# Patient Record
Sex: MALE | Employment: OTHER | URBAN - NONMETROPOLITAN AREA
[De-identification: names, ages, dates, MRNs, and addresses within clinical notes are randomized per-mention and may not be internally consistent; named-entity substitution may affect disease eponyms.]

---

## 2019-01-14 ENCOUNTER — HOSPITAL ENCOUNTER (OUTPATIENT)
Dept: GENERAL RADIOLOGY | Age: 69
Discharge: HOME | End: 2019-01-14
Payer: MEDICARE

## 2019-01-14 ENCOUNTER — HOSPITAL ENCOUNTER (OUTPATIENT)
Age: 69
Discharge: HOME | End: 2019-01-14
Payer: MEDICARE

## 2019-01-14 DIAGNOSIS — M54.9 DORSALGIA: ICD-10-CM

## 2019-01-14 DIAGNOSIS — M54.2 CERVICALGIA: ICD-10-CM

## 2019-01-14 DIAGNOSIS — R04.2 BLOOD IN SPUTUM: ICD-10-CM

## 2019-01-14 LAB
ALBUMIN SERPL-MCNC: 4.6 G/DL (ref 3.5–5.1)
ALP BLD-CCNC: 92 U/L (ref 38–126)
ALT SERPL-CCNC: 10 U/L (ref 11–66)
ANION GAP SERPL CALCULATED.3IONS-SCNC: 11 MEQ/L (ref 8–16)
AST SERPL-CCNC: 18 U/L (ref 5–40)
BILIRUB SERPL-MCNC: 0.5 MG/DL (ref 0.3–1.2)
BUN BLDV-MCNC: 12 MG/DL (ref 7–22)
CALCIUM SERPL-MCNC: 9 MG/DL (ref 8.5–10.5)
CHLORIDE BLD-SCNC: 105 MEQ/L (ref 98–111)
CO2: 26 MEQ/L (ref 23–33)
CREAT SERPL-MCNC: 0.7 MG/DL (ref 0.4–1.2)
ERYTHROCYTE [DISTWIDTH] IN BLOOD BY AUTOMATED COUNT: 15 % (ref 11.5–14.5)
ERYTHROCYTE [DISTWIDTH] IN BLOOD BY AUTOMATED COUNT: 44.9 FL (ref 35–45)
GFR SERPL CREATININE-BSD FRML MDRD: > 90 ML/MIN/1.73M2
GLUCOSE BLD-MCNC: 125 MG/DL (ref 70–108)
HCT VFR BLD CALC: 53.1 % (ref 42–52)
HEMOGLOBIN: 17.8 GM/DL (ref 14–18)
MCH RBC QN AUTO: 28.8 PG (ref 26–33)
MCHC RBC AUTO-ENTMCNC: 33.5 GM/DL (ref 32.2–35.5)
MCV RBC AUTO: 85.9 FL (ref 80–94)
PLATELET # BLD: 209 THOU/MM3 (ref 130–400)
PMV BLD AUTO: 9.9 FL (ref 9.4–12.4)
POTASSIUM SERPL-SCNC: 3.8 MEQ/L (ref 3.5–5.2)
RBC # BLD: 6.18 MILL/MM3 (ref 4.7–6.1)
SODIUM BLD-SCNC: 142 MEQ/L (ref 135–145)
TOTAL PROTEIN: 6.9 G/DL (ref 6.1–8)
WBC # BLD: 9.5 THOU/MM3 (ref 4.8–10.8)

## 2019-01-14 PROCEDURE — 80053 COMPREHEN METABOLIC PANEL: CPT

## 2019-01-14 PROCEDURE — 72100 X-RAY EXAM L-S SPINE 2/3 VWS: CPT

## 2019-01-14 PROCEDURE — 85027 COMPLETE CBC AUTOMATED: CPT

## 2019-01-14 PROCEDURE — 36415 COLL VENOUS BLD VENIPUNCTURE: CPT

## 2019-01-14 PROCEDURE — 72040 X-RAY EXAM NECK SPINE 2-3 VW: CPT

## 2019-01-14 PROCEDURE — 71046 X-RAY EXAM CHEST 2 VIEWS: CPT

## 2019-01-18 ENCOUNTER — HOSPITAL ENCOUNTER (OUTPATIENT)
Age: 69
Discharge: HOME | End: 2019-01-18
Payer: MEDICARE

## 2019-01-18 LAB
ALBUMIN SERPL-MCNC: 4.1 G/DL (ref 3.5–5.1)
ALP BLD-CCNC: 95 U/L (ref 38–126)
ALT SERPL-CCNC: 10 U/L (ref 11–66)
ANION GAP SERPL CALCULATED.3IONS-SCNC: 12 MEQ/L (ref 8–16)
AST SERPL-CCNC: 18 U/L (ref 5–40)
BILIRUB SERPL-MCNC: 0.3 MG/DL (ref 0.3–1.2)
BUN BLDV-MCNC: 16 MG/DL (ref 7–22)
CALCIUM SERPL-MCNC: 9.1 MG/DL (ref 8.5–10.5)
CHLORIDE BLD-SCNC: 104 MEQ/L (ref 98–111)
CO2: 25 MEQ/L (ref 23–33)
CREAT SERPL-MCNC: 0.8 MG/DL (ref 0.4–1.2)
GFR SERPL CREATININE-BSD FRML MDRD: > 90 ML/MIN/1.73M2
GLUCOSE BLD-MCNC: 211 MG/DL (ref 70–108)
POTASSIUM SERPL-SCNC: 4.7 MEQ/L (ref 3.5–5.2)
SODIUM BLD-SCNC: 141 MEQ/L (ref 135–145)
TOTAL PROTEIN: 6.8 G/DL (ref 6.1–8)

## 2019-01-18 PROCEDURE — 36415 COLL VENOUS BLD VENIPUNCTURE: CPT

## 2019-01-18 PROCEDURE — 80053 COMPREHEN METABOLIC PANEL: CPT

## 2019-01-22 ENCOUNTER — OFFICE VISIT (OUTPATIENT)
Dept: PULMONOLOGY | Age: 69
End: 2019-01-22
Payer: MEDICARE

## 2019-01-22 VITALS
DIASTOLIC BLOOD PRESSURE: 88 MMHG | HEIGHT: 68 IN | SYSTOLIC BLOOD PRESSURE: 130 MMHG | RESPIRATION RATE: 16 BRPM | WEIGHT: 175.2 LBS | TEMPERATURE: 98.3 F | OXYGEN SATURATION: 94 % | HEART RATE: 84 BPM | BODY MASS INDEX: 26.55 KG/M2

## 2019-01-22 DIAGNOSIS — R06.09 DOE (DYSPNEA ON EXERTION): ICD-10-CM

## 2019-01-22 DIAGNOSIS — R06.89 ABNORMAL BREATH SOUNDS: ICD-10-CM

## 2019-01-22 DIAGNOSIS — R04.2 HEMOPTYSIS: Primary | ICD-10-CM

## 2019-01-22 DIAGNOSIS — F17.200 SMOKER: ICD-10-CM

## 2019-01-22 PROCEDURE — 1101F PT FALLS ASSESS-DOCD LE1/YR: CPT | Performed by: INTERNAL MEDICINE

## 2019-01-22 PROCEDURE — 99205 OFFICE O/P NEW HI 60 MIN: CPT | Performed by: INTERNAL MEDICINE

## 2019-01-22 PROCEDURE — 4040F PNEUMOC VAC/ADMIN/RCVD: CPT | Performed by: INTERNAL MEDICINE

## 2019-01-22 PROCEDURE — 1123F ACP DISCUSS/DSCN MKR DOCD: CPT | Performed by: INTERNAL MEDICINE

## 2019-01-22 PROCEDURE — 4004F PT TOBACCO SCREEN RCVD TLK: CPT | Performed by: INTERNAL MEDICINE

## 2019-01-22 PROCEDURE — G8419 CALC BMI OUT NRM PARAM NOF/U: HCPCS | Performed by: INTERNAL MEDICINE

## 2019-01-22 PROCEDURE — G8484 FLU IMMUNIZE NO ADMIN: HCPCS | Performed by: INTERNAL MEDICINE

## 2019-01-22 PROCEDURE — G8428 CUR MEDS NOT DOCUMENT: HCPCS | Performed by: INTERNAL MEDICINE

## 2019-01-22 PROCEDURE — 3017F COLORECTAL CA SCREEN DOC REV: CPT | Performed by: INTERNAL MEDICINE

## 2019-01-22 RX ORDER — CEPHALEXIN 500 MG/1
500 CAPSULE ORAL 3 TIMES DAILY
COMMUNITY
End: 2019-02-12

## 2019-01-22 ASSESSMENT — ENCOUNTER SYMPTOMS
CHEST TIGHTNESS: 0
EYES NEGATIVE: 1
WHEEZING: 1
SHORTNESS OF BREATH: 1
ANAL BLEEDING: 0
CHOKING: 0
COUGH: 0
ABDOMINAL DISTENTION: 0
STRIDOR: 0
BACK PAIN: 1
ABDOMINAL PAIN: 0

## 2019-01-23 ENCOUNTER — HOSPITAL ENCOUNTER (OUTPATIENT)
Dept: CT IMAGING | Age: 69
Discharge: HOME | End: 2019-01-23
Payer: MEDICARE

## 2019-01-23 DIAGNOSIS — F17.200 SMOKER: ICD-10-CM

## 2019-01-23 PROCEDURE — 6360000004 HC RX CONTRAST MEDICATION: Performed by: INTERNAL MEDICINE

## 2019-01-23 PROCEDURE — 71260 CT THORAX DX C+: CPT

## 2019-01-23 RX ADMIN — IOPAMIDOL 85 ML: 755 INJECTION, SOLUTION INTRAVENOUS at 13:09

## 2019-01-24 ENCOUNTER — HOSPITAL ENCOUNTER (OUTPATIENT)
Age: 69
Discharge: HOME | End: 2019-01-24
Payer: MEDICARE

## 2019-01-24 DIAGNOSIS — R04.2 HEMOPTYSIS: ICD-10-CM

## 2019-01-24 PROCEDURE — 86480 TB TEST CELL IMMUN MEASURE: CPT

## 2019-01-25 ENCOUNTER — ANESTHESIA (OUTPATIENT)
Dept: ENDOSCOPY | Age: 69
End: 2019-01-25
Payer: MEDICARE

## 2019-01-25 ENCOUNTER — HOSPITAL ENCOUNTER (OUTPATIENT)
Age: 69
Setting detail: OUTPATIENT SURGERY
Discharge: HOME | End: 2019-01-25
Attending: INTERNAL MEDICINE | Admitting: INTERNAL MEDICINE
Payer: MEDICARE

## 2019-01-25 ENCOUNTER — APPOINTMENT (OUTPATIENT)
Dept: GENERAL RADIOLOGY | Age: 69
End: 2019-01-25
Attending: INTERNAL MEDICINE
Payer: MEDICARE

## 2019-01-25 ENCOUNTER — ANESTHESIA EVENT (OUTPATIENT)
Dept: ENDOSCOPY | Age: 69
End: 2019-01-25
Payer: MEDICARE

## 2019-01-25 VITALS
BODY MASS INDEX: 26.64 KG/M2 | HEART RATE: 78 BPM | HEIGHT: 68 IN | RESPIRATION RATE: 20 BRPM | WEIGHT: 175.8 LBS | TEMPERATURE: 98.3 F | DIASTOLIC BLOOD PRESSURE: 69 MMHG | OXYGEN SATURATION: 97 % | SYSTOLIC BLOOD PRESSURE: 121 MMHG

## 2019-01-25 VITALS
TEMPERATURE: 97.3 F | RESPIRATION RATE: 25 BRPM | SYSTOLIC BLOOD PRESSURE: 112 MMHG | DIASTOLIC BLOOD PRESSURE: 72 MMHG | OXYGEN SATURATION: 93 %

## 2019-01-25 PROCEDURE — 2580000003 HC RX 258: Performed by: INTERNAL MEDICINE

## 2019-01-25 PROCEDURE — 87077 CULTURE AEROBIC IDENTIFY: CPT

## 2019-01-25 PROCEDURE — 6370000000 HC RX 637 (ALT 250 FOR IP)

## 2019-01-25 PROCEDURE — 2500000003 HC RX 250 WO HCPCS: Performed by: NURSE ANESTHETIST, CERTIFIED REGISTERED

## 2019-01-25 PROCEDURE — 31628 BRONCHOSCOPY/LUNG BX EACH: CPT | Performed by: INTERNAL MEDICINE

## 2019-01-25 PROCEDURE — 3609011800 HC BRONCHOSCOPY/TRANSBRONCHIAL LUNG BIOPSY: Performed by: INTERNAL MEDICINE

## 2019-01-25 PROCEDURE — 6370000000 HC RX 637 (ALT 250 FOR IP): Performed by: NURSE ANESTHETIST, CERTIFIED REGISTERED

## 2019-01-25 PROCEDURE — 88112 CYTOPATH CELL ENHANCE TECH: CPT

## 2019-01-25 PROCEDURE — 6370000000 HC RX 637 (ALT 250 FOR IP): Performed by: INTERNAL MEDICINE

## 2019-01-25 PROCEDURE — 87070 CULTURE OTHR SPECIMN AEROBIC: CPT

## 2019-01-25 PROCEDURE — 7100000001 HC PACU RECOVERY - ADDTL 15 MIN: Performed by: INTERNAL MEDICINE

## 2019-01-25 PROCEDURE — 71045 X-RAY EXAM CHEST 1 VIEW: CPT

## 2019-01-25 PROCEDURE — 3609027000 HC BRONCHOSCOPY: Performed by: INTERNAL MEDICINE

## 2019-01-25 PROCEDURE — 87102 FUNGUS ISOLATION CULTURE: CPT

## 2019-01-25 PROCEDURE — 3700000000 HC ANESTHESIA ATTENDED CARE: Performed by: INTERNAL MEDICINE

## 2019-01-25 PROCEDURE — 88305 TISSUE EXAM BY PATHOLOGIST: CPT

## 2019-01-25 PROCEDURE — 3700000001 HC ADD 15 MINUTES (ANESTHESIA): Performed by: INTERNAL MEDICINE

## 2019-01-25 PROCEDURE — 87184 SC STD DISK METHOD PER PLATE: CPT

## 2019-01-25 PROCEDURE — 6360000002 HC RX W HCPCS: Performed by: NURSE ANESTHETIST, CERTIFIED REGISTERED

## 2019-01-25 PROCEDURE — 87186 SC STD MICRODIL/AGAR DIL: CPT

## 2019-01-25 PROCEDURE — 7100000000 HC PACU RECOVERY - FIRST 15 MIN: Performed by: INTERNAL MEDICINE

## 2019-01-25 PROCEDURE — 87116 MYCOBACTERIA CULTURE: CPT

## 2019-01-25 PROCEDURE — 87205 SMEAR GRAM STAIN: CPT

## 2019-01-25 PROCEDURE — 2500000003 HC RX 250 WO HCPCS: Performed by: INTERNAL MEDICINE

## 2019-01-25 PROCEDURE — 2500000003 HC RX 250 WO HCPCS

## 2019-01-25 RX ORDER — PROPOFOL 10 MG/ML
INJECTION, EMULSION INTRAVENOUS PRN
Status: DISCONTINUED | OUTPATIENT
Start: 2019-01-25 | End: 2019-01-25 | Stop reason: SDUPTHER

## 2019-01-25 RX ORDER — LIDOCAINE HYDROCHLORIDE 10 MG/ML
INJECTION, SOLUTION EPIDURAL; INFILTRATION; INTRACAUDAL; PERINEURAL PRN
Status: DISCONTINUED | OUTPATIENT
Start: 2019-01-25 | End: 2019-01-25 | Stop reason: HOSPADM

## 2019-01-25 RX ORDER — LIDOCAINE HYDROCHLORIDE 40 MG/ML
SOLUTION TOPICAL PRN
Status: DISCONTINUED | OUTPATIENT
Start: 2019-01-25 | End: 2019-01-25 | Stop reason: SDUPTHER

## 2019-01-25 RX ORDER — SODIUM CHLORIDE 450 MG/100ML
INJECTION, SOLUTION INTRAVENOUS CONTINUOUS
Status: DISCONTINUED | OUTPATIENT
Start: 2019-01-25 | End: 2019-01-25 | Stop reason: HOSPADM

## 2019-01-25 RX ORDER — KETAMINE HYDROCHLORIDE 50 MG/ML
INJECTION, SOLUTION, CONCENTRATE INTRAMUSCULAR; INTRAVENOUS PRN
Status: DISCONTINUED | OUTPATIENT
Start: 2019-01-25 | End: 2019-01-25 | Stop reason: SDUPTHER

## 2019-01-25 RX ORDER — OXYMETAZOLINE HYDROCHLORIDE 0.05 G/100ML
SPRAY NASAL PRN
Status: DISCONTINUED | OUTPATIENT
Start: 2019-01-25 | End: 2019-01-25 | Stop reason: HOSPADM

## 2019-01-25 RX ORDER — GLYCOPYRROLATE 1 MG/5 ML
SYRINGE (ML) INTRAVENOUS PRN
Status: DISCONTINUED | OUTPATIENT
Start: 2019-01-25 | End: 2019-01-25 | Stop reason: SDUPTHER

## 2019-01-25 RX ADMIN — LIDOCAINE HYDROCHLORIDE 4 ML: 40 SOLUTION TOPICAL at 12:14

## 2019-01-25 RX ADMIN — PROPOFOL 50 MG: 10 INJECTION, EMULSION INTRAVENOUS at 12:18

## 2019-01-25 RX ADMIN — SODIUM CHLORIDE: 4.5 INJECTION, SOLUTION INTRAVENOUS at 10:57

## 2019-01-25 RX ADMIN — PROPOFOL 160 MG: 10 INJECTION, EMULSION INTRAVENOUS at 12:22

## 2019-01-25 RX ADMIN — Medication 0.4 MG: at 12:18

## 2019-01-25 RX ADMIN — KETAMINE HYDROCHLORIDE 20 MG: 50 INJECTION, SOLUTION INTRAMUSCULAR; INTRAVENOUS at 12:18

## 2019-01-25 RX ADMIN — KETAMINE HYDROCHLORIDE 20 MG: 50 INJECTION, SOLUTION INTRAMUSCULAR; INTRAVENOUS at 12:35

## 2019-01-25 ASSESSMENT — PAIN - FUNCTIONAL ASSESSMENT: PAIN_FUNCTIONAL_ASSESSMENT: 0-10

## 2019-01-25 ASSESSMENT — PAIN SCALES - GENERAL
PAINLEVEL_OUTOF10: 0
PAINLEVEL_OUTOF10: 0

## 2019-01-28 LAB
QUANTI TB GOLD PLUS: NEGATIVE
QUANTI TB1 MINUS NIL: 0.03 IU/ML (ref 0–0.34)
QUANTI TB2 MINUS NIL: 0.03 IU/ML (ref 0–0.34)
QUANTIFERON MITOGEN MINUS NIL: > 10 IU/ML
QUANTIFERON NIL: 0.08 IU/ML

## 2019-01-29 LAB
GRAM STAIN RESULT: ABNORMAL
ORGANISM: ABNORMAL
RESPIRATORY CULTURE: ABNORMAL
RESPIRATORY CULTURE: ABNORMAL

## 2019-01-30 ENCOUNTER — HOSPITAL ENCOUNTER (OUTPATIENT)
Dept: PULMONOLOGY | Age: 69
Discharge: HOME | End: 2019-01-30
Payer: MEDICARE

## 2019-01-30 PROCEDURE — 94729 DIFFUSING CAPACITY: CPT

## 2019-01-30 PROCEDURE — 2709999900 HC NON-CHARGEABLE SUPPLY

## 2019-01-30 PROCEDURE — 94060 EVALUATION OF WHEEZING: CPT

## 2019-01-30 PROCEDURE — 94726 PLETHYSMOGRAPHY LUNG VOLUMES: CPT

## 2019-02-06 ENCOUNTER — OFFICE VISIT (OUTPATIENT)
Dept: PULMONOLOGY | Age: 69
End: 2019-02-06
Payer: MEDICARE

## 2019-02-06 VITALS
WEIGHT: 174 LBS | SYSTOLIC BLOOD PRESSURE: 138 MMHG | TEMPERATURE: 98.1 F | DIASTOLIC BLOOD PRESSURE: 82 MMHG | RESPIRATION RATE: 20 BRPM | BODY MASS INDEX: 26.37 KG/M2 | OXYGEN SATURATION: 97 % | HEART RATE: 80 BPM | HEIGHT: 68 IN

## 2019-02-06 DIAGNOSIS — R91.8 MASS OF LOWER LOBE OF LEFT LUNG: Primary | ICD-10-CM

## 2019-02-06 DIAGNOSIS — F17.200 SMOKER: ICD-10-CM

## 2019-02-06 PROCEDURE — G8419 CALC BMI OUT NRM PARAM NOF/U: HCPCS | Performed by: INTERNAL MEDICINE

## 2019-02-06 PROCEDURE — 3017F COLORECTAL CA SCREEN DOC REV: CPT | Performed by: INTERNAL MEDICINE

## 2019-02-06 PROCEDURE — 4004F PT TOBACCO SCREEN RCVD TLK: CPT | Performed by: INTERNAL MEDICINE

## 2019-02-06 PROCEDURE — 4040F PNEUMOC VAC/ADMIN/RCVD: CPT | Performed by: INTERNAL MEDICINE

## 2019-02-06 PROCEDURE — 1123F ACP DISCUSS/DSCN MKR DOCD: CPT | Performed by: INTERNAL MEDICINE

## 2019-02-06 PROCEDURE — G8427 DOCREV CUR MEDS BY ELIG CLIN: HCPCS | Performed by: INTERNAL MEDICINE

## 2019-02-06 PROCEDURE — 1101F PT FALLS ASSESS-DOCD LE1/YR: CPT | Performed by: INTERNAL MEDICINE

## 2019-02-06 PROCEDURE — G8484 FLU IMMUNIZE NO ADMIN: HCPCS | Performed by: INTERNAL MEDICINE

## 2019-02-06 PROCEDURE — 99213 OFFICE O/P EST LOW 20 MIN: CPT | Performed by: INTERNAL MEDICINE

## 2019-02-06 ASSESSMENT — ENCOUNTER SYMPTOMS
CHEST TIGHTNESS: 0
BACK PAIN: 1
STRIDOR: 0
ABDOMINAL DISTENTION: 0
EYES NEGATIVE: 1
WHEEZING: 1
ANAL BLEEDING: 0
SHORTNESS OF BREATH: 1
COUGH: 0
ABDOMINAL PAIN: 0
CHOKING: 0

## 2019-02-11 RX ORDER — FENTANYL CITRATE 50 UG/ML
50 INJECTION, SOLUTION INTRAMUSCULAR; INTRAVENOUS ONCE
Status: CANCELLED | OUTPATIENT
Start: 2019-02-11 | End: 2019-02-11

## 2019-02-11 RX ORDER — SODIUM CHLORIDE 450 MG/100ML
INJECTION, SOLUTION INTRAVENOUS CONTINUOUS
Status: CANCELLED | OUTPATIENT
Start: 2019-02-11

## 2019-02-11 RX ORDER — MIDAZOLAM HYDROCHLORIDE 1 MG/ML
1 INJECTION INTRAMUSCULAR; INTRAVENOUS ONCE
Status: CANCELLED | OUTPATIENT
Start: 2019-02-11 | End: 2019-02-11

## 2019-02-12 ENCOUNTER — HOSPITAL ENCOUNTER (OUTPATIENT)
Dept: CT IMAGING | Age: 69
Discharge: HOME | End: 2019-02-12
Payer: MEDICARE

## 2019-02-12 ENCOUNTER — HOSPITAL ENCOUNTER (OUTPATIENT)
Dept: GENERAL RADIOLOGY | Age: 69
Discharge: HOME | End: 2019-02-12
Payer: MEDICARE

## 2019-02-12 VITALS
HEART RATE: 75 BPM | HEIGHT: 68 IN | DIASTOLIC BLOOD PRESSURE: 74 MMHG | WEIGHT: 174.8 LBS | SYSTOLIC BLOOD PRESSURE: 129 MMHG | TEMPERATURE: 98.1 F | RESPIRATION RATE: 18 BRPM | OXYGEN SATURATION: 93 % | BODY MASS INDEX: 26.49 KG/M2

## 2019-02-12 DIAGNOSIS — F17.200 SMOKER: ICD-10-CM

## 2019-02-12 DIAGNOSIS — R91.8 MASS OF LOWER LOBE OF LEFT LUNG: ICD-10-CM

## 2019-02-12 LAB
ERYTHROCYTE [DISTWIDTH] IN BLOOD BY AUTOMATED COUNT: 14 % (ref 11.5–14.5)
ERYTHROCYTE [DISTWIDTH] IN BLOOD BY AUTOMATED COUNT: 43.1 FL (ref 35–45)
HCT VFR BLD CALC: 50.5 % (ref 42–52)
HEMOGLOBIN: 16.9 GM/DL (ref 14–18)
MCH RBC QN AUTO: 28.5 PG (ref 26–33)
MCHC RBC AUTO-ENTMCNC: 33.5 GM/DL (ref 32.2–35.5)
MCV RBC AUTO: 85.2 FL (ref 80–94)
PLATELET # BLD: 196 THOU/MM3 (ref 130–400)
PMV BLD AUTO: 9.8 FL (ref 9.4–12.4)
RBC # BLD: 5.93 MILL/MM3 (ref 4.7–6.1)
WBC # BLD: 10.3 THOU/MM3 (ref 4.8–10.8)

## 2019-02-12 PROCEDURE — 71045 X-RAY EXAM CHEST 1 VIEW: CPT

## 2019-02-12 PROCEDURE — 6360000002 HC RX W HCPCS

## 2019-02-12 PROCEDURE — 6370000000 HC RX 637 (ALT 250 FOR IP)

## 2019-02-12 PROCEDURE — 32405 CT NEEDLE BIOPSY LUNG PERCUTANEOUS: CPT

## 2019-02-12 PROCEDURE — 77012 CT SCAN FOR NEEDLE BIOPSY: CPT

## 2019-02-12 PROCEDURE — 88341 IMHCHEM/IMCYTCHM EA ADD ANTB: CPT

## 2019-02-12 PROCEDURE — 85027 COMPLETE CBC AUTOMATED: CPT

## 2019-02-12 PROCEDURE — 2709999900 HC NON-CHARGEABLE SUPPLY

## 2019-02-12 PROCEDURE — 88333 PATH CONSLTJ SURG CYTO XM 1: CPT

## 2019-02-12 PROCEDURE — 88305 TISSUE EXAM BY PATHOLOGIST: CPT

## 2019-02-12 PROCEDURE — 88342 IMHCHEM/IMCYTCHM 1ST ANTB: CPT

## 2019-02-12 PROCEDURE — 2580000003 HC RX 258: Performed by: RADIOLOGY

## 2019-02-12 PROCEDURE — 36415 COLL VENOUS BLD VENIPUNCTURE: CPT

## 2019-02-12 RX ORDER — OMEPRAZOLE 20 MG/1
20 CAPSULE, DELAYED RELEASE ORAL DAILY
Status: ON HOLD | COMMUNITY

## 2019-02-12 RX ORDER — DIAPER,BRIEF,INFANT-TODD,DISP
EACH MISCELLANEOUS ONCE
Status: COMPLETED | OUTPATIENT
Start: 2019-02-12 | End: 2019-02-12

## 2019-02-12 RX ORDER — FENTANYL CITRATE 50 UG/ML
50 INJECTION, SOLUTION INTRAMUSCULAR; INTRAVENOUS ONCE
Status: COMPLETED | OUTPATIENT
Start: 2019-02-12 | End: 2019-02-12

## 2019-02-12 RX ORDER — MIDAZOLAM HYDROCHLORIDE 1 MG/ML
1 INJECTION INTRAMUSCULAR; INTRAVENOUS ONCE
Status: COMPLETED | OUTPATIENT
Start: 2019-02-12 | End: 2019-02-12

## 2019-02-12 RX ORDER — SODIUM CHLORIDE 450 MG/100ML
INJECTION, SOLUTION INTRAVENOUS CONTINUOUS
Status: DISCONTINUED | OUTPATIENT
Start: 2019-02-12 | End: 2019-02-13 | Stop reason: HOSPADM

## 2019-02-12 RX ADMIN — FENTANYL CITRATE 50 MCG: 50 INJECTION, SOLUTION INTRAMUSCULAR; INTRAVENOUS at 09:49

## 2019-02-12 RX ADMIN — MIDAZOLAM HYDROCHLORIDE 1 MG: 1 INJECTION INTRAMUSCULAR; INTRAVENOUS at 09:48

## 2019-02-12 RX ADMIN — SODIUM CHLORIDE: 4.5 INJECTION, SOLUTION INTRAVENOUS at 08:38

## 2019-02-12 RX ADMIN — Medication 0.9 G: at 10:10

## 2019-02-12 ASSESSMENT — PAIN SCALES - GENERAL
PAINLEVEL_OUTOF10: 0

## 2019-02-12 ASSESSMENT — PAIN - FUNCTIONAL ASSESSMENT: PAIN_FUNCTIONAL_ASSESSMENT: 0-10

## 2019-02-19 ENCOUNTER — TELEPHONE (OUTPATIENT)
Dept: PULMONOLOGY | Age: 69
End: 2019-02-19

## 2019-02-19 DIAGNOSIS — C34.92 ADENOCARCINOMA OF LEFT LUNG (HCC): Primary | ICD-10-CM

## 2019-02-19 DIAGNOSIS — C34.12 MALIGNANT NEOPLASM OF UPPER LOBE, LEFT BRONCHUS OR LUNG (HCC): ICD-10-CM

## 2019-02-20 ENCOUNTER — OFFICE VISIT (OUTPATIENT)
Dept: PULMONOLOGY | Age: 69
End: 2019-02-20
Payer: MEDICARE

## 2019-02-20 ENCOUNTER — TELEPHONE (OUTPATIENT)
Dept: CARDIOTHORACIC SURGERY | Age: 69
End: 2019-02-20

## 2019-02-20 VITALS
WEIGHT: 176.2 LBS | HEART RATE: 81 BPM | OXYGEN SATURATION: 95 % | SYSTOLIC BLOOD PRESSURE: 124 MMHG | RESPIRATION RATE: 18 BRPM | HEIGHT: 68 IN | DIASTOLIC BLOOD PRESSURE: 70 MMHG | BODY MASS INDEX: 26.7 KG/M2 | TEMPERATURE: 97.6 F

## 2019-02-20 DIAGNOSIS — C34.92 ADENOCARCINOMA OF LEFT LUNG (HCC): Primary | ICD-10-CM

## 2019-02-20 DIAGNOSIS — J44.9 CHRONIC OBSTRUCTIVE PULMONARY DISEASE, UNSPECIFIED COPD TYPE (HCC): ICD-10-CM

## 2019-02-20 DIAGNOSIS — M54.2 NECK PAIN: ICD-10-CM

## 2019-02-20 PROCEDURE — 4040F PNEUMOC VAC/ADMIN/RCVD: CPT | Performed by: INTERNAL MEDICINE

## 2019-02-20 PROCEDURE — 3023F SPIROM DOC REV: CPT | Performed by: INTERNAL MEDICINE

## 2019-02-20 PROCEDURE — G8419 CALC BMI OUT NRM PARAM NOF/U: HCPCS | Performed by: INTERNAL MEDICINE

## 2019-02-20 PROCEDURE — 1101F PT FALLS ASSESS-DOCD LE1/YR: CPT | Performed by: INTERNAL MEDICINE

## 2019-02-20 PROCEDURE — G8926 SPIRO NO PERF OR DOC: HCPCS | Performed by: INTERNAL MEDICINE

## 2019-02-20 PROCEDURE — 1123F ACP DISCUSS/DSCN MKR DOCD: CPT | Performed by: INTERNAL MEDICINE

## 2019-02-20 PROCEDURE — G8484 FLU IMMUNIZE NO ADMIN: HCPCS | Performed by: INTERNAL MEDICINE

## 2019-02-20 PROCEDURE — 4004F PT TOBACCO SCREEN RCVD TLK: CPT | Performed by: INTERNAL MEDICINE

## 2019-02-20 PROCEDURE — 99214 OFFICE O/P EST MOD 30 MIN: CPT | Performed by: INTERNAL MEDICINE

## 2019-02-20 PROCEDURE — G8427 DOCREV CUR MEDS BY ELIG CLIN: HCPCS | Performed by: INTERNAL MEDICINE

## 2019-02-20 PROCEDURE — 3017F COLORECTAL CA SCREEN DOC REV: CPT | Performed by: INTERNAL MEDICINE

## 2019-02-20 ASSESSMENT — ENCOUNTER SYMPTOMS
CHOKING: 0
SHORTNESS OF BREATH: 0
ABDOMINAL PAIN: 0
EYES NEGATIVE: 1
COUGH: 1
STRIDOR: 0
BACK PAIN: 0
ANAL BLEEDING: 0
CHEST TIGHTNESS: 0
ABDOMINAL DISTENTION: 0

## 2019-02-25 ENCOUNTER — HOSPITAL ENCOUNTER (OUTPATIENT)
Dept: PET IMAGING | Age: 69
Discharge: HOME | End: 2019-02-25
Payer: MEDICARE

## 2019-02-25 DIAGNOSIS — C34.92 ADENOCARCINOMA OF LEFT LUNG (HCC): ICD-10-CM

## 2019-02-25 DIAGNOSIS — C34.12 MALIGNANT NEOPLASM OF UPPER LOBE, LEFT BRONCHUS OR LUNG (HCC): ICD-10-CM

## 2019-02-25 LAB
FUNGUS IDENTIFIED: NORMAL
FUNGUS SMEAR: NORMAL

## 2019-02-25 PROCEDURE — 78815 PET IMAGE W/CT SKULL-THIGH: CPT

## 2019-02-25 PROCEDURE — A9552 F18 FDG: HCPCS | Performed by: INTERNAL MEDICINE

## 2019-02-25 PROCEDURE — 3430000000 HC RX DIAGNOSTIC RADIOPHARMACEUTICAL: Performed by: INTERNAL MEDICINE

## 2019-02-25 RX ORDER — FLUDEOXYGLUCOSE F 18 200 MCI/ML
10 INJECTION, SOLUTION INTRAVENOUS
Status: COMPLETED | OUTPATIENT
Start: 2019-02-25 | End: 2019-02-25

## 2019-02-25 RX ADMIN — FLUDEOXYGLUCOSE F 18 12.19 MILLICURIE: 200 INJECTION, SOLUTION INTRAVENOUS at 07:55

## 2019-02-26 ENCOUNTER — TELEPHONE (OUTPATIENT)
Dept: PULMONOLOGY | Age: 69
End: 2019-02-26

## 2019-02-26 ENCOUNTER — HOSPITAL ENCOUNTER (OUTPATIENT)
Dept: INFUSION THERAPY | Age: 69
Discharge: HOME | End: 2019-02-26
Payer: MEDICARE

## 2019-02-26 ENCOUNTER — OFFICE VISIT (OUTPATIENT)
Dept: ONCOLOGY | Age: 69
End: 2019-02-26
Payer: MEDICARE

## 2019-02-26 ENCOUNTER — CLINICAL DOCUMENTATION (OUTPATIENT)
Dept: CASE MANAGEMENT | Age: 69
End: 2019-02-26

## 2019-02-26 VITALS
HEIGHT: 68 IN | WEIGHT: 177.2 LBS | SYSTOLIC BLOOD PRESSURE: 144 MMHG | OXYGEN SATURATION: 96 % | HEART RATE: 82 BPM | TEMPERATURE: 97.8 F | DIASTOLIC BLOOD PRESSURE: 78 MMHG | RESPIRATION RATE: 16 BRPM | BODY MASS INDEX: 26.86 KG/M2

## 2019-02-26 DIAGNOSIS — C34.32 MALIGNANT NEOPLASM OF LOWER LOBE OF LEFT LUNG (HCC): Primary | ICD-10-CM

## 2019-02-26 PROCEDURE — 1123F ACP DISCUSS/DSCN MKR DOCD: CPT | Performed by: INTERNAL MEDICINE

## 2019-02-26 PROCEDURE — G8484 FLU IMMUNIZE NO ADMIN: HCPCS | Performed by: INTERNAL MEDICINE

## 2019-02-26 PROCEDURE — 4040F PNEUMOC VAC/ADMIN/RCVD: CPT | Performed by: INTERNAL MEDICINE

## 2019-02-26 PROCEDURE — G8419 CALC BMI OUT NRM PARAM NOF/U: HCPCS | Performed by: INTERNAL MEDICINE

## 2019-02-26 PROCEDURE — 99205 OFFICE O/P NEW HI 60 MIN: CPT | Performed by: INTERNAL MEDICINE

## 2019-02-26 PROCEDURE — G8427 DOCREV CUR MEDS BY ELIG CLIN: HCPCS | Performed by: INTERNAL MEDICINE

## 2019-02-26 PROCEDURE — 99211 OFF/OP EST MAY X REQ PHY/QHP: CPT

## 2019-02-26 PROCEDURE — 3017F COLORECTAL CA SCREEN DOC REV: CPT | Performed by: INTERNAL MEDICINE

## 2019-02-26 PROCEDURE — 4004F PT TOBACCO SCREEN RCVD TLK: CPT | Performed by: INTERNAL MEDICINE

## 2019-02-26 PROCEDURE — 1101F PT FALLS ASSESS-DOCD LE1/YR: CPT | Performed by: INTERNAL MEDICINE

## 2019-02-27 ENCOUNTER — CLINICAL DOCUMENTATION (OUTPATIENT)
Dept: CASE MANAGEMENT | Age: 69
End: 2019-02-27

## 2019-02-27 ASSESSMENT — ENCOUNTER SYMPTOMS
COUGH: 1
VOMITING: 0
BLOOD IN STOOL: 0
SHORTNESS OF BREATH: 1
NAUSEA: 0
BACK PAIN: 0
CONSTIPATION: 0
WHEEZING: 0
COLOR CHANGE: 0
DIARRHEA: 0
FACIAL SWELLING: 0
TROUBLE SWALLOWING: 0
EYE DISCHARGE: 0
ABDOMINAL DISTENTION: 0
RECTAL PAIN: 0
CHEST TIGHTNESS: 0
SORE THROAT: 0
ABDOMINAL PAIN: 0

## 2019-02-28 ENCOUNTER — ANESTHESIA EVENT (OUTPATIENT)
Dept: OPERATING ROOM | Age: 69
End: 2019-02-28
Payer: MEDICARE

## 2019-02-28 ENCOUNTER — TELEPHONE (OUTPATIENT)
Dept: PULMONOLOGY | Age: 69
End: 2019-02-28

## 2019-03-01 ENCOUNTER — HOSPITAL ENCOUNTER (OUTPATIENT)
Age: 69
Setting detail: OUTPATIENT SURGERY
Discharge: HOME | End: 2019-03-01
Attending: INTERNAL MEDICINE | Admitting: INTERNAL MEDICINE
Payer: MEDICARE

## 2019-03-01 ENCOUNTER — APPOINTMENT (OUTPATIENT)
Dept: GENERAL RADIOLOGY | Age: 69
End: 2019-03-01
Attending: INTERNAL MEDICINE
Payer: MEDICARE

## 2019-03-01 ENCOUNTER — ANESTHESIA (OUTPATIENT)
Dept: OPERATING ROOM | Age: 69
End: 2019-03-01
Payer: MEDICARE

## 2019-03-01 VITALS
HEIGHT: 68 IN | DIASTOLIC BLOOD PRESSURE: 69 MMHG | WEIGHT: 175 LBS | TEMPERATURE: 97.8 F | RESPIRATION RATE: 16 BRPM | SYSTOLIC BLOOD PRESSURE: 119 MMHG | HEART RATE: 76 BPM | OXYGEN SATURATION: 98 % | BODY MASS INDEX: 26.52 KG/M2

## 2019-03-01 VITALS
DIASTOLIC BLOOD PRESSURE: 82 MMHG | OXYGEN SATURATION: 99 % | SYSTOLIC BLOOD PRESSURE: 135 MMHG | RESPIRATION RATE: 1 BRPM

## 2019-03-01 LAB
EKG ATRIAL RATE: 78 BPM
EKG P AXIS: 66 DEGREES
EKG P-R INTERVAL: 144 MS
EKG Q-T INTERVAL: 406 MS
EKG QRS DURATION: 126 MS
EKG QTC CALCULATION (BAZETT): 462 MS
EKG R AXIS: -67 DEGREES
EKG T AXIS: 19 DEGREES
EKG VENTRICULAR RATE: 78 BPM

## 2019-03-01 PROCEDURE — 3609020000 HC BRONCHOSCOPY W/EBUS FNA: Performed by: INTERNAL MEDICINE

## 2019-03-01 PROCEDURE — C1725 CATH, TRANSLUMIN NON-LASER: HCPCS | Performed by: INTERNAL MEDICINE

## 2019-03-01 PROCEDURE — 88112 CYTOPATH CELL ENHANCE TECH: CPT

## 2019-03-01 PROCEDURE — 7100000001 HC PACU RECOVERY - ADDTL 15 MIN: Performed by: INTERNAL MEDICINE

## 2019-03-01 PROCEDURE — 2500000003 HC RX 250 WO HCPCS: Performed by: NURSE ANESTHETIST, CERTIFIED REGISTERED

## 2019-03-01 PROCEDURE — 3603165200 HC BRNCHSC EBUS GUIDED SAMPL 1/2 NODE STATION/STRUX: Performed by: INTERNAL MEDICINE

## 2019-03-01 PROCEDURE — 7100000010 HC PHASE II RECOVERY - FIRST 15 MIN: Performed by: INTERNAL MEDICINE

## 2019-03-01 PROCEDURE — 7100000011 HC PHASE II RECOVERY - ADDTL 15 MIN: Performed by: INTERNAL MEDICINE

## 2019-03-01 PROCEDURE — 31629 BRONCHOSCOPY/NEEDLE BX EACH: CPT | Performed by: INTERNAL MEDICINE

## 2019-03-01 PROCEDURE — 71045 X-RAY EXAM CHEST 1 VIEW: CPT

## 2019-03-01 PROCEDURE — 6360000002 HC RX W HCPCS: Performed by: NURSE ANESTHETIST, CERTIFIED REGISTERED

## 2019-03-01 PROCEDURE — 2709999900 HC NON-CHARGEABLE SUPPLY: Performed by: INTERNAL MEDICINE

## 2019-03-01 PROCEDURE — 93010 ELECTROCARDIOGRAM REPORT: CPT | Performed by: NUCLEAR MEDICINE

## 2019-03-01 PROCEDURE — 7100000000 HC PACU RECOVERY - FIRST 15 MIN: Performed by: INTERNAL MEDICINE

## 2019-03-01 PROCEDURE — 93005 ELECTROCARDIOGRAM TRACING: CPT | Performed by: INTERNAL MEDICINE

## 2019-03-01 PROCEDURE — 2580000003 HC RX 258: Performed by: INTERNAL MEDICINE

## 2019-03-01 PROCEDURE — 3700000001 HC ADD 15 MINUTES (ANESTHESIA): Performed by: INTERNAL MEDICINE

## 2019-03-01 PROCEDURE — 3700000000 HC ANESTHESIA ATTENDED CARE: Performed by: INTERNAL MEDICINE

## 2019-03-01 PROCEDURE — 31652 BRONCH EBUS SAMPLNG 1/2 NODE: CPT | Performed by: INTERNAL MEDICINE

## 2019-03-01 RX ORDER — PROPOFOL 10 MG/ML
INJECTION, EMULSION INTRAVENOUS CONTINUOUS PRN
Status: DISCONTINUED | OUTPATIENT
Start: 2019-03-01 | End: 2019-03-01 | Stop reason: SDUPTHER

## 2019-03-01 RX ORDER — ROCURONIUM BROMIDE 10 MG/ML
INJECTION, SOLUTION INTRAVENOUS PRN
Status: DISCONTINUED | OUTPATIENT
Start: 2019-03-01 | End: 2019-03-01 | Stop reason: SDUPTHER

## 2019-03-01 RX ORDER — FENTANYL CITRATE 50 UG/ML
INJECTION, SOLUTION INTRAMUSCULAR; INTRAVENOUS PRN
Status: DISCONTINUED | OUTPATIENT
Start: 2019-03-01 | End: 2019-03-01 | Stop reason: SDUPTHER

## 2019-03-01 RX ORDER — ONDANSETRON 2 MG/ML
INJECTION INTRAMUSCULAR; INTRAVENOUS PRN
Status: DISCONTINUED | OUTPATIENT
Start: 2019-03-01 | End: 2019-03-01 | Stop reason: SDUPTHER

## 2019-03-01 RX ORDER — KETAMINE HYDROCHLORIDE 50 MG/ML
INJECTION, SOLUTION, CONCENTRATE INTRAMUSCULAR; INTRAVENOUS PRN
Status: DISCONTINUED | OUTPATIENT
Start: 2019-03-01 | End: 2019-03-01 | Stop reason: SDUPTHER

## 2019-03-01 RX ORDER — MIDAZOLAM HYDROCHLORIDE 1 MG/ML
INJECTION INTRAMUSCULAR; INTRAVENOUS PRN
Status: DISCONTINUED | OUTPATIENT
Start: 2019-03-01 | End: 2019-03-01 | Stop reason: SDUPTHER

## 2019-03-01 RX ORDER — LIDOCAINE HYDROCHLORIDE 20 MG/ML
INJECTION, SOLUTION INFILTRATION; PERINEURAL PRN
Status: DISCONTINUED | OUTPATIENT
Start: 2019-03-01 | End: 2019-03-01 | Stop reason: SDUPTHER

## 2019-03-01 RX ORDER — DEXAMETHASONE SODIUM PHOSPHATE 4 MG/ML
INJECTION, SOLUTION INTRA-ARTICULAR; INTRALESIONAL; INTRAMUSCULAR; INTRAVENOUS; SOFT TISSUE PRN
Status: DISCONTINUED | OUTPATIENT
Start: 2019-03-01 | End: 2019-03-01 | Stop reason: SDUPTHER

## 2019-03-01 RX ORDER — NEOSTIGMINE METHYLSULFATE 1 MG/ML
INJECTION, SOLUTION INTRAVENOUS PRN
Status: DISCONTINUED | OUTPATIENT
Start: 2019-03-01 | End: 2019-03-01 | Stop reason: SDUPTHER

## 2019-03-01 RX ORDER — PROPOFOL 10 MG/ML
INJECTION, EMULSION INTRAVENOUS PRN
Status: DISCONTINUED | OUTPATIENT
Start: 2019-03-01 | End: 2019-03-01 | Stop reason: SDUPTHER

## 2019-03-01 RX ORDER — GLYCOPYRROLATE 1 MG/5 ML
SYRINGE (ML) INTRAVENOUS PRN
Status: DISCONTINUED | OUTPATIENT
Start: 2019-03-01 | End: 2019-03-01 | Stop reason: SDUPTHER

## 2019-03-01 RX ORDER — SODIUM CHLORIDE 450 MG/100ML
INJECTION, SOLUTION INTRAVENOUS CONTINUOUS
Status: DISCONTINUED | OUTPATIENT
Start: 2019-03-01 | End: 2019-03-01 | Stop reason: HOSPADM

## 2019-03-01 RX ADMIN — FENTANYL CITRATE 100 MCG: 50 INJECTION INTRAMUSCULAR; INTRAVENOUS at 09:13

## 2019-03-01 RX ADMIN — Medication 0.6 MG: at 10:45

## 2019-03-01 RX ADMIN — Medication 0.2 MG: at 09:13

## 2019-03-01 RX ADMIN — MIDAZOLAM HYDROCHLORIDE 2 MG: 1 INJECTION, SOLUTION INTRAMUSCULAR; INTRAVENOUS at 09:07

## 2019-03-01 RX ADMIN — PHENYLEPHRINE HYDROCHLORIDE 100 MCG: 10 INJECTION INTRAVENOUS at 09:55

## 2019-03-01 RX ADMIN — PROPOFOL 120 MCG/KG/MIN: 10 INJECTION, EMULSION INTRAVENOUS at 09:13

## 2019-03-01 RX ADMIN — FENTANYL CITRATE 50 MCG: 50 INJECTION INTRAMUSCULAR; INTRAVENOUS at 09:56

## 2019-03-01 RX ADMIN — FENTANYL CITRATE 50 MCG: 50 INJECTION INTRAMUSCULAR; INTRAVENOUS at 09:34

## 2019-03-01 RX ADMIN — PROPOFOL 200 MG: 10 INJECTION, EMULSION INTRAVENOUS at 09:13

## 2019-03-01 RX ADMIN — SODIUM CHLORIDE: 4.5 INJECTION, SOLUTION INTRAVENOUS at 06:44

## 2019-03-01 RX ADMIN — ONDANSETRON HYDROCHLORIDE 4 MG: 4 INJECTION, SOLUTION INTRAMUSCULAR; INTRAVENOUS at 09:13

## 2019-03-01 RX ADMIN — ROCURONIUM BROMIDE 40 MG: 10 INJECTION INTRAVENOUS at 09:13

## 2019-03-01 RX ADMIN — ROCURONIUM BROMIDE 10 MG: 10 INJECTION INTRAVENOUS at 09:56

## 2019-03-01 RX ADMIN — ROCURONIUM BROMIDE 20 MG: 10 INJECTION INTRAVENOUS at 10:18

## 2019-03-01 RX ADMIN — KETAMINE HYDROCHLORIDE 50 MG: 50 INJECTION, SOLUTION INTRAMUSCULAR; INTRAVENOUS at 09:13

## 2019-03-01 RX ADMIN — SODIUM CHLORIDE: 4.5 INJECTION, SOLUTION INTRAVENOUS at 10:25

## 2019-03-01 RX ADMIN — LIDOCAINE HYDROCHLORIDE 100 MG: 20 INJECTION, SOLUTION INFILTRATION; PERINEURAL at 09:13

## 2019-03-01 RX ADMIN — NEOSTIGMINE METHYLSULFATE 3 MG: 1 INJECTION, SOLUTION INTRAVENOUS at 10:45

## 2019-03-01 RX ADMIN — DEXAMETHASONE SODIUM PHOSPHATE 10 MG: 4 INJECTION, SOLUTION INTRAMUSCULAR; INTRAVENOUS at 09:13

## 2019-03-01 ASSESSMENT — PULMONARY FUNCTION TESTS
PIF_VALUE: 35
PIF_VALUE: 9
PIF_VALUE: 19
PIF_VALUE: 35
PIF_VALUE: 4
PIF_VALUE: 0
PIF_VALUE: 19
PIF_VALUE: 35
PIF_VALUE: 2
PIF_VALUE: 35
PIF_VALUE: 1
PIF_VALUE: 35
PIF_VALUE: 24
PIF_VALUE: 1
PIF_VALUE: 35
PIF_VALUE: 20
PIF_VALUE: 35
PIF_VALUE: 19
PIF_VALUE: 35
PIF_VALUE: 20
PIF_VALUE: 30
PIF_VALUE: 35
PIF_VALUE: 19
PIF_VALUE: 25
PIF_VALUE: 26
PIF_VALUE: 35
PIF_VALUE: 35
PIF_VALUE: 19
PIF_VALUE: 35
PIF_VALUE: 19
PIF_VALUE: 35
PIF_VALUE: 19
PIF_VALUE: 30
PIF_VALUE: 35
PIF_VALUE: 27
PIF_VALUE: 19
PIF_VALUE: 34
PIF_VALUE: 35
PIF_VALUE: 4
PIF_VALUE: 35
PIF_VALUE: 35
PIF_VALUE: 2
PIF_VALUE: 35
PIF_VALUE: 0
PIF_VALUE: 35
PIF_VALUE: 20
PIF_VALUE: 32
PIF_VALUE: 35
PIF_VALUE: 2
PIF_VALUE: 14
PIF_VALUE: 35
PIF_VALUE: 17
PIF_VALUE: 35
PIF_VALUE: 0
PIF_VALUE: 13
PIF_VALUE: 19
PIF_VALUE: 35
PIF_VALUE: 0
PIF_VALUE: 5
PIF_VALUE: 35
PIF_VALUE: 0
PIF_VALUE: 35
PIF_VALUE: 3
PIF_VALUE: 35
PIF_VALUE: 20
PIF_VALUE: 35
PIF_VALUE: 20
PIF_VALUE: 35
PIF_VALUE: 30
PIF_VALUE: 20
PIF_VALUE: 19
PIF_VALUE: 19
PIF_VALUE: 35
PIF_VALUE: 36
PIF_VALUE: 0
PIF_VALUE: 24
PIF_VALUE: 3
PIF_VALUE: 35
PIF_VALUE: 32
PIF_VALUE: 35
PIF_VALUE: 19
PIF_VALUE: 19
PIF_VALUE: 35
PIF_VALUE: 27
PIF_VALUE: 35
PIF_VALUE: 32
PIF_VALUE: 35

## 2019-03-01 ASSESSMENT — ENCOUNTER SYMPTOMS: SHORTNESS OF BREATH: 1

## 2019-03-01 ASSESSMENT — PAIN SCALES - GENERAL
PAINLEVEL_OUTOF10: 0
PAINLEVEL_OUTOF10: 0

## 2019-03-01 ASSESSMENT — PAIN - FUNCTIONAL ASSESSMENT: PAIN_FUNCTIONAL_ASSESSMENT: 0-10

## 2019-03-02 ENCOUNTER — HOSPITAL ENCOUNTER (OUTPATIENT)
Dept: CT IMAGING | Age: 69
Discharge: HOME | End: 2019-03-02
Payer: MEDICARE

## 2019-03-02 ENCOUNTER — HOSPITAL ENCOUNTER (OUTPATIENT)
Dept: MRI IMAGING | Age: 69
Discharge: HOME | End: 2019-03-02
Payer: MEDICARE

## 2019-03-02 DIAGNOSIS — C34.92 ADENOCARCINOMA OF LEFT LUNG (HCC): ICD-10-CM

## 2019-03-02 DIAGNOSIS — M54.2 NECK PAIN: ICD-10-CM

## 2019-03-02 DIAGNOSIS — J44.9 CHRONIC OBSTRUCTIVE PULMONARY DISEASE, UNSPECIFIED COPD TYPE (HCC): ICD-10-CM

## 2019-03-02 LAB — POC CREATININE WHOLE BLOOD: 1 MG/DL (ref 0.5–1.2)

## 2019-03-02 PROCEDURE — A9579 GAD-BASE MR CONTRAST NOS,1ML: HCPCS | Performed by: INTERNAL MEDICINE

## 2019-03-02 PROCEDURE — 82565 ASSAY OF CREATININE: CPT

## 2019-03-02 PROCEDURE — 70553 MRI BRAIN STEM W/O & W/DYE: CPT

## 2019-03-02 PROCEDURE — 6360000004 HC RX CONTRAST MEDICATION: Performed by: INTERNAL MEDICINE

## 2019-03-02 PROCEDURE — 70492 CT SFT TSUE NCK W/O & W/DYE: CPT

## 2019-03-02 RX ADMIN — IOPAMIDOL 65 ML: 755 INJECTION, SOLUTION INTRAVENOUS at 08:33

## 2019-03-02 RX ADMIN — GADOTERIDOL 15 ML: 279.3 INJECTION, SOLUTION INTRAVENOUS at 09:14

## 2019-03-04 ENCOUNTER — OFFICE VISIT (OUTPATIENT)
Dept: CARDIOTHORACIC SURGERY | Age: 69
End: 2019-03-04
Payer: MEDICARE

## 2019-03-04 VITALS
HEART RATE: 96 BPM | WEIGHT: 176.6 LBS | BODY MASS INDEX: 26.76 KG/M2 | DIASTOLIC BLOOD PRESSURE: 90 MMHG | HEIGHT: 68 IN | SYSTOLIC BLOOD PRESSURE: 138 MMHG

## 2019-03-04 DIAGNOSIS — C34.32 PRIMARY ADENOCARCINOMA OF LOWER LOBE OF LEFT LUNG (HCC): Primary | ICD-10-CM

## 2019-03-04 DIAGNOSIS — Z01.810 ENCOUNTER FOR PREPROCEDURAL CARDIOVASCULAR EXAMINATION: ICD-10-CM

## 2019-03-04 PROCEDURE — 3017F COLORECTAL CA SCREEN DOC REV: CPT | Performed by: THORACIC SURGERY (CARDIOTHORACIC VASCULAR SURGERY)

## 2019-03-04 PROCEDURE — 99204 OFFICE O/P NEW MOD 45 MIN: CPT | Performed by: THORACIC SURGERY (CARDIOTHORACIC VASCULAR SURGERY)

## 2019-03-04 PROCEDURE — 4040F PNEUMOC VAC/ADMIN/RCVD: CPT | Performed by: THORACIC SURGERY (CARDIOTHORACIC VASCULAR SURGERY)

## 2019-03-04 PROCEDURE — 4004F PT TOBACCO SCREEN RCVD TLK: CPT | Performed by: THORACIC SURGERY (CARDIOTHORACIC VASCULAR SURGERY)

## 2019-03-04 PROCEDURE — G8484 FLU IMMUNIZE NO ADMIN: HCPCS | Performed by: THORACIC SURGERY (CARDIOTHORACIC VASCULAR SURGERY)

## 2019-03-04 PROCEDURE — G8427 DOCREV CUR MEDS BY ELIG CLIN: HCPCS | Performed by: THORACIC SURGERY (CARDIOTHORACIC VASCULAR SURGERY)

## 2019-03-04 PROCEDURE — 1101F PT FALLS ASSESS-DOCD LE1/YR: CPT | Performed by: THORACIC SURGERY (CARDIOTHORACIC VASCULAR SURGERY)

## 2019-03-04 PROCEDURE — 1123F ACP DISCUSS/DSCN MKR DOCD: CPT | Performed by: THORACIC SURGERY (CARDIOTHORACIC VASCULAR SURGERY)

## 2019-03-04 PROCEDURE — G8419 CALC BMI OUT NRM PARAM NOF/U: HCPCS | Performed by: THORACIC SURGERY (CARDIOTHORACIC VASCULAR SURGERY)

## 2019-03-04 ASSESSMENT — ENCOUNTER SYMPTOMS
EYES NEGATIVE: 1
ALLERGIC/IMMUNOLOGIC NEGATIVE: 1
RESPIRATORY NEGATIVE: 1
GASTROINTESTINAL NEGATIVE: 1

## 2019-03-05 ENCOUNTER — TELEPHONE (OUTPATIENT)
Dept: CARDIOTHORACIC SURGERY | Age: 69
End: 2019-03-05

## 2019-03-05 ENCOUNTER — TELEPHONE (OUTPATIENT)
Dept: PULMONOLOGY | Age: 69
End: 2019-03-05

## 2019-03-06 ENCOUNTER — TELEPHONE (OUTPATIENT)
Dept: CARDIOTHORACIC SURGERY | Age: 69
End: 2019-03-06

## 2019-03-07 ENCOUNTER — CLINICAL DOCUMENTATION (OUTPATIENT)
Dept: CASE MANAGEMENT | Age: 69
End: 2019-03-07

## 2019-03-08 ENCOUNTER — HOSPITAL ENCOUNTER (OUTPATIENT)
Dept: NON INVASIVE DIAGNOSTICS | Age: 69
Discharge: HOME | End: 2019-03-08
Payer: MEDICARE

## 2019-03-08 VITALS — WEIGHT: 175 LBS | HEIGHT: 68 IN | BODY MASS INDEX: 26.52 KG/M2

## 2019-03-08 DIAGNOSIS — Z01.810 ENCOUNTER FOR PREPROCEDURAL CARDIOVASCULAR EXAMINATION: ICD-10-CM

## 2019-03-08 DIAGNOSIS — C34.32 PRIMARY ADENOCARCINOMA OF LOWER LOBE OF LEFT LUNG (HCC): ICD-10-CM

## 2019-03-08 PROCEDURE — 6360000002 HC RX W HCPCS

## 2019-03-08 PROCEDURE — 93018 CV STRESS TEST I&R ONLY: CPT | Performed by: NUCLEAR MEDICINE

## 2019-03-08 PROCEDURE — A9500 TC99M SESTAMIBI: HCPCS | Performed by: NUCLEAR MEDICINE

## 2019-03-08 PROCEDURE — 78452 HT MUSCLE IMAGE SPECT MULT: CPT

## 2019-03-08 PROCEDURE — 93016 CV STRESS TEST SUPVJ ONLY: CPT | Performed by: NUCLEAR MEDICINE

## 2019-03-08 PROCEDURE — 78452 HT MUSCLE IMAGE SPECT MULT: CPT | Performed by: NUCLEAR MEDICINE

## 2019-03-08 PROCEDURE — 2709999900 HC NON-CHARGEABLE SUPPLY

## 2019-03-08 PROCEDURE — 93017 CV STRESS TEST TRACING ONLY: CPT

## 2019-03-08 PROCEDURE — 3430000000 HC RX DIAGNOSTIC RADIOPHARMACEUTICAL: Performed by: NUCLEAR MEDICINE

## 2019-03-08 RX ADMIN — Medication 11 MILLICURIE: at 08:12

## 2019-03-08 RX ADMIN — Medication 35 MILLICURIE: at 09:12

## 2019-03-11 ENCOUNTER — OFFICE VISIT (OUTPATIENT)
Dept: CARDIOLOGY CLINIC | Age: 69
End: 2019-03-11
Payer: MEDICARE

## 2019-03-11 VITALS
WEIGHT: 179.6 LBS | DIASTOLIC BLOOD PRESSURE: 68 MMHG | BODY MASS INDEX: 27.22 KG/M2 | HEART RATE: 88 BPM | HEIGHT: 68 IN | SYSTOLIC BLOOD PRESSURE: 142 MMHG

## 2019-03-11 DIAGNOSIS — E78.01 FAMILIAL HYPERCHOLESTEROLEMIA: ICD-10-CM

## 2019-03-11 DIAGNOSIS — Z01.818 PRE-OP EVALUATION: ICD-10-CM

## 2019-03-11 DIAGNOSIS — I10 ESSENTIAL HYPERTENSION: Primary | ICD-10-CM

## 2019-03-11 DIAGNOSIS — R94.39 ABNORMAL STRESS TEST: ICD-10-CM

## 2019-03-11 DIAGNOSIS — R06.02 SHORTNESS OF BREATH: ICD-10-CM

## 2019-03-11 LAB — AFB CULTURE & SMEAR: NORMAL

## 2019-03-11 PROCEDURE — G8419 CALC BMI OUT NRM PARAM NOF/U: HCPCS | Performed by: NUCLEAR MEDICINE

## 2019-03-11 PROCEDURE — G8484 FLU IMMUNIZE NO ADMIN: HCPCS | Performed by: NUCLEAR MEDICINE

## 2019-03-11 PROCEDURE — 1101F PT FALLS ASSESS-DOCD LE1/YR: CPT | Performed by: NUCLEAR MEDICINE

## 2019-03-11 PROCEDURE — 4040F PNEUMOC VAC/ADMIN/RCVD: CPT | Performed by: NUCLEAR MEDICINE

## 2019-03-11 PROCEDURE — 4004F PT TOBACCO SCREEN RCVD TLK: CPT | Performed by: NUCLEAR MEDICINE

## 2019-03-11 PROCEDURE — 1123F ACP DISCUSS/DSCN MKR DOCD: CPT | Performed by: NUCLEAR MEDICINE

## 2019-03-11 PROCEDURE — 99204 OFFICE O/P NEW MOD 45 MIN: CPT | Performed by: NUCLEAR MEDICINE

## 2019-03-11 PROCEDURE — 3017F COLORECTAL CA SCREEN DOC REV: CPT | Performed by: NUCLEAR MEDICINE

## 2019-03-11 PROCEDURE — G8427 DOCREV CUR MEDS BY ELIG CLIN: HCPCS | Performed by: NUCLEAR MEDICINE

## 2019-03-11 ASSESSMENT — ENCOUNTER SYMPTOMS
CHEST TIGHTNESS: 0
BLOOD IN STOOL: 0
COLOR CHANGE: 0
PHOTOPHOBIA: 0
SHORTNESS OF BREATH: 1
DIARRHEA: 0
ABDOMINAL DISTENTION: 0
VOMITING: 0
BACK PAIN: 0
ABDOMINAL PAIN: 0
RECTAL PAIN: 0
NAUSEA: 0
CONSTIPATION: 0
ANAL BLEEDING: 0

## 2019-03-12 ENCOUNTER — HOSPITAL ENCOUNTER (OUTPATIENT)
Dept: INPATIENT UNIT | Age: 69
Discharge: HOME | DRG: 163 | End: 2019-03-13
Attending: NUCLEAR MEDICINE | Admitting: NUCLEAR MEDICINE
Payer: MEDICARE

## 2019-03-12 PROBLEM — Z98.61 S/P CORONARY ANGIOPLASTY: Status: ACTIVE | Noted: 2019-03-12

## 2019-03-12 PROBLEM — Z98.890 S/P CARDIAC CATH: Status: ACTIVE | Noted: 2019-03-12

## 2019-03-12 PROBLEM — I25.10 CAD IN NATIVE ARTERY: Status: ACTIVE | Noted: 2019-03-12

## 2019-03-12 LAB
ABO: NORMAL
ACTIVATED CLOTTING TIME: 263 SECONDS (ref 1–150)
ANION GAP SERPL CALCULATED.3IONS-SCNC: 11 MEQ/L (ref 8–16)
ANTIBODY SCREEN: NORMAL
BUN BLDV-MCNC: 17 MG/DL (ref 7–22)
CALCIUM SERPL-MCNC: 9.1 MG/DL (ref 8.5–10.5)
CHLORIDE BLD-SCNC: 102 MEQ/L (ref 98–111)
CHOLESTEROL, TOTAL: 171 MG/DL (ref 100–199)
CO2: 26 MEQ/L (ref 23–33)
CREAT SERPL-MCNC: 0.8 MG/DL (ref 0.4–1.2)
EKG ATRIAL RATE: 69 BPM
EKG ATRIAL RATE: 73 BPM
EKG P AXIS: 68 DEGREES
EKG P AXIS: 72 DEGREES
EKG P-R INTERVAL: 138 MS
EKG P-R INTERVAL: 142 MS
EKG Q-T INTERVAL: 414 MS
EKG Q-T INTERVAL: 432 MS
EKG QRS DURATION: 134 MS
EKG QRS DURATION: 134 MS
EKG QTC CALCULATION (BAZETT): 456 MS
EKG QTC CALCULATION (BAZETT): 462 MS
EKG R AXIS: -63 DEGREES
EKG R AXIS: -70 DEGREES
EKG T AXIS: 17 DEGREES
EKG T AXIS: 9 DEGREES
EKG VENTRICULAR RATE: 69 BPM
EKG VENTRICULAR RATE: 73 BPM
ERYTHROCYTE [DISTWIDTH] IN BLOOD BY AUTOMATED COUNT: 13.5 % (ref 11.5–14.5)
ERYTHROCYTE [DISTWIDTH] IN BLOOD BY AUTOMATED COUNT: 42.3 FL (ref 35–45)
GFR SERPL CREATININE-BSD FRML MDRD: > 90 ML/MIN/1.73M2
GLUCOSE BLD-MCNC: 207 MG/DL (ref 70–108)
HCT VFR BLD CALC: 45.1 % (ref 42–52)
HDLC SERPL-MCNC: 48 MG/DL
HEMOGLOBIN: 15 GM/DL (ref 14–18)
INR BLD: 0.88 (ref 0.85–1.13)
LDL CHOLESTEROL CALCULATED: 102 MG/DL
MCH RBC QN AUTO: 28.3 PG (ref 26–33)
MCHC RBC AUTO-ENTMCNC: 33.3 GM/DL (ref 32.2–35.5)
MCV RBC AUTO: 85.1 FL (ref 80–94)
PLATELET # BLD: 198 THOU/MM3 (ref 130–400)
PMV BLD AUTO: 10 FL (ref 9.4–12.4)
POTASSIUM REFLEX MAGNESIUM: 4.5 MEQ/L (ref 3.5–5.2)
PREGNANCY, SERUM: NEGATIVE
RBC # BLD: 5.3 MILL/MM3 (ref 4.7–6.1)
RH FACTOR: NORMAL
SODIUM BLD-SCNC: 139 MEQ/L (ref 135–145)
TRIGL SERPL-MCNC: 105 MG/DL (ref 0–199)
WBC # BLD: 8.8 THOU/MM3 (ref 4.8–10.8)

## 2019-03-12 PROCEDURE — C1760 CLOSURE DEV, VASC: HCPCS

## 2019-03-12 PROCEDURE — C1725 CATH, TRANSLUMIN NON-LASER: HCPCS

## 2019-03-12 PROCEDURE — 6360000004 HC RX CONTRAST MEDICATION: Performed by: NUCLEAR MEDICINE

## 2019-03-12 PROCEDURE — 93005 ELECTROCARDIOGRAM TRACING: CPT | Performed by: NUCLEAR MEDICINE

## 2019-03-12 PROCEDURE — 6370000000 HC RX 637 (ALT 250 FOR IP)

## 2019-03-12 PROCEDURE — C1887 CATHETER, GUIDING: HCPCS

## 2019-03-12 PROCEDURE — 86901 BLOOD TYPING SEROLOGIC RH(D): CPT

## 2019-03-12 PROCEDURE — C1874 STENT, COATED/COV W/DEL SYS: HCPCS

## 2019-03-12 PROCEDURE — 84703 CHORIONIC GONADOTROPIN ASSAY: CPT

## 2019-03-12 PROCEDURE — 36415 COLL VENOUS BLD VENIPUNCTURE: CPT

## 2019-03-12 PROCEDURE — 93010 ELECTROCARDIOGRAM REPORT: CPT | Performed by: INTERNAL MEDICINE

## 2019-03-12 PROCEDURE — 2580000003 HC RX 258: Performed by: NUCLEAR MEDICINE

## 2019-03-12 PROCEDURE — 93458 L HRT ARTERY/VENTRICLE ANGIO: CPT | Performed by: NUCLEAR MEDICINE

## 2019-03-12 PROCEDURE — 2709999900 HC NON-CHARGEABLE SUPPLY

## 2019-03-12 PROCEDURE — 86850 RBC ANTIBODY SCREEN: CPT

## 2019-03-12 PROCEDURE — 6360000002 HC RX W HCPCS

## 2019-03-12 PROCEDURE — 2580000003 HC RX 258: Performed by: INTERNAL MEDICINE

## 2019-03-12 PROCEDURE — C1894 INTRO/SHEATH, NON-LASER: HCPCS

## 2019-03-12 PROCEDURE — 85347 COAGULATION TIME ACTIVATED: CPT

## 2019-03-12 PROCEDURE — 85610 PROTHROMBIN TIME: CPT

## 2019-03-12 PROCEDURE — 80048 BASIC METABOLIC PNL TOTAL CA: CPT

## 2019-03-12 PROCEDURE — C1769 GUIDE WIRE: HCPCS

## 2019-03-12 PROCEDURE — 80061 LIPID PANEL: CPT

## 2019-03-12 PROCEDURE — 86900 BLOOD TYPING SEROLOGIC ABO: CPT

## 2019-03-12 PROCEDURE — 93005 ELECTROCARDIOGRAM TRACING: CPT | Performed by: INTERNAL MEDICINE

## 2019-03-12 PROCEDURE — 2500000003 HC RX 250 WO HCPCS

## 2019-03-12 PROCEDURE — C9600 PERC DRUG-EL COR STENT SING: HCPCS | Performed by: NUCLEAR MEDICINE

## 2019-03-12 PROCEDURE — 85027 COMPLETE CBC AUTOMATED: CPT

## 2019-03-12 RX ORDER — ASPIRIN 81 MG/1
81 TABLET, CHEWABLE ORAL DAILY
Status: DISCONTINUED | OUTPATIENT
Start: 2019-03-12 | End: 2019-03-13 | Stop reason: HOSPADM

## 2019-03-12 RX ORDER — LISINOPRIL 40 MG/1
40 TABLET ORAL DAILY
Status: DISCONTINUED | OUTPATIENT
Start: 2019-03-12 | End: 2019-03-13 | Stop reason: HOSPADM

## 2019-03-12 RX ORDER — ATORVASTATIN CALCIUM 20 MG/1
20 TABLET, FILM COATED ORAL NIGHTLY
Status: DISCONTINUED | OUTPATIENT
Start: 2019-03-12 | End: 2019-03-13 | Stop reason: HOSPADM

## 2019-03-12 RX ORDER — DUTASTERIDE 0.5 MG/1
0.5 CAPSULE, LIQUID FILLED ORAL DAILY
Status: DISCONTINUED | OUTPATIENT
Start: 2019-03-12 | End: 2019-03-13 | Stop reason: HOSPADM

## 2019-03-12 RX ORDER — AMLODIPINE BESYLATE 10 MG/1
10 TABLET ORAL DAILY
Status: DISCONTINUED | OUTPATIENT
Start: 2019-03-12 | End: 2019-03-13 | Stop reason: HOSPADM

## 2019-03-12 RX ORDER — SODIUM CHLORIDE 0.9 % (FLUSH) 0.9 %
10 SYRINGE (ML) INJECTION EVERY 12 HOURS SCHEDULED
Status: DISCONTINUED | OUTPATIENT
Start: 2019-03-12 | End: 2019-03-13 | Stop reason: HOSPADM

## 2019-03-12 RX ORDER — SODIUM CHLORIDE 9 MG/ML
75 INJECTION, SOLUTION INTRAVENOUS CONTINUOUS
Status: ACTIVE | OUTPATIENT
Start: 2019-03-12 | End: 2019-03-13

## 2019-03-12 RX ORDER — SODIUM CHLORIDE 0.9 % (FLUSH) 0.9 %
10 SYRINGE (ML) INJECTION EVERY 12 HOURS SCHEDULED
Status: DISCONTINUED | OUTPATIENT
Start: 2019-03-12 | End: 2019-03-12 | Stop reason: SDUPTHER

## 2019-03-12 RX ORDER — SODIUM CHLORIDE 0.9 % (FLUSH) 0.9 %
10 SYRINGE (ML) INJECTION PRN
Status: DISCONTINUED | OUTPATIENT
Start: 2019-03-12 | End: 2019-03-13 | Stop reason: HOSPADM

## 2019-03-12 RX ORDER — SODIUM CHLORIDE 0.9 % (FLUSH) 0.9 %
10 SYRINGE (ML) INJECTION PRN
Status: DISCONTINUED | OUTPATIENT
Start: 2019-03-12 | End: 2019-03-12 | Stop reason: SDUPTHER

## 2019-03-12 RX ORDER — ACETAMINOPHEN 325 MG/1
650 TABLET ORAL EVERY 4 HOURS PRN
Status: DISCONTINUED | OUTPATIENT
Start: 2019-03-12 | End: 2019-03-13 | Stop reason: HOSPADM

## 2019-03-12 RX ORDER — ONDANSETRON 2 MG/ML
4 INJECTION INTRAMUSCULAR; INTRAVENOUS EVERY 6 HOURS PRN
Status: DISCONTINUED | OUTPATIENT
Start: 2019-03-12 | End: 2019-03-13 | Stop reason: HOSPADM

## 2019-03-12 RX ORDER — ASPIRIN 81 MG/1
324 TABLET, CHEWABLE ORAL ONCE
Status: DISCONTINUED | OUTPATIENT
Start: 2019-03-12 | End: 2019-03-13 | Stop reason: ALTCHOICE

## 2019-03-12 RX ORDER — CLOPIDOGREL BISULFATE 75 MG/1
75 TABLET ORAL DAILY
Status: DISCONTINUED | OUTPATIENT
Start: 2019-03-13 | End: 2019-03-13 | Stop reason: HOSPADM

## 2019-03-12 RX ORDER — DIPHENHYDRAMINE HYDROCHLORIDE 50 MG/ML
25 INJECTION INTRAMUSCULAR; INTRAVENOUS ONCE
Status: DISCONTINUED | OUTPATIENT
Start: 2019-03-12 | End: 2019-03-13 | Stop reason: ALTCHOICE

## 2019-03-12 RX ORDER — SODIUM CHLORIDE 9 MG/ML
INJECTION, SOLUTION INTRAVENOUS CONTINUOUS
Status: DISCONTINUED | OUTPATIENT
Start: 2019-03-12 | End: 2019-03-12 | Stop reason: ALTCHOICE

## 2019-03-12 RX ORDER — OMEPRAZOLE 20 MG/1
20 CAPSULE, DELAYED RELEASE ORAL
Status: DISCONTINUED | OUTPATIENT
Start: 2019-03-12 | End: 2019-03-13 | Stop reason: HOSPADM

## 2019-03-12 RX ORDER — NITROGLYCERIN 0.4 MG/1
0.4 TABLET SUBLINGUAL EVERY 5 MIN PRN
Status: DISCONTINUED | OUTPATIENT
Start: 2019-03-12 | End: 2019-03-13 | Stop reason: HOSPADM

## 2019-03-12 RX ADMIN — SODIUM CHLORIDE: 9 INJECTION, SOLUTION INTRAVENOUS at 14:25

## 2019-03-12 RX ADMIN — ATORVASTATIN CALCIUM 20 MG: 20 TABLET, FILM COATED ORAL at 22:19

## 2019-03-12 RX ADMIN — LISINOPRIL 40 MG: 40 TABLET ORAL at 22:20

## 2019-03-12 RX ADMIN — AMLODIPINE BESYLATE 10 MG: 10 TABLET ORAL at 22:19

## 2019-03-12 RX ADMIN — OMEPRAZOLE 20 MG: 20 CAPSULE, DELAYED RELEASE ORAL at 22:21

## 2019-03-12 RX ADMIN — DUTASTERIDE 0.5 MG: 0.5 CAPSULE, LIQUID FILLED ORAL at 22:20

## 2019-03-12 RX ADMIN — Medication 10 ML: at 22:06

## 2019-03-12 RX ADMIN — IOPAMIDOL 160 ML: 755 INJECTION, SOLUTION INTRAVENOUS at 17:33

## 2019-03-12 ASSESSMENT — PAIN SCALES - GENERAL
PAINLEVEL_OUTOF10: 0

## 2019-03-12 NOTE — H&P
[address] [address]  Sedation/Analgesia History & Physical    Pt Name: Kayleen Husain  Account number: [de-identified]  MRN: [id number]  YOB: 1950  Provider Performing Procedure: Rashad Eason MD MD [facility]  Primary Care Physician: Von Cody. Andria Lindsey MD  Date: 3/12/2019    PRE-PROCEDURE    Code Status: FULL CODE  Brief History/Pre-Procedure Diagnosis:   Angina  Abn stress test      Consent: : I have discussed with the patient risks, benefits, and alternatives (and relevant risks, benefits, and side effects related to alternatives or not receiving care), and likelihood of the success. The patient and/or representative appear to understand and agree to proceed. The discussion encompasses risks, benefits, and side effects related to the alternatives and the risks related to not receiving the proposed care, treatment, and services. MEDICAL HISTORY  []ASHD/ANGINA/MI/CHF   [x]Hypertension  [x]Diabetes  []Hyperlipidemia  []Smoking  [x]Family Hx of ASHD  []Additional information:       has a past medical history of Anxiety, Arthritis, Cancer ([facility] [address] COPD (chronic obstructive pulmonary disease) ([facility] [address] Enlarged prostate with urinary retention, Gait difficulty, Generalized weakness, Hyperlipidemia, Hypertension, Lesion of bladder, Osteoarthritis, Retention of urine, S/P TURP, SOB (shortness of breath), and Voiding difficulty. SURGICAL HISTORY   has a past surgical history that includes Prostate surgery (2012); Colonoscopy (8065,3818); Appendectomy; bronchoscopy (N/A, 1/25/2019); bronchoscopy (1/25/2019); and bronchoscopy (N/A, 3/1/2019).   Additional information:       ALLERGIES   Allergies as of 03/12/2019    (No Known Allergies)     Additional information:       MEDICATIONS   Aspirin  [x] 81 mg  [] 325 mg  [] None  Antiplatelet drug therapy use last 5 days  []No []Yes  Coumadin Use Last 5 Days []No []Yes  Other anticoagulant use last 5 days  []No []Yes    Current Facility-Administered Medications:     0.9 % sodium chloride infusion, , Intravenous, Continuous, Sade Resendiz MD, Last Rate: 75 mL/hr at 03/12/19 1425    sodium chloride flush 0.9 % injection 10 mL, 10 mL, Intravenous, 2 times per day, [address], MD    sodium chloride flush 0.9 % injection 10 mL, 10 mL, Intravenous, PRN, [address], MD    diphenhydrAMINE (BENADRYL) injection 25 mg, 25 mg, Intravenous, Once, [address], MD    hydrocortisone sodium succinate PF (SOLU-CORTEF) injection 100 mg, 100 mg, Intravenous, Once, [address], MD    nitroGLYCERIN (NITROSTAT) SL tablet 0.4 mg, 0.4 mg, Sublingual, Q5 Min PRN, Sade Resendiz MD    aspirin chewable tablet 324 mg, 324 mg, Oral, Once, [address], MD  Prior to Admission medications    Medication Sig Start Date End Date Taking? Authorizing Provider   umeclidinium-vilanterol [facility] ELLIPTA) 62.5-25 MCG/INH AEPB inhaler Inhale 1 puff into the lungs daily 2/20/19  Yes Alina Crowley MD   omeprazole (PRILOSEC) 20 MG delayed release capsule Take 20 mg by mouth daily   Yes Historical Provider, MD   amLODIPine (NORVASC) 10 MG tablet Take 10 mg by mouth daily. Yes Historical Provider, MD   lisinopril (PRINIVIL;ZESTRIL) 40 MG tablet Take 40 mg by mouth daily. Yes Historical Provider, MD   atorvastatin (LIPITOR) 20 MG tablet Take 20 mg by mouth daily. Yes Historical Provider, MD   dutasteride (AVODART) 0.5 MG capsule Take 0.5 mg by mouth daily.      Yes Historical Provider, MD     Additional information:       VITAL SIGNS   Vitals:    03/12/19 1416   BP: 128/76   Pulse:    Resp:    Temp:    SpO2:        PHYSICAL:   General: No acute distress  HEENT:  Unremarkable for age  Neck: without increased JVD, carotid pulses 2+ bilaterally without bruits  Heart: RRR, S1 & S2 WNL, S4 gallop, without murmurs or rubs    Lungs: Clear to auscultation    Abdomen: BS present, without HSM, masses, or tenderness    Extremities: without C,C,E.

## 2019-03-12 NOTE — OP NOTE
[facility]  Sedation/Analgesia Post Sedation Record    Pt Name: Mili Daily  Account number: [de-identified]  MRN: [id number]  YOB: 1950  Procedure Performed By: Maribel Villavicencio MD MD [facility]  Primary Care Physician: Julia Alston. Kelby Larry MD  Date: 3/12/2019    POST-PROCEDURE    Physicians/Assistants: Maribel Villavicencio MD MD [facility]  Procedure Performed: cath    Sedation/Anesthesia: Versed/ Fentanyl and 2% xylocaine local anesthesia. Estimated Blood Loss: < 10 ml.    Specimens Removed: None       Disposition of Specimen: N/A      Complications: None Immediate      Post-procedure Diagnosis/Findings: severe CAD             Recommendations: see chart               Maribel Villavicencio MD MD [facility]  Electronically signed 3/12/2019 at 5:03 PM

## 2019-03-12 NOTE — PLAN OF CARE
Care plan reviewed with patient and family. Patient and family verbalize understanding of the plan of care and contribute to goal setting.

## 2019-03-12 NOTE — PROGRESS NOTES
Sedation/Analgesia Post Sedation Record        Pt Name: Valorie Woodson  MRN: [id number]  YOB: 1950  Procedure Performed By: Opal Kaur MD MD, [facility] [place] [facility] [place]  Primary Care Physician: Irais Finn. Rodríguez Ma MD        POST-PROCEDURE    Physicians/Assistants: Opal Kaur MD MD, Wilda Moeller    Procedure Performed:  PCI to LCX                                  Sedation/Anesthesia:  Local Anesthesia and IV Conscious Sedation with continuous O2 monitoring    Estimated Blood Loss: 10 cc     Specimens Removed:  [x]None []Other:      Disposition of Specimen:  []Pathology []Other        Complications:   [x]None Immediate []Other:       Post Procedure Diagnosis/Findings:  Coronary Artery Disease          Recommendations:    Medical treatment and review films.    DAPT  Lipid lowering therapy  Aggressive risk factor modification  Follow up with Dr. Darrius Lanza within 1-2 weeks               Opal Kaur MD MD, Wilda Moeller,  Electronically signed 3/12/2019 at 5:41 PM

## 2019-03-12 NOTE — FLOWSHEET NOTE
Pt admitted to  2E14 ambulatory for cardiac cath. Pt NPO. Patient accompanied by family. Vital signs obtained. Assessment and data collection initiated. Oriented to room. Policies and procedures for 2E explained   All questions answered with no further questions at this time. Fall prevention and safety precautions discussed with patient.

## 2019-03-12 NOTE — FLOWSHEET NOTE
Care taken over from [facility]  Received in Cath Recovery. Report received from Cath Lab. Right femoral site has no signs of bleeding or swelling, area soft. Tegaderm dressing clean, dry, and intact. IV 1000 0.9 NS infusing at 75 ml per hour. Denies pain or discomfort. Monitor showing NSR. See Post Cath Assessment flowsheet.

## 2019-03-13 ENCOUNTER — ANESTHESIA EVENT (OUTPATIENT)
Dept: OPERATING ROOM | Age: 69
DRG: 163 | End: 2019-03-13
Payer: MEDICARE

## 2019-03-13 VITALS
WEIGHT: 180.3 LBS | OXYGEN SATURATION: 95 % | SYSTOLIC BLOOD PRESSURE: 140 MMHG | RESPIRATION RATE: 14 BRPM | DIASTOLIC BLOOD PRESSURE: 76 MMHG | BODY MASS INDEX: 27.32 KG/M2 | TEMPERATURE: 98 F | HEIGHT: 68 IN | HEART RATE: 73 BPM

## 2019-03-13 LAB
LV EF: 60 %
LVEF MODALITY: NORMAL

## 2019-03-13 PROCEDURE — 93306 TTE W/DOPPLER COMPLETE: CPT

## 2019-03-13 PROCEDURE — 99238 HOSP IP/OBS DSCHRG MGMT 30/<: CPT | Performed by: NURSE PRACTITIONER

## 2019-03-13 PROCEDURE — 6370000000 HC RX 637 (ALT 250 FOR IP): Performed by: INTERNAL MEDICINE

## 2019-03-13 PROCEDURE — 2580000003 HC RX 258: Performed by: INTERNAL MEDICINE

## 2019-03-13 RX ORDER — NITROGLYCERIN 0.4 MG/1
TABLET SUBLINGUAL
Qty: 25 TABLET | Refills: 3 | Status: SHIPPED | OUTPATIENT
Start: 2019-03-13 | End: 2021-07-28 | Stop reason: SDUPTHER

## 2019-03-13 RX ORDER — CLOPIDOGREL BISULFATE 75 MG/1
75 TABLET ORAL DAILY
Qty: 30 TABLET | Refills: 3 | Status: SHIPPED | OUTPATIENT
Start: 2019-03-14 | End: 2019-07-08 | Stop reason: SDUPTHER

## 2019-03-13 RX ORDER — ASPIRIN 81 MG/1
81 TABLET, CHEWABLE ORAL DAILY
Qty: 30 TABLET | Refills: 3 | Status: ON HOLD | OUTPATIENT
Start: 2019-03-14

## 2019-03-13 RX ADMIN — CLOPIDOGREL BISULFATE 75 MG: 75 TABLET ORAL at 09:13

## 2019-03-13 RX ADMIN — OMEPRAZOLE 20 MG: 20 CAPSULE, DELAYED RELEASE ORAL at 07:38

## 2019-03-13 RX ADMIN — Medication 10 ML: at 10:57

## 2019-03-13 RX ADMIN — SODIUM CHLORIDE 75 ML/HR: 9 INJECTION, SOLUTION INTRAVENOUS at 01:18

## 2019-03-13 RX ADMIN — AMLODIPINE BESYLATE 10 MG: 10 TABLET ORAL at 09:14

## 2019-03-13 RX ADMIN — ASPIRIN 81 MG 81 MG: 81 TABLET ORAL at 09:13

## 2019-03-13 RX ADMIN — LISINOPRIL 40 MG: 40 TABLET ORAL at 09:16

## 2019-03-13 RX ADMIN — DUTASTERIDE 0.5 MG: 0.5 CAPSULE, LIQUID FILLED ORAL at 09:16

## 2019-03-13 ASSESSMENT — PAIN SCALES - GENERAL
PAINLEVEL_OUTOF10: 0
PAINLEVEL_OUTOF10: 0

## 2019-03-13 NOTE — PROGRESS NOTES
Inpatient Cardiac Rehabilitation Consult    Received consult for Phase II Cardiac Rehabilitation. Spoke with pt - Pt with Lung CA and needs lung surgery after his PCI. Pt not appropriate for CR until after his next surgery and the recovery/tx. We will take order from the office once cleared and appropriate. Brochure given.

## 2019-03-13 NOTE — PLAN OF CARE
Problem: Discharge Planning:  Goal: Discharged to appropriate level of care  Description  Discharged to appropriate level of care  3/13/2019 0245 by Sonya Nowak RN  Outcome: Ongoing  Note:   He is from home with his girlfriend. 3/12/2019 1515 by Sabina Greenberg RN  Outcome: Ongoing     Problem: Tissue Perfusion - Cardiopulmonary, Altered:  Goal: Circulatory function within specified parameters  Description  Circulatory function within specified parameters. No problems @ present. 3/13/2019 0245 by Sonya Nowak RN  Outcome: Ongoing  Note:   Ongoing assessment & interventions provided throughout shift. Patient on continuous telemetry monitoring, heart tones, vitals & pulses checked at least 3 times per shift & PRN. Vitals within acceptable limits. Peripheral pulses palpable. 3/12/2019 1515 by Sabina Greenberg RN  Outcome: Ongoing  Goal: Absence of angina  Description  Absence of angina. No angina @ Present. 3/13/2019 0245 by Sonya Nowak RN  Outcome: Ongoing  3/12/2019 1515 by Sabina Greenberg RN  Outcome: Ongoing  Goal: Hemodynamic stability will improve  Description  Hemodynamic stability will improve. Stable at present. 3/13/2019 0245 by Sonya Nowak RN  Outcome: Ongoing  3/12/2019 1515 by Sabina Greenberg RN  Outcome: Ongoing     Problem: Tissue Perfusion - Peripheral, Altered:  Goal: Absence of hematoma at arterial access site  Description  Absence of hematoma at arterial access site. None at present   3/13/2019 0245 by Nicolle Graves RN  Outcome: Ongoing  Note:   Cath site checks performed. No evidence of swelling, drainage, or hematoma noted to site, will continue to monitor. 3/12/2019 1515 by Sabina Greenberg RN  Outcome: Ongoing  Goal: Circulatory function of lower extremities is within specified parameters  Description  Circulatory function of lower extremities is within specified parameters.   Pulses are 2/2 bilaterally   3/13/2019 0245 by Nicolle Graves RN  Outcome: Ongoing  3/12/2019 1515 by Alexsandra Pérez RN  Outcome: Ongoing     Problem: Falls - Risk of:  Goal: Will remain free from falls  Description  Will remain free from falls  Outcome: Ongoing  Note:   Assessment & interventions provided throughout shift. Bed locked & in low position, call light in reach, side-rails up x2, non-slip socks on when ambulating, reminded patient to use call light to call for assistance. Goal: Absence of physical injury  Description  Absence of physical injury  Outcome: Ongoing     Problem: Infection - Surgical Site:  Goal: Will show no infection signs and symptoms  Description  Will show no infection signs and symptoms  Outcome: Ongoing  Note:   Cath site checks performed. No evidence of swelling, drainage, or hematoma noted to site, will continue to monitor. Problem: Pain - Acute:  Goal: Recognizes and communicates pain  Description  Recognizes and communicates pain  Outcome: Ongoing  Goal: Pain level will decrease  Description  Pain level will decrease  Outcome: Ongoing    Care plan reviewed with patient. Patient verbalizes understanding of the care plan and contributed to goal setting.

## 2019-03-13 NOTE — PROGRESS NOTES
Received report from [place] [id number].  Electronically signed by Moshe BRUNO/HOLLY on 3/13/2019 at 7:46 AM

## 2019-03-13 NOTE — PROGRESS NOTES
Reported off to Guided Interventions. Patient in room waiting to be discharged with visitor present.  Electronically signed by Jesús BRUNO/HOLLY on 3/13/2019 at 1:48 PM

## 2019-03-13 NOTE — DISCHARGE SUMMARY
Cardiology Discharge Summary      Patient Identification:  Millie Baird  : 1950  MRN: [id number]   Account: [de-identified]     Admit date: 3/12/2019  Discharge date: 19    Attending provider: Arlyn Marks MD        Primary care provider: Karlee Rizo. Melva Demarco MD     Admission Diagnoses:  Abnormal stress  SOB  HTN  HLP  Smoker  H/o Lung cancer    Discharge Diagnoses:   Abnormal stress  SOB  HTN  HLP  Smoker  H/o Lung cancer  S/P LHC / PCI with DAREN to left circumflex     Hospital Course:   Millie Baird is a 76 y.o. male being evaluated for lung cancer with PMH of DM, HTN, HLP, tobacco use, and abnormal stress test admitted to [address] [place] [address] on 3/12/2019 for cardiac catheterization to evaluate coronary anatomy prior to lung surgery. His cath revealed multivessel coronary artery disease with the most critical being the left circumflex artery. Dr. Destiny Ramirez did discuss with Dr. Swapna Kevin the need for DAPT if intervention of the circumflex was undertaken and Dr. Swapna Kevin was agreeable with going ahead with surgery if patient required plavix and asa. He subsequently underwent PCI of the left circumflex with Dr. Mark Mata and tolerated the procedure well. On 3/13/19 he was hemodynamically stable and agreeable to be discharged home. At the time of discharge he was pain free and his cath site was stable without pain, swelling, bleeding, drainage, redness, or warmth. He will return tomorrow as instructed per Dr. Swapna Kevin for planned lung surgery.          Procedures: LHC / PCI / DAREN to Left circumflex    Code Status: Full Code     Consults:   none    Examination:  Vitals:/77   Pulse 73   Temp 97.8 °F (36.6 °C) (Oral)   Resp 16   Ht 5' 8\" (1.727 m)   Wt 180 lb 4.8 oz (81.8 kg)   SpO2 97%   BMI 27.41 kg/m²      24 hour intake/output:    Intake/Output Summary (Last 24 hours) at 3/13/2019 1133  Last data filed at 3/13/2019 0437  Gross per 24 umeclidinium-vilanterol (ANORO ELLIPTA) 62.5-25 MCG/INH AEPB inhaler  Inhale 1 puff into the lungs daily               LHC: 3/12/19  CONCLUSION:  1.  Multivessel coronary artery disease with the most critical being the  left circumflex artery. 2.  Diffuse calcification. 3.  Normal LV function. 4.  No complication.     RECOMMENDATIONS:  At this point, the case was discussed at length with  Dr. Monica Cruz as well as with Dr. Effie Hodgkins. Plan is to proceed with  angioplasty and stenting of a left circumflex and then subsequently  proceeding with the lung surgery as scheduled without interrupting  Plavix and aspirin as approved by Dr. Ana Joseph M.D.    PCI procedure: DAREN: Stent Single Major Artery or Branch  Conclusions  Procedure Summary  Successful PCI of the proximal Circumflex Coronary Artery. Recommendations  -DAPT for atleast one year  -Lipid lowering therapy  -Aggressive risk factor and lifestyle modifications.  -Follow up with cardiology in 1-2 weeks after discharge. Estimated Blood Loss: 10 ml. Complications: No complications. Signatures  Electronically signed by Maya Ward MD    Significant Diagnostics:   Radiology: No results found.     Labs:   Recent Results (from the past 72 hour(s))   CBC    Collection Time: 03/12/19  2:04 PM   Result Value Ref Range    WBC 8.8 4.8 - 10.8 thou/mm3    RBC 5.30 4.70 - 6.10 mill/mm3    Hemoglobin 15.0 14.0 - 18.0 gm/dl    Hematocrit 45.1 42.0 - 52.0 %    MCV 85.1 80.0 - 94.0 fL    MCH 28.3 26.0 - 33.0 pg    MCHC 33.3 32.2 - 35.5 gm/dl    RDW-CV 13.5 11.5 - 14.5 %    RDW-SD 42.3 35.0 - 45.0 fL    Platelets 773 850 - 032 thou/mm3    MPV 10.0 9.4 - 12.4 fL   Basic Metabolic Panel w/ Reflex to MG    Collection Time: 03/12/19  2:04 PM   Result Value Ref Range    Sodium 139 135 - 145 meq/L    Potassium reflex Magnesium 4.5 3.5 - 5.2 meq/L    Chloride 102 98 - 111 meq/L    CO2 26 23 - 33 meq/L    Glucose 207 (H) 70 - 108 mg/dL    BUN 17 7 - 22 mg/dL CREATININE 0.8 0.4 - 1.2 mg/dL    Calcium 9.1 8.5 - 10.5 mg/dL   Lipid panel    Collection Time: 03/12/19  2:04 PM   Result Value Ref Range    Cholesterol, Total 171 100 - 199 mg/dL    Triglycerides 105 0 - 199 mg/dL    HDL 48 mg/dL    LDL Calculated 102 mg/dL   Protime-INR    Collection Time: 03/12/19  2:04 PM   Result Value Ref Range    INR 0.88 0.85 - 1.13   Serum pregnancy    Collection Time: 03/12/19  2:04 PM   Result Value Ref Range    Preg, Serum NEGATIVE NEGATIVE   Anion Gap    Collection Time: 03/12/19  2:04 PM   Result Value Ref Range    Anion Gap 11.0 8.0 - 16.0 meq/L   Glomerular Filtration Rate, Estimated    Collection Time: 03/12/19  2:04 PM   Result Value Ref Range    Est, Glom Filt Rate >90 ml/min/1.73m2   TYPE AND SCREEN    Collection Time: 03/12/19  2:05 PM   Result Value Ref Range    ABO O     Rh Factor POS     Antibody Screen NEG    Electrocardiogram, 12-lead     Collection Time: 03/12/19  2:48 PM   Result Value Ref Range    Ventricular Rate 69 BPM    Atrial Rate 69 BPM    P-R Interval 138 ms    QRS Duration 134 ms    Q-T Interval 432 ms    QTc Calculation (Bazett) 462 ms    P Axis 68 degrees    R Axis -63 degrees    T Axis 9 degrees   POCT activated clotting time    Collection Time: 03/12/19  5:18 PM   Result Value Ref Range    Activated Clotting Time 263 (H) 1 - 150 seconds   EKG 12 lead    Collection Time: 03/12/19  5:56 PM   Result Value Ref Range    Ventricular Rate 73 BPM    Atrial Rate 73 BPM    P-R Interval 142 ms    QRS Duration 134 ms    Q-T Interval 414 ms    QTc Calculation (Bazett) 456 ms    P Axis 72 degrees    R Axis -70 degrees    T Axis 17 degrees       Patient Instructions:    Discharge lab work: None  Activity: See discharge instructions  Diet: DIET CARDIAC; Carb Control: 4 carb choices (60 gms)/meal      Cardiac Rehab: Yes, but will be after recovery from lung cancer surgery    Follow-up visits:   Donald Salazar MD  [address] [place] [address]  [phone]             Discharge condition: stable  Disposition: Home  Time spent on discharge: less than 30 min      Discharge Medications for PCI/MI (performed or attempted):   · ASA: yes  · Statin: yes  · P2Y12 Inhibitor: yes  · Beta Blocker: yes  · Nitro SL: yes      Discharge Medications for ICD, Cardiomyopathy, CHF: N/A EF per cath 60        Electronically signed by RICH Cisneros CNP on 3/13/2019 at 11:33 AM

## 2019-03-13 NOTE — PROGRESS NOTES
CLINICAL PHARMACY: DISCHARGE MED RECONCILIATION/REVIEW    [facility]) Select Patient?: No  Total # of Interventions Recommended: 0   -   Total # Interventions Accepted: 0  Intervention Severity:   - Level 1 Intervention Present?: No   - Level 2 #: 0   - Level 3 #: 0   Time Spent (min): 15    Additional Documentation:

## 2019-03-13 NOTE — PROCEDURES
[address] [id number]                            CARDIAC CATHETERIZATION    PATIENT NAME: Julissa Bolden                   :        1950  MED REC NO:   [id number]                           ROOM:       0014  ACCOUNT NO:   [de-identified]                           ADMIT DATE: 2019  PROVIDER:     Jacki Samano M.D.    DATE OF PROCEDURE:  2019    CLINICAL HISTORY AND INDICATION:  The patient is a 70-year-old being  evaluated for lung cancer with history of longstanding diabetes,  hypertension, hyperlipidemia, smoking, had a stress that showed  inferolateral stress-induced ischemia, referred for cardiac cath to  evaluate coronary anatomy prior to surgery. PROCEDURES:  1. Left heart cath, LV-gram.  2.  Coronary angiogram, right and left. 3.  Sedation, 2 of Versed and 50 of fentanyl between 04:30 and 05:00  p.m. in my presence under my supervision. PROCEDURE DETAILS:  Please refer to my catheterization detailed note. HEMODYNAMIC RESULTS AND LEFT VENTRICULOGRAM:  Left ventricular  end-diastolic pressure was 12 mmHg, no significant change before and  after contrast injection. No significant gradient across the aortic  valve to signify aortic stenosis. Left ventricular function was normal  with EF 60%. CORONARY ARTERIOGRAM RESULTS:  1. Left main is patent, gives rise to left anterior descending and left  circumflex artery. 2.  Left anterior descending has diffuse 70% stenosis with ectasia of  the vessel and stenosis of the diagonal artery about 80% to 90%, which  is a very small-sized vessel. 3.  Left circumflex artery has severe stenosis about 90% with what looks  like an ulcerated plaque. 4.  Right coronary artery is relatively smaller size vessel, has diffuse  50% stenosis with a small size PDA.     CONCLUSION:  1.  Multivessel coronary artery disease with the most critical being the  left circumflex artery. 2.  Diffuse calcification. 3.  Normal LV function. 4.  No complication. RECOMMENDATIONS:  At this point, the case was discussed at length with  Dr. Pinky Murrell as well as with Dr. Raj Larson. Plan is to proceed with  angioplasty and stenting of a left circumflex and then subsequently  proceeding with the lung surgery as scheduled without interrupting  Plavix and aspirin as approved by Dr. Raj Larson.           Tristin Wheeler M.D.    D: 03/12/2019 17:23:06       T: 03/12/2019 18:10:30     [email or web address]  Job#: [id number]     Doc#: [id number]    CC:

## 2019-03-13 NOTE — FLOWSHEET NOTE
Transferred to 3B30 per bed per transport team with personal belongings  Girlfriend, daughter, with pt   Personal belongings with pt, cell phone, , clothes, home meds with him   Denies complaints  Right femoral site remains stable with no bleeding or hematoma

## 2019-03-13 NOTE — CARE COORDINATION
3/13/19, 10:47 AM      Alana Nurse       Admitted from: Procedure 3/12/2019/ [facility] day: 0   Location: 3B-30/030-A Reason for admit: S/P coronary angioplasty [Z98.61]  CAD in native artery [I25.10]  S/P cardiac cath [Z98.890] Status: OP in a bed  Admit order signed?: yes  PMH:  has a past medical history of Anxiety, Arthritis, Cancer ([facility] [address] COPD (chronic obstructive pulmonary disease) ([facility] [address] Enlarged prostate with urinary retention, Gait difficulty, Generalized weakness, Hyperlipidemia, Hypertension, Lesion of bladder, Osteoarthritis, Retention of urine, S/P cardiac catheterization, S/P TURP, SOB (shortness of breath), and Voiding difficulty. Procedure: 3/12 Cardiac cath: PCI to LCX  Pertinent abnormal Imaging:none  Medications:  Scheduled Meds:   sodium chloride flush  10 mL Intravenous 2 times per day    aspirin  81 mg Oral Daily    clopidogrel  75 mg Oral Daily    amLODIPine  10 mg Oral Daily    atorvastatin  20 mg Oral Nightly    dutasteride  0.5 mg Oral Daily    lisinopril  40 mg Oral Daily    omeprazole  20 mg Oral QAM AC    umeclidinium-vilanterol  1 puff Inhalation Daily     Continuous Infusions:   Pertinent Info/Orders/Treatment Plan:  Cardio consult. Daily wts. I/O. Dietitian following. Plavix. Norvasc. ASA. Lipitor. Lisinopril. Diet: DIET CARDIAC; Carb Control: 4 carb choices (60 gms)/meal   Smoking status:  reports that he has been smoking cigarettes. He has a 40.00 pack-year smoking history. He has never used smokeless tobacco.   PCP: Yordan Gardner.  Se Colbert MD  Readmission: no   %    Discharge Planning  Current Residence:  Private Residence  Living Arrangements:  Spouse/Significant Other   Support Systems:  Family Members, Spouse/Significant Other  Current Services PTA:     Potential Assistance Needed:  N/A  Potential Assistance Purchasing Medications:  No  Does patient want to participate in local refill/ meds to beds program?  Yes  Type of Home Care Services:  None  Patient expects to be discharged to:  home  Expected Discharge date:  03/13/19  Follow Up Appointment: Best Day/ Time:      Discharge Plan: Per report and patient, he is scheduled for a lobectomy tomorrow. Per report [facility] is aware pt is on plavix. Unsure at this time if patient will be discharged and return or kept overnight. Patient plans to return home with s/o, he does not feel HH is needed. CM to f/u after surgery.  Evaluation: no    3/13/19, 11:25 AM    Discharge plan discussed by  and . Discharge plan reviewed with patient/ family. Patient/ family verbalize understanding of discharge plan and are in agreement with plan. Understanding was demonstrated using the teach back method.

## 2019-03-13 NOTE — PROGRESS NOTES
Discharge teaching and instructions for diagnosis/procedure of cardiac cath/stent completed with patient using teachback method. AVS reviewed. Printed prescriptions given to patient. Patient voiced understanding regarding prescriptions, follow up appointments, and care of self at home. Discharged in a wheelchair to  home with support per family. Patient stable at discharge. Denies pain or other needs.

## 2019-03-14 ENCOUNTER — APPOINTMENT (OUTPATIENT)
Dept: GENERAL RADIOLOGY | Age: 69
DRG: 163 | End: 2019-03-14
Attending: THORACIC SURGERY (CARDIOTHORACIC VASCULAR SURGERY)
Payer: MEDICARE

## 2019-03-14 ENCOUNTER — HOSPITAL ENCOUNTER (INPATIENT)
Age: 69
LOS: 4 days | Discharge: HOME | DRG: 163 | End: 2019-03-18
Attending: THORACIC SURGERY (CARDIOTHORACIC VASCULAR SURGERY) | Admitting: THORACIC SURGERY (CARDIOTHORACIC VASCULAR SURGERY)
Payer: MEDICARE

## 2019-03-14 ENCOUNTER — ANESTHESIA (OUTPATIENT)
Dept: OPERATING ROOM | Age: 69
DRG: 163 | End: 2019-03-14
Payer: MEDICARE

## 2019-03-14 VITALS
TEMPERATURE: 95.9 F | OXYGEN SATURATION: 100 % | DIASTOLIC BLOOD PRESSURE: 37 MMHG | SYSTOLIC BLOOD PRESSURE: 64 MMHG | RESPIRATION RATE: 4 BRPM

## 2019-03-14 DIAGNOSIS — Z90.2 S/P LOBECTOMY OF LUNG: Primary | ICD-10-CM

## 2019-03-14 PROBLEM — C34.92 ADENOCARCINOMA, LUNG, LEFT (HCC): Status: ACTIVE | Noted: 2019-03-14

## 2019-03-14 LAB
ABO: NORMAL
ANGLE TEG: 73 DEG (ref 53–72)
ANION GAP SERPL CALCULATED.3IONS-SCNC: 11 MEQ/L (ref 8–16)
ANTIBODY SCREEN: NORMAL
APTT: 32.6 SECONDS (ref 22–38)
BACTERIA: ABNORMAL
BASE EXCESS (CALCULATED): -3 MMOL/L (ref -2.5–2.5)
BASE EXCESS (CALCULATED): -5.5 MMOL/L (ref -2.5–2.5)
BASE EXCESS (CALCULATED): -6.4 MMOL/L (ref -2.5–2.5)
BILIRUBIN URINE: NEGATIVE
BLOOD, URINE: ABNORMAL
BUN BLDV-MCNC: 18 MG/DL (ref 7–22)
CALCIUM IONIZED SERUM: 1.05 MMOL/L (ref 1.12–1.32)
CALCIUM IONIZED SERUM: 1.05 MMOL/L (ref 1.12–1.32)
CALCIUM IONIZED SERUM: 1.22 MMOL/L (ref 1.12–1.32)
CALCIUM SERPL-MCNC: 9.2 MG/DL (ref 8.5–10.5)
CASTS: ABNORMAL /LPF
CASTS: ABNORMAL /LPF
CHARACTER, URINE: CLEAR
CHLORIDE BLD-SCNC: 104 MEQ/L (ref 98–111)
CO2: 23 MEQ/L (ref 23–33)
COLLECTED BY:: ABNORMAL
COLOR: YELLOW
CREAT SERPL-MCNC: 0.8 MG/DL (ref 0.4–1.2)
CRYSTALS: ABNORMAL
DEVICE: ABNORMAL
EPITHELIAL CELLS, UA: ABNORMAL /HPF
EPL-TEG: 0 % (ref 0–15)
ERYTHROCYTE [DISTWIDTH] IN BLOOD BY AUTOMATED COUNT: 13.8 % (ref 11.5–14.5)
ERYTHROCYTE [DISTWIDTH] IN BLOOD BY AUTOMATED COUNT: 13.8 % (ref 11.5–14.5)
ERYTHROCYTE [DISTWIDTH] IN BLOOD BY AUTOMATED COUNT: 42.3 FL (ref 35–45)
ERYTHROCYTE [DISTWIDTH] IN BLOOD BY AUTOMATED COUNT: 44.2 FL (ref 35–45)
GFR SERPL CREATININE-BSD FRML MDRD: > 90 ML/MIN/1.73M2
GLUCOSE BLD-MCNC: 259 MG/DL (ref 70–108)
GLUCOSE, URINE: >= 1000 MG/DL
GLUCOSE, WHOLE BLOOD: 175 MG/DL (ref 70–108)
GLUCOSE, WHOLE BLOOD: 178 MG/DL (ref 70–108)
GLUCOSE, WHOLE BLOOD: 208 MG/DL (ref 70–108)
HCO3: 19 MMOL/L (ref 23–28)
HCO3: 20 MMOL/L (ref 23–28)
HCO3: 25 MMOL/L (ref 23–28)
HCT VFR BLD CALC: 27.2 % (ref 42–52)
HCT VFR BLD CALC: 28.7 % (ref 42–52)
HCT VFR BLD CALC: 45.5 % (ref 42–52)
HEMOGLOBIN FINGERSTICK, POC: 10.6 G/DL (ref 14–18)
HEMOGLOBIN FINGERSTICK, POC: 11.6 G/DL (ref 14–18)
HEMOGLOBIN: 15.2 GM/DL (ref 14–18)
HEMOGLOBIN: 9.1 GM/DL (ref 14–18)
HEMOGLOBIN: 9.5 GM/DL (ref 14–18)
HEPARIN THERAPY: NO
INR BLD: 0.87 (ref 0.85–1.13)
KETONES, URINE: NEGATIVE
KINETICS TEG: 1.1 MINUTES (ref 1–3)
LEUKOCYTE ESTERASE, URINE: NEGATIVE
LY30 (LYSIS) TEG: 0 % (ref 0–7.5)
MA (MAX CLOT) TEG: 67.3 MM (ref 50–70)
MCH RBC QN AUTO: 28.1 PG (ref 26–33)
MCH RBC QN AUTO: 29 PG (ref 26–33)
MCHC RBC AUTO-ENTMCNC: 33.1 GM/DL (ref 32.2–35.5)
MCHC RBC AUTO-ENTMCNC: 33.4 GM/DL (ref 32.2–35.5)
MCV RBC AUTO: 84.1 FL (ref 80–94)
MCV RBC AUTO: 87.5 FL (ref 80–94)
MISCELLANEOUS LAB TEST RESULT: ABNORMAL
MRSA SCREEN RT-PCR: NEGATIVE
NITRITE, URINE: NEGATIVE
O2 SATURATION: 100 %
O2 SATURATION: 100 %
O2 SATURATION: 96 %
PCO2: 37 MMHG (ref 35–45)
PCO2: 39 MMHG (ref 35–45)
PCO2: 55 MMHG (ref 35–45)
PH BLOOD GAS: 7.26 (ref 7.35–7.45)
PH BLOOD GAS: 7.31 (ref 7.35–7.45)
PH BLOOD GAS: 7.34 (ref 7.35–7.45)
PH UA: 5.5 (ref 5–9)
PLATELET # BLD: 146 THOU/MM3 (ref 130–400)
PLATELET # BLD: 179 THOU/MM3 (ref 130–400)
PMV BLD AUTO: 10.1 FL (ref 9.4–12.4)
PMV BLD AUTO: 9.9 FL (ref 9.4–12.4)
PO2: 257 MMHG (ref 71–104)
PO2: 464 MMHG (ref 71–104)
PO2: 86 MMHG (ref 71–104)
POTASSIUM REFLEX MAGNESIUM: 4.3 MEQ/L (ref 3.5–5.2)
POTASSIUM, WHOLE BLOOD: 3.7 MEQ/L (ref 3.5–4.9)
POTASSIUM, WHOLE BLOOD: 3.9 MEQ/L (ref 3.5–4.9)
POTASSIUM, WHOLE BLOOD: 3.9 MEQ/L (ref 3.5–4.9)
PROTEIN UA: NEGATIVE MG/DL
RBC # BLD: 3.28 MILL/MM3 (ref 4.7–6.1)
RBC # BLD: 5.41 MILL/MM3 (ref 4.7–6.1)
RBC URINE: ABNORMAL /HPF
REACTION TIME TEG: 4.6 MINUTES (ref 5–10)
RENAL EPITHELIAL, UA: ABNORMAL
RH FACTOR: NORMAL
SODIUM BLD-SCNC: 138 MEQ/L (ref 135–145)
SODIUM, WHOLE BLOOD: 144 MEQ/L (ref 138–146)
SODIUM, WHOLE BLOOD: 144 MEQ/L (ref 138–146)
SODIUM, WHOLE BLOOD: 145 MEQ/L (ref 138–146)
SOURCE, BLOOD GAS: ABNORMAL
SOURCE, BLOOD GAS: ABNORMAL
SPECIFIC GRAVITY UA: 1.01 (ref 1–1.03)
UROBILINOGEN, URINE: 1 EU/DL (ref 0–1)
VANCOMYCIN RESISTANT ENTEROCOCCUS: NEGATIVE
WBC # BLD: 17.4 THOU/MM3 (ref 4.8–10.8)
WBC # BLD: 9.3 THOU/MM3 (ref 4.8–10.8)
WBC UA: ABNORMAL /HPF
YEAST: ABNORMAL

## 2019-03-14 PROCEDURE — 6360000002 HC RX W HCPCS: Performed by: PHYSICIAN ASSISTANT

## 2019-03-14 PROCEDURE — 3600000005 HC SURGERY LEVEL 5 BASE: Performed by: THORACIC SURGERY (CARDIOTHORACIC VASCULAR SURGERY)

## 2019-03-14 PROCEDURE — 88331 PATH CONSLTJ SURG 1 BLK 1SPC: CPT

## 2019-03-14 PROCEDURE — 82803 BLOOD GASES ANY COMBINATION: CPT

## 2019-03-14 PROCEDURE — P9035 PLATELET PHERES LEUKOREDUCED: HCPCS

## 2019-03-14 PROCEDURE — 6360000002 HC RX W HCPCS

## 2019-03-14 PROCEDURE — 87500 VANOMYCIN DNA AMP PROBE: CPT

## 2019-03-14 PROCEDURE — 86900 BLOOD TYPING SEROLOGIC ABO: CPT

## 2019-03-14 PROCEDURE — 81001 URINALYSIS AUTO W/SCOPE: CPT

## 2019-03-14 PROCEDURE — 86901 BLOOD TYPING SEROLOGIC RH(D): CPT

## 2019-03-14 PROCEDURE — 86923 COMPATIBILITY TEST ELECTRIC: CPT

## 2019-03-14 PROCEDURE — 84295 ASSAY OF SERUM SODIUM: CPT

## 2019-03-14 PROCEDURE — P9045 ALBUMIN (HUMAN), 5%, 250 ML: HCPCS | Performed by: ANESTHESIOLOGY

## 2019-03-14 PROCEDURE — 85027 COMPLETE CBC AUTOMATED: CPT

## 2019-03-14 PROCEDURE — P9017 PLASMA 1 DONOR FRZ W/IN 8 HR: HCPCS

## 2019-03-14 PROCEDURE — 85730 THROMBOPLASTIN TIME PARTIAL: CPT

## 2019-03-14 PROCEDURE — 36430 TRANSFUSION BLD/BLD COMPNT: CPT

## 2019-03-14 PROCEDURE — P9045 ALBUMIN (HUMAN), 5%, 250 ML: HCPCS

## 2019-03-14 PROCEDURE — 2500000003 HC RX 250 WO HCPCS: Performed by: THORACIC SURGERY (CARDIOTHORACIC VASCULAR SURGERY)

## 2019-03-14 PROCEDURE — 85018 HEMOGLOBIN: CPT

## 2019-03-14 PROCEDURE — 86850 RBC ANTIBODY SCREEN: CPT

## 2019-03-14 PROCEDURE — 2500000003 HC RX 250 WO HCPCS

## 2019-03-14 PROCEDURE — 37799 UNLISTED PX VASCULAR SURGERY: CPT

## 2019-03-14 PROCEDURE — 6360000002 HC RX W HCPCS: Performed by: ANESTHESIOLOGY

## 2019-03-14 PROCEDURE — P9016 RBC LEUKOCYTES REDUCED: HCPCS

## 2019-03-14 PROCEDURE — 80048 BASIC METABOLIC PNL TOTAL CA: CPT

## 2019-03-14 PROCEDURE — 85610 PROTHROMBIN TIME: CPT

## 2019-03-14 PROCEDURE — 71045 X-RAY EXAM CHEST 1 VIEW: CPT

## 2019-03-14 PROCEDURE — 82330 ASSAY OF CALCIUM: CPT

## 2019-03-14 PROCEDURE — 2580000003 HC RX 258

## 2019-03-14 PROCEDURE — 88307 TISSUE EXAM BY PATHOLOGIST: CPT

## 2019-03-14 PROCEDURE — 88305 TISSUE EXAM BY PATHOLOGIST: CPT

## 2019-03-14 PROCEDURE — 84132 ASSAY OF SERUM POTASSIUM: CPT

## 2019-03-14 PROCEDURE — 6370000000 HC RX 637 (ALT 250 FOR IP): Performed by: THORACIC SURGERY (CARDIOTHORACIC VASCULAR SURGERY)

## 2019-03-14 PROCEDURE — 87641 MR-STAPH DNA AMP PROBE: CPT

## 2019-03-14 PROCEDURE — 36415 COLL VENOUS BLD VENIPUNCTURE: CPT

## 2019-03-14 PROCEDURE — 3700000001 HC ADD 15 MINUTES (ANESTHESIA): Performed by: THORACIC SURGERY (CARDIOTHORACIC VASCULAR SURGERY)

## 2019-03-14 PROCEDURE — 3600000015 HC SURGERY LEVEL 5 ADDTL 15MIN: Performed by: THORACIC SURGERY (CARDIOTHORACIC VASCULAR SURGERY)

## 2019-03-14 PROCEDURE — C1729 CATH, DRAINAGE: HCPCS | Performed by: THORACIC SURGERY (CARDIOTHORACIC VASCULAR SURGERY)

## 2019-03-14 PROCEDURE — 2580000003 HC RX 258: Performed by: THORACIC SURGERY (CARDIOTHORACIC VASCULAR SURGERY)

## 2019-03-14 PROCEDURE — 2000000000 HC ICU R&B

## 2019-03-14 PROCEDURE — 2709999900 HC NON-CHARGEABLE SUPPLY

## 2019-03-14 PROCEDURE — 2580000003 HC RX 258: Performed by: ANESTHESIOLOGY

## 2019-03-14 PROCEDURE — 82947 ASSAY GLUCOSE BLOOD QUANT: CPT

## 2019-03-14 PROCEDURE — 2720000010 HC SURG SUPPLY STERILE: Performed by: THORACIC SURGERY (CARDIOTHORACIC VASCULAR SURGERY)

## 2019-03-14 PROCEDURE — 85347 COAGULATION TIME ACTIVATED: CPT

## 2019-03-14 PROCEDURE — C1713 ANCHOR/SCREW BN/BN,TIS/BN: HCPCS | Performed by: THORACIC SURGERY (CARDIOTHORACIC VASCULAR SURGERY)

## 2019-03-14 PROCEDURE — 85014 HEMATOCRIT: CPT

## 2019-03-14 PROCEDURE — 88309 TISSUE EXAM BY PATHOLOGIST: CPT

## 2019-03-14 PROCEDURE — 7100000001 HC PACU RECOVERY - ADDTL 15 MIN: Performed by: THORACIC SURGERY (CARDIOTHORACIC VASCULAR SURGERY)

## 2019-03-14 PROCEDURE — 87081 CULTURE SCREEN ONLY: CPT

## 2019-03-14 PROCEDURE — 7100000000 HC PACU RECOVERY - FIRST 15 MIN: Performed by: THORACIC SURGERY (CARDIOTHORACIC VASCULAR SURGERY)

## 2019-03-14 PROCEDURE — 3700000000 HC ANESTHESIA ATTENDED CARE: Performed by: THORACIC SURGERY (CARDIOTHORACIC VASCULAR SURGERY)

## 2019-03-14 PROCEDURE — 2709999900 HC NON-CHARGEABLE SUPPLY: Performed by: THORACIC SURGERY (CARDIOTHORACIC VASCULAR SURGERY)

## 2019-03-14 DEVICE — SEALANT TISS GLUE 4ML PLEUR AIR LEAK PROGEL: Type: IMPLANTABLE DEVICE | Site: CHEST | Status: FUNCTIONAL

## 2019-03-14 RX ORDER — MIDAZOLAM HYDROCHLORIDE 1 MG/ML
INJECTION INTRAMUSCULAR; INTRAVENOUS PRN
Status: DISCONTINUED | OUTPATIENT
Start: 2019-03-14 | End: 2019-03-14 | Stop reason: SDUPTHER

## 2019-03-14 RX ORDER — NEOSTIGMINE METHYLSULFATE 5 MG/5 ML
SYRINGE (ML) INTRAVENOUS PRN
Status: DISCONTINUED | OUTPATIENT
Start: 2019-03-14 | End: 2019-03-14 | Stop reason: SDUPTHER

## 2019-03-14 RX ORDER — 0.9 % SODIUM CHLORIDE 0.9 %
250 INTRAVENOUS SOLUTION INTRAVENOUS ONCE
Status: DISCONTINUED | OUTPATIENT
Start: 2019-03-14 | End: 2019-03-15

## 2019-03-14 RX ORDER — FENTANYL CITRATE 50 UG/ML
INJECTION, SOLUTION INTRAMUSCULAR; INTRAVENOUS PRN
Status: DISCONTINUED | OUTPATIENT
Start: 2019-03-14 | End: 2019-03-14 | Stop reason: SDUPTHER

## 2019-03-14 RX ORDER — SODIUM CHLORIDE 0.9 % (FLUSH) 0.9 %
10 SYRINGE (ML) INJECTION PRN
Status: DISCONTINUED | OUTPATIENT
Start: 2019-03-14 | End: 2019-03-14 | Stop reason: HOSPADM

## 2019-03-14 RX ORDER — ONDANSETRON 2 MG/ML
4 INJECTION INTRAMUSCULAR; INTRAVENOUS EVERY 6 HOURS PRN
Status: DISCONTINUED | OUTPATIENT
Start: 2019-03-14 | End: 2019-03-18 | Stop reason: HOSPADM

## 2019-03-14 RX ORDER — SODIUM CHLORIDE 9 MG/ML
INJECTION, SOLUTION INTRAVENOUS CONTINUOUS
Status: DISCONTINUED | OUTPATIENT
Start: 2019-03-14 | End: 2019-03-15

## 2019-03-14 RX ORDER — 0.9 % SODIUM CHLORIDE 0.9 %
250 INTRAVENOUS SOLUTION INTRAVENOUS ONCE
Status: COMPLETED | OUTPATIENT
Start: 2019-03-14 | End: 2019-03-15

## 2019-03-14 RX ORDER — ROCURONIUM BROMIDE 10 MG/ML
INJECTION, SOLUTION INTRAVENOUS PRN
Status: DISCONTINUED | OUTPATIENT
Start: 2019-03-14 | End: 2019-03-14 | Stop reason: SDUPTHER

## 2019-03-14 RX ORDER — ALBUMIN, HUMAN INJ 5% 5 %
25 SOLUTION INTRAVENOUS ONCE
Status: DISCONTINUED | OUTPATIENT
Start: 2019-03-14 | End: 2019-03-14

## 2019-03-14 RX ORDER — PROMETHAZINE HYDROCHLORIDE 25 MG/ML
12.5 INJECTION, SOLUTION INTRAMUSCULAR; INTRAVENOUS
Status: DISCONTINUED | OUTPATIENT
Start: 2019-03-14 | End: 2019-03-14 | Stop reason: HOSPADM

## 2019-03-14 RX ORDER — SODIUM CHLORIDE 0.9 % (FLUSH) 0.9 %
10 SYRINGE (ML) INJECTION EVERY 12 HOURS SCHEDULED
Status: DISCONTINUED | OUTPATIENT
Start: 2019-03-14 | End: 2019-03-14 | Stop reason: HOSPADM

## 2019-03-14 RX ORDER — SODIUM CHLORIDE 450 MG/100ML
INJECTION, SOLUTION INTRAVENOUS CONTINUOUS
Status: DISCONTINUED | OUTPATIENT
Start: 2019-03-14 | End: 2019-03-15

## 2019-03-14 RX ORDER — ACETAMINOPHEN 325 MG/1
650 TABLET ORAL EVERY 4 HOURS PRN
Status: DISCONTINUED | OUTPATIENT
Start: 2019-03-14 | End: 2019-03-18 | Stop reason: HOSPADM

## 2019-03-14 RX ORDER — LABETALOL HYDROCHLORIDE 5 MG/ML
10 INJECTION, SOLUTION INTRAVENOUS EVERY 10 MIN PRN
Status: DISCONTINUED | OUTPATIENT
Start: 2019-03-14 | End: 2019-03-14 | Stop reason: HOSPADM

## 2019-03-14 RX ORDER — FAMOTIDINE 20 MG/1
20 TABLET, FILM COATED ORAL 2 TIMES DAILY
Status: DISCONTINUED | OUTPATIENT
Start: 2019-03-14 | End: 2019-03-15 | Stop reason: ALTCHOICE

## 2019-03-14 RX ORDER — OXYCODONE HYDROCHLORIDE 5 MG/1
5 TABLET ORAL EVERY 4 HOURS PRN
Status: DISCONTINUED | OUTPATIENT
Start: 2019-03-14 | End: 2019-03-18 | Stop reason: HOSPADM

## 2019-03-14 RX ORDER — MORPHINE SULFATE 2 MG/ML
INJECTION, SOLUTION INTRAMUSCULAR; INTRAVENOUS
Status: COMPLETED
Start: 2019-03-14 | End: 2019-03-14

## 2019-03-14 RX ORDER — OXYCODONE HYDROCHLORIDE 5 MG/1
10 TABLET ORAL EVERY 4 HOURS PRN
Status: DISCONTINUED | OUTPATIENT
Start: 2019-03-14 | End: 2019-03-18 | Stop reason: HOSPADM

## 2019-03-14 RX ORDER — ALBUMIN, HUMAN INJ 5% 5 %
12.5 SOLUTION INTRAVENOUS ONCE
Status: COMPLETED | OUTPATIENT
Start: 2019-03-14 | End: 2019-03-14

## 2019-03-14 RX ORDER — ALBUMIN, HUMAN INJ 5% 5 %
SOLUTION INTRAVENOUS
Status: COMPLETED
Start: 2019-03-14 | End: 2019-03-14

## 2019-03-14 RX ORDER — BUPIVACAINE HYDROCHLORIDE AND EPINEPHRINE 5; 5 MG/ML; UG/ML
INJECTION, SOLUTION EPIDURAL; INTRACAUDAL; PERINEURAL PRN
Status: DISCONTINUED | OUTPATIENT
Start: 2019-03-14 | End: 2019-03-14 | Stop reason: HOSPADM

## 2019-03-14 RX ORDER — GLYCOPYRROLATE 1 MG/5 ML
SYRINGE (ML) INTRAVENOUS PRN
Status: DISCONTINUED | OUTPATIENT
Start: 2019-03-14 | End: 2019-03-14 | Stop reason: SDUPTHER

## 2019-03-14 RX ORDER — BUMETANIDE 0.25 MG/ML
1 INJECTION, SOLUTION INTRAMUSCULAR; INTRAVENOUS 2 TIMES DAILY
Status: COMPLETED | OUTPATIENT
Start: 2019-03-15 | End: 2019-03-16

## 2019-03-14 RX ORDER — ALBUMIN, HUMAN INJ 5% 5 %
SOLUTION INTRAVENOUS PRN
Status: DISCONTINUED | OUTPATIENT
Start: 2019-03-14 | End: 2019-03-14 | Stop reason: SDUPTHER

## 2019-03-14 RX ORDER — DEXAMETHASONE SODIUM PHOSPHATE 4 MG/ML
INJECTION, SOLUTION INTRA-ARTICULAR; INTRALESIONAL; INTRAMUSCULAR; INTRAVENOUS; SOFT TISSUE PRN
Status: DISCONTINUED | OUTPATIENT
Start: 2019-03-14 | End: 2019-03-14 | Stop reason: SDUPTHER

## 2019-03-14 RX ORDER — ALBUMIN, HUMAN INJ 5% 5 %
SOLUTION INTRAVENOUS
Status: DISPENSED
Start: 2019-03-14 | End: 2019-03-15

## 2019-03-14 RX ORDER — MORPHINE SULFATE 2 MG/ML
2 INJECTION, SOLUTION INTRAMUSCULAR; INTRAVENOUS
Status: DISCONTINUED | OUTPATIENT
Start: 2019-03-14 | End: 2019-03-15

## 2019-03-14 RX ORDER — ALBUTEROL SULFATE 90 UG/1
2 AEROSOL, METERED RESPIRATORY (INHALATION) EVERY 6 HOURS PRN
Status: DISCONTINUED | OUTPATIENT
Start: 2019-03-14 | End: 2019-03-18 | Stop reason: HOSPADM

## 2019-03-14 RX ORDER — PROPOFOL 10 MG/ML
INJECTION, EMULSION INTRAVENOUS PRN
Status: DISCONTINUED | OUTPATIENT
Start: 2019-03-14 | End: 2019-03-14 | Stop reason: SDUPTHER

## 2019-03-14 RX ORDER — BUMETANIDE 0.25 MG/ML
1 INJECTION, SOLUTION INTRAMUSCULAR; INTRAVENOUS ONCE
Status: COMPLETED | OUTPATIENT
Start: 2019-03-15 | End: 2019-03-15

## 2019-03-14 RX ORDER — GINSENG 100 MG
CAPSULE ORAL DAILY
Status: DISCONTINUED | OUTPATIENT
Start: 2019-03-14 | End: 2019-03-18 | Stop reason: HOSPADM

## 2019-03-14 RX ORDER — DOCUSATE SODIUM 100 MG/1
100 CAPSULE, LIQUID FILLED ORAL 2 TIMES DAILY
Status: DISCONTINUED | OUTPATIENT
Start: 2019-03-14 | End: 2019-03-18 | Stop reason: HOSPADM

## 2019-03-14 RX ORDER — SODIUM CHLORIDE 0.9 % (FLUSH) 0.9 %
10 SYRINGE (ML) INJECTION EVERY 12 HOURS SCHEDULED
Status: DISCONTINUED | OUTPATIENT
Start: 2019-03-14 | End: 2019-03-18 | Stop reason: HOSPADM

## 2019-03-14 RX ORDER — SODIUM CHLORIDE 9 MG/ML
INJECTION, SOLUTION INTRAVENOUS CONTINUOUS PRN
Status: DISCONTINUED | OUTPATIENT
Start: 2019-03-14 | End: 2019-03-14 | Stop reason: SDUPTHER

## 2019-03-14 RX ORDER — ONDANSETRON 2 MG/ML
INJECTION INTRAMUSCULAR; INTRAVENOUS PRN
Status: DISCONTINUED | OUTPATIENT
Start: 2019-03-14 | End: 2019-03-14

## 2019-03-14 RX ORDER — SODIUM CHLORIDE 0.9 % (FLUSH) 0.9 %
10 SYRINGE (ML) INJECTION PRN
Status: DISCONTINUED | OUTPATIENT
Start: 2019-03-14 | End: 2019-03-18 | Stop reason: HOSPADM

## 2019-03-14 RX ORDER — FENTANYL CITRATE 50 UG/ML
50 INJECTION, SOLUTION INTRAMUSCULAR; INTRAVENOUS EVERY 5 MIN PRN
Status: DISCONTINUED | OUTPATIENT
Start: 2019-03-14 | End: 2019-03-14 | Stop reason: HOSPADM

## 2019-03-14 RX ADMIN — SODIUM CHLORIDE: 9 INJECTION, SOLUTION INTRAVENOUS at 14:15

## 2019-03-14 RX ADMIN — ALBUMIN (HUMAN) 12.5 G: 12.5 INJECTION, SOLUTION INTRAVENOUS at 19:20

## 2019-03-14 RX ADMIN — ALBUMIN (HUMAN) 500 ML: 12.5 SOLUTION INTRAVENOUS at 14:25

## 2019-03-14 RX ADMIN — SODIUM CHLORIDE: 4.5 INJECTION, SOLUTION INTRAVENOUS at 18:38

## 2019-03-14 RX ADMIN — ONDANSETRON HYDROCHLORIDE 4 MG: 4 INJECTION, SOLUTION INTRAMUSCULAR; INTRAVENOUS at 17:06

## 2019-03-14 RX ADMIN — FAMOTIDINE 20 MG: 20 TABLET ORAL at 22:09

## 2019-03-14 RX ADMIN — PHENYLEPHRINE HYDROCHLORIDE 200 MCG: 10 INJECTION INTRAVENOUS at 14:10

## 2019-03-14 RX ADMIN — MORPHINE SULFATE 2 MG: 2 INJECTION, SOLUTION INTRAMUSCULAR; INTRAVENOUS at 20:52

## 2019-03-14 RX ADMIN — ALBUMIN (HUMAN) 12.5 G: 12.5 INJECTION, SOLUTION INTRAVENOUS at 19:02

## 2019-03-14 RX ADMIN — Medication 2 G: at 14:08

## 2019-03-14 RX ADMIN — SODIUM CHLORIDE: 9 INJECTION, SOLUTION INTRAVENOUS at 10:55

## 2019-03-14 RX ADMIN — DEXAMETHASONE SODIUM PHOSPHATE 8 MG: 4 INJECTION, SOLUTION INTRAMUSCULAR; INTRAVENOUS at 17:06

## 2019-03-14 RX ADMIN — ROCURONIUM BROMIDE 50 MG: 10 INJECTION INTRAVENOUS at 14:01

## 2019-03-14 RX ADMIN — FENTANYL CITRATE 100 MCG: 50 INJECTION INTRAMUSCULAR; INTRAVENOUS at 16:00

## 2019-03-14 RX ADMIN — SODIUM CHLORIDE 250 ML: 9 INJECTION, SOLUTION INTRAVENOUS at 19:08

## 2019-03-14 RX ADMIN — Medication 2 G: at 18:00

## 2019-03-14 RX ADMIN — FENTANYL CITRATE 150 MCG: 50 INJECTION INTRAMUSCULAR; INTRAVENOUS at 14:02

## 2019-03-14 RX ADMIN — PHENYLEPHRINE HYDROCHLORIDE 200 MCG: 10 INJECTION INTRAVENOUS at 14:12

## 2019-03-14 RX ADMIN — ALBUMIN (HUMAN) 12.5 G: 12.5 INJECTION, SOLUTION INTRAVENOUS at 18:14

## 2019-03-14 RX ADMIN — DOCUSATE SODIUM 100 MG: 100 CAPSULE, LIQUID FILLED ORAL at 22:09

## 2019-03-14 RX ADMIN — PROPOFOL 50 MG: 10 INJECTION, EMULSION INTRAVENOUS at 17:49

## 2019-03-14 RX ADMIN — SODIUM CHLORIDE 250 ML: 9 INJECTION, SOLUTION INTRAVENOUS at 18:41

## 2019-03-14 RX ADMIN — Medication 5 MG: at 17:06

## 2019-03-14 RX ADMIN — LIDOCAINE HYDROCHLORIDE 50 MG: 20 INJECTION, SOLUTION INTRAVENOUS at 13:55

## 2019-03-14 RX ADMIN — MIDAZOLAM HYDROCHLORIDE 2 MG: 1 INJECTION, SOLUTION INTRAMUSCULAR; INTRAVENOUS at 14:16

## 2019-03-14 RX ADMIN — SODIUM CHLORIDE 250 ML: 9 INJECTION, SOLUTION INTRAVENOUS at 22:17

## 2019-03-14 RX ADMIN — ALBUMIN (HUMAN) 12.5 G: 12.5 INJECTION, SOLUTION INTRAVENOUS at 18:40

## 2019-03-14 RX ADMIN — PROPOFOL 150 MG: 10 INJECTION, EMULSION INTRAVENOUS at 13:59

## 2019-03-14 RX ADMIN — OXYCODONE HYDROCHLORIDE 10 MG: 5 TABLET ORAL at 22:49

## 2019-03-14 RX ADMIN — PHENYLEPHRINE HYDROCHLORIDE 200 MCG: 10 INJECTION INTRAVENOUS at 14:15

## 2019-03-14 RX ADMIN — FENTANYL CITRATE 100 MCG: 50 INJECTION INTRAMUSCULAR; INTRAVENOUS at 14:52

## 2019-03-14 RX ADMIN — SODIUM CHLORIDE: 9 INJECTION, SOLUTION INTRAVENOUS at 15:17

## 2019-03-14 RX ADMIN — FENTANYL CITRATE 50 MCG: 50 INJECTION INTRAMUSCULAR; INTRAVENOUS at 17:06

## 2019-03-14 RX ADMIN — ROCURONIUM BROMIDE 50 MG: 10 INJECTION INTRAVENOUS at 14:56

## 2019-03-14 RX ADMIN — FENTANYL CITRATE 50 MCG: 50 INJECTION INTRAMUSCULAR; INTRAVENOUS at 18:15

## 2019-03-14 RX ADMIN — ALBUMIN (HUMAN) 12.5 G: 12.5 SOLUTION INTRAVENOUS at 18:38

## 2019-03-14 RX ADMIN — PHENYLEPHRINE HYDROCHLORIDE 200 MCG: 10 INJECTION INTRAVENOUS at 14:18

## 2019-03-14 RX ADMIN — SODIUM CHLORIDE: 9 INJECTION, SOLUTION INTRAVENOUS at 13:53

## 2019-03-14 RX ADMIN — Medication 0.8 MG: at 17:06

## 2019-03-14 RX ADMIN — FENTANYL CITRATE 100 MCG: 50 INJECTION INTRAMUSCULAR; INTRAVENOUS at 15:15

## 2019-03-14 ASSESSMENT — PULMONARY FUNCTION TESTS
PIF_VALUE: 26
PIF_VALUE: 7
PIF_VALUE: 22
PIF_VALUE: 22
PIF_VALUE: 21
PIF_VALUE: 22
PIF_VALUE: 25
PIF_VALUE: 20
PIF_VALUE: 27
PIF_VALUE: 27
PIF_VALUE: 31
PIF_VALUE: 25
PIF_VALUE: 35
PIF_VALUE: 27
PIF_VALUE: 28
PIF_VALUE: 28
PIF_VALUE: 27
PIF_VALUE: 32
PIF_VALUE: 23
PIF_VALUE: 26
PIF_VALUE: 26
PIF_VALUE: 6
PIF_VALUE: 26
PIF_VALUE: 28
PIF_VALUE: 26
PIF_VALUE: 29
PIF_VALUE: 26
PIF_VALUE: 15
PIF_VALUE: 31
PIF_VALUE: 27
PIF_VALUE: 21
PIF_VALUE: 26
PIF_VALUE: 22
PIF_VALUE: 4
PIF_VALUE: 27
PIF_VALUE: 16
PIF_VALUE: 27
PIF_VALUE: 16
PIF_VALUE: 28
PIF_VALUE: 26
PIF_VALUE: 27
PIF_VALUE: 14
PIF_VALUE: 26
PIF_VALUE: 1
PIF_VALUE: 16
PIF_VALUE: 27
PIF_VALUE: 26
PIF_VALUE: 9
PIF_VALUE: 20
PIF_VALUE: 24
PIF_VALUE: 28
PIF_VALUE: 24
PIF_VALUE: 27
PIF_VALUE: 0
PIF_VALUE: 25
PIF_VALUE: 26
PIF_VALUE: 28
PIF_VALUE: 15
PIF_VALUE: 27
PIF_VALUE: 28
PIF_VALUE: 6
PIF_VALUE: 21
PIF_VALUE: 26
PIF_VALUE: 26
PIF_VALUE: 23
PIF_VALUE: 23
PIF_VALUE: 27
PIF_VALUE: 26
PIF_VALUE: 27
PIF_VALUE: 23
PIF_VALUE: 25
PIF_VALUE: 28
PIF_VALUE: 28
PIF_VALUE: 0
PIF_VALUE: 20
PIF_VALUE: 27
PIF_VALUE: 28
PIF_VALUE: 15
PIF_VALUE: 25
PIF_VALUE: 0
PIF_VALUE: 23
PIF_VALUE: 23
PIF_VALUE: 21
PIF_VALUE: 26
PIF_VALUE: 42
PIF_VALUE: 25
PIF_VALUE: 22
PIF_VALUE: 1
PIF_VALUE: 24
PIF_VALUE: 26
PIF_VALUE: 27
PIF_VALUE: 22
PIF_VALUE: 19
PIF_VALUE: 27
PIF_VALUE: 26
PIF_VALUE: 26
PIF_VALUE: 17
PIF_VALUE: 11
PIF_VALUE: 16
PIF_VALUE: 26
PIF_VALUE: 27
PIF_VALUE: 24
PIF_VALUE: 23
PIF_VALUE: 26
PIF_VALUE: 4
PIF_VALUE: 23
PIF_VALUE: 16
PIF_VALUE: 22
PIF_VALUE: 26
PIF_VALUE: 20
PIF_VALUE: 28
PIF_VALUE: 23
PIF_VALUE: 23
PIF_VALUE: -13
PIF_VALUE: 28
PIF_VALUE: 0
PIF_VALUE: 26
PIF_VALUE: 26
PIF_VALUE: 25
PIF_VALUE: 24
PIF_VALUE: 27
PIF_VALUE: 21
PIF_VALUE: 26
PIF_VALUE: 23
PIF_VALUE: 25
PIF_VALUE: 24
PIF_VALUE: 27
PIF_VALUE: 21
PIF_VALUE: 25
PIF_VALUE: 25
PIF_VALUE: 23
PIF_VALUE: 16
PIF_VALUE: 20
PIF_VALUE: 15
PIF_VALUE: 28
PIF_VALUE: 26
PIF_VALUE: 26
PIF_VALUE: 28
PIF_VALUE: 26
PIF_VALUE: 26
PIF_VALUE: 15
PIF_VALUE: 22
PIF_VALUE: 27
PIF_VALUE: 26
PIF_VALUE: 21
PIF_VALUE: 17
PIF_VALUE: 22
PIF_VALUE: 26
PIF_VALUE: 27
PIF_VALUE: 23
PIF_VALUE: 26
PIF_VALUE: 27
PIF_VALUE: 15
PIF_VALUE: 26
PIF_VALUE: 28
PIF_VALUE: 28
PIF_VALUE: 27
PIF_VALUE: 23
PIF_VALUE: 22
PIF_VALUE: 28
PIF_VALUE: 24
PIF_VALUE: 22
PIF_VALUE: 26
PIF_VALUE: 27
PIF_VALUE: 27
PIF_VALUE: 20
PIF_VALUE: 24
PIF_VALUE: 22
PIF_VALUE: 27
PIF_VALUE: 26
PIF_VALUE: 21
PIF_VALUE: 16
PIF_VALUE: 23
PIF_VALUE: 28
PIF_VALUE: 27
PIF_VALUE: 31
PIF_VALUE: 26
PIF_VALUE: 26
PIF_VALUE: 3
PIF_VALUE: 15
PIF_VALUE: 26
PIF_VALUE: 28
PIF_VALUE: 26
PIF_VALUE: 27
PIF_VALUE: 26
PIF_VALUE: 21
PIF_VALUE: 21
PIF_VALUE: 16
PIF_VALUE: 24
PIF_VALUE: 21
PIF_VALUE: 21
PIF_VALUE: 15
PIF_VALUE: 27
PIF_VALUE: 6
PIF_VALUE: 27
PIF_VALUE: 20
PIF_VALUE: 27
PIF_VALUE: 21
PIF_VALUE: 15
PIF_VALUE: 24
PIF_VALUE: 28
PIF_VALUE: 15
PIF_VALUE: 22
PIF_VALUE: 25
PIF_VALUE: 1
PIF_VALUE: 27
PIF_VALUE: 23
PIF_VALUE: 26
PIF_VALUE: 28
PIF_VALUE: 26
PIF_VALUE: 27
PIF_VALUE: 20
PIF_VALUE: 26
PIF_VALUE: 26
PIF_VALUE: 21
PIF_VALUE: 22
PIF_VALUE: 4
PIF_VALUE: 26
PIF_VALUE: 24
PIF_VALUE: 23
PIF_VALUE: 24
PIF_VALUE: 26
PIF_VALUE: 16
PIF_VALUE: 4
PIF_VALUE: 27
PIF_VALUE: 16
PIF_VALUE: 28
PIF_VALUE: 22
PIF_VALUE: 3
PIF_VALUE: 26
PIF_VALUE: 28
PIF_VALUE: 21
PIF_VALUE: 22
PIF_VALUE: 15
PIF_VALUE: 25
PIF_VALUE: 27
PIF_VALUE: 23
PIF_VALUE: 0
PIF_VALUE: 16
PIF_VALUE: 25
PIF_VALUE: 4
PIF_VALUE: 26
PIF_VALUE: 26
PIF_VALUE: 21
PIF_VALUE: 36

## 2019-03-14 ASSESSMENT — PAIN SCALES - GENERAL
PAINLEVEL_OUTOF10: 10
PAINLEVEL_OUTOF10: 9
PAINLEVEL_OUTOF10: 3
PAINLEVEL_OUTOF10: 8

## 2019-03-14 NOTE — PROGRESS NOTES
Dr Teresita Carey and Dr Emile Celeste have discussed that the surgery should not take place until the platelets arrive from [place]. Patient has been updated regarding this information. Blood bank to notify the unit if anything changes with the processing of this order.

## 2019-03-14 NOTE — H&P
Visit Date: 3/4/2019     Mr. Steve Carrasco is a 76 y.o.male  who presented for:       Chief Complaint   Patient presents with    New Patient       referral, dx adenocarcinoma of left lung.          HPI:   HPI :77 y/o male COPD 48 pk/yr smoker, still smokes instructed to quit in preparation for surgery. Has a 5.7 x 3.7 x 3.4 cm in cephalocaudal extent and transverse and AP dimensions respectively. SUV max is up to 5.1 image 114 Poorly Differentiated Adenocarcinoma left lower lobe. EBUS done Friday, cytology still pending. No mediastinal uptake on PET scan. Pending Lexiscan and Split quantitative V/Q scan. Unknown if mass contained to lobe not invading chest wall. FEV 1= 2.21L for 73% predicted, DLCO 65% predicted. Plan is if EBUS biopsies negative for malignancy will attempt for curative resection with flexible bronchoscopy, left thoracotomy open lung biopsy possible left lower lobectomy, mediastinal lymph node dissection.      Informed consent:     Mr. Steve Carrasco was instructed about the advisability for him to undergo left thoracotomy open lung biopsy mediastinal lymph node dissection flexible bronchoscopy if EBUS biopsies negative for malignancy in an attempt for curative resection of his Adenocarcinoma left lower lobe. Potential risks of surgery like death, stroke, bleeding, infection, damage to arteries, veins, nerves, pneumonia, blood clots to include some were discussed as well as alternatives. Mr Steve Carrasco and his daughter very well appeared to understand, all of their questions were answered to their satisfaction and he is agreeable to proceed as advised.          Current Medication      Current Outpatient Medications:     umeclidinium-vilanterol (ANORO ELLIPTA) 62.5-25 MCG/INH AEPB inhaler, Inhale 1 puff into the lungs daily, Disp: 60 each, Rfl: 5    omeprazole (PRILOSEC) 20 MG delayed release capsule, Take 20 mg by mouth daily, Disp: , Rfl:     amLODIPine (NORVASC) 10 MG tablet, Take 10 mg by mouth daily. , Disp: , alert and oriented to person, place, and time. He has normal reflexes. He displays normal reflexes. No cranial nerve deficit or sensory deficit. He exhibits normal muscle tone. Coordination normal.   Skin: Skin is warm and dry. Capillary refill takes less than 2 seconds. No rash noted. He is not diaphoretic. No erythema. No pallor. Psychiatric: He has a normal mood and affect. His behavior is normal. Judgment and thought content normal.               Lab Results   Component Value Date     WBC 10.3 02/12/2019     RBC 5.93 02/12/2019     HGB 16.9 02/12/2019     HCT 50.5 02/12/2019     MCV 85.2 02/12/2019     MCH 28.5 02/12/2019     MCHC 33.5 02/12/2019     RDW 14.5 05/25/2013      02/12/2019     MPV 9.8 02/12/2019               Lab Results   Component Value Date      01/18/2019     K 4.7 01/18/2019      01/18/2019     CO2 25 01/18/2019     BUN 16 01/18/2019     LABALBU 4.1 01/18/2019     CREATININE 0.8 01/18/2019     CALCIUM 9.1 01/18/2019     LABGLOM >90 01/18/2019     GLUCOSE 211 01/18/2019               Lab Results   Component Value Date     ALKPHOS 95 01/18/2019     ALT 10 01/18/2019     AST 18 01/18/2019     PROT 6.8 01/18/2019     BILITOT 0.3 01/18/2019     BILIDIR 0.2 05/25/2013     LABALBU 4.1 01/18/2019               Assessment/Plan      1. Primary adenocarcinoma of lower lobe of left lung ([address]       Lexiscan pending and Quantitative V/Q scan as well as EBUS biopsies.          Return for left exploratory thoracotomy mediastinal lymph node dissection flexible bronchoscopy. EBUS negative station 4 and 7. Lexiscan abnormal had cardiac cath and Circumflex coronary stenting yesterday. Can not stop Plavix therefore will proceed with resectional therapy for curative attempt. Informed consent:    Mr. Leena Barajas was instructed about the advisability for him to undergo resection attempt for his primary Adenocarcinoma Left Lower lobe.  Potential risks of surgery like bleeding, infection stroke heart attack death blood clots air leak to include some. Mr. Corinna Alvarez very well appeared to understand all of his questions and his daughters were answered to their satisfaction and he is agreeable. Alternatives were also explained.

## 2019-03-14 NOTE — BRIEF OP NOTE
Brief Postoperative Note  ______________________________________________________________    Patient: Kellie Krueger  YOB: 1950  MRN: [id number]  Date of Procedure: 3/14/2019    Pre-Op Diagnosis: PRIMARY ADENOCARCINOMA OF LOWER LOBE OF LEFT LUNG, COPD, CAD s/p LCX STENTING YESTERDAY, ONGOING HEMOPTYSIS    Post-Op Diagnosis: Same       Procedure(s):  LEFT EXPLORATORY THORACOTOMY, MEDIASTINAL LYMPH NODE DISECTION, FLEXIBLE BRONCHOSCOPY, LEFT LOWER LOBECTOMY    Anesthesia: General    Surgeon(s):  Behzad Villavicencio MD    Assistant: Medically Necessary First Assistant Jessie Jj PA-C, CECELIA GUERRIER MS3, Abdelrahman Muñoz BSNFA    Estimated Blood Loss (mL): 646     Complications: None    Specimens:   ID Type Source Tests Collected by Time Destination   A : station 9 lymphnode Tissue Lung SURGICAL PATHOLOGY Behzad Villavicencio MD 3/14/2019 1543    B : 10l lymph node Tissue Lung SURGICAL PATHOLOGY Behzad Villavicencio MD 3/14/2019 1608    C : left lower lobe for broncial margins *Frozen Tissue Lung SURGICAL PATHOLOGY Behzad Villavicencio MD 3/14/2019 1618    D : station 6 lymph nodes x 3 Tissue Lung SURGICAL PATHOLOGY Behzad Villavicencio MD 3/14/2019 1629    E : staion 7 lmph node Tissue Lung SURGICAL PATHOLOGY Behzad Villavicencio MD 3/14/2019 1633    F : station 8 lymph node Tissue Lung SURGICAL PATHOLOGY Behzad Villavicencio MD 3/14/2019 1634        Implants:  Implant Name Type Inv. Item Serial No.  Lot No. LRB No. Used   SEALANT PROGEL PLEURAL AIR LEAK Cement SEALANT PROGEL PLEURAL AIR LEAK  CR BARD [facility] [id number] Left 1         Drains:   Chest Tube 1 Left;Posterior 26 [place] (Active)       Chest Tube 2 Posterior; Left 26 [place] (Active)       Closed/Suction Drain Left Back (Active)       Findings: Bronchial margins negative for malignancy. Mass adherent to the parietal pleura posteriorly.  Extrapleural dissection done and parietal pleura removed en block with left lower lobectomy with margin of normal appearing pleura. No masses palpable on left upper lobe. Completion therapeutic bronchoscopy done. Flexible bronchoscopy showed bright red blood oozing from LLL bronchi and blood in all of the tracheobronchial tree. No endobronchial lesions on LLL,MAURICIO,Lingula,RUL,RML,RB6,RLL segmental bronchi. Completion therapeutic flexible bronchoscopy showed perfectly coapted LLL bronchus, after aspiration of blood from tracheobronchial tree, no active bleeding was noted from any remaining bronchus.     Job ID [id number]    Hammad Trevino MD  Date: 3/14/2019  Time: 5:20 PM

## 2019-03-15 ENCOUNTER — APPOINTMENT (OUTPATIENT)
Dept: GENERAL RADIOLOGY | Age: 69
DRG: 163 | End: 2019-03-15
Attending: THORACIC SURGERY (CARDIOTHORACIC VASCULAR SURGERY)
Payer: MEDICARE

## 2019-03-15 LAB
ALBUMIN SERPL-MCNC: 4.1 G/DL (ref 3.5–5.1)
ALP BLD-CCNC: 50 U/L (ref 38–126)
ALT SERPL-CCNC: 12 U/L (ref 11–66)
ANION GAP SERPL CALCULATED.3IONS-SCNC: 13 MEQ/L (ref 8–16)
AST SERPL-CCNC: 29 U/L (ref 5–40)
AVERAGE GLUCOSE: 174 MG/DL (ref 70–126)
BILIRUB SERPL-MCNC: 0.6 MG/DL (ref 0.3–1.2)
BUN BLDV-MCNC: 12 MG/DL (ref 7–22)
CALCIUM SERPL-MCNC: 7.4 MG/DL (ref 8.5–10.5)
CHLORIDE BLD-SCNC: 106 MEQ/L (ref 98–111)
CO2: 21 MEQ/L (ref 23–33)
CREAT SERPL-MCNC: 0.7 MG/DL (ref 0.4–1.2)
ERYTHROCYTE [DISTWIDTH] IN BLOOD BY AUTOMATED COUNT: 13.7 % (ref 11.5–14.5)
ERYTHROCYTE [DISTWIDTH] IN BLOOD BY AUTOMATED COUNT: 43.1 FL (ref 35–45)
GFR SERPL CREATININE-BSD FRML MDRD: > 90 ML/MIN/1.73M2
GLUCOSE BLD-MCNC: 224 MG/DL (ref 70–108)
GLUCOSE BLD-MCNC: 229 MG/DL (ref 70–108)
GLUCOSE BLD-MCNC: 241 MG/DL (ref 70–108)
GLUCOSE BLD-MCNC: 274 MG/DL (ref 70–108)
HBA1C MFR BLD: 7.8 % (ref 4.4–6.4)
HCT VFR BLD CALC: 27 % (ref 42–52)
HCT VFR BLD CALC: 30 % (ref 42–52)
HEMOGLOBIN: 10.2 GM/DL (ref 14–18)
HEMOGLOBIN: 9.3 GM/DL (ref 14–18)
MCH RBC QN AUTO: 29.5 PG (ref 26–33)
MCHC RBC AUTO-ENTMCNC: 34.4 GM/DL (ref 32.2–35.5)
MCV RBC AUTO: 85.7 FL (ref 80–94)
PLATELET # BLD: 205 THOU/MM3 (ref 130–400)
PMV BLD AUTO: 9.8 FL (ref 9.4–12.4)
POTASSIUM SERPL-SCNC: 3.8 MEQ/L (ref 3.5–5.2)
RBC # BLD: 3.15 MILL/MM3 (ref 4.7–6.1)
SODIUM BLD-SCNC: 140 MEQ/L (ref 135–145)
TOTAL PROTEIN: 6.2 G/DL (ref 6.1–8)
WBC # BLD: 15.3 THOU/MM3 (ref 4.8–10.8)

## 2019-03-15 PROCEDURE — 82948 REAGENT STRIP/BLOOD GLUCOSE: CPT

## 2019-03-15 PROCEDURE — 83036 HEMOGLOBIN GLYCOSYLATED A1C: CPT

## 2019-03-15 PROCEDURE — 0BC78ZZ EXTIRPATION OF MATTER FROM LEFT MAIN BRONCHUS, VIA NATURAL OR ARTIFICIAL OPENING ENDOSCOPIC: ICD-10-PCS | Performed by: THORACIC SURGERY (CARDIOTHORACIC VASCULAR SURGERY)

## 2019-03-15 PROCEDURE — 94761 N-INVAS EAR/PLS OXIMETRY MLT: CPT

## 2019-03-15 PROCEDURE — 85027 COMPLETE CBC AUTOMATED: CPT

## 2019-03-15 PROCEDURE — APPSS30 APP SPLIT SHARED TIME 16-30 MINUTES: Performed by: PHYSICIAN ASSISTANT

## 2019-03-15 PROCEDURE — 36592 COLLECT BLOOD FROM PICC: CPT

## 2019-03-15 PROCEDURE — 85018 HEMOGLOBIN: CPT

## 2019-03-15 PROCEDURE — 31624 DX BRONCHOSCOPE/LAVAGE: CPT | Performed by: THORACIC SURGERY (CARDIOTHORACIC VASCULAR SURGERY)

## 2019-03-15 PROCEDURE — 38746 REMOVE THORACIC LYMPH NODES: CPT | Performed by: THORACIC SURGERY (CARDIOTHORACIC VASCULAR SURGERY)

## 2019-03-15 PROCEDURE — 0BCB8ZZ EXTIRPATION OF MATTER FROM LEFT LOWER LOBE BRONCHUS, VIA NATURAL OR ARTIFICIAL OPENING ENDOSCOPIC: ICD-10-PCS | Performed by: THORACIC SURGERY (CARDIOTHORACIC VASCULAR SURGERY)

## 2019-03-15 PROCEDURE — 2709999900 HC NON-CHARGEABLE SUPPLY

## 2019-03-15 PROCEDURE — 85014 HEMATOCRIT: CPT

## 2019-03-15 PROCEDURE — 6370000000 HC RX 637 (ALT 250 FOR IP): Performed by: PHYSICIAN ASSISTANT

## 2019-03-15 PROCEDURE — 07B70ZZ EXCISION OF THORAX LYMPHATIC, OPEN APPROACH: ICD-10-PCS | Performed by: THORACIC SURGERY (CARDIOTHORACIC VASCULAR SURGERY)

## 2019-03-15 PROCEDURE — 36415 COLL VENOUS BLD VENIPUNCTURE: CPT

## 2019-03-15 PROCEDURE — 6360000002 HC RX W HCPCS: Performed by: THORACIC SURGERY (CARDIOTHORACIC VASCULAR SURGERY)

## 2019-03-15 PROCEDURE — 2580000003 HC RX 258: Performed by: PHYSICIAN ASSISTANT

## 2019-03-15 PROCEDURE — 6370000000 HC RX 637 (ALT 250 FOR IP): Performed by: THORACIC SURGERY (CARDIOTHORACIC VASCULAR SURGERY)

## 2019-03-15 PROCEDURE — 32480 PARTIAL REMOVAL OF LUNG: CPT | Performed by: THORACIC SURGERY (CARDIOTHORACIC VASCULAR SURGERY)

## 2019-03-15 PROCEDURE — 2500000003 HC RX 250 WO HCPCS: Performed by: THORACIC SURGERY (CARDIOTHORACIC VASCULAR SURGERY)

## 2019-03-15 PROCEDURE — 36430 TRANSFUSION BLD/BLD COMPNT: CPT

## 2019-03-15 PROCEDURE — P9035 PLATELET PHERES LEUKOREDUCED: HCPCS

## 2019-03-15 PROCEDURE — 2580000003 HC RX 258: Performed by: THORACIC SURGERY (CARDIOTHORACIC VASCULAR SURGERY)

## 2019-03-15 PROCEDURE — 94640 AIRWAY INHALATION TREATMENT: CPT

## 2019-03-15 PROCEDURE — 31646 BRNCHSC W/THER ASPIR SBSQ: CPT | Performed by: THORACIC SURGERY (CARDIOTHORACIC VASCULAR SURGERY)

## 2019-03-15 PROCEDURE — 0BTJ0ZZ RESECTION OF LEFT LOWER LUNG LOBE, OPEN APPROACH: ICD-10-PCS | Performed by: THORACIC SURGERY (CARDIOTHORACIC VASCULAR SURGERY)

## 2019-03-15 PROCEDURE — 80053 COMPREHEN METABOLIC PANEL: CPT

## 2019-03-15 PROCEDURE — 2500000003 HC RX 250 WO HCPCS: Performed by: PHYSICIAN ASSISTANT

## 2019-03-15 PROCEDURE — C1729 CATH, DRAINAGE: HCPCS

## 2019-03-15 PROCEDURE — 71045 X-RAY EXAM CHEST 1 VIEW: CPT

## 2019-03-15 PROCEDURE — P9017 PLASMA 1 DONOR FRZ W/IN 8 HR: HCPCS

## 2019-03-15 PROCEDURE — 2060000000 HC ICU INTERMEDIATE R&B

## 2019-03-15 PROCEDURE — 2700000000 HC OXYGEN THERAPY PER DAY

## 2019-03-15 PROCEDURE — 37799 UNLISTED PX VASCULAR SURGERY: CPT

## 2019-03-15 RX ORDER — ATORVASTATIN CALCIUM 20 MG/1
20 TABLET, FILM COATED ORAL NIGHTLY
Status: DISCONTINUED | OUTPATIENT
Start: 2019-03-15 | End: 2019-03-18 | Stop reason: HOSPADM

## 2019-03-15 RX ORDER — CLOPIDOGREL BISULFATE 75 MG/1
75 TABLET ORAL DAILY
Status: DISCONTINUED | OUTPATIENT
Start: 2019-03-15 | End: 2019-03-18 | Stop reason: HOSPADM

## 2019-03-15 RX ORDER — ASPIRIN 81 MG/1
81 TABLET, CHEWABLE ORAL DAILY
Status: DISCONTINUED | OUTPATIENT
Start: 2019-03-15 | End: 2019-03-18 | Stop reason: HOSPADM

## 2019-03-15 RX ORDER — PROMETHAZINE HYDROCHLORIDE 25 MG/1
12.5 TABLET ORAL EVERY 6 HOURS PRN
Status: DISCONTINUED | OUTPATIENT
Start: 2019-03-15 | End: 2019-03-18 | Stop reason: HOSPADM

## 2019-03-15 RX ORDER — AMLODIPINE BESYLATE 10 MG/1
10 TABLET ORAL DAILY
Status: DISCONTINUED | OUTPATIENT
Start: 2019-03-15 | End: 2019-03-18 | Stop reason: HOSPADM

## 2019-03-15 RX ORDER — PANTOPRAZOLE SODIUM 40 MG/1
40 TABLET, DELAYED RELEASE ORAL
Status: DISCONTINUED | OUTPATIENT
Start: 2019-03-15 | End: 2019-03-18 | Stop reason: HOSPADM

## 2019-03-15 RX ORDER — LISINOPRIL 40 MG/1
40 TABLET ORAL DAILY
Status: DISCONTINUED | OUTPATIENT
Start: 2019-03-15 | End: 2019-03-18 | Stop reason: HOSPADM

## 2019-03-15 RX ORDER — POTASSIUM CHLORIDE 20MEQ/15ML
20 LIQUID (ML) ORAL 3 TIMES DAILY
Status: DISCONTINUED | OUTPATIENT
Start: 2019-03-15 | End: 2019-03-15 | Stop reason: RX

## 2019-03-15 RX ORDER — NITROGLYCERIN 0.4 MG/1
0.4 TABLET SUBLINGUAL EVERY 5 MIN PRN
Status: DISCONTINUED | OUTPATIENT
Start: 2019-03-15 | End: 2019-03-18 | Stop reason: HOSPADM

## 2019-03-15 RX ORDER — FINASTERIDE 5 MG/1
5 TABLET, FILM COATED ORAL DAILY
Status: DISCONTINUED | OUTPATIENT
Start: 2019-03-15 | End: 2019-03-18 | Stop reason: HOSPADM

## 2019-03-15 RX ADMIN — DOCUSATE SODIUM 100 MG: 100 CAPSULE, LIQUID FILLED ORAL at 20:36

## 2019-03-15 RX ADMIN — SODIUM CHLORIDE 250 ML: 9 INJECTION, SOLUTION INTRAVENOUS at 02:37

## 2019-03-15 RX ADMIN — Medication 10 ML: at 08:57

## 2019-03-15 RX ADMIN — DEXTROSE MONOHYDRATE 2 G: 50 INJECTION, SOLUTION INTRAVENOUS at 08:55

## 2019-03-15 RX ADMIN — CLOPIDOGREL BISULFATE 75 MG: 75 TABLET ORAL at 08:54

## 2019-03-15 RX ADMIN — DEXTROSE MONOHYDRATE 2 G: 50 INJECTION, SOLUTION INTRAVENOUS at 03:00

## 2019-03-15 RX ADMIN — Medication 2 PUFF: at 12:14

## 2019-03-15 RX ADMIN — BUMETANIDE 1 MG: 0.25 INJECTION INTRAMUSCULAR; INTRAVENOUS at 20:37

## 2019-03-15 RX ADMIN — OXYCODONE HYDROCHLORIDE 10 MG: 5 TABLET ORAL at 08:53

## 2019-03-15 RX ADMIN — ASPIRIN 81 MG 81 MG: 81 TABLET ORAL at 08:54

## 2019-03-15 RX ADMIN — GLYCOPYRROLATE AND FORMOTEROL FUMARATE 2 PUFF: 9; 4.8 AEROSOL, METERED RESPIRATORY (INHALATION) at 18:44

## 2019-03-15 RX ADMIN — PROMETHAZINE HYDROCHLORIDE 12.5 MG: 25 TABLET ORAL at 13:08

## 2019-03-15 RX ADMIN — LISINOPRIL 40 MG: 40 TABLET ORAL at 11:02

## 2019-03-15 RX ADMIN — ATORVASTATIN CALCIUM 20 MG: 20 TABLET, FILM COATED ORAL at 20:36

## 2019-03-15 RX ADMIN — FINASTERIDE 5 MG: 5 TABLET, FILM COATED ORAL at 08:58

## 2019-03-15 RX ADMIN — OXYCODONE HYDROCHLORIDE 10 MG: 5 TABLET ORAL at 03:03

## 2019-03-15 RX ADMIN — OXYCODONE HYDROCHLORIDE 10 MG: 5 TABLET ORAL at 20:36

## 2019-03-15 RX ADMIN — INSULIN LISPRO 2 UNITS: 100 INJECTION, SOLUTION INTRAVENOUS; SUBCUTANEOUS at 11:01

## 2019-03-15 RX ADMIN — Medication 2 PUFF: at 18:43

## 2019-03-15 RX ADMIN — PANTOPRAZOLE SODIUM 40 MG: 40 TABLET, DELAYED RELEASE ORAL at 10:55

## 2019-03-15 RX ADMIN — AMLODIPINE BESYLATE 10 MG: 10 TABLET ORAL at 08:58

## 2019-03-15 RX ADMIN — DOCUSATE SODIUM 100 MG: 100 CAPSULE, LIQUID FILLED ORAL at 08:54

## 2019-03-15 RX ADMIN — Medication 10 ML: at 20:37

## 2019-03-15 RX ADMIN — OXYCODONE HYDROCHLORIDE 10 MG: 5 TABLET ORAL at 15:12

## 2019-03-15 RX ADMIN — PROMETHAZINE HYDROCHLORIDE 12.5 MG: 25 TABLET ORAL at 20:36

## 2019-03-15 RX ADMIN — INSULIN LISPRO 2 UNITS: 100 INJECTION, SOLUTION INTRAVENOUS; SUBCUTANEOUS at 21:09

## 2019-03-15 RX ADMIN — BUMETANIDE 1 MG: 0.25 INJECTION INTRAMUSCULAR; INTRAVENOUS at 00:31

## 2019-03-15 RX ADMIN — BUMETANIDE 1 MG: 0.25 INJECTION INTRAMUSCULAR; INTRAVENOUS at 08:55

## 2019-03-15 RX ADMIN — ONDANSETRON 4 MG: 2 INJECTION INTRAMUSCULAR; INTRAVENOUS at 08:54

## 2019-03-15 RX ADMIN — POTASSIUM BICARBONATE 20 MEQ: 782 TABLET, EFFERVESCENT ORAL at 08:54

## 2019-03-15 RX ADMIN — FAMOTIDINE 20 MG: 20 TABLET ORAL at 08:54

## 2019-03-15 ASSESSMENT — PAIN DESCRIPTION - PROGRESSION: CLINICAL_PROGRESSION: NOT CHANGED

## 2019-03-15 ASSESSMENT — PAIN SCALES - GENERAL
PAINLEVEL_OUTOF10: 7
PAINLEVEL_OUTOF10: 7
PAINLEVEL_OUTOF10: 4
PAINLEVEL_OUTOF10: 5
PAINLEVEL_OUTOF10: 7
PAINLEVEL_OUTOF10: 5
PAINLEVEL_OUTOF10: 7
PAINLEVEL_OUTOF10: 8
PAINLEVEL_OUTOF10: 4

## 2019-03-15 ASSESSMENT — PAIN DESCRIPTION - PAIN TYPE
TYPE: SURGICAL PAIN
TYPE: SURGICAL PAIN

## 2019-03-15 ASSESSMENT — PAIN DESCRIPTION - FREQUENCY: FREQUENCY: CONTINUOUS

## 2019-03-15 ASSESSMENT — PAIN DESCRIPTION - ONSET: ONSET: ON-GOING

## 2019-03-15 ASSESSMENT — PAIN DESCRIPTION - LOCATION: LOCATION: CHEST

## 2019-03-15 ASSESSMENT — PAIN DESCRIPTION - ORIENTATION: ORIENTATION: LEFT

## 2019-03-15 ASSESSMENT — PAIN DESCRIPTION - DESCRIPTORS: DESCRIPTORS: ACHING

## 2019-03-15 NOTE — PROGRESS NOTES
CT/CV Surgery Progress Note    3/15/2019 8:16 AM  Surgeon:  Dr. Dominic Witt:  Mr. Sol Murillo is resting comfortably in bed on 1 L/min NC, alert, and in no acute distress. Stable overnight. No major events noted. Chest tubes drained 1570 cc's in the last 24 hours and 150 cc's in the last 8 hours. Awaiting CXR. Pt denies chest pressure, SOB, fever,chills, N/V/D. Vital Signs: /75   Pulse 95   Temp 99.3 °F (37.4 °C) (Core)   Resp 22   Ht 5' 8\" (1.727 m)   Wt 174 lb 6.4 oz (79.1 kg)   SpO2 98%   BMI 26.52 kg/m²    Temp (24hrs), Av.8 °F (35.4 °C), Min:90.7 °F (32.6 °C), Max:99.7 °F (37.6 °C)      PULSE OXIMETRY RANGE: SpO2  Av %  Min: 89 %  Max: 100 %     Labs:   CBC:     Recent Labs     19  1404 19  1047  19  1828 19  2140 03/15/19  0440   WBC 8.8 9.3  --  17.4*  --  15.3*   HGB 15.0 15.2   < > 9.5* 9.1* 9.3*   HCT 45.1 45.5  --  28.7* 27.2* 27.0*   MCV 85.1 84.1  --  87.5  --  85.7    179  --  146  --  205   APTT  --  32.6  --   --   --   --    INR 0.88 0.87  --   --   --   --     < > = values in this interval not displayed. BMP:   Recent Labs     19  1404 19  1047  19  1544 19  1649 03/15/19  0440    138   < > 144 145 140   K 4.5 4.3   < > 3.9 3.9 3.8    104  --   --   --  106   CO2 26 23  --   --   --  21*   BUN 17 18  --   --   --  12   CREATININE 0.8 0.8  --   --   --  0.7    < > = values in this interval not displayed.      Last HgA1C: No results found for: LABA1C    Imaging:  CXR: Awaiting CXR      Intake/Output Summary (Last 24 hours) at 3/15/2019 0816  Last data filed at 3/15/2019 0600  Gross per 24 hour   Intake 6682 ml   Output 7465 ml   Net -783 ml       Scheduled Meds:    potassium bicarb-citric acid  20 mEq Oral Daily    sodium chloride flush  10 mL Intravenous 2 times per day    ceFAZolin (ANCEF) IVPB  2 g Intravenous Q8H    docusate sodium  100 mg Oral BID    famotidine  20 mg Oral BID    bacitracin   Topical Daily    sodium chloride  250 mL Intravenous Once    sodium chloride  250 mL Intravenous Once    sodium chloride  250 mL Intravenous Once    sodium chloride  250 mL Intravenous Once    bumetanide  1 mg Intravenous BID       ROS: All neg unless specifically mentioned in subjective section. Exam:  General Appearance: alert ,conversing, in no acute distress  Cardiovascular: normal rate, regular rhythm, normal S1 and S2, no murmurs, rubs, clicks, or gallops  Pulmonary/Chest: clear to auscultation bilaterally- no wheezes, rales or rhonchi, normal air movement, no respiratory distress  Neurological: alert, oriented, normal speech, no focal findings or movement disorder noted  Sternum: Incision healing appropriately and no wound dehiscence noted. Assessment:   Patient Active Problem List   Diagnosis    Chronic retention of urine    Benign prostatic hyperplasia    Mass of left lung    Adenocarcinoma of left lung (HCC)    Mediastinal lymphadenopathy    Angina effort ([address]    Abnormal stress test    S/P coronary angioplasty    CAD in native artery    S/P cardiac cath    Adenocarcinoma, lung, left ([address]   -acute pulmonary edema    Plan: 3/15/19  1. Awaiting CXR results  2. Restart home meds  3.  Transfer floors       The plan of care was discussed in detail with Dr. Janyth Hammans, PA-C

## 2019-03-15 NOTE — PROGRESS NOTES
2100- Pts family at bedside. Updated on pt's current status and plan of care. Pt's three daughters and significant other verbalize understanding. All questions answered. 2127- Received phone call from Dr. Treva Brown. This RN updated Dr. Treva Brown of current pt status, blood products received and current output in chest tubes and bulb drain. Orders received from Dr. Treva Brown for chest XR, TEG, H and H every 6 hours and transfuse 2 units FFP. Orders read back to Dr. Treva Brown and orders placed. Dr. Treva Brown also states that he does not wish for Plavix to be restarted this evening. 2145- This RN called Dr. Janice Flower to again update on current chest tube outputs, hourly outputs and total output at this time. Labs drawn and sent per orders. 0014- TEG results received. Dr. Treva Brown called and updated. Updated on current chest tube output and hourly outputs at this time. Orders received for 20mEq of liquid potassium TID as long as pt is on Bumex. Orders read back to Dr. Treva Brown and orders placed.

## 2019-03-15 NOTE — PROGRESS NOTES
1759 Pt transferred to PACU, report received from [address] and [place] [address]. Pt is awake but impulsive and needs redirected. Pt continuously stating he needs to \"pee. \" Advised he has a catheter and is in recovery. See flow sheet for assessment. 1805 Xray present. 815 [facility] Dr. Jesus Olvera rounding and reviewed CT output and vitals. Orders received. [address] pushing 100 mcg William  1841 Platelets started. [address] has pushing 100 mcg of William.   1853 Platelets started. No signs of reaction. 1855 Back dressing is saturated with bloody drainage. Cyn [facility] Call made to  and reviewed CT output, back dressing saturated, vitals and orders.  and  discussed case over the phone. Orders placed. 1908 Platelets and PRBC started, no signs of reaction  1926  rounding and updated. 1929 PRBC and platelets started. 1952 No signs of reaction from platelets or PRBC.  4765 Back dressing saturated with bloody drainage. Left back and around x2 CT sites reinforced with ABDs. No crepitus palpated. CT output has slowed down. Pt positioned to right side and warm blanket placed over left side to splint. Pt is more appropriate and oriented. Pt agrees he is comfortable and denies SOB.  rounding, ok to transfer to ICU. Call made to  and updated -no new orders received. 2018 Pt transferred to ICU, X2 RNS, and monitor.

## 2019-03-15 NOTE — PLAN OF CARE
Problem: Pain:  Goal: Pain level will decrease  Description  Pain level will decrease  Outcome: Ongoing  Note:   Pt complains of post-op pain at the incision site. Provide pain medication per orders. Reassess pain post pain medications. Attempt nonpharmacological pain relief. Will continue to monitor for pain needs. Problem: Anxiety/Stress:  Goal: Level of anxiety will decrease  Description  Level of anxiety will decrease  Outcome: Ongoing  Note:   Pt was mildly anxious at the start of this shift. Pt is now relaxed and able to verbalize needs and complaints. Continue to assess for needs with hourly rounding. Problem: Bowel Function - Altered:  Goal: Bowel elimination is within specified parameters  Description  Bowel elimination is within specified parameters  Outcome: Ongoing  Note:   Bowel sounds active this shift. Stool softener provided per orders. Problem: Fluid Volume - Imbalance:  Goal: Absence of imbalanced fluid volume signs and symptoms  Description  Absence of imbalanced fluid volume signs and symptoms  Outcome: Ongoing  Note:   Bumex given per orders. Pt diuresing well. Accurate I/O performed. Will continue to monitor for fluid volume imbalance. Problem: Skin Integrity - Impaired:  Goal: Will show no infection signs and symptoms  Description  Will show no infection signs and symptoms  Outcome: Ongoing  Note:   Pt has scattered abrasions and bruising. Pt is able to reposition self in bed. Continue to provide assistance with repositioning as needed. Arms and legs elevated on pillows. Sacral dressing in place. Continue to monitor for signs of skin breakdown. Care plan reviewed with patient and daughters and girlfriend. Patient and daughters and girlfriend verbalize understanding of the plan of care and contribute to goal setting.

## 2019-03-16 ENCOUNTER — APPOINTMENT (OUTPATIENT)
Dept: GENERAL RADIOLOGY | Age: 69
DRG: 163 | End: 2019-03-16
Attending: THORACIC SURGERY (CARDIOTHORACIC VASCULAR SURGERY)
Payer: MEDICARE

## 2019-03-16 LAB
ANION GAP SERPL CALCULATED.3IONS-SCNC: 9 MEQ/L (ref 8–16)
BUN BLDV-MCNC: 19 MG/DL (ref 7–22)
CALCIUM SERPL-MCNC: 8.2 MG/DL (ref 8.5–10.5)
CHLORIDE BLD-SCNC: 105 MEQ/L (ref 98–111)
CO2: 26 MEQ/L (ref 23–33)
CREAT SERPL-MCNC: 0.7 MG/DL (ref 0.4–1.2)
ERYTHROCYTE [DISTWIDTH] IN BLOOD BY AUTOMATED COUNT: 14 % (ref 11.5–14.5)
ERYTHROCYTE [DISTWIDTH] IN BLOOD BY AUTOMATED COUNT: 43.8 FL (ref 35–45)
GFR SERPL CREATININE-BSD FRML MDRD: > 90 ML/MIN/1.73M2
GLUCOSE BLD-MCNC: 184 MG/DL (ref 70–108)
GLUCOSE BLD-MCNC: 198 MG/DL (ref 70–108)
GLUCOSE BLD-MCNC: 202 MG/DL (ref 70–108)
GLUCOSE BLD-MCNC: 248 MG/DL (ref 70–108)
GLUCOSE BLD-MCNC: 442 MG/DL (ref 70–108)
HCT VFR BLD CALC: 29.4 % (ref 42–52)
HEMOGLOBIN: 10 GM/DL (ref 14–18)
MCH RBC QN AUTO: 29.4 PG (ref 26–33)
MCHC RBC AUTO-ENTMCNC: 34 GM/DL (ref 32.2–35.5)
MCV RBC AUTO: 86.5 FL (ref 80–94)
MRSA SCREEN: NORMAL
PLATELET # BLD: 209 THOU/MM3 (ref 130–400)
PMV BLD AUTO: 9.8 FL (ref 9.4–12.4)
POTASSIUM SERPL-SCNC: 4 MEQ/L (ref 3.5–5.2)
RBC # BLD: 3.4 MILL/MM3 (ref 4.7–6.1)
SODIUM BLD-SCNC: 140 MEQ/L (ref 135–145)
WBC # BLD: 12.4 THOU/MM3 (ref 4.8–10.8)

## 2019-03-16 PROCEDURE — 2709999900 HC NON-CHARGEABLE SUPPLY

## 2019-03-16 PROCEDURE — 97535 SELF CARE MNGMENT TRAINING: CPT

## 2019-03-16 PROCEDURE — 6370000000 HC RX 637 (ALT 250 FOR IP): Performed by: PHYSICIAN ASSISTANT

## 2019-03-16 PROCEDURE — 6370000000 HC RX 637 (ALT 250 FOR IP): Performed by: THORACIC SURGERY (CARDIOTHORACIC VASCULAR SURGERY)

## 2019-03-16 PROCEDURE — 36415 COLL VENOUS BLD VENIPUNCTURE: CPT

## 2019-03-16 PROCEDURE — 97165 OT EVAL LOW COMPLEX 30 MIN: CPT

## 2019-03-16 PROCEDURE — 85027 COMPLETE CBC AUTOMATED: CPT

## 2019-03-16 PROCEDURE — 82948 REAGENT STRIP/BLOOD GLUCOSE: CPT

## 2019-03-16 PROCEDURE — 2500000003 HC RX 250 WO HCPCS: Performed by: PHYSICIAN ASSISTANT

## 2019-03-16 PROCEDURE — 2060000000 HC ICU INTERMEDIATE R&B

## 2019-03-16 PROCEDURE — 94640 AIRWAY INHALATION TREATMENT: CPT

## 2019-03-16 PROCEDURE — 2580000003 HC RX 258: Performed by: PHYSICIAN ASSISTANT

## 2019-03-16 PROCEDURE — 97530 THERAPEUTIC ACTIVITIES: CPT

## 2019-03-16 PROCEDURE — 94660 CPAP INITIATION&MGMT: CPT

## 2019-03-16 PROCEDURE — 71046 X-RAY EXAM CHEST 2 VIEWS: CPT

## 2019-03-16 PROCEDURE — 2700000000 HC OXYGEN THERAPY PER DAY

## 2019-03-16 PROCEDURE — 80048 BASIC METABOLIC PNL TOTAL CA: CPT

## 2019-03-16 RX ORDER — DEXTROSE MONOHYDRATE 25 G/50ML
12.5 INJECTION, SOLUTION INTRAVENOUS PRN
Status: DISCONTINUED | OUTPATIENT
Start: 2019-03-16 | End: 2019-03-18 | Stop reason: HOSPADM

## 2019-03-16 RX ORDER — DEXTROSE MONOHYDRATE 50 MG/ML
100 INJECTION, SOLUTION INTRAVENOUS PRN
Status: DISCONTINUED | OUTPATIENT
Start: 2019-03-16 | End: 2019-03-18 | Stop reason: HOSPADM

## 2019-03-16 RX ORDER — INSULIN GLARGINE 100 [IU]/ML
25 INJECTION, SOLUTION SUBCUTANEOUS 2 TIMES DAILY
Status: DISCONTINUED | OUTPATIENT
Start: 2019-03-16 | End: 2019-03-18 | Stop reason: HOSPADM

## 2019-03-16 RX ORDER — NICOTINE POLACRILEX 4 MG
15 LOZENGE BUCCAL PRN
Status: DISCONTINUED | OUTPATIENT
Start: 2019-03-16 | End: 2019-03-18 | Stop reason: HOSPADM

## 2019-03-16 RX ADMIN — INSULIN LISPRO 3 UNITS: 100 INJECTION, SOLUTION INTRAVENOUS; SUBCUTANEOUS at 16:46

## 2019-03-16 RX ADMIN — INSULIN GLARGINE 25 UNITS: 100 INJECTION, SOLUTION SUBCUTANEOUS at 14:06

## 2019-03-16 RX ADMIN — LISINOPRIL 40 MG: 40 TABLET ORAL at 09:49

## 2019-03-16 RX ADMIN — OXYCODONE HYDROCHLORIDE 10 MG: 5 TABLET ORAL at 20:48

## 2019-03-16 RX ADMIN — ASPIRIN 81 MG 81 MG: 81 TABLET ORAL at 09:49

## 2019-03-16 RX ADMIN — AMLODIPINE BESYLATE 10 MG: 10 TABLET ORAL at 09:49

## 2019-03-16 RX ADMIN — DOCUSATE SODIUM 100 MG: 100 CAPSULE, LIQUID FILLED ORAL at 09:49

## 2019-03-16 RX ADMIN — OXYCODONE HYDROCHLORIDE 10 MG: 5 TABLET ORAL at 14:56

## 2019-03-16 RX ADMIN — INSULIN LISPRO 6 UNITS: 100 INJECTION, SOLUTION INTRAVENOUS; SUBCUTANEOUS at 09:50

## 2019-03-16 RX ADMIN — FINASTERIDE 5 MG: 5 TABLET, FILM COATED ORAL at 09:49

## 2019-03-16 RX ADMIN — PANTOPRAZOLE SODIUM 40 MG: 40 TABLET, DELAYED RELEASE ORAL at 05:53

## 2019-03-16 RX ADMIN — Medication 10 ML: at 09:52

## 2019-03-16 RX ADMIN — INSULIN LISPRO 2 UNITS: 100 INJECTION, SOLUTION INTRAVENOUS; SUBCUTANEOUS at 21:01

## 2019-03-16 RX ADMIN — BUMETANIDE 1 MG: 0.25 INJECTION INTRAMUSCULAR; INTRAVENOUS at 09:49

## 2019-03-16 RX ADMIN — GLYCOPYRROLATE AND FORMOTEROL FUMARATE 2 PUFF: 9; 4.8 AEROSOL, METERED RESPIRATORY (INHALATION) at 19:38

## 2019-03-16 RX ADMIN — Medication 10 ML: at 20:48

## 2019-03-16 RX ADMIN — INSULIN GLARGINE 25 UNITS: 100 INJECTION, SOLUTION SUBCUTANEOUS at 21:01

## 2019-03-16 RX ADMIN — DOCUSATE SODIUM 100 MG: 100 CAPSULE, LIQUID FILLED ORAL at 20:48

## 2019-03-16 RX ADMIN — CLOPIDOGREL BISULFATE 75 MG: 75 TABLET ORAL at 09:49

## 2019-03-16 RX ADMIN — INSULIN LISPRO 6 UNITS: 100 INJECTION, SOLUTION INTRAVENOUS; SUBCUTANEOUS at 13:09

## 2019-03-16 RX ADMIN — ATORVASTATIN CALCIUM 20 MG: 20 TABLET, FILM COATED ORAL at 20:48

## 2019-03-16 RX ADMIN — POTASSIUM BICARBONATE 20 MEQ: 782 TABLET, EFFERVESCENT ORAL at 09:37

## 2019-03-16 RX ADMIN — BUMETANIDE 1 MG: 0.25 INJECTION INTRAMUSCULAR; INTRAVENOUS at 20:49

## 2019-03-16 RX ADMIN — OXYCODONE HYDROCHLORIDE 10 MG: 5 TABLET ORAL at 01:18

## 2019-03-16 RX ADMIN — OXYCODONE HYDROCHLORIDE 5 MG: 5 TABLET ORAL at 05:37

## 2019-03-16 ASSESSMENT — PAIN SCALES - GENERAL
PAINLEVEL_OUTOF10: 4
PAINLEVEL_OUTOF10: 7
PAINLEVEL_OUTOF10: 4
PAINLEVEL_OUTOF10: 5
PAINLEVEL_OUTOF10: 7
PAINLEVEL_OUTOF10: 7
PAINLEVEL_OUTOF10: 6

## 2019-03-16 ASSESSMENT — PAIN DESCRIPTION - PAIN TYPE
TYPE: SURGICAL PAIN
TYPE: SURGICAL PAIN

## 2019-03-16 ASSESSMENT — PAIN DESCRIPTION - LOCATION
LOCATION: BACK
LOCATION: BACK

## 2019-03-16 NOTE — PLAN OF CARE
Problem: Pain:  Goal: Pain level will decrease  Description  Pain level will decrease  3/16/2019 1110 by Angie Ji RN  Outcome: Ongoing  Note:   Pain controlled. Receiving prn pain medication as needed  3/16/2019 0130 by Luna Scott RN  Outcome: Ongoing  Note:   Pain Assessment: 0-10  Pain Level: 7   Pain goal:  3  Is pain goal met at this time? Yes     Additional interventions to be implemented: medications oxicodone and position change       Problem: Discharge Planning:  Goal: Participates in care planning  Description  Participates in care planning  3/16/2019 1110 by Angie Ji RN  Outcome: Ongoing  Note:   Plan is to return home at discharge  3/16/2019 0130 by Luna Scott RN  Outcome: Ongoing  Note:   Patient is aware and participates in care. Problem: Airway Clearance - Ineffective:  Goal: Ability to maintain a clear airway will improve  Description  Ability to maintain a clear airway will improve  3/16/2019 1110 by Angie Ji RN  Outcome: Ongoing  Note:   Airway is clear  3/16/2019 0130 by Luna Scott RN  Outcome: Ongoing  Note:   Bipap on at this time. Chest x ray in am.       Problem: Anxiety/Stress:  Goal: Level of anxiety will decrease  Description  Level of anxiety will decrease  3/16/2019 1110 by Angie Ji RN  Note:   Denies anxiety  3/16/2019 0130 by Luna Scott RN  Outcome: Ongoing  Note:   No s/s of anxiety noted. Problem: Bowel Function - Altered:  Goal: Bowel elimination is within specified parameters  Description  Bowel elimination is within specified parameters  3/16/2019 0130 by Luna Scott RN  Outcome: Ongoing  Note:   Scheduled colace given as ordered.        Problem: Gas Exchange - Impaired:  Goal: Levels of oxygenation will improve  Description  Levels of oxygenation will improve  Outcome: Ongoing  Note:   Weaning oxygen as tolerated- on 1L nasal cannula     Problem: Skin Integrity - Impaired:  Goal: Will show no infection signs and symptoms  Description  Will show no infection signs and symptoms  3/16/2019 0130 by Vivien Fernández RN  Outcome: Ongoing  Note:   No new skin breakdown noted. Care plan reviewed with patient and patient verbalizes understanding of the plan of care and contribute to goal setting.

## 2019-03-16 NOTE — PLAN OF CARE
Problem: Pain:  Goal: Pain level will decrease  Description  Pain level will decrease  Outcome: Ongoing  Note:   Pain Assessment: 0-10  Pain Level: 7   Pain goal:  3  Is pain goal met at this time? Yes     Additional interventions to be implemented: medications oxicodone and position change         Problem: Discharge Planning:  Goal: Participates in care planning  Description  Participates in care planning  Outcome: Ongoing  Note:   Patient is aware and participates in care. Problem: Airway Clearance - Ineffective:  Goal: Ability to maintain a clear airway will improve  Description  Ability to maintain a clear airway will improve  Outcome: Ongoing  Note:   Bipap on at this time. Chest x ray in am.       Problem: Anxiety/Stress:  Goal: Level of anxiety will decrease  Description  Level of anxiety will decrease  Outcome: Ongoing  Note:   No s/s of anxiety noted. Problem: Bowel Function - Altered:  Goal: Bowel elimination is within specified parameters  Description  Bowel elimination is within specified parameters  Outcome: Ongoing  Note:   Scheduled colace given as ordered. Problem: Skin Integrity - Impaired:  Goal: Will show no infection signs and symptoms  Description  Will show no infection signs and symptoms  Outcome: Ongoing  Note:   No new skin breakdown noted. Care plan reviewed with patient. Patient verbalizes understanding of the plan of care and contribute to goal setting. Problem: Pressure Injury - Risk of  Goal: *Prevention of pressure injury  Document Josue Scale and appropriate interventions in the flowsheet. Outcome: Not Progressing Towards Goal  Pressure Injury Interventions:             Activity Interventions: Pressure redistribution bed/mattress(bed type)    Mobility Interventions: Assess need for specialty bed, HOB 30 degrees or less, Pressure redistribution bed/mattress (bed type)    Nutrition Interventions: Document food/fluid/supplement intake    Friction and Shear Interventions: HOB 30 degrees or less

## 2019-03-16 NOTE — PROGRESS NOTES
[facility] [address]  INPATIENT OCCUPATIONAL THERAPY  [facility] ICU STEPDOWN TELEMETRY 4K  EVALUATION    Time:  Time In: 1130  Time Out: 1200  Timed Code Treatment Minutes: 23 Minutes  Minutes: 30          Date: 3/16/2019  Patient Name: Amy Perry,   Gender: male      MRN: [id number]  : 1950  (76 y.o.)  Referring Practitioner: Celena Craig PA-C  Diagnosis: adenocarcinoma lung, left       Restrictions/Precautions:  General Precautions, Fall Risk                    Other position/activity restrictions: L chest tube       Past Medical History:   Diagnosis Date    Anxiety     Arthritis     [facility] ([facility] 2019    left lung stage 2    COPD (chronic obstructive pulmonary disease) ([facility] 2019    Enlarged prostate with urinary retention     Gait difficulty     Generalized weakness     Hyperlipidemia     Hypertension     Lesion of bladder     Osteoarthritis     Retention of urine     S/P cardiac catheterization 2019    stent to circumflex per Dr. Jaime Hall S/P TURP     SOB (shortness of breath)     With activity    Voiding difficulty      Past Surgical History:   Procedure Laterality Date    APPENDECTOMY      BRONCHOSCOPY N/A 2019    BRONCHOSCOPY performed by Sam Stiles MD at [address] [place] [address]  2019    BRONCHOSCOPY/TRANSBRONCHIAL LUNG BIOPSY performed by Sam Stilse MD at [address] [place] [address] N/A 3/1/2019    BRONCHOSCOPY W/EBUS FNA performed by Sam Stiles MD at [facility] 80  [id number]    PROSTATE SURGERY      TURP           Subjective  Chart Reviewed: Yes    Subjective: RN okayed session. Pt in chair with family present agreeable to OT. Comments: trialed room air for brief mobility into hallway, O2 dropping to 86%, returned to .5 L once returned to room.  notified RN    General:  Overall Orientation Status: Within Functional Limits                   Pain:   denies       Social/Functional from OT services to address above an dincrease safety and indep wtih self care tasks for return to [facility] PLOF  Performance deficits / Impairments: Decreased functional mobility , Decreased ADL status, Decreased endurance, Decreased strength, Decreased balance, Decreased safe awareness  Prognosis: Good    Clinical Decision Making: Clinical Decision making was of Low Complexity as the result of analysis of data from a problem focused assessment, a consideration of a limited number of treatment options, no significant comorbidities affecting the plan of care and no modification or assistance required to complete the evaluation. Discharge Recommendations:  Discharge Recommendations: Home with assist PRN    Patient Education:  Patient Education: OT POC, role of OT, deep breathing,     Equipment Recommendations:  Equipment Needed: No    Safety:  Safety Devices in place: Yes  Type of devices: All fall risk precautions in place, Left in chair, Call light within reach, Nurse notified, Gait belt    Plan:  Times per week: 5x  Current Treatment Recommendations: Strengthening, Balance Training, Functional Mobility Training, Endurance Training, Self-Care / ADL, Patient/Caregiver Education & Training, Safety Education & Training    Goals:  Patient goals : to go home    Short term goals  Time Frame for Short term goals: by discharge  Short term goal 1: Pt will complete BADL routine Mod I with any adaptive technique needed and no cues for safet   Short term goal 2: Pt will complete dynamic standing task wtih S for > 3 minutes with 2 hand release and no LOB for ease with grooming routine  Short term goal 3: Pt will complete functional mobility with S and no AD with no cues for safety for ease with accessing home environment  Short term goal 4: Pt will complete functional task or ADL with S tolerating > 8 minutes with O2 remaining > 90% with no cues for EC strategies prn.    Long term goals  Time Frame for Long term goals : No LTG

## 2019-03-16 NOTE — PROGRESS NOTES
Cardiovascular Surgery Progress Note      Comfortable on 2L, stable  CXR mild pulmonary edema, no space issues  Tapering serosanginous pleural tube output  Hyperglycemia uncontrolled  -Continue IV diuresis  -Insulin sliding scale adjusted, lantus added; diet changed to diabetic  -Keep tubes in

## 2019-03-16 NOTE — OP NOTE
[address] [place] [address]                                OPERATIVE REPORT    PATIENT NAME: Dayton Siemens                   :        1950  MED REC NO:   [id number]                           ROOM:       0001  ACCOUNT NO:   [de-identified]                           ADMIT DATE: 2019  PROVIDER:     Mary Grace Limon MD    DATE OF PROCEDURE:  03/15/2019    HISTORY:  The patient is a 63-year-old gentleman who presented with  hemoptysis, and on his workup a left lower lobe mass, was discovered  and biopsied diagnosed as primary adenocarcinoma of the left lower lobe. He had an abnormal stress test, which led to cardiac catheterization  disclosing a critically stenosed dominant circumflex coronary artery  disease which was stented yesterday, the day before surgery. His steady  hemoptysis continued and worsened nevertheless, especially with the Plavix. Because  of his tumor, size of the tumor about 5 cm, and the fact that no lymph  node lit up in the mediastinum, and the bronchial ultrasound biopsies of  the mediastinal lymph nodes were negative for metastatic disease, his  FEV1 was over 2 liters; a curative resection was then recommended. Potential risks, benefits and alternatives of the procedure were  explained in addition to the fact that since he is having surgery on  Plavix because of a recent stent; he has increased risk of bleeding, and  potential risks as that of stroke, bleeding, infection, damage to  arteries, veins, nerves, blood clots, hemothorax, pneumothorax to  include some were discussed, and the patient very well appeared to  understand. All of his questions were answered to his satisfaction and  he is agreeable. PREOPERATIVE DIAGNOSES:  1. Primary adenocarcinoma of the left lower lobe. 2.  COPD. 3.  Coronary artery disease, status post left circumflex coronary artery  stenting, the day before surgery.   4. Ongoing hemoptysis. POSTOPERATIVE DIAGNOSES:  1. Primary adenocarcinoma of the left lower lobe. 2.  COPD. 3.  Coronary artery disease status post left circumflex coronary artery  stenting, the day before surgery. 4.  Ongoing hemoptysis. OPERATION PERFORMED:  1. Left exploratory thoracotomy via posterior thoracotomy. 2.  Anatomic left lower lobectomy with mediastinal lymph node  dissection. 3.  Flexible bronchoscopy and completion therapeutic flexible  bronchoscopy. SURGEON:  Elvira Fernández MD.    MEDICALLY NECESSARY FIRST ASSISTANTS:  1. Angeles Frazier PA-C.  2.  Conor Taylor MS3.  3.  Bruce Batista. ESTIMATED BLOOD LOSS:  Approximately 200 mL. COMPLICATIONS:  None were noted intraoperatively other than generalized  oozing from areas in the parietal pleura. FINDINGS:   Bronchial margins were negative for malignancy. The mass  was adherent to the parietal pleura posteriorly, just lateral to the  aorta. Extrapleural dissection was done and the parietal pleura,was removed  en block with the mass, adhered to the left lower lobe mass in the left lower lobectomy  specimen. Parietal pleural margings of normal-appearing pleura were  done. No masses were palpable in the left upper lobe, and completion  therapeutic bronchoscopy was done. Flexible bronchoscopy showed bright  red blood in the tracheobronchial tree, and on further inspection, it  was seen oozing from the left lower lobe bronchus. This was aspirated  dry, but it continued oozing off. All the tracheobronchial tree was blood  stained, and all this was aspirated until clean and irrigated with  saline until clean. There were no endobronchial lesions identified on  the left lower lobe, left upper lobe, lingula, right upper lobe, right  middle lobe, RB6, and right lower lobe segmental bronchi.   Completion  therapeutic bronchoscopy showed perfectly coapted left lower lobe  bronchus after aspiration of blood from the tracheobronchial tree. There was no active bleeding noted from any remaining bronchus in the  left upper lobe, right upper lobe, RB6, right lower lobe, right middle  lobe, and right lower lobe segmental bronchi. OPERATIVE PROCEDURE:  After adequate monitoring lines were placed, the  patient supine, time-out procedures were completed. General  endotracheal anesthesia was accomplished, with single-lumen endotracheal  tube. A right angle connector was placed on the endotracheal tube to  deliver simultaneous bronchoscopy and anesthetic ventilation. After  this was done, then a flexible bronchoscope was advanced through the right  angle connector. All of the tracheobronchial tree was stained with  fresh blood. Bronchoscope was advanced to the left mainstem bronchus  and left lower lobe, and it was aspirated until clear, and there was  bright red blood oozing from the left lower lobe bronchi. I could not  appreciate any masses in the takeoff of the left lower lobe bronchus. The left upper lobe bronchus was wide open, and there were no  endobronchial lesions. Bronchoscope was advanced to the right upper  lobe, middle lobe, lower lobe, RB6, and there were no endobronchial  Lesions. Some saline was used to irrigate the left lower lobe and aspirated until clear, and  there was still some oozing welling up. After this was done, a double lumen endotracheal tube was placed,  positioned, and the patient was then positioned in the right lateral  decubitus position in a standard fashion for a left thoracotomy. The  patient was then prepped and draped, and the left hemithorax was entered  through the sixth interspace after excising a small piece of rib  posteriorly. After this was done, then it was noted that the mass was  adherent to the parietal pleura posteriorly. I was able to go around  the mass medially with my fingers.   There was an area that there was no  adhesion, therefore extrapleural dissection was wall,  3-0 PDS to reattach the posterior border of the latissimus, 10 mm  Mookie-Crowell drain was placed in the inferior fasciocutaneous flap and  exited through a separate stab. A few sutures were placed coapting the  fasciocutaneous flap to the muscles, and the chest wall was closed  in layers with absorbable sutures for subcutaneous, subcuticular for the  skin. The patient was then positioned on his back. The double lumen  tube which was blood stained was removed, and a single lumen tube was  placed. Right angle connector was placed, and completion therapeutic  flexible bronchoscopy was done. Bronchoscope was advanced, and the left  lower lobe stump appeared to be perfectly coapted. The left upper lobe  bronchus appeared to be clean. It was irrigated was saline, and after  aspirating all the blood stain, there was no active bleeding from the  left lobe. Bronchoscope was advanced to the right upper lobe, middle  lobe and lower lobe, and these were aspirated until clean. Bronchoscope  was then removed, and the patient was then transferred to recovery room. Platelets had to be transfused. The patient tolerated the procedure  uneventfully.         Esequiel Abdi MD    D: 03/15/2019 8:32:05       T: 03/15/2019 12:28:46     [id number]  Job#: [id number]     Doc#: [id number]    CC:

## 2019-03-17 ENCOUNTER — APPOINTMENT (OUTPATIENT)
Dept: GENERAL RADIOLOGY | Age: 69
DRG: 163 | End: 2019-03-17
Attending: THORACIC SURGERY (CARDIOTHORACIC VASCULAR SURGERY)
Payer: MEDICARE

## 2019-03-17 LAB
ANION GAP SERPL CALCULATED.3IONS-SCNC: 11 MEQ/L (ref 8–16)
BUN BLDV-MCNC: 28 MG/DL (ref 7–22)
CALCIUM SERPL-MCNC: 8.8 MG/DL (ref 8.5–10.5)
CHLORIDE BLD-SCNC: 99 MEQ/L (ref 98–111)
CO2: 30 MEQ/L (ref 23–33)
CREAT SERPL-MCNC: 0.9 MG/DL (ref 0.4–1.2)
ERYTHROCYTE [DISTWIDTH] IN BLOOD BY AUTOMATED COUNT: 14 % (ref 11.5–14.5)
ERYTHROCYTE [DISTWIDTH] IN BLOOD BY AUTOMATED COUNT: 45.5 FL (ref 35–45)
GFR SERPL CREATININE-BSD FRML MDRD: 84 ML/MIN/1.73M2
GLUCOSE BLD-MCNC: 126 MG/DL (ref 70–108)
GLUCOSE BLD-MCNC: 147 MG/DL (ref 70–108)
GLUCOSE BLD-MCNC: 151 MG/DL (ref 70–108)
GLUCOSE BLD-MCNC: 175 MG/DL (ref 70–108)
GLUCOSE BLD-MCNC: 183 MG/DL (ref 70–108)
HCT VFR BLD CALC: 31.4 % (ref 42–52)
HEMOGLOBIN: 10.2 GM/DL (ref 14–18)
MCH RBC QN AUTO: 29.1 PG (ref 26–33)
MCHC RBC AUTO-ENTMCNC: 32.5 GM/DL (ref 32.2–35.5)
MCV RBC AUTO: 89.7 FL (ref 80–94)
PLATELET # BLD: 215 THOU/MM3 (ref 130–400)
PMV BLD AUTO: 9.5 FL (ref 9.4–12.4)
POTASSIUM SERPL-SCNC: 4.3 MEQ/L (ref 3.5–5.2)
RBC # BLD: 3.5 MILL/MM3 (ref 4.7–6.1)
SODIUM BLD-SCNC: 140 MEQ/L (ref 135–145)
WBC # BLD: 10.5 THOU/MM3 (ref 4.8–10.8)

## 2019-03-17 PROCEDURE — 6370000000 HC RX 637 (ALT 250 FOR IP): Performed by: PHYSICIAN ASSISTANT

## 2019-03-17 PROCEDURE — 94660 CPAP INITIATION&MGMT: CPT

## 2019-03-17 PROCEDURE — 71046 X-RAY EXAM CHEST 2 VIEWS: CPT

## 2019-03-17 PROCEDURE — 6370000000 HC RX 637 (ALT 250 FOR IP): Performed by: THORACIC SURGERY (CARDIOTHORACIC VASCULAR SURGERY)

## 2019-03-17 PROCEDURE — 80048 BASIC METABOLIC PNL TOTAL CA: CPT

## 2019-03-17 PROCEDURE — 82948 REAGENT STRIP/BLOOD GLUCOSE: CPT

## 2019-03-17 PROCEDURE — 2580000003 HC RX 258: Performed by: PHYSICIAN ASSISTANT

## 2019-03-17 PROCEDURE — 2060000000 HC ICU INTERMEDIATE R&B

## 2019-03-17 PROCEDURE — 2700000000 HC OXYGEN THERAPY PER DAY

## 2019-03-17 PROCEDURE — 94640 AIRWAY INHALATION TREATMENT: CPT

## 2019-03-17 PROCEDURE — 2709999900 HC NON-CHARGEABLE SUPPLY

## 2019-03-17 PROCEDURE — 36415 COLL VENOUS BLD VENIPUNCTURE: CPT

## 2019-03-17 PROCEDURE — 85027 COMPLETE CBC AUTOMATED: CPT

## 2019-03-17 RX ADMIN — FINASTERIDE 5 MG: 5 TABLET, FILM COATED ORAL at 08:36

## 2019-03-17 RX ADMIN — OXYCODONE HYDROCHLORIDE 10 MG: 5 TABLET ORAL at 10:00

## 2019-03-17 RX ADMIN — ACETAMINOPHEN 650 MG: 325 TABLET ORAL at 07:28

## 2019-03-17 RX ADMIN — ATORVASTATIN CALCIUM 20 MG: 20 TABLET, FILM COATED ORAL at 20:03

## 2019-03-17 RX ADMIN — INSULIN LISPRO 3 UNITS: 100 INJECTION, SOLUTION INTRAVENOUS; SUBCUTANEOUS at 08:38

## 2019-03-17 RX ADMIN — DOCUSATE SODIUM 100 MG: 100 CAPSULE, LIQUID FILLED ORAL at 20:03

## 2019-03-17 RX ADMIN — MAGNESIUM CITRATE 296 ML: 1.75 LIQUID ORAL at 12:23

## 2019-03-17 RX ADMIN — OXYCODONE HYDROCHLORIDE 10 MG: 5 TABLET ORAL at 09:22

## 2019-03-17 RX ADMIN — DOCUSATE SODIUM 100 MG: 100 CAPSULE, LIQUID FILLED ORAL at 08:36

## 2019-03-17 RX ADMIN — Medication 10 ML: at 08:37

## 2019-03-17 RX ADMIN — OXYCODONE HYDROCHLORIDE 10 MG: 5 TABLET ORAL at 17:48

## 2019-03-17 RX ADMIN — OXYCODONE HYDROCHLORIDE 10 MG: 5 TABLET ORAL at 13:22

## 2019-03-17 RX ADMIN — GLYCOPYRROLATE AND FORMOTEROL FUMARATE 2 PUFF: 9; 4.8 AEROSOL, METERED RESPIRATORY (INHALATION) at 09:35

## 2019-03-17 RX ADMIN — AMLODIPINE BESYLATE 10 MG: 10 TABLET ORAL at 08:36

## 2019-03-17 RX ADMIN — OXYCODONE HYDROCHLORIDE 5 MG: 5 TABLET ORAL at 05:05

## 2019-03-17 RX ADMIN — Medication 10 ML: at 20:02

## 2019-03-17 RX ADMIN — INSULIN LISPRO 3 UNITS: 100 INJECTION, SOLUTION INTRAVENOUS; SUBCUTANEOUS at 12:27

## 2019-03-17 RX ADMIN — ACETAMINOPHEN 650 MG: 325 TABLET ORAL at 15:50

## 2019-03-17 RX ADMIN — LISINOPRIL 40 MG: 40 TABLET ORAL at 08:36

## 2019-03-17 RX ADMIN — PANTOPRAZOLE SODIUM 40 MG: 40 TABLET, DELAYED RELEASE ORAL at 05:05

## 2019-03-17 RX ADMIN — ACETAMINOPHEN 650 MG: 325 TABLET ORAL at 11:41

## 2019-03-17 RX ADMIN — ASPIRIN 81 MG 81 MG: 81 TABLET ORAL at 08:36

## 2019-03-17 RX ADMIN — INSULIN GLARGINE 25 UNITS: 100 INJECTION, SOLUTION SUBCUTANEOUS at 08:39

## 2019-03-17 RX ADMIN — GLYCOPYRROLATE AND FORMOTEROL FUMARATE 2 PUFF: 9; 4.8 AEROSOL, METERED RESPIRATORY (INHALATION) at 21:11

## 2019-03-17 RX ADMIN — INSULIN LISPRO 2 UNITS: 100 INJECTION, SOLUTION INTRAVENOUS; SUBCUTANEOUS at 21:11

## 2019-03-17 RX ADMIN — CLOPIDOGREL BISULFATE 75 MG: 75 TABLET ORAL at 08:37

## 2019-03-17 RX ADMIN — INSULIN GLARGINE 25 UNITS: 100 INJECTION, SOLUTION SUBCUTANEOUS at 21:10

## 2019-03-17 RX ADMIN — BACITRACIN: 500 OINTMENT TOPICAL at 15:53

## 2019-03-17 ASSESSMENT — PAIN DESCRIPTION - FREQUENCY
FREQUENCY: INTERMITTENT
FREQUENCY: CONTINUOUS
FREQUENCY: INTERMITTENT
FREQUENCY: CONTINUOUS

## 2019-03-17 ASSESSMENT — PAIN DESCRIPTION - PROGRESSION
CLINICAL_PROGRESSION: NOT CHANGED

## 2019-03-17 ASSESSMENT — PAIN DESCRIPTION - ORIENTATION
ORIENTATION: LEFT;UPPER

## 2019-03-17 ASSESSMENT — PAIN SCALES - GENERAL
PAINLEVEL_OUTOF10: 3
PAINLEVEL_OUTOF10: 0
PAINLEVEL_OUTOF10: 5
PAINLEVEL_OUTOF10: 4
PAINLEVEL_OUTOF10: 6
PAINLEVEL_OUTOF10: 7
PAINLEVEL_OUTOF10: 3
PAINLEVEL_OUTOF10: 8
PAINLEVEL_OUTOF10: 7
PAINLEVEL_OUTOF10: 6
PAINLEVEL_OUTOF10: 3
PAINLEVEL_OUTOF10: 7
PAINLEVEL_OUTOF10: 5
PAINLEVEL_OUTOF10: 0
PAINLEVEL_OUTOF10: 0
PAINLEVEL_OUTOF10: 3
PAINLEVEL_OUTOF10: 4
PAINLEVEL_OUTOF10: 3

## 2019-03-17 ASSESSMENT — PAIN DESCRIPTION - PAIN TYPE
TYPE: SURGICAL PAIN

## 2019-03-17 ASSESSMENT — PAIN DESCRIPTION - ONSET
ONSET: ON-GOING

## 2019-03-17 ASSESSMENT — PAIN DESCRIPTION - LOCATION
LOCATION: CHEST

## 2019-03-17 ASSESSMENT — PAIN DESCRIPTION - DESCRIPTORS
DESCRIPTORS: SORE;SHOOTING
DESCRIPTORS: SORE;SHOOTING
DESCRIPTORS: ACHING
DESCRIPTORS: ACHING
DESCRIPTORS: SHARP;SORE;SHOOTING
DESCRIPTORS: SHOOTING
DESCRIPTORS: ACHING
DESCRIPTORS: ACHING

## 2019-03-17 NOTE — PLAN OF CARE
Problem: Pain:  Goal: Control of acute pain  Description  Control of acute pain  Outcome: Ongoing  Note:   Pain medication give prn for chest tube site pain, Goal 3-4 on 1-10 scale     Problem: Discharge Planning:  Goal: Participates in care planning  Description  Participates in care planning  Outcome: Ongoing  Note:   Discharge plan home with girlfriend     Problem: Airway Clearance - Ineffective:  Goal: Ability to maintain a clear airway will improve  Description  Ability to maintain a clear airway will improve  Outcome: Ongoing  Note:   Patient on 3L/NC today, SOB on exertion, encourage IS reaching 500, abnormal lung sounds and chest x-ray. Problem: Bowel Function - Altered:  Goal: Bowel elimination is within specified parameters  Description  Bowel elimination is within specified parameters  Outcome: Ongoing  Note:   Patient bloating, belching, and constipated due to surgery and pain medication, Mag Citrate given, continue to monitor bowel sounds and I&Os     Problem: Fluid Volume - Imbalance:  Goal: Absence of imbalanced fluid volume signs and symptoms  Description  Absence of imbalanced fluid volume signs and symptoms  Outcome: Ongoing  Note:   Monitor I&Os, chest tubes remain to -20 sx and RUTHANN bulb suction      Problem: Nutrition Deficit:  Goal: Ability to achieve adequate nutritional intake will improve  Description  Ability to achieve adequate nutritional intake will improve  Outcome: Ongoing  Note:   Encourage to eat 3 meals per day, with warm fluids. Patient educated on importance of protein in diet for incision healing      Problem: Skin Integrity - Impaired:  Goal: Will show no infection signs and symptoms  Description  Will show no infection signs and symptoms  Outcome: Ongoing  Note:   Incision care teaching done with patient. Continue to monitor for new skin breakdown. Scattered bruising due to anticoagulants.       Problem: Falls - Risk of:  Goal: Will remain free from falls  Description  Will

## 2019-03-17 NOTE — PROGRESS NOTES
Cardiovascular Surgery Progress Note      Comfortable on 1L  Labs/CXR ok -- CXR mild pulmonary edema  Pleural tube output tapering, still marginally high  -Continue IV diuresis  -Keep pleural tubes

## 2019-03-18 ENCOUNTER — APPOINTMENT (OUTPATIENT)
Dept: GENERAL RADIOLOGY | Age: 69
DRG: 163 | End: 2019-03-18
Attending: THORACIC SURGERY (CARDIOTHORACIC VASCULAR SURGERY)
Payer: MEDICARE

## 2019-03-18 ENCOUNTER — TELEPHONE (OUTPATIENT)
Dept: CARDIOTHORACIC SURGERY | Age: 69
End: 2019-03-18

## 2019-03-18 VITALS
DIASTOLIC BLOOD PRESSURE: 58 MMHG | TEMPERATURE: 98.8 F | BODY MASS INDEX: 26.14 KG/M2 | WEIGHT: 172.5 LBS | SYSTOLIC BLOOD PRESSURE: 94 MMHG | RESPIRATION RATE: 18 BRPM | HEIGHT: 68 IN | OXYGEN SATURATION: 93 % | HEART RATE: 93 BPM

## 2019-03-18 LAB
ANION GAP SERPL CALCULATED.3IONS-SCNC: 10 MEQ/L (ref 8–16)
BUN BLDV-MCNC: 29 MG/DL (ref 7–22)
CALCIUM SERPL-MCNC: 8.9 MG/DL (ref 8.5–10.5)
CHLORIDE BLD-SCNC: 101 MEQ/L (ref 98–111)
CO2: 28 MEQ/L (ref 23–33)
CREAT SERPL-MCNC: 0.9 MG/DL (ref 0.4–1.2)
ERYTHROCYTE [DISTWIDTH] IN BLOOD BY AUTOMATED COUNT: 13.8 % (ref 11.5–14.5)
ERYTHROCYTE [DISTWIDTH] IN BLOOD BY AUTOMATED COUNT: 44.9 FL (ref 35–45)
GFR SERPL CREATININE-BSD FRML MDRD: 84 ML/MIN/1.73M2
GLUCOSE BLD-MCNC: 187 MG/DL (ref 70–108)
GLUCOSE BLD-MCNC: 234 MG/DL (ref 70–108)
GLUCOSE BLD-MCNC: 92 MG/DL (ref 70–108)
GLUCOSE BLD-MCNC: 97 MG/DL (ref 70–108)
HCT VFR BLD CALC: 32.1 % (ref 42–52)
HEMOGLOBIN: 10.5 GM/DL (ref 14–18)
MCH RBC QN AUTO: 29.2 PG (ref 26–33)
MCHC RBC AUTO-ENTMCNC: 32.7 GM/DL (ref 32.2–35.5)
MCV RBC AUTO: 89.4 FL (ref 80–94)
PLATELET # BLD: 252 THOU/MM3 (ref 130–400)
PMV BLD AUTO: 9.9 FL (ref 9.4–12.4)
POTASSIUM SERPL-SCNC: 4.4 MEQ/L (ref 3.5–5.2)
RBC # BLD: 3.59 MILL/MM3 (ref 4.7–6.1)
SODIUM BLD-SCNC: 139 MEQ/L (ref 135–145)
WBC # BLD: 11.2 THOU/MM3 (ref 4.8–10.8)

## 2019-03-18 PROCEDURE — 36415 COLL VENOUS BLD VENIPUNCTURE: CPT

## 2019-03-18 PROCEDURE — 97161 PT EVAL LOW COMPLEX 20 MIN: CPT

## 2019-03-18 PROCEDURE — 6370000000 HC RX 637 (ALT 250 FOR IP): Performed by: PHYSICIAN ASSISTANT

## 2019-03-18 PROCEDURE — APPSS60 APP SPLIT SHARED TIME 46-60 MINUTES: Performed by: PHYSICIAN ASSISTANT

## 2019-03-18 PROCEDURE — 97530 THERAPEUTIC ACTIVITIES: CPT

## 2019-03-18 PROCEDURE — 94640 AIRWAY INHALATION TREATMENT: CPT

## 2019-03-18 PROCEDURE — 85027 COMPLETE CBC AUTOMATED: CPT

## 2019-03-18 PROCEDURE — 6370000000 HC RX 637 (ALT 250 FOR IP): Performed by: THORACIC SURGERY (CARDIOTHORACIC VASCULAR SURGERY)

## 2019-03-18 PROCEDURE — 71046 X-RAY EXAM CHEST 2 VIEWS: CPT

## 2019-03-18 PROCEDURE — 2709999900 HC NON-CHARGEABLE SUPPLY

## 2019-03-18 PROCEDURE — 82948 REAGENT STRIP/BLOOD GLUCOSE: CPT

## 2019-03-18 PROCEDURE — 94660 CPAP INITIATION&MGMT: CPT

## 2019-03-18 PROCEDURE — 80048 BASIC METABOLIC PNL TOTAL CA: CPT

## 2019-03-18 PROCEDURE — 2700000000 HC OXYGEN THERAPY PER DAY

## 2019-03-18 PROCEDURE — 2580000003 HC RX 258: Performed by: PHYSICIAN ASSISTANT

## 2019-03-18 PROCEDURE — 94761 N-INVAS EAR/PLS OXIMETRY MLT: CPT

## 2019-03-18 RX ORDER — OXYCODONE HYDROCHLORIDE 5 MG/1
5 TABLET ORAL EVERY 8 HOURS PRN
Qty: 15 TABLET | Refills: 0 | Status: SHIPPED | OUTPATIENT
Start: 2019-03-18 | End: 2019-03-28 | Stop reason: SDUPTHER

## 2019-03-18 RX ORDER — AMLODIPINE BESYLATE 2.5 MG/1
2.5 TABLET ORAL DAILY
Qty: 30 TABLET | Refills: 3 | Status: ON HOLD | OUTPATIENT
Start: 2019-03-18 | End: 2019-04-19 | Stop reason: DRUGHIGH

## 2019-03-18 RX ADMIN — PANTOPRAZOLE SODIUM 40 MG: 40 TABLET, DELAYED RELEASE ORAL at 06:23

## 2019-03-18 RX ADMIN — CLOPIDOGREL BISULFATE 75 MG: 75 TABLET ORAL at 09:05

## 2019-03-18 RX ADMIN — BACITRACIN: 500 OINTMENT TOPICAL at 09:05

## 2019-03-18 RX ADMIN — AMLODIPINE BESYLATE 10 MG: 10 TABLET ORAL at 09:05

## 2019-03-18 RX ADMIN — LISINOPRIL 40 MG: 40 TABLET ORAL at 09:05

## 2019-03-18 RX ADMIN — INSULIN LISPRO 3 UNITS: 100 INJECTION, SOLUTION INTRAVENOUS; SUBCUTANEOUS at 11:30

## 2019-03-18 RX ADMIN — ASPIRIN 81 MG 81 MG: 81 TABLET ORAL at 09:05

## 2019-03-18 RX ADMIN — FINASTERIDE 5 MG: 5 TABLET, FILM COATED ORAL at 09:05

## 2019-03-18 RX ADMIN — GLYCOPYRROLATE AND FORMOTEROL FUMARATE 2 PUFF: 9; 4.8 AEROSOL, METERED RESPIRATORY (INHALATION) at 09:25

## 2019-03-18 RX ADMIN — Medication 10 ML: at 09:05

## 2019-03-18 RX ADMIN — INSULIN GLARGINE 25 UNITS: 100 INJECTION, SOLUTION SUBCUTANEOUS at 09:05

## 2019-03-18 RX ADMIN — OXYCODONE HYDROCHLORIDE 10 MG: 5 TABLET ORAL at 06:52

## 2019-03-18 ASSESSMENT — PAIN DESCRIPTION - PAIN TYPE
TYPE: SURGICAL PAIN
TYPE: SURGICAL PAIN

## 2019-03-18 ASSESSMENT — PAIN SCALES - GENERAL
PAINLEVEL_OUTOF10: 3
PAINLEVEL_OUTOF10: 0
PAINLEVEL_OUTOF10: 3
PAINLEVEL_OUTOF10: 5
PAINLEVEL_OUTOF10: 8

## 2019-03-18 ASSESSMENT — PAIN DESCRIPTION - LOCATION
LOCATION: CHEST
LOCATION: BACK

## 2019-03-18 ASSESSMENT — PAIN DESCRIPTION - ORIENTATION
ORIENTATION: LEFT;UPPER
ORIENTATION: LEFT;UPPER

## 2019-03-18 ASSESSMENT — PAIN DESCRIPTION - DESCRIPTORS: DESCRIPTORS: ACHING

## 2019-03-18 ASSESSMENT — PAIN DESCRIPTION - FREQUENCY: FREQUENCY: INTERMITTENT

## 2019-03-18 ASSESSMENT — PAIN DESCRIPTION - ONSET: ONSET: ON-GOING

## 2019-03-18 NOTE — PROGRESS NOTES
[facility]  INPATIENT PHYSICAL THERAPY  EVALUATION  [facility] ICU STEPDOWN TELEMETRY 4K - 4K-14/014-A    Time In: 5530  Time Out: 5688  Timed Code Treatment Minutes: 18 Minutes  Minutes: 27          Date: 3/18/2019  Patient Name: Ramana Gant,  Gender:  male        MRN: [id number]  : 1950  (76 y.o.)      Referring Practitioner: ROBERTO Caputo PA-C  Diagnosis: Adenocarcinoma Lt lung  Treatment Diagnosis: Adenocarcinoma Lt lung  Additional Pertinent Hx: Pt admitted due to ademocarcinoma Lt lung. s/p surgery. Chest tube removed this am.       Past Medical History:   Diagnosis Date    Anxiety     Arthritis     Cancer ([facility] [address] 2019    left lung stage 2    COPD (chronic obstructive pulmonary disease) ([facility] [address] 2019    Enlarged prostate with urinary retention     Gait difficulty     Generalized weakness     Hyperlipidemia     Hypertension     Lesion of bladder     Osteoarthritis     Retention of urine     S/P cardiac catheterization 2019    stent to circumflex per Dr. English [place] S/P TURP     SOB (shortness of breath)     With activity    Voiding difficulty      Past Surgical History:   Procedure Laterality Date    APPENDECTOMY      BRONCHOSCOPY N/A 2019    BRONCHOSCOPY performed by Emili Waller MD at [address] [place] [address]  2019    BRONCHOSCOPY/TRANSBRONCHIAL LUNG BIOPSY performed by Emili Waller MD at [address] [place] [address] N/A 3/1/2019    BRONCHOSCOPY W/EBUS FNA performed by Emili Waller MD at [facility] [address]  [phone]   [address] [facility]      TURP    THORACOTOMY Left 3/14/2019    LEFT EXPLORATORY THORACOTOMY, MEDIASTINAL LYMPH NODE DISECTION, FLEXIBLE BRONCHOSCOPY [facility]       Restrictions/Precautions:  General Precautions                    Other position/activity restrictions: Chest tube removed approx 40 min prior to session. O2 sats on room air 89%.   Increased to 93% with directed deep breathing. Decreased to 85% with 1st gait trial of 50 ft, but maintained >=88% with 2nd walk with cues for deep breathing. Subjective:  Chart Reviewed: Yes  Patient assessed for rehabilitation services?: Yes  Subjective: Nurse approved session, stated pt currently on room air. Sats 89%. IMproved to 93% with cues for deep breathing. Pt was pleasant and cooperative. General:  Overall Orientation Status: Within Normal Limits  Follows Commands: Within Functional Limits    Vision: Within Functional Limits    Hearing: Within functional limits         Pain:   .  Pain Assessment  Pain Level: 0(at rest.  Chest soreness area of tube removal with activity. )       Social/Functional History:    Lives With: Significant other  Type of Home: House  Home Layout: One level  Home Access: Stairs to enter without rails  Entrance Stairs - Number of Steps: 2  Home Equipment: (No AD used PTA)     Bathroom Shower/Tub: Tub/Shower unit  Bathroom Equipment: (no AD PTA)       ADL Assistance: Independent  Homemaking Assistance: Independent  Ambulation Assistance: Independent  Transfer Assistance: Independent       Type of occupation: works at [employer] semi retired          Objective:       ROM RLE: WFL  ROM LLE: WFL    Strength RLE: WFL    Strength LLE: WFL    RLE Tone: Normotonic  LLE Tone: Normotonic          Transfers  Sit to Stand: Stand by assistance(to Mod I with reps)  Stand to sit: Modified independent       Ambulation 1  Surface: level tile  Device: No Device  Assistance: Stand by assistance  Quality of Gait: slow pace, small steps. O2 sats dropped with 1st walk to 85%. Improved with cues for deep breathing. 2nd walk, O2 sats dropped to 87%, but recovered during the walk with cues for breathing.    Distance: 50 ft, 110 ft          EXERCISES:  The following exercises were completed to improve functional mobility: seated- with erect trunk- upper trunk rotation x8 reps, mirta horizontal abduction and mirta T-Scale Score : 45.54  Mobility Inpatient CMS 0-100% Score: 40.64  Mobility Inpatient without Stair CMS G-Code Modifier : CK

## 2019-03-18 NOTE — PROGRESS NOTES
[address] [facility] THERAPY MISSED TREATMENT NOTE  [facility] ICU STEPDOWN TELEMETRY 4K  4K-14/014-A      Date: 3/18/2019  Patient Name: Rishabh Ferro        CSN: [id number]   : 1950  (76 y.o.)  Gender: male   Referring Practitioner: Jesi Bentley PA-C  Diagnosis: adenocarcinoma lung, left         REASON FOR MISSED TREATMENT:  Missed Treat. Attempted to see pt and he declined as he is going home soon today. Verified with RN and he is going home within the hour.   Will see again if pt continues to be on caseload

## 2019-03-18 NOTE — FLOWSHEET NOTE
03/18/19 1420   Encounter Summary   Services provided to: Patient   Referral/Consult From: [address] [facility] Significant other;Children   Continue Visiting No  (3/19/19)   Complexity of Encounter High   Length of Encounter 15 minutes   Spiritual Assessment Completed Yes   Grief and Life Adjustment   Type New Diagnosis; Adjustment to illness   Assessment Approachable   Intervention Discussed illness/injury and it's impact; Discussed relationship with God;End of life care;Nurtured hope;Explored feelings, thoughts, concerns;Prayer   Outcome Expressed gratitude;Engaged in conversation; Shared life review;Receptive;Encouraged     Subjective: Patient was approachable and sitting in his chair. He explained that he was recently brought in because he spit up blood. He called his doctor who insisted he come to the ED. He is thankful that he was directed that way because they found the cancer.      Objective: Per report, the patient was diagnosed with 3rd stage lung cancer.     Assessment: Patient was receptive to the  and engaged in our conversation. He explained he wasn't really feeling that bad but had a cough that didn't seem to go away. They did the tests and found out about the cancer. He explained that it did attach itself to the bone however the doctor feels he was able to scrape it off the bone. They would like him to do chemo and radiation as a preventative. - Patient is sustained by support from his S/O and her three children. Concerning chris support, he stated \"God must still want me around for some reason. I had heart issues before and he healed me from that. So this isn't going to stop me either. \"  - Patient is coping with his situation and his pain level appeared controlled. He is optimistic about his future therapy and is looking forward to recovery.      Plan:  provided a listening presence for the patient during our encounter.  Patient expressed no additional spiritual needs at this time since he will be discharged later today.

## 2019-03-18 NOTE — PROGRESS NOTES
A home oxygen evaluation has been completed. [x]Patient is an inpatient. It is expected that the patient will be discharged within the next 48 hours. Qualified provider to write order for home prescription if patient qualifies. Social service/care managers will arrange for home oxygen. If patient is active, arrange for Home Medical supplier to assess for Oxygen Conserving Device per pulse oximetry. []Patient is an outpatient. Results will be faxed to the ordering provider. Qualified provider to write order for home prescription if patient qualifies and arranges for home oxygen. Patient was placed on room air for 30 minutes. SpO2 was 93 % on room air at rest. Patients SpO2 was 89% or above and did not qualify for home oxygen. Patient was walked for 6 minutes. SpO2 was 88 % during walking. Patients SpO2 was below 89% and qualified for home oxygen. Oxygen was applied at 2 lpm via nasal cannula to maintain a SpO2 between 90-92% while walking. Actual SpO2 was 91 %. .     Note: For any SpO2 at 82% see policy and procedure for possible qualifications.

## 2019-03-18 NOTE — PLAN OF CARE
Problem: Anxiety/Stress:  Goal: Level of anxiety will decrease  Description  Level of anxiety will decrease  Outcome: Met This Shift  Note:   Patient has not exhibited any signs of anxiety this evening. Will continue to monitor. Problem: Pain:  Goal: Pain level will decrease  Description  Pain level will decrease  Outcome: Ongoing  Note:   Patient complains of pain in right side. Patients current pain level is a 8/10. Patients pain goal is a 3/10. Patient is receiving PO meds for pain at this time. Problem: Discharge Planning:  Goal: Participates in care planning  Description  Participates in care planning  3/18/2019 0015 by Nancy Warren RN  Outcome: Ongoing  Note:   Patient still has chest tubes placed. Discharge planning is being discussed. Will continue to monitor. 3/18/2019 0013 by Nancy Warren RN  Outcome: Ongoing    Problem: Sleep Pattern Disturbance:  Goal: Appears well-rested  Description  Appears well-rested  3/18/2019 0015 by Nancy Warren RN  Outcome: Ongoing  Note:   Patient has Gwendolyn Miser ordered. Patient also has door shut to decrease stimulation. Will continue to monitor. 3/17/2019 1849 by Gio Muller RN  Outcome: Ongoing  Note:   Patient prefers door closed to help with rest and sleep. Problem: Falls - Risk of:  Goal: Will remain free from falls  Description  Will remain free from falls  3/18/2019 0015 by Nancy Warren RN  Outcome: Ongoing  Note:   Patient alert and oriented x4. Bed alarmed armed. Bed wheels locked. Bedside table in reach. Patient up with assistance when ambulating. Patient verbalizes and demonstrates the use of the call light. Hourly rounding being completed. Problem: Airway Clearance - Ineffective:  Goal: Ability to maintain a clear airway will improve  Description  Ability to maintain a clear airway will improve  3/18/2019 0015 by Nancy Warren RN  Outcome: Ongoing  Note:   Patient is on 2L. With oxygen saturation greater then 92%.  Patient

## 2019-03-18 NOTE — PROGRESS NOTES
Discharge instructions reviewed with patient and patient verbalizes understanding. Reviewed and educated patient follow up appointments, outpatient testing - labs and chest xray, current medications and newly prescribed medications and patient verbalizes understanding to all of this. To follow up with PCP regarding blood glucose levels per [facility]. Patient discharged with personal belongings. Patient taken to lobby for discharge.

## 2019-03-18 NOTE — PROGRESS NOTES
Patient was evaluated today for the diagnosis of adenocarcinoma of left lung. I entered a DME order for home oxygen because the diagnosis and testing requires the patient to have supplemental oxygen. Condition will improve or be benefited by oxygen use. The patient is able to perform good mobility in a home setting and therefore does require the use of a portable oxygen system. The need for this equipment was discussed with the patient and he understands and is in agreement.

## 2019-03-18 NOTE — CARE COORDINATION
3/18/19, 10:37 AM      [facility] day: 4  Location: [facility]/014-A Reason for admit: Adenocarcinoma, lung, left ([facility] [address] [C34.92]   Imaginge: 3/18          1. Interval removal of left-sided chest tubes with no residual pneumothorax. 2. Mild blunting of the costophrenic angles which may represent small pleural effusions. Procedure:   S/p 3/13 PCI  3/14  3/14 LEFT EXPLORATORY THORACOTOMY, MEDIASTINAL LYMPH NODE DISECTION, FLEXIBLE BRONCHOSCOPY, LEFT LOWER LOBECTOMY      Treatment Plan of Care: from ICU prior, WBC 11.2; monitor. Oxygen 1L continued  PCP: Alexandro Murray. Paul Adkins MD  Readmission Risk Score: 14%  Discharge Plan: plans home with GF; denies needs, ambulates 160 feet with therapy, monitor for home oxygen eval if not weaned off; will ask physician, (spoke with ERYN Young; await home oxygen eval, weaned off oxygen at rest)    3/18/19, 10:41 AM    Discharge plan discussed by  and . Discharge plan reviewed with patient/ family. Patient/ family verbalize understanding of discharge plan and are in agreement with plan. Understanding was demonstrated using the teach back method.        IMM Letter  IMM Letter given to Patient/Family/Significant other/Guardian/POA/by[de-identified]   IMM Letter date given[de-identified] 03/15/19  IMM Letter time given[de-identified] 4356     Update: spoke with client earlier and chose [facility] (denied need for list) for home oxygen (2L with activity); made [facility] home referral to [facility]  Electronically signed by Claude Lavender, RN on 3/18/2019 at 1:30 PM

## 2019-03-18 NOTE — PROGRESS NOTES
Daily  lisinopril (PRINIVIL;ZESTRIL) tablet 40 mg, 40 mg, Oral, Daily  nitroGLYCERIN (NITROSTAT) SL tablet 0.4 mg, 0.4 mg, Sublingual, Q5 Min PRN  pantoprazole (PROTONIX) tablet 40 mg, 40 mg, Oral, QAM AC  glycopyrrolate-formoterol (BEVESPI) 9-4.8 MCG/ACT inhaler 2 puff, 2 puff, Inhalation, BID  promethazine (PHENERGAN) tablet 12.5 mg, 12.5 mg, Oral, Q6H PRN  sodium chloride flush 0.9 % injection 10 mL, 10 mL, Intravenous, 2 times per day  sodium chloride flush 0.9 % injection 10 mL, 10 mL, Intravenous, PRN  acetaminophen (TYLENOL) tablet 650 mg, 650 mg, Oral, Q4H PRN  oxyCODONE (ROXICODONE) immediate release tablet 5 mg, 5 mg, Oral, Q4H PRN **OR** oxyCODONE (ROXICODONE) immediate release tablet 10 mg, 10 mg, Oral, Q4H PRN  docusate sodium (COLACE) capsule 100 mg, 100 mg, Oral, BID  ondansetron (ZOFRAN) injection 4 mg, 4 mg, Intravenous, Q6H PRN  albuterol sulfate  (90 Base) MCG/ACT inhaler 2 puff, 2 puff, Inhalation, Q6H PRN  bacitracin ointment, , Topical, Daily    ASSESSMENT AND PLAN    Excellent post op. Final path pending.  Home O2 eval, Home

## 2019-03-18 NOTE — DISCHARGE SUMMARY
CT/CV Surgery Discharge Summary     Pt Name: Alana Nurse  MRN: [id number]  YOB: 1950  Primary Care Physician: Yordan Colbert MD    Admit date:  3/14/2019 10:07 AM      Discharge date:  03/18/19     Disposition: Home    Admitting Diagnosis:   1. Primary adenocarcinoma of the left lower lobe. 2.  COPD. 3.  Coronary artery disease, status post left circumflex coronary artery  stenting, the day before surgery. 4.  Ongoing hemoptysis. Discharge Diagnosis:   1. Primary adenocarcinoma of the left lower lobe. 2.  COPD. 3.  Coronary artery disease, status post left circumflex coronary artery  stenting, the day before surgery. 4.  Ongoing hemoptysis. Condition: Stable    Problem List:   Patient Active Problem List   Diagnosis Code    Chronic retention of urine R33.9    Benign prostatic hyperplasia N40.0    Mass of left lung R91.8    Adenocarcinoma of left lung (HCC) C34.92    Mediastinal lymphadenopathy R59.0    Angina effort ([address] I20.8    Abnormal stress test R94.39    S/P coronary angioplasty Z98.61    CAD in native artery I25.10    S/P cardiac cath Z98.890    Adenocarcinoma, lung, left (HCC) C34.92         Procedures:    1. Left exploratory thoracotomy via posterior thoracotomy. 2.  Anatomic left lower lobectomy with mediastinal lymph node  dissection. 3.  Flexible bronchoscopy and completion therapeutic flexible  bronchoscopy. Reason for Admission:    Per Dr. Elmer Champagne H&P, \"75 y/o male COPD 48 pk/yr smoker, still smokes instructed to quit in preparation for surgery. Has a 5.7 x 3.7 x 3.4 cm in cephalocaudal extent and transverse and AP dimensions respectively. SUV max is up to 5.1 image 114 Poorly Differentiated Adenocarcinoma left lower lobe. EBUS done Friday, cytology still pending. No mediastinal uptake on PET scan. Pending Lexiscan and Split quantitative V/Q scan. Unknown if mass contained to lobe not invading chest wall.  FEV 1= 2.21L for 73% predicted, DLCO 65% predicted. Plan is if EBUS biopsies negative for malignancy will attempt for curative resection with flexible bronchoscopy, left thoracotomy open lung biopsy possible left lower lobectomy, mediastinal lymph node dissection. \"  Hospital Course: Following an uncomplicated  Left lower lobectomy 03/14/19,  the patient had an unremarkable and progressive convalescence without adverse events. At time of discharge, Lora Rush was alert, tolerating a regular diet, having bowel movements, ambulating on his own accord and had adequate analgesia on oral pain medications, and had no signs of any complications. DME Home 02    Stationary Oxygen Concentrator at 2 lpm via Nasal Cannula on exertion. None needed at rest    Stationary Prescribed at:  Non Continuous while sleeping    Portable Gaseous O2 System and contents at 2 lpm via Nasal Cannula with exertion and none     30-60min/day    Diagnosis: s/p medical deconditioning/ left lower lobectomy   Length of need: 3 Months    Discharge Vitals:  height is 5' 8\" (1.727 m) and weight is 172 lb 8 oz (78.2 kg). His oral temperature is 98.3 °F (36.8 °C). His blood pressure is 105/64 and his pulse is 93. His respiration is 20 and oxygen saturation is 95%. DISCHARGE INSTRUCTIONS:  Discharge Medications:         Medication List      START taking these medications    oxyCODONE 5 MG immediate release tablet  Commonly known as:  ROXICODONE  Take 1 tablet by mouth every 8 hours as needed for Pain for up to 5 days.         CHANGE how you take these medications    amLODIPine 2.5 MG tablet  Commonly known as:  NORVASC  Take 1 tablet by mouth daily  What changed:    · medication strength  · how much to take        CONTINUE taking these medications    aspirin 81 MG chewable tablet  Take 1 tablet by mouth daily     atorvastatin 20 MG tablet  Commonly known as:  LIPITOR     AVODART 0.5 MG capsule  Generic drug:  dutasteride     clopidogrel 75 MG tablet  Commonly known as:  PLAVIX  Take 1 tablet by mouth daily     lisinopril 40 MG tablet  Commonly known as:  PRINIVIL;ZESTRIL     nitroGLYCERIN 0.4 MG SL tablet  Commonly known as:  NITROSTAT  up to max of 3 total doses. If no relief after 1 dose, call 911. omeprazole 20 MG delayed release capsule  Commonly known as:  PRILOSEC     umeclidinium-vilanterol 62.5-25 MCG/INH Aepb inhaler  Commonly known as:  ANORO ELLIPTA  Inhale 1 puff into the lungs daily           Where to Get Your Medications      These medications were sent to [address] - [place], [address] [facility] [phone]  [address] [facility], [place] [address]    Phone:  [phone]   · amLODIPine 2.5 MG tablet     You can get these medications from any pharmacy    Bring a paper prescription for each of these medications  · oxyCODONE 5 MG immediate release tablet       Diet: AHA diet as tolerated  Activity: As instructed on discharge,no driving while on narcotics. Aerobic activity (walking, climbing stairs, etc.) is encouraged. Wound Care: Clean wounds as instructed on discharge     Follow-up:    1   Follow up with Mariah Saleh. MD Demetrius 2-3 weeks  2. Follow up with Cosme Chaney PA-C  in 3-4 weeks, with PA and Lateral CXR, CBC, and BMP. 3. The pt was agreeable to discharge and future plan of care. All questions were thoroughly answered. Cosme Chaney   Electronincally signed 3/18/2019 at 11:10 AM    CC: Mariah Saleh.  Anais Kent MD

## 2019-03-19 ENCOUNTER — TELEPHONE (OUTPATIENT)
Dept: CARDIOTHORACIC SURGERY | Age: 69
End: 2019-03-19

## 2019-03-21 ENCOUNTER — OFFICE VISIT (OUTPATIENT)
Dept: PULMONOLOGY | Age: 69
End: 2019-03-21
Payer: MEDICARE

## 2019-03-21 VITALS
BODY MASS INDEX: 26.37 KG/M2 | SYSTOLIC BLOOD PRESSURE: 124 MMHG | TEMPERATURE: 99.1 F | DIASTOLIC BLOOD PRESSURE: 82 MMHG | OXYGEN SATURATION: 97 % | HEIGHT: 68 IN | WEIGHT: 174 LBS | RESPIRATION RATE: 16 BRPM | HEART RATE: 78 BPM

## 2019-03-21 DIAGNOSIS — J44.9 CHRONIC OBSTRUCTIVE PULMONARY DISEASE, UNSPECIFIED COPD TYPE (HCC): Primary | ICD-10-CM

## 2019-03-21 PROCEDURE — 3023F SPIROM DOC REV: CPT | Performed by: INTERNAL MEDICINE

## 2019-03-21 PROCEDURE — G8926 SPIRO NO PERF OR DOC: HCPCS | Performed by: INTERNAL MEDICINE

## 2019-03-21 PROCEDURE — 4040F PNEUMOC VAC/ADMIN/RCVD: CPT | Performed by: INTERNAL MEDICINE

## 2019-03-21 PROCEDURE — 1111F DSCHRG MED/CURRENT MED MERGE: CPT | Performed by: INTERNAL MEDICINE

## 2019-03-21 PROCEDURE — G8598 ASA/ANTIPLAT THER USED: HCPCS | Performed by: INTERNAL MEDICINE

## 2019-03-21 PROCEDURE — 1101F PT FALLS ASSESS-DOCD LE1/YR: CPT | Performed by: INTERNAL MEDICINE

## 2019-03-21 PROCEDURE — 99213 OFFICE O/P EST LOW 20 MIN: CPT | Performed by: INTERNAL MEDICINE

## 2019-03-21 PROCEDURE — G8427 DOCREV CUR MEDS BY ELIG CLIN: HCPCS | Performed by: INTERNAL MEDICINE

## 2019-03-21 PROCEDURE — 1036F TOBACCO NON-USER: CPT | Performed by: INTERNAL MEDICINE

## 2019-03-21 PROCEDURE — 3017F COLORECTAL CA SCREEN DOC REV: CPT | Performed by: INTERNAL MEDICINE

## 2019-03-21 PROCEDURE — G8419 CALC BMI OUT NRM PARAM NOF/U: HCPCS | Performed by: INTERNAL MEDICINE

## 2019-03-21 PROCEDURE — 1123F ACP DISCUSS/DSCN MKR DOCD: CPT | Performed by: INTERNAL MEDICINE

## 2019-03-21 PROCEDURE — G8484 FLU IMMUNIZE NO ADMIN: HCPCS | Performed by: INTERNAL MEDICINE

## 2019-03-21 ASSESSMENT — ENCOUNTER SYMPTOMS
COUGH: 0
ABDOMINAL PAIN: 0
EYES NEGATIVE: 1
SHORTNESS OF BREATH: 0
CHEST TIGHTNESS: 0
ABDOMINAL DISTENTION: 0
BACK PAIN: 0
ANAL BLEEDING: 0
STRIDOR: 0
CHOKING: 0

## 2019-03-26 ENCOUNTER — OFFICE VISIT (OUTPATIENT)
Dept: CARDIOLOGY CLINIC | Age: 69
End: 2019-03-26
Payer: MEDICARE

## 2019-03-26 VITALS
SYSTOLIC BLOOD PRESSURE: 110 MMHG | HEART RATE: 70 BPM | WEIGHT: 178.3 LBS | HEIGHT: 68 IN | BODY MASS INDEX: 27.02 KG/M2 | DIASTOLIC BLOOD PRESSURE: 62 MMHG

## 2019-03-26 DIAGNOSIS — E78.01 FAMILIAL HYPERCHOLESTEROLEMIA: ICD-10-CM

## 2019-03-26 DIAGNOSIS — I10 ESSENTIAL HYPERTENSION: ICD-10-CM

## 2019-03-26 DIAGNOSIS — C34.92 ADENOCARCINOMA, LUNG, LEFT (HCC): ICD-10-CM

## 2019-03-26 DIAGNOSIS — Z95.5 S/P DRUG ELUTING CORONARY STENT PLACEMENT: Primary | ICD-10-CM

## 2019-03-26 DIAGNOSIS — Z90.2 S/P LOBECTOMY OF LUNG: ICD-10-CM

## 2019-03-26 PROCEDURE — G8484 FLU IMMUNIZE NO ADMIN: HCPCS | Performed by: NURSE PRACTITIONER

## 2019-03-26 PROCEDURE — G8419 CALC BMI OUT NRM PARAM NOF/U: HCPCS | Performed by: NURSE PRACTITIONER

## 2019-03-26 PROCEDURE — G8598 ASA/ANTIPLAT THER USED: HCPCS | Performed by: NURSE PRACTITIONER

## 2019-03-26 PROCEDURE — 1101F PT FALLS ASSESS-DOCD LE1/YR: CPT | Performed by: NURSE PRACTITIONER

## 2019-03-26 PROCEDURE — 99213 OFFICE O/P EST LOW 20 MIN: CPT | Performed by: NURSE PRACTITIONER

## 2019-03-26 PROCEDURE — 1123F ACP DISCUSS/DSCN MKR DOCD: CPT | Performed by: NURSE PRACTITIONER

## 2019-03-26 PROCEDURE — 3017F COLORECTAL CA SCREEN DOC REV: CPT | Performed by: NURSE PRACTITIONER

## 2019-03-26 PROCEDURE — G8427 DOCREV CUR MEDS BY ELIG CLIN: HCPCS | Performed by: NURSE PRACTITIONER

## 2019-03-26 PROCEDURE — 1111F DSCHRG MED/CURRENT MED MERGE: CPT | Performed by: NURSE PRACTITIONER

## 2019-03-26 PROCEDURE — 1036F TOBACCO NON-USER: CPT | Performed by: NURSE PRACTITIONER

## 2019-03-26 PROCEDURE — 4040F PNEUMOC VAC/ADMIN/RCVD: CPT | Performed by: NURSE PRACTITIONER

## 2019-03-28 ENCOUNTER — HOSPITAL ENCOUNTER (OUTPATIENT)
Dept: INFUSION THERAPY | Age: 69
Discharge: HOME | End: 2019-03-28
Payer: MEDICARE

## 2019-03-28 ENCOUNTER — OFFICE VISIT (OUTPATIENT)
Dept: ONCOLOGY | Age: 69
End: 2019-03-28
Payer: MEDICARE

## 2019-03-28 ENCOUNTER — CLINICAL DOCUMENTATION (OUTPATIENT)
Dept: CASE MANAGEMENT | Age: 69
End: 2019-03-28

## 2019-03-28 VITALS
BODY MASS INDEX: 26.58 KG/M2 | WEIGHT: 175.4 LBS | HEART RATE: 72 BPM | HEIGHT: 68 IN | TEMPERATURE: 97.7 F | RESPIRATION RATE: 16 BRPM | OXYGEN SATURATION: 99 % | SYSTOLIC BLOOD PRESSURE: 127 MMHG | DIASTOLIC BLOOD PRESSURE: 72 MMHG

## 2019-03-28 DIAGNOSIS — Z90.2 S/P LOBECTOMY OF LUNG: ICD-10-CM

## 2019-03-28 DIAGNOSIS — C34.32 MALIGNANT NEOPLASM OF LOWER LOBE OF LEFT LUNG (HCC): Primary | ICD-10-CM

## 2019-03-28 PROCEDURE — G8419 CALC BMI OUT NRM PARAM NOF/U: HCPCS | Performed by: INTERNAL MEDICINE

## 2019-03-28 PROCEDURE — 1111F DSCHRG MED/CURRENT MED MERGE: CPT | Performed by: INTERNAL MEDICINE

## 2019-03-28 PROCEDURE — 99215 OFFICE O/P EST HI 40 MIN: CPT | Performed by: INTERNAL MEDICINE

## 2019-03-28 PROCEDURE — 1123F ACP DISCUSS/DSCN MKR DOCD: CPT | Performed by: INTERNAL MEDICINE

## 2019-03-28 PROCEDURE — G8598 ASA/ANTIPLAT THER USED: HCPCS | Performed by: INTERNAL MEDICINE

## 2019-03-28 PROCEDURE — 1036F TOBACCO NON-USER: CPT | Performed by: INTERNAL MEDICINE

## 2019-03-28 PROCEDURE — G8427 DOCREV CUR MEDS BY ELIG CLIN: HCPCS | Performed by: INTERNAL MEDICINE

## 2019-03-28 PROCEDURE — 3017F COLORECTAL CA SCREEN DOC REV: CPT | Performed by: INTERNAL MEDICINE

## 2019-03-28 PROCEDURE — 99211 OFF/OP EST MAY X REQ PHY/QHP: CPT

## 2019-03-28 PROCEDURE — G8484 FLU IMMUNIZE NO ADMIN: HCPCS | Performed by: INTERNAL MEDICINE

## 2019-03-28 PROCEDURE — 4040F PNEUMOC VAC/ADMIN/RCVD: CPT | Performed by: INTERNAL MEDICINE

## 2019-03-28 RX ORDER — OXYCODONE HYDROCHLORIDE 5 MG/1
5 TABLET ORAL EVERY 12 HOURS PRN
Qty: 42 TABLET | Refills: 0 | Status: SHIPPED | OUTPATIENT
Start: 2019-03-28 | End: 2019-04-18

## 2019-03-28 ASSESSMENT — ENCOUNTER SYMPTOMS
SORE THROAT: 0
WHEEZING: 0
VOMITING: 0
NAUSEA: 0
BLOOD IN STOOL: 0
ABDOMINAL DISTENTION: 0
COUGH: 1
ABDOMINAL PAIN: 0
FACIAL SWELLING: 0
DIARRHEA: 0
BACK PAIN: 0
CHEST TIGHTNESS: 0
CONSTIPATION: 0
SHORTNESS OF BREATH: 1
RECTAL PAIN: 0
TROUBLE SWALLOWING: 0
EYE DISCHARGE: 0
COLOR CHANGE: 0

## 2019-04-08 ENCOUNTER — HOSPITAL ENCOUNTER (OUTPATIENT)
Dept: GENERAL RADIOLOGY | Age: 69
Discharge: HOME | End: 2019-04-08
Payer: MEDICARE

## 2019-04-08 ENCOUNTER — HOSPITAL ENCOUNTER (OUTPATIENT)
Age: 69
Discharge: HOME | End: 2019-04-08
Payer: MEDICARE

## 2019-04-08 DIAGNOSIS — Z90.2 S/P LOBECTOMY OF LUNG: ICD-10-CM

## 2019-04-08 LAB
ALT SERPL-CCNC: < 5 U/L (ref 11–66)
ANION GAP SERPL CALCULATED.3IONS-SCNC: 15 MEQ/L (ref 8–16)
AVERAGE GLUCOSE: 171 MG/DL (ref 70–126)
BUN BLDV-MCNC: 15 MG/DL (ref 7–22)
CALCIUM SERPL-MCNC: 9.1 MG/DL (ref 8.5–10.5)
CHLORIDE BLD-SCNC: 102 MEQ/L (ref 98–111)
CHOLESTEROL, TOTAL: 115 MG/DL (ref 100–199)
CO2: 24 MEQ/L (ref 23–33)
CREAT SERPL-MCNC: 0.8 MG/DL (ref 0.4–1.2)
ERYTHROCYTE [DISTWIDTH] IN BLOOD BY AUTOMATED COUNT: 13.3 % (ref 11.5–14.5)
ERYTHROCYTE [DISTWIDTH] IN BLOOD BY AUTOMATED COUNT: 41.6 FL (ref 35–45)
GFR SERPL CREATININE-BSD FRML MDRD: > 90 ML/MIN/1.73M2
GLUCOSE BLD-MCNC: 171 MG/DL (ref 70–108)
HBA1C MFR BLD: 7.7 % (ref 4.4–6.4)
HCT VFR BLD CALC: 36.6 % (ref 42–52)
HDLC SERPL-MCNC: 35 MG/DL
HEMOGLOBIN: 11.6 GM/DL (ref 14–18)
LDL CHOLESTEROL CALCULATED: 63 MG/DL
MCH RBC QN AUTO: 27.4 PG (ref 26–33)
MCHC RBC AUTO-ENTMCNC: 31.7 GM/DL (ref 32.2–35.5)
MCV RBC AUTO: 86.5 FL (ref 80–94)
PLATELET # BLD: 299 THOU/MM3 (ref 130–400)
PMV BLD AUTO: 9.3 FL (ref 9.4–12.4)
POTASSIUM SERPL-SCNC: 4.6 MEQ/L (ref 3.5–5.2)
RBC # BLD: 4.23 MILL/MM3 (ref 4.7–6.1)
SODIUM BLD-SCNC: 141 MEQ/L (ref 135–145)
TRIGL SERPL-MCNC: 85 MG/DL (ref 0–199)
WBC # BLD: 8.7 THOU/MM3 (ref 4.8–10.8)

## 2019-04-08 PROCEDURE — 80048 BASIC METABOLIC PNL TOTAL CA: CPT

## 2019-04-08 PROCEDURE — 84460 ALANINE AMINO (ALT) (SGPT): CPT

## 2019-04-08 PROCEDURE — 80061 LIPID PANEL: CPT

## 2019-04-08 PROCEDURE — 71046 X-RAY EXAM CHEST 2 VIEWS: CPT

## 2019-04-08 PROCEDURE — 85027 COMPLETE CBC AUTOMATED: CPT

## 2019-04-08 PROCEDURE — 36415 COLL VENOUS BLD VENIPUNCTURE: CPT

## 2019-04-08 PROCEDURE — 83036 HEMOGLOBIN GLYCOSYLATED A1C: CPT

## 2019-04-10 ENCOUNTER — CLINICAL DOCUMENTATION (OUTPATIENT)
Dept: CASE MANAGEMENT | Age: 69
End: 2019-04-10

## 2019-04-10 ENCOUNTER — OFFICE VISIT (OUTPATIENT)
Dept: CARDIOTHORACIC SURGERY | Age: 69
End: 2019-04-10

## 2019-04-10 VITALS
DIASTOLIC BLOOD PRESSURE: 80 MMHG | HEIGHT: 68 IN | BODY MASS INDEX: 26.01 KG/M2 | HEART RATE: 84 BPM | OXYGEN SATURATION: 99 % | WEIGHT: 171.6 LBS | SYSTOLIC BLOOD PRESSURE: 129 MMHG

## 2019-04-10 DIAGNOSIS — C34.92 ADENOCARCINOMA OF LEFT LUNG (HCC): ICD-10-CM

## 2019-04-10 PROCEDURE — 99024 POSTOP FOLLOW-UP VISIT: CPT | Performed by: PHYSICIAN ASSISTANT

## 2019-04-10 NOTE — PROGRESS NOTES
CT/CV Surgery Follow Up Office Visit      Patient's Name/Date of Birth: Dee Frausto / 1950 (71 y.o.)    PCP: Darilyn Crigler. Segundo Ornelas MD     Oncologist:  Dr. Paresh Suggs    Date: April 10, 2019    We had the pleasure of seeing Dee Frausto in the office today, as you know this is a very pleasant 71y.o. year old male with a history of primary adenocarcinoma of the left lower lobe leading to 3/15/19 left exploratory thoracotomy via posterior thoracotomy, anatomic left lower lobectomy with mediastinal lymph node dissection, along with flexible bronchoscopy and completion therapeutic flexible bronchoscopy. The pt denies chest pressure, SOB, fever, chills, N/V/D. He still has intermittent pain along his incision line radiating into his anterior chest in same intercostal nerve distribution. He also mentions abdominal bloating and constipation. He denies any other complaints. PastMedical History:  Kiesha Fofana  has a past medical history of Anxiety, Arthritis, Cancer ([address] COPD (chronic obstructive pulmonary disease) ([address] Enlarged prostate with urinary retention, Gait difficulty, Generalized weakness, Hyperlipidemia, Hypertension, Lesion of bladder, Osteoarthritis, Retention of urine, S/P cardiac catheterization, S/P TURP, SOB (shortness of breath), and Voiding difficulty. Past Surgical History:  The patient  has a past surgical history that includes Prostate surgery (2012); Colonoscopy (0064,1116); Appendectomy; bronchoscopy (N/A, 1/25/2019); bronchoscopy (1/25/2019); bronchoscopy (N/A, 3/1/2019); thoracotomy (Left, 3/14/2019); and Carotid stent placement (03/12/2019). Allergies: The patient has No Known Allergies. Medications:    Current Outpatient Medications:     metFORMIN (GLUCOPHAGE) 500 MG tablet, Take 500 mg by mouth daily (with breakfast), Disp: , Rfl:     oxyCODONE (ROXICODONE) 5 MG immediate release tablet, Take 1 tablet by mouth every 12 hours as needed for Pain for up to 21 days. , Disp: 42 tablet, Rfl: 0    amLODIPine (NORVASC) 2.5 MG tablet, Take 1 tablet by mouth daily, Disp: 30 tablet, Rfl: 3    aspirin 81 MG chewable tablet, Take 1 tablet by mouth daily, Disp: 30 tablet, Rfl: 3    nitroGLYCERIN (NITROSTAT) 0.4 MG SL tablet, up to max of 3 total doses. If no relief after 1 dose, call 911., Disp: 25 tablet, Rfl: 3    clopidogrel (PLAVIX) 75 MG tablet, Take 1 tablet by mouth daily, Disp: 30 tablet, Rfl: 3    umeclidinium-vilanterol (ANORO ELLIPTA) 62.5-25 MCG/INH AEPB inhaler, Inhale 1 puff into the lungs daily, Disp: 60 each, Rfl: 5    omeprazole (PRILOSEC) 20 MG delayed release capsule, Take 20 mg by mouth daily, Disp: , Rfl:     lisinopril (PRINIVIL;ZESTRIL) 40 MG tablet, Take 40 mg by mouth daily. , Disp: , Rfl:     atorvastatin (LIPITOR) 20 MG tablet, Take 40 mg by mouth daily , Disp: , Rfl:     dutasteride (AVODART) 0.5 MG capsule, Take 0.5 mg by mouth daily. , Disp: , Rfl:     Family History: This patient's family history includes Brain Cancer in his mother; COPD in his sister; Diabetes in his brother, father, mother, and sister; Heart Attack in his father; Heart Disease in his father; High Blood Pressure in his brother, father, mother, and sister. Social History:  Steffi Paz  reports that he quit smoking about 5 weeks ago. His smoking use included cigarettes. He started smoking about 48 years ago. He has a 40.00 pack-year smoking history. He has never used smokeless tobacco. He reports that he does not drink alcohol or use drugs. Vital Signs:   Vitals:    04/10/19 1018   BP: 129/80   Site: Left Upper Arm   Position: Sitting   Pulse: 84   SpO2: 99%   Weight: 171 lb 9.6 oz (77.8 kg)   Height: 5' 8\" (1.727 m)     ROS:   Constitutional: Negative for activity change, chills, fatigue, fever and unexpected weight change. Respiratory: Negative for apnea, shortness of breath, wheezing and stridor. Cardiovascular: Negative for chest pain, palpitations and leg swelling. Gastrointestinal: Negative for hematochezia, melana, and N/V/D.  POSITIVE for constipation and abdominal bloating. Musculoskeletal: Negative for myalgias  Skin: Negative for color change, rash and wound. Neurological: Negative for dizziness or syncope. Physical Exam:  General appearance:  No acute distress, appears stated age and cooperative. Neck: No jugular venous distention. Trachea midline. No carotid bruits. Respiratory:  Normal respiratory effort. Clear to auscultation, bilaterally without Rales/Wheezes/Rhonch. Cardiovascular:  Regular rate and rhythm with normal S1/S2 without murmurs, rubs or gallops. Abdomen: Soft, non-tender, non-distended with normal bowel sounds. Ext: No clubbing, cyanosis or edema bilaterally. Full range of motion without deformity. Skin: Skin color, texture, turgor normal.  No rashes or lesions. Neurologic:  Neurovascularly intact without any focal sensory/motor deficits. Psychiatric:  Alert and oriented, thought content appropriate, normal insight. Incision:  Left lateral chest that is clean, dry, and intact. Chest tube sites with sutures in place. Labs:    CBC:  Lab Results   Component Value Date    WBC 8.7 04/08/2019    HGB 11.6 04/08/2019    HCT 36.6 04/08/2019    MCV 86.5 04/08/2019     04/08/2019    INR 0.87 03/14/2019     BMP:   Lab Results   Component Value Date     04/08/2019    K 4.6 04/08/2019    K 4.3 03/14/2019     04/08/2019    CO2 24 04/08/2019    BUN 15 04/08/2019    CREATININE 0.8 04/08/2019     Imaging  CXR PA & LATERAL: I have reviewed the CXR.           Assessment:  Patient Active Problem List   Diagnosis    Chronic retention of urine    Benign prostatic hyperplasia    Mass of left lung    Adenocarcinoma of left lung ([address]    Mediastinal lymphadenopathy    Angina effort ([address]    Abnormal stress test    S/P coronary angioplasty    CAD in native artery    S/P cardiac cath    Adenocarcinoma, lung, left ([address]

## 2019-04-11 NOTE — PROGRESS NOTES
Name: Mili Daily  : 1950  MRN: [id number]    Oncology Navigation Follow-Up Note    Contact Type:  Medical Oncology    Subjective: constipation    Education: drink plenty of fluids, OTC softners, laxatives-what he normally uses and even prune juice    Notes: Shekhar, Lung Cancer patient, (not on treatent yet) phoned to say he is having a problem with constipation-wanted to make sure he could take something for it. Educated on above and explained if it is not resolved, he needs to call the doctor.     Electronically signed by Sabina Almodovar RN on 2019 at 9:53 AM

## 2019-04-17 DIAGNOSIS — C34.32 MALIGNANT NEOPLASM OF LOWER LOBE OF LEFT LUNG (HCC): Primary | ICD-10-CM

## 2019-04-18 ENCOUNTER — OFFICE VISIT (OUTPATIENT)
Dept: ONCOLOGY | Age: 69
End: 2019-04-18
Payer: MEDICARE

## 2019-04-18 ENCOUNTER — APPOINTMENT (OUTPATIENT)
Dept: GENERAL RADIOLOGY | Age: 69
End: 2019-04-18
Payer: MEDICARE

## 2019-04-18 ENCOUNTER — HOSPITAL ENCOUNTER (OUTPATIENT)
Age: 69
Setting detail: OBSERVATION
Discharge: HOME | End: 2019-04-20
Attending: INTERNAL MEDICINE | Admitting: INTERNAL MEDICINE
Payer: MEDICARE

## 2019-04-18 ENCOUNTER — APPOINTMENT (OUTPATIENT)
Dept: CT IMAGING | Age: 69
End: 2019-04-18
Payer: MEDICARE

## 2019-04-18 ENCOUNTER — HOSPITAL ENCOUNTER (OUTPATIENT)
Dept: INFUSION THERAPY | Age: 69
Discharge: HOME | End: 2019-04-18
Payer: MEDICARE

## 2019-04-18 VITALS
RESPIRATION RATE: 18 BRPM | WEIGHT: 172.2 LBS | TEMPERATURE: 98.3 F | DIASTOLIC BLOOD PRESSURE: 72 MMHG | HEART RATE: 84 BPM | OXYGEN SATURATION: 98 % | HEIGHT: 68 IN | BODY MASS INDEX: 26.1 KG/M2 | SYSTOLIC BLOOD PRESSURE: 144 MMHG

## 2019-04-18 DIAGNOSIS — R07.9 ACUTE CHEST PAIN: ICD-10-CM

## 2019-04-18 DIAGNOSIS — C34.32 MALIGNANT NEOPLASM OF LOWER LOBE OF LEFT LUNG (HCC): ICD-10-CM

## 2019-04-18 DIAGNOSIS — C34.32 MALIGNANT NEOPLASM OF LOWER LOBE OF LEFT LUNG (HCC): Primary | ICD-10-CM

## 2019-04-18 DIAGNOSIS — Z90.2 S/P LOBECTOMY OF LUNG: ICD-10-CM

## 2019-04-18 LAB
ALBUMIN SERPL-MCNC: 3.3 G/DL (ref 3.5–5.1)
ALBUMIN SERPL-MCNC: 3.8 G/DL (ref 3.5–5.1)
ALP BLD-CCNC: 100 U/L (ref 38–126)
ALP BLD-CCNC: 104 U/L (ref 38–126)
ALT SERPL-CCNC: 6 U/L (ref 11–66)
ALT SERPL-CCNC: 6 U/L (ref 11–66)
ANION GAP SERPL CALCULATED.3IONS-SCNC: 13 MEQ/L (ref 8–16)
AST SERPL-CCNC: 13 U/L (ref 5–40)
AST SERPL-CCNC: 17 U/L (ref 5–40)
AVERAGE GLUCOSE: 168 MG/DL (ref 70–126)
BASINOPHIL, AUTOMATED: 0 % (ref 0–3)
BASOPHILS # BLD: 0.6 %
BASOPHILS ABSOLUTE: 0.1 THOU/MM3 (ref 0–0.1)
BILIRUB SERPL-MCNC: 0.3 MG/DL (ref 0.3–1.2)
BILIRUB SERPL-MCNC: 0.3 MG/DL (ref 0.3–1.2)
BILIRUBIN DIRECT: < 0.2 MG/DL (ref 0–0.3)
BILIRUBIN DIRECT: < 0.2 MG/DL (ref 0–0.3)
BUN BLDV-MCNC: 12 MG/DL (ref 7–22)
BUN, WHOLE BLOOD: 12 MG/DL (ref 8–26)
CALCIUM SERPL-MCNC: 9.1 MG/DL (ref 8.5–10.5)
CHLORIDE BLD-SCNC: 104 MEQ/L (ref 98–111)
CHLORIDE, WHOLE BLOOD: 101 MEQ/L (ref 98–109)
CO2: 25 MEQ/L (ref 23–33)
CREAT SERPL-MCNC: 0.7 MG/DL (ref 0.4–1.2)
CREATININE, WHOLE BLOOD: 0.7 MG/DL (ref 0.5–1.2)
EKG ATRIAL RATE: 72 BPM
EKG P AXIS: 29 DEGREES
EKG P-R INTERVAL: 136 MS
EKG Q-T INTERVAL: 392 MS
EKG QRS DURATION: 96 MS
EKG QTC CALCULATION (BAZETT): 429 MS
EKG R AXIS: -51 DEGREES
EKG T AXIS: 8 DEGREES
EKG VENTRICULAR RATE: 72 BPM
EOSINOPHIL # BLD: 3.3 %
EOSINOPHILS ABSOLUTE: 0.3 THOU/MM3 (ref 0–0.4)
EOSINOPHILS RELATIVE PERCENT: 4 % (ref 0–4)
ERYTHROCYTE [DISTWIDTH] IN BLOOD BY AUTOMATED COUNT: 13.6 % (ref 11.5–14.5)
ERYTHROCYTE [DISTWIDTH] IN BLOOD BY AUTOMATED COUNT: 41.2 FL (ref 35–45)
GFR SERPL CREATININE-BSD FRML MDRD: > 90 ML/MIN/1.73M2
GFR, ESTIMATED: > 90 ML/MIN/1.73M2
GLUCOSE BLD-MCNC: 107 MG/DL (ref 70–108)
GLUCOSE BLD-MCNC: 140 MG/DL (ref 70–108)
GLUCOSE BLD-MCNC: 233 MG/DL (ref 70–108)
GLUCOSE, WHOLE BLOOD: 175 MG/DL (ref 70–108)
HBA1C MFR BLD: 7.6 % (ref 4.4–6.4)
HCT VFR BLD CALC: 35.8 % (ref 42–52)
HCT VFR BLD CALC: 35.9 % (ref 42–52)
HEMOGLOBIN: 11.5 GM/DL (ref 14–18)
HEMOGLOBIN: 12 GM/DL (ref 14–18)
IMMATURE GRANS (ABS): 0.04 THOU/MM3 (ref 0–0.07)
IMMATURE GRANULOCYTES: 0.5 %
IONIZED CALCIUM, WHOLE BLOOD: 1.17 MMOL/L (ref 1.12–1.32)
LIPASE: 22.3 U/L (ref 5.6–51.3)
LYMPHOCYTES # BLD: 15.5 %
LYMPHOCYTES # BLD: 18 % (ref 15–47)
LYMPHOCYTES ABSOLUTE: 1.3 THOU/MM3 (ref 1–4.8)
MCH RBC QN AUTO: 26.9 PG (ref 26–33)
MCH RBC QN AUTO: 27 PG (ref 27–31)
MCHC RBC AUTO-ENTMCNC: 32.1 GM/DL (ref 32.2–35.5)
MCHC RBC AUTO-ENTMCNC: 33.4 GM/DL (ref 33–37)
MCV RBC AUTO: 81 FL (ref 80–94)
MCV RBC AUTO: 83.8 FL (ref 80–94)
MONOCYTES # BLD: 9.2 %
MONOCYTES ABSOLUTE: 0.8 THOU/MM3 (ref 0.4–1.3)
MONOCYTES: 8 % (ref 0–12)
NUCLEATED RED BLOOD CELLS: 0 /100 WBC
OSMOLALITY CALCULATION: 285.2 MOSMOL/KG (ref 275–300)
PDW BLD-RTO: 12.3 % (ref 11.5–14.5)
PLATELET # BLD: 332 THOU/MM3 (ref 130–400)
PLATELET # BLD: 347 THOU/MM3 (ref 130–400)
PMV BLD AUTO: 6.4 FL (ref 7.4–10.4)
PMV BLD AUTO: 9.1 FL (ref 9.4–12.4)
POTASSIUM SERPL-SCNC: 5.2 MEQ/L (ref 3.5–5.2)
POTASSIUM, WHOLE BLOOD: 4.4 MEQ/L (ref 3.5–4.9)
PRO-BNP: 186.7 PG/ML (ref 0–900)
RBC # BLD: 4.27 MILL/MM3 (ref 4.7–6.1)
RBC # BLD: 4.44 MILL/MM3 (ref 4.7–6.1)
SEG NEUTROPHILS: 69 % (ref 43–75)
SEG NEUTROPHILS: 70.9 %
SEGMENTED NEUTROPHILS ABSOLUTE COUNT: 6 THOU/MM3 (ref 1.8–7.7)
SODIUM BLD-SCNC: 142 MEQ/L (ref 135–145)
SODIUM, WHOLE BLOOD: 140 MEQ/L (ref 138–146)
TOTAL CO2, WHOLE BLOOD: 26 MEQ/L (ref 23–33)
TOTAL PROTEIN: 6.7 G/DL (ref 6.1–8)
TOTAL PROTEIN: 7.2 G/DL (ref 6.1–8)
TROPONIN T: < 0.01 NG/ML
WBC # BLD: 8.5 THOU/MM3 (ref 4.8–10.8)
WBC # BLD: 8.5 THOU/MM3 (ref 4.8–10.8)

## 2019-04-18 PROCEDURE — 6370000000 HC RX 637 (ALT 250 FOR IP): Performed by: PHYSICIAN ASSISTANT

## 2019-04-18 PROCEDURE — 83690 ASSAY OF LIPASE: CPT

## 2019-04-18 PROCEDURE — 96372 THER/PROPH/DIAG INJ SC/IM: CPT

## 2019-04-18 PROCEDURE — G0378 HOSPITAL OBSERVATION PER HR: HCPCS

## 2019-04-18 PROCEDURE — 2580000003 HC RX 258: Performed by: PHYSICIAN ASSISTANT

## 2019-04-18 PROCEDURE — G8598 ASA/ANTIPLAT THER USED: HCPCS | Performed by: INTERNAL MEDICINE

## 2019-04-18 PROCEDURE — 1123F ACP DISCUSS/DSCN MKR DOCD: CPT | Performed by: INTERNAL MEDICINE

## 2019-04-18 PROCEDURE — 99211 OFF/OP EST MAY X REQ PHY/QHP: CPT

## 2019-04-18 PROCEDURE — G8427 DOCREV CUR MEDS BY ELIG CLIN: HCPCS | Performed by: INTERNAL MEDICINE

## 2019-04-18 PROCEDURE — 85025 COMPLETE CBC W/AUTO DIFF WBC: CPT

## 2019-04-18 PROCEDURE — 1036F TOBACCO NON-USER: CPT | Performed by: INTERNAL MEDICINE

## 2019-04-18 PROCEDURE — 2580000003 HC RX 258: Performed by: INTERNAL MEDICINE

## 2019-04-18 PROCEDURE — G8419 CALC BMI OUT NRM PARAM NOF/U: HCPCS | Performed by: INTERNAL MEDICINE

## 2019-04-18 PROCEDURE — 83036 HEMOGLOBIN GLYCOSYLATED A1C: CPT

## 2019-04-18 PROCEDURE — 6370000000 HC RX 637 (ALT 250 FOR IP): Performed by: INTERNAL MEDICINE

## 2019-04-18 PROCEDURE — 6360000002 HC RX W HCPCS: Performed by: INTERNAL MEDICINE

## 2019-04-18 PROCEDURE — 93005 ELECTROCARDIOGRAM TRACING: CPT | Performed by: INTERNAL MEDICINE

## 2019-04-18 PROCEDURE — 80076 HEPATIC FUNCTION PANEL: CPT

## 2019-04-18 PROCEDURE — 71275 CT ANGIOGRAPHY CHEST: CPT

## 2019-04-18 PROCEDURE — 2500000003 HC RX 250 WO HCPCS: Performed by: PHYSICIAN ASSISTANT

## 2019-04-18 PROCEDURE — 82948 REAGENT STRIP/BLOOD GLUCOSE: CPT

## 2019-04-18 PROCEDURE — 96361 HYDRATE IV INFUSION ADD-ON: CPT

## 2019-04-18 PROCEDURE — 96374 THER/PROPH/DIAG INJ IV PUSH: CPT

## 2019-04-18 PROCEDURE — 99285 EMERGENCY DEPT VISIT HI MDM: CPT

## 2019-04-18 PROCEDURE — 83880 ASSAY OF NATRIURETIC PEPTIDE: CPT

## 2019-04-18 PROCEDURE — 99215 OFFICE O/P EST HI 40 MIN: CPT | Performed by: INTERNAL MEDICINE

## 2019-04-18 PROCEDURE — 99219 PR INITIAL OBSERVATION CARE/DAY 50 MINUTES: CPT | Performed by: INTERNAL MEDICINE

## 2019-04-18 PROCEDURE — 84484 ASSAY OF TROPONIN QUANT: CPT

## 2019-04-18 PROCEDURE — 3017F COLORECTAL CA SCREEN DOC REV: CPT | Performed by: INTERNAL MEDICINE

## 2019-04-18 PROCEDURE — 93010 ELECTROCARDIOGRAM REPORT: CPT | Performed by: INTERNAL MEDICINE

## 2019-04-18 PROCEDURE — 80047 BASIC METABLC PNL IONIZED CA: CPT

## 2019-04-18 PROCEDURE — 4040F PNEUMOC VAC/ADMIN/RCVD: CPT | Performed by: INTERNAL MEDICINE

## 2019-04-18 PROCEDURE — 6360000004 HC RX CONTRAST MEDICATION: Performed by: PHYSICIAN ASSISTANT

## 2019-04-18 PROCEDURE — 80048 BASIC METABOLIC PNL TOTAL CA: CPT

## 2019-04-18 PROCEDURE — 36415 COLL VENOUS BLD VENIPUNCTURE: CPT

## 2019-04-18 PROCEDURE — 74018 RADEX ABDOMEN 1 VIEW: CPT

## 2019-04-18 RX ORDER — DEXTROSE MONOHYDRATE 50 MG/ML
100 INJECTION, SOLUTION INTRAVENOUS PRN
Status: DISCONTINUED | OUTPATIENT
Start: 2019-04-18 | End: 2019-04-20 | Stop reason: HOSPADM

## 2019-04-18 RX ORDER — ONDANSETRON 2 MG/ML
4 INJECTION INTRAMUSCULAR; INTRAVENOUS EVERY 6 HOURS PRN
Status: DISCONTINUED | OUTPATIENT
Start: 2019-04-18 | End: 2019-04-20 | Stop reason: HOSPADM

## 2019-04-18 RX ORDER — SODIUM CHLORIDE 0.9 % (FLUSH) 0.9 %
10 SYRINGE (ML) INJECTION EVERY 12 HOURS SCHEDULED
Status: DISCONTINUED | OUTPATIENT
Start: 2019-04-18 | End: 2019-04-20 | Stop reason: HOSPADM

## 2019-04-18 RX ORDER — PANTOPRAZOLE SODIUM 40 MG/1
40 TABLET, DELAYED RELEASE ORAL
Status: DISCONTINUED | OUTPATIENT
Start: 2019-04-19 | End: 2019-04-20 | Stop reason: HOSPADM

## 2019-04-18 RX ORDER — ASPIRIN 81 MG/1
81 TABLET, CHEWABLE ORAL DAILY
Status: DISCONTINUED | OUTPATIENT
Start: 2019-04-18 | End: 2019-04-20 | Stop reason: HOSPADM

## 2019-04-18 RX ORDER — ACETAMINOPHEN 325 MG/1
650 TABLET ORAL EVERY 4 HOURS PRN
Status: DISCONTINUED | OUTPATIENT
Start: 2019-04-18 | End: 2019-04-20 | Stop reason: HOSPADM

## 2019-04-18 RX ORDER — CLOPIDOGREL BISULFATE 75 MG/1
75 TABLET ORAL DAILY
Status: DISCONTINUED | OUTPATIENT
Start: 2019-04-18 | End: 2019-04-20 | Stop reason: HOSPADM

## 2019-04-18 RX ORDER — LISINOPRIL 40 MG/1
40 TABLET ORAL DAILY
Status: DISCONTINUED | OUTPATIENT
Start: 2019-04-18 | End: 2019-04-20 | Stop reason: HOSPADM

## 2019-04-18 RX ORDER — DEXTROSE MONOHYDRATE 25 G/50ML
12.5 INJECTION, SOLUTION INTRAVENOUS PRN
Status: DISCONTINUED | OUTPATIENT
Start: 2019-04-18 | End: 2019-04-20 | Stop reason: HOSPADM

## 2019-04-18 RX ORDER — FINASTERIDE 5 MG/1
5 TABLET, FILM COATED ORAL DAILY
Status: DISCONTINUED | OUTPATIENT
Start: 2019-04-18 | End: 2019-04-18

## 2019-04-18 RX ORDER — 0.9 % SODIUM CHLORIDE 0.9 %
1000 INTRAVENOUS SOLUTION INTRAVENOUS ONCE
Status: COMPLETED | OUTPATIENT
Start: 2019-04-18 | End: 2019-04-18

## 2019-04-18 RX ORDER — POTASSIUM CHLORIDE 7.45 MG/ML
10 INJECTION INTRAVENOUS PRN
Status: DISCONTINUED | OUTPATIENT
Start: 2019-04-18 | End: 2019-04-20 | Stop reason: HOSPADM

## 2019-04-18 RX ORDER — SUCRALFATE 1 G/1
1 TABLET ORAL EVERY 6 HOURS SCHEDULED
Status: DISCONTINUED | OUTPATIENT
Start: 2019-04-18 | End: 2019-04-20 | Stop reason: HOSPADM

## 2019-04-18 RX ORDER — DUTASTERIDE 0.5 MG/1
0.5 CAPSULE, LIQUID FILLED ORAL DAILY
Status: DISCONTINUED | OUTPATIENT
Start: 2019-04-18 | End: 2019-04-20 | Stop reason: HOSPADM

## 2019-04-18 RX ORDER — DUTASTERIDE 0.5 MG/1
0.5 CAPSULE, LIQUID FILLED ORAL DAILY
Status: DISCONTINUED | OUTPATIENT
Start: 2019-04-18 | End: 2019-04-18 | Stop reason: CLARIF

## 2019-04-18 RX ORDER — POLYETHYLENE GLYCOL 3350 17 G/17G
17 POWDER, FOR SOLUTION ORAL DAILY PRN
Status: DISCONTINUED | OUTPATIENT
Start: 2019-04-18 | End: 2019-04-20 | Stop reason: HOSPADM

## 2019-04-18 RX ORDER — SENNOSIDES 8.6 MG
1 TABLET ORAL DAILY
COMMUNITY
End: 2019-09-11 | Stop reason: ALTCHOICE

## 2019-04-18 RX ORDER — SODIUM CHLORIDE 0.9 % (FLUSH) 0.9 %
10 SYRINGE (ML) INJECTION PRN
Status: DISCONTINUED | OUTPATIENT
Start: 2019-04-18 | End: 2019-04-20 | Stop reason: HOSPADM

## 2019-04-18 RX ORDER — NICOTINE POLACRILEX 4 MG
15 LOZENGE BUCCAL PRN
Status: DISCONTINUED | OUTPATIENT
Start: 2019-04-18 | End: 2019-04-20 | Stop reason: HOSPADM

## 2019-04-18 RX ORDER — POTASSIUM CHLORIDE 20 MEQ/1
40 TABLET, EXTENDED RELEASE ORAL PRN
Status: DISCONTINUED | OUTPATIENT
Start: 2019-04-18 | End: 2019-04-20 | Stop reason: HOSPADM

## 2019-04-18 RX ORDER — ATORVASTATIN CALCIUM 40 MG/1
40 TABLET, FILM COATED ORAL DAILY
Status: DISCONTINUED | OUTPATIENT
Start: 2019-04-18 | End: 2019-04-20 | Stop reason: HOSPADM

## 2019-04-18 RX ORDER — SODIUM CHLORIDE 9 MG/ML
INJECTION, SOLUTION INTRAVENOUS CONTINUOUS
Status: DISCONTINUED | OUTPATIENT
Start: 2019-04-18 | End: 2019-04-20

## 2019-04-18 RX ORDER — AMLODIPINE BESYLATE 2.5 MG/1
2.5 TABLET ORAL DAILY
Status: DISCONTINUED | OUTPATIENT
Start: 2019-04-18 | End: 2019-04-19

## 2019-04-18 RX ADMIN — ASPIRIN 81 MG: 81 TABLET, CHEWABLE ORAL at 20:11

## 2019-04-18 RX ADMIN — SODIUM CHLORIDE 1000 ML: 9 INJECTION, SOLUTION INTRAVENOUS at 12:35

## 2019-04-18 RX ADMIN — IOPAMIDOL 80 ML: 755 INJECTION, SOLUTION INTRAVENOUS at 14:25

## 2019-04-18 RX ADMIN — SUCRALFATE 1 G: 1 TABLET ORAL at 23:30

## 2019-04-18 RX ADMIN — LISINOPRIL 40 MG: 40 TABLET ORAL at 20:13

## 2019-04-18 RX ADMIN — SUCRALFATE 1 G: 1 TABLET ORAL at 20:14

## 2019-04-18 RX ADMIN — LIDOCAINE HYDROCHLORIDE: 20 SOLUTION ORAL; TOPICAL at 14:00

## 2019-04-18 RX ADMIN — FAMOTIDINE 20 MG: 10 INJECTION, SOLUTION INTRAVENOUS at 14:01

## 2019-04-18 RX ADMIN — SODIUM CHLORIDE: 9 INJECTION, SOLUTION INTRAVENOUS at 18:09

## 2019-04-18 RX ADMIN — AMLODIPINE BESYLATE 2.5 MG: 2.5 TABLET ORAL at 20:11

## 2019-04-18 RX ADMIN — ACETAMINOPHEN 650 MG: 325 TABLET ORAL at 19:57

## 2019-04-18 RX ADMIN — SUCRALFATE 1 G: 1 TABLET ORAL at 14:01

## 2019-04-18 RX ADMIN — INSULIN LISPRO 1 UNITS: 100 INJECTION, SOLUTION INTRAVENOUS; SUBCUTANEOUS at 20:47

## 2019-04-18 RX ADMIN — CLOPIDOGREL BISULFATE 75 MG: 75 TABLET ORAL at 20:12

## 2019-04-18 RX ADMIN — ENOXAPARIN SODIUM 40 MG: 40 INJECTION SUBCUTANEOUS at 19:57

## 2019-04-18 RX ADMIN — ATORVASTATIN CALCIUM 40 MG: 40 TABLET, FILM COATED ORAL at 20:12

## 2019-04-18 RX ADMIN — DUTASTERIDE 0.5 MG: 0.5 CAPSULE, LIQUID FILLED ORAL at 20:13

## 2019-04-18 ASSESSMENT — ENCOUNTER SYMPTOMS
COLOR CHANGE: 0
COUGH: 1
BACK PAIN: 0
BLOOD IN STOOL: 0
VOMITING: 0
NAUSEA: 0
FACIAL SWELLING: 0
RECTAL PAIN: 0
CHEST TIGHTNESS: 0
DIARRHEA: 0
VOMITING: 0
NAUSEA: 0
COUGH: 0
BACK PAIN: 0
TROUBLE SWALLOWING: 0
ABDOMINAL PAIN: 0
SORE THROAT: 0
ABDOMINAL PAIN: 0
EYE DISCHARGE: 0
COLOR CHANGE: 0
WHEEZING: 0
CONSTIPATION: 0
EYE REDNESS: 0
CONSTIPATION: 1
SHORTNESS OF BREATH: 1
WHEEZING: 0
DIARRHEA: 0
RHINORRHEA: 0
EYE DISCHARGE: 0
ABDOMINAL DISTENTION: 0
SORE THROAT: 0
SHORTNESS OF BREATH: 1

## 2019-04-18 ASSESSMENT — PAIN DESCRIPTION - PAIN TYPE
TYPE: ACUTE PAIN
TYPE: ACUTE PAIN

## 2019-04-18 ASSESSMENT — PAIN SCALES - GENERAL
PAINLEVEL_OUTOF10: 0
PAINLEVEL_OUTOF10: 6
PAINLEVEL_OUTOF10: 5
PAINLEVEL_OUTOF10: 3

## 2019-04-18 ASSESSMENT — PAIN DESCRIPTION - FREQUENCY: FREQUENCY: CONTINUOUS

## 2019-04-18 ASSESSMENT — PAIN DESCRIPTION - LOCATION
LOCATION: CHEST
LOCATION: ABDOMEN

## 2019-04-18 ASSESSMENT — PAIN DESCRIPTION - DESCRIPTORS: DESCRIPTORS: SQUEEZING

## 2019-04-18 ASSESSMENT — HEART SCORE: ECG: 1

## 2019-04-18 NOTE — ED TRIAGE NOTES
Patient presents to ER with complaints of left sided chest pain that began a week ago. Patient reports recently having part of left lung removed 03/14/2019 due to cancer. Patient reports also having heart stent placed 03/12/2019. EKG obtained. Telemetry applied.

## 2019-04-18 NOTE — ED NOTES
Pt transported to 6K21 on cart in stable condition. Floor contacted before transport. Spoke with Spurgeon Habermann.      Darlene Poster  04/18/19 3230

## 2019-04-18 NOTE — ED NOTES
Pt given medication per orders. Pt denies chest pain at this time.       Emily Hernández, MOLLY  04/18/19 8877

## 2019-04-18 NOTE — ED NOTES
Pt resting on cot watching TV. Pt denies chest pain at this time. Pt and family updated on plan of care.      Ross Campos RN  04/18/19 4983

## 2019-04-18 NOTE — H&P
History & Physical        Patient:  Sissy Azul  YOB: 1950    MRN: [id number]     Acct: [de-identified]    PCP: Alexandro Murray. Paul Adkins MD    Date of Admission: 4/18/2019    Date of Service: Pt seen/examined on 4/18/2019 and Placed in Observation. Chief Complaint:    Chief Complaint   Patient presents with    Chest Pain    Post-op Problem     swelling from surgery at site, left anterior         History Of Present Illness:      71 y.o. male who presented to [facility] with \"left sided chest pain that began a week ago. Patient reports recently having part of left lung removed 03/14/2019 due to cancer. Patient reports also having heart stent placed 03/12/2019. \" -per er note and confirmed by myself.     Addendum: states cp only lasted a few seconds , did not get diaphoretic or sob, hx of parnell  Has moderate pleural effusion         Past Medical History:          Diagnosis Date    Anxiety     Arthritis     [address] 01/2019    left lung stage 2    COPD (chronic obstructive pulmonary disease) ([facility] 02/2019    Enlarged prostate with urinary retention     Gait difficulty     Generalized weakness     Hyperlipidemia     Hypertension     Lesion of bladder     Osteoarthritis     Retention of urine     S/P cardiac catheterization 03/12/2019    stent to circumflex per Dr. Jerrica Asher S/P TURP     SOB (shortness of breath)     With activity    Voiding difficulty        Past Surgical History:          Procedure Laterality Date    APPENDECTOMY      BRONCHOSCOPY N/A 1/25/2019    BRONCHOSCOPY performed by Monet Mederos MD at [address] [place] [address]  1/25/2019    BRONCHOSCOPY/TRANSBRONCHIAL LUNG BIOPSY performed by Monet Mederos MD at [address] [place] [address] N/A 3/1/2019    BRONCHOSCOPY W/EBUS FNA performed by Monet Mederos MD at [facility]  03/12/2019    COLONOSCOPY  [id number]   [address] SURGERY  2012    TURP    has never used smokeless tobacco.  ETOH:   reports that he does not drink alcohol. Family History:       Reviewed in detail and negative for DM, CAD, Cancer, CVA. Positive as follows:        Problem Relation Age of Onset    Diabetes Mother     High Blood Pressure Mother     Brain Cancer Mother     Heart Disease Father     Diabetes Father     High Blood Pressure Father     Heart Attack Father     Diabetes Sister     High Blood Pressure Sister     COPD Sister         Agent Orange    Diabetes Brother     High Blood Pressure Brother        Diet:  No diet orders on file    REVIEW OF SYSTEMS:   Pertinent positives as noted in the HPI. All other systems reviewed and negative. PHYSICAL EXAM:    BP (!) 146/84   Pulse 70   Temp 97.5 °F (36.4 °C) (Oral)   Resp 22   Ht 5' 8\" (1.727 m)   Wt 172 lb (78 kg)   SpO2 97%   BMI 26.15 kg/m²     General appearance:  No apparent distress, appears stated age and cooperative. HEENT:  Normal cephalic, atraumatic without obvious deformity. Pupils equal, round, and reactive to light. Extra ocular muscles intact. Conjunctivae/corneas clear. Neck: Supple, with full range of motion. no jugular venous distention. Trachea midline. no carotid bruits  Respiratory:  Normal respiratory effort. l base quiet up 1/3 posterior  Cardiovascular:  Regular rate and rhythm with normal S1/S2 without murmurs, rubs or gallops. PMI non displaced  Abdomen: Soft, non-tender, non-distended with normal bowel sounds. No guarding, rebound. Musculoskeletal:  No clubbing, cyanosis or edema bilaterally. Full range of motion without deformity. Skin: Skin color, texture, turgor normal.  No rashes or lesions, or suspicious lesions. Neurologic:  Neurovascularly intact without any focal sensory/motor deficits.  Cranial nerves: II-XII intact, grossly non-focal.  Psychiatric:  Alert and oriented, thought content appropriate, normal insight  Capillary Refill: Brisk,< 2 seconds   Peripheral Pulses: are recommended to confirm complete resolution. **This report has been created using voice recognition software. It may contain minor errors which are inherent in voice recognition technology. ** Final report electronically signed by Dr. Robbin Snow on 4/8/2019 11:08 AM    Xr Abdomen (kub) (single Ap View)    Result Date: 4/18/2019  PROCEDURE: XR ABDOMEN (KUB) (SINGLE AP VIEW) CLINICAL INFORMATION: constipation. COMPARISON: No prior study. TECHNIQUE: A supine AP view of the abdomen was obtained. FINDINGS: Degenerative changes lumbar spine and both hips. Nonspecific bowel gas pattern. Stool in the colon. Correlate for constipation. No acute osseous pending. SI joints are symmetric. Flow no overlying the right hip joint. No acute findings. Stool in the colon. Correlate for constipation. **This report has been created using voice recognition software. It may contain minor errors which are inherent in voice recognition technology. ** Final report electronically signed by Dr. Jessica Ivan on 4/18/2019 1:07 PM    Cta Chest W Wo Contrast    Result Date: 4/18/2019  PROCEDURE: CTA CHEST W WO CONTRAST CLINICAL INFORMATION: s/p left lower lobectomy, left-sided chest pain and swelling, also attention PE. COMPARISON: January 23, 2019 TECHNIQUE: 1.5 mm axial images were obtained through the chest after the administration of IV contrast.  A non-contrast localizer was obtained. 3D reconstructions were performed on the scanner to include sagittal and bilateral oblique images through the  chest. 80 mL Isovue-370 were administered intravenously. All CT scans at this facility use dose modulation, iterative reconstruction, and/or weight-based dosing when appropriate to reduce radiation dose to as low as reasonably achievable. FINDINGS: Thyroid gland is unremarkable. Centrilobular and paraseptal emphysematous changes throughout the lungs. Thoracic aorta is normal caliber. No cardiomegaly. Shotty mediastinal lymph nodes.  No overt lymphadenopathy by size criteria. Pulmonary arterial vessels are adequately opacified. There is no acute pulmonary arterial embolism. Postsurgical changes left lower lobe. Moderate left pleural effusion with adjacent atelectasis. Posterior left rib defects with some periosteal reaction suggesting interval healing. No acute findings in the upper abdomen. Fatty infiltration of the liver. No acute findings thoracic spine. Degenerative changes. There is a 9 mm nodule in the right lower lobe which was previously described. 6 mm subpleural nodule in the right upper lobe. This is similar in appearance to prior study. 1. No acute pulmonary embolism. 2. Postsurgical changes left lower lobe with moderate-sized pleural effusion and adjacent consolidation. 3. Shotty mediastinal lymph nodes. 4. 9 mm nodule in the right lower lobe. 6 mm subpleural nodular scarring in the right upper lobe. **This report has been created using voice recognition software. It may contain minor errors which are inherent in voice recognition technology Final report electronically signed by Dr. Lydia Ruff on 4/18/2019 2:51 PM           DVT prophylaxis: [x] Lovenox                                 [] SCDs                                 [] SQ Heparin                                 [] Encourage ambulation           [] Already on Anticoagulation    Code Status: Prior      PT/OT Eval Status:     Disposition:    [x] Home       [] TCU       [] Rehab       [] Psych       [] SNF       [] [place]       [] Other-    ASSESSMENT:    Active Hospital Problems    Diagnosis Date Noted    Chest pain [R07.9] 04/18/2019    Adenocarcinoma, lung, left ([address] [C34.92] 03/14/2019    CAD in native artery [I25.10] 03/12/2019       PLAN:    1. Observation  2. ivf  3. Serial troponin  4. Cardiology consulted  5. Will get IR to tap L pleural effsion  6. Will consult oncology supposedly is going to start chemo        Thank you Juan Miguel Toledo.  Rafaela Leos MD for the

## 2019-04-18 NOTE — ED PROVIDER NOTES
[address] [address] [facility] emergency department encounter      [address] [facility]       Chief Complaint   Patient presents with    Chest Pain    Post-op Problem     swelling from surgery at site, left anterior       Nurses Notes reviewed and I agree except asnoted in the HPI. HISTORY OFPRESENT ILLNESS    Harper Perea is a 71 y.o. male who presents to the emergency department for evaluation of intermittent chest pain for the past week. In January 2019, a mass in the patient's left lower lung lobe was discovered. In February 2019, this mass was diagnosed as adenocarcinoma of the left lung. On 3/14/19, Dr. Jayde Hanna performed a left lower lung lobectomy. 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 Angioplasty was performed, and a stent was placed in the left circumflex. The patient's procedure reportedly went well, and he is reportedly healing very nicely. The patient was seen by his oncologist, Dr. Aminah Alvarado, today, who was pleased with his recovery process. The patient now presents to this department for evaluation of intermittent midsternal chest pain for the past one week. He states that this pain comes on suddenly and without unknown triggers. He states that this pain comes with both activity and rest and lasts only a few seconds before self resolving. The patient currently denies having any chest pain. The patient denies any worsening of this pain with deep breathing. Since the lobectomy procedures performed, the patient reports chills as well as shortness of breath and dyspnea on exertion. He denies any abdominal pain, nausea, vomiting, sweating, palpitations, coughing, or URI symptoms. He also reports constipation. The patient also has a history of hypertension, hyperlipidemia, and diabetes. He takes Plavix and aspirin daily. There are no additional complaints at this time. MG tablet Take 500 mg by mouth 2 times daily (with meals) Take 1 tab by mouth at supper x2 weeks, then 1 tab in the morning and 1 tab at supper x2 weeks, then 1 tab in the morning and 2 tab at supper x2weeks, then 2 tabs twice a day      oxyCODONE (ROXICODONE) 5 MG immediate release tablet Take 1 tablet by mouth every 12 hours as needed for Pain for up to 21 days. Qty: 42 tablet, Refills: 0    Comments: Reduce doses taken as pain becomes manageable  Associated Diagnoses: S/P lobectomy of lung      aspirin 81 MG chewable tablet Take 1 tablet by mouth daily  Qty: 30 tablet, Refills: 3      clopidogrel (PLAVIX) 75 MG tablet Take 1 tablet by mouth daily  Qty: 30 tablet, Refills: 3      omeprazole (PRILOSEC) 20 MG delayed release capsule Take 20 mg by mouth daily      lisinopril (PRINIVIL;ZESTRIL) 40 MG tablet Take 40 mg by mouth daily. atorvastatin (LIPITOR) 20 MG tablet Take 40 mg by mouth daily       dutasteride (AVODART) 0.5 MG capsule Take 0.5 mg by mouth daily. senna (SENOKOT) 8.6 MG TABS tablet Take 1 tablet by mouth daily      amLODIPine (NORVASC) 2.5 MG tablet Take 1 tablet by mouth daily  Qty: 30 tablet, Refills: 3      nitroGLYCERIN (NITROSTAT) 0.4 MG SL tablet up to max of 3 total doses. If no relief after 1 dose, call 911. Qty: 25 tablet, Refills: 3      umeclidinium-vilanterol (ANORO ELLIPTA) 62.5-25 MCG/INH AEPB inhaler Inhale 1 puff into the lungs daily  Qty: 60 each, Refills: 5    Associated Diagnoses: Chronic obstructive pulmonary disease, unspecified COPD type ([facility]             ALLERGIES     has No Known Allergies. FAMILY HISTORY     indicated that his mother is . He indicated that his father is . He indicated that his sister is alive. He indicated that his brother is alive. [unfilled]    SOCIAL HISTORY      reports that he quit smoking about 7 weeks ago. His smoking use included cigarettes. He started smoking about 48 years ago.  He has a 40.00 pack-year smoking history. He has never used smokeless tobacco. He reports that he does not drink alcohol or use drugs. PHYSICAL EXAM     INITIAL VITALS:  height is 5' 8\" (1.727 m) and weight is 171 lb 11.2 oz (77.9 kg). His oral temperature is 98 °F (36.7 °C). His blood pressure is 122/78 and his pulse is 79. His respiration is 16 and oxygen saturation is 98%. Physical Exam   Constitutional: He is oriented to person, place, and time. He appears well-developed and well-nourished. No distress. HENT:   Head: Normocephalic and atraumatic. Right Ear: External ear normal.   Left Ear: External ear normal.   Nose: Nose normal.   Mouth/Throat: Oropharynx is clear and moist.   Eyes: Pupils are equal, round, and reactive to light. Conjunctivae and EOM are normal. Right eye exhibits no discharge. Left eye exhibits no discharge. No scleral icterus. Neck: Normal range of motion. Neck supple. Cardiovascular: Normal rate, regular rhythm and normal heart sounds. Exam reveals no gallop and no friction rub. No murmur heard. Pulmonary/Chest: Effort normal and breath sounds normal. No stridor. No respiratory distress. He has no wheezes. He has no rales. He exhibits no tenderness. Abdominal: Soft. Bowel sounds are normal. He exhibits no distension and no mass. There is tenderness (mild, left-sided). There is no rebound and no guarding. No hernia. There is mild tenderness of the left abdomen, and the patient is noted to have a well-healing surgical incision of the left flank. He states that the pain he has in the left abdomen has been present since his surgery due to his healing wound. Musculoskeletal: Normal range of motion. He exhibits no edema. Lymphadenopathy:     He has no cervical adenopathy. Neurological: He is alert and oriented to person, place, and time. He exhibits normal muscle tone. Coordination normal. GCS eye subscore is 4. GCS verbal subscore is 5. GCS motor subscore is 6. Skin: Skin is warm and dry.  No rash noted. He is not diaphoretic. No erythema. No pallor. There is a well-healing surgical incision of the left mid-back with very mild associated tenderness. There are no signs of post-operative wound infection. Psychiatric: He has a normal mood and affect. His behavior is normal. Thought content normal.   Nursing note and vitals reviewed.     Heart Score for chest pain patients  History: Slightly Suspicious  ECG: Non-Specifc repolarization disturbance/LBTB/PM  Patient Age: > 65 years  *Risk factors for Atherosclerotic disease: Diabetes Mellitus, Hypercholesterolemia, Hypertension, Coronary Artery Disease  Risk Factors: > 3 Risk factors or history of atherosclerotic disease*  Troponin: < 1X normal limit  Heart Score Total: 5       DIFFERENTIAL DIAGNOSIS:   Includes but not limited to: Post-operative pain, costochondritis, musculoskeletal pain, ACS, PE, GERD, pneumonia, pleuritis, CHF, pulmonary edema    DIAGNOSTIC RESULTS     EKG: All EKG's are interpreted by the Emergency Department Physician who either signs or Co-signsthis chart in the absence of a cardiologist.  EKG Chest Pain (Edited Result - FINAL)    Component (Lab Inquiry)   Collection Time Result Time Ventricular Rate Atrial Rate P-R Interval QRS Duration Q-T Interval   04/18/19 12:08:40 04/18/19 12:08:40 72 72 136 96 392       Collection Time Result Time QTc Calculation (Bazett) P Axis R Axis T Axis   04/18/19 12:08:40 04/18/19 12:08:40 429 29 -51 8         Edited Result - FINAL                Narrative:    Normal sinus rhythm  Left axis deviation  Low voltage QRS, consider pulmonary disease, pericardial effusion, or normal variant  Abnormal ECG  When compared with ECG of 12-MAR-2019 17:56,  Right bundle branch block is no longer Present  Confirmed by Rashida Sears MD, Jose Chang ([id number]) on 4/18/2019 7:47:55 PM              RADIOLOGY: non-plain film images(s) such as CT, Ultrasound and MRI are read by the radiologist.  Plainradiographic images are visualized and preliminarily interpreted by the emergency physician unless otherwise stated below. CTA Chest W WO Contrast   Final Result         1. No acute pulmonary embolism. 2. Postsurgical changes left lower lobe with moderate-sized pleural effusion and adjacent consolidation. 3. Shotty mediastinal lymph nodes. 4. 9 mm nodule in the right lower lobe. 6 mm subpleural nodular scarring in the right upper lobe. **This report has been created using voice recognition software. It may contain minor errors which are inherent in voice recognition technology      Final report electronically signed by Dr. Rachana Huggins on 4/18/2019 2:51 PM      XR ABDOMEN (KUB) (SINGLE AP VIEW)   Final Result   No acute findings. Stool in the colon. Correlate for constipation. **This report has been created using voice recognition software. It may contain minor errors which are inherent in voice recognition technology. **      Final report electronically signed by Dr. Rachana Huggins on 4/18/2019 1:07 PM      IR GUIDED THORACENTESIS PLEURAL    (Results Pending)       LABS:   Labs Reviewed   CBC WITH AUTO DIFFERENTIAL - Abnormal; Notable for the following components:       Result Value    RBC 4.27 (*)     Hemoglobin 11.5 (*)     Hematocrit 35.8 (*)     MCHC 32.1 (*)     MPV 9.1 (*)     All other components within normal limits   BASIC METABOLIC PANEL - Abnormal; Notable for the following components:    Glucose 140 (*)     All other components within normal limits   HEPATIC FUNCTION PANEL - Abnormal; Notable for the following components:    Alb 3.3 (*)     ALT 6 (*)     All other components within normal limits   HEMOGLOBIN A1C - Abnormal; Notable for the following components:    Hemoglobin A1C 7.6 (*)     AVERAGE GLUCOSE 168 (*)     All other components within normal limits   POCT GLUCOSE - Abnormal; Notable for the following components:    POC Glucose 233 (*)     All other components within normal limits   BRAIN NATRIURETIC amenable to my treatment and admission decisions. Dr. Mehreen Pfeiffer, Hospitalist, was consulted and kindly agreed to admit the patient for further evaluation and management. The patient remained stable and maintained stable vital signs until admission to the floor. CRITICAL CARE:   None    CONSULTS:  Discussed the case with my attending physician in the Emergency Department, who agreed with my workup, treatment, and disposition decisions.     Dr. Mehreen Pfeiffer, Hospitalist - kindly agreed to admit the patient for further evaluation and management    PROCEDURES:  None    FINAL IMPRESSION    Chest pain, unspecified  Adenocarcinoma, left lung    DISPOSITION/PLAN   Admit under Hospitalist services    PATIENT REFERRED TO:  Andry Stein MD  [address] [place] [address],[address] [facility]  [phone]            DISCHARGE MEDICATIONS:  Current Discharge Medication List          (Please note that portions of this note werecompleted with a voice recognition program.  Efforts were made to edit the dictations but occasionally words are mis-transcribed.)    RAMIRO Faustin PA-C  04/18/19 [id number]

## 2019-04-19 ENCOUNTER — APPOINTMENT (OUTPATIENT)
Dept: GENERAL RADIOLOGY | Age: 69
End: 2019-04-19
Payer: MEDICARE

## 2019-04-19 ENCOUNTER — APPOINTMENT (OUTPATIENT)
Dept: ULTRASOUND IMAGING | Age: 69
End: 2019-04-19
Payer: MEDICARE

## 2019-04-19 LAB
ANION GAP SERPL CALCULATED.3IONS-SCNC: 11 MEQ/L (ref 8–16)
BASOPHILS # BLD: 0.5 %
BASOPHILS ABSOLUTE: 0 THOU/MM3 (ref 0–0.1)
BODY FLUID RBC: ABNORMAL /CUMM
BUN BLDV-MCNC: 10 MG/DL (ref 7–22)
CALCIUM SERPL-MCNC: 9 MG/DL (ref 8.5–10.5)
CHARACTER, BODY FLUID: ABNORMAL
CHLORIDE BLD-SCNC: 107 MEQ/L (ref 98–111)
CO2: 24 MEQ/L (ref 23–33)
COLOR: ABNORMAL
CREAT SERPL-MCNC: 0.7 MG/DL (ref 0.4–1.2)
EOSINOPHIL # BLD: 5.8 %
EOSINOPHILS ABSOLUTE: 0.4 THOU/MM3 (ref 0–0.4)
ERYTHROCYTE [DISTWIDTH] IN BLOOD BY AUTOMATED COUNT: 13.6 % (ref 11.5–14.5)
ERYTHROCYTE [DISTWIDTH] IN BLOOD BY AUTOMATED COUNT: 41.4 FL (ref 35–45)
GFR SERPL CREATININE-BSD FRML MDRD: > 90 ML/MIN/1.73M2
GLUCOSE BLD-MCNC: 122 MG/DL (ref 70–108)
GLUCOSE BLD-MCNC: 127 MG/DL (ref 70–108)
GLUCOSE BLD-MCNC: 132 MG/DL (ref 70–108)
GLUCOSE BLD-MCNC: 150 MG/DL (ref 70–108)
GLUCOSE BLD-MCNC: 273 MG/DL (ref 70–108)
HCT VFR BLD CALC: 36.2 % (ref 42–52)
HEMOGLOBIN: 11.6 GM/DL (ref 14–18)
IMMATURE GRANS (ABS): 0.03 THOU/MM3 (ref 0–0.07)
IMMATURE GRANULOCYTES: 0.4 %
LD, FLUID: 287 U/L
LYMPHOCYTES # BLD: 17.3 %
LYMPHOCYTES ABSOLUTE: 1.3 THOU/MM3 (ref 1–4.8)
MAGNESIUM: 1.7 MG/DL (ref 1.6–2.4)
MCH RBC QN AUTO: 26.9 PG (ref 26–33)
MCHC RBC AUTO-ENTMCNC: 32 GM/DL (ref 32.2–35.5)
MCV RBC AUTO: 83.8 FL (ref 80–94)
MONOCYTES # BLD: 10.7 %
MONOCYTES ABSOLUTE: 0.8 THOU/MM3 (ref 0.4–1.3)
MONONUCLEAR CELLS BODY FLUID: 96.7 %
NUCLEATED RED BLOOD CELLS: 0 /100 WBC
PATHOLOGIST REVIEW: ABNORMAL
PLATELET # BLD: 366 THOU/MM3 (ref 130–400)
PMV BLD AUTO: 9.2 FL (ref 9.4–12.4)
POLYMORPHONUCLEAR CELLS BODY FLUID: 3.3 %
POTASSIUM REFLEX MAGNESIUM: 4.9 MEQ/L (ref 3.5–5.2)
PROTEIN FLUID: 4.3 GM/DL
RBC # BLD: 4.32 MILL/MM3 (ref 4.7–6.1)
SEG NEUTROPHILS: 65.3 %
SEGMENTED NEUTROPHILS ABSOLUTE COUNT: 4.8 THOU/MM3 (ref 1.8–7.7)
SODIUM BLD-SCNC: 142 MEQ/L (ref 135–145)
SPECIMEN: ABNORMAL
TOTAL NUCLEATED CELLS BODY FLUID: 800 /CUMM (ref 0–500)
TOTAL VOLUME RECEIVED BODY FLUID: 73 ML
TROPONIN T: < 0.01 NG/ML
WBC # BLD: 7.4 THOU/MM3 (ref 4.8–10.8)

## 2019-04-19 PROCEDURE — 6360000002 HC RX W HCPCS: Performed by: INTERNAL MEDICINE

## 2019-04-19 PROCEDURE — 84484 ASSAY OF TROPONIN QUANT: CPT

## 2019-04-19 PROCEDURE — 89050 BODY FLUID CELL COUNT: CPT

## 2019-04-19 PROCEDURE — 83735 ASSAY OF MAGNESIUM: CPT

## 2019-04-19 PROCEDURE — 88112 CYTOPATH CELL ENHANCE TECH: CPT

## 2019-04-19 PROCEDURE — 80048 BASIC METABOLIC PNL TOTAL CA: CPT

## 2019-04-19 PROCEDURE — 99223 1ST HOSP IP/OBS HIGH 75: CPT | Performed by: INTERNAL MEDICINE

## 2019-04-19 PROCEDURE — 83615 LACTATE (LD) (LDH) ENZYME: CPT

## 2019-04-19 PROCEDURE — 85025 COMPLETE CBC W/AUTO DIFF WBC: CPT

## 2019-04-19 PROCEDURE — 88305 TISSUE EXAM BY PATHOLOGIST: CPT

## 2019-04-19 PROCEDURE — 32555 ASPIRATE PLEURA W/ IMAGING: CPT

## 2019-04-19 PROCEDURE — 87070 CULTURE OTHR SPECIMN AEROBIC: CPT

## 2019-04-19 PROCEDURE — 84157 ASSAY OF PROTEIN OTHER: CPT

## 2019-04-19 PROCEDURE — 71045 X-RAY EXAM CHEST 1 VIEW: CPT

## 2019-04-19 PROCEDURE — 99225 PR SBSQ OBSERVATION CARE/DAY 25 MINUTES: CPT | Performed by: INTERNAL MEDICINE

## 2019-04-19 PROCEDURE — 96372 THER/PROPH/DIAG INJ SC/IM: CPT

## 2019-04-19 PROCEDURE — 6370000000 HC RX 637 (ALT 250 FOR IP): Performed by: INTERNAL MEDICINE

## 2019-04-19 PROCEDURE — 82948 REAGENT STRIP/BLOOD GLUCOSE: CPT

## 2019-04-19 PROCEDURE — 96361 HYDRATE IV INFUSION ADD-ON: CPT

## 2019-04-19 PROCEDURE — 87075 CULTR BACTERIA EXCEPT BLOOD: CPT

## 2019-04-19 PROCEDURE — 99220 PR INITIAL OBSERVATION CARE/DAY 70 MINUTES: CPT | Performed by: INTERNAL MEDICINE

## 2019-04-19 PROCEDURE — 87205 SMEAR GRAM STAIN: CPT

## 2019-04-19 PROCEDURE — 6370000000 HC RX 637 (ALT 250 FOR IP): Performed by: PHYSICIAN ASSISTANT

## 2019-04-19 PROCEDURE — 2580000003 HC RX 258: Performed by: INTERNAL MEDICINE

## 2019-04-19 PROCEDURE — G0378 HOSPITAL OBSERVATION PER HR: HCPCS

## 2019-04-19 PROCEDURE — 36415 COLL VENOUS BLD VENIPUNCTURE: CPT

## 2019-04-19 RX ORDER — AMLODIPINE BESYLATE 10 MG/1
10 TABLET ORAL DAILY
Status: DISCONTINUED | OUTPATIENT
Start: 2019-04-20 | End: 2019-04-20 | Stop reason: HOSPADM

## 2019-04-19 RX ORDER — OXYCODONE HYDROCHLORIDE 5 MG/1
5 TABLET ORAL 2 TIMES DAILY PRN
Status: DISCONTINUED | OUTPATIENT
Start: 2019-04-19 | End: 2019-04-20 | Stop reason: HOSPADM

## 2019-04-19 RX ORDER — ATORVASTATIN CALCIUM 40 MG/1
20 TABLET, FILM COATED ORAL DAILY
COMMUNITY
End: 2021-07-28

## 2019-04-19 RX ORDER — AMLODIPINE BESYLATE 10 MG/1
10 TABLET ORAL DAILY
COMMUNITY
End: 2021-07-28

## 2019-04-19 RX ADMIN — CLOPIDOGREL BISULFATE 75 MG: 75 TABLET ORAL at 08:59

## 2019-04-19 RX ADMIN — LISINOPRIL 40 MG: 40 TABLET ORAL at 08:56

## 2019-04-19 RX ADMIN — ATORVASTATIN CALCIUM 40 MG: 40 TABLET, FILM COATED ORAL at 20:38

## 2019-04-19 RX ADMIN — OXYCODONE HYDROCHLORIDE 5 MG: 5 TABLET ORAL at 09:00

## 2019-04-19 RX ADMIN — PANTOPRAZOLE SODIUM 40 MG: 40 TABLET, DELAYED RELEASE ORAL at 05:52

## 2019-04-19 RX ADMIN — ENOXAPARIN SODIUM 40 MG: 40 INJECTION SUBCUTANEOUS at 20:38

## 2019-04-19 RX ADMIN — SUCRALFATE 1 G: 1 TABLET ORAL at 11:20

## 2019-04-19 RX ADMIN — SUCRALFATE 1 G: 1 TABLET ORAL at 05:52

## 2019-04-19 RX ADMIN — DUTASTERIDE 0.5 MG: 0.5 CAPSULE, LIQUID FILLED ORAL at 08:59

## 2019-04-19 RX ADMIN — SODIUM CHLORIDE: 9 INJECTION, SOLUTION INTRAVENOUS at 16:54

## 2019-04-19 RX ADMIN — SUCRALFATE 1 G: 1 TABLET ORAL at 16:55

## 2019-04-19 RX ADMIN — INSULIN LISPRO 2 UNITS: 100 INJECTION, SOLUTION INTRAVENOUS; SUBCUTANEOUS at 21:43

## 2019-04-19 RX ADMIN — ASPIRIN 81 MG: 81 TABLET, CHEWABLE ORAL at 08:57

## 2019-04-19 RX ADMIN — AMLODIPINE BESYLATE 2.5 MG: 2.5 TABLET ORAL at 08:58

## 2019-04-19 RX ADMIN — OXYCODONE HYDROCHLORIDE 5 MG: 5 TABLET ORAL at 20:40

## 2019-04-19 ASSESSMENT — PAIN SCALES - GENERAL
PAINLEVEL_OUTOF10: 7
PAINLEVEL_OUTOF10: 7
PAINLEVEL_OUTOF10: 5
PAINLEVEL_OUTOF10: 7
PAINLEVEL_OUTOF10: 7
PAINLEVEL_OUTOF10: 4

## 2019-04-19 ASSESSMENT — PAIN DESCRIPTION - DESCRIPTORS: DESCRIPTORS: ACHING

## 2019-04-19 ASSESSMENT — PAIN DESCRIPTION - ORIENTATION
ORIENTATION: LEFT
ORIENTATION: LEFT

## 2019-04-19 ASSESSMENT — PAIN DESCRIPTION - FREQUENCY: FREQUENCY: CONTINUOUS

## 2019-04-19 ASSESSMENT — PAIN DESCRIPTION - PAIN TYPE
TYPE: ACUTE PAIN
TYPE: ACUTE PAIN

## 2019-04-19 ASSESSMENT — PAIN DESCRIPTION - LOCATION
LOCATION: FLANK
LOCATION: ABDOMEN;FLANK

## 2019-04-19 NOTE — CARE COORDINATION
4/19/19, 7:16 AM      Todd Scott       Admitted from: ED 4/18/2019/ [facility] day: 0   Location: -21/021-A Reason for admit: Chest pain [R07.9] Status: IP  Admit order signed?: yes  PMH:  has a past medical history of Anxiety, Arthritis, Cancer ([address] COPD (chronic obstructive pulmonary disease) ([address] Enlarged prostate with urinary retention, Gait difficulty, Generalized weakness, Hyperlipidemia, Hypertension, Lesion of bladder, Osteoarthritis, Retention of urine, S/P cardiac catheterization, S/P TURP, SOB (shortness of breath), and Voiding difficulty. Procedure: none  Pertinent abnormal Imaging:CTA Chest W WO Contrast    Impression:           1. No acute pulmonary embolism. 2. Postsurgical changes left lower lobe with moderate-sized pleural effusion and adjacent consolidation. 3. Shotty mediastinal lymph nodes. 4. 9 mm nodule in the right lower lobe. 6 mm subpleural nodular scarring in the right upper lobe. KUB    Impression:       No acute findings. Stool in the colon. Correlate for constipation. Medications:  Scheduled Meds:   sucralfate  1 g Oral 4 times per day    amLODIPine  2.5 mg Oral Daily    aspirin  81 mg Oral Daily    atorvastatin  40 mg Oral Daily    clopidogrel  75 mg Oral Daily    lisinopril  40 mg Oral Daily    pantoprazole  40 mg Oral QAM AC    sodium chloride flush  10 mL Intravenous 2 times per day    enoxaparin  40 mg Subcutaneous Q24H    insulin lispro  0-6 Units Subcutaneous TID WC    insulin lispro  0-3 Units Subcutaneous Nightly    dutasteride  0.5 mg Oral Daily     Continuous Infusions:   dextrose      sodium chloride 50 mL/hr at 04/18/19 1809      Pertinent Info/Orders/Treatment Plan:  IV fluids, diabetes management, Oncology consult, telemetry, Cardiology consult. Diet: DIET CARB CONTROL;   Smoking status:  reports that he quit smoking about 7 weeks ago. His smoking use included cigarettes. He started smoking about 48 years ago.  He has a 40.00 pack-year smoking history. He has never used smokeless tobacco.   PCP: Sydney Aparicio. Malgorzata Argueta MD  Readmission: no  Readmission Risk Score: 0%    Discharge Planning  Current Residence:  Private Residence  Living Arrangements:  Spouse/Significant Other   Support Systems:  Spouse/Significant Other, Friends/Neighbors, Family Members  Current Services PTA:     Potential Assistance Needed:  N/A  Potential Assistance Purchasing Medications:  No  Does patient want to participate in local refill/ meds to beds program?  No  Type of Home Care Services:  None  Patient expects to be discharged to:  home  Expected Discharge date:  04/20/19  Follow Up Appointment: Best Day/ Time: Wednesday PM    Discharge Plan: Met with Eileen Mares. He currently lives at home with his significant other. Plan is to return at discharge. Denies need for DME and declines HH.      Evaluation: no

## 2019-04-19 NOTE — PLAN OF CARE
Problem: Daily Care:  Goal: Daily care needs are met  Description  Daily care needs are met  Outcome: Met This Shift     Problem: Tissue Perfusion - Cardiopulmonary, Altered:  Goal: Absence of angina  Description  Absence of angina  Outcome: Ongoing  Note:   Denies any chest pain this shift  Goal: Hemodynamic stability will improve  Description  Hemodynamic stability will improve  Outcome: Ongoing  Note:   Vital signs stable, telemetry monitor on      Problem: Pain:  Goal: Control of acute pain  Description  Control of acute pain  Outcome: Ongoing  Note:   Complains of left flank pain from lobectomy, prn oxycodone given with some relief     Problem: Skin Integrity:  Goal: Skin integrity will stabilize  Description  Skin integrity will stabilize  Outcome: Ongoing  Note:   No new skin breakdown noted. Pt turns and repositions self frequently     Problem: Discharge Planning:  Goal: Patients continuum of care needs are met  Description  Patients continuum of care needs are met  Outcome: Ongoing  Note:   Plans on returning home with girlfriend at discharge     Problem: Falls - Risk of:  Goal: Will remain free from falls  Description  Will remain free from falls  Outcome: Ongoing  Note:   No falls this shift, call light in reach. Bed alarm on. Up with 1 assist.      Care plan reviewed with patient. Patient verbalize understanding of the plan of care and contribute to goal setting.

## 2019-04-19 NOTE — CONSULTS
 Cancer ([facility] 2019    left lung stage 2    COPD (chronic obstructive pulmonary disease) ([facility] 2019    Enlarged prostate with urinary retention     Gait difficulty     Generalized weakness     Hyperlipidemia     Hypertension     Lesion of bladder     Osteoarthritis     Retention of urine     S/P cardiac catheterization 2019    stent to circumflex per Dr. Donald Montiel S/P TURP     SOB (shortness of breath)     With activity    Voiding difficulty       Past Surgical History           Procedure Laterality Date    APPENDECTOMY      BRONCHOSCOPY N/A 2019    BRONCHOSCOPY performed by Pb Beasley MD at [address] [place] [address]  2019    BRONCHOSCOPY/TRANSBRONCHIAL LUNG BIOPSY performed by Pb Beasley MD at [address] [place] [address] N/A 3/1/2019    BRONCHOSCOPY W/EBUS FNA performed by Pb Beasley MD at [facility]  2019    COLONOSCOPY  [id number]   [address]      TURP    THORACOTOMY Left 3/14/2019    LEFT EXPLORATORY THORACOTOMY, MEDIASTINAL LYMPH NODE DISECTION, FLEXIBLE BRONCHOSCOPY performed by Maria C Wilson MD at [address] [place] [address]; Allergies    Patient has no known allergies.   Social History     Social History     Socioeconomic History    Marital status:      Spouse name: Not on file    Number of children: Not on file    Years of education: Not on file    Highest education level: Not on file   Occupational History    Not on file   Social Needs    Financial resource strain: Not on file    Food insecurity:     Worry: Not on file     Inability: Not on file    Transportation needs:     Medical: Not on file     Non-medical: Not on file   Tobacco Use    Smoking status: Former Smoker     Packs/day: 1.00     Years: 40.00     Pack years: 40.00     Types: Cigarettes     Start date:      Last attempt to quit: 2019     Years since quittin.1    Smokeless tobacco: Never Used    Tobacco comment: about 5 cigs per day   Substance and Sexual Activity    Alcohol use: No    Drug use: No    Sexual activity: Not on file   Lifestyle    Physical activity:     Days per week: Not on file     Minutes per session: Not on file    Stress: Not on file   Relationships    Social connections:     Talks on phone: Not on file     Gets together: Not on file     Attends Confucianist service: Not on file     Active member of club or organization: Not on file     Attends meetings of clubs or organizations: Not on file     Relationship status: Not on file    Intimate partner violence:     Fear of current or ex partner: Not on file     Emotionally abused: Not on file     Physically abused: Not on file     Forced sexual activity: Not on file   Other Topics Concern    Not on file   Social History Narrative    Not on file     Family History          Problem Relation Age of Onset    Diabetes Mother     High Blood Pressure Mother     Brain Cancer Mother     Heart Disease Father     Diabetes Father     High Blood Pressure Father     Heart Attack Father     Diabetes Sister     High Blood Pressure Sister     COPD Sister         Agent Orange    Diabetes Brother     High Blood Pressure Brother        ROS    General/Constitutional: No fever or chills. HENT: Negative. Eyes: Negative. Upper respiratory tract: No nasal stuffiness or post nasal drip. Lower respiratory tract/ lungs: No cough or sputum production. Cardiovascular: No palpitations, no chest pain now or edema. Gastrointestinal: No nausea or vomiting. Neurological: No focal neurological weakness. Extremities: No tenderness. Musculoskeletal: no complaints  Genitourinary: No complaints. Hematological: Negative. Denies easy buising  Skin: No itching.   Meds    Current Medications    [START ON 4/20/2019] amLODIPine  10 mg Oral Daily    sucralfate  1 g Oral 4 times per day    aspirin  81 mg Oral Daily    atorvastatin 40 mg Oral Daily    clopidogrel  75 mg Oral Daily    lisinopril  40 mg Oral Daily    pantoprazole  40 mg Oral QAM AC    sodium chloride flush  10 mL Intravenous 2 times per day    enoxaparin  40 mg Subcutaneous Q24H    insulin lispro  0-6 Units Subcutaneous TID WC    insulin lispro  0-3 Units Subcutaneous Nightly    dutasteride  0.5 mg Oral Daily     oxyCODONE, sodium chloride flush, ondansetron, glucose, dextrose, glucagon (rDNA), dextrose, magnesium sulfate, potassium chloride **OR** potassium alternative oral replacement **OR** potassium chloride, polyethylene glycol, acetaminophen  IV Drips/Infusions   dextrose      sodium chloride 50 mL/hr at 04/18/19 1809     Vitals    Vitals    height is 5' 8\" (1.727 m) and weight is 170 lb 12.8 oz (77.5 kg). His oral temperature is 98.2 °F (36.8 °C). His blood pressure is 137/79 and his pulse is 72. His respiration is 16 and oxygen saturation is 98%. I/O    Intake/Output Summary (Last 24 hours) at 4/19/2019 1607  Last data filed at 4/19/2019 0711  Gross per 24 hour   Intake 1658.5 ml   Output 1100 ml   Net 558.5 ml     Patient Vitals for the past 96 hrs (Last 3 readings):   Weight   04/19/19 0337 170 lb 12.8 oz (77.5 kg)   04/18/19 1659 171 lb 11.2 oz (77.9 kg)   04/18/19 1214 172 lb (78 kg)     Exam   Constitutional: Patient appears moderately built and moderately nourished. Head: Normocephalic and atraumatic. Mouth/Throat: Oropharynx is clear and moist.  No oral thrush. Eyes: Conjunctivae are normal. Pupils are equal, round, and reactive to light. No scleral icterus. Neck: Neck supple. No JVD or tracheal deviation present. Cardiovascular: Regular rate, regular rhythm, S1 and S2 with no murmur. No peripheral edema  Pulmonary/Chest: Normal effort with clear breath sounds. No stridor. No respiratory distress. Decreased breath sounds on the left base  Abdominal: Soft. Bowel sounds audible.  No distension or tenderness to palp  Musculoskeletal: Moves all extremities  Lymphadenopathy:  No cervical adenopathy. Neurological: Patient is alert and oriented to person, place, and time. Skin: Skin is warm and dry. Labs   ABG  Lab Results   Component Value Date    PH 7.31 03/14/2019    PO2 464 03/14/2019    PCO2 39 03/14/2019    HCO3 19 03/14/2019    O2SAT 100 03/14/2019     No results found for: IFIO2, MODE, SETTIDVOL, SETPEEP  CBC  Recent Labs     04/18/19  1119 04/18/19  1237 04/19/19  0607   WBC 8.5 8.5 7.4   RBC 4.44* 4.27* 4.32*   HGB 12.0* 11.5* 11.6*   HCT 35.9* 35.8* 36.2*   MCV 81 83.8 83.8   MCH 27.0 26.9 26.9   MCHC 33.4 32.1* 32.0*   RDW 12.3  --   --     347 366   MPV 6.4* 9.1* 9.2*      BMP  Recent Labs     04/18/19  1120 04/18/19  1237 04/19/19  0607    142 142   K 4.4 5.2 4.9   CL  --  104 107   CO2  --  25 24   BUN  --  12 10   CREATININE 0.7 0.7 0.7   GLUCOSE  --  140* 122*   MG  --   --  1.7   CALCIUM  --  9.1 9.0     LFT  Recent Labs     04/18/19  1119 04/18/19  1237   AST 13 17   ALT 6* 6*   BILITOT 0.3 0.3   ALKPHOS 104 100   LIPASE  --  22.3     TROP  Lab Results   Component Value Date    TROPONINT < 0.010 04/19/2019    TROPONINT < 0.010 04/18/2019    TROPONINT < 0.010 04/18/2019     BNP  Lab Results   Component Value Date    PROBNP 186.7 04/18/2019     D-Dimer  No results found for: DDIMER  Lactic Acid  No results for input(s): LACTA in the last 72 hours. INR  No results for input(s): INR, PROTIME in the last 72 hours. PTT  No results for input(s): APTT in the last 72 hours. Glucose  Recent Labs     04/18/19 2032 04/19/19  0620 04/19/19  1041   POCGLU 233* 127* 132*     UA   Recent Labs     04/19/19  4930 Ken Eleni   .     Cultures    Procalcitonin  No results found for: Sukhi Priest [[id number]] Resulted: 04/19/19 1351      Order Status: Completed Updated: 04/19/19 1353     Narrative:       PROCEDURE: US THORACENTESIS    CLINICAL INFORMATION: l side, cultures, malignant cells, Emilymaryuri Suzie COMPARISON: CT 4/18/2019    PROCEDURE:        The benefits and the risk of the procedure were explained to the patient. The patient was given an opportunity to ask questions. Signed consent was obtained. Limited ultrasound exam of the left chest showed moderate amount of pleural fluid.  Using ultrasound guidance, a site was marked on the skin. The left side of the posterior chest was prepped and draped using the usual sterile technique and the skin was anesthetized with 2 percent lidocaine. A 5 Lao one-step catheter was introduced to the left pleural space. 0.3  L of bloody fluid drained. A collection was accessed 3 times though only a small amount of fluid would be obtained. It appears thickened and complex. The catheter was then   removed. The patient tolerated the procedure well with no complications. Specimen sent for laboratory studies as requested.       Impression:         Successful ultrasound-guided thoracentesis with  0.3 L of fluid drained. **This report has been created using voice recognition software. It may contain minor errors which are inherent in voice recognition technology. **    Final report electronically signed by Dr. Keiko Hood on 4/19/2019 1:51 PM     XR CHEST PA INSPIRATION 1 VW [[id number]] Resulted: 04/19/19 1257     Order Status: Completed Updated: 04/19/19 1259     Narrative:       PROCEDURE: XR CHEST PA INSPIRATION 1 VW    CLINICAL INFORMATION: left thoracentesis . COMPARISON: 4/8/2019    TECHNIQUE: PA upright chest    FINDINGS: Significant decrease in left pleural effusion has significantly improved aeration left lung. No evidence of pneumothorax post thoracentesis.   Right lung remains clear  EKG leads overlie the upper chest.     Impression:       No pneumothorax status post left thoracentesis        **This report has been created using voice recognition software.  It may contain minor errors which are inherent in voice recognition technology. **    Final report electronically signed by Dr. Yanci Murphy on 4/19/2019 12:57 PM     IR GUIDED THORACENTESIS PLEURAL [[id number]]      Order Status: Canceled      CTA Chest W WO Contrast [[id number]] Resulted: 04/18/19 1451     Order Status: Completed Updated: 04/18/19 1453     Narrative:       PROCEDURE: CTA CHEST W WO CONTRAST    CLINICAL INFORMATION: s/p left lower lobectomy, left-sided chest pain and swelling, also attention PE.    COMPARISON: January 23, 2019    TECHNIQUE: 1.5 mm axial images were obtained through the chest after the administration of IV contrast.  A non-contrast localizer was obtained.  3D reconstructions were performed on the scanner to include sagittal and bilateral oblique images through the   chest. 80 mL Isovue-370 were administered intravenously. All CT scans at this facility use dose modulation, iterative reconstruction, and/or weight-based dosing when appropriate to reduce radiation dose to as low as reasonably achievable. FINDINGS:  Thyroid gland is unremarkable. Centrilobular and paraseptal emphysematous changes throughout the lungs. Thoracic aorta is normal caliber. No cardiomegaly. Shotty mediastinal lymph nodes. No overt lymphadenopathy by size criteria. Pulmonary arterial vessels are adequately opacified. There is no acute pulmonary arterial embolism. Postsurgical changes left lower lobe. Moderate left pleural effusion with adjacent atelectasis. Posterior left rib defects with some periosteal reaction suggesting interval healing. No acute findings in the upper abdomen. Fatty infiltration of the liver. No acute findings thoracic spine. Degenerative changes. There is a 9 mm nodule in the right lower lobe which was previously described. 6 mm subpleural nodule in the right upper lobe.  This is similar in appearance to prior study.       Impression:           1. No acute pulmonary embolism.     2. Postsurgical changes left lower lobe with moderate-sized pleural effusion and adjacent consolidation. 3. Shotty mediastinal lymph nodes. 4. 9 mm nodule in the right lower lobe. 6 mm subpleural nodular scarring in the right upper lobe. **This report has been created using voice recognition software. It may contain minor errors which are inherent in voice recognition technology    Final report electronically signed by Dr. Margaret Perrin on 4/18/2019 2:51 PM     XR ABDOMEN (KUB) (SINGLE AP VIEW) [[id number]] Resulted: 04/18/19 1307     Order Status: Completed Updated: 04/18/19 1331     Narrative:       PROCEDURE: XR ABDOMEN (KUB) (SINGLE AP VIEW)    CLINICAL INFORMATION: constipation. COMPARISON: No prior study. TECHNIQUE: A supine AP view of the abdomen was obtained. FINDINGS:     Degenerative changes lumbar spine and both hips. Nonspecific bowel gas pattern. Stool in the colon. Correlate for constipation. No acute osseous pending. SI joints are symmetric. Flow no overlying the right hip joint.     Impression:       No acute findings. Stool in the colon. Correlate for constipation. **This report has been created using voice recognition software. It may contain minor errors which are inherent in voice recognition technology. **    Final report electronically signed by Dr. Margaret Perrin on 4/18/2019 1:07 PM             CT Scans    (See actual reports for details)   Path: 3/14/2019  FINAL DIAGNOSIS:  A. 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   Lymph nodes (0/3): Negative for malignancy. B. Tissue designated lymph node, station 9, excision:   Benign fibroadipose tissue. C. Lymph node, station 10 L, excision:   Negative for malignancy (0/1). D. Lymph nodes, station 6, excision:   Negative for malignancy (0/3). E. Lymph node, station 7, excision:   Negative for malignancy (0/1).   F. Tissue designated lymph node, station 8, excision:   Benign fibroadipose

## 2019-04-19 NOTE — PLAN OF CARE
Problem: Tissue Perfusion - Cardiopulmonary, Altered:  Goal: Absence of angina  Description  Absence of angina  Outcome: Ongoing  Note:   No angina this shift. Will monitor. Goal: Hemodynamic stability will improve  Description  Hemodynamic stability will improve  Outcome: Ongoing  Note:   Vitals:    04/18/19 1945   BP: 125/70   Pulse: 81   Resp: 18   Temp: 98 °F (36.7 °C)   SpO2: 96%          Problem: Daily Care:  Goal: Daily care needs are met  Description  Daily care needs are met  Outcome: Ongoing  Note:   Daily care needs met this shift. Will continue to assess needs. Problem: Pain:  Goal: Patient's pain/discomfort is manageable  Description  Patient's pain/discomfort is manageable  Outcome: Ongoing  Note:   Patient complaining of pain this shift. Prn pain medication available. Will monitor. Problem: Skin Integrity:  Goal: Skin integrity will stabilize  Description  Skin integrity will stabilize  Outcome: Ongoing  Note:   No skin break down noted at this time. Encouraged patient to reposition self in bed. Problem: Discharge Planning:  Goal: Patients continuum of care needs are met  Description  Patients continuum of care needs are met  Outcome: Ongoing  Note:   Pt plans to return home at discharge. Will continue to assess discharge needs. Problem: Falls - Risk of:  Goal: Will remain free from falls  Description  Will remain free from falls  Outcome: Ongoing  Note:   No falls noted this shift. Continue falling star program. Bed alarm on, bed in low position. Call light and personal belongings in reach. Patient uses call light appropriately. Goal: Absence of physical injury  Description  Absence of physical injury  Outcome: Ongoing  Note:   No physical injury noted this shift. Safe environment provided. Will monitor. Care plan reviewed with patient. Patient verbalizes understanding of plan of care and contributes to goal setting.

## 2019-04-19 NOTE — PROGRESS NOTES
24 hour   Intake 1658.5 ml   Output 1100 ml   Net 558.5 ml       Diet:  DIET CARB CONTROL; Exam:  /80   Pulse 78   Temp 98.1 °F (36.7 °C) (Oral)   Resp 18   Ht 5' 8\" (1.727 m)   Wt 170 lb 12.8 oz (77.5 kg)   SpO2 97%   BMI 25.97 kg/m²     General appearance: No apparent distress, appears stated age and cooperative. HEENT: Pupils equal, round, and reactive to light. Conjunctivae/corneas clear. Neck: Supple, with full range of motion. No jugular venous distention. Trachea midline. Respiratory:  Normal respiratory effort. Clear to auscultation, bilaterally without Rales/Wheezes/Rhonchi. Cardiovascular: Regular rate and rhythm with normal S1/S2 without murmurs, rubs or gallops. Abdomen: Soft, non-tender, non-distended with normal bowel sounds. Musculoskeletal: passive and active ROM x 4 extremities. Skin: Skin color, texture, turgor normal.  No rashes or lesions. Neurologic:  Neurovascularly intact without any focal sensory/motor deficits. Cranial nerves: II-XII intact, grossly non-focal.  Psychiatric: Alert and oriented, thought content appropriate, normal insight  Capillary Refill: Brisk,< 3 seconds   Peripheral Pulses: +2 palpable, equal bilaterally       Labs:   Recent Labs     04/18/19  1119 04/18/19  1237 04/19/19  0607   WBC 8.5 8.5 7.4   HGB 12.0* 11.5* 11.6*   HCT 35.9* 35.8* 36.2*    347 366     Recent Labs     04/18/19  1120 04/18/19  1237 04/19/19  0607    142 142   K 4.4 5.2 4.9   CL  --  104 107   CO2  --  25 24   BUN  --  12 10   CREATININE 0.7 0.7 0.7   CALCIUM  --  9.1 9.0     Recent Labs     04/18/19  1119 04/18/19  1237   AST 13 17   ALT 6* 6*   BILIDIR <0.2 <0.2   BILITOT 0.3 0.3   ALKPHOS 104 100     No results for input(s): INR in the last 72 hours. No results for input(s): Clarance Lewis in the last 72 hours.     Urinalysis:      Lab Results   Component Value Date    NITRU NEGATIVE 03/14/2019    WBCUA 0-2 03/14/2019    BACTERIA NONE 03/14/2019    RBCUA 5-10 03/14/2019    BLOODU MODERATE 03/14/2019    SPECGRAV 1.013 03/14/2019    GLUCOSEU NEGATIVE 08/13/2012       Radiology:  CTA Chest W WO Contrast   Final Result         1. No acute pulmonary embolism. 2. Postsurgical changes left lower lobe with moderate-sized pleural effusion and adjacent consolidation. 3. Shotty mediastinal lymph nodes. 4. 9 mm nodule in the right lower lobe. 6 mm subpleural nodular scarring in the right upper lobe. **This report has been created using voice recognition software. It may contain minor errors which are inherent in voice recognition technology      Final report electronically signed by Dr. Lydia Ruff on 4/18/2019 2:51 PM      XR ABDOMEN (KUB) (SINGLE AP VIEW)   Final Result   No acute findings. Stool in the colon. Correlate for constipation. **This report has been created using voice recognition software. It may contain minor errors which are inherent in voice recognition technology. **      Final report electronically signed by Dr. Lydia Ruff on 4/18/2019 1:07 PM      [address]    (Results Pending)       Diet: DIET CARB CONTROL;    DVT prophylaxis: [x] Lovenox                                 [] SCDs                                 [] SQ Heparin                                 [] Encourage ambulation           [] Already on Anticoagulation     Disposition:    [x] Home       [] TCU       [] Rehab       [] Psych       [] SNF       [] [place]       [] Other-    Code Status: Full Code    Assessment/Plan:    Anticipated Discharge in : TBD     Active Hospital Problems    Diagnosis Date Noted    Chest pain [R07.9] 04/18/2019    Adenocarcinoma, lung, left ([address] [C34.92] 03/14/2019    CAD in native artery [I25.10] 03/12/2019       Assessment/Plan:    1. Chest Pain- history of CAD s/p PCI to left Cx on 3/12/19. Troponin negative. No ecg changes. Compliant with DAPT. Consult Cardiology. Monitor on telemetry.    2. Primary adenocarcinoma of lung s/p lung lobectomy (3/14/2019). Well healing surgical site. No evidence of infection (swelling, redness, drainage, warmth). Consult Oncology. 3. Left Pleural Effusion- CTA chest showing moderate sized left lower effusion. Will plan for thoracentesis with fluid studies   4. Chronic Hypoxic Resp. Failure- pt on home 2L NC prn. Currently at baseline.          Electronically signed by Jolene Downs MD on 4/19/2019 at 7:43 AM

## 2019-04-19 NOTE — CONSULTS
[facility] [address] [place] [address]  Cardiology Consult        Patient:  Kinjal Doyle  YOB: 1950  MRN: [id number]     Acct: [de-identified]    PCP: Lowell Frey. Shanelle Miranda MD    Date of Admission: 4/18/2019      Reason for Consultation:  Chest pain      History Of Present Illness:    71 y.o. pleasant male c hx of CAD s/p PCI, DM, HTN, HLD, primary adenocarcinoma of lung with s/p lung lobectomy who presented to the hospital with complaints of chest pain. The chest pain is midsternal, no radiation, no aggravating or alleviating factors, sometimes associated with shortness of breath. Patient is about to start chemotherapy for lung cancer. Found to have moderate sized left pleural effusion. Trops x 3 negative. EKG SR, NSST, Echo from 3/19 showed normal LVSF, EF 60%. Had PCI of LCx but has another LAD lesion of 70%.         Past Medical History:          Diagnosis Date    Anxiety     Arthritis     Cancer ([facility] [facility] 01/2019    left lung stage 2    COPD (chronic obstructive pulmonary disease) ([facility] [address] 02/2019    Enlarged prostate with urinary retention     Gait difficulty     Generalized weakness     Hyperlipidemia     Hypertension     Lesion of bladder     Osteoarthritis     Retention of urine     S/P cardiac catheterization 03/12/2019    stent to circumflex per Dr. Alvarenga Fitting S/P TURP     SOB (shortness of breath)     With activity    Voiding difficulty        Past Surgical History:          Procedure Laterality Date    APPENDECTOMY      BRONCHOSCOPY N/A 1/25/2019    BRONCHOSCOPY performed by Raquel Stokes MD at [address] [place] [address]  1/25/2019    BRONCHOSCOPY/TRANSBRONCHIAL LUNG BIOPSY performed by Raquel Stokes MD at [address] [place] [address] N/A 3/1/2019    BRONCHOSCOPY W/EBUS FNA performed by Raquel Stokes MD at [facility]  03/12/2019    COLONOSCOPY  [id number]   [address]  2012    TURP    THORACOTOMY Left 3/14/2019 (ROXICODONE) immediate release tablet 5 mg  5 mg Oral BID PRN Izzy Bills MD   5 mg at 04/19/19 0900    sucralfate (CARAFATE) tablet 1 g  1 g Oral 4 times per day Faye Penaloza PA-C   1 g at 04/19/19 0552    aspirin chewable tablet 81 mg  81 mg Oral Daily [facility], DO   81 mg at 04/19/19 0857    atorvastatin (LIPITOR) tablet 40 mg  40 mg Oral Daily [facility], DO   40 mg at 04/18/19 2012    clopidogrel (PLAVIX) tablet 75 mg  75 mg Oral Daily [facility], DO   75 mg at 04/19/19 0859    lisinopril (PRINIVIL;ZESTRIL) tablet 40 mg  40 mg Oral Daily [facility], DO   40 mg at 04/19/19 0856    pantoprazole (PROTONIX) tablet 40 mg  40 mg Oral QAM AC Wesley Domingo, DO   40 mg at 04/19/19 0552    sodium chloride flush 0.9 % injection 10 mL  10 mL Intravenous 2 times per day [facility], DO        sodium chloride flush 0.9 % injection 10 mL  10 mL Intravenous PRN [facility], DO        ondansetron [facility]) injection 4 mg  4 mg Intravenous Q6H PRN [facility], DO        enoxaparin (LOVENOX) injection 40 mg  40 mg Subcutaneous Q24H Wesley Domingo, DO   40 mg at 04/18/19 1957    glucose (GLUTOSE) 40 % oral gel 15 g  15 g Oral PRN [facility], DO        dextrose 50 % solution 12.5 g  12.5 g Intravenous PRN [facility], DO        glucagon (rDNA) injection 1 mg  1 mg Intramuscular PRN [facility], DO        dextrose 5 % solution  100 mL/hr Intravenous PRN [facility], DO        0.9 % sodium chloride infusion   Intravenous Continuous Wesley Domingo, DO 50 mL/hr at 04/18/19 1809      magnesium sulfate 1 g in dextrose 5 % 100 mL IVPB  1 g Intravenous PRN [facility], DO        potassium chloride (KLOR-CON M) extended release tablet 40 mEq  40 mEq Oral PRN [facility], DO        Or    potassium bicarb-citric acid (EFFER-K) effervescent tablet 40 mEq  40 mEq Oral PRN [facility], DO        Or    potassium chloride 10 mEq/100 mL IVPB (Peripheral Line)  10 mEq Intravenous PRN Pacheco Backer, DO        polyethylene glycol (GLYCOLAX) packet 17 g  17 g Oral Daily PRN Pacheco Backer, DO        acetaminophen (TYLENOL) tablet 650 mg  650 mg Oral Q4H PRN Pacheco Backer, DO   650 mg at 04/18/19 1957    insulin lispro (HUMALOG) injection vial 0-6 Units  0-6 Units Subcutaneous TID  Wesley Martinezbeck, DO        insulin lispro (HUMALOG) injection vial 0-3 Units  0-3 Units Subcutaneous Nightly Pacheco Backer, DO   1 Units at 04/18/19 2047    dutasteride (AVODART) capsule 0.5 mg  0.5 mg Oral Daily Pacheco Backer, DO   0.5 mg at 04/19/19 5900       Allergies:  Patient has no known allergies. Social History:    TOBACCO:   reports that he quit smoking about 7 weeks ago. His smoking use included cigarettes. He started smoking about 48 years ago. He has a 40.00 pack-year smoking history. He has never used smokeless tobacco.  ETOH:   reports that he does not drink alcohol. Family History:        Problem Relation Age of Onset    Diabetes Mother     High Blood Pressure Mother     Brain Cancer Mother     Heart Disease Father     Diabetes Father     High Blood Pressure Father     Heart Attack Father     Diabetes Sister     High Blood Pressure Sister     COPD Sister         Agent Orange    Diabetes Brother     High Blood Pressure Brother          Review of Systems -   General ROS: negative  Psychological ROS: negative  Hematological and Lymphatic ROS: No history of blood clots or bleeding disorder. Respiratory ROS: no cough, shortness of breath, or wheezing  Cardiovascular ROS: As per HPI  Gastrointestinal ROS: negative  Genito-Urinary ROS: no dysuria, trouble voiding, or hematuria  Musculoskeletal ROS: negative  Neurological ROS: no TIA or stroke symptoms  Dermatological ROS: negative    All others reviewed and are negative.        /79   Pulse 72   Temp 98.2 °F (36.8 °C) (Oral)   Resp 16   Ht 5' 8\" (1.727 m)   Wt 170 lb 12.8 oz (77.5 kg)   SpO2 98%   BMI 25.97 kg/m²       Physical Examination:   General appearance - alert, in no distress  Mental status - alert, oriented to person, place, and time  Neck - supple, no significant adenopathy, no JVD, or carotid bruits  Chest - clear to auscultation, no wheezes, rales or rhonchi, symmetric air entry  Heart - normal rate, regular rhythm, normal S1, S2, no murmurs, rubs, clicks or gallops  Abdomen - soft, nontender, nondistended, no masses or organomegaly  Neurological - alert, oriented, normal speech, no focal findings or movement disorder noted  Musculoskeletal - no joint tenderness, deformity or swelling  Extremities - peripheral pulses normal, no pedal edema, no clubbing or cyanosis  Skin - normal coloration and turgor, no rashes, no suspicious skin lesions noted      LABS:    Recent Labs     04/18/19  1440 04/18/19  2036 04/19/19  0232   TROPONINT < 0.010 < 0.010 < 0.010     CBC:   Lab Results   Component Value Date    WBC 7.4 04/19/2019    RBC 4.32 04/19/2019    HGB 11.6 04/19/2019    HCT 36.2 04/19/2019    MCV 83.8 04/19/2019    MCH 26.9 04/19/2019    MCHC 32.0 04/19/2019    RDW 12.3 04/18/2019     04/19/2019    MPV 9.2 04/19/2019     BMP:    Lab Results   Component Value Date     04/19/2019    K 4.9 04/19/2019     04/19/2019    CO2 24 04/19/2019    BUN 10 04/19/2019    LABALBU 3.3 04/18/2019    CREATININE 0.7 04/19/2019    CALCIUM 9.0 04/19/2019    LABGLOM >90 04/19/2019    GLUCOSE 122 04/19/2019     Hepatic Function Panel:    Lab Results   Component Value Date    ALKPHOS 100 04/18/2019    ALT 6 04/18/2019    AST 17 04/18/2019    PROT 6.7 04/18/2019    BILITOT 0.3 04/18/2019    BILIDIR <0.2 04/18/2019    LABALBU 3.3 04/18/2019     Magnesium:    Lab Results   Component Value Date    MG 1.7 04/19/2019     Warfarin PT/INR:  No components found for: PTPATWAR, PTINRWAR  HgBA1c:    Lab Results   Component Value Date    LABA1C 7.6 04/18/2019     FLP:    Lab Results   Component Value Date    TRIG 85 04/08/2019    HDL 35 04/08/2019    LDLCALC 63 04/08/2019     TSH:  No results found for: TSH  BNP: No components found for: PRO-BNP      Assessment/Plan:  Atypical chest pain  Left side pleural effusion  CAD s/p PCI  Lung cancer    Atypical chest pain, likely from left pleural effusion  Trops x 3 negative  Follow up in cardiology clinic in 2-4 weeks to evaluate further  Continue Aspirin, plavix, statin        Please do note hesitate to contact me for any further questions. Thank you for the opportunity to be involved in this patient's care.     Code Status: Full Code    Electronically signed by Scott Samson MD on 4/19/2019 at 9:54 AM

## 2019-04-19 NOTE — PROGRESS NOTES
Pharmacy Medication History Note      List of current medications patient is taking is complete. Source of information: Spoke to patient and had his medications    Changes made to medication list:  Medications removed (include reason, ex. therapy complete or physician discontinued):  Amlodipine 2.5 mg daily - dose adjustment  Atorvastatin 20 mg 2 tablets - dose adjustment  Anoro Ellipta 62.5 - 25 mcg/inh - therapy completed    Medications added/doses adjusted:  Amlodipine 10 mg daily  Atorvastatin 40 mg daily    Other notes (ex. Recent course of antibiotics, Coumadin dosing):  Denies use of other OTC or herbal medications.       Allergies reviewed      Electronically signed by Pepito Sparks on 4/19/2019 at 10:10 AM

## 2019-04-19 NOTE — PROGRESS NOTES
A Ultrasound guided left thoracentesis  was performed without complication. Please see PACS for full report.

## 2019-04-20 VITALS
WEIGHT: 170.8 LBS | BODY MASS INDEX: 25.88 KG/M2 | HEIGHT: 68 IN | DIASTOLIC BLOOD PRESSURE: 77 MMHG | TEMPERATURE: 98.2 F | HEART RATE: 82 BPM | RESPIRATION RATE: 18 BRPM | OXYGEN SATURATION: 93 % | SYSTOLIC BLOOD PRESSURE: 128 MMHG

## 2019-04-20 LAB — GLUCOSE BLD-MCNC: 179 MG/DL (ref 70–108)

## 2019-04-20 PROCEDURE — 96361 HYDRATE IV INFUSION ADD-ON: CPT

## 2019-04-20 PROCEDURE — 99217 PR OBSERVATION CARE DISCHARGE MANAGEMENT: CPT | Performed by: INTERNAL MEDICINE

## 2019-04-20 PROCEDURE — 82948 REAGENT STRIP/BLOOD GLUCOSE: CPT

## 2019-04-20 PROCEDURE — 6370000000 HC RX 637 (ALT 250 FOR IP): Performed by: PHYSICIAN ASSISTANT

## 2019-04-20 PROCEDURE — 6370000000 HC RX 637 (ALT 250 FOR IP): Performed by: INTERNAL MEDICINE

## 2019-04-20 PROCEDURE — G0378 HOSPITAL OBSERVATION PER HR: HCPCS

## 2019-04-20 RX ADMIN — CLOPIDOGREL BISULFATE 75 MG: 75 TABLET ORAL at 08:13

## 2019-04-20 RX ADMIN — PANTOPRAZOLE SODIUM 40 MG: 40 TABLET, DELAYED RELEASE ORAL at 05:50

## 2019-04-20 RX ADMIN — INSULIN LISPRO 1 UNITS: 100 INJECTION, SOLUTION INTRAVENOUS; SUBCUTANEOUS at 08:14

## 2019-04-20 RX ADMIN — SUCRALFATE 1 G: 1 TABLET ORAL at 00:13

## 2019-04-20 RX ADMIN — ASPIRIN 81 MG: 81 TABLET, CHEWABLE ORAL at 08:12

## 2019-04-20 RX ADMIN — AMLODIPINE BESYLATE 10 MG: 10 TABLET ORAL at 08:13

## 2019-04-20 RX ADMIN — DUTASTERIDE 0.5 MG: 0.5 CAPSULE, LIQUID FILLED ORAL at 08:14

## 2019-04-20 RX ADMIN — SUCRALFATE 1 G: 1 TABLET ORAL at 05:49

## 2019-04-20 RX ADMIN — LISINOPRIL 40 MG: 40 TABLET ORAL at 08:14

## 2019-04-20 ASSESSMENT — PAIN SCALES - GENERAL: PAINLEVEL_OUTOF10: 0

## 2019-04-20 NOTE — DISCHARGE SUMMARY
Hospital Medicine Discharge Summary      Patient Identification:   Musa Urban   : 1950  MRN: [id number]   Account: [de-identified]      Patient's PCP: Abimael Phillips. Cynthia Murray MD    Admit Date: 2019     Discharge Date:   19    Admitting Physician: Rajesh Stiles DO     Discharge Physician: Gracie Marks MD     Discharge Diagnoses: Atypical Chest Pain, Left Pleural exudative Effusion s/p thoracentesis, CAD s/p PCI, Lung Cancer     Active Hospital Problems    Diagnosis Date Noted    Pleural effusion exudative [J90]     Chest pain [R07.9] 2019    Adenocarcinoma, lung, left ([address] [C34.92] 2019    CAD in native artery [I25.10] 2019       The patient was seen and examined on day of discharge and this discharge summary is in conjunction with any daily progress note from day of discharge. Hospital Course:   71year old male with a history of HTN, DM2, HLD, tobacco abuse, CAD s/p PCI (3/12/2019), and primary adenocarcinoma of lung s/p lung lobectomy (3/14/2019) who presents with one week history of chest pain that occurs mostly at rest. Last for a few seconds and resides. Has Sob with it. Not associated with any diaphoresis, dizziness or lightheadedness. Pt was at his oncology appointment yesterday to start chemotherapy however was sent to the ER for his chest pain.       Pt found to have moderate size left pleural effusion. Troponin negative. No ecg changes. Had thoracentesis with 300cc of purulent exudative fluid removed. Pulmonary thinks this is likely  postthoracotomy pleural effusion (needs to be treated conservatively only vs malignant pleural effusion). Will need to have to follow-up with Dr. Vik Pino on cytology. Cardiology saying chest pain is from left pleural effusion, cont ASA, plavix and statin. Pt currently not having any dizziness, chest pain or sob. Tolerating PO intake. Will need to follow-up with PCP, Cardiology and Oncology.        Exam:     Vitals:  Vitals: 04/19/19 1511 04/19/19 2031 04/20/19 0010 04/20/19 0420   BP: 135/72 108/67 135/79 124/78   Pulse: 71 89 80 79   Resp: 16 18 18 18   Temp: 98.1 °F (36.7 °C) 98.6 °F (37 °C) 97.4 °F (36.3 °C) 98.4 °F (36.9 °C)   TempSrc: Oral Oral Oral Oral   SpO2: 97% 96% 98% 97%   Weight:       Height:         Weight: Weight: 170 lb 12.8 oz (77.5 kg)     24 hour intake/output:    Intake/Output Summary (Last 24 hours) at 4/20/2019 0805  Last data filed at 4/20/2019 0501  Gross per 24 hour   Intake 2553.45 ml   Output 825 ml   Net 1728. 45 ml         General appearance:  No apparent distress, appears stated age and cooperative. HEENT:  Normal cephalic, atraumatic without obvious deformity. Pupils equal, round, and reactive to light. Extra ocular muscles intact. Conjunctivae/corneas clear. Neck: Supple, with full range of motion. No jugular venous distention. Trachea midline. Respiratory:  Normal respiratory effort. Clear to auscultation, bilaterally without Rales/Wheezes/Rhonchi. Cardiovascular:  Regular rate and rhythm with normal S1/S2 without murmurs, rubs or gallops. Abdomen: Soft, non-tender, non-distended with normal bowel sounds. Musculoskeletal:  No clubbing, cyanosis or edema bilaterally. Full range of motion without deformity. Skin: Skin color, texture, turgor normal.  No rashes or lesions. Neurologic:  Neurovascularly intact without any focal sensory/motor deficits. Cranial nerves: II-XII intact, grossly non-focal.  Psychiatric:  Alert and oriented, thought content appropriate, normal insight  Capillary Refill: Brisk,< 3 seconds   Peripheral Pulses: +2 palpable, equal bilaterally       Labs:  For convenience and continuity at follow-up the following most recent labs are provided:      CBC:    Lab Results   Component Value Date    WBC 7.4 04/19/2019    HGB 11.6 04/19/2019    HCT 36.2 04/19/2019     04/19/2019       Renal:    Lab Results   Component Value Date     04/19/2019    K 4.9 04/19/2019    CL 107 04/19/2019    CO2 24 04/19/2019    BUN 10 04/19/2019    CREATININE 0.7 04/19/2019    CALCIUM 9.0 04/19/2019         Significant Diagnostic Studies    Radiology:   US THORACENTESIS   Final Result      Successful ultrasound-guided thoracentesis with  0.3 L of fluid drained. **This report has been created using voice recognition software. It may contain minor errors which are inherent in voice recognition technology. **      Final report electronically signed by Dr. Deana Calderon on 4/19/2019 1:51 PM      XR CHEST PA INSPIRATION 1 VW   Final Result   No pneumothorax status post left thoracentesis            **This report has been created using voice recognition software. It may contain minor errors which are inherent in voice recognition technology. **      Final report electronically signed by Dr. Alexy Scruggs on 4/19/2019 12:57 PM      CTA Chest W WO Contrast   Final Result         1. No acute pulmonary embolism. 2. Postsurgical changes left lower lobe with moderate-sized pleural effusion and adjacent consolidation. 3. Shotty mediastinal lymph nodes. 4. 9 mm nodule in the right lower lobe. 6 mm subpleural nodular scarring in the right upper lobe. **This report has been created using voice recognition software. It may contain minor errors which are inherent in voice recognition technology      Final report electronically signed by Dr. Vikash Cabrales on 4/18/2019 2:51 PM      XR ABDOMEN (KUB) (SINGLE AP VIEW)   Final Result   No acute findings. Stool in the colon. Correlate for constipation. **This report has been created using voice recognition software. It may contain minor errors which are inherent in voice recognition technology. **      Final report electronically signed by Dr. Vikash Cabrales on 4/18/2019 1:07 PM             Consults:     IP CONSULT TO ONCOLOGY  IP CONSULT TO CARDIOLOGY  IP CONSULT TO PULMONOLOGY    Disposition:    [x] Home       [] TCU       [] Rehab       [] Psych       [] SNF       [] [place]       [] Other-    Condition at Discharge: Stable    Code Status:  Full Code     Patient Instructions: Activity: activity as tolerated  Diet: DIET CARB CONTROL; Follow-up visits:   [place]  [id number] [facility]  [place] [address]  [phone]               Discharge Medications:      [facility]"   Home Medication Instructions [id number]    Printed on:04/20/19 1039   Medication Information                      amLODIPine (NORVASC) 10 MG tablet  Take 10 mg by mouth daily             aspirin 81 MG chewable tablet  Take 1 tablet by mouth daily             atorvastatin (LIPITOR) 40 MG tablet  Take 40 mg by mouth daily             clopidogrel (PLAVIX) 75 MG tablet  Take 1 tablet by mouth daily             dutasteride (AVODART) 0.5 MG capsule  Take 0.5 mg by mouth daily. lisinopril (PRINIVIL;ZESTRIL) 40 MG tablet  Take 40 mg by mouth daily. metFORMIN (GLUCOPHAGE) 500 MG tablet  Take 500 mg by mouth 2 times daily (with meals) Take 1 tab by mouth at supper x2 weeks, then 1 tab in the morning and 1 tab at supper x2 weeks, then 1 tab in the morning and 2 tab at supper x2weeks, then 2 tabs twice a day             nitroGLYCERIN (NITROSTAT) 0.4 MG SL tablet  up to max of 3 total doses. If no relief after 1 dose, call 911. omeprazole (PRILOSEC) 20 MG delayed release capsule  Take 20 mg by mouth daily             senna (SENOKOT) 8.6 MG TABS tablet  Take 1 tablet by mouth daily                 Time Spent on discharge is more than 20 minutes in the examination, evaluation, counseling and review of medications and discharge plan. Signed: Thank you Mariah Orma. Anais Kent MD for the opportunity to be involved in this patient's care.     Electronically signed by Silke Cross MD on 4/20/2019 at 8:05 AM

## 2019-04-20 NOTE — PROGRESS NOTES
Discharge teaching and instructions for diagnosis/procedure of Chest pain completed with patient using teachback method. AVS reviewed. Patient voiced understanding regarding prescriptions, follow up appointments, and care of self at home. Discharged ambulatory to  home with support per family.

## 2019-04-20 NOTE — PLAN OF CARE
Problem: Tissue Perfusion - Cardiopulmonary, Altered:  Goal: Absence of angina  Description  Absence of angina  4/20/2019 0053 by Jenni Mayorga RN  Outcome: Ongoing  Note:   No chest pain so far this shift, continue to assess for pain or discomfort. Problem: Tissue Perfusion - Cardiopulmonary, Altered:  Goal: Hemodynamic stability will improve  Description  Hemodynamic stability will improve  4/20/2019 0053 by Jenni Mayorga RN  Outcome: Ongoing  Note:   Pt remains hemodynamically stable at this time, refer to flowsheet. Problem: Daily Care:  Goal: Daily care needs are met  Description  Daily care needs are met  4/20/2019 0053 by Jenni Mayorga RN  Outcome: Ongoing  Note:   Daily needs are met at this time, continue to assist as needed in preparation for discharge. Problem: Pain:  Goal: Patient's pain/discomfort is manageable  Description  Patient's pain/discomfort is manageable  4/20/2019 0053 by Jenni Mayorga RN  Outcome: Ongoing  Note:   Pt is resting quietly at this time with eyes closed, will continue to assess using 0-10 pain scale. Pt has prn oxycodone. Problem: Skin Integrity:  Goal: Skin integrity will stabilize  Description  Skin integrity will stabilize  4/20/2019 0053 by Jenni Mayorga RN  Outcome: Ongoing  Note:   Pt has incisions from previous lobecotomy that are healing on the left side of abdomen and back. Problem: Discharge Planning:  Goal: Patients continuum of care needs are met  Description  Patients continuum of care needs are met  4/20/2019 0053 by Jenni Mayorga RN  Outcome: Ongoing  Note:   Pt needs are met at this time, continue to assist as needed in preparation for discharge. Problem: Falls - Risk of:  Goal: Will remain free from falls  Description  Will remain free from falls  4/20/2019 0053 by Jenni Mayorga RN  Outcome: Ongoing  Note:   No falls so far this shift, call light and bedside table within reach.  Bed in lowest position and alarm on, nonskid socks on bilaterally. Problem: Falls - Risk of:  Goal: Absence of physical injury  Description  Absence of physical injury  4/20/2019 0053 by Amelie Moise RN  Outcome: Ongoing  Note:   No signs of physical injury at this time, continue to assess for signs of harming self or others. Care plan reviewed with patient. Patient verbalize understanding of the plan of care and contribute to goal setting.

## 2019-04-22 ENCOUNTER — TELEPHONE (OUTPATIENT)
Dept: ONCOLOGY | Age: 69
End: 2019-04-22

## 2019-04-22 NOTE — CARE COORDINATION
4/22/19, 7:13 AM    Discharge plan discussed by  and . Discharge plan reviewed with patient/ family. Patient/ family verbalize understanding of discharge plan and are in agreement with plan. Understanding was demonstrated using the teach back method. Pt discharged to home. Denies needs or services.

## 2019-04-24 LAB
ANAEROBIC CULTURE: NORMAL
BODY FLUID CULTURE, STERILE: NORMAL
GRAM STAIN RESULT: NORMAL

## 2019-04-29 NOTE — PROGRESS NOTES
MG TABS tablet Take 1 tablet by mouth daily      metFORMIN (GLUCOPHAGE) 500 MG tablet Take 500 mg by mouth 2 times daily (with meals) Take 1 tab by mouth at supper x2 weeks, then 1 tab in the morning and 1 tab at supper x2 weeks, then 1 tab in the morning and 2 tab at supper x2weeks, then 2 tabs twice a day      aspirin 81 MG chewable tablet Take 1 tablet by mouth daily 30 tablet 3    nitroGLYCERIN (NITROSTAT) 0.4 MG SL tablet up to max of 3 total doses. If no relief after 1 dose, call 911. 25 tablet 3    clopidogrel (PLAVIX) 75 MG tablet Take 1 tablet by mouth daily 30 tablet 3    omeprazole (PRILOSEC) 20 MG delayed release capsule Take 20 mg by mouth daily      lisinopril (PRINIVIL;ZESTRIL) 40 MG tablet Take 40 mg by mouth daily.  dutasteride (AVODART) 0.5 MG capsule Take 0.5 mg by mouth daily. No current facility-administered medications for this visit.         Social History     Socioeconomic History    Marital status:      Spouse name: None    Number of children: None    Years of education: None    Highest education level: None   Occupational History    None   Social Needs    Financial resource strain: None    Food insecurity:     Worry: None     Inability: None    Transportation needs:     Medical: None     Non-medical: None   Tobacco Use    Smoking status: Former Smoker     Packs/day: 1.00     Years: 40.00     Pack years: 40.00     Types: Cigarettes     Start date:      Last attempt to quit: 2019     Years since quittin.1    Smokeless tobacco: Never Used    Tobacco comment: about 5 cigs per day   Substance and Sexual Activity    Alcohol use: No    Drug use: No    Sexual activity: None   Lifestyle    Physical activity:     Days per week: None     Minutes per session: None    Stress: None   Relationships    Social connections:     Talks on phone: None     Gets together: None     Attends [employer] service: None     Active member of club or organization: None     Attends meetings of clubs or organizations: None     Relationship status: None    Intimate partner violence:     Fear of current or ex partner: None     Emotionally abused: None     Physically abused: None     Forced sexual activity: None   Other Topics Concern    None   Social History Narrative    None       Family History   Problem Relation Age of Onset    Diabetes Mother     High Blood Pressure Mother     Brain Cancer Mother     Heart Disease Father     Diabetes Father     High Blood Pressure Father     Heart Attack Father     Diabetes Sister     High Blood Pressure Sister     COPD Sister         Agent [place]    Diabetes Brother     High Blood Pressure Brother        Blood pressure 115/78, pulse 89, height 5' 8\" (1.727 m), weight 169 lb 12.8 oz (77 kg). General:   Well developed, well nourished  Lungs:   Clear to auscultation  Heart:    Normal S1 S2, No murmur, rubs, or gallops  Abdomen:   Soft, non tender, no organomegalies, positive bowel sounds  Extremities:   No edema, no cyanosis, good peripheral pulses  Neurological:   Awake, alert, oriented. No obvious focal deficits  Musculoskeletal:  No obvious deformities    EK19  Normal sinus rhythm  Left axis deviation  Low voltage QRS, consider pulmonary disease, pericardial effusion, or normal variant  Abnormal ECG  When compared with ECG of 12-MAR-2019 17:56,  Right bundle branch block is no longer Present  Confirmed by Cesar Mi MD, Mallorie Kaur ([id number]) on 2019 7:47:55 PM    Echo: 3/13/19  Summary   Technically difficult examination.   Ejection fraction is visually estimated at 60%.   Overall left ventricular function is normal.      Signature      ----------------------------------------------------------------   Electronically signed by Todd De La Paz MD (Interpreting  Jailene Helms) on 2019 at 05:19 PM    LHC: 3/12/19  CORONARY ARTERIOGRAM RESULTS:  1.   Left main is patent, gives rise to left anterior descending and

## 2019-04-30 ENCOUNTER — OFFICE VISIT (OUTPATIENT)
Dept: CARDIOLOGY CLINIC | Age: 69
End: 2019-04-30
Payer: MEDICARE

## 2019-04-30 VITALS
SYSTOLIC BLOOD PRESSURE: 115 MMHG | DIASTOLIC BLOOD PRESSURE: 78 MMHG | HEIGHT: 68 IN | WEIGHT: 169.8 LBS | BODY MASS INDEX: 25.73 KG/M2 | HEART RATE: 89 BPM

## 2019-04-30 DIAGNOSIS — Z90.2 S/P LOBECTOMY OF LUNG: ICD-10-CM

## 2019-04-30 DIAGNOSIS — Z95.5 S/P DRUG ELUTING CORONARY STENT PLACEMENT: Primary | ICD-10-CM

## 2019-04-30 DIAGNOSIS — C34.92 ADENOCARCINOMA, LUNG, LEFT (HCC): ICD-10-CM

## 2019-04-30 DIAGNOSIS — E78.01 FAMILIAL HYPERCHOLESTEROLEMIA: ICD-10-CM

## 2019-04-30 DIAGNOSIS — R06.02 SHORTNESS OF BREATH: ICD-10-CM

## 2019-04-30 DIAGNOSIS — I10 ESSENTIAL HYPERTENSION: ICD-10-CM

## 2019-04-30 PROCEDURE — 99213 OFFICE O/P EST LOW 20 MIN: CPT | Performed by: NURSE PRACTITIONER

## 2019-04-30 PROCEDURE — 1036F TOBACCO NON-USER: CPT | Performed by: NURSE PRACTITIONER

## 2019-04-30 PROCEDURE — 3017F COLORECTAL CA SCREEN DOC REV: CPT | Performed by: NURSE PRACTITIONER

## 2019-04-30 PROCEDURE — G8419 CALC BMI OUT NRM PARAM NOF/U: HCPCS | Performed by: NURSE PRACTITIONER

## 2019-04-30 PROCEDURE — G8598 ASA/ANTIPLAT THER USED: HCPCS | Performed by: NURSE PRACTITIONER

## 2019-04-30 PROCEDURE — 1123F ACP DISCUSS/DSCN MKR DOCD: CPT | Performed by: NURSE PRACTITIONER

## 2019-04-30 PROCEDURE — 4040F PNEUMOC VAC/ADMIN/RCVD: CPT | Performed by: NURSE PRACTITIONER

## 2019-04-30 PROCEDURE — G8427 DOCREV CUR MEDS BY ELIG CLIN: HCPCS | Performed by: NURSE PRACTITIONER

## 2019-04-30 NOTE — PROGRESS NOTES
Patient here for hospital fu. Patient complains of SOB with exertion. Patient denies chest pain, dizziness, lightheadedness, palpations and YONAS.

## 2019-05-01 DIAGNOSIS — C34.32 MALIGNANT NEOPLASM OF LOWER LOBE OF LEFT LUNG (HCC): Primary | ICD-10-CM

## 2019-05-02 ENCOUNTER — HOSPITAL ENCOUNTER (OUTPATIENT)
Dept: GENERAL RADIOLOGY | Age: 69
Discharge: HOME | End: 2019-05-02
Payer: MEDICARE

## 2019-05-02 ENCOUNTER — HOSPITAL ENCOUNTER (OUTPATIENT)
Age: 69
Discharge: HOME | End: 2019-05-02
Payer: MEDICARE

## 2019-05-02 ENCOUNTER — HOSPITAL ENCOUNTER (OUTPATIENT)
Dept: INFUSION THERAPY | Age: 69
Discharge: HOME | End: 2019-05-02
Payer: MEDICARE

## 2019-05-02 ENCOUNTER — OFFICE VISIT (OUTPATIENT)
Dept: ONCOLOGY | Age: 69
End: 2019-05-02
Payer: MEDICARE

## 2019-05-02 VITALS
SYSTOLIC BLOOD PRESSURE: 140 MMHG | WEIGHT: 172.2 LBS | HEIGHT: 68 IN | BODY MASS INDEX: 26.1 KG/M2 | HEART RATE: 81 BPM | TEMPERATURE: 98 F | OXYGEN SATURATION: 97 % | RESPIRATION RATE: 18 BRPM | DIASTOLIC BLOOD PRESSURE: 75 MMHG

## 2019-05-02 DIAGNOSIS — Z90.2 S/P LOBECTOMY OF LUNG: ICD-10-CM

## 2019-05-02 DIAGNOSIS — R10.12 POSTOPERATIVE LEFT UPPER QUADRANT ABDOMINAL PAIN: ICD-10-CM

## 2019-05-02 DIAGNOSIS — G89.18 POSTOPERATIVE LEFT UPPER QUADRANT ABDOMINAL PAIN: ICD-10-CM

## 2019-05-02 DIAGNOSIS — C34.92 ADENOCARCINOMA, LUNG, LEFT (HCC): ICD-10-CM

## 2019-05-02 DIAGNOSIS — C34.32 MALIGNANT NEOPLASM OF LOWER LOBE OF LEFT LUNG (HCC): ICD-10-CM

## 2019-05-02 DIAGNOSIS — Z90.2 S/P LOBECTOMY OF LUNG: Primary | ICD-10-CM

## 2019-05-02 DIAGNOSIS — J90 PLEURAL EFFUSION ON LEFT: ICD-10-CM

## 2019-05-02 LAB
ALBUMIN SERPL-MCNC: 4 G/DL (ref 3.5–5.1)
ALP BLD-CCNC: 102 U/L (ref 38–126)
ALT SERPL-CCNC: 7 U/L (ref 11–66)
AST SERPL-CCNC: 20 U/L (ref 5–40)
BASINOPHIL, AUTOMATED: 0 % (ref 0–3)
BILIRUB SERPL-MCNC: 0.2 MG/DL (ref 0.3–1.2)
BILIRUBIN DIRECT: < 0.2 MG/DL (ref 0–0.3)
BUN, WHOLE BLOOD: 22 MG/DL (ref 8–26)
CHLORIDE, WHOLE BLOOD: 101 MEQ/L (ref 98–109)
CREATININE, WHOLE BLOOD: 0.7 MG/DL (ref 0.5–1.2)
EOSINOPHILS RELATIVE PERCENT: 3 % (ref 0–4)
GFR, ESTIMATED: > 90 ML/MIN/1.73M2
GLUCOSE, WHOLE BLOOD: 159 MG/DL (ref 70–108)
HCT VFR BLD CALC: 39.4 % (ref 42–52)
HEMOGLOBIN: 13 GM/DL (ref 14–18)
IONIZED CALCIUM, WHOLE BLOOD: 1.25 MMOL/L (ref 1.12–1.32)
LYMPHOCYTES # BLD: 17 % (ref 15–47)
MCH RBC QN AUTO: 26.5 PG (ref 27–31)
MCHC RBC AUTO-ENTMCNC: 33 GM/DL (ref 33–37)
MCV RBC AUTO: 80 FL (ref 80–94)
MONOCYTES: 7 % (ref 0–12)
PDW BLD-RTO: 13.2 % (ref 11.5–14.5)
PLATELET # BLD: 236 THOU/MM3 (ref 130–400)
PMV BLD AUTO: 7 FL (ref 7.4–10.4)
POTASSIUM, WHOLE BLOOD: 5.1 MEQ/L (ref 3.5–4.9)
RBC # BLD: 4.92 MILL/MM3 (ref 4.7–6.1)
SEG NEUTROPHILS: 73 % (ref 43–75)
SODIUM, WHOLE BLOOD: 140 MEQ/L (ref 138–146)
TOTAL CO2, WHOLE BLOOD: 30 MEQ/L (ref 23–33)
TOTAL PROTEIN: 7.2 G/DL (ref 6.1–8)
WBC # BLD: 8.2 THOU/MM3 (ref 4.8–10.8)

## 2019-05-02 PROCEDURE — 71046 X-RAY EXAM CHEST 2 VIEWS: CPT

## 2019-05-02 PROCEDURE — 85025 COMPLETE CBC W/AUTO DIFF WBC: CPT

## 2019-05-02 PROCEDURE — G8427 DOCREV CUR MEDS BY ELIG CLIN: HCPCS | Performed by: INTERNAL MEDICINE

## 2019-05-02 PROCEDURE — 71045 X-RAY EXAM CHEST 1 VIEW: CPT

## 2019-05-02 PROCEDURE — 80076 HEPATIC FUNCTION PANEL: CPT

## 2019-05-02 PROCEDURE — G8419 CALC BMI OUT NRM PARAM NOF/U: HCPCS | Performed by: INTERNAL MEDICINE

## 2019-05-02 PROCEDURE — 4040F PNEUMOC VAC/ADMIN/RCVD: CPT | Performed by: INTERNAL MEDICINE

## 2019-05-02 PROCEDURE — G8598 ASA/ANTIPLAT THER USED: HCPCS | Performed by: INTERNAL MEDICINE

## 2019-05-02 PROCEDURE — 1123F ACP DISCUSS/DSCN MKR DOCD: CPT | Performed by: INTERNAL MEDICINE

## 2019-05-02 PROCEDURE — 1036F TOBACCO NON-USER: CPT | Performed by: INTERNAL MEDICINE

## 2019-05-02 PROCEDURE — 36415 COLL VENOUS BLD VENIPUNCTURE: CPT

## 2019-05-02 PROCEDURE — 80047 BASIC METABLC PNL IONIZED CA: CPT

## 2019-05-02 PROCEDURE — 3017F COLORECTAL CA SCREEN DOC REV: CPT | Performed by: INTERNAL MEDICINE

## 2019-05-02 PROCEDURE — 99214 OFFICE O/P EST MOD 30 MIN: CPT | Performed by: INTERNAL MEDICINE

## 2019-05-02 PROCEDURE — 99211 OFF/OP EST MAY X REQ PHY/QHP: CPT

## 2019-05-02 RX ORDER — OXYCODONE AND ACETAMINOPHEN 7.5; 325 MG/1; MG/1
1 TABLET ORAL EVERY 8 HOURS PRN
Qty: 60 TABLET | Refills: 0 | Status: SHIPPED | OUTPATIENT
Start: 2019-05-02 | End: 2019-05-30 | Stop reason: SDUPTHER

## 2019-05-02 RX ORDER — FOLIC ACID 1 MG/1
1 TABLET ORAL DAILY
Qty: 30 TABLET | Refills: 3 | Status: SHIPPED | OUTPATIENT
Start: 2019-05-02 | End: 2019-09-11

## 2019-05-02 ASSESSMENT — ENCOUNTER SYMPTOMS
BLOOD IN STOOL: 0
ABDOMINAL DISTENTION: 0
TROUBLE SWALLOWING: 0
COLOR CHANGE: 0
CHEST TIGHTNESS: 0
SHORTNESS OF BREATH: 1
BACK PAIN: 0
EYE DISCHARGE: 0
RECTAL PAIN: 0
VOMITING: 0
WHEEZING: 0
NAUSEA: 0
ABDOMINAL PAIN: 0
CONSTIPATION: 0
SORE THROAT: 0
DIARRHEA: 0
COUGH: 1
FACIAL SWELLING: 0

## 2019-05-02 NOTE — PROGRESS NOTES
[facility] [address] [place] [address]  [address]  Dept: [phone]  Dept Fax: [phone]: [phone]    Visit Date:5/2/2019     Rishabh Ferro is a 71 y.o. male who presents today for:   Chief Complaint   Patient presents with    Follow-up     ADENOCARCINOMA OF LEFT LUNG        HPI:   The patient developed hemoptysis beginning of January 2019. Chest x-ray on January 14, 2019 showed a developing retrocardiac infiltrate. Patient was referred to the pulmonologist Zoltan Rivera who ordered CT of the chest.  It was performed on January 23, 2019 and showed:  a lobulated mass in the left lower lobe possibly two separate masses is left lower lobe this is a lobulated elongated and measures up to six 5.9 cm in length 2.5 cm in width spiculated margins are present and adjacent airspace disease. Lobulated    8mm nodule lateral aspect right lower lobe image 42   No effusions are seen. Upper abdomen   No adrenal masses. Simple cyst the left kidney. No suspicious bone lesions   Patient underwent bronchoscopy with transbronchial biopsy and BAL on January 25, 2019. They showed no endobronchial lesions and benign bronchial epithelium, alveolar macrophages, and scattered inflammatory cells, respectively. No malignant cells seen. Subsequently on February 12, 2019 the patient had CT-guided lung biopsy which showed poorly differentiated pulmonary adenocarcinoma. The patient had PET scan on February 25, 2019 that showed:  1. Known left lower lobe malignancy showing FDG avidity. 2. Mediastinal adenopathy with minimal activity above background suggesting possible reactive lymphadenopathy. Metastatic disease is not excludable. 3. Pleural fluid in the major fissure of the left upper lung. Minimal activity above background may indicate a malignant effusion. 4. No other evidence of metastatic disease, small nodule in the right lower lobe shows no significant activity above background. Patient had EBUS with transbronchial ultrasound guided mediastinal lymph node biopsy. The mediastinal lymph node biopsy was  Negative. Therefore the patient met with the thoracic surgeon Dr. Lissy Iglesias and after discussing his surgical treatment options, on March 14, 2019 he underwent left lower lobe, lung lobectomy: .  Final pathology report showed:  A. 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   Lymph nodes (0/3): Negative for malignancy. B. Tissue designated lymph node, station 9, excision:   Benign fibroadipose tissue. C. Lymph node, station 10 L, excision:   Negative for malignancy (0/1). D. Lymph nodes, station 6, excision:   Negative for malignancy (0/3). E. Lymph node, station 7, excision:   Negative for malignancy (0/1). F. Tissue designated lymph node, station 8, excision:   Benign fibroadipose tissue. Interim history on May 2, 2019: This is post hospitalization follow-up visit. The patient was hospitalized from April 18, 2019 till April 20, 2019 for atypical chest pain, left pleural exudative effusion. He underwent thoracentesis. Cytology was negative for presence of malignant cells. Troponin negative. No ecg changes. Today the patient reports that his chest pain is resolved however he continues having pain in post surgical site. His shortness of breath is stable. He denies having any fevers.       HPI   Past Medical History:   Diagnosis Date    Anxiety     Arthritis     Cancer ([facility] [address] 01/2019    left lung stage 2    COPD (chronic obstructive pulmonary disease) ([facility] [address] 02/2019    Enlarged prostate with urinary retention     Gait difficulty     Generalized weakness     Hyperlipidemia     Hypertension     Lesion of bladder     Osteoarthritis     Retention of urine     S/P cardiac catheterization 03/12/2019    stent to circumflex per Dr. Hermes Gale S/P TURP     SOB (shortness of TABS tablet Take 1 tablet by mouth daily      metFORMIN (GLUCOPHAGE) 500 MG tablet Take 500 mg by mouth 2 times daily (with meals) Take 1 tab by mouth at supper x2 weeks, then 1 tab in the morning and 1 tab at supper x2 weeks, then 1 tab in the morning and 2 tab at supper x2weeks, then 2 tabs twice a day      aspirin 81 MG chewable tablet Take 1 tablet by mouth daily 30 tablet 3    nitroGLYCERIN (NITROSTAT) 0.4 MG SL tablet up to max of 3 total doses. If no relief after 1 dose, call 911. 25 tablet 3    clopidogrel (PLAVIX) 75 MG tablet Take 1 tablet by mouth daily 30 tablet 3    omeprazole (PRILOSEC) 20 MG delayed release capsule Take 20 mg by mouth daily      lisinopril (PRINIVIL;ZESTRIL) 40 MG tablet Take 40 mg by mouth daily.  dutasteride (AVODART) 0.5 MG capsule Take 0.5 mg by mouth daily. No current facility-administered medications for this visit. No Known Allergies   Health Maintenance   Topic Date Due    AAA screen  1950    Hepatitis C screen  1950    Diabetic foot exam  03/25/1960    Diabetic retinal exam  03/25/1960    Diabetic microalbuminuria test  03/25/1968    Shingles Vaccine (1 of 2) 03/25/2000    Pneumococcal 65+ years Vaccine (1 of 2 - PCV13) 03/25/2015    Flu vaccine (Season Ended) 09/01/2019    Lipid screen  04/08/2020    A1C test (Diabetic or Prediabetic)  04/18/2020    Potassium monitoring  04/19/2020    Creatinine monitoring  04/19/2020    DTaP/Tdap/Td vaccine (2 - Td) 05/25/2023    Colon cancer screen colonoscopy  01/05/2027        Subjective:   Review of Systems   Constitutional: Negative for activity change, appetite change, fatigue and fever. HENT: Negative for congestion, dental problem, facial swelling, hearing loss, mouth sores, nosebleeds, sore throat, tinnitus and trouble swallowing. Eyes: Negative for discharge and visual disturbance. Respiratory: Positive for cough and shortness of breath.  Negative for chest tightness and wheezing. Cardiovascular: Negative for chest pain, palpitations and leg swelling. Gastrointestinal: Negative for abdominal distention, abdominal pain, blood in stool, constipation, diarrhea, nausea, rectal pain and vomiting. Endocrine: Negative for cold intolerance, polydipsia and polyuria. Genitourinary: Negative for decreased urine volume, difficulty urinating, dysuria, flank pain, hematuria and urgency. Musculoskeletal: Negative for arthralgias, back pain, gait problem, joint swelling, myalgias and neck stiffness. Skin: Negative for color change, rash and wound. Neurological: Negative for dizziness, tremors, seizures, speech difficulty, weakness, light-headedness, numbness and headaches. Hematological: Negative for adenopathy. Does not bruise/bleed easily. Psychiatric/Behavioral: Negative for confusion and sleep disturbance. The patient is not nervous/anxious. Objective:   Physical Exam   Constitutional: He is oriented to person, place, and time. He appears well-developed and well-nourished. No distress. HENT:   Head: Normocephalic. Mouth/Throat: Oropharynx is clear and moist. No oropharyngeal exudate. Eyes: Pupils are equal, round, and reactive to light. EOM are normal. Right eye exhibits no discharge. Left eye exhibits no discharge. No scleral icterus. Neck: Normal range of motion. Neck supple. No JVD present. No tracheal deviation present. No thyromegaly present. Cardiovascular: Normal rate and normal heart sounds. Exam reveals no gallop and no friction rub. No murmur heard. Pulmonary/Chest: Effort normal and breath sounds normal. No stridor. No respiratory distress. He has no wheezes. He has no rales. He exhibits no tenderness. Abdominal: Soft. Bowel sounds are normal. He exhibits no distension and no mass. There is no tenderness. There is no rebound. Musculoskeletal: Normal range of motion. He exhibits no edema. Good range of motion in all four extremities. Lymphadenopathy:     He has no cervical adenopathy. Neurological: He is alert and oriented to person, place, and time. He has normal reflexes. No cranial nerve deficit. He exhibits normal muscle tone. Skin: Skin is warm. No rash noted. No erythema. Psychiatric: He has a normal mood and affect. His behavior is normal. Judgment and thought content normal.   Vitals reviewed. BP (!) 140/75 (Site: Left Upper Arm, Position: Sitting, Cuff Size: Medium Adult)   Pulse 81   Temp 98 °F (36.7 °C) (Oral)   Resp 18   Ht 5' 7.99\" (1.727 m)   Wt 172 lb 3.2 oz (78.1 kg)   SpO2 97%   BMI 26.19 kg/m²      ECOG status is 1. Imaging studies and labs:     Pet Ct Skull Base To Mid Thigh  Result Date: 2/25/2019  PROCEDURE: PET CT SKULL BASE TO MID THIGH CLINICAL INFORMATION: Malignant neoplasm of upper lobe, left bronchus or lung ([address] Adenocarcinoma of left lung ([address] . COMPARISON: CT chest 1/23/2019 TECHNIQUE: Radiopharmaceutical: 12.19 mCi F-18 FDG PET/CT was performed from the skull base to the mid thigh levels using routine PET acquisition. Injection site: Right antecubital space. Injection time 7:55 AM. Serum glucose: 201 mg/dL Image acquisition 8:58 AM FINDINGS: Neck: No FDG avid cervical lymphadenopathy. CHEST: There is mediastinal adenopathy. A left paratracheal node short axis measurement 1.2 cm SUV max of 2.11. Nonenlarged right paratracheal node SUV max of 1.3 nonenlarged left hilar node SUV max 1.8. There is fissural fluid in the left upper lobe portion of the major fissure. Study show some activity SUV max 1.3. The left lower lobe mass consistent with known malignancy currently measures 5.7 x 3.7 x 3.4 cm in cephalocaudal extent and transverse and AP dimensions respectively. SUV max is up to 5.1 image 114. There is a nodular density in the right lower lobe image 122 measuring 1.0 cm SUV max of 0.6. Anterior left basilar atelectasis.  Abdomen pelvis: Physiologic activity is seen in the liver, spleen, and genitourinary collecting system. There is no mesenteric, retroperitoneal pelvic or inguinal lymphadenopathy identified. Mild colonic diverticulosis. Prostamegaly. Normal-appearing bladder. No hypermetabolic osseous lesions to suggest osseous metastatic disease. 1. Known left lower lobe malignancy showing FDG avidity. 2. Mediastinal adenopathy with minimal activity above background suggesting possible reactive lymphadenopathy. Metastatic disease is not excludable. 3. Pleural fluid in the major fissure of the left upper lung. Minimal activity above background may indicate a malignant effusion. 4. No other evidence of metastatic disease, small nodule in the right lower lobe shows no significant activity above background.    Xr Chest Pa Inspiration 1 Vw    Lab Results   Component Value Date    WBC 8.2 05/02/2019    HGB 13.0 (L) 05/02/2019    HCT 39.4 (L) 05/02/2019    MCV 80 05/02/2019     05/02/2019       Chemistry        Component Value Date/Time     05/02/2019 0945     04/19/2019 0607    K 5.1 (H) 05/02/2019 0945    K 4.9 04/19/2019 0607     04/19/2019 0607    CO2 24 04/19/2019 0607    BUN 10 04/19/2019 0607    CREATININE 0.7 05/02/2019 0945    CREATININE 0.7 04/19/2019 0607        Component Value Date/Time    CALCIUM 9.0 04/19/2019 0607    ALKPHOS 102 05/02/2019 0945    AST 20 05/02/2019 0945    ALT 7 (L) 05/02/2019 0945    BILITOT 0.2 (L) 05/02/2019 0945           LUNG:    Procedure  Lobectomy    Specimen Laterality  Left    Tumor Site  Lower lobe of lung    Tumor Size  4.7 x 2.3 x 2.2 cm    Tumor Focality  Single tumor    Histologic Type  Adenocarcinoma with sarcomatoid features    Histologic Grade  G3: Poorly differentiated    Visceral Pleura Invasion  Present    Lymphovascular Invasion  Present    Direct Invasion of Adjacent Structures  Adjacent structures present but not involved  Parietal pleura    Margins  All margins are uninvolved by tumor  Margins examined: Bronchial, vascular, and parenchymal.    Treatment Effect  No known presurgical therapy    Regional Lymph Nodes  Number of Lymph Nodes Involved: 0  Number of Lymph Nodes Examined: 8    Pathologic Stage Classification (pTNM, AJCC 8th Edition)    Primary Tumor (pT)  pT2b: Tumor >4 cm, but <=5 cm in greatest dimension    Regional Lymph Nodes (pN)  pN0: No regional lymph node metastasis    Assessment/Plan:   1. Non-small cell lung cancer. 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 He developed hemoptysis that triggered further workup with CT scans showing  lobulated mass in the left lower lobe. Subsequent CT-guided lung biopsy on 02/12/2019 showed poorly differentiated pulmonary adenocarcinoma. The patient had PET scan that showed, in addition to known left lower lobe mass a mediastinal adenopathy with activity above the background. Differential diagnosis includes reactive lymphadenopathy versus metastatic disease. Patient had EBUS with transbronchial ultrasound guided mediastinal lymph node biopsy. It was negative. On March 14, 2019 he underwent  left lower lobe, lung lobectomy and lymph node sampling. His surgery showed pT2b,pN0 cancer   He is recovering from surgery nicely. He presents to the medical oncology clinic to discuss further treatment of his newly diagnosed stage II A lung cancer. His lung cancer had few poor prognostic features: Sarcomatoid features, high histologic grade, lymphovascular invasion, visceral pleural invasion. The patient is elderly and he has some comorbidities including  CAD, COPD, DM. However, he is still fully independent with instrumental activities of daily leaving. His ECOG performance status is 1. Next, we talk about adjuvant chemotherapy. The clinical trials utilizing cisplatin-based regimens demonstrated a survival advantage for patients with stage II disease. Although older adult patients have not been well represented in adjuvant trials, the available data suggest that fit older adults with resected NSCLC  derive as much benefit from adjuvant chemotherapy as a younger individual, with similar tolerance of toxicities. My recommendation is to proceed with combination of Alimta and cisplatin. Schedule and possible side effects of chemotherapy were reviewed with the patient. In preparation for chemotherapy the patient received prescription for folic acid was instruction to take one tablet daily. 2.  Pleural effusion. CXR performed today showed improvement in left-sided pleural effusion. 3.  Postsurgical pain. Oxycodone  5 mg dose does not control his pain well. Ttherefore I increased the dose to 7.5 mg every 8 hours when necessary. OARS prescription monitoring report for this patient was reviewed today. No signs of potential drug abuse or divergent fashion identified. Diagnosis Orders   1. S/P lobectomy of lung  XR CHEST STANDARD (2 VW)    XR CHEST DECUBITUS LEFT    oxyCODONE-acetaminophen (ENDOCET) 7.5-325 MG per tablet   2. Postoperative left upper quadrant abdominal pain  oxyCODONE-acetaminophen (ENDOCET) 7.5-325 MG per tablet   3. Pleural effusion on left     4. Adenocarcinoma, lung, left ([facility] [address]          Plan:   No follow-ups on file.      Orders Placed:   Orders Placed This Encounter   Procedures    XR CHEST STANDARD (2 VW)     Standing Status:   Future     Number of Occurrences:   1     Standing Expiration Date:   5/2/2020    XR CHEST DECUBITUS LEFT     Standing Status:   Future     Number of Occurrences:   1     Standing Expiration Date:   5/2/2020        Medications Prescribed:   Orders Placed This Encounter   Medications    folic acid (FOLVITE) 1 MG tablet     Sig: Take 1 tablet by mouth daily     Dispense:  30 tablet     Refill:  3    oxyCODONE-acetaminophen (ENDOCET) 7.5-325 MG per tablet     Sig: Take 1 tablet by mouth every 8 hours as needed for Pain for up to 30 days. Intended supply: 30 days     Dispense:  60 tablet     Refill:  0     Reduce doses taken as pain becomes manageable             I spent 40 minutes face-to-face with the patient and his family. Greater than 50% of time was spent on counseling and coordinating his care. Face to face counseling included prognosis, risks, benefits of treatment, compliance and risk reduction.

## 2019-05-02 NOTE — PATIENT INSTRUCTIONS
1.  RTC in one week to see me, for labs: CBC, BMP, LFTs and to start treatment with cisplatin and Alimta  2. Chemo teaching before RTC  3.   Description sent her to the pharmacy for folic acid 1 mg daily

## 2019-05-07 RX ORDER — SODIUM CHLORIDE 0.9 % (FLUSH) 0.9 %
20 SYRINGE (ML) INJECTION PRN
Status: CANCELLED | OUTPATIENT
Start: 2019-05-07

## 2019-05-07 RX ORDER — SODIUM CHLORIDE 0.9 % (FLUSH) 0.9 %
10 SYRINGE (ML) INJECTION PRN
Status: CANCELLED | OUTPATIENT
Start: 2019-05-07

## 2019-05-07 RX ORDER — HEPARIN SODIUM (PORCINE) LOCK FLUSH IV SOLN 100 UNIT/ML 100 UNIT/ML
500 SOLUTION INTRAVENOUS PRN
Status: CANCELLED | OUTPATIENT
Start: 2019-05-07

## 2019-05-08 ENCOUNTER — HOSPITAL ENCOUNTER (OUTPATIENT)
Dept: INFUSION THERAPY | Age: 69
Discharge: HOME | End: 2019-05-08
Payer: MEDICARE

## 2019-05-08 VITALS
HEART RATE: 78 BPM | TEMPERATURE: 98.1 F | SYSTOLIC BLOOD PRESSURE: 124 MMHG | DIASTOLIC BLOOD PRESSURE: 77 MMHG | RESPIRATION RATE: 18 BRPM | OXYGEN SATURATION: 97 %

## 2019-05-08 DIAGNOSIS — C34.32 MALIGNANT NEOPLASM OF LOWER LOBE OF LEFT LUNG (HCC): Primary | ICD-10-CM

## 2019-05-08 PROCEDURE — 99212 OFFICE O/P EST SF 10 MIN: CPT

## 2019-05-08 RX ORDER — HEPARIN SODIUM (PORCINE) LOCK FLUSH IV SOLN 100 UNIT/ML 100 UNIT/ML
500 SOLUTION INTRAVENOUS PRN
Status: CANCELLED | OUTPATIENT
Start: 2019-05-09

## 2019-05-08 RX ORDER — 0.9 % SODIUM CHLORIDE 0.9 %
10 VIAL (ML) INJECTION ONCE
Status: CANCELLED | OUTPATIENT
Start: 2019-05-09

## 2019-05-08 RX ORDER — SODIUM CHLORIDE 0.9 % (FLUSH) 0.9 %
10 SYRINGE (ML) INJECTION PRN
Status: CANCELLED | OUTPATIENT
Start: 2019-05-09

## 2019-05-08 RX ORDER — METHYLPREDNISOLONE SODIUM SUCCINATE 125 MG/2ML
125 INJECTION, POWDER, LYOPHILIZED, FOR SOLUTION INTRAMUSCULAR; INTRAVENOUS ONCE
Status: CANCELLED | OUTPATIENT
Start: 2019-05-09

## 2019-05-08 RX ORDER — DIPHENHYDRAMINE HYDROCHLORIDE 50 MG/ML
50 INJECTION INTRAMUSCULAR; INTRAVENOUS ONCE
Status: CANCELLED | OUTPATIENT
Start: 2019-05-09

## 2019-05-08 RX ORDER — SODIUM CHLORIDE 0.9 % (FLUSH) 0.9 %
5 SYRINGE (ML) INJECTION PRN
Status: CANCELLED | OUTPATIENT
Start: 2019-05-09

## 2019-05-08 RX ORDER — CYANOCOBALAMIN 1000 UG/ML
1000 INJECTION INTRAMUSCULAR; SUBCUTANEOUS ONCE
Status: CANCELLED | OUTPATIENT
Start: 2019-05-08

## 2019-05-08 RX ORDER — SODIUM CHLORIDE 9 MG/ML
INJECTION, SOLUTION INTRAVENOUS CONTINUOUS
Status: CANCELLED | OUTPATIENT
Start: 2019-05-09

## 2019-05-08 ASSESSMENT — PAIN DESCRIPTION - DESCRIPTORS
DESCRIPTORS: PRESSURE;STABBING
DESCRIPTORS_2: DISCOMFORT;CONSTANT

## 2019-05-08 ASSESSMENT — PAIN DESCRIPTION - PROGRESSION
CLINICAL_PROGRESSION_2: NOT CHANGED
CLINICAL_PROGRESSION: NOT CHANGED

## 2019-05-08 ASSESSMENT — PAIN DESCRIPTION - LOCATION
LOCATION: ABDOMEN
LOCATION_2: BACK

## 2019-05-08 ASSESSMENT — PAIN DESCRIPTION - FREQUENCY: FREQUENCY: CONTINUOUS

## 2019-05-08 ASSESSMENT — PAIN DESCRIPTION - PAIN TYPE: TYPE: CHRONIC PAIN

## 2019-05-08 ASSESSMENT — PAIN DESCRIPTION - ORIENTATION
ORIENTATION_2: LEFT;UPPER
ORIENTATION: LEFT

## 2019-05-08 ASSESSMENT — PAIN DESCRIPTION - ONSET
ONSET_2: ON-GOING
ONSET: ON-GOING

## 2019-05-08 ASSESSMENT — PAIN DESCRIPTION - INTENSITY: RATING_2: 4

## 2019-05-08 ASSESSMENT — PAIN SCALES - GENERAL: PAINLEVEL_OUTOF10: 6

## 2019-05-08 ASSESSMENT — PAIN DESCRIPTION - DURATION: DURATION_2: CONTINUOUS

## 2019-05-08 NOTE — PROGRESS NOTES
Patient and spouse instructed on chemotherapy specifically the drugs Alimta/Cisplatin along with pre meds and hydration. The [employer] folder along with the following - chemotherapy drug information sheets,chemotherapy side effects handouts,Understanding your blood counts, [place] diet,Importance to hydration,when to call physician sheet,reduce risk infection sheet,bleeding precaution,neutropenia precaution,Chemotherapy and You and Eating Hints books was included and reviewed. All questions answered satisfactory and support given. . Patient navigator explained to patient and informed that she may be calling him if he needs assistance. Patient given a tour of facility . Approximately 60 minutes spent with patient and spouse. Discharged in satisfactory condition, patient to return in AM to start Cycle #1.

## 2019-05-08 NOTE — PLAN OF CARE
Problem: Pain:  Goal: Control of chronic pain  Description  Control of chronic pain  Outcome: Met This Shift     Problem: Intellectual/Education/Knowledge Deficit  Goal: Teaching initiated upon admission  Outcome: Met This Shift  Intervention: Verbal/written education provided  Note:   Initial chemotherapy teaching completed, patient verbalizes understanding of information received and when to call doctor if needed     Problem: Discharge Planning  Goal: Knowledge of discharge instructions  Description  Knowledge of discharge instructions     Outcome: Met This Shift  Intervention: Interaction with patient/family and care team  Note:   Patient verbalizes understanding of discharge instructions, follow up appointment, and when to call physician if needed     Care plan reviewed with patient. Patient verbalizes understanding of the plan of care and contributes to goal setting.

## 2019-05-09 ENCOUNTER — HOSPITAL ENCOUNTER (OUTPATIENT)
Dept: INFUSION THERAPY | Age: 69
Discharge: HOME | End: 2019-05-09
Payer: MEDICARE

## 2019-05-09 ENCOUNTER — OFFICE VISIT (OUTPATIENT)
Dept: ONCOLOGY | Age: 69
End: 2019-05-09
Payer: MEDICARE

## 2019-05-09 VITALS
BODY MASS INDEX: 27.59 KG/M2 | OXYGEN SATURATION: 98 % | RESPIRATION RATE: 18 BRPM | HEIGHT: 67 IN | TEMPERATURE: 98.1 F | HEART RATE: 87 BPM | DIASTOLIC BLOOD PRESSURE: 80 MMHG | SYSTOLIC BLOOD PRESSURE: 132 MMHG | WEIGHT: 175.8 LBS

## 2019-05-09 VITALS
HEART RATE: 87 BPM | HEIGHT: 68 IN | BODY MASS INDEX: 26.64 KG/M2 | TEMPERATURE: 97.8 F | SYSTOLIC BLOOD PRESSURE: 132 MMHG | OXYGEN SATURATION: 98 % | WEIGHT: 175.8 LBS | DIASTOLIC BLOOD PRESSURE: 77 MMHG | RESPIRATION RATE: 18 BRPM

## 2019-05-09 DIAGNOSIS — C34.32 MALIGNANT NEOPLASM OF LOWER LOBE OF LEFT LUNG (HCC): Primary | ICD-10-CM

## 2019-05-09 DIAGNOSIS — Z90.2 S/P LOBECTOMY OF LUNG: ICD-10-CM

## 2019-05-09 DIAGNOSIS — Z51.11 ENCOUNTER FOR CHEMOTHERAPY MANAGEMENT: ICD-10-CM

## 2019-05-09 DIAGNOSIS — C34.92 ADENOCARCINOMA OF LEFT LUNG (HCC): ICD-10-CM

## 2019-05-09 DIAGNOSIS — R10.12 POSTOPERATIVE LEFT UPPER QUADRANT ABDOMINAL PAIN: ICD-10-CM

## 2019-05-09 DIAGNOSIS — G89.18 POSTOPERATIVE LEFT UPPER QUADRANT ABDOMINAL PAIN: ICD-10-CM

## 2019-05-09 LAB
ALBUMIN SERPL-MCNC: 3.7 G/DL (ref 3.5–5.1)
ALP BLD-CCNC: 107 U/L (ref 38–126)
ALT SERPL-CCNC: 7 U/L (ref 11–66)
AST SERPL-CCNC: 15 U/L (ref 5–40)
BILIRUB SERPL-MCNC: 0.2 MG/DL (ref 0.3–1.2)
BILIRUBIN DIRECT: < 0.2 MG/DL (ref 0–0.3)
BUN, WHOLE BLOOD: 13 MG/DL (ref 8–26)
CHLORIDE, WHOLE BLOOD: 103 MEQ/L (ref 98–109)
CREATININE, WHOLE BLOOD: 0.7 MG/DL (ref 0.5–1.2)
GFR, ESTIMATED: > 90 ML/MIN/1.73M2
GLUCOSE, WHOLE BLOOD: 198 MG/DL (ref 70–108)
HCT VFR BLD CALC: 37.6 % (ref 42–52)
HEMOGLOBIN: 12.8 GM/DL (ref 14–18)
IONIZED CALCIUM, WHOLE BLOOD: 1.25 MMOL/L (ref 1.12–1.32)
MCH RBC QN AUTO: 27.1 PG (ref 27–31)
MCHC RBC AUTO-ENTMCNC: 33.9 GM/DL (ref 33–37)
MCV RBC AUTO: 80 FL (ref 80–94)
PDW BLD-RTO: 12.7 % (ref 11.5–14.5)
PLATELET # BLD: 203 THOU/MM3 (ref 130–400)
PMV BLD AUTO: 6.8 FL (ref 7.4–10.4)
POTASSIUM, WHOLE BLOOD: 4.2 MEQ/L (ref 3.5–4.9)
RBC # BLD: 4.7 MILL/MM3 (ref 4.7–6.1)
SEG NEUTROPHILS: 70 % (ref 43–75)
SEGMENTED NEUTROPHILS ABSOLUTE COUNT: 5.6 THOU/MM3 (ref 1.8–7.7)
SODIUM, WHOLE BLOOD: 141 MEQ/L (ref 138–146)
TOTAL CO2, WHOLE BLOOD: 26 MEQ/L (ref 23–33)
TOTAL PROTEIN: 6.8 G/DL (ref 6.1–8)
WBC # BLD: 8 THOU/MM3 (ref 4.8–10.8)

## 2019-05-09 PROCEDURE — 96367 TX/PROPH/DG ADDL SEQ IV INF: CPT

## 2019-05-09 PROCEDURE — G8427 DOCREV CUR MEDS BY ELIG CLIN: HCPCS | Performed by: INTERNAL MEDICINE

## 2019-05-09 PROCEDURE — 96413 CHEMO IV INFUSION 1 HR: CPT

## 2019-05-09 PROCEDURE — G0463 HOSPITAL OUTPT CLINIC VISIT: HCPCS

## 2019-05-09 PROCEDURE — 6360000002 HC RX W HCPCS: Performed by: INTERNAL MEDICINE

## 2019-05-09 PROCEDURE — 2580000003 HC RX 258: Performed by: INTERNAL MEDICINE

## 2019-05-09 PROCEDURE — 2709999900 HC NON-CHARGEABLE SUPPLY

## 2019-05-09 PROCEDURE — 3017F COLORECTAL CA SCREEN DOC REV: CPT | Performed by: INTERNAL MEDICINE

## 2019-05-09 PROCEDURE — 1036F TOBACCO NON-USER: CPT | Performed by: INTERNAL MEDICINE

## 2019-05-09 PROCEDURE — 1123F ACP DISCUSS/DSCN MKR DOCD: CPT | Performed by: INTERNAL MEDICINE

## 2019-05-09 PROCEDURE — 85027 COMPLETE CBC AUTOMATED: CPT

## 2019-05-09 PROCEDURE — 96415 CHEMO IV INFUSION ADDL HR: CPT

## 2019-05-09 PROCEDURE — 36415 COLL VENOUS BLD VENIPUNCTURE: CPT

## 2019-05-09 PROCEDURE — 80047 BASIC METABLC PNL IONIZED CA: CPT

## 2019-05-09 PROCEDURE — 96372 THER/PROPH/DIAG INJ SC/IM: CPT

## 2019-05-09 PROCEDURE — 4040F PNEUMOC VAC/ADMIN/RCVD: CPT | Performed by: INTERNAL MEDICINE

## 2019-05-09 PROCEDURE — 99214 OFFICE O/P EST MOD 30 MIN: CPT | Performed by: INTERNAL MEDICINE

## 2019-05-09 PROCEDURE — 96411 CHEMO IV PUSH ADDL DRUG: CPT

## 2019-05-09 PROCEDURE — G8419 CALC BMI OUT NRM PARAM NOF/U: HCPCS | Performed by: INTERNAL MEDICINE

## 2019-05-09 PROCEDURE — 96375 TX/PRO/DX INJ NEW DRUG ADDON: CPT

## 2019-05-09 PROCEDURE — 96366 THER/PROPH/DIAG IV INF ADDON: CPT

## 2019-05-09 PROCEDURE — 80076 HEPATIC FUNCTION PANEL: CPT

## 2019-05-09 PROCEDURE — G8598 ASA/ANTIPLAT THER USED: HCPCS | Performed by: INTERNAL MEDICINE

## 2019-05-09 RX ORDER — PALONOSETRON 0.05 MG/ML
0.25 INJECTION, SOLUTION INTRAVENOUS ONCE
Status: COMPLETED | OUTPATIENT
Start: 2019-05-09 | End: 2019-05-09

## 2019-05-09 RX ORDER — DEXAMETHASONE 4 MG/1
8 TABLET ORAL
Qty: 6 TABLET | Refills: 2 | Status: SHIPPED | OUTPATIENT
Start: 2019-05-10 | End: 2019-05-30 | Stop reason: SDUPTHER

## 2019-05-09 RX ORDER — PROCHLORPERAZINE MALEATE 5 MG/1
5 TABLET ORAL EVERY 6 HOURS PRN
Qty: 60 TABLET | Refills: 1 | Status: SHIPPED | OUTPATIENT
Start: 2019-05-09 | End: 2019-05-30 | Stop reason: ALTCHOICE

## 2019-05-09 RX ORDER — CYANOCOBALAMIN 1000 UG/ML
1000 INJECTION INTRAMUSCULAR; SUBCUTANEOUS ONCE
Status: COMPLETED | OUTPATIENT
Start: 2019-05-09 | End: 2019-05-09

## 2019-05-09 RX ORDER — SODIUM CHLORIDE 9 MG/ML
20 INJECTION, SOLUTION INTRAVENOUS ONCE
Status: COMPLETED | OUTPATIENT
Start: 2019-05-09 | End: 2019-05-09

## 2019-05-09 RX ADMIN — SODIUM CHLORIDE 970 MG: 9 INJECTION, SOLUTION INTRAVENOUS at 11:55

## 2019-05-09 RX ADMIN — PALONOSETRON 0.25 MG: 0.05 INJECTION, SOLUTION INTRAVENOUS at 10:47

## 2019-05-09 RX ADMIN — POTASSIUM CHLORIDE: 2 INJECTION, SOLUTION, CONCENTRATE INTRAVENOUS at 09:45

## 2019-05-09 RX ADMIN — SODIUM CHLORIDE 20 ML/HR: 9 INJECTION, SOLUTION INTRAVENOUS at 09:39

## 2019-05-09 RX ADMIN — SODIUM CHLORIDE 150 MG: 9 INJECTION, SOLUTION INTRAVENOUS at 11:10

## 2019-05-09 RX ADMIN — CYANOCOBALAMIN 1000 MCG: 1000 INJECTION, SOLUTION INTRAMUSCULAR; SUBCUTANEOUS at 11:45

## 2019-05-09 RX ADMIN — POTASSIUM CHLORIDE: 2 INJECTION, SOLUTION, CONCENTRATE INTRAVENOUS at 15:22

## 2019-05-09 RX ADMIN — DEXAMETHASONE SODIUM PHOSPHATE 12 MG: 4 INJECTION, SOLUTION INTRA-ARTICULAR; INTRALESIONAL; INTRAMUSCULAR; INTRAVENOUS; SOFT TISSUE at 10:50

## 2019-05-09 RX ADMIN — MANNITOL: 250 INJECTION, SOLUTION INTRAVENOUS at 12:20

## 2019-05-09 ASSESSMENT — ENCOUNTER SYMPTOMS
VOMITING: 0
COLOR CHANGE: 0
ABDOMINAL PAIN: 0
TROUBLE SWALLOWING: 0
BLOOD IN STOOL: 0
CONSTIPATION: 0
CHEST TIGHTNESS: 0
WHEEZING: 0
DIARRHEA: 0
COUGH: 1
ABDOMINAL DISTENTION: 0
NAUSEA: 0
RECTAL PAIN: 0
BACK PAIN: 0
SHORTNESS OF BREATH: 1
FACIAL SWELLING: 0
SORE THROAT: 0
EYE DISCHARGE: 0

## 2019-05-09 ASSESSMENT — PAIN DESCRIPTION - FREQUENCY: FREQUENCY: INTERMITTENT

## 2019-05-09 ASSESSMENT — PAIN DESCRIPTION - PAIN TYPE: TYPE: CHRONIC PAIN

## 2019-05-09 ASSESSMENT — PAIN DESCRIPTION - DESCRIPTORS: DESCRIPTORS: ACHING

## 2019-05-09 ASSESSMENT — PAIN SCALES - GENERAL: PAINLEVEL_OUTOF10: 4

## 2019-05-09 ASSESSMENT — PAIN DESCRIPTION - PROGRESSION: CLINICAL_PROGRESSION: NOT CHANGED

## 2019-05-09 ASSESSMENT — PAIN DESCRIPTION - LOCATION: LOCATION: ABDOMEN

## 2019-05-09 NOTE — PROGRESS NOTES
Patient assessed for the following post chemotherapy:    Dizziness   No  Lightheadedness  No     Acute nausea/vomiting No  Headache   No  Chest pain/pressure  No  Rash/itching   No  Shortness of breath  No    Patient kept for 20 minutes observation post infusion chemotherapy. Patient tolerated chemotherapy treatment alimta and cisplatin without any complications. Last vital signs:   /80   Pulse 87   Temp 98.1 °F (36.7 °C) (Oral)   Resp 18   Ht 5' 7\" (1.702 m)   Wt 175 lb 12.8 oz (79.7 kg)   SpO2 98%   BMI 27.53 kg/m²     Patient instructed if experience any of the above symptoms following today's infusion,he/she is to notify MD immediately or go to the emergency department. Discharge instructions given to patient. Verbalizes understanding. Ambulated off unit per self with family with belongings.

## 2019-05-09 NOTE — ONCOLOGY
Chemotherapy Administration    Pre-assessment Data: Antineoplastic Agents  Other:   See toxicity flow sheet for assessment [x]     Physician Notification of Concerns Related to Chemotherapy Administration:   Physician Notified Eliezer Late / Time of Notification Dr Karolyn Pack seen today.      Interventions:   Lab work assessed  [x]   Height / Weight verified for dose [x]   Current MAR reviewed [x]   Emergency drugs available as appropriate [x]   Anaphylaxis assessment completed [x]   Pre-medications administered as ordered [x]   Blood return noted upon initiation of chemotherapy [x]   Blood return noted each 1-2ml of a vesicant medication if given IV push []   Blood return noted each 2-3ml of a non-vesicant medication if given IV push []   Monitor for signs / symptoms of hypersensitivity reaction [x]   Chemotherapy orders (drug/dose/rate) verified by 2 Chemo certified RNs [x]   Monitor IV site and blood return throughout the infusion of the medication [x]   Document IV site checks on the IV assessment form [x]   Document chemotherapy teaching on the Patient Education tab [x]   Document patient verbalizes understanding of medications being administered [x]   If IV infiltration, see ONS Guidelines [x]   Other:   alimta and cisplatin                         []

## 2019-05-09 NOTE — PLAN OF CARE
Problem: Pain:  Goal: Control of chronic pain  Description  Control of chronic pain  Outcome: Met This Shift  Note:   Patient rating pain a 4 out of 10 using MARY scale, Pain located in left lower abdomen. Intervention: Opioid analgesia side-effects  Note:   Patient encouraged to take prescribed pain medications and call Physician if pain is not controlled. Problem: Intellectual/Education/Knowledge Deficit  Goal: Teaching initiated upon admission  Outcome: Met This Shift  Note:   Patient verbalizes understanding to verbal information given on alimta and cisplatin,action and possible side effects. Aware to call MD if develop complications. Intervention: Verbal/written education provided  Note:   Chemotherapy Teaching     What is Chemotherapy   Drug action ? Method of Administration ? Handouts given ? Side Effects  Nausea/vomiting ? Diarrhea ? Fatigue ? Signs / Symptoms of infection ? Neutropenia ? Thrombocytopenia ? Alopecia ? neuropathy ? [place] diet &  the importance of fluids ? Micellaneous  Importance of nutrition ? Importance of oral hygiene ? When to call the MD ?   Monitoring labs ? Use of supportive services ? Explanation of Drug Regimen / Frequency  Alimta and cisplatin day#1 cycle#1     Comments  Verbalized understanding to drug,action,side effects and when to call MD         Problem: Discharge Planning  Goal: Knowledge of discharge instructions  Description  Knowledge of discharge instructions     Outcome: Met This Shift  Note:   Verbalize understanding of discharge instructions, follow up appointments, and when to call Physician. Intervention: Interaction with patient/family and care team  Note:   Provide discharge instructions. Problem: Musculor/Skeletal Functional Status  Goal: Absence of falls  Outcome: Met This Shift  Note:   Free from falls while in O.P. Oncology.    Intervention: Provide standby assistance  Note:   Discussed the need to use the call light for assistance when getting up to ambulate. Call light within reach. Care plan reviewed with patient and family. Patient and family verbalize understanding of the plan of care and contribute to goal setting.

## 2019-05-09 NOTE — PROGRESS NOTES
[facility], [address] [facility] [address]  [address]  Dept: [phone]  Dept Fax: [phone]: [phone]    Visit Date:5/9/2019     Beatriz Sainz is a 71 y.o. male who presents today for:   Chief Complaint   Patient presents with    Follow-up     ADENOCARCINOMA OF LEFT LUNG        HPI:   The patient developed hemoptysis beginning of January 2019. Chest x-ray on January 14, 2019 showed a developing retrocardiac infiltrate. Patient was referred to the pulmonologist Surendra Blue who ordered CT of the chest.  It was performed on January 23, 2019 and showed:  a lobulated mass in the left lower lobe possibly two separate masses is left lower lobe this is a lobulated elongated and measures up to six 5.9 cm in length 2.5 cm in width spiculated margins are present and adjacent airspace disease. Lobulated    8mm nodule lateral aspect right lower lobe image 42   No effusions are seen. Upper abdomen   No adrenal masses. Simple cyst the left kidney. No suspicious bone lesions   Patient underwent bronchoscopy with transbronchial biopsy and BAL on January 25, 2019. They showed no endobronchial lesions and benign bronchial epithelium, alveolar macrophages, and scattered inflammatory cells, respectively. No malignant cells seen. Subsequently on February 12, 2019 the patient had CT-guided lung biopsy which showed poorly differentiated pulmonary adenocarcinoma. The patient had PET scan on February 25, 2019 that showed:  1. Known left lower lobe malignancy showing FDG avidity. 2. Mediastinal adenopathy with minimal activity above background suggesting possible reactive lymphadenopathy. Metastatic disease is not excludable. 3. Pleural fluid in the major fissure of the left upper lung. Minimal activity above background may indicate a malignant effusion. 4. No other evidence of metastatic disease, small nodule in the right lower lobe shows no significant activity above background. Patient had EBUS with transbronchial ultrasound guided mediastinal lymph node biopsy. The mediastinal lymph node biopsy was  Negative. Therefore the patient met with the thoracic surgeon Dr. Kelley Polanco and after discussing his surgical treatment options, on March 14, 2019 he underwent left lower lobe, lung lobectomy: .  Final pathology report showed:  A. 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   Lymph nodes (0/3): Negative for malignancy. B. Tissue designated lymph node, station 9, excision:   Benign fibroadipose tissue. C. Lymph node, station 10 L, excision:   Negative for malignancy (0/1). D. Lymph nodes, station 6, excision:   Negative for malignancy (0/3). E. Lymph node, station 7, excision:   Negative for malignancy (0/1). F. Tissue designated lymph node, station 8, excision:   Benign fibroadipose tissue. The patient was hospitalized from April 18, 2019 till April 20, 2019 for atypical chest pain, left pleural exudative effusion. He underwent thoracentesis. Cytology was negative for malignant cells. Troponins were negative. No ecg changes. Interim history on May 9, 2019: Today the patient presents to the medical oncology clinic to start adjuvant chemotherapy. 1 week ago the patient started taking folic acid today  His chest pain is resolved. However he continues having pain in post surgical site which is gradually improving. His shortness of breath is stable. He denies having any fevers.   Remaining 12 point review of system is negative  HPI   Past Medical History:   Diagnosis Date    Anxiety     Arthritis     Cancer ([facility] [address] 01/2019    left lung stage 2    COPD (chronic obstructive pulmonary disease) ([facility] [address] 02/2019    Enlarged prostate with urinary retention     Gait difficulty     Generalized weakness     Hyperlipidemia     Hypertension     Lesion of bladder     Osteoarthritis     Retention of urine     S/P cardiac catheterization 2019    stent to circumflex per Dr. Celestina Covarrubias S/P TURP     SOB (shortness of breath)     With activity    Voiding difficulty       Past Surgical History:   Procedure Laterality Date    APPENDECTOMY      BRONCHOSCOPY N/A 2019    BRONCHOSCOPY performed by Omayra Swift MD at [address] [place] [address]  2019    BRONCHOSCOPY/TRANSBRONCHIAL LUNG BIOPSY performed by Omayra Swift MD at [address] [place] [address] N/A 3/1/2019    BRONCHOSCOPY W/EBUS FNA performed by Omayra Swift MD at [facility]  2019    COLONOSCOPY  [id number]    PROSTATE SURGERY  2012    TURP    THORACOTOMY Left 3/14/2019    LEFT EXPLORATORY THORACOTOMY, MEDIASTINAL LYMPH NODE DISECTION, FLEXIBLE BRONCHOSCOPY performed by Azael Camargo MD at [address] [place] [address] History   Problem Relation Age of Onset    Diabetes Mother     High Blood Pressure Mother     Brain Cancer Mother     Heart Disease Father     Diabetes Father     High Blood Pressure Father     Heart Attack Father     Diabetes Sister     High Blood Pressure Sister     COPD Sister         Agent Orange    Diabetes Brother     High Blood Pressure Brother       Social History     Tobacco Use    Smoking status: Former Smoker     Packs/day: 1.00     Years: 40.00     Pack years: 40.00     Types: Cigarettes     Start date:      Last attempt to quit: 2019     Years since quittin.1    Smokeless tobacco: Never Used    Tobacco comment: about 5 cigs per day   Substance Use Topics    Alcohol use: No      Current Outpatient Medications   Medication Sig Dispense Refill    [START ON 5/10/2019] dexamethasone (DECADRON) 4 MG tablet Take 2 tablets by mouth daily (with breakfast) for 3 doses 6 tablet 2    prochlorperazine (COMPAZINE) 5 MG tablet Take 1 tablet by mouth every 6 hours as needed for Nausea 60 tablet 1    folic acid (FOLVITE) 1 MG tablet Take 1 tablet by mouth daily 30 tablet 3    oxyCODONE-acetaminophen (ENDOCET) 7.5-325 MG per tablet Take 1 tablet by mouth every 8 hours as needed for Pain for up to 30 days. Intended supply: 30 days 60 tablet 0    amLODIPine (NORVASC) 10 MG tablet Take 10 mg by mouth daily      atorvastatin (LIPITOR) 40 MG tablet Take 40 mg by mouth daily      senna (SENOKOT) 8.6 MG TABS tablet Take 1 tablet by mouth daily      metFORMIN (GLUCOPHAGE) 500 MG tablet Take 500 mg by mouth 2 times daily (with meals) Take 1 tab by mouth at supper x2 weeks, then 1 tab in the morning and 1 tab at supper x2 weeks, then 1 tab in the morning and 2 tab at supper x2weeks, then 2 tabs twice a day      aspirin 81 MG chewable tablet Take 1 tablet by mouth daily 30 tablet 3    nitroGLYCERIN (NITROSTAT) 0.4 MG SL tablet up to max of 3 total doses. If no relief after 1 dose, call 911. 25 tablet 3    clopidogrel (PLAVIX) 75 MG tablet Take 1 tablet by mouth daily 30 tablet 3    omeprazole (PRILOSEC) 20 MG delayed release capsule Take 20 mg by mouth daily      lisinopril (PRINIVIL;ZESTRIL) 40 MG tablet Take 40 mg by mouth daily.  dutasteride (AVODART) 0.5 MG capsule Take 0.5 mg by mouth daily. No current facility-administered medications for this visit.        No Known Allergies   Health Maintenance   Topic Date Due    AAA screen  1950    Hepatitis C screen  1950    Diabetic foot exam  03/25/1960    Diabetic retinal exam  03/25/1960    Diabetic microalbuminuria test  03/25/1968    Shingles Vaccine (1 of 2) 03/25/2000    Pneumococcal 65+ years Vaccine (1 of 2 - PCV13) 03/25/2015    Flu vaccine (Season Ended) 09/01/2019    Lipid screen  04/08/2020    A1C test (Diabetic or Prediabetic)  04/18/2020    Potassium monitoring  04/19/2020    Creatinine monitoring  04/19/2020    DTaP/Tdap/Td vaccine (2 - Td) 05/25/2023    Colon cancer screen colonoscopy  01/05/2027        Subjective:   Review of Systems Constitutional: Negative for activity change, appetite change, fatigue and fever. HENT: Negative for congestion, dental problem, facial swelling, hearing loss, mouth sores, nosebleeds, sore throat, tinnitus and trouble swallowing. Eyes: Negative for discharge and visual disturbance. Respiratory: Positive for cough and shortness of breath. Negative for chest tightness and wheezing. Cardiovascular: Negative for chest pain, palpitations and leg swelling. Gastrointestinal: Negative for abdominal distention, abdominal pain, blood in stool, constipation, diarrhea, nausea, rectal pain and vomiting. Endocrine: Negative for cold intolerance, polydipsia and polyuria. Genitourinary: Negative for decreased urine volume, difficulty urinating, dysuria, flank pain, hematuria and urgency. Musculoskeletal: Negative for arthralgias, back pain, gait problem, joint swelling, myalgias and neck stiffness. Skin: Negative for color change, rash and wound. Neurological: Negative for dizziness, tremors, seizures, speech difficulty, weakness, light-headedness, numbness and headaches. Hematological: Negative for adenopathy. Does not bruise/bleed easily. Psychiatric/Behavioral: Negative for confusion and sleep disturbance. The patient is not nervous/anxious. Objective:   Physical Exam   Constitutional: He is oriented to person, place, and time. He appears well-developed and well-nourished. No distress. HENT:   Head: Normocephalic. Mouth/Throat: Oropharynx is clear and moist. No oropharyngeal exudate. Eyes: Pupils are equal, round, and reactive to light. EOM are normal. Right eye exhibits no discharge. Left eye exhibits no discharge. No scleral icterus. Neck: Normal range of motion. Neck supple. No JVD present. No tracheal deviation present. No thyromegaly present. Cardiovascular: Normal rate and normal heart sounds. Exam reveals no gallop and no friction rub. No murmur heard.   Pulmonary/Chest: Effort normal and breath sounds normal. No stridor. No respiratory distress. He has no wheezes. He has no rales. He exhibits no tenderness. Abdominal: Soft. Bowel sounds are normal. He exhibits no distension and no mass. There is no tenderness. There is no rebound. Musculoskeletal: Normal range of motion. He exhibits no edema. Good range of motion in all four extremities. Lymphadenopathy:     He has no cervical adenopathy. Neurological: He is alert and oriented to person, place, and time. He has normal reflexes. No cranial nerve deficit. He exhibits normal muscle tone. Skin: Skin is warm. No rash noted. No erythema. Psychiatric: He has a normal mood and affect. His behavior is normal. Judgment and thought content normal.   Vitals reviewed. /77 (Site: Left Upper Arm, Position: Sitting, Cuff Size: Medium Adult)   Pulse 87   Temp 97.8 °F (36.6 °C) (Oral)   Resp 18   Ht 5' 7.99\" (1.727 m)   Wt 175 lb 12.8 oz (79.7 kg)   SpO2 98%   BMI 26.74 kg/m²      ECOG status is 1. Imaging studies and labs:     Pet Ct Skull Base To Mid Thigh  Result Date: 2/25/2019  PROCEDURE: PET CT SKULL BASE TO MID THIGH CLINICAL INFORMATION: Malignant neoplasm of upper lobe, left bronchus or lung ([address] Adenocarcinoma of left lung ([address] . COMPARISON: CT chest 1/23/2019 TECHNIQUE: Radiopharmaceutical: 12.19 mCi F-18 FDG PET/CT was performed from the skull base to the mid thigh levels using routine PET acquisition. Injection site: Right antecubital space. Injection time 7:55 AM. Serum glucose: 201 mg/dL Image acquisition 8:58 AM FINDINGS: Neck: No FDG avid cervical lymphadenopathy. CHEST: There is mediastinal adenopathy. A left paratracheal node short axis measurement 1.2 cm SUV max of 2.11. Nonenlarged right paratracheal node SUV max of 1.3 nonenlarged left hilar node SUV max 1.8. There is fissural fluid in the left upper lobe portion of the major fissure. Study show some activity SUV max 1.3.  The left lower lobe mass consistent with known malignancy currently measures 5.7 x 3.7 x 3.4 cm in cephalocaudal extent and transverse and AP dimensions respectively. SUV max is up to 5.1 image 114. There is a nodular density in the right lower lobe image 122 measuring 1.0 cm SUV max of 0.6. Anterior left basilar atelectasis. Abdomen pelvis: Physiologic activity is seen in the liver, spleen, and genitourinary collecting system. There is no mesenteric, retroperitoneal pelvic or inguinal lymphadenopathy identified. Mild colonic diverticulosis. Prostamegaly. Normal-appearing bladder. No hypermetabolic osseous lesions to suggest osseous metastatic disease. 1. Known left lower lobe malignancy showing FDG avidity. 2. Mediastinal adenopathy with minimal activity above background suggesting possible reactive lymphadenopathy. Metastatic disease is not excludable. 3. Pleural fluid in the major fissure of the left upper lung. Minimal activity above background may indicate a malignant effusion. 4. No other evidence of metastatic disease, small nodule in the right lower lobe shows no significant activity above background.    Xr Chest Pa Inspiration 1 Vw    Lab Results   Component Value Date    WBC 8.0 05/09/2019    HGB 12.8 (L) 05/09/2019    HCT 37.6 (L) 05/09/2019    MCV 80 05/09/2019     05/09/2019       Chemistry        Component Value Date/Time     05/09/2019 0828     04/19/2019 0607    K 4.2 05/09/2019 0828    K 4.9 04/19/2019 0607     04/19/2019 0607    CO2 24 04/19/2019 0607    BUN 10 04/19/2019 0607    CREATININE 0.7 05/09/2019 0828    CREATININE 0.7 04/19/2019 0607        Component Value Date/Time    CALCIUM 9.0 04/19/2019 0607    ALKPHOS 107 05/09/2019 0828    AST 15 05/09/2019 0828    ALT 7 (L) 05/09/2019 0828    BILITOT 0.2 (L) 05/09/2019 0828           LUNG:    Procedure  Lobectomy    Specimen Laterality  Left    Tumor Site  Lower lobe of lung    Tumor Size  4.7 x 2.3 x 2.2 cm    Tumor Focality  Single tumor    Histologic Type  Adenocarcinoma with sarcomatoid features    Histologic Grade  G3: Poorly differentiated    Visceral Pleura Invasion  Present    Lymphovascular Invasion  Present    Direct Invasion of Adjacent Structures  Adjacent structures present but not involved  Parietal pleura    Margins  All margins are uninvolved by tumor  Margins examined: Bronchial, vascular, and parenchymal.    Treatment Effect  No known presurgical therapy    Regional Lymph Nodes  Number of Lymph Nodes Involved: 0  Number of Lymph Nodes Examined: 8    Pathologic Stage Classification (pTNM, AJCC 8th Edition)    Primary Tumor (pT)  pT2b: Tumor >4 cm, but <=5 cm in greatest dimension    Regional Lymph Nodes (pN)  pN0: No regional lymph node metastasis    Assessment/Plan:   1. Non-small cell lung cancer. 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 He developed hemoptysis that triggered further workup with CT scans showing  lobulated mass in the left lower lobe. Subsequent CT-guided lung biopsy on 02/12/2019 showed poorly differentiated pulmonary adenocarcinoma. The patient had PET scan that showed, in addition to known left lower lobe mass a mediastinal adenopathy with activity above the background. Differential diagnosis includes reactive lymphadenopathy versus metastatic disease. Patient had EBUS with transbronchial ultrasound guided mediastinal lymph node biopsy. It was negative. On March 14, 2019 he underwent  left lower lobe, lung lobectomy and lymph node sampling. His surgery showed pT2b,pN0 cancer   He is recovering from surgery nicely. He presents to the medical oncology clinic to discuss further treatment of his newly diagnosed stage II A lung cancer.   His lung cancer had few poor prognostic features: Sarcomatoid features, high histologic grade, lymphovascular invasion, visceral pleural invasion. The patient is elderly and he has some comorbidities including  CAD, COPD, DM. However, he is still fully independent with instrumental activities of daily leaving. His ECOG performance status is 1.   2. Adjuvant chemotherapy. The patient has recovered from the surgery. He has well controlled and improving postsurgical pain. His vital signs are stable, his labs are within normal limits, we will proceed with cycle #1 of Alimta and cisplatin. Schedule and possible side effects of chemotherapy were reviewed with the patient. The patient is placed on dexamethasone 8 mg daily for 3 days starting tomorrow for acute chemotherapy-induced nausea. For chronic chemotherapy-induced nausea he is placed on Compazine. The patient was instructed to call us with uncontrolled nausea vomiting and fever above 100.3  2. Pleural effusion. CXR showed improvement in left-sided pleural effusion. 3.  Postsurgical pain. Oxycodone 7.5 mg every 8 hours when necessary controls his postsurgical pain well. OARS prescription monitoring report for this patient was reviewed today. No signs of potential drug abuse or divergent fashion identified. Diagnosis Orders   1. Malignant neoplasm of lower lobe of left lung ([facility] [address]     2. S/P lobectomy of lung     3. Encounter for chemotherapy management     4. Postoperative left upper quadrant abdominal pain          Plan:   Return in about 3 weeks (around 5/30/2019). Orders Placed:   No orders of the defined types were placed in this encounter.        Medications Prescribed:   Orders Placed This Encounter   Medications    dexamethasone (DECADRON) 4 MG tablet     Sig: Take 2 tablets by mouth daily (with breakfast) for 3 doses     Dispense:  6 tablet     Refill:  2    prochlorperazine (COMPAZINE) 5 MG tablet     Sig: Take 1 tablet by mouth every 6 hours as needed for Nausea     Dispense:  60 tablet     Refill:  1

## 2019-05-16 ENCOUNTER — CLINICAL DOCUMENTATION (OUTPATIENT)
Dept: CASE MANAGEMENT | Age: 69
End: 2019-05-16

## 2019-05-16 RX ORDER — CHLORPROMAZINE HYDROCHLORIDE 10 MG/1
10 TABLET, FILM COATED ORAL 3 TIMES DAILY
Qty: 30 TABLET | Refills: 1 | Status: SHIPPED | OUTPATIENT
Start: 2019-05-16 | End: 2019-05-30 | Stop reason: SDUPTHER

## 2019-05-16 NOTE — PROGRESS NOTES
Name: Rachel Olson  : 1950  MRN: [id number]    Oncology Navigation Follow-Up Note-telephone    Contact Type:  Medical Oncology    Subjective: severe hiccups-sometimes hard to catch breath and then vomits    Objective: Does take his prilosec and takes compazine every 6 hours        Notes: [facility] phoned complaining of above. Started the day after chemo (chemo alimpta/cisplatin ). Spoke with Dr. Stephanie Hart. Wanted him to try the compazine first-which I learned he was already doing. Jeanne Glaser ordered Thorazine 10mg 3x daily-gave him 10 tablets and to stop the compazine. Informed [facility] and told him prescription was e-scribed to his pharmacy and to stop compazine. I said I would check back with him to see if he had relief. He verbalized understanding.     Electronically signed by Severa Gondola, RN on 2019 at 3:50 PM

## 2019-05-20 ENCOUNTER — CLINICAL DOCUMENTATION (OUTPATIENT)
Dept: CASE MANAGEMENT | Age: 69
End: 2019-05-20

## 2019-05-20 NOTE — PROGRESS NOTES
Name: Todd Scott  : 1950  MRN: [id number]    Oncology Navigation Follow-Up Note    Contact Type:  Telephone    Subjective: Hiccups, burping-new: bruising    Objective: Chemotherapy: Alimta, Cisplatin. Day #11 post chemotherapy    Education: Discussed \"good blood cells\" WBC, HGB and Platelets    Notes:Phoned Maxwell Jean today to get update regarding hiccups. They resolved with medication, does not have anymore. Still burping some-told him he could try Gas-X. Burping does not bother him like hiccups did. He mentioned he has bruising on his arms-one arm lower half, other two sizes of quarters. He is on ASA and Plavix. Discussed with Dr. Russell Blum. Schedule full tomorrow-wanted him to see his PCP-Dr. Romo to evaluate. Informed Maxwell Jean and he verbalized understanding. I told him if they needed to call me, they could. Will follow up with him.     Electronically signed by Vazquez Cox RN on 2019 at 2:53 PM

## 2019-05-29 DIAGNOSIS — C34.32 MALIGNANT NEOPLASM OF LOWER LOBE OF LEFT LUNG (HCC): Primary | ICD-10-CM

## 2019-05-30 ENCOUNTER — HOSPITAL ENCOUNTER (OUTPATIENT)
Dept: INFUSION THERAPY | Age: 69
Discharge: HOME | End: 2019-05-30
Payer: MEDICARE

## 2019-05-30 ENCOUNTER — OFFICE VISIT (OUTPATIENT)
Dept: ONCOLOGY | Age: 69
End: 2019-05-30
Payer: MEDICARE

## 2019-05-30 VITALS
HEART RATE: 89 BPM | TEMPERATURE: 97.1 F | WEIGHT: 178.8 LBS | HEIGHT: 67 IN | OXYGEN SATURATION: 96 % | DIASTOLIC BLOOD PRESSURE: 79 MMHG | SYSTOLIC BLOOD PRESSURE: 123 MMHG | BODY MASS INDEX: 28.06 KG/M2 | RESPIRATION RATE: 20 BRPM

## 2019-05-30 VITALS
WEIGHT: 178.8 LBS | DIASTOLIC BLOOD PRESSURE: 81 MMHG | BODY MASS INDEX: 28.06 KG/M2 | TEMPERATURE: 97.9 F | SYSTOLIC BLOOD PRESSURE: 131 MMHG | HEIGHT: 67 IN | OXYGEN SATURATION: 94 % | RESPIRATION RATE: 18 BRPM | HEART RATE: 89 BPM

## 2019-05-30 DIAGNOSIS — Z51.11 ENCOUNTER FOR CHEMOTHERAPY MANAGEMENT: ICD-10-CM

## 2019-05-30 DIAGNOSIS — C34.32 MALIGNANT NEOPLASM OF LOWER LOBE OF LEFT LUNG (HCC): Primary | ICD-10-CM

## 2019-05-30 DIAGNOSIS — R10.12 POSTOPERATIVE LEFT UPPER QUADRANT ABDOMINAL PAIN: ICD-10-CM

## 2019-05-30 DIAGNOSIS — C34.92 ADENOCARCINOMA OF LEFT LUNG (HCC): ICD-10-CM

## 2019-05-30 DIAGNOSIS — Z90.2 S/P LOBECTOMY OF LUNG: ICD-10-CM

## 2019-05-30 DIAGNOSIS — G89.18 POSTOPERATIVE LEFT UPPER QUADRANT ABDOMINAL PAIN: ICD-10-CM

## 2019-05-30 LAB
ALBUMIN SERPL-MCNC: 3.9 G/DL (ref 3.5–5.1)
ALP BLD-CCNC: 134 U/L (ref 38–126)
ALT SERPL-CCNC: 10 U/L (ref 11–66)
AST SERPL-CCNC: 13 U/L (ref 5–40)
BILIRUB SERPL-MCNC: 0.2 MG/DL (ref 0.3–1.2)
BILIRUBIN DIRECT: < 0.2 MG/DL (ref 0–0.3)
BUN, WHOLE BLOOD: 19 MG/DL (ref 8–26)
CHLORIDE, WHOLE BLOOD: 100 MEQ/L (ref 98–109)
CREATININE, WHOLE BLOOD: 0.9 MG/DL (ref 0.5–1.2)
GFR, ESTIMATED: 89 ML/MIN/1.73M2
GLUCOSE, WHOLE BLOOD: 374 MG/DL (ref 70–108)
HCT VFR BLD CALC: 35.7 % (ref 42–52)
HEMOGLOBIN: 12 GM/DL (ref 14–18)
IONIZED CALCIUM, WHOLE BLOOD: 1.25 MMOL/L (ref 1.12–1.32)
MCH RBC QN AUTO: 26.5 PG (ref 27–31)
MCHC RBC AUTO-ENTMCNC: 33.7 GM/DL (ref 33–37)
MCV RBC AUTO: 79 FL (ref 80–94)
PDW BLD-RTO: 13.5 % (ref 11.5–14.5)
PLATELET # BLD: 243 THOU/MM3 (ref 130–400)
PMV BLD AUTO: 6.8 FL (ref 7.4–10.4)
POTASSIUM, WHOLE BLOOD: 5.2 MEQ/L (ref 3.5–4.9)
RBC # BLD: 4.54 MILL/MM3 (ref 4.7–6.1)
SEG NEUTROPHILS: 66 % (ref 43–75)
SEGMENTED NEUTROPHILS ABSOLUTE COUNT: 3.1 THOU/MM3 (ref 1.8–7.7)
SODIUM, WHOLE BLOOD: 135 MEQ/L (ref 138–146)
TOTAL CO2, WHOLE BLOOD: 25 MEQ/L (ref 23–33)
TOTAL PROTEIN: 6.5 G/DL (ref 6.1–8)
WBC # BLD: 4.7 THOU/MM3 (ref 4.8–10.8)

## 2019-05-30 PROCEDURE — 3017F COLORECTAL CA SCREEN DOC REV: CPT | Performed by: PHYSICIAN ASSISTANT

## 2019-05-30 PROCEDURE — 96375 TX/PRO/DX INJ NEW DRUG ADDON: CPT

## 2019-05-30 PROCEDURE — 80076 HEPATIC FUNCTION PANEL: CPT

## 2019-05-30 PROCEDURE — 96367 TX/PROPH/DG ADDL SEQ IV INF: CPT

## 2019-05-30 PROCEDURE — 96413 CHEMO IV INFUSION 1 HR: CPT

## 2019-05-30 PROCEDURE — 6360000002 HC RX W HCPCS: Performed by: INTERNAL MEDICINE

## 2019-05-30 PROCEDURE — 96366 THER/PROPH/DIAG IV INF ADDON: CPT

## 2019-05-30 PROCEDURE — G8598 ASA/ANTIPLAT THER USED: HCPCS | Performed by: PHYSICIAN ASSISTANT

## 2019-05-30 PROCEDURE — 1036F TOBACCO NON-USER: CPT | Performed by: PHYSICIAN ASSISTANT

## 2019-05-30 PROCEDURE — G8427 DOCREV CUR MEDS BY ELIG CLIN: HCPCS | Performed by: PHYSICIAN ASSISTANT

## 2019-05-30 PROCEDURE — 99211 OFF/OP EST MAY X REQ PHY/QHP: CPT

## 2019-05-30 PROCEDURE — 2709999900 HC NON-CHARGEABLE SUPPLY

## 2019-05-30 PROCEDURE — 4040F PNEUMOC VAC/ADMIN/RCVD: CPT | Performed by: PHYSICIAN ASSISTANT

## 2019-05-30 PROCEDURE — 36415 COLL VENOUS BLD VENIPUNCTURE: CPT

## 2019-05-30 PROCEDURE — G8419 CALC BMI OUT NRM PARAM NOF/U: HCPCS | Performed by: PHYSICIAN ASSISTANT

## 2019-05-30 PROCEDURE — 1123F ACP DISCUSS/DSCN MKR DOCD: CPT | Performed by: PHYSICIAN ASSISTANT

## 2019-05-30 PROCEDURE — 2580000003 HC RX 258: Performed by: INTERNAL MEDICINE

## 2019-05-30 PROCEDURE — 96411 CHEMO IV PUSH ADDL DRUG: CPT

## 2019-05-30 PROCEDURE — 80047 BASIC METABLC PNL IONIZED CA: CPT

## 2019-05-30 PROCEDURE — 96415 CHEMO IV INFUSION ADDL HR: CPT

## 2019-05-30 PROCEDURE — 99214 OFFICE O/P EST MOD 30 MIN: CPT | Performed by: PHYSICIAN ASSISTANT

## 2019-05-30 PROCEDURE — 85027 COMPLETE CBC AUTOMATED: CPT

## 2019-05-30 RX ORDER — LANCETS 28 GAUGE
1 EACH MISCELLANEOUS DAILY
Refills: 11 | Status: ON HOLD | COMMUNITY
Start: 2019-03-25

## 2019-05-30 RX ORDER — 0.9 % SODIUM CHLORIDE 0.9 %
10 VIAL (ML) INJECTION ONCE
Status: CANCELLED | OUTPATIENT
Start: 2019-05-30

## 2019-05-30 RX ORDER — BLOOD-GLUCOSE METER
1 KIT MISCELLANEOUS DAILY
Refills: 0 | Status: ON HOLD | COMMUNITY
Start: 2019-03-25

## 2019-05-30 RX ORDER — SODIUM CHLORIDE 0.9 % (FLUSH) 0.9 %
5 SYRINGE (ML) INJECTION PRN
Status: CANCELLED | OUTPATIENT
Start: 2019-05-30

## 2019-05-30 RX ORDER — PALONOSETRON 0.05 MG/ML
0.25 INJECTION, SOLUTION INTRAVENOUS ONCE
Status: COMPLETED | OUTPATIENT
Start: 2019-05-30 | End: 2019-05-30

## 2019-05-30 RX ORDER — ONDANSETRON 4 MG/1
4 TABLET, ORALLY DISINTEGRATING ORAL EVERY 8 HOURS PRN
Qty: 30 TABLET | Refills: 2 | Status: SHIPPED | OUTPATIENT
Start: 2019-05-30 | End: 2019-07-17 | Stop reason: SDUPTHER

## 2019-05-30 RX ORDER — OXYCODONE AND ACETAMINOPHEN 7.5; 325 MG/1; MG/1
1 TABLET ORAL EVERY 6 HOURS PRN
Qty: 90 TABLET | Refills: 0 | Status: SHIPPED | OUTPATIENT
Start: 2019-05-30 | End: 2019-06-26 | Stop reason: SDUPTHER

## 2019-05-30 RX ORDER — SODIUM CHLORIDE 0.9 % (FLUSH) 0.9 %
10 SYRINGE (ML) INJECTION PRN
Status: CANCELLED | OUTPATIENT
Start: 2019-05-30

## 2019-05-30 RX ORDER — METHYLPREDNISOLONE SODIUM SUCCINATE 125 MG/2ML
125 INJECTION, POWDER, LYOPHILIZED, FOR SOLUTION INTRAMUSCULAR; INTRAVENOUS ONCE
Status: CANCELLED | OUTPATIENT
Start: 2019-05-30

## 2019-05-30 RX ORDER — DEXAMETHASONE 4 MG/1
4 TABLET ORAL
Qty: 6 TABLET | Refills: 2 | Status: SHIPPED | OUTPATIENT
Start: 2019-05-30 | End: 2019-06-02

## 2019-05-30 RX ORDER — HEPARIN SODIUM (PORCINE) LOCK FLUSH IV SOLN 100 UNIT/ML 100 UNIT/ML
500 SOLUTION INTRAVENOUS PRN
Status: CANCELLED | OUTPATIENT
Start: 2019-05-30

## 2019-05-30 RX ORDER — CHLORPROMAZINE HYDROCHLORIDE 10 MG/1
10 TABLET, FILM COATED ORAL 3 TIMES DAILY
Qty: 30 TABLET | Refills: 1 | Status: SHIPPED | OUTPATIENT
Start: 2019-05-30 | End: 2019-09-11 | Stop reason: ALTCHOICE

## 2019-05-30 RX ORDER — BLOOD-GLUCOSE METER
1 KIT MISCELLANEOUS DAILY
Refills: 11 | Status: ON HOLD | COMMUNITY
Start: 2019-03-25

## 2019-05-30 RX ORDER — DIPHENHYDRAMINE HYDROCHLORIDE 50 MG/ML
50 INJECTION INTRAMUSCULAR; INTRAVENOUS ONCE
Status: CANCELLED | OUTPATIENT
Start: 2019-05-30

## 2019-05-30 RX ORDER — SODIUM CHLORIDE 9 MG/ML
20 INJECTION, SOLUTION INTRAVENOUS ONCE
Status: COMPLETED | OUTPATIENT
Start: 2019-05-30 | End: 2019-05-30

## 2019-05-30 RX ORDER — SODIUM CHLORIDE 9 MG/ML
INJECTION, SOLUTION INTRAVENOUS CONTINUOUS
Status: CANCELLED | OUTPATIENT
Start: 2019-05-30

## 2019-05-30 RX ADMIN — POTASSIUM CHLORIDE: 2 INJECTION, SOLUTION, CONCENTRATE INTRAVENOUS at 15:58

## 2019-05-30 RX ADMIN — MANNITOL: 250 INJECTION, SOLUTION INTRAVENOUS at 12:47

## 2019-05-30 RX ADMIN — SODIUM CHLORIDE 150 MG: 9 INJECTION, SOLUTION INTRAVENOUS at 11:55

## 2019-05-30 RX ADMIN — SODIUM CHLORIDE 970 MG: 9 INJECTION, SOLUTION INTRAVENOUS at 12:33

## 2019-05-30 RX ADMIN — DEXAMETHASONE SODIUM PHOSPHATE 12 MG: 4 INJECTION, SOLUTION INTRA-ARTICULAR; INTRALESIONAL; INTRAMUSCULAR; INTRAVENOUS; SOFT TISSUE at 11:33

## 2019-05-30 RX ADMIN — PALONOSETRON 0.25 MG: 0.05 INJECTION, SOLUTION INTRAVENOUS at 11:52

## 2019-05-30 RX ADMIN — POTASSIUM CHLORIDE: 2 INJECTION, SOLUTION, CONCENTRATE INTRAVENOUS at 10:17

## 2019-05-30 RX ADMIN — SODIUM CHLORIDE 20 ML/HR: 9 INJECTION, SOLUTION INTRAVENOUS at 10:05

## 2019-05-30 ASSESSMENT — PAIN DESCRIPTION - PROGRESSION: CLINICAL_PROGRESSION: NOT CHANGED

## 2019-05-30 ASSESSMENT — PAIN DESCRIPTION - DESCRIPTORS: DESCRIPTORS: SHARP;SHOOTING

## 2019-05-30 ASSESSMENT — PAIN DESCRIPTION - FREQUENCY: FREQUENCY: INTERMITTENT

## 2019-05-30 ASSESSMENT — PAIN DESCRIPTION - ORIENTATION: ORIENTATION: LEFT;MID

## 2019-05-30 ASSESSMENT — PAIN DESCRIPTION - PAIN TYPE: TYPE: CHRONIC PAIN

## 2019-05-30 ASSESSMENT — PAIN SCALES - GENERAL: PAINLEVEL_OUTOF10: 7

## 2019-05-30 ASSESSMENT — PAIN DESCRIPTION - ONSET: ONSET: ON-GOING

## 2019-05-30 ASSESSMENT — PAIN DESCRIPTION - LOCATION: LOCATION: ABDOMEN

## 2019-05-30 NOTE — PROGRESS NOTES
[facility], [address] [facility] [address]  [address] [address]  Dept: [phone]  Dept Fax: [phone]: [phone]      Visit Date:5/30/2019     Wu Vaughn is a 71 y.o. male who presents today for:   Chief Complaint   Patient presents with    Follow-up     Adenocarcinoma of Left Lung        HPI:   Wu Vaughn is a 71 y.o. male followed by Dr. Nolan Ryan for lung cancer. Per Dr. Lawton Pel note on 5/9/19: The patient developed hemoptysis beginning of January 2019. Chest x-ray on January 14, 2019 showed a developing retrocardiac infiltrate. Patient was referred to the pulmonologist Lm Chahal who ordered CT of the chest.  It was performed on January 23, 2019 and showed:  a lobulated mass in the left lower lobe possibly two separate masses is left lower lobe this is a lobulated elongated and measures up to six 5.9 cm in length 2.5 cm in width spiculated margins are present and adjacent airspace disease. Lobulated    8mm nodule lateral aspect right lower lobe image 42   No effusions are seen. Upper abdomen   No adrenal masses. Simple cyst the left kidney. No suspicious bone lesions   Patient underwent bronchoscopy with transbronchial biopsy and BAL on January 25, 2019. They showed no endobronchial lesions and benign bronchial epithelium, alveolar macrophages, and scattered inflammatory cells, respectively. No malignant cells seen. Subsequently on February 12, 2019 the patient had CT-guided lung biopsy which showed poorly differentiated pulmonary adenocarcinoma. The patient had PET scan on February 25, 2019 that showed:  1. Known left lower lobe malignancy showing FDG avidity. 2. Mediastinal adenopathy with minimal activity above background suggesting possible reactive lymphadenopathy. Metastatic disease is not excludable. 3. Pleural fluid in the major fissure of the left upper lung. Minimal activity above background may indicate a malignant effusion.    4. No other evidence of metastatic disease, small nodule in the right lower lobe shows no significant activity above background.       Patient had EBUS with transbronchial ultrasound guided mediastinal lymph node biopsy. The mediastinal lymph node biopsy was  Negative. Therefore the patient met with the thoracic surgeon Dr. Arleth Oakes and after discussing his surgical treatment options, on March 14, 2019 he underwent left lower lobe, lung lobectomy: .  Final pathology report showed:  A. 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   Lymph nodes (0/3): Negative for malignancy. B. Tissue designated lymph node, station 9, excision:   Benign fibroadipose tissue. C. Lymph node, station 10 L, excision:   Negative for malignancy (0/1). D. Lymph nodes, station 6, excision:   Negative for malignancy (0/3). E. Lymph node, station 7, excision:   Negative for malignancy (0/1). F. Tissue designated lymph node, station 8, excision:   Benign fibroadipose tissue. The patient was hospitalized from April 18, 2019 till April 20, 2019 for atypical chest pain, left pleural exudative effusion. He underwent thoracentesis. Cytology was negative for malignant cells. Troponins were negative. No ecg changes. Interval History 5/30/2019:   The patient is here for follow-up evaluation and to receive cycle #2 of Cisplatin and Alimta. He received cycle #1 on 5/9/19 and had persistent hiccups that began several days after chemo. He was given thorazine and was instructed to stop compazine with improvement. Patient affirms ongoing pain to the LUQ of his abdomen that is mildly controlled with Percocet 7.5-325 every 8 hours. He denies fever, chills, or signs/symptoms of infection. Denies chest pain, vomiting, bowel or urinary changes. Denies hearing changes, rashes, peripheral neuropathy or edema.       HPI   Past Medical History:   Diagnosis Date    Anxiety     Arthritis     Cancer ([facility] 2019    left lung stage 2    [facility]    Enlarged prostate with urinary retention     Gait difficulty     Generalized weakness     Hyperlipidemia     Hypertension     Lesion of bladder     Osteoarthritis     Retention of urine     S/P cardiac catheterization 2019    stent to circumflex per Dr. Ray Arms S/P TURP     SOB (shortness of breath)     With activity    Voiding difficulty       Past Surgical History:   Procedure Laterality Date    APPENDECTOMY      BRONCHOSCOPY N/A 2019    BRONCHOSCOPY performed by Magalys Soto MD at [address] [place] [address]  2019    BRONCHOSCOPY/TRANSBRONCHIAL LUNG BIOPSY performed by Magalys Soto MD at [address] [place] [address] N/A 3/1/2019    BRONCHOSCOPY W/EBUS FNA performed by Magalys S[facility] at [facility]  2019    COLONOSCOPY  [phone]    PROSTATE SURGERY  2012    TURP    THORACOTOMY Left 3/14/2019    LEFT EXPLORATORY THORACOTOMY, MEDIASTINAL LYMPH NODE DISECTION, FLEXIBLE BRONCHOSCOPY performed by Mary Grace Limon MD at [address] [place] [address] History   Problem Relation Age of Onset    Diabetes Mother     High Blood Pressure Mother     Brain Cancer Mother     Heart Disease Father     Diabetes Father     High Blood Pressure Father     Heart Attack Father     Diabetes Sister     High Blood Pressure Sister     COPD Sister         Agent Orange    Diabetes Brother     High Blood Pressure Brother       Social History     Tobacco Use    Smoking status: Former Smoker     Packs/day: 1.00     Years: 40.00     Pack years: 40.00     Types: Cigarettes     Start date:      Last attempt to quit: 2019     Years since quittin.2    Smokeless tobacco: Never Used    Tobacco comment: about 5 cigs per day   Substance Use Topics    Alcohol use: No      Current Outpatient Medications   Medication Sig Dispense Refill    Blood Glucose Monitoring Suppl (FREESTYLE FREEDOM LITE) w/Device KIT 1 Device daily  0    FREESTYLE LITE strip 1 strip daily  11    FREESTYLE LANCETS MISC 1 Stick daily  11    chlorproMAZINE (THORAZINE) 10 MG tablet Take 1 tablet by mouth 3 times daily 30 tablet 1    prochlorperazine (COMPAZINE) 5 MG tablet Take 1 tablet by mouth every 6 hours as needed for Nausea 60 tablet 1    folic acid (FOLVITE) 1 MG tablet Take 1 tablet by mouth daily 30 tablet 3    oxyCODONE-acetaminophen (ENDOCET) 7.5-325 MG per tablet Take 1 tablet by mouth every 8 hours as needed for Pain for up to 30 days. Intended supply: 30 days 60 tablet 0    amLODIPine (NORVASC) 10 MG tablet Take 10 mg by mouth daily      atorvastatin (LIPITOR) 40 MG tablet Take 40 mg by mouth daily      senna (SENOKOT) 8.6 MG TABS tablet Take 1 tablet by mouth daily      metFORMIN (GLUCOPHAGE) 500 MG tablet Take 500 mg by mouth 2 times daily (with meals) Take 1 tab by mouth at supper x2 weeks, then 1 tab in the morning and 1 tab at supper x2 weeks, then 1 tab in the morning and 2 tab at supper x2weeks, then 2 tabs twice a day      aspirin 81 MG chewable tablet Take 1 tablet by mouth daily 30 tablet 3    nitroGLYCERIN (NITROSTAT) 0.4 MG SL tablet up to max of 3 total doses. If no relief after 1 dose, call 911. 25 tablet 3    clopidogrel (PLAVIX) 75 MG tablet Take 1 tablet by mouth daily 30 tablet 3    omeprazole (PRILOSEC) 20 MG delayed release capsule Take 20 mg by mouth daily      lisinopril (PRINIVIL;ZESTRIL) 40 MG tablet Take 40 mg by mouth daily.  dutasteride (AVODART) 0.5 MG capsule Take 0.5 mg by mouth daily. No current facility-administered medications for this visit.        No Known Allergies   Health Maintenance   Topic Date Due    AAA screen  1950    Hepatitis C screen  1950    Diabetic foot exam  03/25/1960    Diabetic retinal exam  03/25/1960    Diabetic microalbuminuria test  03/25/1968  Shingles Vaccine (1 of 2) 03/25/2000    Pneumococcal 65+ years Vaccine (1 of 2 - PCV13) 03/25/2015    Flu vaccine (Season Ended) 09/01/2019    Lipid screen  04/08/2020    A1C test (Diabetic or Prediabetic)  04/18/2020    Potassium monitoring  04/19/2020    Creatinine monitoring  04/19/2020    DTaP/Tdap/Td vaccine (2 - Td) 05/25/2023    Colon cancer screen colonoscopy  01/05/2027       Review of Systems:   Review of Systems   Pertinent review of systems noted in HPI, all other ROS negative. Objective:   Physical Exam   /79 (Site: Right Upper Arm, Position: Sitting, Cuff Size: Large Adult)   Pulse 89   Temp 97.1 °F (36.2 °C) (Oral)   Resp 20   Ht 5' 7.01\" (1.702 m)   Wt 178 lb 12.8 oz (81.1 kg)   SpO2 96%   BMI 28.00 kg/m²    General appearance: No apparent distress, well developed and cooperative. HEENT: Pupils equal, round, and reactive to light. Conjunctivae/corneas clear. Oral mucosa moist, no sores noted. Neck: Supple, with full range of motion. Trachea midline. Respiratory:  Normal respiratory effort. Clear to auscultation, bilaterally without Rales/Wheezes/Rhonchi. Well healed scar to left anterior chest wall consistent with prior lobectomy. Cardiovascular: Regular rate and rhythm with normal S1/S2 without murmurs, rubs or gallops. Abdomen: Soft, non-distended with active bowel sounds. Tenderness to palpation of LUQ and near surgical scar. Musculoskeletal: No clubbing, cyanosis or edema bilaterally. Full range of motion without deformity. Skin: Skin color, texture, turgor normal.  No rashes or lesions. Neurologic:  Neurovascularly intact without any focal sensory/motor deficits.    Psychiatric: Alert and oriented      Imaging Studies and Labs:   CBC:   Lab Results   Component Value Date    WBC 8.0 05/09/2019    HGB 12.8 (L) 05/09/2019    HCT 37.6 (L) 05/09/2019    MCV 80 05/09/2019     05/09/2019     BMP:   Lab Results   Component Value Date     05/09/2019  04/19/2019    K 4.2 05/09/2019    K 4.9 04/19/2019     04/19/2019    CO2 24 04/19/2019    BUN 10 04/19/2019    CREATININE 0.7 05/09/2019    CREATININE 0.7 04/19/2019    GLUCOSE 122 04/19/2019    CALCIUM 9.0 04/19/2019      LFT:   Lab Results   Component Value Date    ALT 7 (L) 05/09/2019    AST 15 05/09/2019    ALKPHOS 107 05/09/2019    BILITOT 0.2 (L) 05/09/2019         Assessment and Plan:   1. Non-small cell lung cancer. Poorly differentiated adenocarcinoma with sarcomatoid features  Stage IIA (pT2b, pN0, cM0)  S/P LLL lobectomy and lymph node sampling on 03/14/2019. He developed hemoptysis that triggered further workup with CT scans showing  lobulated mass in the left lower lobe. Subsequent CT-guided lung biopsy on 02/12/2019 showed poorly differentiated pulmonary adenocarcinoma. The patient had PET scan that showed, in addition to known left lower lobe mass a mediastinal adenopathy with activity above the background. Differential diagnosis includes reactive lymphadenopathy versus metastatic disease. Patient had EBUS with transbronchial ultrasound guided mediastinal lymph node biopsy. It was negative. On March 14, 2019 he underwent  left lower lobe, lung lobectomy and lymph node sampling. His surgery showed pT2b,pN0 cancer  He met with Dr. Marylen Bonnet and decision made to proceed with adjuvant chemotherapy with Alimta and cisplatin. His lung cancer had few poor prognostic features: Sarcomatoid features, high histologic grade, lymphovascular invasion, visceral pleural invasion. 2. Adjuvant chemotherapy  He received cycle #1 of Alimta and cisplatin on 5/9/19. He developed persistent hiccups that improved with oral Thorazine 10 mg TID and stopping compazine. Today, his vitals are stable and labs reviewed - okay to proceed with cycle #2 of Alimta and cisplatin. Will monitor potassium level at 5.2 on 5/30/19, 5.1 on 5/2/19. Refill given for Thorazine for possible hiccups.  Prescription for Zofran sent

## 2019-05-30 NOTE — PROGRESS NOTES
Patient assessed for the following post chemotherapy:    Dizziness   No  Lightheadedness  No      Acute nausea/vomiting No  Headache   No  Chest pain/pressure  No  Rash/itching   No  Shortness of breath  No    Patient kept for 20 minutes observation post infusion chemotherapy. Patient tolerated chemotherapy treatment Alimta/Cisplatin without any complications. Last vital signs:   /78   Pulse 81   Temp 98.7 °F (37.1 °C) (Oral)   Resp 20   Ht 5' 7\" (1.702 m)   Wt 178 lb 12.8 oz (81.1 kg)   SpO2 97%   BMI 28.00 kg/m²     Patient instructed if experience any of the above symptoms following today's infusion, he is to notify MD immediately or go to the emergency department. Discharge instructions given to patient. Verbalizes understanding. Ambulated off unit per self, accompanied by family, with belongings.

## 2019-05-30 NOTE — PLAN OF CARE
Problem: Pain:  Description  Pain management should include both nonpharmacologic and pharmacologic interventions. Goal: Pain level will decrease  Description  Pain level will decrease  5/30/2019 1332 by Hailey Sellers RN  Outcome: Met This Shift  5/30/2019 1330 by Hailey Sellers RN  Outcome: Met This Shift  Goal: Control of acute pain  Description  Control of acute pain  5/30/2019 1332 by Hailey Sellers RN  Outcome: Met This Shift  5/30/2019 1330 by Hailey Sellers RN  Outcome: Met This Shift  Goal: Control of chronic pain  Description  Control of chronic pain  5/30/2019 1332 by Hailey Sellers RN  Outcome: Met This Shift  Note:   Patient rating pain a 6 out of 10 using MARY scale, Pain located in  left upper abdomen. 5/30/2019 1330 by Hailey Sellers RN  Outcome: Met This Shift  Note:   Patient rating pain a 6 out of 10 using MARY scale, Pain located in  left rib/left upper abdomen. Intervention: Opioid analgesia side-effects  Description  Opioid analgesia side-effects - REMINDER(s):  Bradycardia. Confusion. Constipation. Respiratory function, decreased. Note:   Patient encouraged to take prescribed pain medications and call Physician if pain is not controlled. Intervention: Assess barriers to pain control  Description  Assess barriers to pain control - REMINDER(s):  Addiction concerns. Healthcare practices, cultural.  Pain medication concerns. Past pain experiences. Note:   Patient encouraged to take prescribed pain medications and call Physician if pain is not controlled. Problem: Intellectual/Education/Knowledge Deficit  Goal: Teaching initiated upon admission  5/30/2019 1332 by Hailey Sellers RN  Outcome: Met This Shift  Note:   Patient verbalizes understanding to verbal information given on Alimta /Cisplatin ,action and possible side effects. Aware to call MD if develop complications.     5/30/2019 1330 by Hailey Sellers RN  Outcome: Met This Shift  Note:   Patient verbalizes understanding to verbal information given on Alimta /Cisplatin ,action and possible side effects. Aware to call MD if develop complications. Intervention: Verbal/written education provided  5/30/2019 1332 by Hailey Sellers RN  Note:   Chemotherapy Teaching     What is Chemotherapy   Drug action ? Method of Administration ? Handouts given ? Side Effects  Nausea/vomiting ? Diarrhea ? Fatigue ? Signs / Symptoms of infection ? Neutropenia ? Thrombocytopenia ? Alopecia ? neuropathy ? Garber diet &  the importance of fluids ? Micellaneous  Importance of nutrition ? Importance of oral hygiene ? When to call the MD ?   Monitoring labs ? Use of supportive services ? Explanation of Drug Regimen / Frequency  Alimta/Cisplatin:  D1C2     Comments  Verbalized understanding to drug,action,side effects and when to call MD      Problem: Discharge Planning  Goal: Knowledge of discharge instructions  Description  Knowledge of discharge instructions     5/30/2019 1332 by Hailey Sellers RN  Outcome: Met This Shift  Note:   Verbalized understanding of discharge instructions, follow-up appointments, and when to call the physician. 5/30/2019 1330 by Hailey Sellers RN  Outcome: Met This Shift  Intervention: Interaction with patient/family and care team  Note:   Discuss understanding of discharge instructions,follow-up appointments, and when to call the physician. Problem: Musculor/Skeletal Functional Status  Goal: Absence of falls  5/30/2019 1332 by Hailey Sellers RN  Outcome: Met This Shift  Note:   Free from falls while in O.P. Oncology. 5/30/2019 1330 by Hailey Sellers RN  Outcome: Met This Shift  Intervention: Provide standby assistance  Note:   Discussed the need to use the call light for assistance when getting up to ambulate. Care plan reviewed with patient and family.   Patient and family verbalize understanding of the plan of care and contribute to goal setting.

## 2019-05-30 NOTE — PATIENT INSTRUCTIONS
1. Okay to proceed with chemotherapy today. 2. Follow up with Dr. Juan Pablo Cervantes in three weeks for next cycle of chemo. 3. Labs on RTC: CBC, BMP, LFT  4. Please call for questions or concerns.

## 2019-05-30 NOTE — ONCOLOGY
Chemotherapy Administration    Pre-assessment Data: Antineoplastic Agents  Other:   See toxicity flow sheet for assessment [x]     Physician Notification of Concerns Related to Chemotherapy Administration:   Physician Notified Lainey Mina / Time of Notification      Interventions:   Lab work assessed  [x]   Height / Weight verified for dose [x]   Current MAR reviewed [x]   Emergency drugs available as appropriate [x]   Anaphylaxis assessment completed [x]   Pre-medications administered as ordered [x]   Blood return noted upon initiation of chemotherapy [x]   Blood return noted each 1-2ml of a vesicant medication if given IV push []   Blood return noted each 2-3ml of a non-vesicant medication if given IV push []   Monitor for signs / symptoms of hypersensitivity reaction [x]   Chemotherapy orders (drug/dose/rate) verified by 2 Chemo certified RNs [x]   Monitor IV site and blood return throughout the infusion of the medication [x]   Document IV site checks on the IV assessment form [x]   Document chemotherapy teaching on the Patient Education tab [x]   Document patient verbalizes understanding of medications being administered [x]   If IV infiltration, see ONS Guidelines []   Other:     Alimta/Cisplatin []

## 2019-06-19 RX ORDER — PALONOSETRON 0.05 MG/ML
0.25 INJECTION, SOLUTION INTRAVENOUS ONCE
Status: CANCELLED | OUTPATIENT
Start: 2019-06-26

## 2019-06-19 RX ORDER — HEPARIN SODIUM (PORCINE) LOCK FLUSH IV SOLN 100 UNIT/ML 100 UNIT/ML
500 SOLUTION INTRAVENOUS PRN
Status: CANCELLED | OUTPATIENT
Start: 2019-06-26

## 2019-06-19 RX ORDER — SODIUM CHLORIDE 0.9 % (FLUSH) 0.9 %
5 SYRINGE (ML) INJECTION PRN
Status: CANCELLED | OUTPATIENT
Start: 2019-06-26

## 2019-06-19 RX ORDER — METHYLPREDNISOLONE SODIUM SUCCINATE 125 MG/2ML
125 INJECTION, POWDER, LYOPHILIZED, FOR SOLUTION INTRAMUSCULAR; INTRAVENOUS ONCE
Status: CANCELLED | OUTPATIENT
Start: 2019-06-26

## 2019-06-19 RX ORDER — SODIUM CHLORIDE 9 MG/ML
20 INJECTION, SOLUTION INTRAVENOUS ONCE
Status: CANCELLED | OUTPATIENT
Start: 2019-06-26

## 2019-06-19 RX ORDER — DIPHENHYDRAMINE HYDROCHLORIDE 50 MG/ML
50 INJECTION INTRAMUSCULAR; INTRAVENOUS ONCE
Status: CANCELLED | OUTPATIENT
Start: 2019-06-26

## 2019-06-19 RX ORDER — SODIUM CHLORIDE 9 MG/ML
INJECTION, SOLUTION INTRAVENOUS CONTINUOUS
Status: CANCELLED | OUTPATIENT
Start: 2019-06-26

## 2019-06-19 RX ORDER — 0.9 % SODIUM CHLORIDE 0.9 %
10 VIAL (ML) INJECTION ONCE
Status: CANCELLED | OUTPATIENT
Start: 2019-06-26

## 2019-06-19 RX ORDER — CYANOCOBALAMIN 1000 UG/ML
1000 INJECTION INTRAMUSCULAR; SUBCUTANEOUS ONCE
Status: CANCELLED | OUTPATIENT
Start: 2019-06-26

## 2019-06-19 RX ORDER — SODIUM CHLORIDE 0.9 % (FLUSH) 0.9 %
10 SYRINGE (ML) INJECTION PRN
Status: CANCELLED | OUTPATIENT
Start: 2019-06-26

## 2019-06-20 ENCOUNTER — HOSPITAL ENCOUNTER (OUTPATIENT)
Dept: INFUSION THERAPY | Age: 69
Discharge: HOME | End: 2019-06-20
Payer: MEDICARE

## 2019-06-20 ENCOUNTER — OFFICE VISIT (OUTPATIENT)
Dept: ONCOLOGY | Age: 69
End: 2019-06-20
Payer: MEDICARE

## 2019-06-20 ENCOUNTER — APPOINTMENT (OUTPATIENT)
Dept: GENERAL RADIOLOGY | Age: 69
End: 2019-06-20
Payer: MEDICARE

## 2019-06-20 ENCOUNTER — HOSPITAL ENCOUNTER (EMERGENCY)
Age: 69
Discharge: HOME | End: 2019-06-20
Attending: FAMILY MEDICINE
Payer: MEDICARE

## 2019-06-20 VITALS
BODY MASS INDEX: 27.56 KG/M2 | RESPIRATION RATE: 16 BRPM | WEIGHT: 176 LBS | SYSTOLIC BLOOD PRESSURE: 129 MMHG | OXYGEN SATURATION: 95 % | TEMPERATURE: 97.8 F | HEART RATE: 67 BPM | DIASTOLIC BLOOD PRESSURE: 83 MMHG

## 2019-06-20 VITALS
DIASTOLIC BLOOD PRESSURE: 83 MMHG | TEMPERATURE: 99 F | BODY MASS INDEX: 28.09 KG/M2 | OXYGEN SATURATION: 95 % | RESPIRATION RATE: 18 BRPM | WEIGHT: 179 LBS | HEIGHT: 67 IN | HEART RATE: 85 BPM | SYSTOLIC BLOOD PRESSURE: 130 MMHG

## 2019-06-20 DIAGNOSIS — K59.00 CONSTIPATION, UNSPECIFIED CONSTIPATION TYPE: ICD-10-CM

## 2019-06-20 DIAGNOSIS — Z51.11 ENCOUNTER FOR CHEMOTHERAPY MANAGEMENT: ICD-10-CM

## 2019-06-20 DIAGNOSIS — R10.12 POSTOPERATIVE LEFT UPPER QUADRANT ABDOMINAL PAIN: ICD-10-CM

## 2019-06-20 DIAGNOSIS — E87.5 HYPERKALEMIA: Primary | ICD-10-CM

## 2019-06-20 DIAGNOSIS — G89.18 POSTOPERATIVE LEFT UPPER QUADRANT ABDOMINAL PAIN: ICD-10-CM

## 2019-06-20 DIAGNOSIS — Z90.2 S/P LOBECTOMY OF LUNG: ICD-10-CM

## 2019-06-20 DIAGNOSIS — C34.32 MALIGNANT NEOPLASM OF LOWER LOBE OF LEFT LUNG (HCC): ICD-10-CM

## 2019-06-20 LAB
ALBUMIN SERPL-MCNC: 4.2 G/DL (ref 3.5–5.1)
ALBUMIN SERPL-MCNC: 4.3 G/DL (ref 3.5–5.1)
ALP BLD-CCNC: 86 U/L (ref 38–126)
ALP BLD-CCNC: 88 U/L (ref 38–126)
ALT SERPL-CCNC: 10 U/L (ref 11–66)
ALT SERPL-CCNC: 10 U/L (ref 11–66)
ANION GAP SERPL CALCULATED.3IONS-SCNC: 14 MEQ/L (ref 8–16)
AST SERPL-CCNC: 16 U/L (ref 5–40)
AST SERPL-CCNC: 16 U/L (ref 5–40)
BASOPHILS # BLD: 0.4 %
BASOPHILS ABSOLUTE: 0 THOU/MM3 (ref 0–0.1)
BILIRUB SERPL-MCNC: 0.3 MG/DL (ref 0.3–1.2)
BILIRUB SERPL-MCNC: 0.3 MG/DL (ref 0.3–1.2)
BILIRUBIN DIRECT: < 0.2 MG/DL (ref 0–0.3)
BILIRUBIN DIRECT: < 0.2 MG/DL (ref 0–0.3)
BUN BLDV-MCNC: 22 MG/DL (ref 7–22)
BUN, WHOLE BLOOD: 25 MG/DL (ref 8–26)
CALCIUM IONIZED: 1.21 MMOL/L (ref 1.12–1.32)
CALCIUM SERPL-MCNC: 9.6 MG/DL (ref 8.5–10.5)
CHLORIDE BLD-SCNC: 100 MEQ/L (ref 98–111)
CHLORIDE, WHOLE BLOOD: 103 MEQ/L (ref 98–109)
CO2: 25 MEQ/L (ref 23–33)
CREAT SERPL-MCNC: 0.8 MG/DL (ref 0.4–1.2)
CREATININE, WHOLE BLOOD: 1 MG/DL (ref 0.5–1.2)
EKG ATRIAL RATE: 75 BPM
EKG P AXIS: 68 DEGREES
EKG P-R INTERVAL: 126 MS
EKG Q-T INTERVAL: 386 MS
EKG QRS DURATION: 96 MS
EKG QTC CALCULATION (BAZETT): 431 MS
EKG R AXIS: -56 DEGREES
EKG T AXIS: 46 DEGREES
EKG VENTRICULAR RATE: 75 BPM
EOSINOPHIL # BLD: 0.8 %
EOSINOPHILS ABSOLUTE: 0 THOU/MM3 (ref 0–0.4)
ERYTHROCYTE [DISTWIDTH] IN BLOOD BY AUTOMATED COUNT: 17.4 % (ref 11.5–14.5)
ERYTHROCYTE [DISTWIDTH] IN BLOOD BY AUTOMATED COUNT: 51.3 FL (ref 35–45)
GFR SERPL CREATININE-BSD FRML MDRD: > 90 ML/MIN/1.73M2
GFR, ESTIMATED: 79 ML/MIN/1.73M2
GLUCOSE BLD-MCNC: 343 MG/DL (ref 70–108)
GLUCOSE, WHOLE BLOOD: 267 MG/DL (ref 70–108)
HCT VFR BLD CALC: 36 % (ref 42–52)
HCT VFR BLD CALC: 37.7 % (ref 42–52)
HEMOGLOBIN: 12.1 GM/DL (ref 14–18)
HEMOGLOBIN: 12.3 GM/DL (ref 14–18)
IMMATURE GRANS (ABS): 0.02 THOU/MM3 (ref 0–0.07)
IMMATURE GRANULOCYTES: 0.4 %
IONIZED CALCIUM, WHOLE BLOOD: 1.23 MMOL/L (ref 1.12–1.32)
LIPASE: 21 U/L (ref 5.6–51.3)
LYMPHOCYTES # BLD: 19.1 %
LYMPHOCYTES ABSOLUTE: 1 THOU/MM3 (ref 1–4.8)
MAGNESIUM: 1.7 MG/DL (ref 1.6–2.4)
MCH RBC QN AUTO: 26.7 PG (ref 26–33)
MCH RBC QN AUTO: 27.6 PG (ref 27–31)
MCHC RBC AUTO-ENTMCNC: 32.1 GM/DL (ref 32.2–35.5)
MCHC RBC AUTO-ENTMCNC: 34.2 GM/DL (ref 33–37)
MCV RBC AUTO: 81 FL (ref 80–94)
MCV RBC AUTO: 83.2 FL (ref 80–94)
MONOCYTES # BLD: 11.1 %
MONOCYTES ABSOLUTE: 0.6 THOU/MM3 (ref 0.4–1.3)
NUCLEATED RED BLOOD CELLS: 0 /100 WBC
OSMOLALITY CALCULATION: 294.5 MOSMOL/KG (ref 275–300)
PDW BLD-RTO: 14.7 % (ref 11.5–14.5)
PHOSPHORUS: 3.2 MG/DL (ref 2.4–4.7)
PLATELET # BLD: 196 THOU/MM3 (ref 130–400)
PLATELET # BLD: 199 THOU/MM3 (ref 130–400)
PMV BLD AUTO: 6.5 FL (ref 7.4–10.4)
PMV BLD AUTO: 8.8 FL (ref 9.4–12.4)
POTASSIUM SERPL-SCNC: 5.2 MEQ/L (ref 3.5–5.2)
POTASSIUM, WHOLE BLOOD: 6.1 MEQ/L (ref 3.5–4.9)
POTASSIUM, WHOLE BLOOD: 6.1 MEQ/L (ref 3.5–4.9)
RBC # BLD: 4.46 MILL/MM3 (ref 4.7–6.1)
RBC # BLD: 4.53 MILL/MM3 (ref 4.7–6.1)
SEG NEUTROPHILS: 67 % (ref 43–75)
SEG NEUTROPHILS: 68.2 %
SEGMENTED NEUTROPHILS ABSOLUTE COUNT: 3.4 THOU/MM3 (ref 1.8–7.7)
SEGMENTED NEUTROPHILS ABSOLUTE COUNT: 3.6 THOU/MM3 (ref 1.8–7.7)
SODIUM BLD-SCNC: 139 MEQ/L (ref 135–145)
SODIUM, WHOLE BLOOD: 138 MEQ/L (ref 138–146)
TOTAL CO2, WHOLE BLOOD: 27 MEQ/L (ref 23–33)
TOTAL PROTEIN: 7.1 G/DL (ref 6.1–8)
TOTAL PROTEIN: 7.2 G/DL (ref 6.1–8)
WBC # BLD: 5 THOU/MM3 (ref 4.8–10.8)
WBC # BLD: 5.3 THOU/MM3 (ref 4.8–10.8)

## 2019-06-20 PROCEDURE — G8598 ASA/ANTIPLAT THER USED: HCPCS | Performed by: INTERNAL MEDICINE

## 2019-06-20 PROCEDURE — 85025 COMPLETE CBC W/AUTO DIFF WBC: CPT

## 2019-06-20 PROCEDURE — 82330 ASSAY OF CALCIUM: CPT

## 2019-06-20 PROCEDURE — 93010 ELECTROCARDIOGRAM REPORT: CPT | Performed by: INTERNAL MEDICINE

## 2019-06-20 PROCEDURE — 36415 COLL VENOUS BLD VENIPUNCTURE: CPT

## 2019-06-20 PROCEDURE — 93005 ELECTROCARDIOGRAM TRACING: CPT | Performed by: FAMILY MEDICINE

## 2019-06-20 PROCEDURE — 84100 ASSAY OF PHOSPHORUS: CPT

## 2019-06-20 PROCEDURE — 85027 COMPLETE CBC AUTOMATED: CPT

## 2019-06-20 PROCEDURE — 2709999900 HC NON-CHARGEABLE SUPPLY

## 2019-06-20 PROCEDURE — 99213 OFFICE O/P EST LOW 20 MIN: CPT | Performed by: INTERNAL MEDICINE

## 2019-06-20 PROCEDURE — 83690 ASSAY OF LIPASE: CPT

## 2019-06-20 PROCEDURE — 80053 COMPREHEN METABOLIC PANEL: CPT

## 2019-06-20 PROCEDURE — G8419 CALC BMI OUT NRM PARAM NOF/U: HCPCS | Performed by: INTERNAL MEDICINE

## 2019-06-20 PROCEDURE — 99285 EMERGENCY DEPT VISIT HI MDM: CPT

## 2019-06-20 PROCEDURE — 80047 BASIC METABLC PNL IONIZED CA: CPT

## 2019-06-20 PROCEDURE — 99211 OFF/OP EST MAY X REQ PHY/QHP: CPT

## 2019-06-20 PROCEDURE — 6370000000 HC RX 637 (ALT 250 FOR IP): Performed by: FAMILY MEDICINE

## 2019-06-20 PROCEDURE — 1036F TOBACCO NON-USER: CPT | Performed by: INTERNAL MEDICINE

## 2019-06-20 PROCEDURE — 3017F COLORECTAL CA SCREEN DOC REV: CPT | Performed by: INTERNAL MEDICINE

## 2019-06-20 PROCEDURE — 82248 BILIRUBIN DIRECT: CPT

## 2019-06-20 PROCEDURE — 2580000003 HC RX 258: Performed by: FAMILY MEDICINE

## 2019-06-20 PROCEDURE — G8428 CUR MEDS NOT DOCUMENT: HCPCS | Performed by: INTERNAL MEDICINE

## 2019-06-20 PROCEDURE — 83735 ASSAY OF MAGNESIUM: CPT

## 2019-06-20 PROCEDURE — 4040F PNEUMOC VAC/ADMIN/RCVD: CPT | Performed by: INTERNAL MEDICINE

## 2019-06-20 PROCEDURE — 71046 X-RAY EXAM CHEST 2 VIEWS: CPT

## 2019-06-20 PROCEDURE — 80076 HEPATIC FUNCTION PANEL: CPT

## 2019-06-20 PROCEDURE — 1123F ACP DISCUSS/DSCN MKR DOCD: CPT | Performed by: INTERNAL MEDICINE

## 2019-06-20 RX ORDER — LACTULOSE 10 G/15ML
10-20 SOLUTION ORAL 2 TIMES DAILY PRN
Qty: 480 ML | Refills: 1 | Status: SHIPPED | OUTPATIENT
Start: 2019-06-20 | End: 2019-09-11 | Stop reason: ALTCHOICE

## 2019-06-20 RX ORDER — SODIUM CHLORIDE 9 MG/ML
INJECTION, SOLUTION INTRAVENOUS CONTINUOUS
Status: DISCONTINUED | OUTPATIENT
Start: 2019-06-20 | End: 2019-06-20 | Stop reason: HOSPADM

## 2019-06-20 RX ORDER — SODIUM POLYSTYRENE SULFONATE 4.1 MEQ/G
15 POWDER, FOR SUSPENSION ORAL; RECTAL ONCE
Status: COMPLETED | OUTPATIENT
Start: 2019-06-20 | End: 2019-06-20

## 2019-06-20 RX ORDER — OXYCODONE AND ACETAMINOPHEN 7.5; 325 MG/1; MG/1
1 TABLET ORAL EVERY 6 HOURS PRN
Qty: 90 TABLET | Refills: 0 | Status: CANCELLED | OUTPATIENT
Start: 2019-06-20 | End: 2019-07-20

## 2019-06-20 RX ADMIN — SODIUM CHLORIDE: 9 INJECTION, SOLUTION INTRAVENOUS at 11:09

## 2019-06-20 RX ADMIN — SODIUM POLYSTYRENE SULFONATE 15 G: 1 POWDER ORAL; RECTAL at 13:41

## 2019-06-20 RX ADMIN — INSULIN HUMAN 5 UNITS: 100 INJECTION, SOLUTION PARENTERAL at 13:42

## 2019-06-20 ASSESSMENT — ENCOUNTER SYMPTOMS
COUGH: 0
BLOOD IN STOOL: 0
ABDOMINAL PAIN: 1
SHORTNESS OF BREATH: 0
NAUSEA: 0
CONSTIPATION: 1
VOMITING: 0

## 2019-06-20 NOTE — ED TRIAGE NOTES
Pt presents to ED at the request of his  For Potassium level of 6.2. Pt denies chest pain. EKG complete. Dr. Tereso Leon in room to assess pt.

## 2019-06-20 NOTE — ED PROVIDER NOTES
[facility]  eMERGENCY dEPARTMENT eNCOUnter          [address] [facility]       Chief Complaint   Patient presents with    Abnormal Lab     k+ 6.2       Nurses Notes reviewed and I agree except as noted in the HPI. HISTORY OF PRESENT ILLNESS    Alfredo Weeks is a 71 y.o. male who presents for elevated blood potassium level    Location/Symptom: High potassium  Timing/Onset: Over the last few days  Context/Setting: History of lung cancer followed by   Outpatient blood work showed potassium 6.1  Sent to the ER  Does not take potassium supplement  Quality: Patient is asymptomatic  He has had some left upper quadrant abdominal pain. Tendency to be constipated on his pain pills  No history of renal insufficiency  Duration: Few days  Modifying Factors: None  Severity: 3/10    REVIEW OF SYSTEMS     Review of Systems   Constitutional: Negative for chills, diaphoresis and fever. HENT: Negative for congestion. Respiratory: Negative for cough and shortness of breath. Cardiovascular: Negative for chest pain, palpitations and leg swelling. Gastrointestinal: Positive for abdominal pain and constipation. Negative for blood in stool, nausea and vomiting. Occasional left upper quadrant abdominal pain   Genitourinary: Negative for dysuria. Musculoskeletal: Negative for joint swelling. Skin: Negative for rash. Neurological: Negative for light-headedness. Hematological: Negative for adenopathy. Psychiatric/Behavioral: Negative for confusion. PAST MEDICAL HISTORY    has a past medical history of Anxiety, Arthritis, Cancer ([address] COPD (chronic obstructive pulmonary disease) ([address] Enlarged prostate with urinary retention, Gait difficulty, Generalized weakness, Hyperlipidemia, Hypertension, Lesion of bladder, Osteoarthritis, Retention of urine, S/P cardiac catheterization, S/P TURP, SOB (shortness of breath), and Voiding difficulty.     SURGICAL HISTORY      has a past that his mother is . He indicated that his father is . He indicated that his sister is alive. He indicated that his brother is alive. family history includes Brain Cancer in his mother; COPD in his sister; Diabetes in his brother, father, mother, and sister; Heart Attack in his father; Heart Disease in his father; High Blood Pressure in his brother, father, mother, and sister. SOCIAL HISTORY      reports that he quit smoking about 3 months ago. His smoking use included cigarettes. He started smoking about 48 years ago. He has a 40.00 pack-year smoking history. He has never used smokeless tobacco. He reports that he does not drink alcohol or use drugs. PHYSICAL EXAM     INITIAL VITALS:  weight is 176 lb (79.8 kg). His oral temperature is 97.8 °F (36.6 °C). His blood pressure is 130/83 and his pulse is 73. His respiration is 16 and oxygen saturation is 96%. Physical Exam   Constitutional: He is oriented to person, place, and time. He appears well-developed and well-nourished. GCS 15   HENT:   Head: Normocephalic and atraumatic. Eyes: Pupils are equal, round, and reactive to light. EOM are normal.   Neck: Normal range of motion. Neck supple. No JVD present. Cardiovascular: Normal rate and regular rhythm. Pulmonary/Chest: Effort normal and breath sounds normal.   Abdominal: Soft. Bowel sounds are normal. There is no tenderness. There is no rebound and no guarding. Musculoskeletal: Normal range of motion. He exhibits no edema. Neurological: He is alert and oriented to person, place, and time. Skin: Skin is warm and dry. Psychiatric: He has a normal mood and affect. His behavior is normal.   Nursing note and vitals reviewed. DIFFERENTIAL DIAGNOSIS:     Hyperkalemia    LUQ abd. Pain with constipation.         DIAGNOSTIC RESULTS     EKG: All EKG's are interpreted by the Emergency Department Physician who either signs or Co-signs this chart in the absence of a cardiologist.    EKG showed sinus rhythm with rate 75. QRS complexes show left axis, normal conduction. ST-T waves show no acute change. RADIOLOGY: non-plain film images(s) such as CT, Ultrasound and MRI are read by the radiologist.  The patient had a 2 view X-ray of the chest which demonstrates left basilar volume loss with pleural thickening or effusion or my interpretation    [x] Visualized and interpreted by me   [x] Radiologist's Wet Read Report Reviewed   [] Discussed with Radiologist.    LABS:   Labs Reviewed   CBC WITH AUTO DIFFERENTIAL - Abnormal; Notable for the following components:       Result Value    RBC 4.53 (*)     Hemoglobin 12.1 (*)     Hematocrit 37.7 (*)     MCHC 32.1 (*)     RDW-CV 17.4 (*)     RDW-SD 51.3 (*)     MPV 8.8 (*)     All other components within normal limits   BASIC METABOLIC PANEL - Abnormal; Notable for the following components:    Glucose 343 (*)     All other components within normal limits   HEPATIC FUNCTION PANEL - Abnormal; Notable for the following components:    ALT 10 (*)     All other components within normal limits   LIPASE   MAGNESIUM   PHOSPHORUS   CALCIUM, IONIZED   ANION GAP   OSMOLALITY   GLOMERULAR FILTRATION RATE, ESTIMATED   URINALYSIS WITH MICROSCOPIC       EMERGENCY DEPARTMENT COURSE:   Vitals:    Vitals:    06/20/19 1100 06/20/19 1201   BP: 134/80 130/83   Pulse: 87 73   Resp: 16 16   Temp: 97.8 °F (36.6 °C)    TempSrc: Oral    SpO2: 97% 96%   Weight: 176 lb (79.8 kg)      Nursing notes reviewed    Chest x-ray shows small effusion left basilar pleural thickening no acute process    His potassium draw here is 5.2    Creatinine 0.8    Potassium is barely elevated    Random sugar was 343    Given regular insulin 5 units subcu    Will give 1 dose of Kayexalate p.o. With a potassium of 5.2 more treatment is not needed.   He does not need admitted    Will start on lactulose for constipation    Discharge home      CRITICAL CARE: none    CONSULTS:  none    PROCEDURES:  None    FINAL IMPRESSION      1. Hyperkalemia    2.  Constipation, unspecified constipation type          DISPOSITION/PLAN     Discharge home    PATIENT REFERRED TO:  Linh Barr MD  [facility] [address]  [address]      As scheduled      DISCHARGE MEDICATIONS:  New Prescriptions    LACTULOSE ([facility]) 10 GM/15ML SOLUTION    Take 15-30 mLs by mouth 2 times daily as needed (constipation)       (Please note that portions of this note were completed with a voice recognition program.  Efforts were made to edit the dictations but occasionally words are mis-transcribed.)    MD Hakan Enrique MD  06/20/19 7026

## 2019-06-20 NOTE — PATIENT INSTRUCTIONS
1.  No treatment today  2. Patient is sent to the emergency room for treatment of hyperkalemia  3.   RTC on June 24, 2019 to see me, for labs: CBC, BMP, LFTs and next cycle of chemotherapy

## 2019-06-22 ASSESSMENT — ENCOUNTER SYMPTOMS
SORE THROAT: 0
VOMITING: 0
RECTAL PAIN: 0
DIARRHEA: 0
ABDOMINAL DISTENTION: 0
EYE DISCHARGE: 0
BLOOD IN STOOL: 0
ABDOMINAL PAIN: 0
COLOR CHANGE: 0
NAUSEA: 0
CONSTIPATION: 0
FACIAL SWELLING: 0
TROUBLE SWALLOWING: 0
CHEST TIGHTNESS: 0
SHORTNESS OF BREATH: 0
COUGH: 0
BACK PAIN: 0
WHEEZING: 0

## 2019-06-25 DIAGNOSIS — C34.32 MALIGNANT NEOPLASM OF LOWER LOBE OF LEFT LUNG (HCC): Primary | ICD-10-CM

## 2019-06-26 ENCOUNTER — HOSPITAL ENCOUNTER (OUTPATIENT)
Dept: INFUSION THERAPY | Age: 69
Discharge: HOME | End: 2019-06-26
Payer: MEDICARE

## 2019-06-26 VITALS
HEIGHT: 67 IN | SYSTOLIC BLOOD PRESSURE: 141 MMHG | OXYGEN SATURATION: 94 % | TEMPERATURE: 97.8 F | DIASTOLIC BLOOD PRESSURE: 84 MMHG | WEIGHT: 179.6 LBS | RESPIRATION RATE: 16 BRPM | HEART RATE: 84 BPM | BODY MASS INDEX: 28.19 KG/M2

## 2019-06-26 DIAGNOSIS — G89.18 POSTOPERATIVE LEFT UPPER QUADRANT ABDOMINAL PAIN: ICD-10-CM

## 2019-06-26 DIAGNOSIS — Z90.2 S/P LOBECTOMY OF LUNG: ICD-10-CM

## 2019-06-26 DIAGNOSIS — C34.92 ADENOCARCINOMA OF LEFT LUNG (HCC): ICD-10-CM

## 2019-06-26 DIAGNOSIS — C34.32 MALIGNANT NEOPLASM OF LOWER LOBE OF LEFT LUNG (HCC): Primary | ICD-10-CM

## 2019-06-26 DIAGNOSIS — R10.12 POSTOPERATIVE LEFT UPPER QUADRANT ABDOMINAL PAIN: ICD-10-CM

## 2019-06-26 LAB
ALBUMIN SERPL-MCNC: 4.1 G/DL (ref 3.5–5.1)
ALP BLD-CCNC: 89 U/L (ref 38–126)
ALT SERPL-CCNC: 8 U/L (ref 11–66)
AST SERPL-CCNC: 17 U/L (ref 5–40)
BILIRUB SERPL-MCNC: 0.2 MG/DL (ref 0.3–1.2)
BILIRUBIN DIRECT: < 0.2 MG/DL (ref 0–0.3)
BUN, WHOLE BLOOD: 19 MG/DL (ref 8–26)
CHLORIDE, WHOLE BLOOD: 102 MEQ/L (ref 98–109)
CREATININE, WHOLE BLOOD: 1 MG/DL (ref 0.5–1.2)
GFR, ESTIMATED: 79 ML/MIN/1.73M2
GLUCOSE, WHOLE BLOOD: 325 MG/DL (ref 70–108)
HCT VFR BLD CALC: 36.4 % (ref 42–52)
HEMOGLOBIN: 12.4 GM/DL (ref 14–18)
IONIZED CALCIUM, WHOLE BLOOD: 1.24 MMOL/L (ref 1.12–1.32)
MCH RBC QN AUTO: 27.4 PG (ref 27–31)
MCHC RBC AUTO-ENTMCNC: 34 GM/DL (ref 33–37)
MCV RBC AUTO: 81 FL (ref 80–94)
PDW BLD-RTO: 15.9 % (ref 11.5–14.5)
PLATELET # BLD: 156 THOU/MM3 (ref 130–400)
PMV BLD AUTO: 7 FL (ref 7.4–10.4)
POTASSIUM, WHOLE BLOOD: 4.8 MEQ/L (ref 3.5–4.9)
RBC # BLD: 4.51 MILL/MM3 (ref 4.7–6.1)
SEG NEUTROPHILS: 68 % (ref 43–75)
SEGMENTED NEUTROPHILS ABSOLUTE COUNT: 4.1 THOU/MM3 (ref 1.8–7.7)
SODIUM, WHOLE BLOOD: 139 MEQ/L (ref 138–146)
TOTAL CO2, WHOLE BLOOD: 26 MEQ/L (ref 23–33)
TOTAL PROTEIN: 6.9 G/DL (ref 6.1–8)
WBC # BLD: 6.1 THOU/MM3 (ref 4.8–10.8)

## 2019-06-26 PROCEDURE — 80076 HEPATIC FUNCTION PANEL: CPT

## 2019-06-26 PROCEDURE — 96375 TX/PRO/DX INJ NEW DRUG ADDON: CPT

## 2019-06-26 PROCEDURE — 85027 COMPLETE CBC AUTOMATED: CPT

## 2019-06-26 PROCEDURE — 96366 THER/PROPH/DIAG IV INF ADDON: CPT

## 2019-06-26 PROCEDURE — 6360000002 HC RX W HCPCS: Performed by: INTERNAL MEDICINE

## 2019-06-26 PROCEDURE — 2580000003 HC RX 258: Performed by: INTERNAL MEDICINE

## 2019-06-26 PROCEDURE — 80047 BASIC METABLC PNL IONIZED CA: CPT

## 2019-06-26 PROCEDURE — 96413 CHEMO IV INFUSION 1 HR: CPT

## 2019-06-26 PROCEDURE — 96411 CHEMO IV PUSH ADDL DRUG: CPT

## 2019-06-26 PROCEDURE — 2709999900 HC NON-CHARGEABLE SUPPLY

## 2019-06-26 PROCEDURE — 96361 HYDRATE IV INFUSION ADD-ON: CPT

## 2019-06-26 PROCEDURE — 96372 THER/PROPH/DIAG INJ SC/IM: CPT

## 2019-06-26 PROCEDURE — 96415 CHEMO IV INFUSION ADDL HR: CPT

## 2019-06-26 PROCEDURE — 96367 TX/PROPH/DG ADDL SEQ IV INF: CPT

## 2019-06-26 PROCEDURE — 36415 COLL VENOUS BLD VENIPUNCTURE: CPT

## 2019-06-26 RX ORDER — SODIUM CHLORIDE 9 MG/ML
20 INJECTION, SOLUTION INTRAVENOUS ONCE
Status: COMPLETED | OUTPATIENT
Start: 2019-06-26 | End: 2019-06-26

## 2019-06-26 RX ORDER — CYANOCOBALAMIN 1000 UG/ML
1000 INJECTION INTRAMUSCULAR; SUBCUTANEOUS ONCE
Status: COMPLETED | OUTPATIENT
Start: 2019-06-26 | End: 2019-06-26

## 2019-06-26 RX ORDER — PALONOSETRON 0.05 MG/ML
0.25 INJECTION, SOLUTION INTRAVENOUS ONCE
Status: COMPLETED | OUTPATIENT
Start: 2019-06-26 | End: 2019-06-26

## 2019-06-26 RX ORDER — OXYCODONE AND ACETAMINOPHEN 7.5; 325 MG/1; MG/1
1 TABLET ORAL EVERY 6 HOURS PRN
Qty: 90 TABLET | Refills: 0 | Status: SHIPPED | OUTPATIENT
Start: 2019-06-26 | End: 2019-07-17 | Stop reason: SDUPTHER

## 2019-06-26 RX ADMIN — CISPLATIN: 1 INJECTION, SOLUTION INTRAVENOUS at 11:52

## 2019-06-26 RX ADMIN — SODIUM CHLORIDE 20 ML/HR: 9 INJECTION, SOLUTION INTRAVENOUS at 09:12

## 2019-06-26 RX ADMIN — CYANOCOBALAMIN 1000 MCG: 1000 INJECTION, SOLUTION INTRAMUSCULAR; SUBCUTANEOUS at 10:12

## 2019-06-26 RX ADMIN — POTASSIUM CHLORIDE: 2 INJECTION, SOLUTION, CONCENTRATE INTRAVENOUS at 14:56

## 2019-06-26 RX ADMIN — SODIUM CHLORIDE 970 MG: 9 INJECTION, SOLUTION INTRAVENOUS at 11:26

## 2019-06-26 RX ADMIN — PALONOSETRON 0.25 MG: 0.05 INJECTION, SOLUTION INTRAVENOUS at 10:24

## 2019-06-26 RX ADMIN — POTASSIUM CHLORIDE: 2 INJECTION, SOLUTION, CONCENTRATE INTRAVENOUS at 09:16

## 2019-06-26 RX ADMIN — SODIUM CHLORIDE 150 MG: 9 INJECTION, SOLUTION INTRAVENOUS at 10:44

## 2019-06-26 RX ADMIN — DEXAMETHASONE SODIUM PHOSPHATE 12 MG: 4 INJECTION, SOLUTION INTRAMUSCULAR; INTRAVENOUS at 10:25

## 2019-06-26 ASSESSMENT — PAIN DESCRIPTION - ORIENTATION: ORIENTATION: LEFT;MID

## 2019-06-26 ASSESSMENT — PAIN SCALES - GENERAL: PAINLEVEL_OUTOF10: 6

## 2019-06-26 ASSESSMENT — PAIN DESCRIPTION - LOCATION: LOCATION: ABDOMEN

## 2019-06-26 ASSESSMENT — PAIN DESCRIPTION - PROGRESSION: CLINICAL_PROGRESSION: NOT CHANGED

## 2019-06-26 ASSESSMENT — PAIN DESCRIPTION - ONSET: ONSET: ON-GOING

## 2019-06-26 ASSESSMENT — PAIN DESCRIPTION - FREQUENCY: FREQUENCY: CONTINUOUS

## 2019-06-26 ASSESSMENT — PAIN DESCRIPTION - DESCRIPTORS: DESCRIPTORS: STABBING

## 2019-06-26 NOTE — PROGRESS NOTES
Patient assessed for the following post chemotherapy:    Dizziness   No  Lightheadedness  No      Acute nausea/vomiting No  Headache   No  Chest pain/pressure  No  Rash/itching   No  Shortness of breath  No    Patient kept for 20 minutes observation post infusion chemotherapy. Patient tolerated chemotherapy treatment Alimta and cisplatin without any complications. Last vital signs:   BP (!) 141/84   Pulse 84   Temp 97.8 °F (36.6 °C) (Oral)   Resp 16   Ht 5' 7\" (1.702 m)   Wt 179 lb 9.6 oz (81.5 kg)   SpO2 94%   BMI 28.13 kg/m²       Patient instructed if experience any of the above symptoms following today's infusion,he/she is to notify MD immediately or go to the emergency department. Discharge instructions given to patient. Verbalizes understanding. Ambulated off unit per self in stable condition with belongings.

## 2019-06-26 NOTE — ONCOLOGY
Chemotherapy Administration    Pre-assessment Data: Antineoplastic Agents  Other:   See toxicity flow sheet for assessment [x]     Physician Notification of Concerns Related to Chemotherapy Administration:   Physician Notified Maryam Rm / Time of Notification      Interventions:   Lab work assessed  [x]   Height / Weight verified for dose [x]   Current MAR reviewed [x]   Emergency drugs available as appropriate [x]   Anaphylaxis assessment completed [x]   Pre-medications administered as ordered [x]   Blood return noted upon initiation of chemotherapy [x]   Blood return noted each 1-2ml of a vesicant medication if given IV push []   Blood return noted each 2-3ml of a non-vesicant medication if given IV push []   Monitor for signs / symptoms of hypersensitivity reaction [x]   Chemotherapy orders (drug/dose/rate) verified by 2 Chemo certified RNs [x]   Monitor IV site and blood return throughout the infusion of the medication [x]   Document IV site checks on the IV assessment form [x]   Document chemotherapy teaching on the Patient Education tab [x]   Document patient verbalizes understanding of medications being administered-  Alimta and cisplatin [x]   If IV infiltration, see ONS Guidelines []   Other:      []

## 2019-06-26 NOTE — PLAN OF CARE
Musculor/Skeletal Functional Status  Goal: Absence of falls  Outcome: Met This Shift  Note:   Free from falls while in O.P. Oncology. Intervention: Provide standby assistance  Note:   Discussed the need to use the call light for assistance when getting up to ambulate. Care plan reviewed with patient and significant other. Patient and significant other verbalize understanding of the plan of care and contribute to goal setting.

## 2019-07-08 RX ORDER — CLOPIDOGREL BISULFATE 75 MG/1
TABLET ORAL
Qty: 30 TABLET | Refills: 2 | Status: SHIPPED | OUTPATIENT
Start: 2019-07-08 | End: 2019-09-11 | Stop reason: SDUPTHER

## 2019-07-15 ENCOUNTER — HOSPITAL ENCOUNTER (OUTPATIENT)
Dept: PULMONOLOGY | Age: 69
Discharge: HOME | End: 2019-07-15
Payer: MEDICARE

## 2019-07-15 DIAGNOSIS — J44.9 CHRONIC OBSTRUCTIVE PULMONARY DISEASE, UNSPECIFIED COPD TYPE (HCC): ICD-10-CM

## 2019-07-15 PROCEDURE — 94726 PLETHYSMOGRAPHY LUNG VOLUMES: CPT

## 2019-07-15 PROCEDURE — 94060 EVALUATION OF WHEEZING: CPT

## 2019-07-15 PROCEDURE — 2709999900 HC NON-CHARGEABLE SUPPLY

## 2019-07-15 PROCEDURE — 94729 DIFFUSING CAPACITY: CPT

## 2019-07-16 DIAGNOSIS — C34.32 MALIGNANT NEOPLASM OF LOWER LOBE OF LEFT LUNG (HCC): Primary | ICD-10-CM

## 2019-07-17 ENCOUNTER — HOSPITAL ENCOUNTER (OUTPATIENT)
Dept: INFUSION THERAPY | Age: 69
Discharge: HOME | End: 2019-07-17
Payer: MEDICARE

## 2019-07-17 ENCOUNTER — OFFICE VISIT (OUTPATIENT)
Dept: ONCOLOGY | Age: 69
End: 2019-07-17
Payer: MEDICARE

## 2019-07-17 ENCOUNTER — CLINICAL DOCUMENTATION (OUTPATIENT)
Dept: ONCOLOGY | Age: 69
End: 2019-07-17

## 2019-07-17 VITALS
OXYGEN SATURATION: 95 % | DIASTOLIC BLOOD PRESSURE: 74 MMHG | SYSTOLIC BLOOD PRESSURE: 144 MMHG | TEMPERATURE: 97.8 F | RESPIRATION RATE: 18 BRPM | WEIGHT: 179 LBS | BODY MASS INDEX: 28.09 KG/M2 | HEIGHT: 67 IN | HEART RATE: 83 BPM

## 2019-07-17 VITALS
HEART RATE: 84 BPM | DIASTOLIC BLOOD PRESSURE: 90 MMHG | WEIGHT: 179 LBS | OXYGEN SATURATION: 98 % | BODY MASS INDEX: 28.09 KG/M2 | TEMPERATURE: 98.5 F | RESPIRATION RATE: 18 BRPM | HEIGHT: 67 IN | SYSTOLIC BLOOD PRESSURE: 148 MMHG

## 2019-07-17 DIAGNOSIS — C34.32 MALIGNANT NEOPLASM OF LOWER LOBE OF LEFT LUNG (HCC): ICD-10-CM

## 2019-07-17 DIAGNOSIS — Z90.2 S/P LOBECTOMY OF LUNG: ICD-10-CM

## 2019-07-17 DIAGNOSIS — R10.12 POSTOPERATIVE LEFT UPPER QUADRANT ABDOMINAL PAIN: ICD-10-CM

## 2019-07-17 DIAGNOSIS — C34.92 ADENOCARCINOMA OF LEFT LUNG (HCC): ICD-10-CM

## 2019-07-17 DIAGNOSIS — G89.18 POSTOPERATIVE LEFT UPPER QUADRANT ABDOMINAL PAIN: ICD-10-CM

## 2019-07-17 DIAGNOSIS — R10.12 LEFT UPPER QUADRANT PAIN: Primary | ICD-10-CM

## 2019-07-17 DIAGNOSIS — C34.32 MALIGNANT NEOPLASM OF LOWER LOBE OF LEFT LUNG (HCC): Primary | ICD-10-CM

## 2019-07-17 DIAGNOSIS — Z51.11 ENCOUNTER FOR CHEMOTHERAPY MANAGEMENT: ICD-10-CM

## 2019-07-17 LAB
ALBUMIN SERPL-MCNC: 4.2 G/DL (ref 3.5–5.1)
ALP BLD-CCNC: 107 U/L (ref 38–126)
ALT SERPL-CCNC: 7 U/L (ref 11–66)
AST SERPL-CCNC: 13 U/L (ref 5–40)
BILIRUB SERPL-MCNC: < 0.2 MG/DL (ref 0.3–1.2)
BILIRUBIN DIRECT: < 0.2 MG/DL (ref 0–0.3)
BUN, WHOLE BLOOD: 27 MG/DL (ref 8–26)
CHLORIDE, WHOLE BLOOD: 106 MEQ/L (ref 98–109)
CREATININE, WHOLE BLOOD: 0.9 MG/DL (ref 0.5–1.2)
GFR, ESTIMATED: 89 ML/MIN/1.73M2
GLUCOSE, WHOLE BLOOD: 198 MG/DL (ref 70–108)
HCT VFR BLD CALC: 33.4 % (ref 42–52)
HEMOGLOBIN: 11.5 GM/DL (ref 14–18)
IONIZED CALCIUM, WHOLE BLOOD: 1.3 MMOL/L (ref 1.12–1.32)
MCH RBC QN AUTO: 28.1 PG (ref 27–31)
MCHC RBC AUTO-ENTMCNC: 34.5 GM/DL (ref 33–37)
MCV RBC AUTO: 81 FL (ref 80–94)
PDW BLD-RTO: 17.8 % (ref 11.5–14.5)
PLATELET # BLD: 223 THOU/MM3 (ref 130–400)
PMV BLD AUTO: 6.4 FL (ref 7.4–10.4)
POTASSIUM, WHOLE BLOOD: 4.9 MEQ/L (ref 3.5–4.9)
RBC # BLD: 4.1 MILL/MM3 (ref 4.7–6.1)
SEG NEUTROPHILS: 60 % (ref 43–75)
SEGMENTED NEUTROPHILS ABSOLUTE COUNT: 2.3 THOU/MM3 (ref 1.8–7.7)
SODIUM, WHOLE BLOOD: 143 MEQ/L (ref 138–146)
TOTAL CO2, WHOLE BLOOD: 25 MEQ/L (ref 23–33)
TOTAL PROTEIN: 6.6 G/DL (ref 6.1–8)
WBC # BLD: 3.8 THOU/MM3 (ref 4.8–10.8)

## 2019-07-17 PROCEDURE — 4040F PNEUMOC VAC/ADMIN/RCVD: CPT | Performed by: INTERNAL MEDICINE

## 2019-07-17 PROCEDURE — 96413 CHEMO IV INFUSION 1 HR: CPT

## 2019-07-17 PROCEDURE — 96375 TX/PRO/DX INJ NEW DRUG ADDON: CPT

## 2019-07-17 PROCEDURE — 1123F ACP DISCUSS/DSCN MKR DOCD: CPT | Performed by: INTERNAL MEDICINE

## 2019-07-17 PROCEDURE — 85027 COMPLETE CBC AUTOMATED: CPT

## 2019-07-17 PROCEDURE — 96367 TX/PROPH/DG ADDL SEQ IV INF: CPT

## 2019-07-17 PROCEDURE — 3017F COLORECTAL CA SCREEN DOC REV: CPT | Performed by: INTERNAL MEDICINE

## 2019-07-17 PROCEDURE — 99214 OFFICE O/P EST MOD 30 MIN: CPT | Performed by: INTERNAL MEDICINE

## 2019-07-17 PROCEDURE — G8598 ASA/ANTIPLAT THER USED: HCPCS | Performed by: INTERNAL MEDICINE

## 2019-07-17 PROCEDURE — 96415 CHEMO IV INFUSION ADDL HR: CPT

## 2019-07-17 PROCEDURE — 80076 HEPATIC FUNCTION PANEL: CPT

## 2019-07-17 PROCEDURE — 1036F TOBACCO NON-USER: CPT | Performed by: INTERNAL MEDICINE

## 2019-07-17 PROCEDURE — 96411 CHEMO IV PUSH ADDL DRUG: CPT

## 2019-07-17 PROCEDURE — 2580000003 HC RX 258: Performed by: INTERNAL MEDICINE

## 2019-07-17 PROCEDURE — 6360000002 HC RX W HCPCS: Performed by: INTERNAL MEDICINE

## 2019-07-17 PROCEDURE — 2709999900 HC NON-CHARGEABLE SUPPLY

## 2019-07-17 PROCEDURE — 36415 COLL VENOUS BLD VENIPUNCTURE: CPT

## 2019-07-17 PROCEDURE — 99211 OFF/OP EST MAY X REQ PHY/QHP: CPT

## 2019-07-17 PROCEDURE — G8427 DOCREV CUR MEDS BY ELIG CLIN: HCPCS | Performed by: INTERNAL MEDICINE

## 2019-07-17 PROCEDURE — 80047 BASIC METABLC PNL IONIZED CA: CPT

## 2019-07-17 PROCEDURE — G8419 CALC BMI OUT NRM PARAM NOF/U: HCPCS | Performed by: INTERNAL MEDICINE

## 2019-07-17 PROCEDURE — 96366 THER/PROPH/DIAG IV INF ADDON: CPT

## 2019-07-17 RX ORDER — PALONOSETRON 0.05 MG/ML
0.25 INJECTION, SOLUTION INTRAVENOUS ONCE
Status: CANCELLED | OUTPATIENT
Start: 2019-07-17

## 2019-07-17 RX ORDER — SODIUM CHLORIDE 0.9 % (FLUSH) 0.9 %
5 SYRINGE (ML) INJECTION PRN
Status: CANCELLED | OUTPATIENT
Start: 2019-07-17

## 2019-07-17 RX ORDER — PALONOSETRON 0.05 MG/ML
0.25 INJECTION, SOLUTION INTRAVENOUS ONCE
Status: COMPLETED | OUTPATIENT
Start: 2019-07-17 | End: 2019-07-17

## 2019-07-17 RX ORDER — METHYLPREDNISOLONE SODIUM SUCCINATE 125 MG/2ML
125 INJECTION, POWDER, LYOPHILIZED, FOR SOLUTION INTRAMUSCULAR; INTRAVENOUS ONCE
Status: CANCELLED | OUTPATIENT
Start: 2019-07-17

## 2019-07-17 RX ORDER — HEPARIN SODIUM (PORCINE) LOCK FLUSH IV SOLN 100 UNIT/ML 100 UNIT/ML
500 SOLUTION INTRAVENOUS PRN
Status: CANCELLED | OUTPATIENT
Start: 2019-07-17

## 2019-07-17 RX ORDER — 0.9 % SODIUM CHLORIDE 0.9 %
10 VIAL (ML) INJECTION ONCE
Status: CANCELLED | OUTPATIENT
Start: 2019-07-17

## 2019-07-17 RX ORDER — SODIUM CHLORIDE 9 MG/ML
20 INJECTION, SOLUTION INTRAVENOUS ONCE
Status: COMPLETED | OUTPATIENT
Start: 2019-07-17 | End: 2019-07-17

## 2019-07-17 RX ORDER — SODIUM CHLORIDE 9 MG/ML
INJECTION, SOLUTION INTRAVENOUS CONTINUOUS
Status: CANCELLED | OUTPATIENT
Start: 2019-07-17

## 2019-07-17 RX ORDER — SODIUM CHLORIDE 9 MG/ML
20 INJECTION, SOLUTION INTRAVENOUS ONCE
Status: CANCELLED | OUTPATIENT
Start: 2019-07-17

## 2019-07-17 RX ORDER — DIPHENHYDRAMINE HYDROCHLORIDE 50 MG/ML
50 INJECTION INTRAMUSCULAR; INTRAVENOUS ONCE
Status: CANCELLED | OUTPATIENT
Start: 2019-07-17

## 2019-07-17 RX ORDER — OXYCODONE AND ACETAMINOPHEN 7.5; 325 MG/1; MG/1
1 TABLET ORAL EVERY 6 HOURS PRN
Qty: 90 TABLET | Refills: 0 | Status: SHIPPED | OUTPATIENT
Start: 2019-07-17 | End: 2019-08-16

## 2019-07-17 RX ORDER — SODIUM CHLORIDE 0.9 % (FLUSH) 0.9 %
10 SYRINGE (ML) INJECTION PRN
Status: CANCELLED | OUTPATIENT
Start: 2019-07-17

## 2019-07-17 RX ORDER — ONDANSETRON 4 MG/1
4 TABLET, ORALLY DISINTEGRATING ORAL EVERY 8 HOURS PRN
Qty: 30 TABLET | Refills: 2 | Status: SHIPPED | OUTPATIENT
Start: 2019-07-17 | End: 2019-09-11 | Stop reason: ALTCHOICE

## 2019-07-17 RX ADMIN — POTASSIUM CHLORIDE: 2 INJECTION, SOLUTION, CONCENTRATE INTRAVENOUS at 09:44

## 2019-07-17 RX ADMIN — SODIUM CHLORIDE 970 MG: 9 INJECTION, SOLUTION INTRAVENOUS at 11:57

## 2019-07-17 RX ADMIN — SODIUM CHLORIDE 150 MG: 9 INJECTION, SOLUTION INTRAVENOUS at 11:19

## 2019-07-17 RX ADMIN — SODIUM CHLORIDE 20 ML/HR: 9 INJECTION, SOLUTION INTRAVENOUS at 09:33

## 2019-07-17 RX ADMIN — DEXAMETHASONE SODIUM PHOSPHATE 12 MG: 4 INJECTION, SOLUTION INTRAMUSCULAR; INTRAVENOUS at 10:59

## 2019-07-17 RX ADMIN — CISPLATIN: 1 INJECTION, SOLUTION INTRAVENOUS at 12:13

## 2019-07-17 RX ADMIN — POTASSIUM CHLORIDE: 2 INJECTION, SOLUTION, CONCENTRATE INTRAVENOUS at 15:37

## 2019-07-17 RX ADMIN — PALONOSETRON 0.25 MG: 0.05 INJECTION, SOLUTION INTRAVENOUS at 10:58

## 2019-07-17 ASSESSMENT — ENCOUNTER SYMPTOMS
DIARRHEA: 0
CHEST TIGHTNESS: 0
COUGH: 0
EYE DISCHARGE: 0
BLOOD IN STOOL: 0
COLOR CHANGE: 0
SHORTNESS OF BREATH: 0
TROUBLE SWALLOWING: 0
SORE THROAT: 0
FACIAL SWELLING: 0
BACK PAIN: 0
VOMITING: 1
RECTAL PAIN: 0
ABDOMINAL PAIN: 1
WHEEZING: 0
NAUSEA: 1
ABDOMINAL DISTENTION: 0
CONSTIPATION: 0

## 2019-07-17 ASSESSMENT — PAIN SCALES - GENERAL: PAINLEVEL_OUTOF10: 4

## 2019-07-17 ASSESSMENT — PAIN DESCRIPTION - LOCATION: LOCATION: ABDOMEN

## 2019-07-17 ASSESSMENT — PAIN DESCRIPTION - PAIN TYPE: TYPE: ACUTE PAIN

## 2019-07-17 NOTE — PATIENT INSTRUCTIONS
1. PROCEED with he last, cycle #4 of Alimta and cisplatin today  2. CT of the abdomen in 2 weeks  3. RTC in 3 weeks to see me, for labs: CBC, BMP, LFTs

## 2019-07-17 NOTE — PROGRESS NOTES
2019    Enlarged prostate with urinary retention     Gait difficulty     Generalized weakness     Hyperlipidemia     Hypertension     Lesion of bladder     Osteoarthritis     Retention of urine     S/P cardiac catheterization 2019    stent to circumflex per Dr. Maria Luisa Yanez S/P TURP     SOB (shortness of breath)     With activity    Voiding difficulty       Past Surgical History:   Procedure Laterality Date    APPENDECTOMY      BRONCHOSCOPY N/A 2019    BRONCHOSCOPY performed by Cruz Zimmer MD at [address] [place] [address]  2019    BRONCHOSCOPY/TRANSBRONCHIAL LUNG BIOPSY performed by Cruz Zimmer MD at [address] [place] [address] N/A 3/1/2019    BRONCHOSCOPY W/EBUS FNA [facility] at [facility]  2019    COLONOSCOPY  [id number]    PROSTATE SURGERY  2012    TURP    THORACOTOMY Left 3/14/2019    LEFT EXPLORATORY THORACOTOMY, MEDIASTINAL LYMPH NODE DISECTION, FLEXIBLE BRONCHOSCOPY performed by Emi Dumont MD at [address] [place] [address] History   Problem Relation Age of Onset    Diabetes Mother     High Blood Pressure Mother     Brain Cancer Mother     Heart Disease Father     Diabetes Father     High Blood Pressure Father     Heart Attack Father     Diabetes Sister     High Blood Pressure Sister     COPD Sister         Agent Orange    Diabetes Brother     High Blood Pressure Brother       Social History     Tobacco Use    Smoking status: Former Smoker     Packs/day: 1.00     Years: 40.00     Pack years: 40.00     Types: Cigarettes     Start date:      Last attempt to quit: 2019     Years since quittin.3    Smokeless tobacco: Never Used    Tobacco comment: about 5 cigs per day   Substance Use Topics    Alcohol use: No      Current Outpatient Medications   Medication Sig Dispense Refill    oxyCODONE-acetaminophen (ENDOCET) 7.5-325 MG per tablet Take 1 tablet by mouth every 6 problem, joint swelling, myalgias and neck stiffness. Skin: Negative for color change, rash and wound. Neurological: Negative for dizziness, tremors, seizures, speech difficulty, weakness, light-headedness, numbness and headaches. Hematological: Negative for adenopathy. Does not bruise/bleed easily. Psychiatric/Behavioral: Negative for confusion and sleep disturbance. The patient is not nervous/anxious. Pertinent review of systems noted in HPI, all other ROS negative. Objective:   Physical Exam   BP (!) 148/90 (Site: Left Upper Arm, Position: Sitting, Cuff Size: Large Adult)   Pulse 84   Temp 98.5 °F (36.9 °C) (Oral)   Resp 18   Ht 5' 7.01\" (1.702 m)   Wt 179 lb (81.2 kg)   SpO2 98%   BMI 28.03 kg/m²    General appearance: No apparent distress, well developed and cooperative. HEENT: Pupils equal, round, and reactive to light. Conjunctivae/corneas clear. Oral mucosa moist, no sores noted. Neck: Supple, with full range of motion. Trachea midline. Respiratory:  Normal respiratory effort. Clear to auscultation, bilaterally without Rales/Wheezes/Rhonchi. Well healed scar to left anterior chest wall consistent with prior lobectomy. Cardiovascular: Regular rate and rhythm with normal S1/S2 without murmurs, rubs or gallops. Abdomen: Soft, non-distended with active bowel sounds. Tenderness to palpation of LUQ and near surgical scar. Musculoskeletal: No clubbing, cyanosis or edema bilaterally. Full range of motion without deformity. Skin: Skin color, texture, turgor normal.  No rashes or lesions. Neurologic:  Neurovascularly intact without any focal sensory/motor deficits.    Psychiatric: Alert and oriented      Imaging Studies and Labs:   CBC:   Lab Results   Component Value Date    WBC 3.8 (L) 07/17/2019    HGB 11.5 (L) 07/17/2019    HCT 33.4 (L) 07/17/2019    MCV 81 07/17/2019     07/17/2019     BMP:    LFT:   Lab Results   Component Value Date    ALT 7 (L) 07/17/2019    AST 13

## 2019-07-17 NOTE — PLAN OF CARE
Care plan reviewed with patient. Patient  verbalized understanding of the plan of care and contribute to goal setting. Problem: Intellectual/Education/Knowledge Deficit  Goal: Teaching initiated upon admission  Outcome: Met This Shift  Note:   Patient verbalizes understanding to verbal information given on cisplatin and alimta,action and possible side effects. Aware to call MD if develop complications. Intervention: Verbal/written education provided  Note:   Chemotherapy Teaching     What is Chemotherapy   Drug action ? Method of Administration ? Handouts given ? Side Effects  Nausea/vomiting ? Diarrhea ? Fatigue ? Signs / Symptoms of infection ? Neutropenia ? Thrombocytopenia ? Alopecia ? neuropathy ? [place] diet &  the importance of fluids ? Micellaneous  Importance of nutrition ? Importance of oral hygiene ? When to call the MD ?   Monitoring labs ? Use of supportive services ? Explanation of Drug Regimen / Frequency  Cisplatin and alimta     Comments  Verbalized understanding to drug,action,side effects and when to call MD         Problem: Discharge Planning  Goal: Knowledge of discharge instructions  Description  Knowledge of discharge instructions     Outcome: Met This Shift  Note:   Verbalized understanding of discharge instructions, follow ups and when to call doctor   Intervention: Discharge to appropriate level of care  Note:   Discuss discharge instructions, follow ups and when to call doctor. Problem: Falls - Risk of:  Goal: Will remain free from falls  Description  Will remain free from falls  Outcome: Met This Shift  Note:   Free from falls while in infusion center.  Verbalized understanding of fall prevention and to ask for assistance with ambulation   Intervention: Assess risk factors for falls  Description  Assess risk factors for falls  Note:   Assess for fall risk, instruct to ask for assistance with ambulation

## 2019-07-17 NOTE — PROGRESS NOTES
Chemotherapy Administration    Pre-assessment Data: Antineoplastic Agents  Other:   See toxicity flow sheet for assessment [x]     Physician Notification of Concerns Related to Chemotherapy Administration:   Physician Notified Denece Kirill / Time of Notification      Interventions:   Lab work assessed  [x]   Height / Weight verified for dose [x]   Current MAR reviewed [x]   Emergency drugs available as appropriate [x]   Anaphylaxis assessment completed [x]   Pre-medications administered as ordered [x]   Blood return noted upon initiation of chemotherapy [x]   Blood return noted each 1-2ml of a vesicant medication if given IV push []   Blood return noted each 2-3ml of a non-vesicant medication if given IV push [x]   Monitor for signs / symptoms of hypersensitivity reaction [x]   Chemotherapy orders (drug/dose/rate) verified by 2 Chemo certified RNs [x]   Monitor IV site and blood return throughout the infusion of the medication [x]   Document IV site checks on the IV assessment form [x]   Document chemotherapy teaching on the Patient Education tab [x]   Document patient verbalizes understanding of medications being administered [x]   If IV infiltration, see ONS Guidelines []   Other:      []

## 2019-07-17 NOTE — PROGRESS NOTES
Patient assessed for the following post chemotherapy:    Dizziness   No  Lightheadedness  No      Acute nausea/vomiting No  Headache   No  Chest pain/pressure  No  Rash/itching   No  Shortness of breath  No    Patient kept for 20 minutes observation post infusion chemotherapy. Patient tolerated chemotherapy treatment per self without any complications. Last vital signs:   BP (!) 144/74   Pulse 83   Temp 97.8 °F (36.6 °C) (Oral)   Resp 18   Ht 5' 7.01\" (1.702 m)   Wt 179 lb (81.2 kg)   SpO2 95%   BMI 28.03 kg/m²         Patient instructed if experience any of the above symptoms following today's infusion,he/she is to notify MD immediately or go to the emergency department. Discharge instructions given to patient. Verbalizes understanding. Ambulated off unit per self with belongings.

## 2019-07-24 ENCOUNTER — OFFICE VISIT (OUTPATIENT)
Dept: PULMONOLOGY | Age: 69
End: 2019-07-24
Payer: MEDICARE

## 2019-07-24 VITALS
BODY MASS INDEX: 27.47 KG/M2 | HEIGHT: 67 IN | SYSTOLIC BLOOD PRESSURE: 127 MMHG | DIASTOLIC BLOOD PRESSURE: 85 MMHG | WEIGHT: 175 LBS | HEART RATE: 96 BPM | TEMPERATURE: 98.5 F | OXYGEN SATURATION: 98 %

## 2019-07-24 DIAGNOSIS — C34.92 NON-SMALL CELL CANCER OF LEFT LUNG (HCC): ICD-10-CM

## 2019-07-24 DIAGNOSIS — J96.11 CHRONIC RESPIRATORY FAILURE WITH HYPOXIA (HCC): ICD-10-CM

## 2019-07-24 DIAGNOSIS — J44.9 STAGE 2 MODERATE COPD BY GOLD CLASSIFICATION (HCC): Primary | ICD-10-CM

## 2019-07-24 PROCEDURE — 1036F TOBACCO NON-USER: CPT | Performed by: INTERNAL MEDICINE

## 2019-07-24 PROCEDURE — 99214 OFFICE O/P EST MOD 30 MIN: CPT | Performed by: INTERNAL MEDICINE

## 2019-07-24 PROCEDURE — 1123F ACP DISCUSS/DSCN MKR DOCD: CPT | Performed by: INTERNAL MEDICINE

## 2019-07-24 PROCEDURE — 3023F SPIROM DOC REV: CPT | Performed by: INTERNAL MEDICINE

## 2019-07-24 PROCEDURE — 3017F COLORECTAL CA SCREEN DOC REV: CPT | Performed by: INTERNAL MEDICINE

## 2019-07-24 PROCEDURE — 4040F PNEUMOC VAC/ADMIN/RCVD: CPT | Performed by: INTERNAL MEDICINE

## 2019-07-24 PROCEDURE — G8926 SPIRO NO PERF OR DOC: HCPCS | Performed by: INTERNAL MEDICINE

## 2019-07-24 PROCEDURE — G8598 ASA/ANTIPLAT THER USED: HCPCS | Performed by: INTERNAL MEDICINE

## 2019-07-24 PROCEDURE — G8419 CALC BMI OUT NRM PARAM NOF/U: HCPCS | Performed by: INTERNAL MEDICINE

## 2019-07-24 PROCEDURE — G8427 DOCREV CUR MEDS BY ELIG CLIN: HCPCS | Performed by: INTERNAL MEDICINE

## 2019-07-24 RX ORDER — ALBUTEROL SULFATE 90 UG/1
2 AEROSOL, METERED RESPIRATORY (INHALATION) 4 TIMES DAILY
Qty: 1 INHALER | Refills: 11 | Status: SHIPPED | OUTPATIENT
Start: 2019-07-24 | End: 2020-06-03 | Stop reason: SDUPTHER

## 2019-07-24 ASSESSMENT — ENCOUNTER SYMPTOMS
ANAL BLEEDING: 0
CHEST TIGHTNESS: 0
SHORTNESS OF BREATH: 0
ABDOMINAL PAIN: 0
EYES NEGATIVE: 1
COUGH: 0
ABDOMINAL DISTENTION: 0
BACK PAIN: 0
CHOKING: 0
STRIDOR: 0

## 2019-07-24 NOTE — PROGRESS NOTES
myalgias. Hematological: Negative. Psychiatric/Behavioral: Negative.           All other systems reviewed and are negative    Lung Nodule Screening     [x] Qualifies    [] Does not qualify   [] Declined   [] Completed  Past Medical History:   Diagnosis Date    Anxiety     Arthritis     Cancer ([facility] 2019    left lung stage 2    [facility]    Enlarged prostate with urinary retention     Gait difficulty     Generalized weakness     Hyperlipidemia     Hypertension     Lesion of bladder     Osteoarthritis     Retention of urine     S/P cardiac catheterization 2019    stent to circumflex per Dr. Reid Orozco S/P TURP     SOB (shortness of breath)     With activity    Voiding difficulty      Past Surgical History:   Procedure Laterality Date    APPENDECTOMY      BRONCHOSCOPY N/A 2019    BRONCHOSCOPY performed by Duncan Lenz MD at [address] [place] [address]  2019    BRONCHOSCOPY/TRANSBRONCHIAL LUNG BIOPSY performed by Duncan Lenz MD at [address] [place] [address] N/A 3/1/2019    BRONCHOSCOPY W/EBUS FNA performed by Duncan Lenz MD at [facility]  2019    COLONOSCOPY  [id number]    PROSTATE SURGERY  2012    TURP    THORACOTOMY Left 3/14/2019    LEFT EXPLORATORY THORACOTOMY, MEDIASTINAL LYMPH NODE DISECTION, FLEXIBLE BRONCHOSCOPY performed by Yesi Jimenez MD at [address] [place] [address] Drive Use    Smoking status: Former Smoker     Packs/day: 1.00     Years: 40.00     Pack years: 40.00     Types: Cigarettes     Start date:      Last attempt to quit: 2019     Years since quittin.4    Smokeless tobacco: Never Used    Tobacco comment: about 5 cigs per day   Substance Use Topics    Alcohol use: No    Drug use: No      No Known Allergies   Family History   Problem Relation Age of Onset    Diabetes Mother     High Blood Pressure Mother    

## 2019-07-31 ENCOUNTER — HOSPITAL ENCOUNTER (OUTPATIENT)
Dept: CT IMAGING | Age: 69
Discharge: HOME | End: 2019-07-31
Payer: MEDICARE

## 2019-07-31 DIAGNOSIS — G89.18 POSTOPERATIVE LEFT UPPER QUADRANT ABDOMINAL PAIN: ICD-10-CM

## 2019-07-31 DIAGNOSIS — Z90.2 S/P LOBECTOMY OF LUNG: ICD-10-CM

## 2019-07-31 DIAGNOSIS — R10.12 POSTOPERATIVE LEFT UPPER QUADRANT ABDOMINAL PAIN: ICD-10-CM

## 2019-07-31 DIAGNOSIS — R10.12 LEFT UPPER QUADRANT PAIN: ICD-10-CM

## 2019-07-31 PROCEDURE — 74177 CT ABD & PELVIS W/CONTRAST: CPT

## 2019-07-31 PROCEDURE — 6360000004 HC RX CONTRAST MEDICATION: Performed by: INTERNAL MEDICINE

## 2019-07-31 RX ADMIN — IOHEXOL 50 ML: 240 INJECTION, SOLUTION INTRATHECAL; INTRAVASCULAR; INTRAVENOUS; ORAL at 09:38

## 2019-07-31 RX ADMIN — IOPAMIDOL 85 ML: 755 INJECTION, SOLUTION INTRAVENOUS at 09:39

## 2019-08-01 ENCOUNTER — TELEPHONE (OUTPATIENT)
Dept: ONCOLOGY | Age: 69
End: 2019-08-01

## 2019-08-08 DIAGNOSIS — Z90.2 S/P LOBECTOMY OF LUNG: Primary | ICD-10-CM

## 2019-08-08 DIAGNOSIS — C34.32 MALIGNANT NEOPLASM OF LOWER LOBE OF LEFT LUNG (HCC): ICD-10-CM

## 2019-08-09 ENCOUNTER — OFFICE VISIT (OUTPATIENT)
Dept: ONCOLOGY | Age: 69
End: 2019-08-09
Payer: MEDICARE

## 2019-08-09 ENCOUNTER — HOSPITAL ENCOUNTER (OUTPATIENT)
Dept: INFUSION THERAPY | Age: 69
Discharge: HOME | End: 2019-08-09
Payer: MEDICARE

## 2019-08-09 VITALS
SYSTOLIC BLOOD PRESSURE: 141 MMHG | HEART RATE: 95 BPM | TEMPERATURE: 98.3 F | WEIGHT: 179 LBS | DIASTOLIC BLOOD PRESSURE: 87 MMHG | RESPIRATION RATE: 16 BRPM | BODY MASS INDEX: 28.09 KG/M2 | HEIGHT: 67 IN | OXYGEN SATURATION: 96 %

## 2019-08-09 DIAGNOSIS — Z92.21 STATUS POST CHEMOTHERAPY: ICD-10-CM

## 2019-08-09 DIAGNOSIS — Z90.2 S/P LOBECTOMY OF LUNG: Primary | ICD-10-CM

## 2019-08-09 DIAGNOSIS — C34.32 MALIGNANT NEOPLASM OF LOWER LOBE OF LEFT LUNG (HCC): ICD-10-CM

## 2019-08-09 DIAGNOSIS — R10.12 POSTOPERATIVE LEFT UPPER QUADRANT ABDOMINAL PAIN: ICD-10-CM

## 2019-08-09 DIAGNOSIS — G89.18 POSTOPERATIVE LEFT UPPER QUADRANT ABDOMINAL PAIN: ICD-10-CM

## 2019-08-09 LAB
ALBUMIN SERPL-MCNC: 4.5 G/DL (ref 3.5–5.1)
ALP BLD-CCNC: 90 U/L (ref 38–126)
ALT SERPL-CCNC: 8 U/L (ref 11–66)
AST SERPL-CCNC: 15 U/L (ref 5–40)
BILIRUB SERPL-MCNC: 0.2 MG/DL (ref 0.3–1.2)
BILIRUBIN DIRECT: < 0.2 MG/DL (ref 0–0.3)
BUN, WHOLE BLOOD: 28 MG/DL (ref 8–26)
CHLORIDE, WHOLE BLOOD: 108 MEQ/L (ref 98–109)
CREATININE, WHOLE BLOOD: 0.9 MG/DL (ref 0.5–1.2)
GFR, ESTIMATED: 89 ML/MIN/1.73M2
GLUCOSE, WHOLE BLOOD: 194 MG/DL (ref 70–108)
HCT VFR BLD CALC: 33.1 % (ref 42–52)
HEMOGLOBIN: 11.5 GM/DL (ref 14–18)
IONIZED CALCIUM, WHOLE BLOOD: 1.33 MMOL/L (ref 1.12–1.32)
MCH RBC QN AUTO: 29.9 PG (ref 27–31)
MCHC RBC AUTO-ENTMCNC: 34.8 GM/DL (ref 33–37)
MCV RBC AUTO: 86 FL (ref 80–94)
PDW BLD-RTO: 17.9 % (ref 11.5–14.5)
PLATELET # BLD: 230 THOU/MM3 (ref 130–400)
PMV BLD AUTO: 6.5 FL (ref 7.4–10.4)
POTASSIUM, WHOLE BLOOD: 5.8 MEQ/L (ref 3.5–4.9)
RBC # BLD: 3.85 MILL/MM3 (ref 4.7–6.1)
SEG NEUTROPHILS: 67 % (ref 43–75)
SEGMENTED NEUTROPHILS ABSOLUTE COUNT: 4 THOU/MM3 (ref 1.8–7.7)
SODIUM, WHOLE BLOOD: 138 MEQ/L (ref 138–146)
TOTAL CO2, WHOLE BLOOD: 21 MEQ/L (ref 23–33)
TOTAL PROTEIN: 7.1 G/DL (ref 6.1–8)
WBC # BLD: 5.9 THOU/MM3 (ref 4.8–10.8)

## 2019-08-09 PROCEDURE — G8598 ASA/ANTIPLAT THER USED: HCPCS | Performed by: INTERNAL MEDICINE

## 2019-08-09 PROCEDURE — G8428 CUR MEDS NOT DOCUMENT: HCPCS | Performed by: INTERNAL MEDICINE

## 2019-08-09 PROCEDURE — 1123F ACP DISCUSS/DSCN MKR DOCD: CPT | Performed by: INTERNAL MEDICINE

## 2019-08-09 PROCEDURE — 85027 COMPLETE CBC AUTOMATED: CPT

## 2019-08-09 PROCEDURE — 99214 OFFICE O/P EST MOD 30 MIN: CPT | Performed by: INTERNAL MEDICINE

## 2019-08-09 PROCEDURE — 36415 COLL VENOUS BLD VENIPUNCTURE: CPT

## 2019-08-09 PROCEDURE — 80047 BASIC METABLC PNL IONIZED CA: CPT

## 2019-08-09 PROCEDURE — 4040F PNEUMOC VAC/ADMIN/RCVD: CPT | Performed by: INTERNAL MEDICINE

## 2019-08-09 PROCEDURE — 3017F COLORECTAL CA SCREEN DOC REV: CPT | Performed by: INTERNAL MEDICINE

## 2019-08-09 PROCEDURE — 80076 HEPATIC FUNCTION PANEL: CPT

## 2019-08-09 PROCEDURE — 99211 OFF/OP EST MAY X REQ PHY/QHP: CPT

## 2019-08-09 PROCEDURE — G8419 CALC BMI OUT NRM PARAM NOF/U: HCPCS | Performed by: INTERNAL MEDICINE

## 2019-08-09 PROCEDURE — 1036F TOBACCO NON-USER: CPT | Performed by: INTERNAL MEDICINE

## 2019-08-09 ASSESSMENT — ENCOUNTER SYMPTOMS
ABDOMINAL PAIN: 1
VOMITING: 1
SORE THROAT: 0
DIARRHEA: 0
RECTAL PAIN: 0
FACIAL SWELLING: 0
TROUBLE SWALLOWING: 0
COUGH: 0
SHORTNESS OF BREATH: 0
CHEST TIGHTNESS: 0
WHEEZING: 0
BACK PAIN: 0
EYE DISCHARGE: 0
COLOR CHANGE: 0
CONSTIPATION: 0
BLOOD IN STOOL: 0
ABDOMINAL DISTENTION: 0
NAUSEA: 1

## 2019-08-09 NOTE — PROGRESS NOTES
Refill    umeclidinium-vilanterol (ANORO ELLIPTA) 62.5-25 MCG/INH AEPB inhaler Inhale 1 puff into the lungs daily 60 each 5    albuterol sulfate HFA (VENTOLIN HFA) 108 (90 Base) MCG/ACT inhaler Inhale 2 puffs into the lungs 4 times daily 1 Inhaler 11    ondansetron (ZOFRAN-ODT) 4 MG disintegrating tablet Take 1 tablet by mouth every 8 hours as needed for Nausea or Vomiting 30 tablet 2    clopidogrel (PLAVIX) 75 MG tablet TAKE 1 TABLET BY MOUTH ONE TIME A DAY  30 tablet 2    lactulose (CHRONULAC) 10 GM/15ML solution Take 15-30 mLs by mouth 2 times daily as needed (constipation) 480 mL 1    Blood Glucose Monitoring Suppl (FREESTYLE FREEDOM LITE) w/Device KIT 1 Device daily  0    FREESTYLE LITE strip 1 strip daily  11    FREESTYLE LANCETS MISC 1 Stick daily  11    chlorproMAZINE (THORAZINE) 10 MG tablet Take 1 tablet by mouth 3 times daily 30 tablet 1    folic acid (FOLVITE) 1 MG tablet Take 1 tablet by mouth daily 30 tablet 3    amLODIPine (NORVASC) 10 MG tablet Take 10 mg by mouth daily      atorvastatin (LIPITOR) 40 MG tablet Take 40 mg by mouth daily      senna (SENOKOT) 8.6 MG TABS tablet Take 1 tablet by mouth daily      metFORMIN (GLUCOPHAGE) 500 MG tablet Take 500 mg by mouth 2 times daily (with meals) Take 1 tab by mouth at supper x2 weeks, then 1 tab in the morning and 1 tab at supper x2 weeks, then 1 tab in the morning and 2 tab at supper x2weeks, then 2 tabs twice a day      aspirin 81 MG chewable tablet Take 1 tablet by mouth daily 30 tablet 3    nitroGLYCERIN (NITROSTAT) 0.4 MG SL tablet up to max of 3 total doses. If no relief after 1 dose, call 911. 25 tablet 3    omeprazole (PRILOSEC) 20 MG delayed release capsule Take 20 mg by mouth daily      lisinopril (PRINIVIL;ZESTRIL) 40 MG tablet Take 40 mg by mouth daily.  dutasteride (AVODART) 0.5 MG capsule Take 0.5 mg by mouth daily. No current facility-administered medications for this visit.        No Known Allergies

## 2019-08-14 ENCOUNTER — HOSPITAL ENCOUNTER (OUTPATIENT)
Age: 69
Discharge: HOME | End: 2019-08-14
Payer: MEDICARE

## 2019-08-14 LAB — POTASSIUM SERPL-SCNC: 6.6 MEQ/L (ref 3.5–5.2)

## 2019-08-14 PROCEDURE — 36415 COLL VENOUS BLD VENIPUNCTURE: CPT

## 2019-08-14 PROCEDURE — 84132 ASSAY OF SERUM POTASSIUM: CPT

## 2019-08-16 ENCOUNTER — HOSPITAL ENCOUNTER (OUTPATIENT)
Age: 69
Discharge: HOME | End: 2019-08-16
Payer: MEDICARE

## 2019-08-16 ENCOUNTER — TELEPHONE (OUTPATIENT)
Dept: CARDIOTHORACIC SURGERY | Age: 69
End: 2019-08-16

## 2019-08-16 LAB
ANION GAP SERPL CALCULATED.3IONS-SCNC: 13 MEQ/L (ref 8–16)
BACTERIA: ABNORMAL /HPF
BILIRUBIN URINE: NEGATIVE
BLOOD, URINE: NEGATIVE
BUN BLDV-MCNC: 34 MG/DL (ref 7–22)
CALCIUM SERPL-MCNC: 9.8 MG/DL (ref 8.5–10.5)
CASTS 2: ABNORMAL /LPF
CASTS UA: ABNORMAL /LPF
CHARACTER, URINE: CLEAR
CHLORIDE BLD-SCNC: 107 MEQ/L (ref 98–111)
CO2: 22 MEQ/L (ref 23–33)
COLOR: YELLOW
CREAT SERPL-MCNC: 1 MG/DL (ref 0.4–1.2)
CRYSTALS, UA: ABNORMAL
EPITHELIAL CELLS, UA: ABNORMAL /HPF
GFR SERPL CREATININE-BSD FRML MDRD: 74 ML/MIN/1.73M2
GLUCOSE BLD-MCNC: 161 MG/DL (ref 70–108)
GLUCOSE URINE: 100 MG/DL
KETONES, URINE: NEGATIVE
LEUKOCYTE ESTERASE, URINE: NEGATIVE
MISCELLANEOUS 2: ABNORMAL
NITRITE, URINE: NEGATIVE
PH UA: 5 (ref 5–9)
POTASSIUM SERPL-SCNC: 6.1 MEQ/L (ref 3.5–5.2)
PROTEIN UA: 30
RBC URINE: ABNORMAL /HPF
RENAL EPITHELIAL, UA: ABNORMAL
SODIUM BLD-SCNC: 142 MEQ/L (ref 135–145)
SPECIFIC GRAVITY, URINE: 1.02 (ref 1–1.03)
UROBILINOGEN, URINE: 0.2 EU/DL (ref 0–1)
WBC UA: ABNORMAL /HPF
YEAST: ABNORMAL

## 2019-08-16 PROCEDURE — 36415 COLL VENOUS BLD VENIPUNCTURE: CPT

## 2019-08-16 PROCEDURE — 80048 BASIC METABOLIC PNL TOTAL CA: CPT

## 2019-08-16 PROCEDURE — 81001 URINALYSIS AUTO W/SCOPE: CPT

## 2019-08-19 ENCOUNTER — HOSPITAL ENCOUNTER (OUTPATIENT)
Age: 69
Discharge: HOME | End: 2019-08-19
Payer: MEDICARE

## 2019-08-19 LAB — POTASSIUM SERPL-SCNC: 5.1 MEQ/L (ref 3.5–5.2)

## 2019-08-19 PROCEDURE — 84132 ASSAY OF SERUM POTASSIUM: CPT

## 2019-08-19 PROCEDURE — 36415 COLL VENOUS BLD VENIPUNCTURE: CPT

## 2019-08-26 ENCOUNTER — OFFICE VISIT (OUTPATIENT)
Dept: CARDIOTHORACIC SURGERY | Age: 69
End: 2019-08-26
Payer: MEDICARE

## 2019-08-26 VITALS
BODY MASS INDEX: 27.28 KG/M2 | HEART RATE: 89 BPM | SYSTOLIC BLOOD PRESSURE: 135 MMHG | HEIGHT: 68 IN | WEIGHT: 180 LBS | DIASTOLIC BLOOD PRESSURE: 84 MMHG

## 2019-08-26 DIAGNOSIS — C34.92 ADENOCARCINOMA OF LEFT LUNG (HCC): ICD-10-CM

## 2019-08-26 PROCEDURE — 3017F COLORECTAL CA SCREEN DOC REV: CPT | Performed by: PHYSICIAN ASSISTANT

## 2019-08-26 PROCEDURE — G8419 CALC BMI OUT NRM PARAM NOF/U: HCPCS | Performed by: PHYSICIAN ASSISTANT

## 2019-08-26 PROCEDURE — G8428 CUR MEDS NOT DOCUMENT: HCPCS | Performed by: PHYSICIAN ASSISTANT

## 2019-08-26 PROCEDURE — 99213 OFFICE O/P EST LOW 20 MIN: CPT | Performed by: PHYSICIAN ASSISTANT

## 2019-08-26 PROCEDURE — G8598 ASA/ANTIPLAT THER USED: HCPCS | Performed by: PHYSICIAN ASSISTANT

## 2019-08-26 PROCEDURE — 1123F ACP DISCUSS/DSCN MKR DOCD: CPT | Performed by: PHYSICIAN ASSISTANT

## 2019-08-26 PROCEDURE — APPSS30 APP SPLIT SHARED TIME 16-30 MINUTES: Performed by: PHYSICIAN ASSISTANT

## 2019-08-26 PROCEDURE — 1036F TOBACCO NON-USER: CPT | Performed by: PHYSICIAN ASSISTANT

## 2019-08-26 PROCEDURE — 4040F PNEUMOC VAC/ADMIN/RCVD: CPT | Performed by: PHYSICIAN ASSISTANT

## 2019-08-27 ENCOUNTER — HOSPITAL ENCOUNTER (OUTPATIENT)
Age: 69
Discharge: HOME | End: 2019-08-27
Payer: MEDICARE

## 2019-08-27 LAB
ALT SERPL-CCNC: 9 U/L (ref 11–66)
ANION GAP SERPL CALCULATED.3IONS-SCNC: 14 MEQ/L (ref 8–16)
AVERAGE GLUCOSE: 156 MG/DL (ref 70–126)
BUN BLDV-MCNC: 34 MG/DL (ref 7–22)
CALCIUM SERPL-MCNC: 10.3 MG/DL (ref 8.5–10.5)
CHLORIDE BLD-SCNC: 108 MEQ/L (ref 98–111)
CHOLESTEROL, TOTAL: 171 MG/DL (ref 100–199)
CO2: 20 MEQ/L (ref 23–33)
CREAT SERPL-MCNC: 1.1 MG/DL (ref 0.4–1.2)
GFR SERPL CREATININE-BSD FRML MDRD: 66 ML/MIN/1.73M2
GLUCOSE BLD-MCNC: 179 MG/DL (ref 70–108)
HBA1C MFR BLD: 7.2 % (ref 4.4–6.4)
HDLC SERPL-MCNC: 43 MG/DL
LDL CHOLESTEROL CALCULATED: 96 MG/DL
POTASSIUM SERPL-SCNC: 6.5 MEQ/L (ref 3.5–5.2)
SODIUM BLD-SCNC: 142 MEQ/L (ref 135–145)
TRIGL SERPL-MCNC: 159 MG/DL (ref 0–199)

## 2019-08-27 PROCEDURE — 80061 LIPID PANEL: CPT

## 2019-08-27 PROCEDURE — 80048 BASIC METABOLIC PNL TOTAL CA: CPT

## 2019-08-27 PROCEDURE — 84460 ALANINE AMINO (ALT) (SGPT): CPT

## 2019-08-27 PROCEDURE — 36415 COLL VENOUS BLD VENIPUNCTURE: CPT

## 2019-08-27 PROCEDURE — 83036 HEMOGLOBIN GLYCOSYLATED A1C: CPT

## 2019-08-29 ENCOUNTER — HOSPITAL ENCOUNTER (OUTPATIENT)
Age: 69
Discharge: HOME | End: 2019-08-29
Payer: MEDICARE

## 2019-08-29 LAB — POTASSIUM SERPL-SCNC: 5.7 MEQ/L (ref 3.5–5.2)

## 2019-08-29 PROCEDURE — 36415 COLL VENOUS BLD VENIPUNCTURE: CPT

## 2019-08-29 PROCEDURE — 84132 ASSAY OF SERUM POTASSIUM: CPT

## 2019-09-05 ENCOUNTER — HOSPITAL ENCOUNTER (OUTPATIENT)
Age: 69
Discharge: HOME | End: 2019-09-05
Payer: MEDICARE

## 2019-09-05 LAB — POTASSIUM SERPL-SCNC: 4.8 MEQ/L (ref 3.5–5.2)

## 2019-09-05 PROCEDURE — 84132 ASSAY OF SERUM POTASSIUM: CPT

## 2019-09-05 PROCEDURE — 36415 COLL VENOUS BLD VENIPUNCTURE: CPT

## 2019-09-11 ENCOUNTER — OFFICE VISIT (OUTPATIENT)
Dept: CARDIOLOGY CLINIC | Age: 69
End: 2019-09-11
Payer: MEDICARE

## 2019-09-11 VITALS
HEIGHT: 68 IN | HEART RATE: 100 BPM | WEIGHT: 174.6 LBS | DIASTOLIC BLOOD PRESSURE: 72 MMHG | BODY MASS INDEX: 26.46 KG/M2 | SYSTOLIC BLOOD PRESSURE: 120 MMHG

## 2019-09-11 DIAGNOSIS — I25.10 CORONARY ARTERY DISEASE INVOLVING NATIVE CORONARY ARTERY OF NATIVE HEART WITHOUT ANGINA PECTORIS: Primary | ICD-10-CM

## 2019-09-11 DIAGNOSIS — I10 ESSENTIAL HYPERTENSION: ICD-10-CM

## 2019-09-11 PROCEDURE — 4040F PNEUMOC VAC/ADMIN/RCVD: CPT | Performed by: NUCLEAR MEDICINE

## 2019-09-11 PROCEDURE — 1036F TOBACCO NON-USER: CPT | Performed by: NUCLEAR MEDICINE

## 2019-09-11 PROCEDURE — 99213 OFFICE O/P EST LOW 20 MIN: CPT | Performed by: NUCLEAR MEDICINE

## 2019-09-11 PROCEDURE — G8598 ASA/ANTIPLAT THER USED: HCPCS | Performed by: NUCLEAR MEDICINE

## 2019-09-11 PROCEDURE — G8427 DOCREV CUR MEDS BY ELIG CLIN: HCPCS | Performed by: NUCLEAR MEDICINE

## 2019-09-11 PROCEDURE — 1123F ACP DISCUSS/DSCN MKR DOCD: CPT | Performed by: NUCLEAR MEDICINE

## 2019-09-11 PROCEDURE — 3017F COLORECTAL CA SCREEN DOC REV: CPT | Performed by: NUCLEAR MEDICINE

## 2019-09-11 PROCEDURE — G8419 CALC BMI OUT NRM PARAM NOF/U: HCPCS | Performed by: NUCLEAR MEDICINE

## 2019-09-11 RX ORDER — CLOPIDOGREL BISULFATE 75 MG/1
TABLET ORAL
Qty: 30 TABLET | Refills: 11 | Status: SHIPPED | OUTPATIENT
Start: 2019-09-11 | End: 2020-07-29 | Stop reason: SDUPTHER

## 2019-09-11 NOTE — PROGRESS NOTES
Subjective:   Hayde Macias is a 29 year old year old female who presents to clinic today for the following health issues:    Chief Complaint   Patient presents with     RECHECK     Medication Reconciliation     Not complete due to interperter not available. Pt came early     Weight gain:  Patient feels her abdomen is bloated and distended. She continues to gain weight, primarily in her abdomen which she feels is 2/2 nexplanon. She is also very distressed because she has not had regular periods since getting the nexplanon placed in March 2019. She does not exercise because it makes her tired.     Mood:  Continues to meet with Shawn for her mood. Denies recent medication changes.     Last pap smear 2013, normal. Due today.     A Algolux  was used for this visit.         PMHX:   PAST MEDICAL HISTORY:  Patient Active Problem List   Diagnosis     Health Care Home     Recurrent major depressive disorder (H)     Seasonal allergic rhinitis     PTSD (post-traumatic stress disorder)     BRENDA (generalized anxiety disorder)     Insomnia due to other mental disorder     Major depressive disorder, recurrent episode, moderate (H)     Class 1 obesity without serious comorbidity with body mass index (BMI) of 32.0 to 32.9 in adult, unspecified obesity type     CURRENT MEDICATIONS:  Current Outpatient Medications   Medication Sig Dispense Refill     hydrocortisone (CORTAID) 1 % external cream Apply topically 2 times daily As needed for skin rash/irritation 45 g 1     norethindrone-ethinyl estradiol (MICROGESTIN 1.5/30) 1.5-30 MG-MCG tablet Take 1 tablet by mouth daily 84 tablet 4     acetaminophen (TYLENOL) 325 MG tablet Take 2 tablets (650 mg) by mouth every 6 hours as needed for headaches 100 tablet 1     calcium carbonate (TUMS) 500 MG chewable tablet Take 1 tablet (500 mg) by mouth 2 times daily as needed for heartburn 150 tablet 3     cetirizine (ZYRTEC) 10 MG tablet Take 1 tablet (10 mg) by mouth daily 30 tablet 11  [facility] [address] [place], [place] [address]Ernestomissael Fine  Dept: [phone]  Dept Fax: [phone]: [phone]    Visit Date:4/18/2019     Mili Rudd is a 71 y.o. male who presents today for:   Chief Complaint   Patient presents with    Follow-up     Lung Cancer        HPI:   The patient developed hemoptysis beginning of January 2019. Chest x-ray on January 14, 2019 showed a developing retrocardiac infiltrate. Patient was referred to the pulmonologist Robert Gonzales who ordered CT of the chest.  It was performed on January 23, 2019 and showed:  a lobulated mass in the left lower lobe possibly two separate masses is left lower lobe this is a lobulated elongated and measures up to six 5.9 cm in length 2.5 cm in width spiculated margins are present and adjacent airspace disease. Lobulated    8mm nodule lateral aspect right lower lobe image 42   No effusions are seen. Upper abdomen   No adrenal masses. Simple cyst the left kidney. No suspicious bone lesions   Patient underwent bronchoscopy with transbronchial biopsy and BAL on January 25, 2019. They showed no endobronchial lesions and benign bronchial epithelium, alveolar macrophages, and scattered inflammatory cells, respectively. No malignant cells seen. Subsequently on February 12, 2019 the patient had CT-guided lung biopsy which showed poorly differentiated pulmonary adenocarcinoma. The patient had PET scan on February 25, 2019 that showed:  1. Known left lower lobe malignancy showing FDG avidity. 2. Mediastinal adenopathy with minimal activity above background suggesting possible reactive lymphadenopathy. Metastatic disease is not excludable. 3. Pleural fluid in the major fissure of the left upper lung. Minimal activity above background may indicate a malignant effusion. 4. No other evidence of metastatic disease, small nodule in the right lower lobe shows no significant activity above background.       Patient had "    docusate sodium (COLACE) 100 MG tablet Take 2 tablets (200 mg) by mouth daily 90 tablet 3     fluticasone (FLONASE) 50 MCG/ACT nasal spray Spray 1-2 sprays into both nostrils daily as needed for allergies 16 mL 1     ibuprofen (ADVIL/MOTRIN) 600 MG tablet Take 1 tablet (600 mg) by mouth daily as needed (back pain)       ketotifen (ZADITOR) 0.025 % SOLN ophthalmic solution Place 1 drop into both eyes every 12 hours 1 Bottle 3     montelukast (SINGULAIR) 10 MG tablet Take 1 tablet (10 mg) by mouth daily 90 tablet 3     polyethylene glycol (MIRALAX) powder Take 17 g (1 capful) by mouth daily as needed for constipation 510 g 1     prazosin (MINIPRESS) 1 MG capsule Take 2 capsules (2 mg) by mouth At Bedtime 90 capsule 3     ranitidine (ZANTAC) 150 MG tablet Take 1 tablet (150 mg) by mouth At Bedtime 90 tablet 3     risperiDONE (RISPERDAL) 2 MG tablet Take 1 tablet (2 mg) by mouth At Bedtime 30 tablet 3     SENNA-docusate sodium (SENNA S) 8.6-50 MG tablet Take 1 tablet by mouth At Bedtime       venlafaxine (EFFEXOR-XR) 150 MG 24 hr capsule Take 1 capsule by mouth daily (together with 75 mg capsule for total of 225 mg/day) 90 capsule 1     venlafaxine (EFFEXOR-XR) 75 MG 24 hr capsule Take 1 capsule by mouth daily (together with 150 mg capsule for total of 225 mg/day) 90 capsule 1     ALLERGIES:     Allergies   Allergen Reactions     Augmentin Rash            Objective:     Vitals:    09/11/19 1354   BP: 102/69   Pulse: 99   Resp: 22   Temp: 98.9  F (37.2  C)   SpO2: 96%   Weight: 68.5 kg (151 lb)   Height: 1.448 m (4' 9\")     Body mass index is 32.68 kg/m .  No results found for this or any previous visit (from the past 24 hour(s)).    General: Alert, well-appearing female in NAD  Skin: green face mask   Pulm: breathing comfortably  CV: RRR, no murmur  Ext: nexplanon palpable left LE  GYN: increased vascularity of cervix and slightly friable 12o'clock  Abd: obese, nontender, nondistended  Psych: Mood appropriate to " EBUS with transbronchial ultrasound guided mediastinal lymph node biopsy. The mediastinal lymph node biopsy was  Negative. Therefore the patient met with the thoracic surgeon Dr. Donald Wilcox and after discussing his surgical treatment options, on March 14, 2019 he underwent left lower lobe, lung lobectomy: .  Final pathology report showed:  A. 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   Lymph nodes (0/3): Negative for malignancy. B. Tissue designated lymph node, station 9, excision:   Benign fibroadipose tissue. C. Lymph node, station 10 L, excision:   Negative for malignancy (0/1). D. Lymph nodes, station 6, excision:   Negative for malignancy (0/3). E. Lymph node, station 7, excision:   Negative for malignancy (0/1). F. Tissue designated lymph node, station 8, excision:   Benign fibroadipose tissue. He is actually recovering from the surgery very nicely. He reports improving chest wall pain. His shortness of breath is improved, he is using oxygen only with more strenuous physical activity. Denies having any fevers. Appetite is fine and his weight stable.   Regular bowel movements      HPI   Past Medical History:   Diagnosis Date    Anxiety     Arthritis     Cancer ([facility] 01/2019    left lung stage 2    COPD (chronic obstructive pulmonary disease) ([facility] [facility] 02/2019    Enlarged prostate with urinary retention     Gait difficulty     Generalized weakness     Hyperlipidemia     Hypertension     Lesion of bladder     Osteoarthritis     Retention of urine     S/P cardiac catheterization 03/12/2019    stent to circumflex per Dr. Beto Hwang S/P TURP     SOB (shortness of breath)     With activity    Voiding difficulty       Past Surgical History:   Procedure Laterality Date    APPENDECTOMY      BRONCHOSCOPY N/A 1/25/2019    BRONCHOSCOPY performed by Jyotsna Devlin MD at [address] [place] [address] BRONCHOSCOPY  2019    BRONCHOSCOPY/TRANSBRONCHIAL LUNG BIOPSY performed by Chris Grant MD at [address] [place] N/A 3/1/2019    BRONCHOSCOPY W/EBUS FNA performed by Chris Grant MD at [facility]  2019    COLONOSCOPY  [id number]    PROSTATE SURGERY  2012    TURP    THORACOTOMY Left 3/14/2019    LEFT EXPLORATORY THORACOTOMY, MEDIASTINAL LYMPH NODE DISECTION, FLEXIBLE BRONCHOSCOPY performed by Chelsea Leroy MD at [facility] History   Problem Relation Age of Onset    Diabetes Mother     High Blood Pressure Mother     Brain Cancer Mother     Heart Disease Father     Diabetes Father     High Blood Pressure Father     Heart Attack Father     Diabetes Sister     High Blood Pressure Sister     COPD Sister         Agent Orange    Diabetes Brother     High Blood Pressure Brother       Social History     Tobacco Use    Smoking status: Former Smoker     Packs/day: 1.00     Years: 40.00     Pack years: 40.00     Types: Cigarettes     Start date:      Last attempt to quit: 2019     Years since quittin.1    Smokeless tobacco: Never Used    Tobacco comment: about 5 cigs per day   Substance Use Topics    Alcohol use: No      Current Outpatient Medications   Medication Sig Dispense Refill    senna (SENOKOT) 8.6 MG TABS tablet Take 1 tablet by mouth daily      metFORMIN (GLUCOPHAGE) 500 MG tablet Take 500 mg by mouth 2 times daily (with meals) Take 1 tab by mouth at supper x2 weeks, then 1 tab in the morning and 1 tab at supper x2 weeks, then 1 tab in the morning and 2 tab at supper x2weeks, then 2 tabs twice a day      aspirin 81 MG chewable tablet Take 1 tablet by mouth daily 30 tablet 3    nitroGLYCERIN (NITROSTAT) 0.4 MG SL tablet up to max of 3 total doses.  If no relief after 1 dose, call 911. 25 tablet 3    clopidogrel (PLAVIX) 75 MG tablet Take 1 tablet by mouth daily 30 tablet 3    omeprazole (PRILOSEC) 20 MG visit content, tangential, perseverates on things    Assessment and Plan:   1. PTSD (post-traumatic stress disorder)  2. BRENDA (generalized anxiety disorder)  3. Major depressive disorder, recurrent episode, moderate (H)  Currently on risperidone, Effexor, and prazosin. [facility] team assumed responsibility for patient's mental health care end of June 2019 and are actively managing her medications. Will assess lipid panel due to weight gain and anti-psychotic use.  - Lipid Panel ([facility])    4. Contraception  Patient insisting her nexplanon be removed because she feels it is the sole reason for her weight gain, abdominal bloating, and fatigue. She is also distressed by the dysmenorrhea. Significant counseling was performed regarding expectations of the nexplanon and how it is imparative to her mental health that she stay on a reliable form of birth control to prevent pregnancy. Despite our discussion patient would still like the nexplanon removed. She is agreeable to starting oral OCPs in preparation for nexplanon removal next week.   - norethindrone-ethinyl estradiol (MICROGESTIN 1.5/30) 1.5-30 MG-MCG tablet; Take 1 tablet by mouth daily  Dispense: 84 tablet; Refill: 4    5. Class 1 obesity without serious comorbidity with body mass index (BMI) of 32.0 to 32.9 in adult, unspecified obesity type  Body mass index is 32.68 kg/m . 13 pound weight gain since 2/2019. Suspect partially related to initiation of risperidone for mental health. Patient fixated that her weight gain is 2/2 nexplanon as discussed above. Discussed realistic expectations that she will likely not lose a significant amount of weight after nexplanon removal, though patient insists she will. She declines exercise because it makes her fatigued.   - Lipid Panel ([facility])    6. Screening for malignant neoplasm of cervix  Pap smear due, cervix slightly friable on exam. Reflex HPV.   -GYN cytology    8. Chronic seasonal allergic rhinitis due to fungal  spores  Refills provided.   - ketotifen (ZADITOR) 0.025 % ophthalmic solution; Place 1 drop into both eyes every 12 hours  Dispense: 1 Bottle; Refill: 3  - fluticasone (FLONASE) 50 MCG/ACT nasal spray; Spray 1-2 sprays into both nostrils daily as needed for allergies  Dispense: 16 mL; Refill: 1    Follow up: 1 week for nexplanon removal  Options for treatment and follow-up care were reviewed with the patient and/or guardian. Hayde TAI Say and/or guardian engaged in the decision making process and verbalized understanding of the options discussed and agreed with the final plan.    Rosana Rosales DO  [facility] Resident    Precepted with: Bulmaro Lin MD           noted. No erythema. Psychiatric: He has a normal mood and affect. His behavior is normal. Judgment and thought content normal.   Vitals reviewed. BP (!) 144/72 (Site: Left Upper Arm, Position: Sitting, Cuff Size: Large Adult)   Pulse 84   Temp 98.3 °F (36.8 °C) (Oral)   Resp 18   Ht 5' 8\" (1.727 m)   Wt 172 lb 3.2 oz (78.1 kg)   SpO2 98%   BMI 26.18 kg/m²      ECOG status is 1. Imaging studies and labs:     Pet Ct Skull Base To Mid Thigh  Result Date: 2/25/2019  PROCEDURE: PET CT SKULL BASE TO MID THIGH CLINICAL INFORMATION: Malignant neoplasm of upper lobe, left bronchus or lung ([address] Adenocarcinoma of left lung ([address] . COMPARISON: CT chest 1/23/2019 TECHNIQUE: Radiopharmaceutical: 12.19 mCi F-18 FDG PET/CT was performed from the skull base to the mid thigh levels using routine PET acquisition. Injection site: Right antecubital space. Injection time 7:55 AM. Serum glucose: 201 mg/dL Image acquisition 8:58 AM FINDINGS: Neck: No FDG avid cervical lymphadenopathy. CHEST: There is mediastinal adenopathy. A left paratracheal node short axis measurement 1.2 cm SUV max of 2.11. Nonenlarged right paratracheal node SUV max of 1.3 nonenlarged left hilar node SUV max 1.8. There is fissural fluid in the left upper lobe portion of the major fissure. Study show some activity SUV max 1.3. The left lower lobe mass consistent with known malignancy currently measures 5.7 x 3.7 x 3.4 cm in cephalocaudal extent and transverse and AP dimensions respectively. SUV max is up to 5.1 image 114. There is a nodular density in the right lower lobe image 122 measuring 1.0 cm SUV max of 0.6. Anterior left basilar atelectasis. Abdomen pelvis: Physiologic activity is seen in the liver, spleen, and genitourinary collecting system. There is no mesenteric, retroperitoneal pelvic or inguinal lymphadenopathy identified. Mild colonic diverticulosis. Prostamegaly. Normal-appearing bladder.  No hypermetabolic osseous lesions to suggest osseous metastatic disease. 1. Known left lower lobe malignancy showing FDG avidity. 2. Mediastinal adenopathy with minimal activity above background suggesting possible reactive lymphadenopathy. Metastatic disease is not excludable. 3. Pleural fluid in the major fissure of the left upper lung. Minimal activity above background may indicate a malignant effusion. 4. No other evidence of metastatic disease, small nodule in the right lower lobe shows no significant activity above background.    Xr Chest Pa Inspiration 1 Vw    Lab Results   Component Value Date    WBC 8.2 05/02/2019    HGB 13.0 (L) 05/02/2019    HCT 39.4 (L) 05/02/2019    MCV 80 05/02/2019     05/02/2019       Chemistry        Component Value Date/Time     05/02/2019 0945     04/19/2019 0607    K 5.1 (H) 05/02/2019 0945    K 4.9 04/19/2019 0607     04/19/2019 0607    CO2 24 04/19/2019 0607    BUN 10 04/19/2019 0607    CREATININE 0.7 05/02/2019 0945    CREATININE 0.7 04/19/2019 0607        Component Value Date/Time    CALCIUM 9.0 04/19/2019 0607    ALKPHOS 102 05/02/2019 0945    AST 20 05/02/2019 0945    ALT 7 (L) 05/02/2019 0945    BILITOT 0.2 (L) 05/02/2019 0945           LUNG:    Procedure  Lobectomy    Specimen Laterality  Left    Tumor Site  Lower lobe of lung    Tumor Size  4.7 x 2.3 x 2.2 cm    Tumor Focality  Single tumor    Histologic Type  Adenocarcinoma with sarcomatoid features    Histologic Grade  G3: Poorly differentiated    Visceral Pleura Invasion  Present    Lymphovascular Invasion  Present    Direct Invasion of Adjacent Structures  Adjacent structures present but not involved  Parietal pleura    Margins  All margins are uninvolved by tumor  Margins examined: Bronchial, vascular, and parenchymal.    Treatment Effect  No known presurgical therapy    Regional Lymph Nodes  Number of Lymph Nodes Involved: 0  Number of Lymph Nodes Examined: 8    Pathologic Stage Classification (pTNM, AJCC 8th Edition)    Primary Tumor (pT)  pT2b: Tumor >4 cm, but <=5 cm in greatest dimension    Regional Lymph Nodes (pN)  pN0: No regional lymph node metastasis    Assessment/Plan:   1. Non-small cell lung cancer. 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 He developed hemoptysis that triggered further workup with CT scans showing  lobulated mass in the left lower lobe. Subsequent CT-guided lung biopsy on 02/12/2019 showed poorly differentiated pulmonary adenocarcinoma. The patient had PET scan that showed, in addition to known left lower lobe mass a mediastinal adenopathy with activity above the background. Differential diagnosis includes reactive lymphadenopathy versus metastatic disease. Patient had EBUS with transbronchial ultrasound guided mediastinal lymph node biopsy. It was negative. On March 14, 2019 he underwent  left lower lobe, lung lobectomy and lymph node sampling. He is recovering from surgery nicely. He presents to the medical oncology clinic to discuss further treatment of his newly diagnosed stage II A lung cancer. His lung cancer had few poor prognostic features: Sarcomatoid features, high histologic grade, lymphovascular invasion, visceral pleural invasion. The patient is elderly and he has some comorbidities including  CAD, COPD, DM. However, he is still fully independent with instrumental activities of daily leaving. His ECOG performance status is 1. Next, we talk about adjuvant chemotherapy. The clinical trials utilizing cisplatin-based regimens demonstrated a survival advantage for patients with stage II disease. Although older adult patients have not been well represented in adjuvant trials, the available data suggest that fit older adults with resected NSCLC  derive as much benefit from adjuvant chemotherapy as a younger individual, with similar tolerance of toxicities.  My recommendation is to proceed with combination of Alimta and cisplatin. Schedule and possible side effects of chemotherapy were reviewed with the patient. 2.  Acute chest pain. He was sent to the emergency room for further evaluation and to rule out ischemic heart disease      Diagnosis Orders   1. Malignant neoplasm of lower lobe of left lung ([facility]     2. S/P lobectomy of lung     3. Acute chest pain          Plan:   No follow-ups on file. Orders Placed:   No orders of the defined types were placed in this encounter. Medications Prescribed:   No orders of the defined types were placed in this encounter. I spent 40 minutes face-to-face with the patient and his family. Greater than 50% of time was spent on counseling and coordinating his care. Face to face counseling included prognosis, risks, benefits of treatment, compliance and risk reduction.

## 2019-09-12 ENCOUNTER — HOSPITAL ENCOUNTER (OUTPATIENT)
Dept: ULTRASOUND IMAGING | Age: 69
Discharge: HOME | End: 2019-09-12
Payer: MEDICARE

## 2019-09-12 ENCOUNTER — HOSPITAL ENCOUNTER (OUTPATIENT)
Age: 69
Discharge: HOME | End: 2019-09-12
Payer: MEDICARE

## 2019-09-12 DIAGNOSIS — R93.49 ABNORMAL FINDINGS ON DIAGNOSTIC IMAGING OF URINARY ORGANS: ICD-10-CM

## 2019-09-12 LAB
FERRITIN: 70 NG/ML (ref 22–322)
IRON SATURATION: 23 % (ref 20–50)
IRON: 74 UG/DL (ref 65–195)
MAGNESIUM: 1.5 MG/DL (ref 1.6–2.4)
POTASSIUM SERPL-SCNC: 4.7 MEQ/L (ref 3.5–5.2)
TOTAL IRON BINDING CAPACITY: 319 UG/DL (ref 171–450)

## 2019-09-12 PROCEDURE — 76705 ECHO EXAM OF ABDOMEN: CPT

## 2019-09-12 PROCEDURE — 83883 ASSAY NEPHELOMETRY NOT SPEC: CPT

## 2019-09-12 PROCEDURE — 82728 ASSAY OF FERRITIN: CPT

## 2019-09-12 PROCEDURE — 82784 ASSAY IGA/IGD/IGG/IGM EACH: CPT

## 2019-09-12 PROCEDURE — 86334 IMMUNOFIX E-PHORESIS SERUM: CPT

## 2019-09-12 PROCEDURE — 83735 ASSAY OF MAGNESIUM: CPT

## 2019-09-12 PROCEDURE — 84160 ASSAY OF PROTEIN ANY SOURCE: CPT

## 2019-09-12 PROCEDURE — 84156 ASSAY OF PROTEIN URINE: CPT

## 2019-09-12 PROCEDURE — 83550 IRON BINDING TEST: CPT

## 2019-09-12 PROCEDURE — 36415 COLL VENOUS BLD VENIPUNCTURE: CPT

## 2019-09-12 PROCEDURE — 83540 ASSAY OF IRON: CPT

## 2019-09-12 PROCEDURE — 84132 ASSAY OF SERUM POTASSIUM: CPT

## 2019-09-12 PROCEDURE — 86335 IMMUNFIX E-PHORSIS/URINE/CSF: CPT

## 2019-09-12 PROCEDURE — 84165 PROTEIN E-PHORESIS SERUM: CPT

## 2019-09-15 LAB — PROTEIN ELECTROPHORESIS, URINE: NORMAL

## 2019-09-17 LAB — IMMUNOFIXATION ELECTROPHORESIS: NORMAL

## 2019-09-20 ENCOUNTER — HOSPITAL ENCOUNTER (OUTPATIENT)
Age: 69
Discharge: HOME | End: 2019-09-20
Payer: MEDICARE

## 2019-09-20 LAB — POTASSIUM SERPL-SCNC: 4.8 MEQ/L (ref 3.5–5.2)

## 2019-09-20 PROCEDURE — 36415 COLL VENOUS BLD VENIPUNCTURE: CPT

## 2019-09-20 PROCEDURE — 84132 ASSAY OF SERUM POTASSIUM: CPT

## 2019-09-27 ENCOUNTER — HOSPITAL ENCOUNTER (OUTPATIENT)
Age: 69
Discharge: HOME | End: 2019-09-27
Payer: MEDICARE

## 2019-09-27 LAB — POTASSIUM SERPL-SCNC: 4.9 MEQ/L (ref 3.5–5.2)

## 2019-09-27 PROCEDURE — 36415 COLL VENOUS BLD VENIPUNCTURE: CPT

## 2019-09-27 PROCEDURE — 84132 ASSAY OF SERUM POTASSIUM: CPT

## 2019-10-19 ENCOUNTER — HOSPITAL ENCOUNTER (OUTPATIENT)
Age: 69
Discharge: HOME | End: 2019-10-19
Payer: MEDICARE

## 2019-10-19 LAB
ANION GAP SERPL CALCULATED.3IONS-SCNC: 15 MEQ/L (ref 8–16)
BUN BLDV-MCNC: 24 MG/DL (ref 7–22)
CALCIUM SERPL-MCNC: 10 MG/DL (ref 8.5–10.5)
CHLORIDE BLD-SCNC: 105 MEQ/L (ref 98–111)
CO2: 22 MEQ/L (ref 23–33)
CREAT SERPL-MCNC: 0.8 MG/DL (ref 0.4–1.2)
GFR SERPL CREATININE-BSD FRML MDRD: > 90 ML/MIN/1.73M2
GLUCOSE BLD-MCNC: 199 MG/DL (ref 70–108)
MAGNESIUM: 1.5 MG/DL (ref 1.6–2.4)
POTASSIUM SERPL-SCNC: 4.2 MEQ/L (ref 3.5–5.2)
SODIUM BLD-SCNC: 142 MEQ/L (ref 135–145)

## 2019-10-19 PROCEDURE — 83735 ASSAY OF MAGNESIUM: CPT

## 2019-10-19 PROCEDURE — 36415 COLL VENOUS BLD VENIPUNCTURE: CPT

## 2019-10-19 PROCEDURE — 80048 BASIC METABOLIC PNL TOTAL CA: CPT

## 2019-11-02 ENCOUNTER — HOSPITAL ENCOUNTER (OUTPATIENT)
Age: 69
Discharge: HOME | End: 2019-11-02
Payer: MEDICARE

## 2019-11-02 LAB
ANION GAP SERPL CALCULATED.3IONS-SCNC: 13 MEQ/L (ref 8–16)
BUN BLDV-MCNC: 14 MG/DL (ref 7–22)
CALCIUM SERPL-MCNC: 9.3 MG/DL (ref 8.5–10.5)
CHLORIDE BLD-SCNC: 102 MEQ/L (ref 98–111)
CO2: 24 MEQ/L (ref 23–33)
CREAT SERPL-MCNC: 0.7 MG/DL (ref 0.4–1.2)
GFR SERPL CREATININE-BSD FRML MDRD: > 90 ML/MIN/1.73M2
GLUCOSE BLD-MCNC: 193 MG/DL (ref 70–108)
MAGNESIUM: 1.6 MG/DL (ref 1.6–2.4)
POTASSIUM SERPL-SCNC: 5.1 MEQ/L (ref 3.5–5.2)
SODIUM BLD-SCNC: 139 MEQ/L (ref 135–145)

## 2019-11-02 PROCEDURE — 80048 BASIC METABOLIC PNL TOTAL CA: CPT

## 2019-11-02 PROCEDURE — 83735 ASSAY OF MAGNESIUM: CPT

## 2019-11-02 PROCEDURE — 36415 COLL VENOUS BLD VENIPUNCTURE: CPT

## 2019-11-07 ENCOUNTER — HOSPITAL ENCOUNTER (OUTPATIENT)
Dept: CT IMAGING | Age: 69
Discharge: HOME | End: 2019-11-07
Payer: MEDICARE

## 2019-11-07 DIAGNOSIS — C34.32 MALIGNANT NEOPLASM OF LOWER LOBE OF LEFT LUNG (HCC): ICD-10-CM

## 2019-11-07 DIAGNOSIS — Z90.2 S/P LOBECTOMY OF LUNG: ICD-10-CM

## 2019-11-07 PROCEDURE — 6360000004 HC RX CONTRAST MEDICATION: Performed by: INTERNAL MEDICINE

## 2019-11-07 PROCEDURE — 71260 CT THORAX DX C+: CPT

## 2019-11-07 RX ADMIN — IOPAMIDOL 85 ML: 755 INJECTION, SOLUTION INTRAVENOUS at 08:23

## 2019-11-15 ENCOUNTER — HOSPITAL ENCOUNTER (OUTPATIENT)
Dept: INFUSION THERAPY | Age: 69
Discharge: HOME | End: 2019-11-15
Payer: MEDICARE

## 2019-11-15 ENCOUNTER — OFFICE VISIT (OUTPATIENT)
Dept: ONCOLOGY | Age: 69
End: 2019-11-15
Payer: MEDICARE

## 2019-11-15 VITALS
HEIGHT: 68 IN | TEMPERATURE: 98.4 F | RESPIRATION RATE: 18 BRPM | BODY MASS INDEX: 27.37 KG/M2 | WEIGHT: 180.6 LBS | OXYGEN SATURATION: 96 % | DIASTOLIC BLOOD PRESSURE: 86 MMHG | SYSTOLIC BLOOD PRESSURE: 158 MMHG | HEART RATE: 83 BPM

## 2019-11-15 DIAGNOSIS — C34.32 MALIGNANT NEOPLASM OF LOWER LOBE OF LEFT LUNG (HCC): ICD-10-CM

## 2019-11-15 DIAGNOSIS — Z90.2 S/P LOBECTOMY OF LUNG: Primary | ICD-10-CM

## 2019-11-15 DIAGNOSIS — C34.32 MALIGNANT NEOPLASM OF LOWER LOBE OF LEFT LUNG (HCC): Primary | ICD-10-CM

## 2019-11-15 DIAGNOSIS — Z92.21 STATUS POST CHEMOTHERAPY: ICD-10-CM

## 2019-11-15 DIAGNOSIS — Z85.118 ENCOUNTER FOR FOLLOW-UP SURVEILLANCE OF LUNG CANCER: ICD-10-CM

## 2019-11-15 DIAGNOSIS — Z08 ENCOUNTER FOR FOLLOW-UP SURVEILLANCE OF LUNG CANCER: ICD-10-CM

## 2019-11-15 LAB
ALBUMIN SERPL-MCNC: 4.2 G/DL (ref 3.5–5.1)
ALP BLD-CCNC: 80 U/L (ref 38–126)
ALT SERPL-CCNC: 8 U/L (ref 11–66)
AST SERPL-CCNC: 16 U/L (ref 5–40)
BASOPHILS # BLD: 0.6 %
BASOPHILS ABSOLUTE: 0 THOU/MM3 (ref 0–0.1)
BILIRUB SERPL-MCNC: 0.2 MG/DL (ref 0.3–1.2)
BILIRUBIN DIRECT: < 0.2 MG/DL (ref 0–0.3)
BUN, WHOLE BLOOD: 27 MG/DL (ref 8–26)
CHLORIDE, WHOLE BLOOD: 106 MEQ/L (ref 98–109)
CREATININE, WHOLE BLOOD: 1 MG/DL (ref 0.5–1.2)
EOSINOPHIL # BLD: 2.6 %
EOSINOPHILS ABSOLUTE: 0.2 THOU/MM3 (ref 0–0.4)
ERYTHROCYTE [DISTWIDTH] IN BLOOD BY AUTOMATED COUNT: 13.3 % (ref 11.5–14.5)
ERYTHROCYTE [DISTWIDTH] IN BLOOD BY AUTOMATED COUNT: 43.2 FL (ref 35–45)
GFR, ESTIMATED: 79 ML/MIN/1.73M2
GLUCOSE, WHOLE BLOOD: 163 MG/DL (ref 70–108)
HCT VFR BLD CALC: 39.9 % (ref 42–52)
HEMOGLOBIN: 12.8 GM/DL (ref 14–18)
IMMATURE GRANS (ABS): 0.03 THOU/MM3 (ref 0–0.07)
IMMATURE GRANULOCYTES: 0.4 %
IONIZED CALCIUM, WHOLE BLOOD: 1.28 MMOL/L (ref 1.12–1.32)
LYMPHOCYTES # BLD: 13.4 %
LYMPHOCYTES ABSOLUTE: 1.1 THOU/MM3 (ref 1–4.8)
MCH RBC QN AUTO: 28.4 PG (ref 26–33)
MCHC RBC AUTO-ENTMCNC: 32.1 GM/DL (ref 32.2–35.5)
MCV RBC AUTO: 88.7 FL (ref 80–94)
MONOCYTES # BLD: 10.1 %
MONOCYTES ABSOLUTE: 0.8 THOU/MM3 (ref 0.4–1.3)
NUCLEATED RED BLOOD CELLS: 0 /100 WBC
PLATELET # BLD: 222 THOU/MM3 (ref 130–400)
PMV BLD AUTO: 9.2 FL (ref 9.4–12.4)
POTASSIUM, WHOLE BLOOD: 4.9 MEQ/L (ref 3.5–4.9)
RBC # BLD: 4.5 MILL/MM3 (ref 4.7–6.1)
SEG NEUTROPHILS: 72.9 %
SEGMENTED NEUTROPHILS ABSOLUTE COUNT: 5.9 THOU/MM3 (ref 1.8–7.7)
SODIUM, WHOLE BLOOD: 142 MEQ/L (ref 138–146)
TOTAL CO2, WHOLE BLOOD: 28 MEQ/L (ref 23–33)
TOTAL PROTEIN: 7.3 G/DL (ref 6.1–8)
WBC # BLD: 8.1 THOU/MM3 (ref 4.8–10.8)

## 2019-11-15 PROCEDURE — 99211 OFF/OP EST MAY X REQ PHY/QHP: CPT

## 2019-11-15 PROCEDURE — 4040F PNEUMOC VAC/ADMIN/RCVD: CPT | Performed by: INTERNAL MEDICINE

## 2019-11-15 PROCEDURE — 99214 OFFICE O/P EST MOD 30 MIN: CPT | Performed by: INTERNAL MEDICINE

## 2019-11-15 PROCEDURE — G8417 CALC BMI ABV UP PARAM F/U: HCPCS | Performed by: INTERNAL MEDICINE

## 2019-11-15 PROCEDURE — 80047 BASIC METABLC PNL IONIZED CA: CPT

## 2019-11-15 PROCEDURE — G8427 DOCREV CUR MEDS BY ELIG CLIN: HCPCS | Performed by: INTERNAL MEDICINE

## 2019-11-15 PROCEDURE — 36415 COLL VENOUS BLD VENIPUNCTURE: CPT

## 2019-11-15 PROCEDURE — G8484 FLU IMMUNIZE NO ADMIN: HCPCS | Performed by: INTERNAL MEDICINE

## 2019-11-15 PROCEDURE — 80076 HEPATIC FUNCTION PANEL: CPT

## 2019-11-15 PROCEDURE — 1036F TOBACCO NON-USER: CPT | Performed by: INTERNAL MEDICINE

## 2019-11-15 PROCEDURE — 3017F COLORECTAL CA SCREEN DOC REV: CPT | Performed by: INTERNAL MEDICINE

## 2019-11-15 PROCEDURE — 85025 COMPLETE CBC W/AUTO DIFF WBC: CPT

## 2019-11-15 PROCEDURE — G8598 ASA/ANTIPLAT THER USED: HCPCS | Performed by: INTERNAL MEDICINE

## 2019-11-15 PROCEDURE — 1123F ACP DISCUSS/DSCN MKR DOCD: CPT | Performed by: INTERNAL MEDICINE

## 2019-11-18 ASSESSMENT — ENCOUNTER SYMPTOMS
RECTAL PAIN: 0
CONSTIPATION: 0
BLOOD IN STOOL: 0
TROUBLE SWALLOWING: 0
NAUSEA: 0
SORE THROAT: 0
BACK PAIN: 0
DIARRHEA: 0
WHEEZING: 0
FACIAL SWELLING: 0
VOMITING: 0
SHORTNESS OF BREATH: 0
ABDOMINAL DISTENTION: 0
COUGH: 0
COLOR CHANGE: 0
CHEST TIGHTNESS: 0
EYE DISCHARGE: 0
ABDOMINAL PAIN: 0

## 2019-11-25 ENCOUNTER — OFFICE VISIT (OUTPATIENT)
Dept: PULMONOLOGY | Age: 69
End: 2019-11-25
Payer: MEDICARE

## 2019-11-25 ENCOUNTER — CARE COORDINATION (OUTPATIENT)
Dept: CARE COORDINATION | Age: 69
End: 2019-11-25

## 2019-11-25 VITALS
HEIGHT: 67 IN | TEMPERATURE: 98.2 F | WEIGHT: 181 LBS | SYSTOLIC BLOOD PRESSURE: 128 MMHG | OXYGEN SATURATION: 94 % | DIASTOLIC BLOOD PRESSURE: 72 MMHG | BODY MASS INDEX: 28.41 KG/M2 | HEART RATE: 100 BPM

## 2019-11-25 DIAGNOSIS — J44.9 STAGE 2 MODERATE COPD BY GOLD CLASSIFICATION (HCC): Primary | ICD-10-CM

## 2019-11-25 DIAGNOSIS — C34.92 NON-SMALL CELL CANCER OF LEFT LUNG (HCC): ICD-10-CM

## 2019-11-25 DIAGNOSIS — R91.1 PULMONARY NODULE, RIGHT: ICD-10-CM

## 2019-11-25 DIAGNOSIS — Z87.891 PERSONAL HISTORY OF TOBACCO USE: ICD-10-CM

## 2019-11-25 DIAGNOSIS — J44.9 STAGE 2 MODERATE COPD BY GOLD CLASSIFICATION (HCC): ICD-10-CM

## 2019-11-25 PROCEDURE — G8417 CALC BMI ABV UP PARAM F/U: HCPCS | Performed by: NURSE PRACTITIONER

## 2019-11-25 PROCEDURE — G8427 DOCREV CUR MEDS BY ELIG CLIN: HCPCS | Performed by: NURSE PRACTITIONER

## 2019-11-25 PROCEDURE — 1036F TOBACCO NON-USER: CPT | Performed by: NURSE PRACTITIONER

## 2019-11-25 PROCEDURE — G8926 SPIRO NO PERF OR DOC: HCPCS | Performed by: NURSE PRACTITIONER

## 2019-11-25 PROCEDURE — G8598 ASA/ANTIPLAT THER USED: HCPCS | Performed by: NURSE PRACTITIONER

## 2019-11-25 PROCEDURE — 99214 OFFICE O/P EST MOD 30 MIN: CPT | Performed by: NURSE PRACTITIONER

## 2019-11-25 PROCEDURE — 4040F PNEUMOC VAC/ADMIN/RCVD: CPT | Performed by: NURSE PRACTITIONER

## 2019-11-25 PROCEDURE — 94618 PULMONARY STRESS TESTING: CPT | Performed by: NURSE PRACTITIONER

## 2019-11-25 PROCEDURE — G8484 FLU IMMUNIZE NO ADMIN: HCPCS | Performed by: NURSE PRACTITIONER

## 2019-11-25 PROCEDURE — 3023F SPIROM DOC REV: CPT | Performed by: NURSE PRACTITIONER

## 2019-11-25 PROCEDURE — 1123F ACP DISCUSS/DSCN MKR DOCD: CPT | Performed by: NURSE PRACTITIONER

## 2019-11-25 PROCEDURE — 3017F COLORECTAL CA SCREEN DOC REV: CPT | Performed by: NURSE PRACTITIONER

## 2019-11-25 RX ORDER — AZITHROMYCIN 500 MG/1
500 TABLET, FILM COATED ORAL DAILY
Qty: 1 PACKET | Refills: 0 | Status: SHIPPED | OUTPATIENT
Start: 2019-11-25 | End: 2019-11-28

## 2019-11-25 RX ORDER — PREDNISONE 50 MG/1
50 TABLET ORAL DAILY
Qty: 5 TABLET | Refills: 0 | Status: SHIPPED | OUTPATIENT
Start: 2019-11-25 | End: 2019-11-30

## 2019-11-25 ASSESSMENT — ENCOUNTER SYMPTOMS
COUGH: 1
VOICE CHANGE: 0
TROUBLE SWALLOWING: 0
EYES NEGATIVE: 1
ABDOMINAL PAIN: 0
CHEST TIGHTNESS: 0
SHORTNESS OF BREATH: 1
DIARRHEA: 0
WHEEZING: 0
CHOKING: 0
VOMITING: 0
NAUSEA: 0

## 2019-12-18 ENCOUNTER — CARE COORDINATION (OUTPATIENT)
Dept: CARE COORDINATION | Age: 69
End: 2019-12-18

## 2020-01-13 ENCOUNTER — HOSPITAL ENCOUNTER (OUTPATIENT)
Age: 70
Discharge: HOME | End: 2020-01-13
Payer: MEDICARE

## 2020-01-13 LAB
ALBUMIN SERPL-MCNC: 4.4 G/DL (ref 3.5–5.1)
ALP BLD-CCNC: 76 U/L (ref 38–126)
ALT SERPL-CCNC: 17 U/L (ref 11–66)
ANION GAP SERPL CALCULATED.3IONS-SCNC: 13 MEQ/L (ref 8–16)
AST SERPL-CCNC: 21 U/L (ref 5–40)
AVERAGE GLUCOSE: 162 MG/DL (ref 70–126)
BILIRUB SERPL-MCNC: 0.2 MG/DL (ref 0.3–1.2)
BUN BLDV-MCNC: 13 MG/DL (ref 7–22)
CALCIUM SERPL-MCNC: 9.3 MG/DL (ref 8.5–10.5)
CHLORIDE BLD-SCNC: 103 MEQ/L (ref 98–111)
CHOLESTEROL, TOTAL: 154 MG/DL (ref 100–199)
CO2: 25 MEQ/L (ref 23–33)
CREAT SERPL-MCNC: 0.8 MG/DL (ref 0.4–1.2)
CREATININE URINE: 175.1 MG/DL
ERYTHROCYTE [DISTWIDTH] IN BLOOD BY AUTOMATED COUNT: 14.6 % (ref 11.5–14.5)
ERYTHROCYTE [DISTWIDTH] IN BLOOD BY AUTOMATED COUNT: 46.1 FL (ref 35–45)
GFR SERPL CREATININE-BSD FRML MDRD: > 90 ML/MIN/1.73M2
GLUCOSE BLD-MCNC: 178 MG/DL (ref 70–108)
HBA1C MFR BLD: 7.4 % (ref 4.4–6.4)
HCT VFR BLD CALC: 46.6 % (ref 42–52)
HDLC SERPL-MCNC: 49 MG/DL
HEMOGLOBIN: 14.9 GM/DL (ref 14–18)
LDL CHOLESTEROL CALCULATED: 78 MG/DL
MCH RBC QN AUTO: 27.6 PG (ref 26–33)
MCHC RBC AUTO-ENTMCNC: 32 GM/DL (ref 32.2–35.5)
MCV RBC AUTO: 86.3 FL (ref 80–94)
PLATELET # BLD: 181 THOU/MM3 (ref 130–400)
PMV BLD AUTO: 9.1 FL (ref 9.4–12.4)
POTASSIUM SERPL-SCNC: 5.1 MEQ/L (ref 3.5–5.2)
PROT/CREAT RATIO, UR: 1.43
PROTEIN, URINE: 251.1 MG/DL
RBC # BLD: 5.4 MILL/MM3 (ref 4.7–6.1)
SODIUM BLD-SCNC: 141 MEQ/L (ref 135–145)
TOTAL PROTEIN: 7.3 G/DL (ref 6.1–8)
TRIGL SERPL-MCNC: 135 MG/DL (ref 0–199)
WBC # BLD: 8.5 THOU/MM3 (ref 4.8–10.8)

## 2020-01-13 PROCEDURE — 84156 ASSAY OF PROTEIN URINE: CPT

## 2020-01-13 PROCEDURE — 85027 COMPLETE CBC AUTOMATED: CPT

## 2020-01-13 PROCEDURE — 36415 COLL VENOUS BLD VENIPUNCTURE: CPT

## 2020-01-13 PROCEDURE — 82570 ASSAY OF URINE CREATININE: CPT

## 2020-01-13 PROCEDURE — 80053 COMPREHEN METABOLIC PANEL: CPT

## 2020-01-13 PROCEDURE — 83036 HEMOGLOBIN GLYCOSYLATED A1C: CPT

## 2020-01-13 PROCEDURE — 80061 LIPID PANEL: CPT

## 2020-01-15 ENCOUNTER — HOSPITAL ENCOUNTER (OUTPATIENT)
Age: 70
End: 2020-01-15
Payer: MEDICARE

## 2020-01-16 ENCOUNTER — HOSPITAL ENCOUNTER (OUTPATIENT)
Age: 70
Setting detail: SPECIMEN
Discharge: HOME | End: 2020-01-16
Payer: MEDICARE

## 2020-01-16 PROCEDURE — 86335 IMMUNFIX E-PHORSIS/URINE/CSF: CPT

## 2020-01-16 PROCEDURE — 81050 URINALYSIS VOLUME MEASURE: CPT

## 2020-01-16 PROCEDURE — 84156 ASSAY OF PROTEIN URINE: CPT

## 2020-01-16 PROCEDURE — 83883 ASSAY NEPHELOMETRY NOT SPEC: CPT

## 2020-01-17 PROCEDURE — 81050 URINALYSIS VOLUME MEASURE: CPT

## 2020-01-20 LAB — PROTEIN ELECTROPHORESIS, URINE: NORMAL

## 2020-01-29 ENCOUNTER — CARE COORDINATION (OUTPATIENT)
Dept: CARE COORDINATION | Age: 70
End: 2020-01-29

## 2020-01-29 NOTE — CARE COORDINATION
Aleida Conti called me asking about assistance on his Anoro inhaler. I explained the guidelines for the program is to spend 600.00 at the pharmacy first in 2020. He will let me know if and when that happens.       Nathen Patel [facility]  [address] [facility]   Medication Assistance  Judd Cesar and [employer]    (S)[phone]  (V)[phone]

## 2020-02-24 ENCOUNTER — HOSPITAL ENCOUNTER (OUTPATIENT)
Dept: CT IMAGING | Age: 70
Discharge: HOME | End: 2020-02-24
Payer: MEDICARE

## 2020-02-24 ENCOUNTER — HOSPITAL ENCOUNTER (OUTPATIENT)
Dept: NURSING | Age: 70
Discharge: HOME | End: 2020-02-24
Payer: MEDICARE

## 2020-02-24 VITALS
OXYGEN SATURATION: 96 % | SYSTOLIC BLOOD PRESSURE: 157 MMHG | HEART RATE: 79 BPM | HEIGHT: 68 IN | BODY MASS INDEX: 27.28 KG/M2 | TEMPERATURE: 97.7 F | WEIGHT: 180 LBS | RESPIRATION RATE: 16 BRPM | DIASTOLIC BLOOD PRESSURE: 103 MMHG

## 2020-02-24 LAB
CREAT SERPL-MCNC: 0.7 MG/DL (ref 0.4–1.2)
GFR SERPL CREATININE-BSD FRML MDRD: > 90 ML/MIN/1.73M2

## 2020-02-24 PROCEDURE — 6360000004 HC RX CONTRAST MEDICATION: Performed by: INTERNAL MEDICINE

## 2020-02-24 PROCEDURE — 36415 COLL VENOUS BLD VENIPUNCTURE: CPT

## 2020-02-24 PROCEDURE — 96360 HYDRATION IV INFUSION INIT: CPT

## 2020-02-24 PROCEDURE — 71260 CT THORAX DX C+: CPT

## 2020-02-24 PROCEDURE — 96361 HYDRATE IV INFUSION ADD-ON: CPT

## 2020-02-24 PROCEDURE — 2580000003 HC RX 258: Performed by: INTERNAL MEDICINE

## 2020-02-24 PROCEDURE — 82565 ASSAY OF CREATININE: CPT

## 2020-02-24 RX ORDER — SODIUM CHLORIDE 9 MG/ML
INJECTION, SOLUTION INTRAVENOUS CONTINUOUS
Status: ACTIVE | OUTPATIENT
Start: 2020-02-24 | End: 2020-02-24

## 2020-02-24 RX ORDER — FERROUS SULFATE 325(65) MG
325 TABLET ORAL
Status: ON HOLD | COMMUNITY
End: 2021-06-03

## 2020-02-24 RX ADMIN — SODIUM CHLORIDE: 9 INJECTION, SOLUTION INTRAVENOUS at 12:09

## 2020-02-24 RX ADMIN — IOPAMIDOL 85 ML: 755 INJECTION, SOLUTION INTRAVENOUS at 15:27

## 2020-02-24 ASSESSMENT — PAIN - FUNCTIONAL ASSESSMENT: PAIN_FUNCTIONAL_ASSESSMENT: 0-10

## 2020-02-24 NOTE — PROGRESS NOTES
1140 Patient arrived to OPN ambulatory for IV hydration prior to CT scan  PT RIGHTS AND RESPONSIBILITIES OFFERED TO PT.  1155 Patient updated on orders  1250 Patient denies any needs at this time  12 Discharge instructions given to patient verbalized understanding.  Patient taken to CT scan as scheduled    _m___ Safety:       (Environmental)  Julietta Hodgkins to environment   Ensure ID band is correct and in place/ allergy band as needed   Assess for fall risk   Initiate fall precautions as applicable (fall band, side rails, etc.)   Call light within reach   Bed in low position/ wheels locked    _m___ Pain:        Assess pain level and characteristics   Administer analgesics as ordered   Assess effectiveness of pain management and report to MD as needed    m____ Knowledge Deficit:   Assess baseline knowledge   Provide teaching at level of understanding   Provide teaching via preferred learning method   Evaluate teaching effectiveness    _m___ Hemodynamic/Respiratory Status:       (Pre and Post Procedure Monitoring)   Assess/Monitor vital signs and LOC   Assess Baseline SpO2 prior to any sedation   Obtain weight/height   Assess vital signs/ LOC until patient meets discharge criteria   Monitor procedure site and notify MD of any issues

## 2020-02-28 ENCOUNTER — HOSPITAL ENCOUNTER (OUTPATIENT)
Dept: INFUSION THERAPY | Age: 70
Discharge: HOME | End: 2020-02-28
Payer: MEDICARE

## 2020-02-28 ENCOUNTER — OFFICE VISIT (OUTPATIENT)
Dept: ONCOLOGY | Age: 70
End: 2020-02-28
Payer: MEDICARE

## 2020-02-28 VITALS
BODY MASS INDEX: 27.37 KG/M2 | RESPIRATION RATE: 18 BRPM | HEART RATE: 74 BPM | OXYGEN SATURATION: 96 % | HEIGHT: 68 IN | TEMPERATURE: 98 F | SYSTOLIC BLOOD PRESSURE: 142 MMHG | DIASTOLIC BLOOD PRESSURE: 90 MMHG

## 2020-02-28 PROCEDURE — G8427 DOCREV CUR MEDS BY ELIG CLIN: HCPCS | Performed by: INTERNAL MEDICINE

## 2020-02-28 PROCEDURE — 1123F ACP DISCUSS/DSCN MKR DOCD: CPT | Performed by: INTERNAL MEDICINE

## 2020-02-28 PROCEDURE — G8417 CALC BMI ABV UP PARAM F/U: HCPCS | Performed by: INTERNAL MEDICINE

## 2020-02-28 PROCEDURE — G8484 FLU IMMUNIZE NO ADMIN: HCPCS | Performed by: INTERNAL MEDICINE

## 2020-02-28 PROCEDURE — 4040F PNEUMOC VAC/ADMIN/RCVD: CPT | Performed by: INTERNAL MEDICINE

## 2020-02-28 PROCEDURE — 1036F TOBACCO NON-USER: CPT | Performed by: INTERNAL MEDICINE

## 2020-02-28 PROCEDURE — 99214 OFFICE O/P EST MOD 30 MIN: CPT | Performed by: INTERNAL MEDICINE

## 2020-02-28 PROCEDURE — 99211 OFF/OP EST MAY X REQ PHY/QHP: CPT

## 2020-02-28 PROCEDURE — 3017F COLORECTAL CA SCREEN DOC REV: CPT | Performed by: INTERNAL MEDICINE

## 2020-02-28 ASSESSMENT — ENCOUNTER SYMPTOMS
VOMITING: 0
BACK PAIN: 0
BLOOD IN STOOL: 0
DIARRHEA: 0
COLOR CHANGE: 0
COUGH: 0
NAUSEA: 0
WHEEZING: 0
FACIAL SWELLING: 0
EYE DISCHARGE: 0
CONSTIPATION: 0
SHORTNESS OF BREATH: 0
SORE THROAT: 0
CHEST TIGHTNESS: 0
RECTAL PAIN: 0
ABDOMINAL DISTENTION: 0
TROUBLE SWALLOWING: 0
ABDOMINAL PAIN: 0

## 2020-02-28 NOTE — PROGRESS NOTES
[facility], [address] [facility],[address]  [address] [address]  Dept: [phone]  Dept Fax: [phone]: [phone]      Visit Date:2/28/2020     Les Cameron is a 71 y.o. male who presents today for:   Chief Complaint   Patient presents with    Follow-up     LEFT LUNG CANCER    Results     CT        HPI:   Les Cameron is a 71 y.o. male with h/o lung cancer. The patient developed hemoptysis beginning of January 2019. Chest x-ray on January 14, 2019 showed a developing retrocardiac infiltrate. Patient was referred to the pulmonologist Leno Dolan who ordered CT of the chest.  It was performed on January 23, 2019 and showed:  a lobulated mass in the left lower lobe possibly two separate masses is left lower lobe this is a lobulated elongated and measures up to six 5.9 cm in length 2.5 cm in width spiculated margins are present and adjacent airspace disease. Lobulated    8mm nodule lateral aspect right lower lobe image 42   No effusions are seen. Upper abdomen   No adrenal masses. Simple cyst the left kidney. No suspicious bone lesions   Patient underwent bronchoscopy with transbronchial biopsy and BAL on January 25, 2019. They showed no endobronchial lesions and benign bronchial epithelium, alveolar macrophages, and scattered inflammatory cells, respectively. No malignant cells seen. Subsequently on February 12, 2019 the patient had CT-guided lung biopsy which showed poorly differentiated pulmonary adenocarcinoma. The patient had PET scan on February 25, 2019 that showed:  1. Known left lower lobe malignancy showing FDG avidity. 2. Mediastinal adenopathy with minimal activity above background suggesting possible reactive lymphadenopathy. Metastatic disease is not excludable. 3. Pleural fluid in the major fissure of the left upper lung. Minimal activity above background may indicate a malignant effusion.    4. No other evidence of metastatic disease, small nodule in the right lower lobe shows no significant activity above background.       Patient had EBUS with transbronchial ultrasound guided mediastinal lymph node biopsy. The mediastinal lymph node biopsy was  Negative. Therefore the patient met with the thoracic surgeon Dr. John Paul Thomas and after discussing his surgical treatment options, on March 14, 2019 he underwent left lower lobe, lung lobectomy: .  Final pathology report showed:  A. 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   Lymph nodes (0/3): Negative for malignancy. B. Tissue designated lymph node, station 9, excision:   Benign fibroadipose tissue. C. Lymph node, station 10 L, excision:   Negative for malignancy (0/1). D. Lymph nodes, station 6, excision:   Negative for malignancy (0/3). E. Lymph node, station 7, excision:   Negative for malignancy (0/1). F. Tissue designated lymph node, station 8, excision:   Benign fibroadipose tissue. The patient was hospitalized from April 18, 2019 till April 20, 2019 for atypical chest pain, left pleural exudative effusion. He underwent thoracentesis. Cytology was negative for malignant cells. Troponins were negative. No ecg changes. He received cycle #1 of Cisplatin and Alimta on 5/9/19, his last, cycle#4 given on 07/17/2019. Interval History on 02/28/2020: The patient is here for 3 months follow-up visit and to review results of imaging studies. He denies having any worsening shortness of breath. He has occasional nonproductive cough. He denies hemoptysis. No pleuritic or chest wall pain. He denies having any recent infections. No headaches no focal neurological deficits. He denies having any residual side effects from chemotherapy.    HPI   Past Medical History:   Diagnosis Date    Anxiety     Arthritis     Cancer ([facility] [address] 01/2019    left lung stage 2    COPD (chronic obstructive pulmonary disease) ([facility] [address] 2019    Enlarged prostate with urinary retention     Gait difficulty     Generalized weakness     Hyperlipidemia     Hypertension     Lesion of bladder     Osteoarthritis     Retention of urine     S/P cardiac catheterization 2019    stent to circumflex per Dr. Nicole Bingham S/P TURP     SOB (shortness of breath)     With activity    Voiding difficulty       Past Surgical History:   Procedure Laterality Date    APPENDECTOMY      BRONCHOSCOPY N/A 2019    BRONCHOSCOPY performed by Harpreet Sharpe MD at [address] [place] [address]  2019    BRONCHOSCOPY/TRANSBRONCHIAL LUNG BIOPSY performed by Harpreet Sharpe MD at [address] [place] [address] N/A 3/1/2019    BRONCHOSCOPY W/EBUS FNA performed by Harpreet Sharpe MD at [facility]  2019    COLONOSCOPY  [id number]    PROSTATE SURGERY  2012    TURP    THORACOTOMY Left 3/14/2019    LEFT EXPLORATORY THORACOTOMY, MEDIASTINAL LYMPH NODE DISECTION, FLEXIBLE BRONCHOSCOPY performed by Cesar Acevedo MD at [address] [place] [address] History   Problem Relation Age of Onset    Diabetes Mother     High Blood Pressure Mother     Brain Cancer Mother     Heart Disease Father     Diabetes Father     High Blood Pressure Father     Heart Attack Father     Diabetes Sister     High Blood Pressure Sister     COPD Sister         Agent Orange    Diabetes Brother     High Blood Pressure Brother       Social History     Tobacco Use    Smoking status: Former Smoker     Packs/day: 1.00     Years: 40.00     Pack years: 40.00     Types: Cigarettes     Start date:      Last attempt to quit: 2019     Years since quittin.0    Smokeless tobacco: Never Used    Tobacco comment: about 5 cigs per day   Substance Use Topics    Alcohol use: No      Current Outpatient Medications   Medication Sig Dispense Refill    ferrous sulfate 325 (65 Fe) MG tablet Take 325 mg by mouth daily (with breakfast)      SITagliptin (JANUVIA) 100 MG tablet Take 100 mg by mouth daily      clopidogrel (PLAVIX) 75 MG tablet TAKE 1 TABLET BY MOUTH ONE TIME A DAY 30 tablet 11    albuterol sulfate HFA (VENTOLIN HFA) 108 (90 Base) MCG/ACT inhaler Inhale 2 puffs into the lungs 4 times daily 1 Inhaler 11    Blood Glucose Monitoring Suppl (FREESTYLE FREEDOM LITE) w/Device KIT 1 Device daily  0    FREESTYLE LITE strip 1 strip daily  11    FREESTYLE LANCETS MISC 1 Stick daily  11    amLODIPine (NORVASC) 10 MG tablet Take 10 mg by mouth daily      atorvastatin (LIPITOR) 40 MG tablet Take 40 mg by mouth daily      metFORMIN (GLUCOPHAGE) 500 MG tablet Take 500 mg by mouth 2 times daily (with meals) Take 1 tab by mouth at supper x2 weeks, then 1 tab in the morning and 1 tab at supper x2 weeks, then 1 tab in the morning and 2 tab at supper x2weeks, then 2 tabs twice a day      aspirin 81 MG chewable tablet Take 1 tablet by mouth daily 30 tablet 3    nitroGLYCERIN (NITROSTAT) 0.4 MG SL tablet up to max of 3 total doses. If no relief after 1 dose, call 911. 25 tablet 3    omeprazole (PRILOSEC) 20 MG delayed release capsule Take 20 mg by mouth daily      dutasteride (AVODART) 0.5 MG capsule Take 0.5 mg by mouth daily. No current facility-administered medications for this visit.        No Known Allergies   Health Maintenance   Topic Date Due    Hepatitis C screen  1950    Diabetic foot exam  03/25/1960    Diabetic retinal exam  03/25/1960    Diabetic microalbuminuria test  03/25/1968    Shingles Vaccine (1 of 2) 03/25/2000    Pneumococcal 65+ years Vaccine (1 of 1 - PPSV23) 03/25/2015    Annual Wellness Visit (AWV)  06/20/2019    Flu vaccine (1) 09/01/2019    A1C test (Diabetic or Prediabetic)  01/13/2021    Lipid screen  01/13/2021    Low dose CT lung screening  02/24/2021    DTaP/Tdap/Td vaccine (2 - Td) 05/25/2023    Colon cancer screen colonoscopy  01/05/2027    AAA screen  Completed    Hepatitis A vaccine Aged Out    Hepatitis B vaccine  Aged Out    Hib vaccine  Aged Out    Meningococcal (ACWY) vaccine  Aged Out       Review of Systems:   Review of Systems   Constitutional: Negative for activity change, appetite change and fever. HENT: Negative for congestion, dental problem, facial swelling, hearing loss, mouth sores, nosebleeds, sore throat, tinnitus and trouble swallowing. Eyes: Negative for discharge and visual disturbance. Respiratory: Negative for cough, chest tightness, shortness of breath and wheezing. Cardiovascular: Negative for chest pain, palpitations and leg swelling. Gastrointestinal: Negative for abdominal distention, abdominal pain, blood in stool, constipation, diarrhea, nausea, rectal pain and vomiting. Endocrine: Negative for cold intolerance, polydipsia and polyuria. Genitourinary: Negative for decreased urine volume, difficulty urinating, dysuria, flank pain, hematuria and urgency. Musculoskeletal: Negative for arthralgias, back pain, gait problem, joint swelling, myalgias and neck stiffness. Skin: Negative for color change, rash and wound. Neurological: Negative for dizziness, tremors, seizures, speech difficulty, weakness, light-headedness, numbness and headaches. Hematological: Negative for adenopathy. Does not bruise/bleed easily. Psychiatric/Behavioral: Negative for confusion and sleep disturbance. The patient is not nervous/anxious. Pertinent review of systems noted in HPI, all other ROS negative. Objective:   Physical Exam   BP (!) 142/90 (Site: Left Upper Arm, Position: Sitting, Cuff Size: Medium Adult)   Pulse 74   Temp 98 °F (36.7 °C) (Oral)   Resp 18   Ht 5' 8\" (1.727 m)   SpO2 96%   BMI 27.37 kg/m²    General appearance: No apparent distress, well developed and cooperative. HEENT: Pupils equal, round, and reactive to light. Conjunctivae/corneas clear. Oral mucosa moist, no sores noted. Neck: Supple, with full range of motion.  Trachea midline. Respiratory:  Normal respiratory effort. Clear to auscultation, bilaterally without Rales/Wheezes/Rhonchi. Well healed scar to left anterior chest wall consistent with prior lobectomy. Cardiovascular: Regular rate and rhythm with normal S1/S2 without murmurs, rubs or gallops. Abdomen: Soft, non-distended with active bowel sounds. Tenderness to palpation of LUQ and near surgical scar. Musculoskeletal: No clubbing, cyanosis or edema bilaterally. Full range of motion without deformity. Skin: Skin color, texture, turgor normal.  No rashes or lesions. Neurologic:  Neurovascularly intact without any focal sensory/motor deficits. Psychiatric: Alert and oriented      Imaging Studies and Labs:   CBC:   Lab Results   Component Value Date    WBC 8.5 01/13/2020    HGB 14.9 01/13/2020    HCT 46.6 01/13/2020    MCV 86.3 01/13/2020     01/13/2020     BMP:    LFT:   Lab Results   Component Value Date    ALT 17 01/13/2020    AST 21 01/13/2020    ALKPHOS 76 01/13/2020    BILITOT 0.2 (L) 01/13/2020     CT of the abdomen on July 31, 2019 showed:  1. No evidence of acute intra-abdominal or intrapelvic abnormality. 2. No evidence of metastatic disease in the abdomen or pelvis. 3. 9 mm pulmonary nodule in the right lower lobe, stable compared to PET/CT dated 2/25/2019. 4. Hepatic steatosis. 5. Prostatomegaly. 6. Possible bladder wall thickening although the bladder is underdistended. Differential considerations include outlet obstruction from prostatomegaly and cystitis. Correlate with urinalysis as clinically indicated. CT of the chest on November 7, 2019 showed:  1. Small partially loculated pleural effusion at the left lung base. This appears slightly decreased from prior examination. 2. Persistent mild shotty lymphadenopathy is again seen. 3. Postsurgical changes from prior left lower lobectomy are again noted.    4. Pulmonary nodules within the right lung are again seen and appear stable to slightly less prominent from prior examination. Recommend continued follow-up. CT of the chest February 24, 2020 showed:  1. Redemonstration of postsurgical changes from previous left lower lobectomy. There is persistence of a small partially loculated pleural effusion at the left lung base which has decreased in size since the prior exam.   2. Persistent mediastinal lymphadenopathy which is overall similar when compared to the prior exam.   3. There are some pulmonary nodules in the right lung which are similar when compared to the previous examination. Continued follow-up is recommen              Assessment and Plan:   1. Non-small cell lung cancer. Poorly differentiated adenocarcinoma with sarcomatoid features  Stage IIA (pT2b, pN0, cM0)  S/P LLL lobectomy and lymph node sampling on 03/14/2019. His lung cancer had few poor prognostic features: Sarcomatoid features, high histologic grade, lymphovascular invasion, visceral pleural invasion. Therefore the patient  Underwent treatment with adjuvant chemotherapy 4 cycles of Alimta and cisplatin. His cycle#4 given on 07/17/2019. 2.Lung cancer surveillance.   Standard surveillance includes history, physical examination, and chest CT every six months for at least the first two years, and annually thereafter. The patient denies having any new complaints. Specifically he denies having any headaches, no shortness of breath, no chest pain, no bone pain, no abdominal pain. There is no evidence of disease recurrence on today's physical examination. The patient had CT of the chest on November 7, 2019 that showed persistent mediastinal lymphadenopathy and stable pulmonary nodules within the right lung. 3 months follow-up CT of the chest showed stable findings. We will continue monitoring. All patient questions answered. Pt voiced understanding. Patient agreed with treatment plan. Follow up as directed.  Patient instructed to call for questions or

## 2020-03-23 ENCOUNTER — TELEPHONE (OUTPATIENT)
Dept: ONCOLOGY | Age: 70
End: 2020-03-23

## 2020-03-23 NOTE — TELEPHONE ENCOUNTER
Patient stated he has noticed some pain across right chest- past week- intermittently. Denies any increase shortness of breath or cough. Concerned because had lung cancer on left side last year. Justed wanted to call for reassurance. Instructed to monitor the pain and if becomes constant or develop other complications like increase shortness of breath or cough to call us back. Voiced understanding.

## 2020-03-24 NOTE — TELEPHONE ENCOUNTER
Dr. Katheryn Rivas talked to the patient. He will give Dr. Katheryn Rivas update in 1 week.   He might need a new CT scan

## 2020-05-26 ENCOUNTER — HOSPITAL ENCOUNTER (OUTPATIENT)
Dept: CT IMAGING | Age: 70
Discharge: HOME | End: 2020-05-26
Payer: MEDICARE

## 2020-05-26 ENCOUNTER — HOSPITAL ENCOUNTER (OUTPATIENT)
Age: 70
Discharge: HOME | End: 2020-05-26
Payer: MEDICARE

## 2020-05-26 LAB
ANION GAP SERPL CALCULATED.3IONS-SCNC: 8 MEQ/L (ref 8–16)
BUN BLDV-MCNC: 17 MG/DL (ref 7–22)
CALCIUM SERPL-MCNC: 9.3 MG/DL (ref 8.5–10.5)
CHLORIDE BLD-SCNC: 105 MEQ/L (ref 98–111)
CO2: 26 MEQ/L (ref 23–33)
CREAT SERPL-MCNC: 0.7 MG/DL (ref 0.4–1.2)
CREATININE URINE: 153.8 MG/DL
GFR SERPL CREATININE-BSD FRML MDRD: > 90 ML/MIN/1.73M2
GLUCOSE BLD-MCNC: 172 MG/DL (ref 70–108)
MAGNESIUM: 1.8 MG/DL (ref 1.6–2.4)
POC CREATININE WHOLE BLOOD: 0.9 MG/DL (ref 0.5–1.2)
POTASSIUM SERPL-SCNC: 5.2 MEQ/L (ref 3.5–5.2)
PROT/CREAT RATIO, UR: 1.53
PROTEIN, URINE: 235.1 MG/DL
SODIUM BLD-SCNC: 139 MEQ/L (ref 135–145)

## 2020-05-26 PROCEDURE — 71260 CT THORAX DX C+: CPT

## 2020-05-26 PROCEDURE — 6360000004 HC RX CONTRAST MEDICATION: Performed by: INTERNAL MEDICINE

## 2020-05-26 PROCEDURE — 36415 COLL VENOUS BLD VENIPUNCTURE: CPT

## 2020-05-26 PROCEDURE — 84156 ASSAY OF PROTEIN URINE: CPT

## 2020-05-26 PROCEDURE — 80048 BASIC METABOLIC PNL TOTAL CA: CPT

## 2020-05-26 PROCEDURE — 82565 ASSAY OF CREATININE: CPT

## 2020-05-26 PROCEDURE — 82570 ASSAY OF URINE CREATININE: CPT

## 2020-05-26 PROCEDURE — 83735 ASSAY OF MAGNESIUM: CPT

## 2020-05-26 RX ADMIN — IOPAMIDOL 85 ML: 755 INJECTION, SOLUTION INTRAVENOUS at 10:38

## 2020-06-01 ENCOUNTER — OFFICE VISIT (OUTPATIENT)
Dept: ONCOLOGY | Age: 70
End: 2020-06-01
Payer: MEDICARE

## 2020-06-01 ENCOUNTER — HOSPITAL ENCOUNTER (OUTPATIENT)
Dept: INFUSION THERAPY | Age: 70
Discharge: HOME | End: 2020-06-01
Payer: MEDICARE

## 2020-06-01 VITALS
HEIGHT: 68 IN | BODY MASS INDEX: 29.64 KG/M2 | HEART RATE: 77 BPM | OXYGEN SATURATION: 96 % | SYSTOLIC BLOOD PRESSURE: 161 MMHG | TEMPERATURE: 97 F | DIASTOLIC BLOOD PRESSURE: 84 MMHG | RESPIRATION RATE: 16 BRPM | WEIGHT: 195.6 LBS

## 2020-06-01 DIAGNOSIS — C34.90 MALIGNANT NEOPLASM OF LUNG, UNSPECIFIED LATERALITY, UNSPECIFIED PART OF LUNG (HCC): ICD-10-CM

## 2020-06-01 LAB
ABSOLUTE IMMATURE GRANULOCYTE: 0.02 THOU/MM3 (ref 0–0.07)
ALBUMIN SERPL-MCNC: 4.5 G/DL (ref 3.5–5.1)
ALP BLD-CCNC: 82 U/L (ref 38–126)
ALT SERPL-CCNC: 20 U/L (ref 11–66)
AST SERPL-CCNC: 25 U/L (ref 5–40)
BASINOPHIL, AUTOMATED: 0 % (ref 0–3)
BASOPHILS ABSOLUTE: 0 THOU/MM3 (ref 0–0.1)
BILIRUB SERPL-MCNC: 0.3 MG/DL (ref 0.3–1.2)
BILIRUBIN DIRECT: < 0.2 MG/DL (ref 0–0.3)
BUN BLDV-MCNC: 16 MG/DL (ref 7–22)
CHLORIDE, WHOLE BLOOD: 106 MEQ/L (ref 98–109)
CO2: 25 MEQ/L (ref 23–33)
CREATININE, WHOLE BLOOD: 1 MG/DL (ref 0.5–1.2)
EOSINOPHILS ABSOLUTE: 0.1 THOU/MM3 (ref 0–0.4)
EOSINOPHILS RELATIVE PERCENT: 2 % (ref 0–4)
GFR, ESTIMATED: 78 ML/MIN/1.73M2
GLUCOSE, WHOLE BLOOD: 148 MG/DL (ref 70–108)
HCT VFR BLD CALC: 46 % (ref 42–52)
HEMOGLOBIN: 15.3 GM/DL (ref 14–18)
IMMATURE GRANULOCYTES: 0 %
IONIZED CALCIUM, WHOLE BLOOD: 1.2 MMOL/L (ref 1.12–1.32)
LYMPHOCYTES # BLD: 20 % (ref 15–47)
LYMPHOCYTES ABSOLUTE: 1.2 THOU/MM3 (ref 1–4.8)
MCH RBC QN AUTO: 28.4 PG (ref 26–33)
MCHC RBC AUTO-ENTMCNC: 33.3 GM/DL (ref 32.2–35.5)
MCV RBC AUTO: 86 FL (ref 80–94)
MONOCYTES ABSOLUTE: 0.6 THOU/MM3 (ref 0.4–1.3)
MONOCYTES: 11 % (ref 0–12)
PDW BLD-RTO: 13.8 % (ref 11.5–14.5)
PLATELET # BLD: 172 THOU/MM3 (ref 130–400)
PMV BLD AUTO: 8.8 FL (ref 9.4–12.4)
POTASSIUM, WHOLE BLOOD: 5.1 MEQ/L (ref 3.5–4.9)
RBC # BLD: 5.38 MILL/MM3 (ref 4.7–6.1)
SEG NEUTROPHILS: 68 % (ref 43–75)
SEGMENTED NEUTROPHILS ABSOLUTE COUNT: 4 THOU/MM3 (ref 1.8–7.7)
SODIUM, WHOLE BLOOD: 142 MEQ/L (ref 138–146)
TOTAL PROTEIN: 7 G/DL (ref 6.1–8)
WBC # BLD: 6 THOU/MM3 (ref 4.8–10.8)

## 2020-06-01 PROCEDURE — 36415 COLL VENOUS BLD VENIPUNCTURE: CPT

## 2020-06-01 PROCEDURE — 99214 OFFICE O/P EST MOD 30 MIN: CPT | Performed by: INTERNAL MEDICINE

## 2020-06-01 PROCEDURE — 80047 BASIC METABLC PNL IONIZED CA: CPT

## 2020-06-01 PROCEDURE — 99211 OFF/OP EST MAY X REQ PHY/QHP: CPT

## 2020-06-01 PROCEDURE — 82374 ASSAY BLOOD CARBON DIOXIDE: CPT

## 2020-06-01 PROCEDURE — 4040F PNEUMOC VAC/ADMIN/RCVD: CPT | Performed by: INTERNAL MEDICINE

## 2020-06-01 PROCEDURE — 84520 ASSAY OF UREA NITROGEN: CPT

## 2020-06-01 PROCEDURE — 85025 COMPLETE CBC W/AUTO DIFF WBC: CPT

## 2020-06-01 PROCEDURE — 1036F TOBACCO NON-USER: CPT | Performed by: INTERNAL MEDICINE

## 2020-06-01 PROCEDURE — G8427 DOCREV CUR MEDS BY ELIG CLIN: HCPCS | Performed by: INTERNAL MEDICINE

## 2020-06-01 PROCEDURE — 80076 HEPATIC FUNCTION PANEL: CPT

## 2020-06-01 PROCEDURE — 1123F ACP DISCUSS/DSCN MKR DOCD: CPT | Performed by: INTERNAL MEDICINE

## 2020-06-01 PROCEDURE — G8417 CALC BMI ABV UP PARAM F/U: HCPCS | Performed by: INTERNAL MEDICINE

## 2020-06-01 PROCEDURE — 3017F COLORECTAL CA SCREEN DOC REV: CPT | Performed by: INTERNAL MEDICINE

## 2020-06-01 RX ORDER — PATIROMER 8.4 G/1
8.4 POWDER, FOR SUSPENSION ORAL DAILY
COMMUNITY
End: 2021-05-14 | Stop reason: ALTCHOICE

## 2020-06-01 ASSESSMENT — ENCOUNTER SYMPTOMS
BLOOD IN STOOL: 0
NAUSEA: 0
CONSTIPATION: 0
COUGH: 0
ABDOMINAL PAIN: 0
TROUBLE SWALLOWING: 0
VOMITING: 0
EYE PAIN: 0
DIARRHEA: 0
SHORTNESS OF BREATH: 0

## 2020-06-01 NOTE — PROGRESS NOTES
Perez Vazquez is a 79 y.o. male who presents today for:  Chief Complaint   Patient presents with    Follow-up     LEFT LUNG CANCER    Results     RV CT       Goals    None         HPI:     HPI  Perez Vazquez is a 71 y.o. male with h/o lung cancer. The patient developed hemoptysis beginning of January 2019.  Chest x-ray on January 14, 2019 showed a developing retrocardiac infiltrate.  Patient was referred to the pulmonologist Christine Pierce who ordered CT of the chest.  It was performed on January 23, 2019 and showed:  a lobulated mass in the left lower lobe possibly two separate masses is left lower lobe this is a lobulated elongated and measures up to six 5.9 cm in length 2.5 cm in width spiculated margins are present and adjacent airspace disease. Lobulated    8mm nodule lateral aspect right lower lobe image 42   No effusions are seen. Upper abdomen   No adrenal masses. Simple cyst the left kidney. No suspicious bone lesions   Patient underwent bronchoscopy with transbronchial biopsy and BAL on January 25, 2019. They showed no endobronchial lesions and benign bronchial epithelium, alveolar macrophages, and scattered inflammatory cells, respectively. No malignant cells seen.  Subsequently on February 12, 2019 the patient had CT-guided lung biopsy which showed poorly differentiated pulmonary adenocarcinoma.  The patient had PET scan on February 25, 2019 that showed:  1. Known left lower lobe malignancy showing FDG avidity. 2. Mediastinal adenopathy with minimal activity above background suggesting possible reactive lymphadenopathy. Metastatic disease is not excludable. 3. Pleural fluid in the major fissure of the left upper lung. Minimal activity above background may indicate a malignant effusion.    4. No other evidence of metastatic disease, small nodule in the right lower lobe shows no significant activity above background.       Patient had EBUS with transbronchial ultrasound guided mediastinal FREEDOM LITE) w/Device KIT 1 Device daily  0    FREESTYLE LITE strip 1 strip daily  11    FREESTYLE LANCETS MISC 1 Stick daily  11    amLODIPine (NORVASC) 10 MG tablet Take 10 mg by mouth daily      atorvastatin (LIPITOR) 40 MG tablet Take 40 mg by mouth daily      metFORMIN (GLUCOPHAGE) 500 MG tablet Take 500 mg by mouth 2 times daily (with meals) Take 1 tab by mouth at supper x2 weeks, then 1 tab in the morning and 1 tab at supper x2 weeks, then 1 tab in the morning and 2 tab at supper x2weeks, then 2 tabs twice a day      aspirin 81 MG chewable tablet Take 1 tablet by mouth daily 30 tablet 3    dutasteride (AVODART) 0.5 MG capsule Take 0.5 mg by mouth daily.  ferrous sulfate 325 (65 Fe) MG tablet Take 325 mg by mouth daily (with breakfast)      nitroGLYCERIN (NITROSTAT) 0.4 MG SL tablet up to max of 3 total doses. If no relief after 1 dose, call 911. (Patient not taking: Reported on 6/1/2020) 25 tablet 3    omeprazole (PRILOSEC) 20 MG delayed release capsule Take 20 mg by mouth daily       No current facility-administered medications for this visit.       No Known Allergies    Health Maintenance   Topic Date Due    Hepatitis C screen  1950    Diabetic foot exam  03/25/1960    Diabetic retinal exam  03/25/1960    Diabetic microalbuminuria test  03/25/1968    Shingles Vaccine (1 of 2) 03/25/2000    Pneumococcal 65+ years Vaccine (1 of 1 - PPSV23) 03/25/2015    Annual Wellness Visit (AWV)  06/20/2019    Flu vaccine (Season Ended) 09/01/2020    A1C test (Diabetic or Prediabetic)  01/13/2021    Lipid screen  01/13/2021    Low dose CT lung screening  05/26/2021    DTaP/Tdap/Td vaccine (2 - Td) 05/25/2023    Colon cancer screen colonoscopy  01/05/2027    AAA screen  Completed    Hepatitis A vaccine  Aged Out    Hepatitis B vaccine  Aged Out    Hib vaccine  Aged Out    Meningococcal (ACWY) vaccine  Aged Out       Subjective:      Review of Systems   Constitutional: Negative for Palpations: Abdomen is soft. Skin:     General: Skin is warm and dry. Findings: No erythema or rash. Neurological:      Mental Status: He is alert and oriented to person, place, and time. Cranial Nerves: No cranial nerve deficit. Psychiatric:         Behavior: Behavior normal.         Thought Content: Thought content normal.         Judgment: Judgment normal.         Lab Results   Component Value Date    WBC 6.0 06/01/2020    HGB 15.3 06/01/2020    HCT 46.0 06/01/2020     06/01/2020    CHOL 154 01/13/2020    TRIG 135 01/13/2020    HDL 49 01/13/2020    LDLCALC 78 01/13/2020    AST 25 06/01/2020     06/01/2020    K 5.1 (H) 06/01/2020     05/26/2020    CREATININE 1.0 06/01/2020    BUN 16 06/01/2020    CO2 25 06/01/2020    INR 0.87 03/14/2019    LABA1C 7.4 (H) 01/13/2020    LABGLOM >90 05/26/2020    MG 1.8 05/26/2020    CALCIUM 9.3 05/26/2020       Imaging Results:    Ct Chest W Contrast    Result Date: 5/26/2020  PROCEDURE: CT CHEST W CONTRAST CLINICAL INFORMATION: Malignant neoplasm of lung, unspecified laterality, unspecified part of lung ([facility] [address] . COMPARISON: 2/24/2020 TECHNIQUE: 2-D multiplanar postcontrast images of the chest All CT scans at this facility use dose modulation, iterative reconstruction, and/or weight-based dosing when appropriate to reduce radiation dose to as low as reasonably achievable. FINDINGS: Thyroid gland is normal. No mediastinal or hilar adenopathy by size criteria. Heart size is normal. There is coronary artery stent noted. There are no infiltrates or effusions. There is a 6 mm pleural-based nodule lateral aspect right upper lung image 15 this is stable. 0.9 x 1.1 cm nodule lateral aspect right lower lobe also unchanged in size. There is pleural and parenchymal scarring at the left lung base. This is stable. There are no suspicious bone lesions. There is marked healed rib fractures or healed changes from left thoracotomy.  Upper abdomen Suspected

## 2020-06-03 ENCOUNTER — OFFICE VISIT (OUTPATIENT)
Dept: PULMONOLOGY | Age: 70
End: 2020-06-03
Payer: MEDICARE

## 2020-06-03 VITALS
HEIGHT: 68 IN | BODY MASS INDEX: 29.4 KG/M2 | TEMPERATURE: 98.9 F | WEIGHT: 194 LBS | DIASTOLIC BLOOD PRESSURE: 90 MMHG | HEART RATE: 90 BPM | SYSTOLIC BLOOD PRESSURE: 146 MMHG | OXYGEN SATURATION: 96 %

## 2020-06-03 PROCEDURE — 1123F ACP DISCUSS/DSCN MKR DOCD: CPT | Performed by: NURSE PRACTITIONER

## 2020-06-03 PROCEDURE — 1036F TOBACCO NON-USER: CPT | Performed by: NURSE PRACTITIONER

## 2020-06-03 PROCEDURE — G8417 CALC BMI ABV UP PARAM F/U: HCPCS | Performed by: NURSE PRACTITIONER

## 2020-06-03 PROCEDURE — G8427 DOCREV CUR MEDS BY ELIG CLIN: HCPCS | Performed by: NURSE PRACTITIONER

## 2020-06-03 PROCEDURE — 3023F SPIROM DOC REV: CPT | Performed by: NURSE PRACTITIONER

## 2020-06-03 PROCEDURE — 3017F COLORECTAL CA SCREEN DOC REV: CPT | Performed by: NURSE PRACTITIONER

## 2020-06-03 PROCEDURE — 99213 OFFICE O/P EST LOW 20 MIN: CPT | Performed by: NURSE PRACTITIONER

## 2020-06-03 PROCEDURE — 4040F PNEUMOC VAC/ADMIN/RCVD: CPT | Performed by: NURSE PRACTITIONER

## 2020-06-03 PROCEDURE — G8926 SPIRO NO PERF OR DOC: HCPCS | Performed by: NURSE PRACTITIONER

## 2020-06-03 RX ORDER — ALBUTEROL SULFATE 90 UG/1
2 AEROSOL, METERED RESPIRATORY (INHALATION) 4 TIMES DAILY
Qty: 1 INHALER | Refills: 11 | Status: SHIPPED | OUTPATIENT
Start: 2020-06-03 | End: 2022-07-12

## 2020-06-03 ASSESSMENT — ENCOUNTER SYMPTOMS
COUGH: 0
ABDOMINAL PAIN: 0
SHORTNESS OF BREATH: 0
WHEEZING: 0
VOMITING: 0
DIARRHEA: 0
NAUSEA: 0
EYES NEGATIVE: 1

## 2020-07-22 ENCOUNTER — HOSPITAL ENCOUNTER (OUTPATIENT)
Age: 70
Discharge: HOME | End: 2020-07-22
Payer: MEDICARE

## 2020-07-22 LAB
ALT SERPL-CCNC: 24 U/L (ref 11–66)
ANION GAP SERPL CALCULATED.3IONS-SCNC: 11 MEQ/L (ref 8–16)
AVERAGE GLUCOSE: 159 MG/DL (ref 70–126)
BUN BLDV-MCNC: 17 MG/DL (ref 7–22)
CALCIUM SERPL-MCNC: 9 MG/DL (ref 8.5–10.5)
CHLORIDE BLD-SCNC: 105 MEQ/L (ref 98–111)
CHOLESTEROL, TOTAL: 129 MG/DL (ref 100–199)
CO2: 25 MEQ/L (ref 23–33)
CREAT SERPL-MCNC: 1 MG/DL (ref 0.4–1.2)
GFR SERPL CREATININE-BSD FRML MDRD: 74 ML/MIN/1.73M2
GLUCOSE BLD-MCNC: 103 MG/DL (ref 70–108)
HBA1C MFR BLD: 7.3 % (ref 4.4–6.4)
HDLC SERPL-MCNC: 37 MG/DL
LDL CHOLESTEROL CALCULATED: 65 MG/DL
POTASSIUM SERPL-SCNC: 4.7 MEQ/L (ref 3.5–5.2)
SODIUM BLD-SCNC: 141 MEQ/L (ref 135–145)
TRIGL SERPL-MCNC: 135 MG/DL (ref 0–199)

## 2020-07-22 PROCEDURE — 80048 BASIC METABOLIC PNL TOTAL CA: CPT

## 2020-07-22 PROCEDURE — 36415 COLL VENOUS BLD VENIPUNCTURE: CPT

## 2020-07-22 PROCEDURE — 84460 ALANINE AMINO (ALT) (SGPT): CPT

## 2020-07-22 PROCEDURE — 83036 HEMOGLOBIN GLYCOSYLATED A1C: CPT

## 2020-07-22 PROCEDURE — 80061 LIPID PANEL: CPT

## 2020-07-28 ENCOUNTER — TELEPHONE (OUTPATIENT)
Dept: PULMONOLOGY | Age: 70
End: 2020-07-28

## 2020-07-28 NOTE — TELEPHONE ENCOUNTER
Cody called from Dr Chowdary Bullion office. Pt is having trouble breathing with any kind of mask including a bandana. They are asking what your recommendations are for pt to be able to ambulate with a mask. He still works also. Please advise.

## 2020-07-29 ENCOUNTER — OFFICE VISIT (OUTPATIENT)
Dept: CARDIOLOGY CLINIC | Age: 70
End: 2020-07-29
Payer: MEDICARE

## 2020-07-29 VITALS
BODY MASS INDEX: 29.77 KG/M2 | DIASTOLIC BLOOD PRESSURE: 80 MMHG | SYSTOLIC BLOOD PRESSURE: 138 MMHG | HEIGHT: 68 IN | HEART RATE: 80 BPM | WEIGHT: 196.4 LBS

## 2020-07-29 PROCEDURE — 99213 OFFICE O/P EST LOW 20 MIN: CPT | Performed by: NUCLEAR MEDICINE

## 2020-07-29 PROCEDURE — 1123F ACP DISCUSS/DSCN MKR DOCD: CPT | Performed by: NUCLEAR MEDICINE

## 2020-07-29 PROCEDURE — 3017F COLORECTAL CA SCREEN DOC REV: CPT | Performed by: NUCLEAR MEDICINE

## 2020-07-29 PROCEDURE — G8926 SPIRO NO PERF OR DOC: HCPCS | Performed by: NUCLEAR MEDICINE

## 2020-07-29 PROCEDURE — 1036F TOBACCO NON-USER: CPT | Performed by: NUCLEAR MEDICINE

## 2020-07-29 PROCEDURE — G8428 CUR MEDS NOT DOCUMENT: HCPCS | Performed by: NUCLEAR MEDICINE

## 2020-07-29 PROCEDURE — G8417 CALC BMI ABV UP PARAM F/U: HCPCS | Performed by: NUCLEAR MEDICINE

## 2020-07-29 PROCEDURE — 4040F PNEUMOC VAC/ADMIN/RCVD: CPT | Performed by: NUCLEAR MEDICINE

## 2020-07-29 PROCEDURE — 3023F SPIROM DOC REV: CPT | Performed by: NUCLEAR MEDICINE

## 2020-07-29 RX ORDER — CLOPIDOGREL BISULFATE 75 MG/1
TABLET ORAL
Qty: 30 TABLET | Refills: 11 | Status: ON HOLD | OUTPATIENT
Start: 2020-07-29

## 2020-07-29 NOTE — TELEPHONE ENCOUNTER
We have been advising face shield in place of the mask and still trying to maintain 6 ft distance from people as able. I can write the letter, can have pt come to office to  or we can fax/ mail to them.

## 2020-07-29 NOTE — PROGRESS NOTES
[address] [place] [address]  Dept: [phone]  Dept Fax: [phone]  Loc: [phone]    Visit Date: 7/29/2020    Shanika Venegas is a 79 y.o. male who presents todayfor:  Chief Complaint   Patient presents with    Shortness of Breath    COPD    Coronary Artery Disease    Hypertension   know left circ stent  Know lung resection   Know lung cancer  Follows with pulmonary   No chest pain   Chronic dyspnea  No changes in breathing  BP Is stable   No dizziness  No syncope        HPI:  HPI  Past Medical History:   Diagnosis Date    Anxiety     Arthritis     [facility] ([facility] 01/2019    left lung stage 2    COPD (chronic obstructive pulmonary disease) ([facility] 02/2019    Enlarged prostate with urinary retention     Gait difficulty     Generalized weakness     Hyperlipidemia     Hypertension     Lesion of bladder     Osteoarthritis     Retention of urine     S/P cardiac catheterization 03/12/2019    stent to circumflex per Dr. Janes Koenig S/P TURP     SOB (shortness of breath)     With activity    Voiding difficulty       Past Surgical History:   Procedure Laterality Date    APPENDECTOMY      BRONCHOSCOPY N/A 1/25/2019    BRONCHOSCOPY performed by Jet Bautista MD at [address] [place] [address]  1/25/2019    BRONCHOSCOPY/TRANSBRONCHIAL LUNG BIOPSY performed by Jet Bautista MD at [address] [place] [address] N/A 3/1/2019    BRONCHOSCOPY W/EBUS FNA performed by Jet [facility]autista MD at [facility]  03/12/2019    COLONOSCOPY  [id number]    PROSTATE SURGERY  2012    TURP    THORACOTOMY Left 3/14/2019    LEFT EXPLORATORY THORACOTOMY, MEDIASTINAL LYMPH NODE DISECTION, FLEXIBLE BRONCHOSCOPY performed by Neal Moody MD at [address] [place] [address] History   Problem Relation Age of Onset    Diabetes Mother     High Blood Pressure Mother     Brain Cancer Mother     Heart Disease Father     Diabetes Father     High Blood Pressure Father     Heart Attack Father     Diabetes Sister     High Blood Pressure Sister     COPD Sister         Agent Orange    Diabetes Brother     High Blood Pressure Brother      Social History     Tobacco Use    Smoking status: Former Smoker     Packs/day: 1.00     Years: 40.00     Pack years: 40.00     Types: Cigarettes     Start date:      Last attempt to quit: 2019     Years since quittin.4    Smokeless tobacco: Never Used    Tobacco comment: about 5 cigs per day   Substance Use Topics    Alcohol use: No      Current Outpatient Medications   Medication Sig Dispense Refill    albuterol sulfate HFA (VENTOLIN HFA) 108 (90 Base) MCG/ACT inhaler Inhale 2 puffs into the lungs 4 times daily 1 Inhaler 11    patiromer sorbitex calcium (VELTASSA) 8.4 g PACK packet Take 8.4 g by mouth daily      ferrous sulfate 325 (65 Fe) MG tablet Take 325 mg by mouth daily (with breakfast)      SITagliptin (JANUVIA) 100 MG tablet Take 100 mg by mouth daily      clopidogrel (PLAVIX) 75 MG tablet TAKE 1 TABLET BY MOUTH ONE TIME A DAY 30 tablet 11    Blood Glucose Monitoring Suppl (FREESTYLE FREEDOM LITE) w/Device KIT 1 Device daily  0    FREESTYLE LITE strip 1 strip daily  11    FREESTYLE LANCETS MISC 1 Stick daily  11    amLODIPine (NORVASC) 10 MG tablet Take 10 mg by mouth daily      atorvastatin (LIPITOR) 40 MG tablet Take 40 mg by mouth daily      metFORMIN (GLUCOPHAGE) 500 MG tablet Take 500 mg by mouth 2 times daily (with meals) Take 1 tab by mouth at supper x2 weeks, then 1 tab in the morning and 1 tab at supper x2 weeks, then 1 tab in the morning and 2 tab at supper x2weeks, then 2 tabs twice a day      aspirin 81 MG chewable tablet Take 1 tablet by mouth daily 30 tablet 3    nitroGLYCERIN (NITROSTAT) 0.4 MG SL tablet up to max of 3 total doses.  If no relief after 1 dose, call 911. 25 tablet 3    omeprazole (PRILOSEC) 20 MG delayed release capsule Take 20 mg by mouth daily      dutasteride (AVODART) 0.5 MG capsule Take 0.5 mg by mouth daily. No current facility-administered medications for this visit. No Known Allergies  Health Maintenance   Topic Date Due    Hepatitis C screen  1950    Diabetic foot exam  03/25/1960    Diabetic retinal exam  03/25/1960    Diabetic microalbuminuria test  03/25/1968    Shingles Vaccine (1 of 2) 03/25/2000    Pneumococcal 65+ years Vaccine (1 of 1 - PPSV23) 03/25/2015    Annual Wellness Visit (AWV)  06/20/2019    Flu vaccine (1) 09/01/2020    Low dose CT lung screening  05/26/2021    A1C test (Diabetic or Prediabetic)  07/22/2021    Lipid screen  07/22/2021    DTaP/Tdap/Td vaccine (2 - Td) 05/25/2023    Colon cancer screen colonoscopy  01/05/2027    AAA screen  Completed    Hepatitis A vaccine  Aged Out    Hepatitis B vaccine  Aged Out    Hib vaccine  Aged Out    Meningococcal (ACWY) vaccine  Aged Out       Subjective:  Review of Systems  General:   No fever, no chills, some fatigue or weight loss  Pulmonary:    some dyspnea, no wheezing  Cardiac:    Denies recent chest pain,   GI:     No nausea or vomiting, no abdominal pain  Neuro:    No dizziness or light headedness,   Musculoskeletal:  No recent active issues  Extremities:   No edema, no obvious claudication       Objective:  Physical Exam  /80   Pulse 80   Ht 5' 8\" (1.727 m)   Wt 196 lb 6.4 oz (89.1 kg)   BMI 29.86 kg/m²   General:   Well developed, well nourished  Lungs:   Clear to auscultation  Heart:    Normal S1 S2, Slight murmur. no rubs, no gallops  Abdomen:   Soft, non tender, no organomegalies, positive bowel sounds  Extremities:   No edema, no cyanosis, good peripheral pulses  Neurological:   Awake, alert, oriented. No obvious focal deficits  Musculoskelatal:  No obvious deformities    Assessment:      Diagnosis Orders   1.  Coronary artery disease involving native coronary artery of native heart without angina pectoris     2. Chronic obstructive pulmonary disease, unspecified COPD type ([facility]     3. Essential hypertension     cardiac fair for now       Plan:  No follow-ups on file. As above  Continue risk factor modification and medical management  Thank you for allowing me to participate in the care of your patient. Please don't hesitate to contact me regarding any further issues related to the patient care    Orders Placed:  No orders of the defined types were placed in this encounter. Medications Prescribed:  No orders of the defined types were placed in this encounter. Discussed use, benefit, and side effects of prescribed medications. All patient questions answered. Pt voicedunderstanding. Instructed to continue current medications, diet and exercise. Continue risk factor modification and medical management. Patient agreed with treatment plan. Follow up as directed.     Electronically signedby Mo Ramírez MD on 7/29/2020 at 11:56 AM

## 2020-08-27 ENCOUNTER — HOSPITAL ENCOUNTER (OUTPATIENT)
Age: 70
Discharge: HOME | End: 2020-08-27
Payer: MEDICARE

## 2020-08-27 LAB
ALBUMIN SERPL-MCNC: 4.1 G/DL (ref 3.5–5.1)
ALP BLD-CCNC: 83 U/L (ref 38–126)
ALT SERPL-CCNC: 20 U/L (ref 11–66)
ANION GAP SERPL CALCULATED.3IONS-SCNC: 8 MEQ/L (ref 8–16)
AST SERPL-CCNC: 28 U/L (ref 5–40)
BACTERIA: ABNORMAL /HPF
BILIRUB SERPL-MCNC: 0.4 MG/DL (ref 0.3–1.2)
BILIRUBIN URINE: NEGATIVE
BLOOD, URINE: NEGATIVE
BUN BLDV-MCNC: 17 MG/DL (ref 7–22)
CALCIUM SERPL-MCNC: 9.2 MG/DL (ref 8.5–10.5)
CASTS 2: ABNORMAL /LPF
CASTS UA: ABNORMAL /LPF
CHARACTER, URINE: CLEAR
CHLORIDE BLD-SCNC: 109 MEQ/L (ref 98–111)
CO2: 27 MEQ/L (ref 23–33)
COLOR: YELLOW
CREAT SERPL-MCNC: 0.9 MG/DL (ref 0.4–1.2)
CREATININE URINE: 285.7 MG/DL
CRYSTALS, UA: ABNORMAL
EPITHELIAL CELLS, UA: ABNORMAL /HPF
ERYTHROCYTE [DISTWIDTH] IN BLOOD BY AUTOMATED COUNT: 13.5 % (ref 11.5–14.5)
ERYTHROCYTE [DISTWIDTH] IN BLOOD BY AUTOMATED COUNT: 44.4 FL (ref 35–45)
GFR SERPL CREATININE-BSD FRML MDRD: 83 ML/MIN/1.73M2
GLUCOSE BLD-MCNC: 123 MG/DL (ref 70–108)
GLUCOSE URINE: NEGATIVE MG/DL
HCT VFR BLD CALC: 45.8 % (ref 42–52)
HEMOGLOBIN: 14.8 GM/DL (ref 14–18)
KETONES, URINE: NEGATIVE
LEUKOCYTE ESTERASE, URINE: NEGATIVE
MCH RBC QN AUTO: 29.2 PG (ref 26–33)
MCHC RBC AUTO-ENTMCNC: 32.3 GM/DL (ref 32.2–35.5)
MCV RBC AUTO: 90.3 FL (ref 80–94)
MISCELLANEOUS 2: ABNORMAL
NITRITE, URINE: NEGATIVE
PH UA: 5 (ref 5–9)
PLATELET # BLD: 231 THOU/MM3 (ref 130–400)
PMV BLD AUTO: 9.3 FL (ref 9.4–12.4)
POTASSIUM SERPL-SCNC: 5.1 MEQ/L (ref 3.5–5.2)
PROT/CREAT RATIO, UR: 1.26
PROTEIN UA: 300
PROTEIN, URINE: 361.4 MG/DL
RBC # BLD: 5.07 MILL/MM3 (ref 4.7–6.1)
RBC URINE: ABNORMAL /HPF
RENAL EPITHELIAL, UA: ABNORMAL
SODIUM BLD-SCNC: 144 MEQ/L (ref 135–145)
SPECIFIC GRAVITY, URINE: > 1.03 (ref 1–1.03)
TOTAL PROTEIN: 7 G/DL (ref 6.1–8)
UROBILINOGEN, URINE: 1 EU/DL (ref 0–1)
WBC # BLD: 7.1 THOU/MM3 (ref 4.8–10.8)
WBC UA: ABNORMAL /HPF
YEAST: ABNORMAL

## 2020-08-27 PROCEDURE — 85027 COMPLETE CBC AUTOMATED: CPT

## 2020-08-27 PROCEDURE — 36415 COLL VENOUS BLD VENIPUNCTURE: CPT

## 2020-08-27 PROCEDURE — 84156 ASSAY OF PROTEIN URINE: CPT

## 2020-08-27 PROCEDURE — 80053 COMPREHEN METABOLIC PANEL: CPT

## 2020-08-27 PROCEDURE — 82570 ASSAY OF URINE CREATININE: CPT

## 2020-08-27 PROCEDURE — 81001 URINALYSIS AUTO W/SCOPE: CPT

## 2020-09-02 ENCOUNTER — HOSPITAL ENCOUNTER (OUTPATIENT)
Dept: CT IMAGING | Age: 70
Discharge: HOME | End: 2020-09-02
Payer: MEDICARE

## 2020-09-02 PROCEDURE — 71260 CT THORAX DX C+: CPT

## 2020-09-02 PROCEDURE — 6360000004 HC RX CONTRAST MEDICATION: Performed by: INTERNAL MEDICINE

## 2020-09-02 RX ADMIN — IOPAMIDOL 85 ML: 755 INJECTION, SOLUTION INTRAVENOUS at 10:22

## 2020-09-03 ASSESSMENT — ENCOUNTER SYMPTOMS
COUGH: 0
TROUBLE SWALLOWING: 0
BLOOD IN STOOL: 0
VOMITING: 0
SHORTNESS OF BREATH: 0
CONSTIPATION: 0
DIARRHEA: 0
EYE PAIN: 0
NAUSEA: 0
ABDOMINAL PAIN: 0

## 2020-09-04 ENCOUNTER — OFFICE VISIT (OUTPATIENT)
Dept: ONCOLOGY | Age: 70
End: 2020-09-04
Payer: MEDICARE

## 2020-09-04 ENCOUNTER — HOSPITAL ENCOUNTER (OUTPATIENT)
Dept: INFUSION THERAPY | Age: 70
Discharge: HOME | End: 2020-09-04
Payer: MEDICARE

## 2020-09-04 VITALS
DIASTOLIC BLOOD PRESSURE: 80 MMHG | SYSTOLIC BLOOD PRESSURE: 145 MMHG | HEIGHT: 68 IN | BODY MASS INDEX: 29.37 KG/M2 | RESPIRATION RATE: 20 BRPM | HEART RATE: 69 BPM | OXYGEN SATURATION: 97 % | WEIGHT: 193.8 LBS | TEMPERATURE: 97.3 F

## 2020-09-04 PROCEDURE — 99211 OFF/OP EST MAY X REQ PHY/QHP: CPT

## 2020-09-04 PROCEDURE — 99214 OFFICE O/P EST MOD 30 MIN: CPT | Performed by: INTERNAL MEDICINE

## 2020-09-04 PROCEDURE — G8427 DOCREV CUR MEDS BY ELIG CLIN: HCPCS | Performed by: INTERNAL MEDICINE

## 2020-09-04 PROCEDURE — 1123F ACP DISCUSS/DSCN MKR DOCD: CPT | Performed by: INTERNAL MEDICINE

## 2020-09-04 PROCEDURE — 4040F PNEUMOC VAC/ADMIN/RCVD: CPT | Performed by: INTERNAL MEDICINE

## 2020-09-04 PROCEDURE — 3017F COLORECTAL CA SCREEN DOC REV: CPT | Performed by: INTERNAL MEDICINE

## 2020-09-04 PROCEDURE — 1036F TOBACCO NON-USER: CPT | Performed by: INTERNAL MEDICINE

## 2020-09-04 PROCEDURE — G8417 CALC BMI ABV UP PARAM F/U: HCPCS | Performed by: INTERNAL MEDICINE

## 2020-09-04 NOTE — PROGRESS NOTES
Frances Nash is a 79 y.o. male who presents today for:  Chief Complaint   Patient presents with    Follow-up     Malignant neoplasm of lung, unspecified laterality, unspecified part of lung    Results     RV Scan         HPI:     CIARAN Nash is a 71 y.o. male with h/o lung cancer. The patient developed hemoptysis beginning of January 2019.  Chest x-ray on January 14, 2019 showed a developing retrocardiac infiltrate.  Patient was referred to the pulmonologist Lui [place] who ordered CT of the chest.  It was performed on January 23, 2019 and showed:  a lobulated mass in the left lower lobe possibly two separate masses is left lower lobe this is a lobulated elongated and measures up to six 5.9 cm in length 2.5 cm in width spiculated margins are present and adjacent airspace disease. Lobulated    8mm nodule lateral aspect right lower lobe image 42   No effusions are seen. Upper abdomen   No adrenal masses. Simple cyst the left kidney. No suspicious bone lesions   Patient underwent bronchoscopy with transbronchial biopsy and BAL on January 25, 2019. They showed no endobronchial lesions and benign bronchial epithelium, alveolar macrophages, and scattered inflammatory cells, respectively. No malignant cells seen.  Subsequently on February 12, 2019 the patient had CT-guided lung biopsy which showed poorly differentiated pulmonary adenocarcinoma.  The patient had PET scan on February 25, 2019 that showed:  1. Known left lower lobe malignancy showing FDG avidity. 2. Mediastinal adenopathy with minimal activity above background suggesting possible reactive lymphadenopathy. Metastatic disease is not excludable. 3. Pleural fluid in the major fissure of the left upper lung. Minimal activity above background may indicate a malignant effusion.    4. No other evidence of metastatic disease, small nodule in the right lower lobe shows no significant activity above background.       Patient had EBUS with transbronchial ultrasound guided mediastinal lymph node biopsy. The mediastinal lymph node biopsy was  Negative. Therefore the patient met with the thoracic surgeon Dr. Maegan Pugh and after discussing his surgical treatment options, on March 14, 2019 he underwent left lower lobe, lung lobectomy: .  Final pathology report showed:  A. 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   Lymph nodes (0/3): Negative for malignancy. B. Tissue designated lymph node, station 9, excision:   Benign fibroadipose tissue. C. Lymph node, station 10 L, excision:   Negative for malignancy (0/1). D. Lymph nodes, station 6, excision:   Negative for malignancy (0/3). E. Lymph node, station 7, excision:   Negative for malignancy (0/1). F. Tissue designated lymph node, station 8, excision:   Benign fibroadipose tissue. The patient was hospitalized from April 18, 2019 till April 20, 2019 for atypical chest pain, left pleural exudative effusion.  He underwent thoracentesis.  Cytology was negative for malignant cells. Troponins were negative. No ecg changes. He received cycle #1 of Cisplatin and Alimta on 5/9/19, his last, cycle#4 given on 07/17/2019. Interval History 09/04/2020: This is a 3 month follow up surveillance visit and to review imaging studies. Denies any worsening shortness of breath or cough. Denies hemoptysis. No fevers, chills, recent infections. Denies any residual chemotherapy side effects.   He continues to have intermittent left chest wall pain since the time of the surgery      Past Medical History:   Diagnosis Date    Anxiety     Arthritis     Cancer ([facility] 01/2019    left lung stage 2    COPD (chronic obstructive pulmonary disease) ([facility] 02/2019    Enlarged prostate with urinary retention     Gait difficulty     Generalized weakness     Hyperlipidemia     Hypertension     Lesion of bladder     8.4 g PACK packet Take 8.4 g by mouth daily      ferrous sulfate 325 (65 Fe) MG tablet Take 325 mg by mouth daily (with breakfast)      SITagliptin (JANUVIA) 100 MG tablet Take 100 mg by mouth daily      Blood Glucose Monitoring Suppl (FREESTYLE FREEDOM LITE) w/Device KIT 1 Device daily  0    FREESTYLE LITE strip 1 strip daily  11    FREESTYLE LANCETS MISC 1 Stick daily  11    amLODIPine (NORVASC) 10 MG tablet Take 10 mg by mouth daily      atorvastatin (LIPITOR) 40 MG tablet Take 40 mg by mouth daily      metFORMIN (GLUCOPHAGE) 500 MG tablet Take 500 mg by mouth 2 times daily (with meals) Take 1 tab by mouth at supper x2 weeks, then 1 tab in the morning and 1 tab at supper x2 weeks, then 1 tab in the morning and 2 tab at supper x2weeks, then 2 tabs twice a day      aspirin 81 MG chewable tablet Take 1 tablet by mouth daily 30 tablet 3    nitroGLYCERIN (NITROSTAT) 0.4 MG SL tablet up to max of 3 total doses. If no relief after 1 dose, call 911. 25 tablet 3    omeprazole (PRILOSEC) 20 MG delayed release capsule Take 20 mg by mouth daily      dutasteride (AVODART) 0.5 MG capsule Take 0.5 mg by mouth daily. No current facility-administered medications for this visit.       No Known Allergies    Health Maintenance   Topic Date Due    Hepatitis C screen  1950    Diabetic foot exam  03/25/1960    Diabetic retinal exam  03/25/1960    Diabetic microalbuminuria test  03/25/1968    Shingles Vaccine (1 of 2) 03/25/2000    Pneumococcal 65+ years Vaccine (1 of 1 - PPSV23) 03/25/2015    Annual Wellness Visit (AWV)  06/20/2019    Flu vaccine (1) 09/01/2020    A1C test (Diabetic or Prediabetic)  07/22/2021    Lipid screen  07/22/2021    Low dose CT lung screening  09/02/2021    DTaP/Tdap/Td vaccine (2 - Td) 05/25/2023    Colon cancer screen colonoscopy  01/05/2027    AAA screen  Completed    Hepatitis A vaccine  Aged Out    Hepatitis B vaccine  Aged Out    Hib vaccine  Aged C/ [address] Meningococcal (ACWY) vaccine  Aged Out       Subjective:      Review of Systems   Constitutional: Negative for chills, fatigue and fever. HENT: Negative for ear pain, postnasal drip and trouble swallowing. Eyes: Negative for pain and visual disturbance. Respiratory: Negative for cough and shortness of breath. Cardiovascular: Negative for chest pain and palpitations. Gastrointestinal: Negative for abdominal pain, blood in stool, constipation, diarrhea, nausea and vomiting. Genitourinary: Negative for dysuria. Skin: Negative for rash. Neurological: Negative for headaches. Psychiatric/Behavioral: Negative for dysphoric mood. The patient is not nervous/anxious. Objective:     Vitals:    09/04/20 1104   BP: (!) 145/80   Site: Left Upper Arm   Position: Sitting   Cuff Size: Medium Adult   Pulse: 69   Resp: 20   Temp: 97.3 °F (36.3 °C)   TempSrc: Infrared   SpO2: 97%   Weight: 193 lb 12.8 oz (87.9 kg)   Height: 5' 8\" (1.727 m)       Body mass index is 29.47 kg/m². Wt Readings from Last 3 Encounters:   09/04/20 193 lb 12.8 oz (87.9 kg)   07/29/20 196 lb 6.4 oz (89.1 kg)   06/03/20 194 lb (88 kg)     BP Readings from Last 3 Encounters:   09/04/20 (!) 145/80   07/29/20 138/80   06/03/20 (!) 146/90       Physical Exam  Vitals signs and nursing note reviewed. Constitutional:       General: He is not in acute distress. Appearance: He is well-developed. He is not diaphoretic. HENT:      Head: Normocephalic and atraumatic. Right Ear: External ear normal.      Left Ear: External ear normal.      Nose: Nose normal.   Eyes:      General: No scleral icterus. Right eye: No discharge. Left eye: No discharge. Conjunctiva/sclera: Conjunctivae normal.   Neck:      Musculoskeletal: Normal range of motion. Cardiovascular:      Rate and Rhythm: Normal rate and regular rhythm. Heart sounds: Normal heart sounds. No murmur.    Pulmonary:      Effort: Pulmonary effort is normal. Breath sounds: Normal breath sounds. Abdominal:      General: Abdomen is flat. Bowel sounds are normal.      Palpations: Abdomen is soft. Skin:     General: Skin is warm and dry. Findings: No erythema or rash. Neurological:      Mental Status: He is alert and oriented to person, place, and time. Cranial Nerves: No cranial nerve deficit. Psychiatric:         Behavior: Behavior normal.         Thought Content: Thought content normal.         Judgment: Judgment normal.         Lab Results   Component Value Date    WBC 7.1 08/27/2020    HGB 14.8 08/27/2020    HCT 45.8 08/27/2020     08/27/2020    CHOL 129 07/22/2020    TRIG 135 07/22/2020    HDL 37 07/22/2020    LDLCALC 65 07/22/2020    AST 28 08/27/2020     08/27/2020    K 5.1 08/27/2020     08/27/2020    CREATININE 0.9 08/27/2020    BUN 17 08/27/2020    CO2 27 08/27/2020    INR 0.87 03/14/2019    LABA1C 7.3 (H) 07/22/2020    LABGLOM 83 (A) 08/27/2020    MG 1.8 05/26/2020    CALCIUM 9.2 08/27/2020       Imaging Results:    Ct Chest W Contrast  Result Date: 5/26/2020  FINDINGS: Thyroid gland is normal. No mediastinal or hilar adenopathy by size criteria. Heart size is normal. There is coronary artery stent noted. There are no infiltrates or effusions. There is a 6 mm pleural-based nodule lateral aspect right upper lung image 15 this is stable. 0.9 x 1.1 cm nodule lateral aspect right lower lobe also unchanged in size. There is pleural and parenchymal scarring at the left lung base. This is stable. There are no suspicious bone lesions. There is marked healed rib fractures or healed changes from left thoracotomy. Upper abdomen Suspected hepatic steatosis. Normal adrenals. Partially imaged left renal cyst.     Stable CT chest. Stable nodules in the right lower and upper lobes. **This report has been created using voice recognition software. It may contain minor errors which are inherent in voice recognition technology. ** Final report electronically signed by Dr. Liyah Blum on 5/26/2020 1:39 PM    CT of the chest on September 2, 2020 showed:  1. No acute intrathoracic findings. 2. Stable right lung nodules of indeterminate etiology. 3. Chronic lung disease and stable surgical changes in the left hemithorax. 4. Prominent mediastinal lymph nodes, likely reactive. Assessment/Plan:     1. History of Malignant neoplasm of lung, unspecified laterality, unspecified part of lung ([address]  S/P LLL lobectomy and lymph node sampling on 03/14/2019. His lung cancer had few poor prognostic features: Sarcomatoid features, high histologic grade, lymphovascular invasion, visceral pleural invasion. Therefore the patient  Underwent treatment with adjuvant chemotherapy 4 cycles of Alimta and cisplatin. His cycle#4 given on 07/17/2019.   2. Encounter for follow-up surveillance of lung cancer  Standard surveillance includes history, physical examination, and chest CT every six months for at least the first two years, and annually thereafter. The patient denies having any new complaints. Specifically he denies having any headaches, no shortness of breath, no chest pain, no bone pain, no abdominal pain. There is no evidence of disease recurrence on today's physical examination. The patient had CT of the chest on November 7, 2019 that showed persistent mediastinal lymphadenopathy and stable pulmonary nodules within the right lung. Patient had CT scan 2.24.2020 with no changes in pulmonary nodules. Most Recent CT scan on September 2, 2020 showed no acute intrathoracic findings. Stable right lung nodule of indeterminate etiology. Prominent mediastinal lymph nodes, pathologically not enlarged by CT criteria, likely reactive. Recent labs August 27, 2020 showed normal CBC normal chemistry. We will continue surveillance               Return in about 4 months (around 1/5/2021).

## 2020-09-07 ENCOUNTER — HOSPITAL ENCOUNTER (OUTPATIENT)
Age: 70
Setting detail: SPECIMEN
Discharge: HOME | End: 2020-09-07
Payer: MEDICARE

## 2020-09-07 PROCEDURE — 83883 ASSAY NEPHELOMETRY NOT SPEC: CPT

## 2020-09-07 PROCEDURE — 84156 ASSAY OF PROTEIN URINE: CPT

## 2020-09-07 PROCEDURE — 86335 IMMUNFIX E-PHORSIS/URINE/CSF: CPT

## 2020-09-10 LAB — PROTEIN ELECTROPHORESIS, URINE: NORMAL

## 2020-12-01 ENCOUNTER — TELEPHONE (OUTPATIENT)
Dept: PULMONOLOGY | Age: 70
End: 2020-12-01

## 2020-12-11 ENCOUNTER — OFFICE VISIT (OUTPATIENT)
Dept: PULMONOLOGY | Age: 70
End: 2020-12-11
Payer: MEDICARE

## 2020-12-11 ENCOUNTER — NURSE ONLY (OUTPATIENT)
Dept: LAB | Age: 70
End: 2020-12-11

## 2020-12-11 VITALS
DIASTOLIC BLOOD PRESSURE: 78 MMHG | HEART RATE: 79 BPM | OXYGEN SATURATION: 95 % | HEIGHT: 68 IN | TEMPERATURE: 97.6 F | WEIGHT: 191 LBS | BODY MASS INDEX: 28.95 KG/M2 | SYSTOLIC BLOOD PRESSURE: 122 MMHG

## 2020-12-11 LAB
ANION GAP SERPL CALCULATED.3IONS-SCNC: 10 MEQ/L (ref 8–16)
BACTERIA: ABNORMAL /HPF
BILIRUBIN URINE: NEGATIVE
BLOOD, URINE: NEGATIVE
BUN BLDV-MCNC: 14 MG/DL (ref 7–22)
CALCIUM SERPL-MCNC: 9.4 MG/DL (ref 8.5–10.5)
CASTS 2: ABNORMAL /LPF
CASTS UA: ABNORMAL /LPF
CHARACTER, URINE: CLEAR
CHLORIDE BLD-SCNC: 102 MEQ/L (ref 98–111)
CO2: 28 MEQ/L (ref 23–33)
COLOR: YELLOW
CREAT SERPL-MCNC: 0.8 MG/DL (ref 0.4–1.2)
CREATININE URINE: 47.8 MG/DL
CRYSTALS, UA: ABNORMAL
EPITHELIAL CELLS, UA: ABNORMAL /HPF
GFR SERPL CREATININE-BSD FRML MDRD: > 90 ML/MIN/1.73M2
GLUCOSE BLD-MCNC: 179 MG/DL (ref 70–108)
GLUCOSE URINE: NEGATIVE MG/DL
KETONES, URINE: NEGATIVE
LEUKOCYTE ESTERASE, URINE: NEGATIVE
MISCELLANEOUS 2: ABNORMAL
NITRITE, URINE: NEGATIVE
PH UA: 6.5 (ref 5–9)
POTASSIUM SERPL-SCNC: 4.4 MEQ/L (ref 3.5–5.2)
PROT/CREAT RATIO, UR: 3.3
PROTEIN UA: 300
PROTEIN, URINE: 157.6 MG/DL
RBC URINE: ABNORMAL /HPF
RENAL EPITHELIAL, UA: ABNORMAL
SODIUM BLD-SCNC: 140 MEQ/L (ref 135–145)
SPECIFIC GRAVITY, URINE: 1.01 (ref 1–1.03)
UROBILINOGEN, URINE: 0.2 EU/DL (ref 0–1)
WBC UA: ABNORMAL /HPF
YEAST: ABNORMAL

## 2020-12-11 PROCEDURE — 1036F TOBACCO NON-USER: CPT | Performed by: NURSE PRACTITIONER

## 2020-12-11 PROCEDURE — 4040F PNEUMOC VAC/ADMIN/RCVD: CPT | Performed by: NURSE PRACTITIONER

## 2020-12-11 PROCEDURE — 1123F ACP DISCUSS/DSCN MKR DOCD: CPT | Performed by: NURSE PRACTITIONER

## 2020-12-11 PROCEDURE — 3017F COLORECTAL CA SCREEN DOC REV: CPT | Performed by: NURSE PRACTITIONER

## 2020-12-11 PROCEDURE — G8484 FLU IMMUNIZE NO ADMIN: HCPCS | Performed by: NURSE PRACTITIONER

## 2020-12-11 PROCEDURE — G8926 SPIRO NO PERF OR DOC: HCPCS | Performed by: NURSE PRACTITIONER

## 2020-12-11 PROCEDURE — 3023F SPIROM DOC REV: CPT | Performed by: NURSE PRACTITIONER

## 2020-12-11 PROCEDURE — G8417 CALC BMI ABV UP PARAM F/U: HCPCS | Performed by: NURSE PRACTITIONER

## 2020-12-11 PROCEDURE — 99214 OFFICE O/P EST MOD 30 MIN: CPT | Performed by: NURSE PRACTITIONER

## 2020-12-11 PROCEDURE — G8427 DOCREV CUR MEDS BY ELIG CLIN: HCPCS | Performed by: NURSE PRACTITIONER

## 2020-12-11 ASSESSMENT — ENCOUNTER SYMPTOMS
WHEEZING: 0
VOMITING: 0
SHORTNESS OF BREATH: 0
ABDOMINAL PAIN: 0
COUGH: 0
NAUSEA: 0
EYES NEGATIVE: 1
DIARRHEA: 0

## 2020-12-11 NOTE — PROGRESS NOTES
[facility]     Patient: Gustabo Sicard, 79 y.o.   : 1950  2020    Pt of Dr. 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 Follows with Dr Griselda Figueroa for surveillance with CT chest every 6 months, last one completed 2020- no evidence of disease progression. Total lung capacity (TLC) has dropped from 6.2 liters before the surgery to 4.97 liters after the surgery . Most recent PFT 2019     6 MWT 2019- no hypoxia, he is not currently on home O2   Only on PRN Ventolin for his COPD and seldomly uses. Denies any SOB, wheezing, cough, or chest tightness. Was on Anoro in past - quit using due to cost.   Has not had flu shot yet this year     Copied from oncology office note:  Malignant neoplasm of lung, unspecified laterality, unspecified part of lung ([facility]  S/P LLL lobectomy and lymph node sampling on 2019. His lung cancer had few poor prognostic features: Sarcomatoid features, high histologic grade, lymphovascular invasion, visceral pleural invasion. Therefore the patient  Underwent treatment with adjuvant chemotherapy 4 cycles of Alimta and cisplatin.   His cycle#4 given on 2019.   -     CT Chest W Contrast; Future    Past Medical hx   PMH:  Past Medical History:   Diagnosis Date    Anxiety     Arthritis     Cancer ([facility] [address] 2019    left lung stage 2    COPD (chronic obstructive pulmonary disease) ([facility] [address] 2019    Enlarged prostate with urinary retention     Gait difficulty     Generalized weakness     Hyperlipidemia     Hypertension     Lesion of bladder     Osteoarthritis     Retention of urine     S/P cardiac catheterization 2019    stent to circumflex per Dr. Pina Cook S/P TURP     SOB (shortness of breath)     With activity    Voiding difficulty      SURGICAL HISTORY:  Past Surgical History:   Procedure Laterality Date    APPENDECTOMY      BRONCHOSCOPY N/A 2019    BRONCHOSCOPY performed by Isaias Baker MD at [address] [place] [address]  2019    BRONCHOSCOPY/TRANSBRONCHIAL LUNG BIOPSY performed by Isaias Baker MD at [address] [place] [address] N/A 3/1/2019    BRONCHOSCOPY W/EBUS FNA performed by [facility]aias [facility] MD at [facility]  2019    COLONOSCOPY  [id number]   [facility]  2012    TURP    THORACOTOMY Left 3/14/2019    LEFT EXPLORATORY THORACOTOMY, MEDIASTINAL LYMPH NODE DISECTION, FLEXIBLE BRONCHOSCOPY performed by Elliott Warren MD at [address] [place] [address] Road:  Social History     Tobacco Use    Smoking status: Former Smoker     Packs/day: 1.00     Years: 40.00     Pack years: 40.00     Types: Cigarettes     Start date:      Last attempt to quit: 2019     Years since quittin.7    Smokeless tobacco: Never Used    Tobacco comment: about 5 cigs per day   Substance Use Topics    Alcohol use: No    Drug use: No     ALLERGIES:No Known Allergies  FAMILY HISTORY:  Family History   Problem Relation Age of Onset    Diabetes Mother     High Blood Pressure Mother     Brain Cancer Mother     Heart Disease Father     Diabetes Father     High Blood Pressure Father     Heart Attack Father     Diabetes Sister     High Blood Pressure Sister     COPD Sister         Agent Orange    Diabetes Brother     High Blood Pressure Brother      CURRENT MEDICATIONS:  Current Outpatient Medications   Medication Sig Dispense Refill    clopidogrel (PLAVIX) 75 MG tablet TAKE 1 TABLET BY MOUTH ONE TIME A DAY 30 tablet 11    albuterol sulfate HFA (VENTOLIN HFA) 108 (90 Base) MCG/ACT inhaler Inhale 2 puffs into the lungs 4 times daily 1 Inhaler 11    patiromer sorbitex 191 lb (86.6 kg)   SpO2 95% Comment: on room air at rest  BMI 29.04 kg/m²      Wt Readings from Last 3 Encounters:   12/11/20 191 lb (86.6 kg)   09/04/20 193 lb 12.8 oz (87.9 kg)   07/29/20 196 lb 6.4 oz (89.1 kg)     Physical Exam  Vitals signs and nursing note reviewed. Constitutional:       General: He is not in acute distress. Appearance: He is well-developed. HENT:      Mouth/Throat:      Lips: Pink. Mouth: Mucous membranes are moist.      Pharynx: Oropharynx is clear. No oropharyngeal exudate or posterior oropharyngeal erythema. Eyes:      Conjunctiva/sclera: Conjunctivae normal.   Neck:      Vascular: No JVD. Cardiovascular:      Rate and Rhythm: Normal rate and regular rhythm. Heart sounds: No murmur. No friction rub. Pulmonary:      Effort: Pulmonary effort is normal. No accessory muscle usage or respiratory distress. Breath sounds: Normal breath sounds. No wheezing, rhonchi or rales. Chest:      Chest wall: No tenderness. Musculoskeletal:      Right lower leg: No edema. Left lower leg: No edema. Skin:     General: Skin is warm and dry. Capillary Refill: Capillary refill takes less than 2 seconds. Nails: There is no clubbing. Neurological:      Mental Status: He is alert. Psychiatric:         Mood and Affect: Mood normal.         Behavior: Behavior normal.         Thought Content: Thought content normal.         Judgment: Judgment normal.          Test results   Lung Nodule Screening     [] Qualifies    [x]Does not qualify   [] Declined    [] Completed    CT chest w contrast 9/22/2020  COMPARISON: CT chest 5/26/2020         FINDINGS: Cardiac size is normal. Atherosclerotic calcifications are present in the thoracic aorta and coronary arteries without evidence of aneurysm. There is stable shift of cardiomediastinal structures to the left side secondary to volume loss. There    is no pleural or pericardial effusion.  Stable pleural thickening and scarring are present at the left lung base. Scattered paraseptal emphysematous changes are again noted. A 1.1 cm nodule at the lateral right midlung is stable (image 39). A 0.6 cm    nodule at the lateral right upper lung is also stable (image 16). Prominent mediastinal lymph nodes are not pathologically enlarged by CT criteria. There are surgical clips in the left hilum. There is no hilar or axillary lymphadenopathy. Degenerative    changes are present in the thoracic spine without evidence of aggressive osseous lesions.         Diffuse hypoattenuation in the liver may be secondary to hepatic steatosis. Exophytic hypoattenuating lesion in the left kidney is likely a cyst.              Impression    1. No acute intrathoracic findings. 2. Stable right lung nodules of indeterminate etiology. 3. Chronic lung disease and stable surgical changes in the left hemithorax. 4. Prominent mediastinal lymph nodes, likely reactive.         Final report electronically signed by Dr. Louann Whittaker on 9/2/2020 11:04 AM                          Assessment      Diagnosis Orders   1. Stage 2 moderate COPD by GOLD classification ([address]  Full PFT Study With Bronchodilator    COVID-19 Ambulatory   2. Non-small cell cancer of left lung ([address]  Full PFT Study With Bronchodilator    COVID-19 Ambulatory   3. Personal history of tobacco use     4. Pulmonary nodule, right     5. Mediastinal lymphadenopathy         stable nodule on right and stable lymphadenopathy, continues in surveillance with oncology.    Plan   Stable COPD symptoms, continue PRN Ventolin  Get full PFT before next visit  Recommend flu vaccine, discuss with your PCP   Keep f/u's with oncology     Will see Arlene Lugo in: 6 months    Electronically signed by RICH Rizvi CNP on 12/11/2020 at 8:35 AM

## 2020-12-22 ENCOUNTER — NURSE ONLY (OUTPATIENT)
Dept: LAB | Age: 70
End: 2020-12-22

## 2020-12-22 LAB
ANION GAP SERPL CALCULATED.3IONS-SCNC: 8 MEQ/L (ref 8–16)
BUN BLDV-MCNC: 21 MG/DL (ref 7–22)
CALCIUM SERPL-MCNC: 10.6 MG/DL (ref 8.5–10.5)
CHLORIDE BLD-SCNC: 104 MEQ/L (ref 98–111)
CO2: 29 MEQ/L (ref 23–33)
CREAT SERPL-MCNC: 1 MG/DL (ref 0.4–1.2)
GFR SERPL CREATININE-BSD FRML MDRD: 74 ML/MIN/1.73M2
GLUCOSE BLD-MCNC: 193 MG/DL (ref 70–108)
POTASSIUM SERPL-SCNC: 5.2 MEQ/L (ref 3.5–5.2)
SODIUM BLD-SCNC: 141 MEQ/L (ref 135–145)

## 2020-12-29 ENCOUNTER — HOSPITAL ENCOUNTER (OUTPATIENT)
Dept: CT IMAGING | Age: 70
Discharge: HOME | End: 2020-12-29
Payer: MEDICARE

## 2020-12-29 PROCEDURE — 71260 CT THORAX DX C+: CPT

## 2020-12-29 PROCEDURE — 6360000004 HC RX CONTRAST MEDICATION: Performed by: INTERNAL MEDICINE

## 2020-12-29 RX ADMIN — IOPAMIDOL 85 ML: 755 INJECTION, SOLUTION INTRAVENOUS at 10:56

## 2021-01-05 ENCOUNTER — HOSPITAL ENCOUNTER (OUTPATIENT)
Dept: INFUSION THERAPY | Age: 71
Discharge: HOME | End: 2021-01-05
Payer: MEDICARE

## 2021-01-05 ENCOUNTER — OFFICE VISIT (OUTPATIENT)
Dept: ONCOLOGY | Age: 71
End: 2021-01-05
Payer: MEDICARE

## 2021-01-05 VITALS
TEMPERATURE: 98.3 F | RESPIRATION RATE: 18 BRPM | OXYGEN SATURATION: 94 % | WEIGHT: 190.4 LBS | DIASTOLIC BLOOD PRESSURE: 93 MMHG | HEART RATE: 79 BPM | SYSTOLIC BLOOD PRESSURE: 174 MMHG | HEIGHT: 68 IN | BODY MASS INDEX: 28.85 KG/M2

## 2021-01-05 DIAGNOSIS — Z90.2 S/P LOBECTOMY OF LUNG: ICD-10-CM

## 2021-01-05 DIAGNOSIS — R91.8 MASS OF UPPER LOBE OF RIGHT LUNG: ICD-10-CM

## 2021-01-05 DIAGNOSIS — Z85.118 ENCOUNTER FOR FOLLOW-UP SURVEILLANCE OF LUNG CANCER: ICD-10-CM

## 2021-01-05 DIAGNOSIS — Z08 ENCOUNTER FOR FOLLOW-UP SURVEILLANCE OF LUNG CANCER: ICD-10-CM

## 2021-01-05 DIAGNOSIS — C34.32 MALIGNANT NEOPLASM OF LOWER LOBE OF LEFT LUNG (HCC): Primary | ICD-10-CM

## 2021-01-05 DIAGNOSIS — Z92.21 STATUS POST CHEMOTHERAPY: ICD-10-CM

## 2021-01-05 PROCEDURE — 1123F ACP DISCUSS/DSCN MKR DOCD: CPT | Performed by: INTERNAL MEDICINE

## 2021-01-05 PROCEDURE — 99214 OFFICE O/P EST MOD 30 MIN: CPT | Performed by: INTERNAL MEDICINE

## 2021-01-05 PROCEDURE — 3017F COLORECTAL CA SCREEN DOC REV: CPT | Performed by: INTERNAL MEDICINE

## 2021-01-05 PROCEDURE — 1036F TOBACCO NON-USER: CPT | Performed by: INTERNAL MEDICINE

## 2021-01-05 PROCEDURE — G8417 CALC BMI ABV UP PARAM F/U: HCPCS | Performed by: INTERNAL MEDICINE

## 2021-01-05 PROCEDURE — 4040F PNEUMOC VAC/ADMIN/RCVD: CPT | Performed by: INTERNAL MEDICINE

## 2021-01-05 PROCEDURE — 99211 OFF/OP EST MAY X REQ PHY/QHP: CPT

## 2021-01-05 PROCEDURE — G8427 DOCREV CUR MEDS BY ELIG CLIN: HCPCS | Performed by: INTERNAL MEDICINE

## 2021-01-05 PROCEDURE — G8484 FLU IMMUNIZE NO ADMIN: HCPCS | Performed by: INTERNAL MEDICINE

## 2021-01-05 ASSESSMENT — ENCOUNTER SYMPTOMS
ABDOMINAL PAIN: 0
NAUSEA: 0
EYE PAIN: 0
COUGH: 0
CONSTIPATION: 0
BLOOD IN STOOL: 0
VOMITING: 0
TROUBLE SWALLOWING: 0
SHORTNESS OF BREATH: 0
DIARRHEA: 0

## 2021-01-05 NOTE — PROGRESS NOTES
Yohan North is a 79 y.o. male who presents today for:  Chief Complaint   Patient presents with    Follow-up     Malignant neoplasm of lower lobe of left lung     Results     RV Scans         HPI:     HPI  Yohan North is a 79 y.o. male with h/o lung cancer. The patient developed hemoptysis beginning of January 2019.  Chest x-ray on January 14, 2019 showed a developing retrocardiac infiltrate.  Patient was referred to the pulmonologist Omi Gregorio who ordered CT of the chest.  It was performed on January 23, 2019 and showed:  a lobulated mass in the left lower lobe possibly two separate masses is left lower lobe this is a lobulated elongated and measures up to six 5.9 cm in length 2.5 cm in width spiculated margins are present and adjacent airspace disease. Lobulated    8mm nodule lateral aspect right lower lobe image 42   No effusions are seen. Upper abdomen   No adrenal masses. Simple cyst the left kidney. No suspicious bone lesions   Patient underwent bronchoscopy with transbronchial biopsy and BAL on January 25, 2019. They showed no endobronchial lesions and benign bronchial epithelium, alveolar macrophages, and scattered inflammatory cells, respectively. No malignant cells seen.  Subsequently on February 12, 2019 the patient had CT-guided lung biopsy which showed poorly differentiated pulmonary adenocarcinoma.  The patient had PET scan on February 25, 2019 that showed:  1. Known left lower lobe malignancy showing FDG avidity. 2. Mediastinal adenopathy with minimal activity above background suggesting possible reactive lymphadenopathy. Metastatic disease is not excludable. 3. Pleural fluid in the major fissure of the left upper lung. Minimal activity above background may indicate a malignant effusion.    4. No other evidence of metastatic disease, small nodule in the right lower lobe shows no significant activity above background.      Patient had EBUS with transbronchial ultrasound guided mediastinal lymph node biopsy. The mediastinal lymph node biopsy was  Negative. Therefore the patient met with the thoracic surgeon Dr. Charan Cruz and after discussing his surgical treatment options, on March 14, 2019 he underwent left lower lobe, lung lobectomy: .  Final pathology report showed:  A. 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   Lymph nodes (0/3): Negative for malignancy. B. Tissue designated lymph node, station 9, excision:   Benign fibroadipose tissue. C. Lymph node, station 10 L, excision:   Negative for malignancy (0/1). D. Lymph nodes, station 6, excision:   Negative for malignancy (0/3). E. Lymph node, station 7, excision:   Negative for malignancy (0/1). F. Tissue designated lymph node, station 8, excision:   Benign fibroadipose tissue. The patient was hospitalized from April 18, 2019 till April 20, 2019 for atypical chest pain, left pleural exudative effusion.  He underwent thoracentesis.  Cytology was negative for malignant cells. Troponins were negative. No ecg changes. He received cycle #1 of Cisplatin and Alimta on 5/9/19, his last, cycle#4 given on 07/17/2019. Interval History 01/05/2021: This is a 3 month follow up surveillance visit and to review imaging studies. Denies any worsening shortness of breath or cough. Denies hemoptysis. No fevers, chills, recent infections. Denies any residual chemotherapy side effects.   He continues to have intermittent left chest wall pain since the time of the surgery      Past Medical History:   Diagnosis Date    Anxiety     Arthritis     Cancer ([facility] [address] 01/2019    left lung stage 2    COPD (chronic obstructive pulmonary disease) ([facility] [address] 02/2019    Enlarged prostate with urinary retention     Gait difficulty     Generalized weakness     Hyperlipidemia     Hypertension  Lesion of bladder     Osteoarthritis     Retention of urine     S/P cardiac catheterization 2019    stent to circumflex per Dr. Christ Graves S/P TURP     SOB (shortness of breath)     With activity    Voiding difficulty       Past Surgical History:   Procedure Laterality Date    APPENDECTOMY      BRONCHOSCOPY N/A 2019    BRONCHOSCOPY performed by Vinny Joseph MD at [address] [place] [address]  2019    BRONCHOSCOPY/TRANSBRONCHIAL LUNG BIOPSY performed by Vinny Joseph MD at [address] [place] [address] N/A 3/1/2019    BRONCHOSCOPY W/EBUS [facility]NA [facility]  2019    COLONOSCOPY  [id number]    PROSTATE SURGERY  2012    TURP    THORACOTOMY Left 3/14/2019    LEFT EXPLORATORY THORACOTOMY, MEDIASTINAL LYMPH NODE DISECTION, FLEXIBLE BRONCHOSCOPY performed by Charan Enciso MD at [address] [place] [address] History   Problem Relation Age of Onset    Diabetes Mother     High Blood Pressure Mother     Brain Cancer Mother     Heart Disease Father     Diabetes Father     High Blood Pressure Father     Heart Attack Father     Diabetes Sister     High Blood Pressure Sister     COPD Sister         Agent Orange    Diabetes Brother     High Blood Pressure Brother      Social History     Tobacco Use    Smoking status: Former Smoker     Packs/day: 1.00     Years: 40.00     Pack years: 40.00     Types: Cigarettes     Start date:      Quit date: 2019     Years since quittin.8    Smokeless tobacco: Never Used    Tobacco comment: about 5 cigs per day   Substance Use Topics    Alcohol use: No      Current Outpatient Medications   Medication Sig Dispense Refill    clopidogrel (PLAVIX) 75 MG tablet TAKE 1 TABLET BY MOUTH ONE TIME A DAY 30 tablet 11    albuterol sulfate HFA (VENTOLIN HFA) 108 (90 Base) MCG/ACT inhaler Inhale 2 puffs into the lungs 4 times daily 1 Inhaler 11  patiromer sorbitex calcium (VELTASSA) 8.4 g PACK packet Take 8.4 g by mouth daily      ferrous sulfate 325 (65 Fe) MG tablet Take 325 mg by mouth daily (with breakfast)      SITagliptin (JANUVIA) 100 MG tablet Take 100 mg by mouth daily      Blood Glucose Monitoring Suppl (FREESTYLE FREEDOM LITE) w/Device KIT 1 Device daily  0    FREESTYLE LITE strip 1 strip daily  11    FREESTYLE LANCETS MISC 1 Stick daily  11    amLODIPine (NORVASC) 10 MG tablet Take 10 mg by mouth daily      atorvastatin (LIPITOR) 40 MG tablet Take 40 mg by mouth daily      metFORMIN (GLUCOPHAGE) 500 MG tablet Take 500 mg by mouth 2 times daily (with meals) Take 1 tab by mouth at supper x2 weeks, then 1 tab in the morning and 1 tab at supper x2 weeks, then 1 tab in the morning and 2 tab at supper x2weeks, then 2 tabs twice a day      aspirin 81 MG chewable tablet Take 1 tablet by mouth daily 30 tablet 3    nitroGLYCERIN (NITROSTAT) 0.4 MG SL tablet up to max of 3 total doses. If no relief after 1 dose, call 911. 25 tablet 3    omeprazole (PRILOSEC) 20 MG delayed release capsule Take 20 mg by mouth daily      dutasteride (AVODART) 0.5 MG capsule Take 0.5 mg by mouth daily. No current facility-administered medications for this visit.       No Known Allergies    Health Maintenance   Topic Date Due    Hepatitis C screen  1950    Diabetic foot exam  03/25/1960    Diabetic retinal exam  03/25/1960    Diabetic microalbuminuria test  03/25/1968    Shingles Vaccine (1 of 2) 03/25/2000    Pneumococcal 65+ years Vaccine (1 of 1 - PPSV23) 03/25/2015    Annual Wellness Visit (AWV)  06/20/2019    Flu vaccine (1) 09/01/2020    A1C test (Diabetic or Prediabetic)  07/22/2021    Lipid screen  07/22/2021    Low dose CT lung screening  12/29/2021    DTaP/Tdap/Td vaccine (2 - Td) 05/25/2023    Colon cancer screen colonoscopy  01/05/2027    AAA screen  Completed    Hepatitis A vaccine  Aged Out  Hepatitis B vaccine  Aged Out    Hib vaccine  Aged Out    Meningococcal (ACWY) vaccine  Aged Out       Subjective:      Review of Systems   Constitutional: Negative for chills, fatigue and fever. HENT: Negative for ear pain, postnasal drip and trouble swallowing. Eyes: Negative for pain and visual disturbance. Respiratory: Negative for cough and shortness of breath. Cardiovascular: Negative for chest pain and palpitations. Gastrointestinal: Negative for abdominal pain, blood in stool, constipation, diarrhea, nausea and vomiting. Genitourinary: Negative for dysuria. Skin: Negative for rash. Neurological: Negative for headaches. Psychiatric/Behavioral: Negative for dysphoric mood. The patient is not nervous/anxious. Objective:     Vitals:    01/05/21 1159   BP: (!) 174/93   Site: Left Upper Arm   Position: Sitting   Cuff Size: Medium Adult   Pulse: 79   Resp: 18   Temp: 98.3 °F (36.8 °C)   TempSrc: Oral   SpO2: 94%   Weight: 190 lb 6.4 oz (86.4 kg)   Height: 5' 8\" (1.727 m)       Body mass index is 28.95 kg/m². Wt Readings from Last 3 Encounters:   01/05/21 190 lb 6.4 oz (86.4 kg)   12/11/20 191 lb (86.6 kg)   09/04/20 193 lb 12.8 oz (87.9 kg)     BP Readings from Last 3 Encounters:   01/05/21 (!) 174/93   12/11/20 122/78   09/04/20 (!) 145/80       Physical Exam  Vitals signs and nursing note reviewed. Constitutional:       General: He is not in acute distress. Appearance: He is well-developed. He is not diaphoretic. HENT:      Head: Normocephalic and atraumatic. Right Ear: External ear normal.      Left Ear: External ear normal.      Nose: Nose normal.   Eyes:      General: No scleral icterus. Right eye: No discharge. Left eye: No discharge. Conjunctiva/sclera: Conjunctivae normal.   Neck:      Musculoskeletal: Normal range of motion. Cardiovascular:      Rate and Rhythm: Normal rate and regular rhythm. Heart sounds: Normal heart sounds. No murmur. Pulmonary:      Effort: Pulmonary effort is normal.      Breath sounds: Normal breath sounds. Abdominal:      General: Abdomen is flat. Bowel sounds are normal.      Palpations: Abdomen is soft. Skin:     General: Skin is warm and dry. Findings: No erythema or rash. Neurological:      Mental Status: He is alert and oriented to person, place, and time. Cranial Nerves: No cranial nerve deficit. Psychiatric:         Behavior: Behavior normal.         Thought Content: Thought content normal.         Judgment: Judgment normal.         Lab Results   Component Value Date    WBC 7.1 08/27/2020    HGB 14.8 08/27/2020    HCT 45.8 08/27/2020     08/27/2020    CHOL 129 07/22/2020    TRIG 135 07/22/2020    HDL 37 07/22/2020    LDLCALC 65 07/22/2020    AST 28 08/27/2020     12/22/2020    K 5.2 12/22/2020     12/22/2020    CREATININE 1.0 12/22/2020    BUN 21 12/22/2020    CO2 29 12/22/2020    INR 0.87 03/14/2019    LABA1C 7.3 (H) 07/22/2020    LABGLOM 74 (A) 12/22/2020    MG 1.8 05/26/2020    CALCIUM 10.6 (H) 12/22/2020       Imaging Results:    Ct Chest W Contrast  Result Date: 5/26/2020  Stable CT chest. Stable nodules in the right lower and upper lobes. CT of the chest on September 2, 2020 showed:  1. No acute intrathoracic findings. 2. Stable right lung nodules of indeterminate etiology. 3. Chronic lung disease and stable surgical changes in the left hemithorax. 4. Prominent mediastinal lymph nodes, likely reactive. CT of the chest on December 29, 2020 showed: Interval change from the prior examination is increase in size of a right upper lobe pulmonary nodule. There are no new nodules and no change in the other findings. Assessment/Plan:     1. History of Malignant neoplasm of lung, unspecified laterality, unspecified part of lung ([address]  S/P LLL lobectomy and lymph node sampling on 03/14/2019.

## 2021-01-13 ENCOUNTER — HOSPITAL ENCOUNTER (OUTPATIENT)
Dept: PET IMAGING | Age: 71
Discharge: HOME | End: 2021-01-13
Payer: MEDICARE

## 2021-01-13 DIAGNOSIS — Z90.2 S/P LOBECTOMY OF LUNG: ICD-10-CM

## 2021-01-13 DIAGNOSIS — C34.32 MALIGNANT NEOPLASM OF LOWER LOBE OF LEFT LUNG (HCC): ICD-10-CM

## 2021-01-13 PROCEDURE — 78815 PET IMAGE W/CT SKULL-THIGH: CPT

## 2021-01-13 PROCEDURE — A9552 F18 FDG: HCPCS | Performed by: INTERNAL MEDICINE

## 2021-01-13 PROCEDURE — 3430000000 HC RX DIAGNOSTIC RADIOPHARMACEUTICAL: Performed by: INTERNAL MEDICINE

## 2021-01-13 RX ORDER — FLUDEOXYGLUCOSE F 18 200 MCI/ML
14.4 INJECTION, SOLUTION INTRAVENOUS
Status: COMPLETED | OUTPATIENT
Start: 2021-01-13 | End: 2021-01-13

## 2021-01-13 RX ADMIN — FLUDEOXYGLUCOSE F 18 14.4 MILLICURIE: 200 INJECTION, SOLUTION INTRAVENOUS at 08:54

## 2021-01-15 ENCOUNTER — HOSPITAL ENCOUNTER (OUTPATIENT)
Dept: INFUSION THERAPY | Age: 71
Discharge: HOME | End: 2021-01-15
Payer: MEDICARE

## 2021-01-15 ENCOUNTER — OFFICE VISIT (OUTPATIENT)
Dept: ONCOLOGY | Age: 71
End: 2021-01-15
Payer: MEDICARE

## 2021-01-15 VITALS
RESPIRATION RATE: 18 BRPM | HEART RATE: 84 BPM | OXYGEN SATURATION: 93 % | BODY MASS INDEX: 29.22 KG/M2 | HEIGHT: 68 IN | TEMPERATURE: 96.9 F | WEIGHT: 192.8 LBS | SYSTOLIC BLOOD PRESSURE: 166 MMHG | DIASTOLIC BLOOD PRESSURE: 90 MMHG

## 2021-01-15 DIAGNOSIS — Z08 ENCOUNTER FOR FOLLOW-UP SURVEILLANCE OF LUNG CANCER: ICD-10-CM

## 2021-01-15 DIAGNOSIS — C34.32 MALIGNANT NEOPLASM OF LOWER LOBE OF LEFT LUNG (HCC): Primary | ICD-10-CM

## 2021-01-15 DIAGNOSIS — Z85.118 ENCOUNTER FOR FOLLOW-UP SURVEILLANCE OF LUNG CANCER: ICD-10-CM

## 2021-01-15 DIAGNOSIS — Z92.21 STATUS POST CHEMOTHERAPY: ICD-10-CM

## 2021-01-15 DIAGNOSIS — R91.8 MASS OF UPPER LOBE OF RIGHT LUNG: ICD-10-CM

## 2021-01-15 DIAGNOSIS — Z90.2 S/P LOBECTOMY OF LUNG: ICD-10-CM

## 2021-01-15 PROCEDURE — 99211 OFF/OP EST MAY X REQ PHY/QHP: CPT

## 2021-01-15 PROCEDURE — G8417 CALC BMI ABV UP PARAM F/U: HCPCS | Performed by: INTERNAL MEDICINE

## 2021-01-15 PROCEDURE — G8427 DOCREV CUR MEDS BY ELIG CLIN: HCPCS | Performed by: INTERNAL MEDICINE

## 2021-01-15 PROCEDURE — 1123F ACP DISCUSS/DSCN MKR DOCD: CPT | Performed by: INTERNAL MEDICINE

## 2021-01-15 PROCEDURE — 99214 OFFICE O/P EST MOD 30 MIN: CPT | Performed by: INTERNAL MEDICINE

## 2021-01-15 PROCEDURE — 3017F COLORECTAL CA SCREEN DOC REV: CPT | Performed by: INTERNAL MEDICINE

## 2021-01-15 PROCEDURE — G8484 FLU IMMUNIZE NO ADMIN: HCPCS | Performed by: INTERNAL MEDICINE

## 2021-01-15 PROCEDURE — 4040F PNEUMOC VAC/ADMIN/RCVD: CPT | Performed by: INTERNAL MEDICINE

## 2021-01-15 PROCEDURE — 1036F TOBACCO NON-USER: CPT | Performed by: INTERNAL MEDICINE

## 2021-01-15 RX ORDER — SODIUM ZIRCONIUM CYCLOSILICATE 10 G/10G
POWDER, FOR SUSPENSION ORAL
COMMUNITY
Start: 2020-12-29 | End: 2021-07-28 | Stop reason: ALTCHOICE

## 2021-01-15 RX ORDER — LOSARTAN POTASSIUM 25 MG/1
TABLET ORAL
Status: ON HOLD | COMMUNITY
Start: 2020-12-23 | End: 2022-11-01

## 2021-01-15 RX ORDER — GLIMEPIRIDE 1 MG/1
TABLET ORAL
Status: ON HOLD | COMMUNITY
Start: 2020-10-31 | End: 2022-11-01

## 2021-01-18 ENCOUNTER — TELEPHONE (OUTPATIENT)
Dept: ONCOLOGY | Age: 71
End: 2021-01-18

## 2021-01-21 ENCOUNTER — NURSE ONLY (OUTPATIENT)
Dept: LAB | Age: 71
End: 2021-01-21

## 2021-01-22 LAB
ALT SERPL-CCNC: 16 U/L (ref 11–66)
ANION GAP SERPL CALCULATED.3IONS-SCNC: 7 MEQ/L (ref 8–16)
AVERAGE GLUCOSE: 201 MG/DL (ref 70–126)
BUN BLDV-MCNC: 16 MG/DL (ref 7–22)
CALCIUM SERPL-MCNC: 9.7 MG/DL (ref 8.5–10.5)
CHLORIDE BLD-SCNC: 102 MEQ/L (ref 98–111)
CHOLESTEROL, TOTAL: 151 MG/DL (ref 100–199)
CO2: 28 MEQ/L (ref 23–33)
CREAT SERPL-MCNC: 0.8 MG/DL (ref 0.4–1.2)
GFR SERPL CREATININE-BSD FRML MDRD: > 90 ML/MIN/1.73M2
GLUCOSE BLD-MCNC: 124 MG/DL (ref 70–108)
HBA1C MFR BLD: 8.7 % (ref 4.4–6.4)
HDLC SERPL-MCNC: 38 MG/DL
LDL CHOLESTEROL CALCULATED: 85 MG/DL
POTASSIUM SERPL-SCNC: 4.4 MEQ/L (ref 3.5–5.2)
SODIUM BLD-SCNC: 137 MEQ/L (ref 135–145)
TRIGL SERPL-MCNC: 140 MG/DL (ref 0–199)

## 2021-01-27 ASSESSMENT — ENCOUNTER SYMPTOMS
BLOOD IN STOOL: 0
NAUSEA: 0
ABDOMINAL PAIN: 0
VOMITING: 0
TROUBLE SWALLOWING: 0
DIARRHEA: 0
EYE PAIN: 0
COUGH: 0
CONSTIPATION: 0
SHORTNESS OF BREATH: 0

## 2021-01-28 NOTE — PROGRESS NOTES
Leonel Wellington is a 79 y.o. male who presents today for:  Chief Complaint   Patient presents with    Follow-up     Malignant neoplasm of lower lobe of left lung     Results     RV PET         HPI:     HPI  Leonel Wellington is a 79 y.o. male with h/o lung cancer. The patient developed hemoptysis beginning of January 2019.  Chest x-ray on January 14, 2019 showed a developing retrocardiac infiltrate.  Patient was referred to the pulmonologist Gwen Ayers who ordered CT of the chest.  It was performed on January 23, 2019 and showed:  a lobulated mass in the left lower lobe possibly two separate masses is left lower lobe this is a lobulated elongated and measures up to six 5.9 cm in length 2.5 cm in width spiculated margins are present and adjacent airspace disease. Lobulated    8mm nodule lateral aspect right lower lobe image 42   No effusions are seen. Upper abdomen   No adrenal masses. Simple cyst the left kidney. No suspicious bone lesions   Patient underwent bronchoscopy with transbronchial biopsy and BAL on January 25, 2019. They showed no endobronchial lesions and benign bronchial epithelium, alveolar macrophages, and scattered inflammatory cells, respectively. No malignant cells seen.  Subsequently on February 12, 2019 the patient had CT-guided lung biopsy which showed poorly differentiated pulmonary adenocarcinoma.  The patient had PET scan on February 25, 2019 that showed:  1. Known left lower lobe malignancy showing FDG avidity. 2. Mediastinal adenopathy with minimal activity above background suggesting possible reactive lymphadenopathy. Metastatic disease is not excludable. 3. Pleural fluid in the major fissure of the left upper lung. Minimal activity above background may indicate a malignant effusion.    4. No other evidence of metastatic disease, small nodule in the right lower lobe shows no significant activity above background.       Patient had EBUS with transbronchial ultrasound guided mediastinal lymph node biopsy. The mediastinal lymph node biopsy was  Negative. Therefore the patient met with the thoracic surgeon Dr. Rizwan Kennedy and after discussing his surgical treatment options, on March 14, 2019 he underwent left lower lobe, lung lobectomy: .  Final pathology report showed:  A. 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   Lymph nodes (0/3): Negative for malignancy. B. Tissue designated lymph node, station 9, excision:   Benign fibroadipose tissue. C. Lymph node, station 10 L, excision:   Negative for malignancy (0/1). D. Lymph nodes, station 6, excision:   Negative for malignancy (0/3). E. Lymph node, station 7, excision:   Negative for malignancy (0/1). F. Tissue designated lymph node, station 8, excision:   Benign fibroadipose tissue. The patient was hospitalized from April 18, 2019 till April 20, 2019 for atypical chest pain, left pleural exudative effusion.  He underwent thoracentesis.  Cytology was negative for malignant cells. Troponins were negative. No ecg changes. He received cycle #1 of Cisplatin and Alimta on 5/9/19, his last, cycle#4 given on 07/17/2019. Interval History 01/15/2021: This is a follow-up visit to review results of PET scan. It was done to clarify abnormal findings on CT of the chest.  The patient denies any worsening shortness of breath or cough. Denies hemoptysis. No fevers, chills, recent infections. Denies any residual chemotherapy side effects.   He continues to have intermittent left chest wall pain since the time of the surgery      Past Medical History:   Diagnosis Date    Anxiety     Arthritis     Cancer ([facility] [address] 01/2019    left lung stage 2    COPD (chronic obstructive pulmonary disease) ([facility] [address] 02/2019    Enlarged prostate with urinary retention     Gait difficulty     Generalized weakness     Hyperlipidemia     Hypertension     Lesion of bladder     Osteoarthritis     Retention of urine     S/P cardiac catheterization 2019    stent to circumflex per Dr. Mcneal July S/P TURP     SOB (shortness of breath)     With activity    Voiding difficulty       Past Surgical History:   Procedure Laterality Date    APPENDECTOMY      BRONCHOSCOPY N/A 2019    BRONCHOSCOPY performed by Andria De Anda MD at [address] [place] [address]  2019    BRONCHOSCOPY/TRANSBRONCHIAL LUNG BIOPSY performed by Andria De Anda MD at [address] [place] [address] N/A 3/1/2019    BRONCHOSCOPY W/EBUS FNA performed by Andria De Anda MD at [facility]  2019    COLONOSCOPY  [id number]   [address] [facility]      TURP    THORACOTOMY Left 3/14/2019    LEFT EXPLORATORY THORACOTOMY, MEDIASTINAL LYMPH NODE DISECTION, FLEXIBLE BRONCHOSCOPY performed by Shannon Post MD at [address] [place] [address] History   Problem Relation Age of Onset    Diabetes Mother     High Blood Pressure Mother     Brain Cancer Mother     Heart Disease Father     Diabetes Father     High Blood Pressure Father     Heart Attack Father     Diabetes Sister     High Blood Pressure Sister     COPD Sister         Agent Orange    Diabetes Brother     High Blood Pressure Brother      Social History     Tobacco Use    Smoking status: Former Smoker     Packs/day: 1.00     Years: 40.00     Pack years: 40.00     Types: Cigarettes     Start date:      Quit date: 2019     Years since quittin.9    Smokeless tobacco: Never Used    Tobacco comment: about 5 cigs per day   Substance Use Topics    Alcohol use: No      Current Outpatient Medications   Medication Sig Dispense Refill    glimepiride (AMARYL) 1 MG tablet TAKE 1 TABLET BY MOUTH ONE TIME A DAY      losartan (COZAAR) 25 MG tablet TAKE 1 TABLET BY MOUTH EVERY DAY      LOKELMA 10 g PACK oral suspension DISSOLVE 1 PACKET IN WATER AND DRINK ONCE DAILY      clopidogrel (PLAVIX) 75 MG tablet TAKE 1 TABLET BY MOUTH ONE TIME A DAY 30 tablet 11    albuterol sulfate HFA (VENTOLIN HFA) 108 (90 Base) MCG/ACT inhaler Inhale 2 puffs into the lungs 4 times daily 1 Inhaler 11    patiromer sorbitex calcium (VELTASSA) 8.4 g PACK packet Take 8.4 g by mouth daily      ferrous sulfate 325 (65 Fe) MG tablet Take 325 mg by mouth daily (with breakfast)      SITagliptin (JANUVIA) 100 MG tablet Take 100 mg by mouth daily      Blood Glucose Monitoring Suppl (FREESTYLE FREEDOM LITE) w/Device KIT 1 Device daily  0    FREESTYLE LITE strip 1 strip daily  11    FREESTYLE LANCETS MISC 1 Stick daily  11    amLODIPine (NORVASC) 10 MG tablet Take 10 mg by mouth daily      atorvastatin (LIPITOR) 40 MG tablet Take 40 mg by mouth daily      metFORMIN (GLUCOPHAGE) 500 MG tablet Take 500 mg by mouth 2 times daily (with meals) Take 1 tab by mouth at supper x2 weeks, then 1 tab in the morning and 1 tab at supper x2 weeks, then 1 tab in the morning and 2 tab at supper x2weeks, then 2 tabs twice a day      aspirin 81 MG chewable tablet Take 1 tablet by mouth daily 30 tablet 3    nitroGLYCERIN (NITROSTAT) 0.4 MG SL tablet up to max of 3 total doses. If no relief after 1 dose, call 911. 25 tablet 3    omeprazole (PRILOSEC) 20 MG delayed release capsule Take 20 mg by mouth daily      dutasteride (AVODART) 0.5 MG capsule Take 0.5 mg by mouth daily. No current facility-administered medications for this visit.       No Known Allergies    Health Maintenance   Topic Date Due    Hepatitis C screen  1950    Diabetic foot exam  03/25/1960    Diabetic retinal exam  03/25/1960    Diabetic microalbuminuria test  03/25/1968    Shingles Vaccine (1 of 2) 03/25/2000    Pneumococcal 65+ years Vaccine (1 of 1 - PPSV23) 03/25/2015    Annual Wellness Visit (AWV)  06/20/2019    Flu vaccine (1) 09/01/2020    Low dose CT lung screening  12/29/2021    A1C test (Diabetic or Prediabetic)  01/21/2022    Lipid screen  01/21/2022    Potassium monitoring  01/21/2022    Creatinine monitoring  01/21/2022    DTaP/Tdap/Td vaccine (2 - Td) 05/25/2023    Colon cancer screen colonoscopy  01/05/2027    AAA screen  Completed    Hepatitis A vaccine  Aged Out    Hepatitis B vaccine  Aged Out    Hib vaccine  Aged Out    Meningococcal (ACWY) vaccine  Aged Out       Subjective:      Review of Systems   Constitutional: Negative for chills, fatigue and fever. HENT: Negative for ear pain, postnasal drip and trouble swallowing. Eyes: Negative for pain and visual disturbance. Respiratory: Negative for cough and shortness of breath. Cardiovascular: Negative for chest pain and palpitations. Gastrointestinal: Negative for abdominal pain, blood in stool, constipation, diarrhea, nausea and vomiting. Genitourinary: Negative for dysuria. Skin: Negative for rash. Neurological: Negative for headaches. Psychiatric/Behavioral: Negative for dysphoric mood. The patient is not nervous/anxious. Objective:     Vitals:    01/15/21 1139   BP: (!) 166/90   Site: Left Upper Arm   Position: Sitting   Cuff Size: Medium Adult   Pulse: 84   Resp: 18   Temp: 96.9 °F (36.1 °C)   TempSrc: Infrared   SpO2: 93%   Weight: 192 lb 12.8 oz (87.5 kg)   Height: 5' 8\" (1.727 m)       Body mass index is 29.32 kg/m². Wt Readings from Last 3 Encounters:   01/15/21 192 lb 12.8 oz (87.5 kg)   01/05/21 190 lb 6.4 oz (86.4 kg)   12/11/20 191 lb (86.6 kg)     BP Readings from Last 3 Encounters:   01/15/21 (!) 166/90   01/05/21 (!) 174/93   12/11/20 122/78       Physical Exam  Vitals signs and nursing note reviewed. Constitutional:       General: He is not in acute distress. Appearance: He is well-developed. He is not diaphoretic. HENT:      Head: Normocephalic and atraumatic. Right Ear: External ear normal.      Left Ear: External ear normal.      Nose: Nose normal.   Eyes:      General: No scleral icterus.         Right eye: No discharge. Left eye: No discharge. Conjunctiva/sclera: Conjunctivae normal.   Neck:      Musculoskeletal: Normal range of motion. Cardiovascular:      Rate and Rhythm: Normal rate and regular rhythm. Heart sounds: Normal heart sounds. No murmur. Pulmonary:      Effort: Pulmonary effort is normal.      Breath sounds: Normal breath sounds. Abdominal:      General: Abdomen is flat. Bowel sounds are normal.      Palpations: Abdomen is soft. Skin:     General: Skin is warm and dry. Findings: No erythema or rash. Neurological:      Mental Status: He is alert and oriented to person, place, and time. Cranial Nerves: No cranial nerve deficit. Psychiatric:         Behavior: Behavior normal.         Thought Content: Thought content normal.         Judgment: Judgment normal.         Lab Results   Component Value Date    WBC 7.1 08/27/2020    HGB 14.8 08/27/2020    HCT 45.8 08/27/2020     08/27/2020    CHOL 151 01/21/2021    TRIG 140 01/21/2021    HDL 38 01/21/2021    LDLCALC 85 01/21/2021    AST 28 08/27/2020     01/21/2021    K 4.4 01/21/2021     01/21/2021    CREATININE 0.8 01/21/2021    BUN 16 01/21/2021    CO2 28 01/21/2021    INR 0.87 03/14/2019    LABA1C 8.7 (H) 01/21/2021    LABGLOM >90 01/21/2021    MG 1.8 05/26/2020    CALCIUM 9.7 01/21/2021       Imaging Results:      CT of the chest on September 2, 2020 showed:  1. No acute intrathoracic findings. 2. Stable right lung nodules of indeterminate etiology. 3. Chronic lung disease and stable surgical changes in the left hemithorax. 4. Prominent mediastinal lymph nodes, likely reactive. CT of the chest on December 29, 2020 showed: Interval change from the prior examination is increase in size of a right upper lobe pulmonary nodule. There are no new nodules and no change in the other findings.      PET scan on January 13, 2021 showed:  Small, mildly FDG avid right lung nodules of indeterminate etiology. The degree of activity is suggestive of a benign process. However, low-grade or slow-growing malignancy cannot be excluded. Follow-up CT of the chest in 3 months is recommended    Assessment/Plan:     1. History of Malignant neoplasm of lung, unspecified laterality, unspecified part of lung ([address]  S/P LLL lobectomy and lymph node sampling on 03/14/2019. His lung cancer had few poor prognostic features: Sarcomatoid features, high histologic grade, lymphovascular invasion, visceral pleural invasion. Therefore the patient  Underwent treatment with adjuvant chemotherapy 4 cycles of Alimta and cisplatin. His cycle#4 given on 07/17/2019.   2. Encounter for follow-up surveillance of lung cancer  The patient denies having any new complaints. Specifically he denies having any headaches, no shortness of breath, no chest pain, no bone pain, no abdominal pain. The patient had chest CT  scan on September 2, 2020 which showed no acute intrathoracic findings. Stable right lung nodule of indeterminate etiology. Prominent mediastinal lymph nodes, pathologically not enlarged by CT criteria, likely reactive. 3 months follow-up CT of the chest on December 29, 2020 showed that the right upper lobe peripheral partially spiculated nodule measured 1.7 cm in maximal dimension, previously 1 cm. Lobulated density anterior right lower lobe was stable measuring 11 mm. Studies were reviewed and discussed with the patient. My recommendation was to proceed with PET scan showed a 1.4 cm nodule at the adjacent to the pleura at the posterior lateral right upper middle lung stable with the associated maximum SUV of 1.0, and 1 cm nodule at the lateral right midlung is stable in size and SUV of 1.0 the degree of activity is suggestive of benign process however allowed greater slow-growing malignancy cannot be excluded.   All of this discussed with the patient, decision was made to proceed with CT of the chest in 3 months

## 2021-02-03 ENCOUNTER — CLINICAL DOCUMENTATION (OUTPATIENT)
Dept: CASE MANAGEMENT | Age: 71
End: 2021-02-03

## 2021-02-03 NOTE — PROGRESS NOTES
Name: Nimesh Palomares  : 1950  MRN: [id number]    Oncology Navigation Follow-Up Note    Contact Type:  Telephone        Notes: Shekhar's daughter, Alphonso Hernandez, phoned regarding her Dad's last visit with Dr. Neris Worthy. Her Dad seemed to think they were going to biopsy a spot on his lung that might be cancerous but was a low probability. He had CT of chest in December showing slight enlargement of a nodule. PET scan showed slight FGD but with probability of being benign-suggested CT in 3 months. That is the plan from Neris Worthy: CT 3/12 with follow up 3/19. Explained she can call anytime with questions or concerns.     Electronically signed by Kermit Junior RN on 2/3/2021 at 11:49 AM

## 2021-02-24 RX ORDER — HEPARIN SODIUM (PORCINE) LOCK FLUSH IV SOLN 100 UNIT/ML 100 UNIT/ML
500 SOLUTION INTRAVENOUS PRN
Status: CANCELLED | OUTPATIENT
Start: 2021-02-24

## 2021-02-24 RX ORDER — SODIUM CHLORIDE 0.9 % (FLUSH) 0.9 %
10 SYRINGE (ML) INJECTION PRN
Status: CANCELLED | OUTPATIENT
Start: 2021-02-24

## 2021-02-24 RX ORDER — SODIUM CHLORIDE 0.9 % (FLUSH) 0.9 %
20 SYRINGE (ML) INJECTION PRN
Status: CANCELLED | OUTPATIENT
Start: 2021-02-24

## 2021-03-02 DIAGNOSIS — C34.32 MALIGNANT NEOPLASM OF LOWER LOBE OF LEFT LUNG (HCC): Primary | ICD-10-CM

## 2021-03-05 ENCOUNTER — HOSPITAL ENCOUNTER (OUTPATIENT)
Age: 71
Discharge: HOME | End: 2021-03-05
Payer: MEDICARE

## 2021-03-05 LAB
CREAT SERPL-MCNC: 1.1 MG/DL (ref 0.4–1.2)
GFR SERPL CREATININE-BSD FRML MDRD: 66 ML/MIN/1.73M2

## 2021-03-05 PROCEDURE — 36415 COLL VENOUS BLD VENIPUNCTURE: CPT

## 2021-03-05 PROCEDURE — 82565 ASSAY OF CREATININE: CPT

## 2021-03-12 ENCOUNTER — HOSPITAL ENCOUNTER (OUTPATIENT)
Dept: CT IMAGING | Age: 71
Discharge: HOME | End: 2021-03-12
Payer: MEDICARE

## 2021-03-12 ENCOUNTER — HOSPITAL ENCOUNTER (OUTPATIENT)
Dept: NURSING | Age: 71
Discharge: HOME | End: 2021-03-12
Payer: MEDICARE

## 2021-03-12 ENCOUNTER — HOSPITAL ENCOUNTER (OUTPATIENT)
Dept: INFUSION THERAPY | Age: 71
End: 2021-03-12

## 2021-03-12 VITALS
TEMPERATURE: 97.5 F | HEART RATE: 84 BPM | DIASTOLIC BLOOD PRESSURE: 86 MMHG | HEIGHT: 68 IN | BODY MASS INDEX: 29.25 KG/M2 | OXYGEN SATURATION: 95 % | SYSTOLIC BLOOD PRESSURE: 173 MMHG | RESPIRATION RATE: 18 BRPM | WEIGHT: 193 LBS

## 2021-03-12 DIAGNOSIS — C34.32 MALIGNANT NEOPLASM OF LOWER LOBE OF LEFT LUNG (HCC): ICD-10-CM

## 2021-03-12 DIAGNOSIS — Z90.2 S/P LOBECTOMY OF LUNG: ICD-10-CM

## 2021-03-12 LAB
CREAT SERPL-MCNC: 0.8 MG/DL (ref 0.4–1.2)
GFR SERPL CREATININE-BSD FRML MDRD: > 90 ML/MIN/1.73M2

## 2021-03-12 PROCEDURE — 82565 ASSAY OF CREATININE: CPT

## 2021-03-12 PROCEDURE — 96360 HYDRATION IV INFUSION INIT: CPT

## 2021-03-12 PROCEDURE — 2580000003 HC RX 258: Performed by: INTERNAL MEDICINE

## 2021-03-12 PROCEDURE — 96361 HYDRATE IV INFUSION ADD-ON: CPT

## 2021-03-12 PROCEDURE — 6360000004 HC RX CONTRAST MEDICATION: Performed by: INTERNAL MEDICINE

## 2021-03-12 PROCEDURE — 36415 COLL VENOUS BLD VENIPUNCTURE: CPT

## 2021-03-12 PROCEDURE — 71260 CT THORAX DX C+: CPT

## 2021-03-12 RX ORDER — SODIUM CHLORIDE 9 MG/ML
INJECTION, SOLUTION INTRAVENOUS CONTINUOUS
Status: ACTIVE | OUTPATIENT
Start: 2021-03-12 | End: 2021-03-12

## 2021-03-12 RX ADMIN — IOPAMIDOL 80 ML: 755 INJECTION, SOLUTION INTRAVENOUS at 11:42

## 2021-03-12 RX ADMIN — SODIUM CHLORIDE: 9 INJECTION, SOLUTION INTRAVENOUS at 08:31

## 2021-03-12 ASSESSMENT — PAIN - FUNCTIONAL ASSESSMENT: PAIN_FUNCTIONAL_ASSESSMENT: 0-10

## 2021-03-12 NOTE — PROGRESS NOTES
0830: Blood work collected and sent to lab. IV hydration started. 1045: IV hydration complete, patient tolerated well. AVS reviewed with patient, voiced understanding. Patient discharged ambulatory to outpatient express for CT scan.

## 2021-03-12 NOTE — PROGRESS NOTES
__m__ Safety:       (Environmental)   Brewer to environment   Ensure ID band is correct and in place/ allergy band as needed   Assess for fall risk   Initiate fall precautions as applicable (fall band, side rails, etc.)   Call light within reach   Bed in low position/ wheels locked    _m___ Pain:        Assess pain level and characteristics   Administer analgesics as ordered   Assess effectiveness of pain management and report to MD as needed    _m___ Knowledge Deficit:   Assess baseline knowledge   Provide teaching at level of understanding   Provide teaching via preferred learning method   Evaluate teaching effectiveness    __m__ Hemodynamic/Respiratory Status:       (Pre and Post Procedure Monitoring)   Assess/Monitor vital signs and LOC   Assess Baseline SpO2 prior to any sedation   Obtain weight/height   Assess vital signs/ LOC until patient meets discharge criteria   Monitor procedure site and notify MD of any issues    __

## 2021-03-18 ASSESSMENT — ENCOUNTER SYMPTOMS
EYE PAIN: 0
BLOOD IN STOOL: 0
ABDOMINAL PAIN: 0
NAUSEA: 0
DIARRHEA: 0
COUGH: 0
CONSTIPATION: 0
SHORTNESS OF BREATH: 0
TROUBLE SWALLOWING: 0
VOMITING: 0

## 2021-03-19 ENCOUNTER — HOSPITAL ENCOUNTER (OUTPATIENT)
Dept: INFUSION THERAPY | Age: 71
Discharge: HOME | End: 2021-03-19
Payer: MEDICARE

## 2021-03-19 ENCOUNTER — NURSE ONLY (OUTPATIENT)
Dept: LAB | Age: 71
End: 2021-03-19

## 2021-03-19 ENCOUNTER — OFFICE VISIT (OUTPATIENT)
Dept: ONCOLOGY | Age: 71
End: 2021-03-19
Payer: MEDICARE

## 2021-03-19 VITALS
DIASTOLIC BLOOD PRESSURE: 88 MMHG | HEIGHT: 68 IN | BODY MASS INDEX: 29.13 KG/M2 | HEART RATE: 87 BPM | WEIGHT: 192.2 LBS | RESPIRATION RATE: 18 BRPM | SYSTOLIC BLOOD PRESSURE: 173 MMHG | OXYGEN SATURATION: 97 % | TEMPERATURE: 96.7 F

## 2021-03-19 DIAGNOSIS — Z08 ENCOUNTER FOR FOLLOW-UP SURVEILLANCE OF LUNG CANCER: ICD-10-CM

## 2021-03-19 DIAGNOSIS — Z85.118 ENCOUNTER FOR FOLLOW-UP SURVEILLANCE OF LUNG CANCER: ICD-10-CM

## 2021-03-19 DIAGNOSIS — R91.8 RIGHT LOWER LOBE LUNG MASS: ICD-10-CM

## 2021-03-19 DIAGNOSIS — Z90.2 S/P LOBECTOMY OF LUNG: ICD-10-CM

## 2021-03-19 DIAGNOSIS — Z92.21 STATUS POST CHEMOTHERAPY: ICD-10-CM

## 2021-03-19 DIAGNOSIS — Z85.118 HISTORY OF LUNG CANCER: Primary | ICD-10-CM

## 2021-03-19 LAB
ANION GAP SERPL CALCULATED.3IONS-SCNC: 11 MEQ/L (ref 8–16)
BUN BLDV-MCNC: 19 MG/DL (ref 7–22)
CALCIUM SERPL-MCNC: 9.8 MG/DL (ref 8.5–10.5)
CHLORIDE BLD-SCNC: 104 MEQ/L (ref 98–111)
CO2: 26 MEQ/L (ref 23–33)
CREAT SERPL-MCNC: 0.8 MG/DL (ref 0.4–1.2)
GFR SERPL CREATININE-BSD FRML MDRD: > 90 ML/MIN/1.73M2
GLUCOSE BLD-MCNC: 235 MG/DL (ref 70–108)
POTASSIUM SERPL-SCNC: 4.8 MEQ/L (ref 3.5–5.2)
SODIUM BLD-SCNC: 141 MEQ/L (ref 135–145)

## 2021-03-19 PROCEDURE — G8427 DOCREV CUR MEDS BY ELIG CLIN: HCPCS | Performed by: INTERNAL MEDICINE

## 2021-03-19 PROCEDURE — 99211 OFF/OP EST MAY X REQ PHY/QHP: CPT

## 2021-03-19 PROCEDURE — 1123F ACP DISCUSS/DSCN MKR DOCD: CPT | Performed by: INTERNAL MEDICINE

## 2021-03-19 PROCEDURE — G8484 FLU IMMUNIZE NO ADMIN: HCPCS | Performed by: INTERNAL MEDICINE

## 2021-03-19 PROCEDURE — 3017F COLORECTAL CA SCREEN DOC REV: CPT | Performed by: INTERNAL MEDICINE

## 2021-03-19 PROCEDURE — 1036F TOBACCO NON-USER: CPT | Performed by: INTERNAL MEDICINE

## 2021-03-19 PROCEDURE — 4040F PNEUMOC VAC/ADMIN/RCVD: CPT | Performed by: INTERNAL MEDICINE

## 2021-03-19 PROCEDURE — G8417 CALC BMI ABV UP PARAM F/U: HCPCS | Performed by: INTERNAL MEDICINE

## 2021-03-19 PROCEDURE — 99214 OFFICE O/P EST MOD 30 MIN: CPT | Performed by: INTERNAL MEDICINE

## 2021-03-19 NOTE — PROGRESS NOTES
Saud Hill is a 79 y.o. male who presents today for:  Chief Complaint   Patient presents with    Follow-up     Malignant neoplasm of lower lobe of left lung     Results     RV CT       HPI  Saud Hill is a 79 y.o. male with h/o lung cancer. The patient developed hemoptysis beginning of January 2019.  Chest x-ray on January 14, 2019 showed a developing retrocardiac infiltrate.  Patient was referred to the pulmonologist Abhijit Thrasher who ordered CT of the chest.  It was performed on January 23, 2019 and showed:  a lobulated mass in the left lower lobe possibly two separate masses is left lower lobe this is a lobulated elongated and measures up to six 5.9 cm in length 2.5 cm in width spiculated margins are present and adjacent airspace disease. Lobulated    8mm nodule lateral aspect right lower lobe image 42   No effusions are seen. Upper abdomen   No adrenal masses. Simple cyst the left kidney. No suspicious bone lesions   Patient underwent bronchoscopy with transbronchial biopsy and BAL on January 25, 2019. They showed no endobronchial lesions and benign bronchial epithelium, alveolar macrophages, and scattered inflammatory cells, respectively. No malignant cells seen.  Subsequently on February 12, 2019 the patient had CT-guided lung biopsy which showed poorly differentiated pulmonary adenocarcinoma.  The patient had PET scan on February 25, 2019 that showed:  1. Known left lower lobe malignancy showing FDG avidity. 2. Mediastinal adenopathy with minimal activity above background suggesting possible reactive lymphadenopathy. Metastatic disease is not excludable. 3. Pleural fluid in the major fissure of the left upper lung. Minimal activity above background may indicate a malignant effusion.    4. No other evidence of metastatic disease, small nodule in the right lower lobe shows no significant activity above background.       Patient had EBUS with transbronchial ultrasound guided mediastinal lymph node biopsy. The mediastinal lymph node biopsy was  Negative. Therefore the patient met with the thoracic surgeon Dr. Fransisca Sheriff and after discussing his surgical treatment options, on March 14, 2019 he underwent left lower lobe, lung lobectomy: .  Final pathology report showed:  A. 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   Lymph nodes (0/3): Negative for malignancy. B. Tissue designated lymph node, station 9, excision:   Benign fibroadipose tissue. C. Lymph node, station 10 L, excision:   Negative for malignancy (0/1). D. Lymph nodes, station 6, excision:   Negative for malignancy (0/3). E. Lymph node, station 7, excision:   Negative for malignancy (0/1). F. Tissue designated lymph node, station 8, excision:   Benign fibroadipose tissue. The patient was hospitalized from April 18, 2019 till April 20, 2019 for atypical chest pain, left pleural exudative effusion.  He underwent thoracentesis.  Cytology was negative for malignant cells. Troponins were negative. No ecg changes. He received cycle #1 of Cisplatin and Alimta on 5/9/19, his last, cycle#4 given on 07/17/2019. Interval History 03/19/2021: This is a follow-up visit to review results of 3 months follow-up CT of the chest.  The patient denies any worsening shortness of breath or cough. Denies hemoptysis. No fevers, chills, recent infections. Denies any residual chemotherapy side effects.   He continues to have intermittent left chest wall pain since the time of the surgery      Past Medical History:   Diagnosis Date    Anxiety     Arthritis     Cancer ([facility] [address] 01/2019    left lung stage 2    COPD (chronic obstructive pulmonary disease) ([facility] [address] 02/2019    Enlarged prostate with urinary retention     Gait difficulty     Generalized weakness     Hyperlipidemia     Hypertension     Lesion of bladder     Osteoarthritis     Retention of urine 30 tablet 11    albuterol sulfate HFA (VENTOLIN HFA) 108 (90 Base) MCG/ACT inhaler Inhale 2 puffs into the lungs 4 times daily 1 Inhaler 11    Blood Glucose Monitoring Suppl (FREESTYLE FREEDOM LITE) w/Device KIT 1 Device daily  0    FREESTYLE LITE strip 1 strip daily  11    FREESTYLE LANCETS MISC 1 Stick daily  11    amLODIPine (NORVASC) 10 MG tablet Take 10 mg by mouth daily      atorvastatin (LIPITOR) 40 MG tablet Take 40 mg by mouth daily      metFORMIN (GLUCOPHAGE) 500 MG tablet Take 500 mg by mouth 2 times daily (with meals) Take 1 tab by mouth at supper x2 weeks, then 1 tab in the morning and 1 tab at supper x2 weeks, then 1 tab in the morning and 2 tab at supper x2weeks, then 2 tabs twice a day      aspirin 81 MG chewable tablet Take 1 tablet by mouth daily 30 tablet 3    dutasteride (AVODART) 0.5 MG capsule Take 0.5 mg by mouth daily.  patiromer sorbitex calcium (VELTASSA) 8.4 g PACK packet Take 8.4 g by mouth daily      ferrous sulfate 325 (65 Fe) MG tablet Take 325 mg by mouth daily (with breakfast)      SITagliptin (JANUVIA) 100 MG tablet Take 100 mg by mouth daily      nitroGLYCERIN (NITROSTAT) 0.4 MG SL tablet up to max of 3 total doses. If no relief after 1 dose, call 911. (Patient not taking: Reported on 3/19/2021) 25 tablet 3    omeprazole (PRILOSEC) 20 MG delayed release capsule Take 20 mg by mouth daily       No current facility-administered medications for this visit.       No Known Allergies    Health Maintenance   Topic Date Due    Hepatitis C screen  Never done    Diabetic foot exam  Never done    Diabetic retinal exam  Never done    COVID-19 Vaccine (1) Never done    Diabetic microalbuminuria test  Never done    Shingles Vaccine (1 of 2) Never done    Pneumococcal 65+ years Vaccine (1 of 1 - PPSV23) Never done   [employer] Visit (AWV)  Never done    Flu vaccine (1) Never done    A1C test (Diabetic or Prediabetic)  01/21/2022    Lipid screen  01/21/2022    Right eye: No discharge. Left eye: No discharge. Conjunctiva/sclera: Conjunctivae normal.   Neck:      Musculoskeletal: Normal range of motion. Cardiovascular:      Rate and Rhythm: Normal rate and regular rhythm. Heart sounds: Normal heart sounds. No murmur. Pulmonary:      Effort: Pulmonary effort is normal.      Breath sounds: Normal breath sounds. Abdominal:      General: Abdomen is flat. Bowel sounds are normal.      Palpations: Abdomen is soft. Skin:     General: Skin is warm and dry. Findings: No erythema or rash. Neurological:      Mental Status: He is alert and oriented to person, place, and time. Cranial Nerves: No cranial nerve deficit. Psychiatric:         Behavior: Behavior normal.         Thought Content: Thought content normal.         Judgment: Judgment normal.         Lab Results   Component Value Date    WBC 7.9 03/04/2021    HGB 15.2 03/04/2021    HCT 47.3 03/04/2021     03/04/2021    CHOL 151 01/21/2021    TRIG 140 01/21/2021    HDL 38 01/21/2021    LDLCALC 85 01/21/2021    AST 28 08/27/2020     03/19/2021    K 4.8 03/19/2021     03/19/2021    CREATININE 0.8 03/19/2021    BUN 19 03/19/2021    CO2 26 03/19/2021    INR 0.87 03/14/2019    LABA1C 8.7 (H) 01/21/2021    LABGLOM >90 03/19/2021    MG 1.7 03/04/2021    CALCIUM 9.8 03/19/2021    VITD25 21 (L) 03/04/2021       Imaging Results:      CT of the chest on September 2, 2020 showed:  1. No acute intrathoracic findings. 2. Stable right lung nodules of indeterminate etiology. 3. Chronic lung disease and stable surgical changes in the left hemithorax. 4. Prominent mediastinal lymph nodes, likely reactive. CT of the chest on December 29, 2020 showed: Interval change from the prior examination is increase in size of a right upper lobe pulmonary nodule. There are no new nodules and no change in the other findings.      PET scan on January 13, 2021 showed:  Small, mildly FDG avid posterior lateral right upper lobe has increased in size to 1.6 cm and is concerning for malignancy. Imaging studies were reviewed and discussed with the patient. My recommendation is to proceed with CT-guided lung biopsy. I contacted IR they feel that this lesion is amenable to biopsy. Order has been placed. I will see the patient 1 week after biopsy             No follow-ups on file.

## 2021-03-19 NOTE — PATIENT INSTRUCTIONS
1.  CT-guided lung biopsy by IR. He was instructed to stop taking Plavix and aspirin  2.   RTC to see me 1 week after biopsy

## 2021-03-27 RX ORDER — MIDAZOLAM HYDROCHLORIDE 2 MG/2ML
1 INJECTION, SOLUTION INTRAMUSCULAR; INTRAVENOUS ONCE
Status: CANCELLED | OUTPATIENT
Start: 2021-03-27 | End: 2021-03-27

## 2021-03-27 RX ORDER — FENTANYL CITRATE 50 UG/ML
50 INJECTION, SOLUTION INTRAMUSCULAR; INTRAVENOUS ONCE
Status: CANCELLED | OUTPATIENT
Start: 2021-03-27 | End: 2021-03-27

## 2021-03-27 RX ORDER — SODIUM CHLORIDE 450 MG/100ML
INJECTION, SOLUTION INTRAVENOUS CONTINUOUS
Status: CANCELLED | OUTPATIENT
Start: 2021-03-27

## 2021-03-29 ENCOUNTER — HOSPITAL ENCOUNTER (OUTPATIENT)
Dept: GENERAL RADIOLOGY | Age: 71
Discharge: HOME | End: 2021-03-29
Payer: MEDICARE

## 2021-03-29 ENCOUNTER — HOSPITAL ENCOUNTER (OUTPATIENT)
Dept: CT IMAGING | Age: 71
Discharge: HOME | End: 2021-03-29
Payer: MEDICARE

## 2021-03-29 VITALS
OXYGEN SATURATION: 95 % | RESPIRATION RATE: 18 BRPM | DIASTOLIC BLOOD PRESSURE: 79 MMHG | BODY MASS INDEX: 29.19 KG/M2 | SYSTOLIC BLOOD PRESSURE: 140 MMHG | WEIGHT: 192 LBS | TEMPERATURE: 97.4 F | HEART RATE: 69 BPM

## 2021-03-29 DIAGNOSIS — Z85.118 HISTORY OF LUNG CANCER: ICD-10-CM

## 2021-03-29 DIAGNOSIS — R91.8 RIGHT LOWER LOBE LUNG MASS: ICD-10-CM

## 2021-03-29 LAB
CREAT SERPL-MCNC: 0.9 MG/DL (ref 0.4–1.2)
ERYTHROCYTE [DISTWIDTH] IN BLOOD BY AUTOMATED COUNT: 13.5 % (ref 11.5–14.5)
ERYTHROCYTE [DISTWIDTH] IN BLOOD BY AUTOMATED COUNT: 41.7 FL (ref 35–45)
GFR SERPL CREATININE-BSD FRML MDRD: 83 ML/MIN/1.73M2
HCT VFR BLD CALC: 48.7 % (ref 42–52)
HEMOGLOBIN: 15.9 GM/DL (ref 14–18)
MCH RBC QN AUTO: 28.3 PG (ref 26–33)
MCHC RBC AUTO-ENTMCNC: 32.6 GM/DL (ref 32.2–35.5)
MCV RBC AUTO: 86.7 FL (ref 80–94)
PLATELET # BLD: 146 THOU/MM3 (ref 130–400)
PMV BLD AUTO: 9.7 FL (ref 9.4–12.4)
RBC # BLD: 5.62 MILL/MM3 (ref 4.7–6.1)
WBC # BLD: 7.1 THOU/MM3 (ref 4.8–10.8)

## 2021-03-29 PROCEDURE — 88334 PATH CONSLTJ SURG CYTO XM EA: CPT

## 2021-03-29 PROCEDURE — 6360000002 HC RX W HCPCS

## 2021-03-29 PROCEDURE — 71045 X-RAY EXAM CHEST 1 VIEW: CPT

## 2021-03-29 PROCEDURE — 88305 TISSUE EXAM BY PATHOLOGIST: CPT

## 2021-03-29 PROCEDURE — 85027 COMPLETE CBC AUTOMATED: CPT

## 2021-03-29 PROCEDURE — 32408 CORE NDL BX LNG/MED PERQ: CPT

## 2021-03-29 PROCEDURE — 82565 ASSAY OF CREATININE: CPT

## 2021-03-29 PROCEDURE — 2580000003 HC RX 258: Performed by: RADIOLOGY

## 2021-03-29 PROCEDURE — 77012 CT SCAN FOR NEEDLE BIOPSY: CPT

## 2021-03-29 PROCEDURE — 88333 PATH CONSLTJ SURG CYTO XM 1: CPT

## 2021-03-29 PROCEDURE — 36415 COLL VENOUS BLD VENIPUNCTURE: CPT

## 2021-03-29 PROCEDURE — 6370000000 HC RX 637 (ALT 250 FOR IP)

## 2021-03-29 RX ORDER — MIDAZOLAM HYDROCHLORIDE 2 MG/2ML
1 INJECTION, SOLUTION INTRAMUSCULAR; INTRAVENOUS ONCE
Status: COMPLETED | OUTPATIENT
Start: 2021-03-29 | End: 2021-03-29

## 2021-03-29 RX ORDER — SODIUM CHLORIDE 450 MG/100ML
INJECTION, SOLUTION INTRAVENOUS CONTINUOUS
Status: DISCONTINUED | OUTPATIENT
Start: 2021-03-29 | End: 2021-03-30 | Stop reason: HOSPADM

## 2021-03-29 RX ORDER — FENTANYL CITRATE 50 UG/ML
50 INJECTION, SOLUTION INTRAMUSCULAR; INTRAVENOUS ONCE
Status: COMPLETED | OUTPATIENT
Start: 2021-03-29 | End: 2021-03-29

## 2021-03-29 RX ADMIN — FENTANYL CITRATE 50 MCG: 50 INJECTION, SOLUTION INTRAMUSCULAR; INTRAVENOUS at 09:14

## 2021-03-29 RX ADMIN — SODIUM CHLORIDE: 4.5 INJECTION, SOLUTION INTRAVENOUS at 08:07

## 2021-03-29 RX ADMIN — FENTANYL CITRATE 50 MCG: 50 INJECTION, SOLUTION INTRAMUSCULAR; INTRAVENOUS at 09:01

## 2021-03-29 RX ADMIN — MIDAZOLAM HYDROCHLORIDE 1 MG: 2 INJECTION, SOLUTION INTRAMUSCULAR; INTRAVENOUS at 09:01

## 2021-03-29 RX ADMIN — MIDAZOLAM HYDROCHLORIDE 1 MG: 2 INJECTION, SOLUTION INTRAMUSCULAR; INTRAVENOUS at 09:14

## 2021-03-29 ASSESSMENT — PAIN SCALES - GENERAL
PAINLEVEL_OUTOF10: 0

## 2021-03-29 NOTE — OP NOTE
Department of Radiology  Post Procedure Progress Note      Pre-Procedure Diagnosis:  Right upper lobe pulmonary nodule    Procedure Performed:  CT guided lung biopsy    Anesthesia: local / versed and fentanyl    Findings: successful    Immediate Complications:  None    Estimated Blood Loss: minimal    SEE DICTATED PROCEDURE NOTE FOR COMPLETE DETAILS.     Electronically signed by Tereso Taylor MD on 3/29/2021 at 10:03 AM

## 2021-03-29 NOTE — H&P
Formulation and discussion of sedation / procedure plans, risks, benefits, side effects and alternatives with patient and/or responsible adult completed.     Electronically signed by Carlotta Barnard MD on 3/29/2021 at 10:00 AM

## 2021-03-29 NOTE — PROGRESS NOTES
0740 pt arrives ambulatory for lung bx. Procedure explained and questions answered. PT RIGHTS AND RESPONSIBILITIES OFFERED TO PT. Pt has held plavix and aspirin for 5 days. Pt has been NPO since 3/28/21. Pt states he can call family for discharge .   0801 labs drawn and sent down as ordered. 0830 pt to CT via bed.   0947 pt back from CT. Vitals stable. bandaid on right lateral chest clean, dry and intact. Pt denies pain. Pt refusing snack at this time. 1000 vitals stable. bandaid unchanged. Pt denies pain. Pt denies snack. 1030 vitals stable. bandaid unchanged. Pt denies pain. Pt provided with crackers and coffee. 1045 vitals stable. Pt tolerating snack. bandaid unchanged. Pt denies needs at this time. 1140 pt to CXR via bed.  1157 pt back from CXR. Vitals stable. Site uncahged. 1300 vitals stable. bandaid unchanged. Pt denies pain. Pt denies snack. Pt denies new SOB. 1318 pt to CXR via bed.  1345 pt back from CXR. 26 Dr. Tomasa Lin in to see pt. Dr. Tomasa Lin gives okay to discharge pt.   1427 pt discharged via wheelchair to pt's gf car with instructions with no complaints. bandaid unchanged.      _m___ Safety:       (Environmental)   Bath to environment   Ensure ID band is correct and in place/ allergy band as needed   Assess for fall risk   Initiate fall precautions as applicable (fall band, side rails, etc.)   Call light within reach   Bed in low position/ wheels locked    _m___ Pain:        Assess pain level and characteristics   Administer analgesics as ordered   Assess effectiveness of pain management and report to MD as needed    _m___ Knowledge Deficit:   Assess baseline knowledge   Provide teaching at level of understanding   Provide teaching via preferred learning method   Evaluate teaching effectiveness    _m___ Hemodynamic/Respiratory Status:       (Pre and Post Procedure Monitoring)   Assess/Monitor vital signs and LOC   Assess Baseline SpO2 prior to any

## 2021-03-29 NOTE — H&P
[facility]  Sedation/Analgesia History & Physical    Pt Name: Avel Jarvis  MRN: [id number]  YOB: 1950  Provider Performing Procedure: Min Daniel MD  Primary Care Physician: Collette Flasher. Meredith Pack MD    PRE-PROCEDURE   DNR-CCA/DNR-CC []Yes [x]No  Brief History/Pre-Procedure Diagnosis: Right upper lobe lung nodule          MEDICAL HISTORY  []CAD/Valve  []Liver Disease  [x]Lung Disease []Diabetes  [x]Hypertension []Renal Disease  []Additional information:       has a past medical history of Anxiety, Arthritis, Cancer ([facility] [address] COPD (chronic obstructive pulmonary disease) ([facility] [address] Enlarged prostate with urinary retention, Gait difficulty, Generalized weakness, Hyperlipidemia, Hypertension, Lesion of bladder, Osteoarthritis, Retention of urine, S/P cardiac catheterization, S/P TURP, SOB (shortness of breath), and Voiding difficulty. SURGICAL HISTORY   has a past surgical history that includes Prostate surgery (2012); Colonoscopy (4373,5788); Appendectomy; bronchoscopy (N/A, 1/25/2019); bronchoscopy (1/25/2019); bronchoscopy (N/A, 3/1/2019); thoracotomy (Left, 3/14/2019); and Carotid stent placement (03/12/2019).   Additional information:       ALLERGIES   Allergies as of 03/29/2021    (No Known Allergies)     Additional information:       MEDICATIONS   Coumadin Use Last 5 Days [x]No []Yes  Antiplatelet drug therapy use last 5 days  [x]No []Yes  Other anticoagulant use last 5 days  [x]No []Yes    Current Outpatient Medications:     glimepiride (AMARYL) 1 MG tablet, TAKE 1 TABLET BY MOUTH ONE TIME A DAY, Disp: , Rfl:     losartan (COZAAR) 25 MG tablet, TAKE 1 TABLET BY MOUTH EVERY DAY, Disp: , Rfl:     LOKELMA 10 g PACK oral suspension, DISSOLVE 1 PACKET IN WATER AND DRINK ONCE DAILY, Disp: , Rfl:     clopidogrel (PLAVIX) 75 MG tablet, TAKE 1 TABLET BY MOUTH ONE TIME A DAY, Disp: 30 tablet, Rfl: 11    albuterol sulfate HFA (VENTOLIN HFA) 108 (90 Base) MCG/ACT inhaler, Inhale 2 puffs into the lungs 4 times daily, Disp: 1 Inhaler, Rfl: 11    patiromer sorbitex calcium (VELTASSA) 8.4 g PACK packet, Take 8.4 g by mouth daily, Disp: , Rfl:     ferrous sulfate 325 (65 Fe) MG tablet, Take 325 mg by mouth daily (with breakfast), Disp: , Rfl:     SITagliptin (JANUVIA) 100 MG tablet, Take 100 mg by mouth daily, Disp: , Rfl:     Blood Glucose Monitoring Suppl (FREESTYLE FREEDOM LITE) w/Device KIT, 1 Device daily, Disp: , Rfl: 0    FREESTYLE LITE strip, 1 strip daily, Disp: , Rfl: 11    FREESTYLE LANCETS MISC, 1 Stick daily, Disp: , Rfl: 11    amLODIPine (NORVASC) 10 MG tablet, Take 10 mg by mouth daily, Disp: , Rfl:     atorvastatin (LIPITOR) 40 MG tablet, Take 40 mg by mouth daily, Disp: , Rfl:     metFORMIN (GLUCOPHAGE) 500 MG tablet, Take 500 mg by mouth 2 times daily (with meals) Take 1 tab by mouth at supper x2 weeks, then 1 tab in the morning and 1 tab at supper x2 weeks, then 1 tab in the morning and 2 tab at supper x2weeks, then 2 tabs twice a day, Disp: , Rfl:     aspirin 81 MG chewable tablet, Take 1 tablet by mouth daily, Disp: 30 tablet, Rfl: 3    nitroGLYCERIN (NITROSTAT) 0.4 MG SL tablet, up to max of 3 total doses. If no relief after 1 dose, call 911. (Patient not taking: Reported on 3/19/2021), Disp: 25 tablet, Rfl: 3    omeprazole (PRILOSEC) 20 MG delayed release capsule, Take 20 mg by mouth daily, Disp: , Rfl:     dutasteride (AVODART) 0.5 MG capsule, Take 0.5 mg by mouth daily. , Disp: , Rfl:     Current Facility-Administered Medications:     0.45 % sodium chloride infusion, , Intravenous, Continuous, Sabina Sierra MD, Last Rate: 20 mL/hr at 03/29/21 0807, New Bag at 03/29/21 0807  Prior to Admission medications    Medication Sig Start Date End Date Taking?  Authorizing Provider   glimepiride (AMARYL) 1 MG tablet TAKE 1 TABLET BY MOUTH ONE TIME A DAY 10/31/20   Historical Provider, MD   losartan (COZAAR) 25 MG tablet TAKE 1 TABLET BY MOUTH EVERY DAY 12/23/20 Historical Provider, MD VAUGHN 10 g PACK oral suspension DISSOLVE 1 PACKET IN WATER AND DRINK ONCE DAILY 12/29/20   Historical Provider, MD   clopidogrel (PLAVIX) 75 MG tablet TAKE 1 TABLET BY MOUTH ONE TIME A DAY 7/29/20   Sade Cabrera MD   albuterol sulfate HFA (VENTOLIN HFA) 108 (90 Base) MCG/ACT inhaler Inhale 2 puffs into the lungs 4 times daily 6/3/20 6/3/21  RICH Us CNP   patiromer sorbitex calcium (VELTASSA) 8.4 g PACK packet Take 8.4 g by mouth daily    Historical Provider, MD   ferrous sulfate 325 (65 Fe) MG tablet Take 325 mg by mouth daily (with breakfast)    Historical Provider, MD   SITagliptin (JANUVIA) 100 MG tablet Take 100 mg by mouth daily    Historical Provider, MD   Blood Glucose Monitoring Suppl (FREESTYLE FREEDOM LITE) w/Device KIT 1 Device daily 3/25/19   Historical Provider, MD   FREESTYLE LITE strip 1 strip daily 3/25/19   Historical Provider, MD   FREESTYLE LANCETS MISC 1 Stick daily 3/25/19   Historical Provider, MD   amLODIPine (NORVASC) 10 MG tablet Take 10 mg by mouth daily    Historical Provider, MD   atorvastatin (LIPITOR) 40 MG tablet Take 40 mg by mouth daily    Historical Provider, MD   metFORMIN (GLUCOPHAGE) 500 MG tablet Take 500 mg by mouth 2 times daily (with meals) Take 1 tab by mouth at supper x2 weeks, then 1 tab in the morning and 1 tab at supper x2 weeks, then 1 tab in the morning and 2 tab at supper x2weeks, then 2 tabs twice a day    Historical Provider, MD   aspirin 81 MG chewable tablet Take 1 tablet by mouth daily 3/14/19   RICH Omalley CNP   nitroGLYCERIN (NITROSTAT) 0.4 MG SL tablet up to max of 3 total doses. If no relief after 1 dose, call 911. Patient not taking: Reported on 3/19/2021 3/13/19   RICH Omalley CNP   omeprazole (PRILOSEC) 20 MG delayed release capsule Take 20 mg by mouth daily    Historical Provider, MD   dutasteride (AVODART) 0.5 MG capsule Take 0.5 mg by mouth daily.       Historical Provider, MD Additional information:       VITAL SIGNS   Vitals:    03/29/21 0947   BP: (!) 144/77   Pulse: 73   Resp: 16   Temp:    SpO2: 95%       PHYSICAL:   Heart:  [x]Regular rate and rhythm  []Other:    Lungs:  [x]Clear    []Other:    Abdomen: [x]Soft    []Other:    Mental Status: [x]Alert & Oriented  []Other:      PLANNED PROCEDURE   [x]Biospy []Arteriogram              []Drainage   []Mediport Insertion  []Fistulogram []IV access       []Vertebroplasty / Augmentation  []IVC filter []Dialysis catheter []Biliary drainage  []Other: []CAPD Catheter []Nephrostomy Tube / Stent  SEDATION  Planned agent:[x]Midazolam []Meperidine [x]Sublimaze []Dilaudid []Morphine     []Diazepam  []Other:     ASA Classification:  []1 [x]2 []3 []4 []5  Class 1: A normal healthy patient  Class 2: Pt with mild to moderate systemic disease  Class 3: Severe systemic disease or disturbance  Class 4: Severe systemic disorders that are already life threatening. Class 5: Moribund pt with little chances of survival, for more than 24 hours. Mallampati I Airway Classification:   []1 [x]2 []3 []4    [x]Pre-procedure diagnostic studies complete and results available. Comment:    [x]Previous sedation/anesthesia experiences assessed. Comment:    [x]The patient is an appropriate candidate to undergo the planned procedure sedation and anesthesia. (Refer to nursing sedation/analgesia documentation record)  [x]Formulation and discussion of sedation/procedure plan, risks, and expectations with patient and/or responsible adult completed. [x]Patient examined immediately prior to the procedure.  (Refer to nursing sedation/analgesia documentation record)    Serena Alcaraz MD  Electronically signed 3/29/2021 at 10:01 AM

## 2021-03-29 NOTE — PROGRESS NOTES
4361 Patient received in CT for pre-procedure assessment of right lung biopsy. Monitor applied. 3179 Dr. Rocio Caban here; spoke to patient. This procedure has been fully reviewed with the patient and written informed consent has been obtained. 8609 Patient assisted to CT table and pre scan started.  0901 Pathology called to CT area for procedure. Procedure started with Dr. Rocio Caban. 1076 Pathology arrived to CT.   0915 Samples collected and given to pathologist for further review. [id number] Procedure completed; patient tolerated well. Post scan obtained. 1754 Bacitracin oint, band aid to site; no bleeding noted. 9838 Patient on cart; comfort ensured. 0940 Report called to OPN and patient taken to OPN via cart.

## 2021-04-05 ASSESSMENT — ENCOUNTER SYMPTOMS
ABDOMINAL PAIN: 0
NAUSEA: 0
COUGH: 0
EYE PAIN: 0
DIARRHEA: 0
SHORTNESS OF BREATH: 0
TROUBLE SWALLOWING: 0
CONSTIPATION: 0
VOMITING: 0
BLOOD IN STOOL: 0

## 2021-04-06 ENCOUNTER — HOSPITAL ENCOUNTER (OUTPATIENT)
Dept: INFUSION THERAPY | Age: 71
Discharge: HOME | End: 2021-04-06
Payer: MEDICARE

## 2021-04-06 ENCOUNTER — OFFICE VISIT (OUTPATIENT)
Dept: ONCOLOGY | Age: 71
End: 2021-04-06
Payer: MEDICARE

## 2021-04-06 VITALS
BODY MASS INDEX: 29.1 KG/M2 | WEIGHT: 192 LBS | HEIGHT: 68 IN | HEART RATE: 92 BPM | TEMPERATURE: 97.8 F | OXYGEN SATURATION: 92 % | RESPIRATION RATE: 18 BRPM | SYSTOLIC BLOOD PRESSURE: 139 MMHG | DIASTOLIC BLOOD PRESSURE: 83 MMHG

## 2021-04-06 DIAGNOSIS — Z90.2 S/P LOBECTOMY OF LUNG: ICD-10-CM

## 2021-04-06 DIAGNOSIS — Z08 ENCOUNTER FOR FOLLOW-UP SURVEILLANCE OF LUNG CANCER: ICD-10-CM

## 2021-04-06 DIAGNOSIS — Z85.118 ENCOUNTER FOR FOLLOW-UP SURVEILLANCE OF LUNG CANCER: ICD-10-CM

## 2021-04-06 DIAGNOSIS — R91.8 RIGHT LOWER LOBE LUNG MASS: Primary | ICD-10-CM

## 2021-04-06 DIAGNOSIS — Z85.118 HISTORY OF LUNG CANCER: ICD-10-CM

## 2021-04-06 DIAGNOSIS — Z92.21 STATUS POST CHEMOTHERAPY: ICD-10-CM

## 2021-04-06 PROCEDURE — 3017F COLORECTAL CA SCREEN DOC REV: CPT | Performed by: INTERNAL MEDICINE

## 2021-04-06 PROCEDURE — 1123F ACP DISCUSS/DSCN MKR DOCD: CPT | Performed by: INTERNAL MEDICINE

## 2021-04-06 PROCEDURE — 99214 OFFICE O/P EST MOD 30 MIN: CPT | Performed by: INTERNAL MEDICINE

## 2021-04-06 PROCEDURE — 1036F TOBACCO NON-USER: CPT | Performed by: INTERNAL MEDICINE

## 2021-04-06 PROCEDURE — G8417 CALC BMI ABV UP PARAM F/U: HCPCS | Performed by: INTERNAL MEDICINE

## 2021-04-06 PROCEDURE — G8427 DOCREV CUR MEDS BY ELIG CLIN: HCPCS | Performed by: INTERNAL MEDICINE

## 2021-04-06 PROCEDURE — 4040F PNEUMOC VAC/ADMIN/RCVD: CPT | Performed by: INTERNAL MEDICINE

## 2021-04-06 PROCEDURE — 99211 OFF/OP EST MAY X REQ PHY/QHP: CPT

## 2021-04-06 NOTE — PROGRESS NOTES
Cesar Pelaez is a 70 y.o. male who presents today for:  Chief Complaint   Patient presents with    Follow-up     History of lung cancer    Results     RV bx       HPI  Cesar Pelaez is a 79 y.o. male with h/o lung cancer. The patient developed hemoptysis beginning of January 2019.  Chest x-ray on January 14, 2019 showed a developing retrocardiac infiltrate.  Patient was referred to the pulmonologist Afsaneh Theodore who ordered CT of the chest.  It was performed on January 23, 2019 and showed:  a lobulated mass in the left lower lobe possibly two separate masses is left lower lobe this is a lobulated elongated and measures up to six 5.9 cm in length 2.5 cm in width spiculated margins are present and adjacent airspace disease. Lobulated    8mm nodule lateral aspect right lower lobe image 42   No effusions are seen. Upper abdomen   No adrenal masses. Simple cyst the left kidney. No suspicious bone lesions   Patient underwent bronchoscopy with transbronchial biopsy and BAL on January 25, 2019. They showed no endobronchial lesions and benign bronchial epithelium, alveolar macrophages, and scattered inflammatory cells, respectively. No malignant cells seen.  Subsequently on February 12, 2019 the patient had CT-guided lung biopsy which showed poorly differentiated pulmonary adenocarcinoma.  The patient had PET scan on February 25, 2019 that showed:  1. Known left lower lobe malignancy showing FDG avidity. 2. Mediastinal adenopathy with minimal activity above background suggesting possible reactive lymphadenopathy. Metastatic disease is not excludable. 3. Pleural fluid in the major fissure of the left upper lung. Minimal activity above background may indicate a malignant effusion. 4. No other evidence of metastatic disease, small nodule in the right lower lobe shows no significant activity above background.       Patient had EBUS with transbronchial ultrasound guided mediastinal lymph node biopsy.  The mediastinal lymph node biopsy was  Negative. Therefore the patient met with the thoracic surgeon Dr. Tiffanie Ward and after discussing his surgical treatment options, on March 14, 2019 he underwent left lower lobe, lung lobectomy: .  Final pathology report showed:  A. 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   Lymph nodes (0/3): Negative for malignancy. B. Tissue designated lymph node, station 9, excision:   Benign fibroadipose tissue. C. Lymph node, station 10 L, excision:   Negative for malignancy (0/1). D. Lymph nodes, station 6, excision:   Negative for malignancy (0/3). E. Lymph node, station 7, excision:   Negative for malignancy (0/1). F. Tissue designated lymph node, station 8, excision:   Benign fibroadipose tissue. The patient was hospitalized from April 18, 2019 till April 20, 2019 for atypical chest pain, left pleural exudative effusion.  He underwent thoracentesis.  Cytology was negative for malignant cells. Troponins were negative. No ecg changes. He received cycle #1 of Cisplatin and Alimta on 5/9/19, his last, cycle#4 given on 07/17/2019. Interval History 04/06/2021: This is a follow-up visit to review results of CT-guided lung biopsy. The patient reports that he tolerated biopsy well. The patient denies any worsening shortness of breath or cough. Denies hemoptysis. No fevers, chills, recent infections. Denies any residual chemotherapy side effects.   He continues to have intermittent left chest wall pain since the time of the surgery      Past Medical History:   Diagnosis Date    Anxiety     Arthritis     Cancer ([facility] [address] 01/2019    left lung stage 2    COPD (chronic obstructive pulmonary disease) ([facility] [address] 02/2019    Enlarged prostate with urinary retention     Gait difficulty     Generalized weakness     Hyperlipidemia     Hypertension     Lesion of bladder     Osteoarthritis     Retention of urine     S/P cardiac catheterization 2019    stent to circumflex per Dr. Jared Izaguirre S/P TURP     SOB (shortness of breath)     With activity    Voiding difficulty       Past Surgical History:   Procedure Laterality Date    APPENDECTOMY      BRONCHOSCOPY N/A 2019    BRONCHOSCOPY performed by Jasmeet Wolfe MD at [facility]  2019    BRONCHOSCOPY/TRANSBRONCHIAL LUNG BIOPSY performed by Jasmeet Wolfe MD at [address] [place] [address] N/A 3/1/2019    BRONCHOSCOPY W/EBUS FNA performed by Jasmeet Wolfe MD at [facility]  2019    COLONOSCOPY  [id number]    CT NEEDLE BIOPSY LUNG PERCUTANEOUS  3/29/2021    CT NEEDLE BIOPSY LUNG PERCUTANEOUS 3/29/2021 [facility] CT SCAN    PROSTATE SURGERY      TURP    THORACOTOMY Left 3/14/2019    LEFT EXPLORATORY THORACOTOMY, MEDIASTINAL LYMPH NODE DISECTION, FLEXIBLE BRONCHOSCOPY performed by Pablo Zimmerman MD at [facility] History   Problem Relation Age of Onset    Diabetes Mother     High Blood Pressure Mother     Brain Cancer Mother     Heart Disease Father     Diabetes Father     High Blood Pressure Father     Heart Attack Father     Diabetes Sister     High Blood Pressure Sister     COPD Sister         Agent Orange    Diabetes Brother     High Blood Pressure Brother      Social History     Tobacco Use    Smoking status: Former Smoker     Packs/day: 1.00     Years: 40.00     Pack years: 40.00     Types: Cigarettes     Start date:      Quit date: 2019     Years since quittin.1    Smokeless tobacco: Never Used    Tobacco comment: about 5 cigs per day   Substance Use Topics    Alcohol use: No      Current Outpatient Medications   Medication Sig Dispense Refill    glimepiride (AMARYL) 1 MG tablet TAKE 1 TABLET BY MOUTH ONE TIME A DAY      losartan (COZAAR) 25 MG tablet TAKE 1 TABLET BY MOUTH EVERY DAY      LOKELMA 10 g PACK oral suspension DISSOLVE 1 PACKET IN WATER AND DRINK ONCE DAILY      clopidogrel (PLAVIX) 75 MG tablet TAKE 1 TABLET BY MOUTH ONE TIME A DAY 30 tablet 11    albuterol sulfate HFA (VENTOLIN HFA) 108 (90 Base) MCG/ACT inhaler Inhale 2 puffs into the lungs 4 times daily 1 Inhaler 11    patiromer sorbitex calcium (VELTASSA) 8.4 g PACK packet Take 8.4 g by mouth daily      ferrous sulfate 325 (65 Fe) MG tablet Take 325 mg by mouth daily (with breakfast)      SITagliptin (JANUVIA) 100 MG tablet Take 100 mg by mouth daily      Blood Glucose Monitoring Suppl (FREESTYLE FREEDOM LITE) w/Device KIT 1 Device daily  0    FREESTYLE LITE strip 1 strip daily  11    FREESTYLE LANCETS MISC 1 Stick daily  11    amLODIPine (NORVASC) 10 MG tablet Take 10 mg by mouth daily      atorvastatin (LIPITOR) 40 MG tablet Take 40 mg by mouth daily      metFORMIN (GLUCOPHAGE) 500 MG tablet Take 500 mg by mouth 2 times daily (with meals) Take 1 tab by mouth at supper x2 weeks, then 1 tab in the morning and 1 tab at supper x2 weeks, then 1 tab in the morning and 2 tab at supper x2weeks, then 2 tabs twice a day      aspirin 81 MG chewable tablet Take 1 tablet by mouth daily 30 tablet 3    nitroGLYCERIN (NITROSTAT) 0.4 MG SL tablet up to max of 3 total doses. If no relief after 1 dose, call 911. 25 tablet 3    omeprazole (PRILOSEC) 20 MG delayed release capsule Take 20 mg by mouth daily      dutasteride (AVODART) 0.5 MG capsule Take 0.5 mg by mouth daily. No current facility-administered medications for this visit.       No Known Allergies    Health Maintenance   Topic Date Due    Hepatitis C screen  Never done    Diabetic foot exam  Never done    Diabetic retinal exam  Never done    COVID-19 Vaccine (1) Never done    Diabetic microalbuminuria test  Never done    Shingles Vaccine (1 of 2) Never done    Pneumococcal 65+ years Vaccine (1 of 1 - PPSV23) Never done   [employer] Visit (AWV)  Never done    Flu vaccine (Season Ended) 09/01/2021    A1C test (Diabetic or Prediabetic)  01/21/2022    Lipid screen  01/21/2022    Potassium monitoring  03/19/2022    Creatinine monitoring  03/29/2022    Low dose CT lung screening  03/29/2022    DTaP/Tdap/Td vaccine (2 - Td) 05/25/2023    Colon cancer screen colonoscopy  01/05/2027    AAA screen  Completed    Hepatitis A vaccine  Aged Out    Hepatitis B vaccine  Aged Out    Hib vaccine  Aged Out    Meningococcal (ACWY) vaccine  Aged Out       Subjective:      Review of Systems   Constitutional: Negative for chills, fatigue and fever. HENT: Negative for ear pain, postnasal drip and trouble swallowing. Eyes: Negative for pain and visual disturbance. Respiratory: Negative for cough and shortness of breath. Cardiovascular: Negative for chest pain and palpitations. Gastrointestinal: Negative for abdominal pain, blood in stool, constipation, diarrhea, nausea and vomiting. Genitourinary: Negative for dysuria. Skin: Negative for rash. Neurological: Negative for headaches. Psychiatric/Behavioral: Negative for dysphoric mood. The patient is not nervous/anxious. Objective:     Vitals:    04/06/21 1358   BP: 139/83   Site: Left Upper Arm   Position: Sitting   Cuff Size: Medium Adult   Pulse: 92   Resp: 18   Temp: 97.8 °F (36.6 °C)   TempSrc: Infrared   SpO2: 92%   Weight: 192 lb (87.1 kg)   Height: 5' 8\" (1.727 m)       Body mass index is 29.19 kg/m². Wt Readings from Last 3 Encounters:   04/06/21 192 lb (87.1 kg)   03/29/21 192 lb (87.1 kg)   03/19/21 192 lb 3.2 oz (87.2 kg)     BP Readings from Last 3 Encounters:   04/06/21 139/83   03/29/21 (!) 140/79   03/19/21 (!) 173/88       Physical Exam  Vitals signs and nursing note reviewed. Constitutional:       General: He is not in acute distress. Appearance: He is well-developed. He is not diaphoretic. HENT:      Head: Normocephalic and atraumatic.       Right Ear: External ear normal.      Left Ear: External ear normal.      Nose: Nose normal.   Eyes:      General: No scleral icterus. Right eye: No discharge. Left eye: No discharge. Conjunctiva/sclera: Conjunctivae normal.   Neck:      Musculoskeletal: Normal range of motion. Cardiovascular:      Rate and Rhythm: Normal rate and regular rhythm. Heart sounds: Normal heart sounds. No murmur. Pulmonary:      Effort: Pulmonary effort is normal.      Breath sounds: Normal breath sounds. Abdominal:      General: Abdomen is flat. Bowel sounds are normal.      Palpations: Abdomen is soft. Skin:     General: Skin is warm and dry. Findings: No erythema or rash. Neurological:      Mental Status: He is alert and oriented to person, place, and time. Cranial Nerves: No cranial nerve deficit. Psychiatric:         Behavior: Behavior normal.         Thought Content: Thought content normal.         Judgment: Judgment normal.         Lab Results   Component Value Date    WBC 7.1 03/29/2021    HGB 15.9 03/29/2021    HCT 48.7 03/29/2021     03/29/2021    CHOL 151 01/21/2021    TRIG 140 01/21/2021    HDL 38 01/21/2021    LDLCALC 85 01/21/2021    AST 28 08/27/2020     03/19/2021    K 4.8 03/19/2021     03/19/2021    CREATININE 0.9 03/29/2021    BUN 19 03/19/2021    CO2 26 03/19/2021    INR 0.87 03/14/2019    LABA1C 8.7 (H) 01/21/2021    LABGLOM 83 (A) 03/29/2021    MG 1.7 03/04/2021    CALCIUM 9.8 03/19/2021    VITD25 21 (L) 03/04/2021       Imaging Results:      CT of the chest on September 2, 2020 showed:  1. No acute intrathoracic findings. 2. Stable right lung nodules of indeterminate etiology. 3. Chronic lung disease and stable surgical changes in the left hemithorax. 4. Prominent mediastinal lymph nodes, likely reactive. CT of the chest on December 29, 2020 showed: Interval change from the prior examination is increase in size of a right upper lobe pulmonary nodule.    There are no new nodules and no change in the other findings. PET scan on January 13, 2021 showed:  Small, mildly FDG avid right lung nodules of indeterminate etiology. The degree of activity is suggestive of a benign process. However, low-grade or slow-growing malignancy cannot be excluded. Follow-up CT of the chest in 3 months is recommended    CT of the chest on March 12, 2021 showed:  1. There is a pulmonary nodule abutting the pleura within the posterior lateral right upper lobe on axial image 20 measuring approximately 1.6 x 0.9 cm. This has increased in size since the prior examination and is concerning for malignancy.       2. There is a stable indeterminate lobulated pulmonary nodule demonstrated within the lateral right midlung measuring 1.1 x 1 cm. This is unchanged when compared to prior examination from 9/2/2020. Recommend continued follow-up.       3. 2.4 x 0.9 cm lymph node within the left paratracheal region. This appears unchanged from prior examination. Recommend continued follow-up.             Right upper lobe of lung, nodule, core needle biopsies on March 29, 2021 showed:    Benign skeletal muscle.    Negative for neoplasia-nondiagnostic specimen    Assessment/Plan:     1. History of Malignant neoplasm of lung, unspecified laterality, unspecified part of lung ([facility] [address]  S/P LLL lobectomy and lymph node sampling on 03/14/2019. His lung cancer had few poor prognostic features: Sarcomatoid features, high histologic grade, lymphovascular invasion, visceral pleural invasion. Therefore the patient  Underwent treatment with adjuvant chemotherapy 4 cycles of Alimta and cisplatin. His cycle#4 given on 07/17/2019.   2. Encounter for follow-up surveillance of lung cancer  The patient denies having any new complaints. Specifically he denies having any headaches, no shortness of breath, no chest pain, no bone pain, no abdominal pain. The patient had chest CT  scan on September 2, 2020 which showed no acute intrathoracic findings.   Stable right lung nodule of indeterminate etiology. Prominent mediastinal lymph nodes, pathologically not enlarged by CT criteria, likely reactive. 3 months follow-up CT of the chest on December 29, 2020 showed that the right upper lobe peripheral partially spiculated nodule measured 1.7 cm in maximal dimension, previously 1 cm. Lobulated density anterior right lower lobe was stable measuring 11 mm. PET scan in January 2021 showed a 1.4 cm nodule at the adjacent to the pleura at the posterior lateral right upper middle lung stable with the associated maximum SUV of 1.0, and 1 cm nodule at the lateral right midlung is stable in size and SUV of 1.0 the degree of activity is suggestive of benign process however allowed greater slow-growing malignancy cannot be excluded. Had 3 months follow-up CT of the chest on March 12, 2021 that showed that the pulmonary nodule in the posterior lateral right upper lobe has increased in size to 1.6 cm and is concerning for malignancy. The patient proceeded with CT-guided lung biopsy. Unfortunately biopsy was nondiagnostic. My recommendation is to proceed with a VATS procedure and open biopsy. The patient is referred to the thoracic surgeon Dr. Uribe Pulling           No follow-ups on file.

## 2021-04-07 ENCOUNTER — CLINICAL DOCUMENTATION (OUTPATIENT)
Dept: CASE MANAGEMENT | Age: 71
End: 2021-04-07

## 2021-04-07 NOTE — PROGRESS NOTES
Name: Zully Mirza  : 1950  MRN: [id number]    Oncology Navigation Follow-Up Note    Contact Type:  Telephone    Notes:   Received a call from Britney Miller daughter in [place]. Knows her Dad has follow up with Dr. Douglas Jimenez () to go over biopsy results. They were inconclusive and she has made referral to Dr. Nasim Saxena for VATS procedure for actual tissue diagnosis. Dr. Natalie Liriano office will notify him of time. Michelle also wanted to set up medical proxy account with his MyChart. Will have to check into this with her and have [facility] permission-which he has given for release of information to her. He had already discussed above with Michelle. Will follow Calin Swain again, since he may have recurrence of disease.   Electronically signed by Larisa Valverde RN on 2021 at 10:19 AM

## 2021-04-08 ENCOUNTER — TELEPHONE (OUTPATIENT)
Dept: CARDIOTHORACIC SURGERY | Age: 71
End: 2021-04-08

## 2021-04-08 NOTE — TELEPHONE ENCOUNTER
Called patient in regards to referral received from Dr Makayla Berkowitz. Patient states that he would like to speak to Dr Teo Toledo before scheduling an appointment. Per his request, appt scheduling in progress    Dr Teo Toledo - please call patient when you are next available to do so.  Patient prefers after 2 PM.  Ph: [phone]

## 2021-04-22 ENCOUNTER — NURSE ONLY (OUTPATIENT)
Dept: LAB | Age: 71
End: 2021-04-22

## 2021-04-22 LAB
ALT SERPL-CCNC: 20 U/L (ref 11–66)
ANION GAP SERPL CALCULATED.3IONS-SCNC: 10 MEQ/L (ref 8–16)
AVERAGE GLUCOSE: 195 MG/DL (ref 70–126)
BUN BLDV-MCNC: 20 MG/DL (ref 7–22)
CALCIUM SERPL-MCNC: 9.5 MG/DL (ref 8.5–10.5)
CHLORIDE BLD-SCNC: 104 MEQ/L (ref 98–111)
CO2: 27 MEQ/L (ref 23–33)
CREAT SERPL-MCNC: 1 MG/DL (ref 0.4–1.2)
CREATININE, URINE: 356.2 MG/DL
GFR SERPL CREATININE-BSD FRML MDRD: 74 ML/MIN/1.73M2
GLUCOSE BLD-MCNC: 109 MG/DL (ref 70–108)
HBA1C MFR BLD: 8.5 % (ref 4.4–6.4)
MICROALBUMIN UR-MCNC: 386.31 MG/DL
MICROALBUMIN/CREAT UR-RTO: 1085 MG/G (ref 0–30)
POTASSIUM SERPL-SCNC: 4.7 MEQ/L (ref 3.5–5.2)
SODIUM BLD-SCNC: 141 MEQ/L (ref 135–145)

## 2021-04-26 ENCOUNTER — OFFICE VISIT (OUTPATIENT)
Dept: CARDIOTHORACIC SURGERY | Age: 71
End: 2021-04-26
Payer: MEDICARE

## 2021-04-26 VITALS
HEART RATE: 92 BPM | HEIGHT: 68 IN | BODY MASS INDEX: 29.1 KG/M2 | WEIGHT: 192 LBS | SYSTOLIC BLOOD PRESSURE: 151 MMHG | DIASTOLIC BLOOD PRESSURE: 96 MMHG

## 2021-04-26 DIAGNOSIS — Z01.818 PRE-OP EXAMINATION: Primary | ICD-10-CM

## 2021-04-26 DIAGNOSIS — Z01.810 ENCOUNTER FOR PREPROCEDURAL CARDIOVASCULAR EXAMINATION: ICD-10-CM

## 2021-04-26 PROCEDURE — 4040F PNEUMOC VAC/ADMIN/RCVD: CPT | Performed by: THORACIC SURGERY (CARDIOTHORACIC VASCULAR SURGERY)

## 2021-04-26 PROCEDURE — 3017F COLORECTAL CA SCREEN DOC REV: CPT | Performed by: THORACIC SURGERY (CARDIOTHORACIC VASCULAR SURGERY)

## 2021-04-26 PROCEDURE — G8417 CALC BMI ABV UP PARAM F/U: HCPCS | Performed by: THORACIC SURGERY (CARDIOTHORACIC VASCULAR SURGERY)

## 2021-04-26 PROCEDURE — 1123F ACP DISCUSS/DSCN MKR DOCD: CPT | Performed by: THORACIC SURGERY (CARDIOTHORACIC VASCULAR SURGERY)

## 2021-04-26 PROCEDURE — 99214 OFFICE O/P EST MOD 30 MIN: CPT | Performed by: THORACIC SURGERY (CARDIOTHORACIC VASCULAR SURGERY)

## 2021-04-26 PROCEDURE — G8427 DOCREV CUR MEDS BY ELIG CLIN: HCPCS | Performed by: THORACIC SURGERY (CARDIOTHORACIC VASCULAR SURGERY)

## 2021-04-26 PROCEDURE — 1036F TOBACCO NON-USER: CPT | Performed by: THORACIC SURGERY (CARDIOTHORACIC VASCULAR SURGERY)

## 2021-04-26 NOTE — PROGRESS NOTES
CT/CV Surgery Follow Up Office Visit      Patient's Name/Date of Birth: Ar Watson / 1950 (56 y.o.)    PCP: Cecilia Davis. Marcos Wilkins MD    Date: April 26, 2021    We had the pleasure of seeing Ar Watson in the office today, as you know this is a very pleasant 70y.o. year old male with a history of hypertension, hyperlipidemia, coronary artery disease, status post circumflex branch coronary artery stenting on 03/12/2019, TURP 7 years ago, and status post left thoracotomy and left lower lobe lobectomy for lung cancer on 3/13/ 2019. He returned to cardiothoracic surgery clinic for follow-up. He was found to have 2 lung nodules in the right lung before the left thoracotomy. And follow-up CT scan, the nodule appeared to be increased in size. And percutaneous biopsy was attempted but did not get into the lung nodule. He reports vague incisional pain from her left thoracotomy but denied any recent episode of hemoptysis, chest wall pleuritic type of pain in the right chest, or weight loss. He has not taking any blood thinners except aspirin. And he denied any recent episode of midsternal chest pain. PastMedical History:  Yoel Siu  has a past medical history of Anxiety, Arthritis, Cancer ([address] COPD (chronic obstructive pulmonary disease) ([address] Enlarged prostate with urinary retention, Gait difficulty, Generalized weakness, Hyperlipidemia, Hypertension, Lesion of bladder, Osteoarthritis, Retention of urine, S/P cardiac catheterization, S/P TURP, SOB (shortness of breath), and Voiding difficulty. Past Surgical History:  The patient  has a past surgical history that includes Prostate surgery (2012); Colonoscopy (1340,0830); Appendectomy; bronchoscopy (N/A, 1/25/2019); bronchoscopy (1/25/2019); bronchoscopy (N/A, 3/1/2019); thoracotomy (Left, 3/14/2019); Carotid stent placement (03/12/2019); and CT NEEDLE BIOPSY LUNG PERCUTANEOUS (3/29/2021). Allergies: The patient has No Known Allergies. Medications:    Current Outpatient Medications:     glimepiride (AMARYL) 1 MG tablet, TAKE 1 TABLET BY MOUTH ONE TIME A DAY, Disp: , Rfl:     losartan (COZAAR) 25 MG tablet, TAKE 1 TABLET BY MOUTH EVERY DAY, Disp: , Rfl:     LOKELMA 10 g PACK oral suspension, DISSOLVE 1 PACKET IN WATER AND DRINK ONCE DAILY, Disp: , Rfl:     clopidogrel (PLAVIX) 75 MG tablet, TAKE 1 TABLET BY MOUTH ONE TIME A DAY, Disp: 30 tablet, Rfl: 11    albuterol sulfate HFA (VENTOLIN HFA) 108 (90 Base) MCG/ACT inhaler, Inhale 2 puffs into the lungs 4 times daily, Disp: 1 Inhaler, Rfl: 11    patiromer sorbitex calcium (VELTASSA) 8.4 g PACK packet, Take 8.4 g by mouth daily, Disp: , Rfl:     ferrous sulfate 325 (65 Fe) MG tablet, Take 325 mg by mouth daily (with breakfast), Disp: , Rfl:     SITagliptin (JANUVIA) 100 MG tablet, Take 100 mg by mouth daily, Disp: , Rfl:     Blood Glucose Monitoring Suppl (FREESTYLE FREEDOM LITE) w/Device KIT, 1 Device daily, Disp: , Rfl: 0    FREESTYLE LITE strip, 1 strip daily, Disp: , Rfl: 11    FREESTYLE LANCETS MISC, 1 Stick daily, Disp: , Rfl: 11    amLODIPine (NORVASC) 10 MG tablet, Take 10 mg by mouth daily, Disp: , Rfl:     atorvastatin (LIPITOR) 40 MG tablet, Take 40 mg by mouth daily, Disp: , Rfl:     metFORMIN (GLUCOPHAGE) 500 MG tablet, Take 500 mg by mouth 2 times daily (with meals) Take 1 tab by mouth at supper x2 weeks, then 1 tab in the morning and 1 tab at supper x2 weeks, then 1 tab in the morning and 2 tab at supper x2weeks, then 2 tabs twice a day, Disp: , Rfl:     aspirin 81 MG chewable tablet, Take 1 tablet by mouth daily, Disp: 30 tablet, Rfl: 3    nitroGLYCERIN (NITROSTAT) 0.4 MG SL tablet, up to max of 3 total doses. If no relief after 1 dose, call 911., Disp: 25 tablet, Rfl: 3    omeprazole (PRILOSEC) 20 MG delayed release capsule, Take 20 mg by mouth daily, Disp: , Rfl:     dutasteride (AVODART) 0.5 MG capsule, Take 0.5 mg by mouth daily.   , Disp: , Rfl:     Family History: This patient's family history includes Brain Cancer in his mother; COPD in his sister; Diabetes in his brother, father, mother, and sister; Heart Attack in his father; Heart Disease in his father; High Blood Pressure in his brother, father, mother, and sister. Social History:  Suzie Leonard  reports that he quit smoking about 2 years ago. His smoking use included cigarettes. He started smoking about 50 years ago. He has a 40.00 pack-year smoking history. He has never used smokeless tobacco. He reports that he does not drink alcohol or use drugs. He smoked 1 pack/day for 50 years. He quit smoking 2 years ago. Vital Signs:   BP (!) 151/96 (Site: Right Upper Arm, Position: Sitting, Cuff Size: Medium Adult) Comment: Took medications this morning  Pulse 92   Ht 5' 8\" (1.727 m)   Wt 192 lb (87.1 kg)   BMI 29.19 kg/m²     ROS:   Constitutional: Negative for activity change, chills, fatigue, fever and unexpected weight change. Respiratory: Negative for apnea, shortness of breath, wheezing and stridor. Cardiovascular: Negative for chest pain, palpitations and leg swelling. Gastrointestinal: Negative for hematochezia, melana, constipation, and N/V/D. Musculoskeletal: Vague left chest wall incisional pain. Skin: Negative for color change, rash and wound. Neurological: Negative for dizziness or syncope. Physical Exam:  General appearance:  No acute distress, appears stated age and cooperative. Neck: No jugular venous distention. Trachea midline. No carotid bruits. Respiratory:  Normal respiratory effort. Clear to auscultation, bilaterally without Rales/Wheezes/Rhonch. Left thoracotomy incision healed well. Lung sounds are clear to auscultation. Cardiovascular:  Regular rate and rhythm with normal S1/S2 without murmurs, rubs or gallops. Abdomen: Soft, non-tender, non-distended with normal bowel sounds. Ext: No clubbing, cyanosis or edema bilaterally.   Full range of motion without deformity. Skin: Skin color, texture, turgor normal.  No rashes or lesions. Neurologic:  Neurovascularly intact without any focal sensory/motor deficits. Psychiatric:  Alert and oriented, thought content appropriate, normal insight. Capillary Refill: Brisk,< 3 seconds   Peripheral Pulses: +2 palpable, equal bilaterally     Labs:    CBC:  Lab Results   Component Value Date    WBC 7.1 03/29/2021    HGB 15.9 03/29/2021    HCT 48.7 03/29/2021    MCV 86.7 03/29/2021     03/29/2021    INR 0.87 03/14/2019     BMP:   Lab Results   Component Value Date     04/22/2021    K 4.7 04/22/2021    K 4.9 04/19/2019     04/22/2021    CO2 27 04/22/2021    PHOS 3.2 06/20/2019    BUN 20 04/22/2021    CREATININE 1.0 04/22/2021    MG 1.7 03/04/2021       Imaging      Problem List:  Patient Active Problem List   Diagnosis    Chronic retention of urine    Benign prostatic hyperplasia    Mass of left lung    Adenocarcinoma of left lung (HCC)    Mediastinal lymphadenopathy    Angina effort    Abnormal stress test    S/P coronary angioplasty    CAD in native artery    S/P cardiac cath    Adenocarcinoma, lung, left (HCC)    Chest pain    Pleural effusion exudative       Assessment: 2 right lung nodule 1 in the right upper lobe and one in the right lower lobe. Plan 4/26/21:  1) preop work-up for nuclear cardiac stress test, EKG, and 2D echocardiogram    2) right video-assisted thoracoscopic surgery and wedge resection of 2 twonodules. Possibility of right thoracotomy and wedge resection. Thank you for allowing us to be involved in the patient's care.     Electronically by Rylan Schofield MD  on 4/26/2021 at 4:30 PM

## 2021-04-27 ENCOUNTER — TELEPHONE (OUTPATIENT)
Dept: CARDIOTHORACIC SURGERY | Age: 71
End: 2021-04-27

## 2021-04-27 NOTE — TELEPHONE ENCOUNTER
Spoke to the patient to schedule the Echo and Stress test ASAP   Offered the patient an apt for the Stress test to be done on 04.28.2021-  Patient declined  Patient states that he has waited this long,  whats another couple of weeks.        Patient is scheduled for May 14, 2021 arrival time 12:30pm

## 2021-04-30 ENCOUNTER — HOSPITAL ENCOUNTER (OUTPATIENT)
Dept: INTERVENTIONAL RADIOLOGY/VASCULAR | Age: 71
Discharge: HOME | End: 2021-04-30
Payer: MEDICARE

## 2021-04-30 ENCOUNTER — HOSPITAL ENCOUNTER (OUTPATIENT)
Dept: ULTRASOUND IMAGING | Age: 71
Discharge: HOME | End: 2021-04-30
Payer: MEDICARE

## 2021-04-30 DIAGNOSIS — R10.2 ADNEXAL TENDERNESS, RIGHT: ICD-10-CM

## 2021-04-30 DIAGNOSIS — I73.9 PERIPHERAL VASCULAR DISEASE, UNSPECIFIED (HCC): ICD-10-CM

## 2021-04-30 PROCEDURE — 93922 UPR/L XTREMITY ART 2 LEVELS: CPT

## 2021-04-30 PROCEDURE — 76882 US LMTD JT/FCL EVL NVASC XTR: CPT

## 2021-05-14 ENCOUNTER — OFFICE VISIT (OUTPATIENT)
Dept: SURGERY | Age: 71
End: 2021-05-14
Payer: MEDICARE

## 2021-05-14 ENCOUNTER — HOSPITAL ENCOUNTER (OUTPATIENT)
Dept: NON INVASIVE DIAGNOSTICS | Age: 71
Discharge: HOME | End: 2021-05-14
Payer: MEDICARE

## 2021-05-14 VITALS
BODY MASS INDEX: 28.72 KG/M2 | SYSTOLIC BLOOD PRESSURE: 120 MMHG | OXYGEN SATURATION: 93 % | DIASTOLIC BLOOD PRESSURE: 80 MMHG | HEIGHT: 68 IN | RESPIRATION RATE: 18 BRPM | WEIGHT: 189.5 LBS | HEART RATE: 87 BPM | TEMPERATURE: 96.7 F

## 2021-05-14 DIAGNOSIS — Z01.818 PRE-OP EXAMINATION: ICD-10-CM

## 2021-05-14 DIAGNOSIS — K40.90 RIGHT INGUINAL HERNIA: Primary | ICD-10-CM

## 2021-05-14 DIAGNOSIS — Z01.810 ENCOUNTER FOR PREPROCEDURAL CARDIOVASCULAR EXAMINATION: ICD-10-CM

## 2021-05-14 LAB
LV EF: 55 %
LVEF MODALITY: NORMAL

## 2021-05-14 PROCEDURE — 93306 TTE W/DOPPLER COMPLETE: CPT

## 2021-05-14 PROCEDURE — 93017 CV STRESS TEST TRACING ONLY: CPT | Performed by: NUCLEAR MEDICINE

## 2021-05-14 PROCEDURE — 3430000000 HC RX DIAGNOSTIC RADIOPHARMACEUTICAL: Performed by: THORACIC SURGERY (CARDIOTHORACIC VASCULAR SURGERY)

## 2021-05-14 PROCEDURE — 78452 HT MUSCLE IMAGE SPECT MULT: CPT | Performed by: NUCLEAR MEDICINE

## 2021-05-14 PROCEDURE — 93016 CV STRESS TEST SUPVJ ONLY: CPT | Performed by: NUCLEAR MEDICINE

## 2021-05-14 PROCEDURE — 78452 HT MUSCLE IMAGE SPECT MULT: CPT

## 2021-05-14 PROCEDURE — 6360000002 HC RX W HCPCS

## 2021-05-14 PROCEDURE — G8427 DOCREV CUR MEDS BY ELIG CLIN: HCPCS | Performed by: SURGERY

## 2021-05-14 PROCEDURE — 99204 OFFICE O/P NEW MOD 45 MIN: CPT | Performed by: SURGERY

## 2021-05-14 PROCEDURE — G8417 CALC BMI ABV UP PARAM F/U: HCPCS | Performed by: SURGERY

## 2021-05-14 PROCEDURE — A9500 TC99M SESTAMIBI: HCPCS | Performed by: THORACIC SURGERY (CARDIOTHORACIC VASCULAR SURGERY)

## 2021-05-14 PROCEDURE — 93018 CV STRESS TEST I&R ONLY: CPT | Performed by: NUCLEAR MEDICINE

## 2021-05-14 RX ADMIN — Medication 31.4 MILLICURIE: at 14:26

## 2021-05-14 RX ADMIN — Medication 9.8 MILLICURIE: at 13:37

## 2021-05-14 ASSESSMENT — ENCOUNTER SYMPTOMS
TROUBLE SWALLOWING: 0
NAUSEA: 0
EYE DISCHARGE: 0
DIARRHEA: 0
FACIAL SWELLING: 0
STRIDOR: 0
BLOOD IN STOOL: 0
EYE PAIN: 0
APNEA: 0
COUGH: 0
EYE REDNESS: 0
CHOKING: 0
SHORTNESS OF BREATH: 1
COLOR CHANGE: 0
RHINORRHEA: 0
EYE ITCHING: 0
ABDOMINAL PAIN: 0
BACK PAIN: 0
CHEST TIGHTNESS: 0
CONSTIPATION: 0
SORE THROAT: 0
ANAL BLEEDING: 0
WHEEZING: 1
SINUS PRESSURE: 0
VOMITING: 0
VOICE CHANGE: 0
ABDOMINAL DISTENTION: 0
PHOTOPHOBIA: 0
RECTAL PAIN: 0

## 2021-05-14 NOTE — PROGRESS NOTES
Shakira Adams, [address] [place] [id number]  [phone]  New Patient Evaluation in Office    Pt Name: Gloria Pete  Date of Birth 1950   Today's Date: 5/14/2021  Medical Record Number: [id number]  Referring Provider: Josephine Akers MD  Primary Care Provider: Asha Frias. Serenity Prabhakar MD  Chief Complaint   Patient presents with    Surgical Consult     New patient-referred by Dr Halima Melendrez inguinal hernia     ASSESSMENT       Diagnosis Orders   1. Right inguinal hernia       Past Medical History:   Diagnosis Date    Anxiety     Arthritis     CAD (coronary artery disease)     on Plavix and ASA    Cancer ([facility] 01/2019    left lung stage 2-Dr Dan    COPD (chronic obstructive pulmonary disease) ([facility] 02/2019    E. Cali    [facility] ([facility]     Metformin    Enlarged prostate with urinary retention     Gait difficulty     Generalized weakness     Hyperlipidemia     Hypertension     Dr Lu Bates and Dr Pino Gloss    Lesion of bladder     Osteoarthritis     Retention of urine     Right inguinal hernia 2021    S/P cardiac catheterization 03/12/2019    stent to circumflex per Dr. Alina Morrell S/P TURP     SOB (shortness of breath)     With activity    Voiding difficulty           PLANS      1. Schedule Shazia for right inguinal hernia repair with mesh  2. I had a discussion with the patient regarding potential therapies for hernias and the risks of hernia surgery to include bleeding, infection, recurrence, injury to surrounding structures and organs, and continued pain in the area. We also discussed the use of mesh and its advantages and disadvantages. We discussed the anesthetic options, conduct of the operation, outpatient status, post op recovery and length of time off of work. After this discussion, the patient's questions were answered and has elected to proceed with surgical repair. 3. Status: outpatient  4.  Planned anesthesia: general 5. He will undergo pre-operative clearance per anesthesia guidelines with risk factors listed under the past medical history diagnosis & problem list.  6. Perioperative discontinuation of Plavix. Continuation of 81 mg Aspirin is acceptable. 7. Perioperative medical clearance will be scheduled with Dr. Doyle Kwasi is a 70 y.o. male seen in the consultation for evaluation of a right inguinal hernia. Symptoms were first noted 3 months ago and has gradually worsened since that time. The pain is dull, intermittent, radiating to groin, moderate in severity. It is aggravated by Valsalva maneuvers and palliated by rest. He has no additional symptoms. He denies signs or symptoms of incarceration or strangulation. He has not had prior right inguinal hernia surgery. This hernia is not stated to be work related per patient. US reviewed showing a fat containing right inguinal hernia. Past Medical History  Past Medical History:   Diagnosis Date    Anxiety     Arthritis     CAD (coronary artery disease)     on Plavix and ASA    Cancer ([facility] 01/2019    left lung stage 2-Dr Dan    COPD (chronic obstructive pulmonary disease) ([facility] 02/2019    ANDREINA Leiva    Diabetes ([facility]     Metformin    Enlarged prostate with urinary retention     Gait difficulty     Generalized weakness     Hyperlipidemia     Hypertension     Dr Elodia Haile and Dr Kylie Ayala    Lesion of bladder     Osteoarthritis     Retention of urine     Right inguinal hernia 2021    S/P cardiac catheterization 03/12/2019    stent to circumflex per Dr. Jimi Fleming S/P TURP     SOB (shortness of breath)     With activity    Voiding difficulty      Past Surgical History  Past Surgical History:   Procedure Laterality Date    APPENDECTOMY      BRONCHOSCOPY N/A 1/25/2019    BRONCHOSCOPY performed by Carmen Lu MD at [address] [place] [address]  1/25/2019    BRONCHOSCOPY/TRANSBRONCHIAL LUNG BIOPSY performed by Carmen Lu, MD at [facility]    BRONCHOSCOPY N/A 3/1/2019    BRONCHOSCOPY W/EBUS FNA performed by Paulo Oseguera MD at [facility]  03/12/2019    COLONOSCOPY  [id number]    CT NEEDLE BIOPSY LUNG PERCUTANEOUS  3/29/2021    CT NEEDLE BIOPSY LUNG PERCUTANEOUS 3/29/2021 [facility] CT SCAN    PROSTATE SURGERY  2012    TURP    THORACOTOMY Left 3/14/2019    LEFT EXPLORATORY THORACOTOMY, MEDIASTINAL LYMPH NODE DISECTION, FLEXIBLE BRONCHOSCOPY performed by Tiana Duffy MD at [address]     Medications  Current Outpatient Medications   Medication Sig Dispense Refill    glimepiride (AMARYL) 1 MG tablet TAKE 1 TABLET BY MOUTH ONE TIME A DAY      losartan (COZAAR) 25 MG tablet TAKE 1 TABLET BY MOUTH EVERY DAY      LOKELMA 10 g PACK oral suspension DISSOLVE 1 PACKET IN WATER AND DRINK ONCE DAILY      clopidogrel (PLAVIX) 75 MG tablet TAKE 1 TABLET BY MOUTH ONE TIME A DAY 30 tablet 11    albuterol sulfate HFA (VENTOLIN HFA) 108 (90 Base) MCG/ACT inhaler Inhale 2 puffs into the lungs 4 times daily 1 Inhaler 11    ferrous sulfate 325 (65 Fe) MG tablet Take 325 mg by mouth daily (with breakfast)      Blood Glucose Monitoring Suppl (FREESTYLE FREEDOM LITE) w/Device KIT 1 Device daily  0    FREESTYLE LITE strip 1 strip daily  11    FREESTYLE LANCETS MISC 1 Stick daily  11    amLODIPine (NORVASC) 10 MG tablet Take 10 mg by mouth daily      atorvastatin (LIPITOR) 40 MG tablet Take 40 mg by mouth daily      metFORMIN (GLUCOPHAGE) 500 MG tablet Take 500 mg by mouth 2 times daily (with meals) Take 1 tab by mouth at supper x2 weeks, then 1 tab in the morning and 1 tab at supper x2 weeks, then 1 tab in the morning and 2 tab at supper x2weeks, then 2 tabs twice a day      aspirin 81 MG chewable tablet Take 1 tablet by mouth daily 30 tablet 3    nitroGLYCERIN (NITROSTAT) 0.4 MG SL tablet up to max of 3 total doses.  If no relief after 1 dose, call 911. 25 tablet 3    omeprazole (PRILOSEC) 20 MG delayed release capsule Take 20 mg by mouth daily      dutasteride (AVODART) 0.5 MG capsule Take 0.5 mg by mouth daily. No current facility-administered medications for this visit. Allergies  has No Known Allergies. Family History  family history includes Brain Cancer in his mother; COPD in his sister; Diabetes in his brother, father, mother, and sister; Heart Attack in his father; Heart Disease in his father; High Blood Pressure in his brother, father, mother, and sister. Social History   reports that he quit smoking about 2 years ago. His smoking use included cigarettes. He started smoking about 50 years ago. He has a 40.00 pack-year smoking history. He has never used smokeless tobacco. He reports that he does not drink alcohol or use drugs. Health Screening Exams  Health Maintenance   Topic Date Due    Hepatitis C screen  Never done    Diabetic foot exam  Never done    Diabetic retinal exam  Never done    COVID-19 Vaccine (1) Never done    Shingles Vaccine (1 of 2) Never done    Pneumococcal 65+ years Vaccine (1 of 1 - PPSV23) Never done   [employer] Visit (AWV)  Never done    Flu vaccine (Season Ended) 09/01/2021    Lipid screen  01/21/2022    Low dose CT lung screening  03/29/2022    A1C test (Diabetic or Prediabetic)  04/22/2022    Diabetic microalbuminuria test  04/22/2022    Potassium monitoring  04/22/2022    Creatinine monitoring  04/22/2022    DTaP/Tdap/Td vaccine (2 - Td) 05/25/2023    Colon cancer screen colonoscopy  01/05/2027    AAA screen  Completed    Hepatitis A vaccine  Aged Out    Hepatitis B vaccine  Aged Out    Hib vaccine  Aged Out    Meningococcal (ACWY) vaccine  Aged Out     Review of Systems  Constitutional: Negative for activity change, appetite change, chills, diaphoresis, fatigue, fever and unexpected weight change.    HENT: Negative for congestion, dental problem, drooling, ear discharge, ear pain, facial swelling, hearing loss, mouth sores, nosebleeds, postnasal drip, rhinorrhea, sinus pressure, sneezing, sore throat, tinnitus, trouble swallowing and voice change. Eyes: Negative for photophobia, pain, discharge, redness, itching and visual disturbance. Respiratory: Positive for shortness of breath and wheezing. Negative for apnea, cough, choking, chest tightness and stridor. Cardiovascular: Negative for chest pain, palpitations and leg swelling. Gastrointestinal: Negative for abdominal distention, abdominal pain, anal bleeding, blood in stool, constipation, diarrhea, nausea, rectal pain and vomiting. Endocrine: Negative for cold intolerance, heat intolerance, polydipsia, polyphagia and polyuria. Genitourinary: Negative for decreased urine volume, difficulty urinating, dysuria, enuresis, flank pain, frequency, genital sores, hematuria and urgency. Musculoskeletal: Negative for arthralgias, back pain, gait problem, joint swelling, myalgias, neck pain and neck stiffness. Skin: Negative for color change, pallor, rash and wound. Right bulge groin   Allergic/Immunologic: Negative for environmental allergies, food allergies and immunocompromised state. Neurological: Negative for dizziness, tremors, seizures, syncope, facial asymmetry, speech difficulty, weakness, light-headedness, numbness and headaches. Hematological: Negative for adenopathy. Does not bruise/bleed easily. Psychiatric/Behavioral: Negative for agitation, behavioral problems, confusion, decreased concentration, dysphoric mood, hallucinations, self-injury, sleep disturbance and suicidal ideas. The patient is not nervous/anxious and is not hyperactive      OBJECTIVE    VITALS:  height is 5' 8\" (1.727 m) and weight is 189 lb 8 oz (86 kg). His temporal temperature is 96.7 °F (35.9 °C). His blood pressure is 120/80 and his pulse is 87. His respiration is 18 and oxygen saturation is 93%. Pain Score:   0 - No pain Body mass index is 28.81 kg/m².   CONSTITUTIONAL: Alert and

## 2021-05-14 NOTE — PROGRESS NOTES
Subjective:      Patient ID: Edith Simons is a 70 y.o. male. Chief Complaint   Patient presents with    Surgical Consult     New patient-referred by Dr Tiffany Lopez inguinal hernia       HPI    Review of Systems   Constitutional: Negative for activity change, appetite change, chills, diaphoresis, fatigue, fever and unexpected weight change. HENT: Negative for congestion, dental problem, drooling, ear discharge, ear pain, facial swelling, hearing loss, mouth sores, nosebleeds, postnasal drip, rhinorrhea, sinus pressure, sneezing, sore throat, tinnitus, trouble swallowing and voice change. Eyes: Negative for photophobia, pain, discharge, redness, itching and visual disturbance. Respiratory: Positive for shortness of breath and wheezing. Negative for apnea, cough, choking, chest tightness and stridor. Cardiovascular: Negative for chest pain, palpitations and leg swelling. Gastrointestinal: Negative for abdominal distention, abdominal pain, anal bleeding, blood in stool, constipation, diarrhea, nausea, rectal pain and vomiting. Endocrine: Negative for cold intolerance, heat intolerance, polydipsia, polyphagia and polyuria. Genitourinary: Negative for decreased urine volume, difficulty urinating, dysuria, enuresis, flank pain, frequency, genital sores, hematuria and urgency. Musculoskeletal: Negative for arthralgias, back pain, gait problem, joint swelling, myalgias, neck pain and neck stiffness. Skin: Negative for color change, pallor, rash and wound. Right bulge groin   Allergic/Immunologic: Negative for environmental allergies, food allergies and immunocompromised state. Neurological: Negative for dizziness, tremors, seizures, syncope, facial asymmetry, speech difficulty, weakness, light-headedness, numbness and headaches. Hematological: Negative for adenopathy. Does not bruise/bleed easily.    Psychiatric/Behavioral: Negative for agitation, behavioral problems, confusion, decreased

## 2021-05-14 NOTE — LETTER
Zaki Mares,   [address] [facility]  [place] [address]  [phone]     Pt Name: [address] Record Number: [id number]  Date of Birth 1950   Today's Date: 5/14/2021    Zion Mcnamara was seen in consultation in the office today. My assessment and plans are listed below. ASSESSMENT      Diagnosis Orders   1. Right inguinal hernia       Past Medical History:   Diagnosis Date    Anxiety     Arthritis     CAD (coronary artery disease)     on Plavix and ASA    Cancer ([facility] 01/2019    left lung stage 2-Dr Dan    [facility]    E. Cali    Diabetes ([facility]     Metformin    Enlarged prostate with urinary retention     Gait difficulty     Generalized weakness     Hyperlipidemia     Hypertension     Dr Vianey Cedillo and Dr Kinga Leonard    Lesion of bladder     Osteoarthritis     Retention of urine     Right inguinal hernia 2021    S/P cardiac catheterization 03/12/2019    stent to circumflex per Dr. Azael Mtz S/P TURP     SOB (shortness of breath)     With activity    Voiding difficulty          PLANS:   1. Schedule Shazia for right inguinal hernia repair with mesh  2. I had a discussion with the patient regarding potential therapies for hernias and the risks of hernia surgery to include bleeding, infection, recurrence, injury to surrounding structures and organs, and continued pain in the area. We also discussed the use of mesh and its advantages and disadvantages. We discussed the anesthetic options, conduct of the operation, outpatient status, post op recovery and length of time off of work. After this discussion, the patient's questions were answered and has elected to proceed with surgical repair. 3. Status: outpatient  4. Planned anesthesia: general  5.  He will undergo pre-operative clearance per anesthesia guidelines with risk factors listed under the past medical history diagnosis & problem list.  6. Perioperative discontinuation of Plavix. Continuation of 81 mg Aspirin is acceptable. 7. Perioperative medical clearance will be scheduled with Dr. Luann Mello     If I can provide any additional assistance or you have any concerns, please feel free to contact me. Thank you for allowing to participate in the care of your patients. Sincerely,      Marta Virk.  Wally Adams

## 2021-05-18 ENCOUNTER — TELEPHONE (OUTPATIENT)
Dept: CARDIOTHORACIC SURGERY | Age: 71
End: 2021-05-18

## 2021-05-18 NOTE — TELEPHONE ENCOUNTER
Patient completed ECHO and stress test on 5/14/21  Please review results. Thank you. Plan from office visit on 4/26/21:  1) preop work-up for nuclear cardiac stress test, EKG, and 2D echocardiogram     2) right video-assisted thoracoscopic surgery and wedge resection of 2 twonodules. Possibility of right thoracotomy and wedge resection.

## 2021-05-19 ENCOUNTER — TELEPHONE (OUTPATIENT)
Dept: SURGERY | Age: 71
End: 2021-05-19

## 2021-05-19 ENCOUNTER — HOSPITAL ENCOUNTER (EMERGENCY)
Age: 71
Discharge: HOME | End: 2021-05-19
Attending: EMERGENCY MEDICINE
Payer: MEDICARE

## 2021-05-19 VITALS
WEIGHT: 189 LBS | HEART RATE: 108 BPM | DIASTOLIC BLOOD PRESSURE: 84 MMHG | OXYGEN SATURATION: 96 % | RESPIRATION RATE: 16 BRPM | TEMPERATURE: 98.1 F | BODY MASS INDEX: 28.64 KG/M2 | HEIGHT: 68 IN | SYSTOLIC BLOOD PRESSURE: 136 MMHG

## 2021-05-19 DIAGNOSIS — S61.411A LACERATION OF RIGHT HAND WITHOUT FOREIGN BODY, INITIAL ENCOUNTER: Primary | ICD-10-CM

## 2021-05-19 PROCEDURE — 99282 EMERGENCY DEPT VISIT SF MDM: CPT

## 2021-05-19 RX ORDER — BACITRACIN, NEOMYCIN, POLYMYXIN B 400; 3.5; 5 [USP'U]/G; MG/G; [USP'U]/G
OINTMENT TOPICAL 2 TIMES DAILY
Status: DISCONTINUED | OUTPATIENT
Start: 2021-05-19 | End: 2021-05-19 | Stop reason: HOSPADM

## 2021-05-19 RX ORDER — LIDOCAINE HYDROCHLORIDE 10 MG/ML
20 INJECTION, SOLUTION INFILTRATION; PERINEURAL ONCE
Status: DISCONTINUED | OUTPATIENT
Start: 2021-05-19 | End: 2021-05-19 | Stop reason: HOSPADM

## 2021-05-19 RX ORDER — AMOXICILLIN 500 MG/1
500 CAPSULE ORAL 3 TIMES DAILY
Qty: 30 CAPSULE | Refills: 0 | Status: SHIPPED | OUTPATIENT
Start: 2021-05-19 | End: 2021-05-29

## 2021-05-19 RX ORDER — BACITRACIN, NEOMYCIN, POLYMYXIN B 400; 3.5; 5 [USP'U]/G; MG/G; [USP'U]/G
OINTMENT TOPICAL
Status: DISCONTINUED
Start: 2021-05-19 | End: 2021-05-19 | Stop reason: HOSPADM

## 2021-05-19 ASSESSMENT — ENCOUNTER SYMPTOMS
VOMITING: 0
ABDOMINAL DISTENTION: 0
EYE ITCHING: 0
DIARRHEA: 0
PHOTOPHOBIA: 0
SORE THROAT: 0
WHEEZING: 0
EYE PAIN: 0
CONSTIPATION: 0
CHEST TIGHTNESS: 0
RHINORRHEA: 0
COUGH: 0
BACK PAIN: 0
ABDOMINAL PAIN: 0
STRIDOR: 0
EYE REDNESS: 0
SHORTNESS OF BREATH: 0
EYE DISCHARGE: 0
NAUSEA: 0

## 2021-05-19 NOTE — ED TRIAGE NOTES
Pt presents to the ED via ED lobby with c/o right hand laceration. Pt states he cut himself on accident with a knife. Pt reports having a tetanus shot within 6 years.

## 2021-05-19 NOTE — TELEPHONE ENCOUNTER
Patient had stress and echo on 5/14/2021. Scheduled for right inguinal hernia repair with Dr Raquel Simpson 6/3/2021-ok to proceed with surgery as planned from cardiac standpoint?

## 2021-05-19 NOTE — TELEPHONE ENCOUNTER
Dr Le Goldberg, Dr Sonam Murcia ordered stress and ECHO. You have not seen pt since July 2020. Can you clear off of the Stress and ECHO done 5/14/21?

## 2021-05-19 NOTE — ED PROVIDER NOTES
[facility]  EMERGENCY DEPARTMENT ENCOUNTER      PATIENT NAME: Chriss Robles  MRN: [id number]  : 1950  MARC: 2021  PROVIDER: Marco Odom MD      [address] [place] [address]       Chief Complaint   Patient presents with    Hand Laceration       Patient is seen and evaluated in a timely fashion. Nurses Notes are reviewed and I agree except as noted in the HPI. HISTORY OF PRESENT ILLNESS    Chriss Robles is a 70 y.o. male who presents to Emergency Department with Hand Laceration    Right hand laceration which happened at home when he was cutting a wood and he slipped. There is a 2 cm laceration at base of right thumb and index finger with mild bleeding. Moderate pain. Last tetanus vaccination was 6 years ago. This HPI was provided by patient. REVIEW OF SYSTEMS   Review of Systems   Constitutional: Negative for activity change, appetite change, chills, fatigue, fever and unexpected weight change. HENT: Negative for congestion, ear discharge, ear pain, hearing loss, nosebleeds, rhinorrhea and sore throat. Eyes: Negative for photophobia, pain, discharge, redness and itching. Respiratory: Negative for cough, chest tightness, shortness of breath, wheezing and stridor. Cardiovascular: Negative for chest pain, palpitations and leg swelling. Gastrointestinal: Negative for abdominal distention, abdominal pain, constipation, diarrhea, nausea and vomiting. Endocrine: Negative for cold intolerance, heat intolerance, polydipsia and polyphagia. Genitourinary: Negative for dysuria, flank pain, frequency and hematuria. Musculoskeletal: Negative for arthralgias, back pain, gait problem, myalgias, neck pain and neck stiffness. Skin: Positive for wound. Negative for pallor and rash. Allergic/Immunologic: Negative for environmental allergies and food allergies. Neurological: Negative for dizziness, tremors, syncope, weakness and headaches.    Psychiatric/Behavioral: Negative for agitation, behavioral problems, confusion, self-injury, sleep disturbance and suicidal ideas. PAST MEDICAL HISTORY     Past Medical History:   Diagnosis Date    Anxiety     Arthritis     CAD (coronary artery disease)     on Plavix and ASA    Cancer ([facility] 01/2019    left lung stage 2-Dr Dan    [facility]    EKatharine Leiva    Diabetes ([facility]     Metformin    Enlarged prostate with urinary retention     Gait difficulty     Generalized weakness     Hyperlipidemia     Hypertension     Dr Anival Man and Dr Celia Harris    Lesion of bladder     Osteoarthritis     Retention of urine     Right inguinal hernia 2021    S/P cardiac catheterization 03/12/2019    stent to circumflex per Dr. Edison Wheatley S/P TURP     SOB (shortness of breath)     With activity    Voiding difficulty        SURGICAL HISTORY       Past Surgical History:   Procedure Laterality Date    APPENDECTOMY      BRONCHOSCOPY N/A 1/25/2019    BRONCHOSCOPY performed by Cassandra Clarke MD at [address] [place] [address]  1/25/2019    BRONCHOSCOPY/TRANSBRONCHIAL LUNG BIOPSY performed by Cassandra Clarke MD at [address] [place] [address] N/A 3/1/2019    BRONCHOSCOPY W/EBUS FNA [facility] MD at [facility]  03/12/2019    COLONOSCOPY  [id number]    CT NEEDLE BIOPSY LUNG PERCUTANEOUS  3/29/2021    CT NEEDLE BIOPSY LUNG PERCUTANEOUS 3/29/2021 [facility] CT SCAN    PROSTATE SURGERY  2012    TURP    THORACOTOMY Left 3/14/2019    LEFT EXPLORATORY THORACOTOMY, MEDIASTINAL LYMPH NODE DISECTION, FLEXIBLE BRONCHOSCOPY performed by Celeste King MD at [address] [place] [address]       Discharge Medication List as of 5/19/2021  6:27 PM      CONTINUE these medications which have NOT CHANGED    Details   glimepiride (AMARYL) 1 MG tablet TAKE 1 TABLET BY MOUTH ONE TIME A DAYHistorical Med      losartan (COZAAR) 25 MG tablet TAKE 1 TABLET BY MOUTH EVERY DAYHistorical Med      LOKELMA 10 g PACK oral suspension DISSOLVE 1 PACKET IN WATER AND DRINK ONCE DAILY, DAWHistorical Med      clopidogrel (PLAVIX) 75 MG tablet TAKE 1 TABLET BY MOUTH ONE TIME A DAY, Disp-30 tablet,R-11Normal      albuterol sulfate HFA (VENTOLIN HFA) 108 (90 Base) MCG/ACT inhaler Inhale 2 puffs into the lungs 4 times daily, Disp-1 Inhaler, R-11Normal      ferrous sulfate 325 (65 Fe) MG tablet Take 325 mg by mouth daily (with breakfast)Historical Med      Blood Glucose Monitoring Suppl (FREESTYLE FREEDOM LITE) w/Device KIT DAILY Starting Mon 3/25/2019, R-0, Historical Med      FREESTYLE LITE strip 1 strip daily, R-11, DAWHistorical Med      FREESTYLE LANCETS MISC DAILY Starting Mon 3/25/2019, R-11, Historical Med      amLODIPine (NORVASC) 10 MG tablet Take 10 mg by mouth dailyHistorical Med      atorvastatin (LIPITOR) 40 MG tablet Take 40 mg by mouth dailyHistorical Med      metFORMIN (GLUCOPHAGE) 500 MG tablet Take 500 mg by mouth 2 times daily (with meals) Take 1 tab by mouth at supper x2 weeks, then 1 tab in the morning and 1 tab at supper x2 weeks, then 1 tab in the morning and 2 tab at supper x2weeks, then 2 tabs twice a dayHistorical Med      aspirin 81 MG chewable tablet Take 1 tablet by mouth daily, Disp-30 tablet, R-3Normal      nitroGLYCERIN (NITROSTAT) 0.4 MG SL tablet up to max of 3 total doses. If no relief after 1 dose, call 911., Disp-25 tablet, R-3Normal      omeprazole (PRILOSEC) 20 MG delayed release capsule Take 20 mg by mouth dailyHistorical Med      dutasteride (AVODART) 0.5 MG capsule Take 0.5 mg by mouth daily. ALLERGIES     Patient has no known allergies. FAMILY HISTORY     He indicated that his mother is . He indicated that his father is . He indicated that his sister is alive. He indicated that his brother is alive.    family history includes Brain Cancer in his mother; COPD in his sister; Diabetes in his brother, father, mother, and sister; Lymphadenopathy:      Cervical: No cervical adenopathy. Skin:     General: Skin is warm and dry. Capillary Refill: Capillary refill takes less than 2 seconds. Coloration: Skin is not pale. Findings: No erythema or rash. Neurological:      Mental Status: He is alert and oriented to person, place, and time. Cranial Nerves: No cranial nerve deficit. Sensory: No sensory deficit. Motor: No abnormal muscle tone. Coordination: Coordination normal.      Deep Tendon Reflexes: Reflexes normal.   Psychiatric:         Behavior: Behavior normal.         Thought Content: Thought content normal.         Judgment: Judgment normal.             ANCILLARY TEST RESULTS   EKG: Interpreted by me  None    LAB RESULTS:  No results found for this visit on 05/19/21. RADIOLOGY REPORTS  No orders to display       MEDICAL DEDISION MAKINGS AND RATIONALES     Differential diagnsis: Laceration    Actions: Laceration repair    ED Vitals:  Vitals:    05/19/21 1719   BP: 136/84   Pulse: 108   Resp: 16   Temp: 98.1 °F (36.7 °C)   TempSrc: Oral   SpO2: 96%   Weight: 189 lb (85.7 kg)   Height: 5' 8\" (1.727 m)     Laceration is repaired by me. See below procedure note. Tetanus vaccination is up-to-date. Discharged with PCP follow-up in 10 days for wound check and suture removal.  He is prescribed amoxicillin    CRITICAL CARE   None    CONSULTS   None    PROCEDURES   Laceration Repair Procedure Note    Indication: Laceration    Procedure: The patient was placed in the appropriate position and anesthesia around the laceration was obtained by infiltration using 1% Lidocaine without epinephrine. The area was then cleansed with Shur-Clens and draped in a sterile fashion. The laceration was closed with 4-0 Ethilon using interrupted sutures. There were no additional lacerations requiring repair. The wound area was then dressed with bacitracin, tape and Coban. Total repaired wound length: 2 cm.      Other Items: None    The patient tolerated the procedure well. Complications: None        FINAL IMPRESSION AND DISPOSITION      1.  Laceration of right hand without foreign body, initial encounter        Discharge home    PATIENT REFERRED TO:  Mario Cui MD  [address] [address]  [phone]    In 10 days  Wound check and suture removal      DISCHARGE MEDICATIONS:  Discharge Medication List as of 5/19/2021  6:27 PM      START taking these medications    Details   amoxicillin (AMOXIL) 500 MG capsule Take 1 capsule by mouth 3 times daily for 10 days, Disp-30 capsule, R-0Print             (Please note that portions of this note were completed with a voice recognition program.  Efforts were made to edit the dictations but occasionally words aremis-transcribed.)    MD Cris Kovacs MD  05/20/21 0267

## 2021-05-20 ENCOUNTER — OFFICE VISIT (OUTPATIENT)
Dept: CARDIOTHORACIC SURGERY | Age: 71
End: 2021-05-20
Payer: MEDICARE

## 2021-05-20 VITALS
SYSTOLIC BLOOD PRESSURE: 132 MMHG | DIASTOLIC BLOOD PRESSURE: 80 MMHG | WEIGHT: 190 LBS | BODY MASS INDEX: 28.79 KG/M2 | HEIGHT: 68 IN | HEART RATE: 90 BPM

## 2021-05-20 DIAGNOSIS — R91.1 NODULE OF RIGHT LUNG: Primary | ICD-10-CM

## 2021-05-20 PROCEDURE — 99213 OFFICE O/P EST LOW 20 MIN: CPT | Performed by: THORACIC SURGERY (CARDIOTHORACIC VASCULAR SURGERY)

## 2021-05-20 PROCEDURE — 1036F TOBACCO NON-USER: CPT | Performed by: THORACIC SURGERY (CARDIOTHORACIC VASCULAR SURGERY)

## 2021-05-20 PROCEDURE — 4040F PNEUMOC VAC/ADMIN/RCVD: CPT | Performed by: THORACIC SURGERY (CARDIOTHORACIC VASCULAR SURGERY)

## 2021-05-20 PROCEDURE — 3017F COLORECTAL CA SCREEN DOC REV: CPT | Performed by: THORACIC SURGERY (CARDIOTHORACIC VASCULAR SURGERY)

## 2021-05-20 PROCEDURE — G8427 DOCREV CUR MEDS BY ELIG CLIN: HCPCS | Performed by: THORACIC SURGERY (CARDIOTHORACIC VASCULAR SURGERY)

## 2021-05-20 PROCEDURE — 1123F ACP DISCUSS/DSCN MKR DOCD: CPT | Performed by: THORACIC SURGERY (CARDIOTHORACIC VASCULAR SURGERY)

## 2021-05-20 PROCEDURE — G8417 CALC BMI ABV UP PARAM F/U: HCPCS | Performed by: THORACIC SURGERY (CARDIOTHORACIC VASCULAR SURGERY)

## 2021-05-20 NOTE — PROGRESS NOTES
CT/CV Surgery Follow Up Office Visit      Patient's Name/Date of Birth: Alicia Harris / 1950 (22 y.o.)    PCP: Christiano Sainz. Charlene Alcantara MD    Date: May 20, 2021    We had the pleasure of seeing Alicia Harris in the office today, as you know this is a very pleasant 70y.o. year old male with a history of coronary artery disease, status post coronary artery stenting 2 years ago, status post left lower lobe lobectomy for lung cancer in March 2019, COPD, and 2 small lung nodules in right lung. He underwent percutaneous needle biopsy of the right lung nodule in the past.  However the specimen of the biopsy did not include lung tissue. He underwent nuclear cardiac stress test and 2D echocardiogram in preparation for surgical wedge resection of right lung nodules. The nuclear test showed no evidence of ischemia. And 2D echocardiogram showed ejection fraction around 55% and overall normal looking left ventricular function. I had a long discussion with the patient. And possibility of repeat percutaneous needle biopsy was discussed. He is now agreeable for repeat percutaneous needle biopsy. PastMedical History:  Milana Bowman  has a past medical history of Anxiety, Arthritis, CAD (coronary artery disease), Cancer ([address] COPD (chronic obstructive pulmonary disease) ([address] Diabetes ([address] Enlarged prostate with urinary retention, Gait difficulty, Generalized weakness, Hyperlipidemia, Hypertension, Lesion of bladder, Osteoarthritis, Retention of urine, Right inguinal hernia, S/P cardiac catheterization, S/P TURP, SOB (shortness of breath), and Voiding difficulty. Past Surgical History:  The patient  has a past surgical history that includes Prostate surgery (2012); Colonoscopy (8322,5338); Appendectomy; bronchoscopy (N/A, 1/25/2019); bronchoscopy (1/25/2019); bronchoscopy (N/A, 3/1/2019); thoracotomy (Left, 3/14/2019); Carotid stent placement (03/12/2019); and CT NEEDLE BIOPSY LUNG PERCUTANEOUS (3/29/2021). Allergies: The patient has No Known Allergies. Medications:    Current Outpatient Medications:     amoxicillin (AMOXIL) 500 MG capsule, Take 1 capsule by mouth 3 times daily for 10 days, Disp: 30 capsule, Rfl: 0    glimepiride (AMARYL) 1 MG tablet, TAKE 1 TABLET BY MOUTH ONE TIME A DAY, Disp: , Rfl:     losartan (COZAAR) 25 MG tablet, TAKE 1 TABLET BY MOUTH EVERY DAY, Disp: , Rfl:     LOKELMA 10 g PACK oral suspension, DISSOLVE 1 PACKET IN WATER AND DRINK ONCE DAILY, Disp: , Rfl:     clopidogrel (PLAVIX) 75 MG tablet, TAKE 1 TABLET BY MOUTH ONE TIME A DAY, Disp: 30 tablet, Rfl: 11    albuterol sulfate HFA (VENTOLIN HFA) 108 (90 Base) MCG/ACT inhaler, Inhale 2 puffs into the lungs 4 times daily, Disp: 1 Inhaler, Rfl: 11    ferrous sulfate 325 (65 Fe) MG tablet, Take 325 mg by mouth daily (with breakfast), Disp: , Rfl:     Blood Glucose Monitoring Suppl (FREESTYLE FREEDOM LITE) w/Device KIT, 1 Device daily, Disp: , Rfl: 0    FREESTYLE LITE strip, 1 strip daily, Disp: , Rfl: 11    FREESTYLE LANCETS MISC, 1 Stick daily, Disp: , Rfl: 11    amLODIPine (NORVASC) 10 MG tablet, Take 10 mg by mouth daily, Disp: , Rfl:     atorvastatin (LIPITOR) 40 MG tablet, Take 40 mg by mouth daily, Disp: , Rfl:     metFORMIN (GLUCOPHAGE) 500 MG tablet, Take 500 mg by mouth 2 times daily (with meals) Take 1 tab by mouth at supper x2 weeks, then 1 tab in the morning and 1 tab at supper x2 weeks, then 1 tab in the morning and 2 tab at supper x2weeks, then 2 tabs twice a day, Disp: , Rfl:     aspirin 81 MG chewable tablet, Take 1 tablet by mouth daily, Disp: 30 tablet, Rfl: 3    nitroGLYCERIN (NITROSTAT) 0.4 MG SL tablet, up to max of 3 total doses. If no relief after 1 dose, call 911., Disp: 25 tablet, Rfl: 3    omeprazole (PRILOSEC) 20 MG delayed release capsule, Take 20 mg by mouth daily, Disp: , Rfl:     dutasteride (AVODART) 0.5 MG capsule, Take 0.5 mg by mouth daily. , Disp: , Rfl:     Family History:   This patient's family history includes Brain Cancer in his mother; COPD in his sister; Diabetes in his brother, father, mother, and sister; Heart Attack in his father; Heart Disease in his father; High Blood Pressure in his brother, father, mother, and sister. Social History:  Kash Jeffrey  reports that he quit smoking about 2 years ago. His smoking use included cigarettes. He started smoking about 50 years ago. He has a 40.00 pack-year smoking history. He has never used smokeless tobacco. He reports that he does not drink alcohol and does not use drugs. Vital Signs:   /80 (Site: Left Upper Arm, Position: Sitting, Cuff Size: Medium Adult) Comment: Took medications today  Pulse 90   Ht 5' 8\" (1.727 m)   Wt 190 lb (86.2 kg)   BMI 28.89 kg/m²     ROS:   Constitutional: Negative for activity change, chills, fatigue, fever and unexpected weight change. Respiratory: Negative for apnea, shortness of breath, wheezing and stridor. COPD. Status post left lung lower lobe lobectomy for cancer. Cardiovascular: Negative for chest pain, palpitations and leg swelling. Status post coronary artery stenting. Gastrointestinal: Negative for hematochezia, melana, constipation, and N/V/D. Musculoskeletal: Negative for myalgias  Skin: Negative for color change, rash and wound. Neurological: Negative for dizziness or syncope. Physical Exam:  General appearance:  No acute distress, appears stated age and cooperative. Neck: No jugular venous distention. Trachea midline. No carotid bruits. Respiratory:  Normal respiratory effort. Clear to auscultation, bilaterally without Rales/Wheezes/Rhonch. Left thoracotomy incision healed well. Cardiovascular:  Regular rate and rhythm with normal S1/S2 without murmurs, rubs or gallops. Abdomen: Soft, non-tender, non-distended with normal bowel sounds. Ext: No clubbing, cyanosis or edema bilaterally. Full range of motion without deformity.    Skin: Skin color, texture, turgor normal.  No rashes or lesions. Neurologic:  Neurovascularly intact without any focal sensory/motor deficits. Psychiatric:  Alert and oriented, thought content appropriate, normal insight. Capillary Refill: Brisk,< 3 seconds   Peripheral Pulses: +2 palpable, equal bilaterally     Labs:    CBC:  Lab Results   Component Value Date    WBC 7.1 03/29/2021    HGB 15.9 03/29/2021    HCT 48.7 03/29/2021    MCV 86.7 03/29/2021     03/29/2021    INR 0.87 03/14/2019     BMP:   Lab Results   Component Value Date     04/22/2021    K 4.7 04/22/2021    K 4.9 04/19/2019     04/22/2021    CO2 27 04/22/2021    PHOS 3.2 06/20/2019    BUN 20 04/22/2021    CREATININE 1.0 04/22/2021    MG 1.7 03/04/2021       Imaging: I have reviewed the chest CT. Problem List:  Patient Active Problem List   Diagnosis    Chronic retention of urine    Benign prostatic hyperplasia    Mass of left lung    Adenocarcinoma of left lung (HCC)    Mediastinal lymphadenopathy    Angina effort    Abnormal stress test    S/P coronary angioplasty    CAD in native artery    S/P cardiac cath    Adenocarcinoma, lung, left (HCC)    Chest pain    Pleural effusion exudative       Assessment: 2 small right lung nodules. Plan 5/20/21:  1) we will refer to interventional radiology for CT-guided needle aspiration biopsy of the right lung nodules    Thank you for allowing us to be involved in the patient's care.     Electronically by Freida Leggett MD  on 5/20/2021 at 4:13 PM

## 2021-06-02 ENCOUNTER — ANESTHESIA EVENT (OUTPATIENT)
Dept: OPERATING ROOM | Age: 71
End: 2021-06-02
Payer: MEDICARE

## 2021-06-03 ENCOUNTER — HOSPITAL ENCOUNTER (OUTPATIENT)
Age: 71
Setting detail: OUTPATIENT SURGERY
Discharge: HOME | End: 2021-06-03
Attending: SURGERY | Admitting: SURGERY
Payer: MEDICARE

## 2021-06-03 ENCOUNTER — ANESTHESIA (OUTPATIENT)
Dept: OPERATING ROOM | Age: 71
End: 2021-06-03
Payer: MEDICARE

## 2021-06-03 VITALS
TEMPERATURE: 96.8 F | SYSTOLIC BLOOD PRESSURE: 89 MMHG | OXYGEN SATURATION: 100 % | DIASTOLIC BLOOD PRESSURE: 53 MMHG | RESPIRATION RATE: 9 BRPM

## 2021-06-03 VITALS
DIASTOLIC BLOOD PRESSURE: 77 MMHG | TEMPERATURE: 96.6 F | SYSTOLIC BLOOD PRESSURE: 119 MMHG | OXYGEN SATURATION: 93 % | HEIGHT: 68 IN | RESPIRATION RATE: 18 BRPM | WEIGHT: 189.6 LBS | BODY MASS INDEX: 28.73 KG/M2 | HEART RATE: 79 BPM

## 2021-06-03 DIAGNOSIS — G89.18 ACUTE POSTOPERATIVE PAIN: Primary | ICD-10-CM

## 2021-06-03 LAB
GLUCOSE BLD-MCNC: 223 MG/DL (ref 70–108)
GLUCOSE BLD-MCNC: 225 MG/DL (ref 70–108)

## 2021-06-03 PROCEDURE — 7100000011 HC PHASE II RECOVERY - ADDTL 15 MIN: Performed by: SURGERY

## 2021-06-03 PROCEDURE — 7100000010 HC PHASE II RECOVERY - FIRST 15 MIN: Performed by: SURGERY

## 2021-06-03 PROCEDURE — 3700000001 HC ADD 15 MINUTES (ANESTHESIA): Performed by: SURGERY

## 2021-06-03 PROCEDURE — 2580000003 HC RX 258: Performed by: SURGERY

## 2021-06-03 PROCEDURE — 2500000003 HC RX 250 WO HCPCS

## 2021-06-03 PROCEDURE — 6360000002 HC RX W HCPCS: Performed by: SURGERY

## 2021-06-03 PROCEDURE — 3600000012 HC SURGERY LEVEL 2 ADDTL 15MIN: Performed by: SURGERY

## 2021-06-03 PROCEDURE — C1781 MESH (IMPLANTABLE): HCPCS | Performed by: SURGERY

## 2021-06-03 PROCEDURE — 7100000000 HC PACU RECOVERY - FIRST 15 MIN: Performed by: SURGERY

## 2021-06-03 PROCEDURE — 3700000000 HC ANESTHESIA ATTENDED CARE: Performed by: SURGERY

## 2021-06-03 PROCEDURE — 82948 REAGENT STRIP/BLOOD GLUCOSE: CPT

## 2021-06-03 PROCEDURE — 2709999900 HC NON-CHARGEABLE SUPPLY: Performed by: SURGERY

## 2021-06-03 PROCEDURE — 2500000003 HC RX 250 WO HCPCS: Performed by: SURGERY

## 2021-06-03 PROCEDURE — 6360000002 HC RX W HCPCS

## 2021-06-03 PROCEDURE — 7100000001 HC PACU RECOVERY - ADDTL 15 MIN: Performed by: SURGERY

## 2021-06-03 PROCEDURE — 6370000000 HC RX 637 (ALT 250 FOR IP): Performed by: ANESTHESIOLOGY

## 2021-06-03 PROCEDURE — 3600000002 HC SURGERY LEVEL 2 BASE: Performed by: SURGERY

## 2021-06-03 PROCEDURE — 94640 AIRWAY INHALATION TREATMENT: CPT

## 2021-06-03 PROCEDURE — 49505 PRP I/HERN INIT REDUC >5 YR: CPT | Performed by: SURGERY

## 2021-06-03 DEVICE — PLUG HERN M W1.3XL1.55IN INGUINAL POLYPR REP PRESHAPED: Type: IMPLANTABLE DEVICE | Site: GROIN | Status: FUNCTIONAL

## 2021-06-03 RX ORDER — DEXAMETHASONE SODIUM PHOSPHATE 10 MG/ML
INJECTION, EMULSION INTRAMUSCULAR; INTRAVENOUS PRN
Status: DISCONTINUED | OUTPATIENT
Start: 2021-06-03 | End: 2021-06-03 | Stop reason: SDUPTHER

## 2021-06-03 RX ORDER — HYDROCODONE BITARTRATE AND ACETAMINOPHEN 5; 325 MG/1; MG/1
1 TABLET ORAL EVERY 4 HOURS PRN
Qty: 30 TABLET | Refills: 0 | Status: SHIPPED | OUTPATIENT
Start: 2021-06-03 | End: 2021-06-08

## 2021-06-03 RX ORDER — MEPERIDINE HYDROCHLORIDE 25 MG/ML
12.5 INJECTION INTRAMUSCULAR; INTRAVENOUS; SUBCUTANEOUS EVERY 5 MIN PRN
Status: DISCONTINUED | OUTPATIENT
Start: 2021-06-03 | End: 2021-06-03 | Stop reason: HOSPADM

## 2021-06-03 RX ORDER — SODIUM CHLORIDE 0.9 % (FLUSH) 0.9 %
5-40 SYRINGE (ML) INJECTION EVERY 12 HOURS SCHEDULED
Status: DISCONTINUED | OUTPATIENT
Start: 2021-06-03 | End: 2021-06-03 | Stop reason: HOSPADM

## 2021-06-03 RX ORDER — ONDANSETRON 2 MG/ML
4 INJECTION INTRAMUSCULAR; INTRAVENOUS EVERY 6 HOURS PRN
Status: DISCONTINUED | OUTPATIENT
Start: 2021-06-03 | End: 2021-06-03 | Stop reason: HOSPADM

## 2021-06-03 RX ORDER — DIPHENHYDRAMINE HYDROCHLORIDE 50 MG/ML
12.5 INJECTION INTRAMUSCULAR; INTRAVENOUS
Status: DISCONTINUED | OUTPATIENT
Start: 2021-06-03 | End: 2021-06-03 | Stop reason: HOSPADM

## 2021-06-03 RX ORDER — FENTANYL CITRATE 50 UG/ML
50 INJECTION, SOLUTION INTRAMUSCULAR; INTRAVENOUS EVERY 5 MIN PRN
Status: DISCONTINUED | OUTPATIENT
Start: 2021-06-03 | End: 2021-06-03 | Stop reason: HOSPADM

## 2021-06-03 RX ORDER — HYDROCODONE BITARTRATE AND ACETAMINOPHEN 5; 325 MG/1; MG/1
1 TABLET ORAL EVERY 4 HOURS PRN
Status: DISCONTINUED | OUTPATIENT
Start: 2021-06-03 | End: 2021-06-03 | Stop reason: HOSPADM

## 2021-06-03 RX ORDER — SODIUM CHLORIDE 0.9 % (FLUSH) 0.9 %
5-40 SYRINGE (ML) INJECTION PRN
Status: DISCONTINUED | OUTPATIENT
Start: 2021-06-03 | End: 2021-06-03 | Stop reason: HOSPADM

## 2021-06-03 RX ORDER — SODIUM CHLORIDE 9 MG/ML
25 INJECTION, SOLUTION INTRAVENOUS PRN
Status: DISCONTINUED | OUTPATIENT
Start: 2021-06-03 | End: 2021-06-03 | Stop reason: HOSPADM

## 2021-06-03 RX ORDER — IPRATROPIUM BROMIDE AND ALBUTEROL SULFATE 2.5; .5 MG/3ML; MG/3ML
1 SOLUTION RESPIRATORY (INHALATION) ONCE
Status: COMPLETED | OUTPATIENT
Start: 2021-06-03 | End: 2021-06-03

## 2021-06-03 RX ORDER — HYDRALAZINE HYDROCHLORIDE 20 MG/ML
5 INJECTION INTRAMUSCULAR; INTRAVENOUS EVERY 10 MIN PRN
Status: DISCONTINUED | OUTPATIENT
Start: 2021-06-03 | End: 2021-06-03 | Stop reason: HOSPADM

## 2021-06-03 RX ORDER — KETOROLAC TROMETHAMINE 30 MG/ML
INJECTION, SOLUTION INTRAMUSCULAR; INTRAVENOUS
Status: COMPLETED
Start: 2021-06-03 | End: 2021-06-03

## 2021-06-03 RX ORDER — FENTANYL CITRATE 50 UG/ML
INJECTION, SOLUTION INTRAMUSCULAR; INTRAVENOUS PRN
Status: DISCONTINUED | OUTPATIENT
Start: 2021-06-03 | End: 2021-06-03 | Stop reason: SDUPTHER

## 2021-06-03 RX ORDER — ONDANSETRON 2 MG/ML
4 INJECTION INTRAMUSCULAR; INTRAVENOUS
Status: DISCONTINUED | OUTPATIENT
Start: 2021-06-03 | End: 2021-06-03 | Stop reason: HOSPADM

## 2021-06-03 RX ORDER — LABETALOL 20 MG/4 ML (5 MG/ML) INTRAVENOUS SYRINGE
5 4 TIMES DAILY PRN
Status: DISCONTINUED | OUTPATIENT
Start: 2021-06-03 | End: 2021-06-03 | Stop reason: HOSPADM

## 2021-06-03 RX ORDER — ONDANSETRON 2 MG/ML
INJECTION INTRAMUSCULAR; INTRAVENOUS PRN
Status: DISCONTINUED | OUTPATIENT
Start: 2021-06-03 | End: 2021-06-03 | Stop reason: SDUPTHER

## 2021-06-03 RX ORDER — SODIUM CHLORIDE 9 MG/ML
INJECTION, SOLUTION INTRAVENOUS CONTINUOUS
Status: DISCONTINUED | OUTPATIENT
Start: 2021-06-03 | End: 2021-06-03 | Stop reason: HOSPADM

## 2021-06-03 RX ORDER — PROPOFOL 10 MG/ML
INJECTION, EMULSION INTRAVENOUS PRN
Status: DISCONTINUED | OUTPATIENT
Start: 2021-06-03 | End: 2021-06-03 | Stop reason: SDUPTHER

## 2021-06-03 RX ORDER — KETOROLAC TROMETHAMINE 30 MG/ML
30 INJECTION, SOLUTION INTRAMUSCULAR; INTRAVENOUS ONCE
Status: COMPLETED | OUTPATIENT
Start: 2021-06-03 | End: 2021-06-03

## 2021-06-03 RX ORDER — LIDOCAINE HCL/PF 100 MG/5ML
SYRINGE (ML) INJECTION PRN
Status: DISCONTINUED | OUTPATIENT
Start: 2021-06-03 | End: 2021-06-03 | Stop reason: SDUPTHER

## 2021-06-03 RX ORDER — MORPHINE SULFATE 2 MG/ML
2 INJECTION, SOLUTION INTRAMUSCULAR; INTRAVENOUS EVERY 5 MIN PRN
Status: DISCONTINUED | OUTPATIENT
Start: 2021-06-03 | End: 2021-06-03 | Stop reason: HOSPADM

## 2021-06-03 RX ORDER — BUPIVACAINE HYDROCHLORIDE 5 MG/ML
INJECTION, SOLUTION EPIDURAL; INTRACAUDAL PRN
Status: DISCONTINUED | OUTPATIENT
Start: 2021-06-03 | End: 2021-06-03 | Stop reason: ALTCHOICE

## 2021-06-03 RX ADMIN — PROPOFOL 150 MG: 10 INJECTION, EMULSION INTRAVENOUS at 08:36

## 2021-06-03 RX ADMIN — KETOROLAC TROMETHAMINE 30 MG: 30 INJECTION, SOLUTION INTRAMUSCULAR; INTRAVENOUS at 09:37

## 2021-06-03 RX ADMIN — SODIUM CHLORIDE: 9 INJECTION, SOLUTION INTRAVENOUS at 07:26

## 2021-06-03 RX ADMIN — FENTANYL CITRATE 50 MCG: 50 INJECTION, SOLUTION INTRAMUSCULAR; INTRAVENOUS at 08:45

## 2021-06-03 RX ADMIN — FENTANYL CITRATE 25 MCG: 50 INJECTION, SOLUTION INTRAMUSCULAR; INTRAVENOUS at 08:39

## 2021-06-03 RX ADMIN — INSULIN HUMAN 4 UNITS: 100 INJECTION, SOLUTION PARENTERAL at 09:35

## 2021-06-03 RX ADMIN — PHENYLEPHRINE HYDROCHLORIDE 100 MCG: 10 INJECTION INTRAVENOUS at 09:08

## 2021-06-03 RX ADMIN — CEFAZOLIN 2000 MG: 10 INJECTION, POWDER, FOR SOLUTION INTRAVENOUS at 08:41

## 2021-06-03 RX ADMIN — PHENYLEPHRINE HYDROCHLORIDE 100 MCG: 10 INJECTION INTRAVENOUS at 09:14

## 2021-06-03 RX ADMIN — FENTANYL CITRATE 25 MCG: 50 INJECTION, SOLUTION INTRAMUSCULAR; INTRAVENOUS at 08:49

## 2021-06-03 RX ADMIN — ONDANSETRON HYDROCHLORIDE 4 MG: 4 INJECTION, SOLUTION INTRAMUSCULAR; INTRAVENOUS at 08:57

## 2021-06-03 RX ADMIN — PROPOFOL 50 MG: 10 INJECTION, EMULSION INTRAVENOUS at 08:45

## 2021-06-03 RX ADMIN — Medication 80 MG: at 08:36

## 2021-06-03 RX ADMIN — DEXAMETHASONE SODIUM PHOSPHATE 10 MG: 10 INJECTION, EMULSION INTRAMUSCULAR; INTRAVENOUS at 08:41

## 2021-06-03 RX ADMIN — IPRATROPIUM BROMIDE AND ALBUTEROL SULFATE 1 AMPULE: .5; 3 SOLUTION RESPIRATORY (INHALATION) at 07:48

## 2021-06-03 RX ADMIN — FENTANYL CITRATE 25 MCG: 50 INJECTION, SOLUTION INTRAMUSCULAR; INTRAVENOUS at 08:41

## 2021-06-03 ASSESSMENT — PAIN SCALES - GENERAL
PAINLEVEL_OUTOF10: 0

## 2021-06-03 ASSESSMENT — PULMONARY FUNCTION TESTS
PIF_VALUE: 2
PIF_VALUE: 3
PIF_VALUE: 2
PIF_VALUE: 2
PIF_VALUE: 0
PIF_VALUE: 2
PIF_VALUE: 3
PIF_VALUE: 2
PIF_VALUE: 3
PIF_VALUE: 4
PIF_VALUE: 3
PIF_VALUE: 3
PIF_VALUE: 17
PIF_VALUE: 2
PIF_VALUE: 5
PIF_VALUE: 3
PIF_VALUE: 2
PIF_VALUE: 3
PIF_VALUE: 17
PIF_VALUE: 2
PIF_VALUE: 2
PIF_VALUE: 4
PIF_VALUE: 3
PIF_VALUE: 0
PIF_VALUE: 3
PIF_VALUE: 2
PIF_VALUE: 4
PIF_VALUE: 3
PIF_VALUE: 2
PIF_VALUE: 0
PIF_VALUE: 2
PIF_VALUE: 3

## 2021-06-03 ASSESSMENT — ENCOUNTER SYMPTOMS: SHORTNESS OF BREATH: 1

## 2021-06-03 NOTE — PROGRESS NOTES
ADMITTED TO [facility] AND ORIENTED TO UNIT. SCDS ON. FALL BAND ON. PT VERBALIZED APPROVAL FOR FIRST NAME, LAST INITIAL AND PHYSICIAN NAME ON UNIT WHITEBOARD. Spouse, Ruthie with the patient.

## 2021-06-03 NOTE — ANESTHESIA PRE PROCEDURE
Department of Anesthesiology  Preprocedure Note       Name:  Bonifacio Gilliam   Age:  70 y.o.  :  1950                                          MRN:  [id number]         Date:  6/3/2021      Surgeon: Shane Vaca):  Juan Joya DO    Procedure: Procedure(s):  RIGHT INGUINAL HERNIA REPAIR WITH MESH    Medications prior to admission:   Prior to Admission medications    Medication Sig Start Date End Date Taking? Authorizing Provider   glimepiride (AMARYL) 1 MG tablet TAKE 1 TABLET BY MOUTH ONE TIME A DAY 10/31/20  Yes Historical Provider, MD   losartan (COZAAR) 25 MG tablet TAKE 1 TABLET BY MOUTH EVERY DAY 20  Yes Historical Provider, MD   albuterol sulfate HFA (VENTOLIN HFA) 108 (90 Base) MCG/ACT inhaler Inhale 2 puffs into the lungs 4 times daily 6/3/20 6/3/21 Yes Elis Leiva APRN - CNP   amLODIPine (NORVASC) 10 MG tablet Take 10 mg by mouth daily   Yes Historical Provider, MD   atorvastatin (LIPITOR) 40 MG tablet Take 40 mg by mouth daily   Yes Historical Provider, MD   metFORMIN (GLUCOPHAGE) 500 MG tablet Take 500 mg by mouth 2 times daily (with meals) Take 1 tab by mouth at supper x2 weeks, then 1 tab in the morning and 1 tab at supper x2 weeks, then 1 tab in the morning and 2 tab at supper x2weeks, then 2 tabs twice a day   Yes Historical Provider, MD   omeprazole (PRILOSEC) 20 MG delayed release capsule Take 20 mg by mouth daily   Yes Historical Provider, MD   dutasteride (AVODART) 0.5 MG capsule Take 0.5 mg by mouth daily.      Yes Historical Provider, MD VAUGHN 10 g PACK oral suspension DISSOLVE 1 PACKET IN WATER AND DRINK ONCE DAILY 20   Historical Provider, MD   clopidogrel (PLAVIX) 75 MG tablet TAKE 1 TABLET BY MOUTH ONE TIME A DAY 20   Ze Jeronimo MD   Blood Glucose Monitoring Suppl (FREESTYLE FREEDOM LITE) w/Device KIT 1 Device daily 3/25/19   Historical Provider, MD   FREESTYLE LITE strip 1 strip daily 3/25/19   Historical Provider, MD ARREGUINYLE LANCETS MISC 1 Stick daily 3/25/19   Historical Provider, MD   aspirin 81 MG chewable tablet Take 1 tablet by mouth daily 3/14/19   RICH Vinson CNP   nitroGLYCERIN (NITROSTAT) 0.4 MG SL tablet up to max of 3 total doses. If no relief after 1 dose, call 911. 3/13/19   RICH Vinson CNP       Current medications:    Current Facility-Administered Medications   Medication Dose Route Frequency Provider Last Rate Last Admin    0.9 % sodium chloride infusion   Intravenous Continuous Alvira Sergio Ratliffser,  mL/hr at 06/03/21 0726 New Bag at 06/03/21 0726    sodium chloride flush 0.9 % injection 5-40 mL  5-40 mL Intravenous 2 times per day Alvira Pummel Wisser, DO        sodium chloride flush 0.9 % injection 5-40 mL  5-40 mL Intravenous PRN Alvira Pummel Wisser, DO        0.9 % sodium chloride infusion  25 mL Intravenous PRN Alvira Pummel Wisser, DO        ceFAZolin (ANCEF) 2000 mg in dextrose 5 % 50 mL IVPB  2,000 mg Intravenous On Call to 2000 Marisol Otero DO           Allergies:  No Known Allergies    Problem List:    Patient Active Problem List   Diagnosis Code    Chronic retention of urine R33.9    Benign prostatic hyperplasia N40.0    Mass of left lung R91.8    Adenocarcinoma of left lung (HCC) C34.92    Mediastinal lymphadenopathy R59.0    Angina effort I20.8    Abnormal stress test R94.39    S/P coronary angioplasty Z98.61    CAD in native artery I25.10    S/P cardiac cath Z98.890    Adenocarcinoma, lung, left (HCC) C34.92    Chest pain R07.9    Pleural effusion exudative J90       Past Medical History:        Diagnosis Date    Anxiety     Arthritis     CAD (coronary artery disease)     on Plavix and ASA    Cancer ([facility] [address] 01/2019    left lung stage 2-Dr Dan    COPD (chronic obstructive pulmonary disease) ([facility] [address] 02/2019    ANDREINA Leiva    Diabetes ([facility] [address]     Metformin    Enlarged prostate with urinary retention     Gait difficulty     Generalized weakness     Hyperlipidemia     Hypertension Dr Glenn Loza and Dr Effie Lofton    Lesion of bladder     Osteoarthritis     Retention of urine     Right inguinal hernia     S/P cardiac catheterization 2019    stent to circumflex per Dr. Ana Xie S/P TURP     SOB (shortness of breath)     With activity    Voiding difficulty        Past Surgical History:        Procedure Laterality Date    APPENDECTOMY      BRONCHOSCOPY N/A 2019    BRONCHOSCOPY performed by Ivett Nuñez MD at [address] [place] [address]  2019    BRONCHOSCOPY/TRANSBRONCHIAL LUNG BIOPSY performed by Ivett Nuñez MD at [address] [place] [address] N/A 3/1/2019    BRONCHOSCOPY W/EBUS FNA performed by Ivett Nuñez MD at [facility]  2019    COLONOSCOPY  [id number]    CT NEEDLE BIOPSY LUNG PERCUTANEOUS  3/29/2021    CT NEEDLE BIOPSY LUNG PERCUTANEOUS 3/29/2021 [facility] CT SCAN    PROSTATE SURGERY      TURP    THORACOTOMY Left 3/14/2019    LEFT EXPLORATORY THORACOTOMY, MEDIASTINAL LYMPH NODE DISECTION, FLEXIBLE BRONCHOSCOPY performed by Amarilis Ruffin MD at [facility] History:    Social History     Tobacco Use    Smoking status: Former Smoker     Packs/day: 1.00     Years: 40.00     Pack years: 40.00     Types: Cigarettes     Start date: [facility]     Quit date: 2019     Years since quittin.2    Smokeless tobacco: Never Used    Tobacco comment: about 5 cigs per day   Substance Use Topics    Alcohol use:  No                                Counseling given: Not Answered  Comment: about 5 cigs per day      Vital Signs (Current):   Vitals:    21 0650   BP: 129/83   Pulse: 81   Resp: 12   Temp: 97.8 °F (36.6 °C)   TempSrc: Temporal   SpO2: 93%   Weight: 189 lb 9.6 oz (86 kg)   Height: 5' 8\" (1.727 m)                                              BP Readings from Last 3 Encounters:   21 129/83   21 132/80   21 136/84       NPO Status: Time of last liquid consumption:  Time of last solid consumption: 2100                        Date of last liquid consumption: 06/02/21                        Date of last solid food consumption: 06/02/21    BMI:   Wt Readings from Last 3 Encounters:   06/03/21 189 lb 9.6 oz (86 kg)   05/20/21 190 lb (86.2 kg)   05/19/21 189 lb (85.7 kg)     Body mass index is 28.83 kg/m². CBC:   Lab Results   Component Value Date    WBC 7.1 03/29/2021    RBC 5.62 03/29/2021    HGB 15.9 03/29/2021    HCT 48.7 03/29/2021    MCV 86.7 03/29/2021    RDW 13.8 06/01/2020     03/29/2021       CMP:   Lab Results   Component Value Date     04/22/2021    K 4.7 04/22/2021    K 4.9 04/19/2019     04/22/2021    CO2 27 04/22/2021    BUN 20 04/22/2021    CREATININE 1.0 04/22/2021    LABGLOM 74 04/22/2021    GLUCOSE 109 04/22/2021    PROT 7.0 08/27/2020    CALCIUM 9.5 04/22/2021    BILITOT 0.4 08/27/2020    ALKPHOS 83 08/27/2020    AST 28 08/27/2020    ALT 20 04/22/2021       POC Tests:   Recent Labs     06/03/21  0721   POCGLU 225*       Coags:   Lab Results   Component Value Date    INR 0.87 03/14/2019    APTT 32.6 03/14/2019       HCG (If Applicable):   Lab Results   Component Value Date    PREGSERUM NEGATIVE 03/12/2019        ABGs: No results found for: PHART, PO2ART, ACV5CBS, ATX8EWZ, BEART, S2OIANMA     Type & Screen (If Applicable):  Lab Results   Component Value Date    LABRH POS 03/14/2019       Drug/Infectious Status (If Applicable):  No results found for: HIV, HEPCAB    COVID-19 Screening (If Applicable): No results found for: COVID19        Anesthesia Evaluation    Airway: Mallampati: II       Mouth opening: > = 3 FB Dental:          Pulmonary:   (+) COPD:  shortness of breath:                             Cardiovascular:    (+) hypertension:, angina:, CAD:, CABG/stent:,                   Neuro/Psych:               GI/Hepatic/Renal:             Endo/Other:    (+) Diabetes, .                  Abdominal:           Vascular: Anesthesia Plan      general     ASA 4       Induction: intravenous. Anesthetic plan and risks discussed with patient. Plan discussed with CRNA.                   Maribell Quiles MD   6/3/2021

## 2021-06-03 NOTE — OP NOTE
[address]  RECORD OF OPERATION  PATIENT NAME: Alejandro Thakur RECORD NO. [id number]  SURGEON: Jen Maki. [facility]  PRIMARY CARE PHYSICIAN: Barbara Meredith. MD Demetrius     PREOPERATIVE DIAGNOSIS:  RIGHT INGUINAL HERNIA  POSTOPERATIVE DIAGNOSES:  Indirect right inguinal hernia   PROCEDURE PERFORMED:  right inguinal hernia repair with mesh. SURGEON:  Dr. Jen Maki. Angie,  ANESTHESIA:  general  ESTIMATED BLOOD LOSS:  8 ml. SPECIMENS:  None. COMPLICATIONS:  None immediately appreciated. DISCUSSION:  Navdeep Rahman is a 70 y.o.-year male who presented to my office as a referral from Barbara Meredith. Kelli Vang MD regarding a right inguinal hernia. After history and physical examination was performed, potential diagnostic and therapeutic modalities discussed with the patient. Operative and nonoperative management was discussed. Laparoscopic and open approach with use of mesh reviewed. He was given opportunity to ask questions. Once answered informed consent was obtained. The patient was brought to the operating room on 6/3/2021 for procedure. OPERATIVE FINDINGS:  At the time of exploration the patient was found to have and indirect defect which was subsequently repaired as described below. PROCEDURE:  The patient was brought to the operating room, placed in supine position, placed under continuous cardiac telemetry, blood pressure and pulse oximetry monitoring and placed under general anesthesia by the anesthesia department. The right groin was then prepped and draped in a sterile fashion. A right inguinal incision was made with a #10 scalpel blade and carried down to subcutaneous tissue. Subcutaneous tissue dissection carried out with electrocautery down to the external aponeurosis to which was then incised along its fibers using the Metzenbaum scissors allowing safe entry into the canal. The cord and its contents were grasped and elevated off the floor using blunt dissection.  A Penrose drain placed for manipulation purposes and the floor of the canal was noted be intact. No evidence of direct hernia was evident. An indirect hernia sac was identified. The hernia sac was dissected free from the surrounding tissues with care to preserve the cord structures and then was reduced back within the internal ring. A Medium  Marlex plug was placed in the defect and secured in place using 2-0 Prolene suture. A Marlex patch was then fashioned to size, attached to the pubic tubercle, inguinal ligament, transverse abdominus muscle in not constricting fashion around the cord using 2-0 Prolene and 2-0 Vicryl suture. The cord and its contents placed back within the canal. The external aponeurosis was then closed using a running 2-0 Vicryl suture an ilioinguinal nerve block performed and the skin edges infiltrated with local anesthetic. The Emir's fascia approximated using 2-0 Vicryl suture, deep dermis approximated using 3-0 Vicryl suture and the skin closed using a 4-0 Monocryl suture in a running subcuticular fashion. The was then cleansed, prepared with Mastisol, steri-strips and sterile dressings were applied. The patient brought out of anesthesia, transferred to PACU in stable and satisfactory condition. No immediate complication evident. All sponge, instrument and needle counts were correct at the completion of the procedure. Postoperative findings were discussed with the patient's family. He was given discharge instructions, prescriptions for analgesics, and will follow up in my office in 2 week period of time for reevaluation.       Electronically signed by Guru Ortiz DO on 6/3/2021 at 9:16 AM

## 2021-06-03 NOTE — ANESTHESIA POSTPROCEDURE EVALUATION
Department of Anesthesiology  Postprocedure Note    Patient: Denita Jaramillo  MRN: [id number]  YOB: 1950  Date of evaluation: 6/3/2021  Time:  10:05 AM     Procedure Summary     Date: 06/03/21 Room / Location: [address] [place] [address]    Anesthesia Start: 0032 Anesthesia Stop: 9242    Procedure: RIGHT INGUINAL HERNIA REPAIR WITH MESH (Right Groin) Diagnosis: (RIGHT INGUINAL HERNIA)    Surgeons: Sho Sow DO Responsible Provider: Manuela Prater MD    Anesthesia Type: general ASA Status: 4          Anesthesia Type: general    Leodan Phase I: Leodan Score: 10    Leodan Phase II:      Last vitals: Reviewed and per EMR flowsheets.        Anesthesia Post Evaluation    Complications: no  Cardiovascular status: hemodynamically stable  Respiratory status: acceptable

## 2021-06-03 NOTE — PROGRESS NOTES
Pt returned to [facility] room 8. Vitals and assessment as charted. 0.9 infusing, @750ml to count from PACU. Pt refused snack. Family at the bedside. Pt and family verbalized understanding of discharge criteria and call light use. Call light in reach.

## 2021-06-03 NOTE — H&P
Nicho Jones. Gaudencioser, [address] [address]  [phone]  New Patient Evaluation in Office    Pt Name: Talita Cerrato  Date of Birth 1950   Today's Date: 5/14/2021  Medical Record Number: [id number]  Referring Provider: Luiz Syed MD  Primary Care Provider: Zbigniew Gar. Sergey Nicole MD  Chief Complaint   Patient presents with    Surgical Consult     New patient-referred by Dr Aydee Portillo inguinal hernia     ASSESSMENT       Diagnosis Orders   1. Right inguinal hernia       Past Medical History:   Diagnosis Date    Anxiety     Arthritis     CAD (coronary artery disease)     on Plavix and ASA    Cancer [facility] [address] 01/2019    left lung stage 2-Dr Dan    COPD (chronic obstructive pulmonary disease) ([facility] [address] 02/2019    EKatharine Leiva    [facility]     Metformin    Enlarged prostate with urinary retention     Gait difficulty     Generalized weakness     Hyperlipidemia     Hypertension     Dr [employer] and Dr Chitra Solano    Lesion of bladder     Osteoarthritis     Retention of urine     Right inguinal hernia 2021    S/P cardiac catheterization 03/12/2019    stent to circumflex per Dr. Destiney Jimenez S/P TURP     SOB (shortness of breath)     With activity    Voiding difficulty           PLANS      1. Schedule Shazia for right inguinal hernia repair with mesh  2. I had a discussion with the patient regarding potential therapies for hernias and the risks of hernia surgery to include bleeding, infection, recurrence, injury to surrounding structures and organs, and continued pain in the area. We also discussed the use of mesh and its advantages and disadvantages. We discussed the anesthetic options, conduct of the operation, outpatient status, post op recovery and length of time off of work. After this discussion, the patient's questions were answered and has elected to proceed with surgical repair. 3. Status: outpatient  4.  Planned anesthesia: Emily Murphy MD at [address] N/A 3/1/2019    BRONCHOSCOPY W/EBUS FNA performed by Raleigh Palmer [facility] at [facility]  03/12/2019    COLONOSCOPY  [id number]    CT NEEDLE BIOPSY LUNG PERCUTANEOUS  3/29/2021    CT NEEDLE BIOPSY LUNG PERCUTANEOUS 3/29/2021 [facility] CT SCAN    PROSTATE SURGERY  2012    TURP    THORACOTOMY Left 3/14/2019    LEFT EXPLORATORY THORACOTOMY, MEDIASTINAL LYMPH NODE DISECTION, FLEXIBLE BRONCHOSCOPY performed by Saman Viera MD at [facility]     Medications  Current Outpatient Medications   Medication Sig Dispense Refill    glimepiride (AMARYL) 1 MG tablet TAKE 1 TABLET BY MOUTH ONE TIME A DAY      losartan (COZAAR) 25 MG tablet TAKE 1 TABLET BY MOUTH EVERY DAY      LOKELMA 10 g PACK oral suspension DISSOLVE 1 PACKET IN WATER AND DRINK ONCE DAILY      clopidogrel (PLAVIX) 75 MG tablet TAKE 1 TABLET BY MOUTH ONE TIME A DAY 30 tablet 11    albuterol sulfate HFA (VENTOLIN HFA) 108 (90 Base) MCG/ACT inhaler Inhale 2 puffs into the lungs 4 times daily 1 Inhaler 11    ferrous sulfate 325 (65 Fe) MG tablet Take 325 mg by mouth daily (with breakfast)      Blood Glucose Monitoring Suppl (FREESTYLE FREEDOM LITE) w/Device KIT 1 Device daily  0    FREESTYLE LITE strip 1 strip daily  11    FREESTYLE LANCETS MISC 1 Stick daily  11    amLODIPine (NORVASC) 10 MG tablet Take 10 mg by mouth daily      atorvastatin (LIPITOR) 40 MG tablet Take 40 mg by mouth daily      metFORMIN (GLUCOPHAGE) 500 MG tablet Take 500 mg by mouth 2 times daily (with meals) Take 1 tab by mouth at supper x2 weeks, then 1 tab in the morning and 1 tab at supper x2 weeks, then 1 tab in the morning and 2 tab at supper x2weeks, then 2 tabs twice a day      aspirin 81 MG chewable tablet Take 1 tablet by mouth daily 30 tablet 3    nitroGLYCERIN (NITROSTAT) 0.4 MG SL tablet up to max of 3 total doses.  If no relief after 1 dose, call 911. 25 tablet 3    omeprazole (PRILOSEC) 20 MG delayed release capsule Take 20 mg by mouth daily      dutasteride (AVODART) 0.5 MG capsule Take 0.5 mg by mouth daily. No current facility-administered medications for this visit. Allergies  has No Known Allergies. Family History  family history includes Brain Cancer in his mother; COPD in his sister; Diabetes in his brother, father, mother, and sister; Heart Attack in his father; Heart Disease in his father; High Blood Pressure in his brother, father, mother, and sister. Social History   reports that he quit smoking about 2 years ago. His smoking use included cigarettes. He started smoking about 50 years ago. He has a 40.00 pack-year smoking history. He has never used smokeless tobacco. He reports that he does not drink alcohol or use drugs. Health Screening Exams  Health Maintenance   Topic Date Due    Hepatitis C screen  Never done    Diabetic foot exam  Never done    Diabetic retinal exam  Never done    COVID-19 Vaccine (1) Never done    Shingles Vaccine (1 of 2) Never done    Pneumococcal 65+ years Vaccine (1 of 1 - PPSV23) Never done   [employer] Visit (AWV)  Never done    Flu vaccine (Season Ended) 09/01/2021    Lipid screen  01/21/2022    Low dose CT lung screening  03/29/2022    A1C test (Diabetic or Prediabetic)  04/22/2022    Diabetic microalbuminuria test  04/22/2022    Potassium monitoring  04/22/2022    Creatinine monitoring  04/22/2022    DTaP/Tdap/Td vaccine (2 - Td) 05/25/2023    Colon cancer screen colonoscopy  01/05/2027    AAA screen  Completed    Hepatitis A vaccine  Aged Out    Hepatitis B vaccine  Aged Out    Hib vaccine  Aged Out    Meningococcal (ACWY) vaccine  Aged Out     Review of Systems  Constitutional: Negative for activity change, appetite change, chills, diaphoresis, fatigue, fever and unexpected weight change.    HENT: Negative for congestion, dental problem, drooling, ear discharge, ear pain, facial swelling, hearing loss, mouth sores, nosebleeds, postnasal drip, rhinorrhea, sinus pressure, sneezing, sore throat, tinnitus, trouble swallowing and voice change. Eyes: Negative for photophobia, pain, discharge, redness, itching and visual disturbance. Respiratory: Positive for shortness of breath and wheezing. Negative for apnea, cough, choking, chest tightness and stridor. Cardiovascular: Negative for chest pain, palpitations and leg swelling. Gastrointestinal: Negative for abdominal distention, abdominal pain, anal bleeding, blood in stool, constipation, diarrhea, nausea, rectal pain and vomiting. Endocrine: Negative for cold intolerance, heat intolerance, polydipsia, polyphagia and polyuria. Genitourinary: Negative for decreased urine volume, difficulty urinating, dysuria, enuresis, flank pain, frequency, genital sores, hematuria and urgency. Musculoskeletal: Negative for arthralgias, back pain, gait problem, joint swelling, myalgias, neck pain and neck stiffness. Skin: Negative for color change, pallor, rash and wound. Right bulge groin   Allergic/Immunologic: Negative for environmental allergies, food allergies and immunocompromised state. Neurological: Negative for dizziness, tremors, seizures, syncope, facial asymmetry, speech difficulty, weakness, light-headedness, numbness and headaches. Hematological: Negative for adenopathy. Does not bruise/bleed easily. Psychiatric/Behavioral: Negative for agitation, behavioral problems, confusion, decreased concentration, dysphoric mood, hallucinations, self-injury, sleep disturbance and suicidal ideas. The patient is not nervous/anxious and is not hyperactive      OBJECTIVE    VITALS:  height is 5' 8\" (1.727 m) and weight is 189 lb 8 oz (86 kg). His temporal temperature is 96.7 °F (35.9 °C). His blood pressure is 120/80 and his pulse is 87. His respiration is 18 and oxygen saturation is 93%. Pain Score:   0 - No pain Body mass index is 28.81 kg/m².   CONSTITUTIONAL: Alert and oriented times 3, no acute distress and cooperative to examination with proper mood and affect. SKIN: Skin color, texture, turgor normal. No rashes or lesions. LYMPH: no cervical nodes, no inguinal nodes  HEENT: Head is normocephalic, atraumatic. EOMI, PERRLA. NECK: Supple, symmetrical, trachea midline, no adenopathy, thyroid symmetric, not enlarged and no tenderness, skin normal.  CHEST/LUNGS: chest symmetric with normal A/P diameter, normal respiratory rate and rhythm, lungs clear to auscultation without wheezes, rales or rhonchi. No accessory muscle use. Scars Left thoracotomy  CARDIOVASCULAR: Heart sounds are normal.  Regular rate and rhythm without murmur, gallop or rub. Normal S1 and S2. Carotid and femoral pulses 2+/4 and equal bilaterally. ABDOMEN: obese appendectomy scar(s) present. Normal bowel sounds. No bruits. soft, nontender, nondistended, no masses or organomegaly. right inguinal hernia present which is reducible. Percussion: Normal without hepatosplenomegally. RECTAL: deferred, not clinically indicated  NEUROLOGIC: There are no focalizing motor or sensory deficits. CN II-XII are grossly intact. Lady Crofts EXTREMITIES: no cyanosis, no clubbing and no edema. Thank you for the interesting evaluation. Further recommendations as listed above. Electronically signed by Tam Ingram DO on 5/14/2021 at 10:54 AM      DO EDITH [address] DR GENERAL SURGERY  History and Physical Update    Pt Name: Sinan Abdalla  MRN: [id number]  YOB: 1950  Date of evaluation: 6/3/2021    I have examined the patient and reviewed the H&P/Consult and there are no changes to the patient or plans.          Electronically signed by Tam Ingram DO on 6/3/2021 at 8:08 AM

## 2021-06-03 NOTE — PROGRESS NOTES
Pt has met discharge criteria and states he is ready for discharge to home. IV removed, gauze and tape applied. Dressed in own clothes and personal belongings gathered. Discharge instructions (with opioid medication education information) given to pt and family; pt and family verbalized understanding of discharge instructions, prescriptions and follow up appointments. Pt transported to discharge lobby by [facility] staff.

## 2021-06-04 ENCOUNTER — TELEPHONE (OUTPATIENT)
Dept: SURGERY | Age: 71
End: 2021-06-04

## 2021-06-04 NOTE — TELEPHONE ENCOUNTER
Pt states that he is feeling good this morning. Pt states he is urinating with no issues. States that his incisions are clean, dry and intact. Pt denies any s/sx of infection, and minimal pain. Advised to call the office with any questions or concerns.

## 2021-06-08 RX ORDER — FENTANYL CITRATE 50 UG/ML
50 INJECTION, SOLUTION INTRAMUSCULAR; INTRAVENOUS ONCE
Status: CANCELLED | OUTPATIENT
Start: 2021-06-08 | End: 2021-06-08

## 2021-06-08 RX ORDER — MIDAZOLAM HYDROCHLORIDE 1 MG/ML
1 INJECTION INTRAMUSCULAR; INTRAVENOUS ONCE
Status: CANCELLED | OUTPATIENT
Start: 2021-06-08 | End: 2021-06-08

## 2021-06-08 RX ORDER — SODIUM CHLORIDE 450 MG/100ML
INJECTION, SOLUTION INTRAVENOUS CONTINUOUS
Status: CANCELLED | OUTPATIENT
Start: 2021-06-08

## 2021-06-18 RX ORDER — SODIUM CHLORIDE 450 MG/100ML
INJECTION, SOLUTION INTRAVENOUS CONTINUOUS
Status: CANCELLED | OUTPATIENT
Start: 2021-06-18

## 2021-06-18 RX ORDER — MIDAZOLAM HYDROCHLORIDE 1 MG/ML
1 INJECTION INTRAMUSCULAR; INTRAVENOUS ONCE
Status: CANCELLED | OUTPATIENT
Start: 2021-06-18 | End: 2021-06-18

## 2021-06-18 RX ORDER — FENTANYL CITRATE 50 UG/ML
50 INJECTION, SOLUTION INTRAMUSCULAR; INTRAVENOUS ONCE
Status: CANCELLED | OUTPATIENT
Start: 2021-06-18 | End: 2021-06-18

## 2021-06-22 ENCOUNTER — CLINICAL DOCUMENTATION (OUTPATIENT)
Dept: CASE MANAGEMENT | Age: 71
End: 2021-06-22

## 2021-06-22 ENCOUNTER — TELEPHONE (OUTPATIENT)
Dept: PULMONOLOGY | Age: 71
End: 2021-06-22

## 2021-06-22 NOTE — PROGRESS NOTES
Name: Trang Eng  : 1950  MRN: [id number]    Oncology Navigation Follow-Up Note    Contact Type:  Telephone    Notes:   Received a call from Karely Weems daughter. There has been a new spot in lung since end . Dr. Tima Kennedy referred to Dr. Brent Loyola, thoracic surgeon-he ordered needle biopsy-plan thwarted x 2-first due to aspirin intake-second was to be yesterday-but they don't know why. No appointment follow up with him is seen. Will investigate as to what plans are to move forward. Dr. Tima Kennedy is back Thursday-I will update her also when I learn any details.     Electronically signed by Marquita Hanks RN on 2021 at 2:00 PM

## 2021-06-22 NOTE — TELEPHONE ENCOUNTER
Patient called back regarding overdue results and follow up with Elis, patient does not plan to get PFT done at this time and will call back when he feels he would like to reschedule.

## 2021-07-28 ENCOUNTER — OFFICE VISIT (OUTPATIENT)
Dept: CARDIOLOGY CLINIC | Age: 71
End: 2021-07-28
Payer: MEDICARE

## 2021-07-28 VITALS
HEART RATE: 84 BPM | HEIGHT: 68 IN | SYSTOLIC BLOOD PRESSURE: 170 MMHG | BODY MASS INDEX: 28.82 KG/M2 | DIASTOLIC BLOOD PRESSURE: 88 MMHG | WEIGHT: 190.2 LBS

## 2021-07-28 DIAGNOSIS — I25.10 CORONARY ARTERY DISEASE INVOLVING NATIVE CORONARY ARTERY OF NATIVE HEART WITHOUT ANGINA PECTORIS: Primary | ICD-10-CM

## 2021-07-28 DIAGNOSIS — I10 ESSENTIAL HYPERTENSION: ICD-10-CM

## 2021-07-28 PROCEDURE — 99213 OFFICE O/P EST LOW 20 MIN: CPT | Performed by: NUCLEAR MEDICINE

## 2021-07-28 PROCEDURE — 4040F PNEUMOC VAC/ADMIN/RCVD: CPT | Performed by: NUCLEAR MEDICINE

## 2021-07-28 PROCEDURE — G8427 DOCREV CUR MEDS BY ELIG CLIN: HCPCS | Performed by: NUCLEAR MEDICINE

## 2021-07-28 PROCEDURE — 3017F COLORECTAL CA SCREEN DOC REV: CPT | Performed by: NUCLEAR MEDICINE

## 2021-07-28 PROCEDURE — 93000 ELECTROCARDIOGRAM COMPLETE: CPT | Performed by: NUCLEAR MEDICINE

## 2021-07-28 PROCEDURE — 1123F ACP DISCUSS/DSCN MKR DOCD: CPT | Performed by: NUCLEAR MEDICINE

## 2021-07-28 PROCEDURE — 1036F TOBACCO NON-USER: CPT | Performed by: NUCLEAR MEDICINE

## 2021-07-28 PROCEDURE — G8417 CALC BMI ABV UP PARAM F/U: HCPCS | Performed by: NUCLEAR MEDICINE

## 2021-07-28 RX ORDER — ATORVASTATIN CALCIUM 20 MG/1
20 TABLET, FILM COATED ORAL DAILY
Status: ON HOLD | COMMUNITY

## 2021-07-28 RX ORDER — AMLODIPINE BESYLATE 5 MG/1
5 TABLET ORAL DAILY
Status: ON HOLD | COMMUNITY
Start: 2021-06-19 | End: 2022-11-01

## 2021-07-28 RX ORDER — NITROGLYCERIN 0.4 MG/1
TABLET SUBLINGUAL
Qty: 25 TABLET | Refills: 3 | Status: ON HOLD | OUTPATIENT
Start: 2021-07-28

## 2021-07-28 NOTE — PROGRESS NOTES
[address] [facility] [address]  Dept: [phone]  Dept Fax: [phone]  Loc: [phone]    Visit Date: 7/28/2021    Martín Tello is a 70 y.o. male who presents todayfor:  Chief Complaint   Patient presents with    Check-Up     EKG done today    Coronary Artery Disease    Hypertension   known left circ stent   Known lung cancer resection   No chest pain reported  Some baseline dyspnea  No changes in breathing  BP is stable  No dizziness  No syncope  Going for some lung biopsy at [facility]  Stress test lately was okay       HPI:  HPI  Past Medical History:   Diagnosis Date    Anxiety     Arthritis     CAD (coronary artery disease)     on Plavix and ASA    Cancer ([facility] [address] 01/2019    left lung stage 2-Dr Dan    COPD (chronic obstructive pulmonary disease) ([facility] [address] 02/2019    ANDREINA Leiva    Diabetes ([facility] [address]     Metformin    Enlarged prostate with urinary retention     Gait difficulty     Generalized weakness     Hyperlipidemia     Hypertension     Dr Perez Sessions and Dr Yara Grene    Lesion of bladder     Osteoarthritis     Retention of urine     Right inguinal hernia 2021    S/P cardiac catheterization 03/12/2019    stent to circumflex per Dr. Pina Cook S/P TURP     SOB (shortness of breath)     With activity    Voiding difficulty       Past Surgical History:   Procedure Laterality Date    APPENDECTOMY      BRONCHOSCOPY N/A 1/25/2019    BRONCHOSCOPY performed by Isaias Baker MD at [place] [address]  1/25/2019    BRONCHOSCOPY/TRANSBRONCHIAL LUNG BIOPSY performed by Isaias Baker MD at [place] [address] N/A 3/1/2019    BRONCHOSCOPY W/EBUS FNA performed by [facility]as [facility] MD at [facility]  03/12/2019    COLONOSCOPY  [id number]    CT NEEDLE BIOPSY LUNG PERCUTANEOUS  3/29/2021    CT NEEDLE BIOPSY LUNG PERCUTANEOUS 3/29/2021 [facility] CT SCAN    HERNIA REPAIR tablet by mouth daily 30 tablet 3    nitroGLYCERIN (NITROSTAT) 0.4 MG SL tablet up to max of 3 total doses. If no relief after 1 dose, call 911. 25 tablet 3    omeprazole (PRILOSEC) 20 MG delayed release capsule Take 20 mg by mouth daily      dutasteride (AVODART) 0.5 MG capsule Take 0.5 mg by mouth daily.  albuterol sulfate HFA (VENTOLIN HFA) 108 (90 Base) MCG/ACT inhaler Inhale 2 puffs into the lungs 4 times daily 1 Inhaler 11     No current facility-administered medications for this visit.      No Known Allergies  Health Maintenance   Topic Date Due    Hepatitis C screen  Never done    Diabetic foot exam  Never done    Diabetic retinal exam  Never done    Shingles Vaccine (1 of 2) Never done    Pneumococcal 65+ years Vaccine (1 of 1 - PPSV23) Never done   [employer] Visit (AWV)  Never done    Flu vaccine (1) 09/01/2021    Lipid screen  01/21/2022    Low dose CT lung screening  03/29/2022    A1C test (Diabetic or Prediabetic)  04/22/2022    Diabetic microalbuminuria test  04/22/2022    Potassium monitoring  04/22/2022    Creatinine monitoring  04/22/2022    DTaP/Tdap/Td vaccine (2 - Td or Tdap) 05/25/2023    Colon cancer screen colonoscopy  01/05/2027    COVID-19 Vaccine  Completed    AAA screen  Completed    Hepatitis A vaccine  Aged Out    Hepatitis B vaccine  Aged Out    Hib vaccine  Aged Out    Meningococcal (ACWY) vaccine  Aged Out       Subjective:  Review of Systems  General:   No fever, no chills, No fatigue or weight loss  Pulmonary:    baseline dyspnea, no wheezing  Cardiac:    Denies recent chest pain,   GI:     No nausea or vomiting, no abdominal pain  Neuro:    No dizziness or light headedness,   Musculoskeletal:  No recent active issues  Extremities:   No edema, no obvious claudication       Objective:  Physical Exam  BP (!) 170/88   Pulse 84   Ht 5' 8\" (1.727 m)   Wt 190 lb 3.2 oz (86.3 kg)   BMI 28.92 kg/m²   General:   Well developed, well nourished  Lungs: Clear to auscultation  Heart:    Normal S1 S2, Slight murmur. no rubs, no gallops  Abdomen:   Soft, non tender, no organomegalies, positive bowel sounds  Extremities:   No edema, no cyanosis, good peripheral pulses  Neurological:   Awake, alert, oriented. No obvious focal deficits  Musculoskelatal:  No obvious deformities    Assessment:      Diagnosis Orders   1. Coronary artery disease involving native coronary artery of native heart without angina pectoris  EKG 12 Lead   2. Essential hypertension     as above  Cardiac fair for now  ECG in office was done today. I reviewed the ECG. No acute findings      Plan:  No follow-ups on file. As above  Continue risk factor modification and medical management  Thank you for allowing me to participate in the care of your patient. Please don't hesitate to contact me regarding any further issues related to the patient care      Orders Placed:  Orders Placed This Encounter   Procedures    EKG 12 Lead     Order Specific Question:   Reason for Exam?     Answer: Other       Medications Prescribed:  No orders of the defined types were placed in this encounter. Discussed use, benefit, and side effects of prescribed medications. All patient questions answered. Pt voicedunderstanding. Instructed to continue current medications, diet and exercise. Continue risk factor modification and medical management. Patient agreed with treatment plan. Follow up as directed.     Electronically signedby Tessa Andres MD on 7/28/2021 at 11:40 AM

## 2021-12-10 ENCOUNTER — NURSE ONLY (OUTPATIENT)
Dept: LAB | Age: 71
End: 2021-12-10

## 2021-12-10 LAB
ANION GAP SERPL CALCULATED.3IONS-SCNC: 11 MEQ/L (ref 8–16)
BUN BLDV-MCNC: 30 MG/DL (ref 7–22)
CALCIUM SERPL-MCNC: 9.9 MG/DL (ref 8.5–10.5)
CHLORIDE BLD-SCNC: 98 MEQ/L (ref 98–111)
CO2: 26 MEQ/L (ref 23–33)
CREAT SERPL-MCNC: 1.5 MG/DL (ref 0.4–1.2)
CREATININE URINE: 517.8 MG/DL
GFR SERPL CREATININE-BSD FRML MDRD: 46 ML/MIN/1.73M2
GLUCOSE BLD-MCNC: 316 MG/DL (ref 70–108)
MAGNESIUM: 1.9 MG/DL (ref 1.6–2.4)
POTASSIUM SERPL-SCNC: 4.8 MEQ/L (ref 3.5–5.2)
PROT/CREAT RATIO, UR: 1.02
PROTEIN, URINE: 527 MG/DL
SODIUM BLD-SCNC: 135 MEQ/L (ref 135–145)

## 2022-07-12 ENCOUNTER — OFFICE VISIT (OUTPATIENT)
Dept: CARDIOLOGY CLINIC | Age: 72
End: 2022-07-12
Payer: MEDICARE

## 2022-07-12 VITALS
DIASTOLIC BLOOD PRESSURE: 88 MMHG | BODY MASS INDEX: 29.7 KG/M2 | WEIGHT: 196 LBS | HEIGHT: 68 IN | HEART RATE: 87 BPM | SYSTOLIC BLOOD PRESSURE: 148 MMHG

## 2022-07-12 DIAGNOSIS — I25.10 CAD IN NATIVE ARTERY: Primary | ICD-10-CM

## 2022-07-12 DIAGNOSIS — I10 PRIMARY HYPERTENSION: ICD-10-CM

## 2022-07-12 PROCEDURE — 93000 ELECTROCARDIOGRAM COMPLETE: CPT | Performed by: STUDENT IN AN ORGANIZED HEALTH CARE EDUCATION/TRAINING PROGRAM

## 2022-07-12 PROCEDURE — 3017F COLORECTAL CA SCREEN DOC REV: CPT | Performed by: STUDENT IN AN ORGANIZED HEALTH CARE EDUCATION/TRAINING PROGRAM

## 2022-07-12 PROCEDURE — 1123F ACP DISCUSS/DSCN MKR DOCD: CPT | Performed by: STUDENT IN AN ORGANIZED HEALTH CARE EDUCATION/TRAINING PROGRAM

## 2022-07-12 PROCEDURE — 1036F TOBACCO NON-USER: CPT | Performed by: STUDENT IN AN ORGANIZED HEALTH CARE EDUCATION/TRAINING PROGRAM

## 2022-07-12 PROCEDURE — G8417 CALC BMI ABV UP PARAM F/U: HCPCS | Performed by: STUDENT IN AN ORGANIZED HEALTH CARE EDUCATION/TRAINING PROGRAM

## 2022-07-12 PROCEDURE — 99214 OFFICE O/P EST MOD 30 MIN: CPT | Performed by: STUDENT IN AN ORGANIZED HEALTH CARE EDUCATION/TRAINING PROGRAM

## 2022-07-12 PROCEDURE — G8427 DOCREV CUR MEDS BY ELIG CLIN: HCPCS | Performed by: STUDENT IN AN ORGANIZED HEALTH CARE EDUCATION/TRAINING PROGRAM

## 2022-07-12 RX ORDER — SEMAGLUTIDE 1.34 MG/ML
0.5 INJECTION, SOLUTION SUBCUTANEOUS WEEKLY
Status: ON HOLD | COMMUNITY
Start: 2022-05-31

## 2022-07-12 NOTE — PROGRESS NOTES
[facility] PROFESSIONAL SERVICES  [facility]   [address]    [address] [address]   Dept: [phone]   Dept Fax: [phone]   Loc: [phone]      Chief Complaint   Patient presents with    1 Year Follow Up     Cardiologist:  Dr. Patrick Zhou  68 yo male presents for 1 year f/u. Hx of CAD, LCx stent, hx of lung ca resection, HTN. No breathing issues. Occ sharp chest pains, lasts about a minute or 2, thinks it is MSK related as he had similar symptoms after his lung surgery. Happens randomly, with exertion and without. No dizziness. No swelling. weight stable. No issues with meds. Does have tired legs sometimes after walking, no pain. Able to do all ADLs. General:   No fever, no chills, no weight loss, no fatigue  Pulmonary:    No dyspnea, no wheezing  Cardiac:    + sharp CP short in duration  GI:     No nausea or vomiting, no abdominal pain  Neuro:      No dizziness or light headedness  Musculoskeletal:  + leg weakness  Extremities:   No edema      Past Medical History:   Diagnosis Date    Anxiety     Arthritis     CAD (coronary artery disease)     on Plavix and ASA    Cancer ([facility] [address] 01/2019    left lung stage 2-Dr Dan    COPD (chronic obstructive pulmonary disease) ([facility] [address] 02/2019    ANDREINA Leiva    Diabetes ([facility] [address]     Metformin    Enlarged prostate with urinary retention     Gait difficulty     Generalized weakness     Hyperlipidemia     Hypertension     Dr Nasrin Arita and Dr Patrick Zhou    Lesion of bladder     Osteoarthritis     Retention of urine     Right inguinal hernia 2021    S/P cardiac catheterization 03/12/2019    stent to circumflex per Dr. Shannan Gonzalez S/P TURP     SOB (shortness of breath)     With activity    Voiding difficulty        No Known Allergies    Current Outpatient Medications   Medication Sig Dispense Refill    Semaglutide,0.25 or 0.5MG/DOS, (OZEMPIC, 0.25 OR 0.5 MG/DOSE,) 2 MG/1.5ML SOPN Inject 0.25 mg into the skin once a week      atorvastatin (LIPITOR) 20 MG tablet Take 20 mg by mouth daily      amLODIPine (NORVASC) 5 MG tablet Take 5 mg by mouth daily       nitroGLYCERIN (NITROSTAT) 0.4 MG SL tablet up to max of 3 total doses. If no relief after 1 dose, call 911. 25 tablet 3    glimepiride (AMARYL) 1 MG tablet TAKE 1 TABLET BY MOUTH ONE TIME A DAY      losartan (COZAAR) 25 MG tablet TAKE 1 TABLET BY MOUTH EVERY DAY      clopidogrel (PLAVIX) 75 MG tablet TAKE 1 TABLET BY MOUTH ONE TIME A DAY 30 tablet 11    Blood Glucose Monitoring Suppl (FREESTYLE FREEDOM LITE) w/Device KIT 1 Device daily  0    FREESTYLE LITE strip 1 strip daily  11    FREESTYLE LANCETS MISC 1 Stick daily  11    metFORMIN (GLUCOPHAGE) 500 MG tablet Take 500 mg by mouth 2 times daily (with meals) Take 1 tab by mouth at supper x2 weeks, then 1 tab in the morning and 1 tab at supper x2 weeks, then 1 tab in the morning and 2 tab at supper x2weeks, then 2 tabs twice a day      aspirin 81 MG chewable tablet Take 1 tablet by mouth daily 30 tablet 3    omeprazole (PRILOSEC) 20 MG delayed release capsule Take 20 mg by mouth daily      dutasteride (AVODART) 0.5 MG capsule Take 0.5 mg by mouth daily. No current facility-administered medications for this visit.        Social History     Socioeconomic History    Marital status:      Spouse name: Not on file    Number of children: Not on file    Years of education: Not on file    Highest education level: Not on file   Occupational History    Not on file   Tobacco Use    Smoking status: Former Smoker     Packs/day: 1.00     Years: 40.00     Pack years: 40.00     Types: Cigarettes     Start date: 26     Quit date: 2/28/2019     Years since quitting: 3.3    Smokeless tobacco: Never Used    Tobacco comment: about 5 cigs per day   Vaping Use    Vaping Use: Never used   Substance and Sexual Activity    Alcohol use: No    Drug use: No    Sexual activity: Not on file   Other Topics Concern    Not on file   Social History Narrative    Not on file     Social Determinants of Health     Financial Resource Strain:     Difficulty of Paying Living Expenses: Not on file   Food Insecurity:     Worried About Running Out of Food in the Last Year: Not on file    Yadi of Food in the Last Year: Not on file   Transportation Needs:     Lack of Transportation (Medical): Not on file    Lack of Transportation (Non-Medical): Not on file   Physical Activity:     Days of Exercise per Week: Not on file    Minutes of Exercise per Session: Not on file   Stress:     Feeling of Stress : Not on file   Social Connections:     Frequency of Communication with Friends and Family: Not on file    Frequency of Social Gatherings with Friends and Family: Not on file    Attends Orthodox Services: Not on file    Active Member of Clubs or Organizations: Not on file    Attends Club or Organization Meetings: Not on file    Marital Status: Not on file   Intimate Partner Violence:     Fear of Current or Ex-Partner: Not on file    Emotionally Abused: Not on file    Physically Abused: Not on file    Sexually Abused: Not on file   Housing Stability:     Unable to Pay for Housing in the Last Year: Not on file    Number of [place] in the Last Year: Not on file    Unstable Housing in the Last Year: Not on file       Family History   Problem Relation Age of Onset    Diabetes Mother     High Blood Pressure Mother     Brain Cancer Mother     Heart Disease Father     Diabetes Father     High Blood Pressure Father     Heart Attack Father     Diabetes Sister     High Blood Pressure Sister     COPD Sister         [employer] [place]    Diabetes Brother     High Blood Pressure Brother        Blood pressure (!) 148/88, height 5' 8\" (1.727 m), weight 196 lb (88.9 kg).     General:   Well developed, well nourished  Lungs:   Clear to auscultation, no rales  Heart:    RRR, Normal S1 S2, No murmur, rubs, or gallops  Abdomen:   Soft, non tender, no organomegalies, positive bowel sounds  Extremities:   No edema, no cyanosis, weak DP pulses  Neurological:   Awake, alert, oriented. No obvious focal deficits  Musculoskeletal:  No obvious deformities    EKG:          Diagnosis Orders   1. CAD in native artery  EKG 12 lead   2. Primary hypertension  EKG 12 lead       Orders Placed This Encounter   Procedures    EKG 12 lead     Order Specific Question:   Reason for Exam?     Answer: Other       Assessment/Plan:   CAD s/p prior PCI- continue GDMT as tolerated with asa/plavix/statin. occ sharp chest pain. More likely MSK, had stress and echo last year that were normal. Consider repeat or LHC if symptoms persist or worsen. HTN-slightly elevated today, 148/88, usually good at home. Will continue current meds. Leg weakness--could consider MISTY if weakness persists or worsens. No pain so unclear if PAD related. Disposition:   F/u with Dr Linda Gvain in 1 year.    Continue Dr Mary Kay Gray current treatment plan  Follow up with Dr Linda Gavin as scheduled or sooner if needed

## 2022-10-31 ENCOUNTER — APPOINTMENT (OUTPATIENT)
Dept: GENERAL RADIOLOGY | Age: 72
DRG: 065 | End: 2022-10-31
Payer: MEDICARE

## 2022-10-31 ENCOUNTER — APPOINTMENT (OUTPATIENT)
Dept: CT IMAGING | Age: 72
DRG: 065 | End: 2022-10-31
Payer: MEDICARE

## 2022-10-31 ENCOUNTER — APPOINTMENT (OUTPATIENT)
Dept: MRI IMAGING | Age: 72
DRG: 065 | End: 2022-10-31
Payer: MEDICARE

## 2022-10-31 ENCOUNTER — HOSPITAL ENCOUNTER (INPATIENT)
Age: 72
LOS: 2 days | Discharge: NURSING, REHAB OR LONG-TERM CARE | DRG: 065 | End: 2022-11-02
Attending: EMERGENCY MEDICINE | Admitting: HOSPITALIST
Payer: MEDICARE

## 2022-10-31 DIAGNOSIS — I63.9 CEREBROVASCULAR ACCIDENT (CVA), UNSPECIFIED MECHANISM (HCC): Primary | ICD-10-CM

## 2022-10-31 PROBLEM — K04.7 DENTAL ABSCESS: Status: ACTIVE | Noted: 2022-10-31

## 2022-10-31 LAB
ALBUMIN SERPL-MCNC: 4.3 G/DL (ref 3.5–5.1)
ALP BLD-CCNC: 91 U/L (ref 38–126)
ALT SERPL-CCNC: 20 U/L (ref 11–66)
ANION GAP SERPL CALCULATED.3IONS-SCNC: 13 MEQ/L (ref 8–16)
APTT: 37.2 SECONDS (ref 22–38)
AST SERPL-CCNC: 24 U/L (ref 5–40)
BASOPHILS # BLD: 0.6 %
BASOPHILS ABSOLUTE: 0 THOU/MM3 (ref 0–0.1)
BILIRUB SERPL-MCNC: 0.2 MG/DL (ref 0.3–1.2)
BUN BLDV-MCNC: 19 MG/DL (ref 7–22)
CALCIUM SERPL-MCNC: 10 MG/DL (ref 8.5–10.5)
CHLORIDE BLD-SCNC: 104 MEQ/L (ref 98–111)
CO2: 22 MEQ/L (ref 23–33)
CREAT SERPL-MCNC: 1 MG/DL (ref 0.4–1.2)
EKG ATRIAL RATE: 103 BPM
EKG P AXIS: 68 DEGREES
EKG P-R INTERVAL: 138 MS
EKG Q-T INTERVAL: 354 MS
EKG QRS DURATION: 122 MS
EKG QTC CALCULATION (BAZETT): 463 MS
EKG R AXIS: -73 DEGREES
EKG T AXIS: 41 DEGREES
EKG VENTRICULAR RATE: 103 BPM
EOSINOPHIL # BLD: 2.9 %
EOSINOPHILS ABSOLUTE: 0.2 THOU/MM3 (ref 0–0.4)
ERYTHROCYTE [DISTWIDTH] IN BLOOD BY AUTOMATED COUNT: 13.9 % (ref 11.5–14.5)
ERYTHROCYTE [DISTWIDTH] IN BLOOD BY AUTOMATED COUNT: 42.3 FL (ref 35–45)
GFR SERPL CREATININE-BSD FRML MDRD: > 60 ML/MIN/1.73M2
GLUCOSE BLD-MCNC: 257 MG/DL (ref 70–108)
GLUCOSE BLD-MCNC: 273 MG/DL (ref 70–108)
GLUCOSE BLD-MCNC: 83 MG/DL (ref 70–108)
GLUCOSE BLD-MCNC: 91 MG/DL (ref 70–108)
HCT VFR BLD CALC: 45.3 % (ref 42–52)
HEMOGLOBIN: 15.2 GM/DL (ref 14–18)
IMMATURE GRANS (ABS): 0.01 THOU/MM3 (ref 0–0.07)
IMMATURE GRANULOCYTES: 0.1 %
INR BLD: 0.89 (ref 0.85–1.13)
LYMPHOCYTES # BLD: 16.6 %
LYMPHOCYTES ABSOLUTE: 1.4 THOU/MM3 (ref 1–4.8)
MAGNESIUM: 2 MG/DL (ref 1.6–2.4)
MCH RBC QN AUTO: 28.5 PG (ref 26–33)
MCHC RBC AUTO-ENTMCNC: 33.6 GM/DL (ref 32.2–35.5)
MCV RBC AUTO: 84.8 FL (ref 80–94)
MONOCYTES # BLD: 8.4 %
MONOCYTES ABSOLUTE: 0.7 THOU/MM3 (ref 0.4–1.3)
NUCLEATED RED BLOOD CELLS: 0 /100 WBC
OSMOLALITY CALCULATION: 289.5 MOSMOL/KG (ref 275–300)
P2Y12 ASSAY: 125 PRU (ref 180–376)
PHOSPHORUS: 2.8 MG/DL (ref 2.4–4.7)
PLATELET # BLD: 217 THOU/MM3 (ref 130–400)
PMV BLD AUTO: 9.4 FL (ref 9.4–12.4)
POC CREATININE WHOLE BLOOD: 1 MG/DL (ref 0.5–1.2)
POTASSIUM REFLEX MAGNESIUM: 4.6 MEQ/L (ref 3.5–5.2)
RBC # BLD: 5.34 MILL/MM3 (ref 4.7–6.1)
SEG NEUTROPHILS: 71.4 %
SEGMENTED NEUTROPHILS ABSOLUTE COUNT: 5.9 THOU/MM3 (ref 1.8–7.7)
SODIUM BLD-SCNC: 139 MEQ/L (ref 135–145)
TOTAL PROTEIN: 7.6 G/DL (ref 6.1–8)
TROPONIN T: < 0.01 NG/ML
WBC # BLD: 8.2 THOU/MM3 (ref 4.8–10.8)

## 2022-10-31 PROCEDURE — 36415 COLL VENOUS BLD VENIPUNCTURE: CPT

## 2022-10-31 PROCEDURE — 85730 THROMBOPLASTIN TIME PARTIAL: CPT

## 2022-10-31 PROCEDURE — 6370000000 HC RX 637 (ALT 250 FOR IP): Performed by: STUDENT IN AN ORGANIZED HEALTH CARE EDUCATION/TRAINING PROGRAM

## 2022-10-31 PROCEDURE — 99285 EMERGENCY DEPT VISIT HI MDM: CPT

## 2022-10-31 PROCEDURE — 2060000000 HC ICU INTERMEDIATE R&B

## 2022-10-31 PROCEDURE — 4A03X5D MEASUREMENT OF ARTERIAL FLOW, INTRACRANIAL, EXTERNAL APPROACH: ICD-10-PCS | Performed by: EMERGENCY MEDICINE

## 2022-10-31 PROCEDURE — 6370000000 HC RX 637 (ALT 250 FOR IP): Performed by: EMERGENCY MEDICINE

## 2022-10-31 PROCEDURE — 93010 ELECTROCARDIOGRAM REPORT: CPT | Performed by: INTERNAL MEDICINE

## 2022-10-31 PROCEDURE — 94640 AIRWAY INHALATION TREATMENT: CPT

## 2022-10-31 PROCEDURE — 87040 BLOOD CULTURE FOR BACTERIA: CPT

## 2022-10-31 PROCEDURE — 70450 CT HEAD/BRAIN W/O DYE: CPT

## 2022-10-31 PROCEDURE — 2580000003 HC RX 258: Performed by: STUDENT IN AN ORGANIZED HEALTH CARE EDUCATION/TRAINING PROGRAM

## 2022-10-31 PROCEDURE — 84100 ASSAY OF PHOSPHORUS: CPT

## 2022-10-31 PROCEDURE — 82565 ASSAY OF CREATININE: CPT

## 2022-10-31 PROCEDURE — 70496 CT ANGIOGRAPHY HEAD: CPT

## 2022-10-31 PROCEDURE — 93005 ELECTROCARDIOGRAM TRACING: CPT | Performed by: EMERGENCY MEDICINE

## 2022-10-31 PROCEDURE — 83735 ASSAY OF MAGNESIUM: CPT

## 2022-10-31 PROCEDURE — 85576 BLOOD PLATELET AGGREGATION: CPT

## 2022-10-31 PROCEDURE — 70551 MRI BRAIN STEM W/O DYE: CPT

## 2022-10-31 PROCEDURE — 80053 COMPREHEN METABOLIC PANEL: CPT

## 2022-10-31 PROCEDURE — 6360000004 HC RX CONTRAST MEDICATION: Performed by: EMERGENCY MEDICINE

## 2022-10-31 PROCEDURE — 71045 X-RAY EXAM CHEST 1 VIEW: CPT

## 2022-10-31 PROCEDURE — 84484 ASSAY OF TROPONIN QUANT: CPT

## 2022-10-31 PROCEDURE — 99223 1ST HOSP IP/OBS HIGH 75: CPT | Performed by: HOSPITALIST

## 2022-10-31 PROCEDURE — 82948 REAGENT STRIP/BLOOD GLUCOSE: CPT

## 2022-10-31 PROCEDURE — 70498 CT ANGIOGRAPHY NECK: CPT

## 2022-10-31 PROCEDURE — 85025 COMPLETE CBC W/AUTO DIFF WBC: CPT

## 2022-10-31 PROCEDURE — 85610 PROTHROMBIN TIME: CPT

## 2022-10-31 RX ORDER — IPRATROPIUM BROMIDE AND ALBUTEROL SULFATE 2.5; .5 MG/3ML; MG/3ML
1 SOLUTION RESPIRATORY (INHALATION) EVERY 4 HOURS PRN
Status: DISCONTINUED | OUTPATIENT
Start: 2022-10-31 | End: 2022-11-02 | Stop reason: HOSPADM

## 2022-10-31 RX ORDER — CLOPIDOGREL BISULFATE 75 MG/1
225 TABLET ORAL ONCE
Status: COMPLETED | OUTPATIENT
Start: 2022-10-31 | End: 2022-10-31

## 2022-10-31 RX ORDER — FINASTERIDE 5 MG/1
5 TABLET, FILM COATED ORAL DAILY
Status: DISCONTINUED | OUTPATIENT
Start: 2022-11-01 | End: 2022-11-02 | Stop reason: HOSPADM

## 2022-10-31 RX ORDER — DEXTROSE MONOHYDRATE 100 MG/ML
INJECTION, SOLUTION INTRAVENOUS CONTINUOUS PRN
Status: DISCONTINUED | OUTPATIENT
Start: 2022-10-31 | End: 2022-11-02 | Stop reason: HOSPADM

## 2022-10-31 RX ORDER — ONDANSETRON 2 MG/ML
4 INJECTION INTRAMUSCULAR; INTRAVENOUS EVERY 6 HOURS PRN
Status: DISCONTINUED | OUTPATIENT
Start: 2022-10-31 | End: 2022-11-02 | Stop reason: HOSPADM

## 2022-10-31 RX ORDER — ASPIRIN 81 MG/1
81 TABLET, CHEWABLE ORAL DAILY
Status: DISCONTINUED | OUTPATIENT
Start: 2022-11-01 | End: 2022-11-02 | Stop reason: HOSPADM

## 2022-10-31 RX ORDER — ONDANSETRON 4 MG/1
4 TABLET, ORALLY DISINTEGRATING ORAL EVERY 8 HOURS PRN
Status: DISCONTINUED | OUTPATIENT
Start: 2022-10-31 | End: 2022-11-02 | Stop reason: HOSPADM

## 2022-10-31 RX ORDER — SODIUM CHLORIDE 9 MG/ML
INJECTION, SOLUTION INTRAVENOUS CONTINUOUS
Status: DISCONTINUED | OUTPATIENT
Start: 2022-10-31 | End: 2022-11-02 | Stop reason: HOSPADM

## 2022-10-31 RX ORDER — 0.9 % SODIUM CHLORIDE 0.9 %
1000 INTRAVENOUS SOLUTION INTRAVENOUS ONCE
Status: COMPLETED | OUTPATIENT
Start: 2022-10-31 | End: 2022-10-31

## 2022-10-31 RX ORDER — ASPIRIN 81 MG/1
324 TABLET, CHEWABLE ORAL ONCE
Status: COMPLETED | OUTPATIENT
Start: 2022-10-31 | End: 2022-10-31

## 2022-10-31 RX ORDER — AMLODIPINE BESYLATE 5 MG/1
5 TABLET ORAL DAILY
Status: DISCONTINUED | OUTPATIENT
Start: 2022-10-31 | End: 2022-11-02 | Stop reason: HOSPADM

## 2022-10-31 RX ORDER — ATORVASTATIN CALCIUM 80 MG/1
80 TABLET, FILM COATED ORAL NIGHTLY
Status: DISCONTINUED | OUTPATIENT
Start: 2022-10-31 | End: 2022-11-01

## 2022-10-31 RX ORDER — INSULIN LISPRO 100 [IU]/ML
0-4 INJECTION, SOLUTION INTRAVENOUS; SUBCUTANEOUS NIGHTLY
Status: DISCONTINUED | OUTPATIENT
Start: 2022-10-31 | End: 2022-11-02 | Stop reason: HOSPADM

## 2022-10-31 RX ORDER — CLOPIDOGREL 300 MG/1
300 TABLET, FILM COATED ORAL ONCE
Status: DISCONTINUED | OUTPATIENT
Start: 2022-10-31 | End: 2022-10-31

## 2022-10-31 RX ORDER — IPRATROPIUM BROMIDE AND ALBUTEROL SULFATE 2.5; .5 MG/3ML; MG/3ML
1 SOLUTION RESPIRATORY (INHALATION) 2 TIMES DAILY
Status: DISCONTINUED | OUTPATIENT
Start: 2022-10-31 | End: 2022-11-02 | Stop reason: HOSPADM

## 2022-10-31 RX ORDER — AMOXICILLIN AND CLAVULANATE POTASSIUM 875; 125 MG/1; MG/1
1 TABLET, FILM COATED ORAL EVERY 12 HOURS SCHEDULED
Status: DISCONTINUED | OUTPATIENT
Start: 2022-10-31 | End: 2022-11-02 | Stop reason: HOSPADM

## 2022-10-31 RX ORDER — LOSARTAN POTASSIUM 25 MG/1
25 TABLET ORAL DAILY
Status: DISCONTINUED | OUTPATIENT
Start: 2022-11-01 | End: 2022-11-02 | Stop reason: HOSPADM

## 2022-10-31 RX ORDER — POLYETHYLENE GLYCOL 3350 17 G/17G
17 POWDER, FOR SOLUTION ORAL DAILY PRN
Status: DISCONTINUED | OUTPATIENT
Start: 2022-10-31 | End: 2022-11-02 | Stop reason: HOSPADM

## 2022-10-31 RX ORDER — IPRATROPIUM BROMIDE AND ALBUTEROL SULFATE 2.5; .5 MG/3ML; MG/3ML
1 SOLUTION RESPIRATORY (INHALATION)
Status: DISCONTINUED | OUTPATIENT
Start: 2022-10-31 | End: 2022-10-31

## 2022-10-31 RX ORDER — INSULIN GLARGINE 100 [IU]/ML
23 INJECTION, SOLUTION SUBCUTANEOUS NIGHTLY
Status: DISCONTINUED | OUTPATIENT
Start: 2022-10-31 | End: 2022-10-31

## 2022-10-31 RX ORDER — CLOPIDOGREL BISULFATE 75 MG/1
75 TABLET ORAL DAILY
Status: DISCONTINUED | OUTPATIENT
Start: 2022-11-01 | End: 2022-11-02 | Stop reason: HOSPADM

## 2022-10-31 RX ORDER — PANTOPRAZOLE SODIUM 40 MG/1
40 TABLET, DELAYED RELEASE ORAL
Status: DISCONTINUED | OUTPATIENT
Start: 2022-11-01 | End: 2022-11-02 | Stop reason: HOSPADM

## 2022-10-31 RX ORDER — LABETALOL 20 MG/4 ML (5 MG/ML) INTRAVENOUS SYRINGE
10 EVERY 10 MIN PRN
Status: DISCONTINUED | OUTPATIENT
Start: 2022-10-31 | End: 2022-11-02 | Stop reason: HOSPADM

## 2022-10-31 RX ORDER — INSULIN LISPRO 100 [IU]/ML
0-4 INJECTION, SOLUTION INTRAVENOUS; SUBCUTANEOUS
Status: DISCONTINUED | OUTPATIENT
Start: 2022-10-31 | End: 2022-11-02 | Stop reason: HOSPADM

## 2022-10-31 RX ADMIN — ATORVASTATIN CALCIUM 80 MG: 80 TABLET, FILM COATED ORAL at 22:29

## 2022-10-31 RX ADMIN — ASPIRIN 81 MG 324 MG: 81 TABLET ORAL at 11:18

## 2022-10-31 RX ADMIN — IPRATROPIUM BROMIDE AND ALBUTEROL SULFATE 1 AMPULE: .5; 3 SOLUTION RESPIRATORY (INHALATION) at 21:02

## 2022-10-31 RX ADMIN — SODIUM CHLORIDE: 9 INJECTION, SOLUTION INTRAVENOUS at 17:17

## 2022-10-31 RX ADMIN — CLOPIDOGREL BISULFATE 225 MG: 75 TABLET ORAL at 11:22

## 2022-10-31 RX ADMIN — SODIUM CHLORIDE 1000 ML: 9 INJECTION, SOLUTION INTRAVENOUS at 11:16

## 2022-10-31 RX ADMIN — IOPAMIDOL 80 ML: 755 INJECTION, SOLUTION INTRAVENOUS at 11:14

## 2022-10-31 RX ADMIN — AMOXICILLIN AND CLAVULANATE POTASSIUM 1 TABLET: 875; 125 TABLET, FILM COATED ORAL at 21:42

## 2022-10-31 ASSESSMENT — ENCOUNTER SYMPTOMS
EYE DISCHARGE: 0
GASTROINTESTINAL NEGATIVE: 1
COUGH: 0
SORE THROAT: 0
VOMITING: 0
NAUSEA: 0
SHORTNESS OF BREATH: 0
PHOTOPHOBIA: 0
ABDOMINAL PAIN: 0
RHINORRHEA: 0

## 2022-10-31 ASSESSMENT — PAIN SCALES - GENERAL: PAINLEVEL_OUTOF10: 0

## 2022-10-31 NOTE — SIGNIFICANT EVENT
Brief Note:  Glucose decreased to 83 on point-of-care testing. Therefore, will hold Lantus and maintain on low-dose sliding scale, hypoglycemia protocols, and 4 times a day glucose checks. Otherwise, as per H&P.       Isabel Reyes MD, MPH, [facility]

## 2022-10-31 NOTE — PROGRESS NOTES
Code Stroke Note    Last Known Well < 4.5 hours? No    Recent anticoagulant use (therapeutic Lovenox within 24 hrs, DOAC/DTI within 48 hrs if normal renal function, warfarin with INR > 1.7, PT > 15 sec, aPTT > 40 sec)? N/A    BP less than 185/110 mmHg (if no, include what medication was used to decrease BP)? Yes    Are there any other contraindications to TNK (previous intracranial hemorrhage, recent or active bleeding, intracranial or intraspinal surgery within 3 months, previous stroke within 3 months, intracranial neoplasm, GI malignancy, GI bleeding in previous 21 days, infective endocarditis, platelets < 998,877, stroke suspected to be due to aortic arch dissection)? No    Was TNK given (over 5 seconds & flushed before & after)? No    If yes, were orders placed in EPIC? no    If no, include reason for no TNK: contraindicated based on above criteria    If no and TNK was mixed, was the product returned to pharmacy? N/A    Dismissed from code stroke by: Provider Suan Dakin.  Guerita Angeles, [facility]  10/31/2022 2:14 PM

## 2022-10-31 NOTE — ED PROVIDER NOTES
[place] ENCOUNTER          Pt Name: Ann-Marie Lou  MRN: [id number]  [place] 1950  Date of evaluation: 10/31/2022  Treating Resident Physician: Jean-Pierre Moore MD  Supervising Physician: Jeaneth Amor       Chief Complaint   Patient presents with    Facial Droop     right    Extremity Weakness     right     History obtained from the patient. HISTORY OF PRESENT ILLNESS    HPI  Ann-Marie Lou is a 67 y.o. male who presents to the emergency department for evaluation of facial droop. Patient states that he woke up this morning feeling dizzy, feeling like she was bouncing off the walls while trying to walk straight, noticed a right facial droop, also has had some perceptual changes of sensation and strength in his right upper extremity. Patient does have previous history of right nasal hemianopsia, he does not know why he has this. Patient went to bed at 10 PM, woke up with the symptoms, assumed last known well 10 PM last night. Has history of hypertension, anxiety, no history of blood thinners, does have untreated diabetes. Denies any pain at this time. The patient has no other acute complaints at this time. REVIEW OF SYSTEMS   Review of Systems   Constitutional:  Negative for fatigue and fever. HENT: Negative. Eyes:  Negative for photophobia, discharge and visual disturbance. Cardiovascular: Negative. Gastrointestinal: Negative. Endocrine: Negative for polyphagia and polyuria. Genitourinary: Negative. Musculoskeletal: Negative. Neurological:  Positive for dizziness, facial asymmetry, speech difficulty, weakness, light-headedness and numbness. Negative for tremors, seizures, syncope and headaches. Psychiatric/Behavioral: Negative.           PAST MEDICAL AND SURGICAL HISTORY     Past Medical History:   Diagnosis Date    Anxiety     Arthritis     CAD (coronary artery disease)     on Plavix and ASA    Cancer ([facility] [address] 01/2019    left lung stage 2-Dr Dan    [facility] 02/2019    ANDREINA Leiva    Diabetes ([facility]     Metformin    Enlarged prostate with urinary retention     Gait difficulty     Generalized weakness     Hyperlipidemia     Hypertension     Dr Reece Glover and Dr Davide Laguna    Lesion of bladder     Osteoarthritis     Retention of urine     Right inguinal hernia 2021    S/P cardiac catheterization 03/12/2019    stent to circumflex per Dr. Efe Hilton    S/P TURP     SOB (shortness of breath)     With activity    Voiding difficulty      Past Surgical History:   Procedure Laterality Date    APPENDECTOMY      BRONCHOSCOPY N/A 1/25/2019    BRONCHOSCOPY performed by Ariana Minaya MD at [address] [facility]  1/25/2019    BRONCHOSCOPY/TRANSBRONCHIAL LUNG BIOPSY performed by Ariana Minaya MD at [address] [facility] N/A 3/1/2019    BRONCHOSCOPY W/EBUS FNA performed by Ariana Minaya MD at [address]  03/12/2019    COLONOSCOPY  [id number]    CT NEEDLE BIOPSY LUNG PERCUTANEOUS  3/29/2021    CT NEEDLE BIOPSY LUNG PERCUTANEOUS 3/29/2021 [facility] CT SCAN    HERNIA REPAIR Right 6/3/2021    RIGHT INGUINAL HERNIA REPAIR WITH MESH performed by Donald Beltran DO at [address]  2012    TURP    THORACOTOMY Left 3/14/2019    LEFT EXPLORATORY THORACOTOMY, MEDIASTINAL LYMPH NODE DISECTION, FLEXIBLE BRONCHOSCOPY performed by Nury Pacheco MD at [address]   No current facility-administered medications for this encounter.     Current Outpatient Medications:     Semaglutide,0.25 or 0.5MG/DOS, (OZEMPIC, 0.25 OR 0.5 MG/DOSE,) 2 MG/1.5ML SOPN, Inject 0.25 mg into the skin once a week, Disp: , Rfl:     atorvastatin (LIPITOR) 20 MG tablet, Take 20 mg by mouth daily, Disp: , Rfl:     amLODIPine (NORVASC) 5 MG tablet, Take 5 mg by mouth daily , Disp: , Rfl:     nitroGLYCERIN (NITROSTAT) 0.4 MG SL tablet, up to max of 3 total doses. If no relief after 1 dose, call 911., Disp: 25 tablet, Rfl: 3    glimepiride (AMARYL) 1 MG tablet, TAKE 1 TABLET BY MOUTH ONE TIME A DAY, Disp: , Rfl:     losartan (COZAAR) 25 MG tablet, TAKE 1 TABLET BY MOUTH EVERY DAY, Disp: , Rfl:     clopidogrel (PLAVIX) 75 MG tablet, TAKE 1 TABLET BY MOUTH ONE TIME A DAY, Disp: 30 tablet, Rfl: 11    Blood Glucose Monitoring Suppl (FREESTYLE FREEDOM LITE) w/Device KIT, 1 Device daily, Disp: , Rfl: 0    FREESTYLE LITE strip, 1 strip daily, Disp: , Rfl: 11    FREESTYLE LANCETS MISC, 1 Stick daily, Disp: , Rfl: 11    metFORMIN (GLUCOPHAGE) 500 MG tablet, Take 500 mg by mouth 2 times daily (with meals) Take 1 tab by mouth at supper x2 weeks, then 1 tab in the morning and 1 tab at supper x2 weeks, then 1 tab in the morning and 2 tab at supper x2weeks, then 2 tabs twice a day, Disp: , Rfl:     aspirin 81 MG chewable tablet, Take 1 tablet by mouth daily, Disp: 30 tablet, Rfl: 3    omeprazole (PRILOSEC) 20 MG delayed release capsule, Take 20 mg by mouth daily, Disp: , Rfl:     dutasteride (AVODART) 0.5 MG capsule, Take 0.5 mg by mouth daily.   , Disp: , Rfl:       SOCIAL HISTORY     Social History     Social History Narrative    Not on file     Social History     Tobacco Use    Smoking status: Former     Packs/day: 1.00     Years: 40.00     Pack years: 40.00     Types: Cigarettes     Start date: 26     Quit date: 2/28/2019     Years since quitting: 3.6    Smokeless tobacco: Never    Tobacco comments:     about 5 cigs per day   Vaping Use    Vaping Use: Never used   Substance Use Topics    Alcohol use: No    Drug use: No         ALLERGIES   No Known Allergies      FAMILY HISTORY     Family History   Problem Relation Age of Onset    Diabetes Mother     High Blood Pressure Mother     Brain Cancer Mother     Heart Disease Father     Diabetes Father     High Blood Pressure Father     Heart Attack Father     Diabetes Sister     High Blood Pressure Sister     COPD Sister Agent Orange    Diabetes Brother     High Blood Pressure Brother          PREVIOUS RECORDS   Previous records reviewed: Medical, past surgical, medications, allergies        PHYSICAL EXAM     ED Triage Vitals   BP Temp Temp Source Heart Rate Resp SpO2 Height Weight   10/31/22 1047 10/31/22 1047 10/31/22 1047 10/31/22 1047 10/31/22 1047 10/31/22 1051 10/31/22 1047 10/31/22 1047   (!) 177/94 98.3 °F (36.8 °C) Oral (!) 103 20 95 % 5' 8\" (1.727 m) 190 lb (86.2 kg)     Initial vital signs and nursing assessment reviewed and  hypertensive, tachycardic . Body mass index is 28.89 kg/m². Pulsoximetry is normal per my interpretation. Additional Vital Signs:  Vitals:    10/31/22 1213   BP: (!) 139/91   Pulse: 91   Resp: 22   Temp:    SpO2: 96%       Physical Exam  Constitutional:       General: He is not in acute distress. Appearance: Normal appearance. He is obese. He is not ill-appearing or toxic-appearing. Comments: revious nasal hemianopsia, mild dysarthria, partial facial droop on right right upper extremity ataxia   HENT:      Head: Normocephalic and atraumatic. Right Ear: External ear normal.      Left Ear: External ear normal.      Nose: Nose normal. No congestion or rhinorrhea. Mouth/Throat:      Mouth: Mucous membranes are moist.      Pharynx: Oropharynx is clear. Eyes:      Extraocular Movements: Extraocular movements intact. Conjunctiva/sclera: Conjunctivae normal.      Pupils: Pupils are equal, round, and reactive to light. Cardiovascular:      Rate and Rhythm: Regular rhythm. Tachycardia present. Pulses: Normal pulses. Heart sounds: Normal heart sounds. No murmur heard. No gallop. Pulmonary:      Effort: Pulmonary effort is normal. No respiratory distress. Breath sounds: Normal breath sounds. No wheezing or rales. Chest:      Chest wall: No tenderness. Abdominal:      General: Abdomen is flat. There is no distension. Tenderness:  There is no abdominal tenderness. There is no guarding or rebound. Musculoskeletal:      Cervical back: Normal range of motion and neck supple. No rigidity or tenderness. Right lower leg: No edema. Left lower leg: No edema. Skin:     General: Skin is warm and dry. Capillary Refill: Capillary refill takes less than 2 seconds. Neurological:      General: No focal deficit present. Mental Status: He is alert and oriented to person, place, and time. MEDICAL DECISION MAKING   Initial Assessment andPlan: This is a 66-year-old male, history of hypertension, hyperlipidemia, diabetes presenting with right facial droop and right-sided subjective weakness and numbness, last known well 10 PM last night. Physical exam significant for previous nasal hemianopsia, mild dysarthria, partial facial droop on right right upper extremity ataxia. HTN K patient given duration of symptoms. CT, CTA negative. MRI shows left parietal infarct. Patient has been loaded with Plavix and aspirin. Neuro would like him to go to . [facility] STROKE   Activation By:  ED    NIHSS, POCT glucose, vitals including weight obtained in the ED before transfer to CT. The [facility] Stroke Team met the patient on arrival to CT. Last Known Well: 2200 last night  Initial NIHSS: 2 (from Screenings)  NIH Stroke Scale  Interval: Reassessment  Level of Consciousness (1a): Alert  LOC Questions (1b): Answers both correctly  LOC Commands (1c): Performs both tasks correctly  Best Gaze (2): Normal  Visual (3): (!) Partial hemianopia  Facial Palsy (4): (!) Minor paralysis  Motor Arm, Left (5a): No drift  Motor Arm, Right (5b): Drift, but does not hit bed  Motor Leg, Left (6a): No drift  Motor Leg, Right (6b):  No drift  Limb Ataxia (7): Absent  Sensory (8): Normal  Best Language (9): No aphasia  Dysarthria (10): Mild to moderate, slurs some words  Extinction and Inattention (11): No abnormality  Total: 4    POCT Glucose: 257 mg/dl    CT Head Without Contrast: No acute intracranial hemorrhage    ECG, IV inserted, and blood obtained between CT non-contrast and CTA. ECG: Normal sinus rhythm, no ischemic changes. CTA Head and Neck: No large vessel occlusion. Thrombolysis:   Patient presents with signs and symptoms of acute ischemic stroke. In accordance with national stroke guidelines and in and in consultation with Neurointerventional Stroke Team and Dr. Riccardo Blanco, the patient is  not eligible to receive IV-TNK/TPA based on time of onset. Attending Neurointerventionalist: Dr. Riccardo Blanco       References:  2019 AHA/ASA Guideline on Management of Acute Ischemic Stroke Update  2018 AHA/ASA Guidelines for Management of Acute Ischemic Stroke  2018 ACEP Clinical Policy: Use of Acute TPA for the Management of Acute Ischemic Stroke in the Emergency Department             ED RESULTS   Laboratory results:  Labs Reviewed   COMPREHENSIVE METABOLIC PANEL W/ REFLEX TO MG FOR LOW K - Abnormal; Notable for the following components:       Result Value    Glucose 273 (*)     CO2 22 (*)     Total Bilirubin 0.2 (*)     All other components within normal limits   POCT GLUCOSE - Abnormal; Notable for the following components:    POC Glucose 257 (*)     All other components within normal limits   CBC WITH AUTO DIFFERENTIAL   TROPONIN   PROTIME-INR   APTT   GLOMERULAR FILTRATION RATE, ESTIMATED   ANION GAP   OSMOLALITY   PLATELET [id number] INHIBITION   POCT CREATININE       Radiologic studies results:  XR CHEST 1 VIEW   Final Result   1. Small bilateral pleural effusion with bibasilar atelectasis. **This report has been created using voice recognition software. It may contain minor errors which are inherent in voice recognition technology. **      Final report electronically signed by Dr Dilan Hsieh on 10/31/2022 12:48 PM      MRI BRAIN WO CONTRAST   Final Result       1.  Area of restricted diffusion in the left parietal periventricular white matter with corresponding diminished signal intensity on ADC map consistent with an acute infarct. 2. Mild atrophy and probable ischemic changes in the white matter. 3. Otherwise negative MRI scan of the brain. **This report has been created using voice recognition software. It may contain minor errors which are inherent in voice recognition technology. **      Final report electronically signed by DR Huma Schuler on 10/31/2022 12:14 PM      CTA HEAD W WO CONTRAST (CODE STROKE)   Final Result   1. Small amount of plaque in the proximal right internal carotid artery. No significant hemodynamic stenosis in the right common and internal carotid arteries. 2. No significant hemodynamic stenosis in the left common and internal carotid arteries. 3. Atherosclerotic calcification aortic arch and at the origin of the left subclavian artery. 4. Atherosclerotic calcification in the cavernous segments of both internal carotid arteries. No evidence of severe stenosis in the internal carotid arteries. 5. Atherosclerotic calcification in the distal right vertebral artery. There is antegrade flow in the right and left vertebral arteries. 6. Otherwise negative CTA of the [place]. **This report has been created using voice recognition software. It may contain minor errors which are inherent in voice recognition technology. **      Final report electronically signed by DR Huma Schuler on 10/31/2022 12:27 PM      CTA NECK W WO CONTRAST (CODE STROKE)   Final Result   1. Small amount of plaque in the proximal right internal carotid artery. No significant hemodynamic stenosis in the right common and internal carotid arteries. 2. No significant hemodynamic stenosis in the left common and internal carotid arteries. 3. Atherosclerotic calcification aortic arch and at the origin of the left subclavian artery. 4. Atherosclerotic calcification in the cavernous segments of both internal carotid arteries. No evidence of severe stenosis in the internal carotid arteries. 5. Atherosclerotic calcification in the distal right vertebral artery. There is antegrade flow in the right and left vertebral arteries. 6. Otherwise negative CTA of the [place]. **This report has been created using voice recognition software. It may contain minor errors which are inherent in voice recognition technology. **      Final report electronically signed by DR Mendy Kurtz on 10/31/2022 12:27 PM      CT HEAD WO CONTRAST   Final Result       1. No evidence of an acute process in the brain. 2. Apical abscess associated with the left maxillary 1st premolar. These findings were telephoned to CEASAR Hooper at 11:11 AM on 10/31/2022 . **This report has been created using voice recognition software. It may contain minor errors which are inherent in voice recognition technology. **      Final report electronically signed by Dr. Nathaniel Antoine on 10/31/2022 11:14 AM          ED Medications administered this visit:   Medications   aspirin chewable tablet 324 mg (324 mg Oral Given 10/31/22 1118)   0.9 % sodium chloride bolus (1,000 mLs IntraVENous New Bag 10/31/22 1116)   iopamidol (ISOVUE-370) 76 % injection 80 mL (80 mLs IntraVENous Given 10/31/22 1114)   clopidogrel (PLAVIX) tablet 225 mg (225 mg Oral Given 10/31/22 1122)         ED COURSE            MEDICATION CHANGES     New Prescriptions    No medications on file         FINAL DISPOSITION     Final diagnoses:   Cerebrovascular accident (CVA), unspecified mechanism ([address]     Condition: condition: serious  Dispo: Admit to telemetry      This transcription was electronically signed. Parts of this transcriptions may have been dictated by use of voice recognition software and electronically transcribed, and parts may have been transcribed with the assistance of an ED scribe. The transcription may contain errors not detected in proofreading.   Please refer to my supervising physician's documentation if my documentation differs.     Electronically Signed: Lelan Cowden, MD, 10/31/22, 1:08 PM          Lelan Cowden, MD  Resident  10/31/22 [id number]

## 2022-10-31 NOTE — CONSULTS
Neurology Stroke Alert Note    Date:10/31/2022       JTIZ:[id number]  Patient Name:Shazia Sher     Date of Birth:[birth year]     Age:72 y.o. Requesting Physician: Mansoor Cali MD     Reason for Consult:  Evaluate for stroke alert    Subjective     HPI: Skyler Vazquez who is a 67 y.o. male with a history of HTN, HLD, DM, CAD, adenocarcinoma of the lung, chronic renal failure who presents to [facility] ED complaining of right sided facial droop, right sided weakness and difficulty ambulating at home prior to presentation. Patient states he kept bumping into the walls while trying to ambulate in his home this AM. LKW approximately 2200 10/30 prior to going to bed. POC glucose on arrival: 257. Initial NIH 4: 1 for right sided facial droop, 1 for RLE drift, 1 for RUE ataxia, and 1 for dysarthria. No RUE drift noted on my initial exam. Patient not a candidate for TNK due to being out of the window for administration. Patient on ASA 81 mg and Plavix 75 mg as outpatient, although patient admits to taking ASA every other day secondary to easy bruising he was experiencing. He reports compliance with Plavix and Lipitor 20 mg nightly. He denies prior history of stroke. Reports chronic right sided visual disturbance of a \"black speck\" in the center of his visual field on the right side, but is able to see in the periphery. BP in /94. Denies any sensory changes. He is not aphasic. He denies dizziness, headache, difficulty expressing speech, numbness or tingling, tremors. Denies chest pain, SOB, recent fevers/illness. Denies seizure history. Patient reports extensive smoking history in the past, but reports quitting in 2019 due to hx of lung cancer.       Last known well:  2200 10/30/22  Time of stroke alert:  65  Time of neurology arrival:  1049, pt arrival at 1056  Vascular risk factors:  HTN, HLD, DM, CAD, former smoker  Initial glucose: 257  Old deficits from prior stroke:  denies hx of CVA  INR in ED if anticoagulated: not applicable. Initial blood pressure:  177/94  Initial NIHSS: 4  Pre-morbid Modified Randall Scale:  1  Time of initial imaging read:  1111  Thrombolytic administered:  not indicated due to LKW outside window of administration  Thrombectomy performed:  not indicated. Puncture time:  not applicable. Review of Systems   Review of Systems   Constitutional:  Negative for chills and fever. HENT:  Negative for rhinorrhea and sore throat. Eyes:  Positive for visual disturbance (chronic). Negative for photophobia. Respiratory:  Negative for cough and shortness of breath. Cardiovascular:  Negative for chest pain and palpitations. Gastrointestinal:  Negative for abdominal pain, nausea and vomiting. Genitourinary:  Negative for dysuria. Musculoskeletal:  Positive for gait problem and neck stiffness. Negative for arthralgias, myalgias and neck pain. Skin:  Negative for rash. Neurological:  Positive for facial asymmetry, speech difficulty and weakness. Negative for dizziness, tremors, seizures, syncope, numbness and headaches. Psychiatric/Behavioral:  Negative for confusion and decreased concentration. The patient is not nervous/anxious. Medications   Scheduled Meds:    atorvastatin  80 mg Oral Nightly     Continuous Infusions:   PRN Meds:   Medications Prior to Admission:   No current facility-administered medications on file prior to encounter. Current Outpatient Medications on File Prior to Encounter   Medication Sig Dispense Refill    Semaglutide,0.25 or 0.5MG/DOS, (OZEMPIC, 0.25 OR 0.5 MG/DOSE,) 2 MG/1.5ML SOPN Inject 0.25 mg into the skin once a week      atorvastatin (LIPITOR) 20 MG tablet Take 20 mg by mouth daily      amLODIPine (NORVASC) 5 MG tablet Take 5 mg by mouth daily       nitroGLYCERIN (NITROSTAT) 0.4 MG SL tablet up to max of 3 total doses.  If no relief after 1 dose, call 911. 25 tablet 3    glimepiride (AMARYL) 1 MG tablet TAKE 1 TABLET BY MOUTH ONE TIME A DAY losartan (COZAAR) 25 MG tablet TAKE 1 TABLET BY MOUTH EVERY DAY      clopidogrel (PLAVIX) 75 MG tablet TAKE 1 TABLET BY MOUTH ONE TIME A DAY 30 tablet 11    Blood Glucose Monitoring Suppl (FREESTYLE FREEDOM LITE) w/Device KIT 1 Device daily  0    FREESTYLE LITE strip 1 strip daily  11    FREESTYLE LANCETS MISC 1 Stick daily  11    metFORMIN (GLUCOPHAGE) 500 MG tablet Take 500 mg by mouth 2 times daily (with meals) Take 1 tab by mouth at supper x2 weeks, then 1 tab in the morning and 1 tab at supper x2 weeks, then 1 tab in the morning and 2 tab at supper x2weeks, then 2 tabs twice a day      aspirin 81 MG chewable tablet Take 1 tablet by mouth daily 30 tablet 3    omeprazole (PRILOSEC) 20 MG delayed release capsule Take 20 mg by mouth daily      dutasteride (AVODART) 0.5 MG capsule Take 0.5 mg by mouth daily. Past History    Past Medical History:   has a past medical history of Anxiety, Arthritis, CAD (coronary artery disease), Cancer ([facility] [address] COPD (chronic obstructive pulmonary disease) ([facility] [address] [facility] Enlarged prostate with urinary retention, Gait difficulty, Generalized weakness, Hyperlipidemia, Hypertension, Lesion of bladder, Osteoarthritis, Retention of urine, Right inguinal hernia, S/P cardiac catheterization, S/P TURP, SOB (shortness of breath), and Voiding difficulty. Social History:   reports that he quit smoking about 3 years ago. His smoking use included cigarettes. He started smoking about 51 years ago. He has a 40.00 pack-year smoking history. He has never used smokeless tobacco. He reports that he does not drink alcohol and does not use drugs.      Family History:   Family History   Problem Relation Age of Onset    Diabetes Mother     High Blood Pressure Mother     Brain Cancer Mother     Heart Disease Father     Diabetes Father     High Blood Pressure Father     Heart Attack Father     Diabetes Sister     High Blood Pressure Sister     COPD Sister         Agent Orange    Diabetes Brother     High Blood Pressure Brother        Physical Examination        NIH Stroke Scale:  1a.  Level of consciousness:  0 - alert; keenly responsive  1b. Level of consciousness questions:  0 - answers both questions correctly  1c. Level of consciousness questions:  0 - performs both tasks correctly  2. Best Gaze:  0 - normal  3. Visual:  0 - no visual loss  4. Facial Palsy:  1 - minor paralysis (flattened nasolabial fold, asymmetric on smiling)  5a. Motor left arm:  0 - no drift, limb holds 90 (or 45) degrees for full 10 seconds  5b. Motor right arm:  0 - no drift, limb holds 90 (or 45) degrees for full 10 seconds  6a. Motor left le - no drift; leg holds 30 degree position for full 5 seconds  6b. Motor right le - drift; leg falls by the end of the 5 second period but does not hit bed  7. Limb Ataxia:  1 - present in one limb  8. Sensory:  0 - normal; no sensory loss  9. Best Language:  0 - no aphasia, normal  10. Dysarthria:  1 - mild to moderate, patient slurs at least some words and at worst, can be understood with some difficulty  11. Extinction and Inattention:  0 - no abnormality    TOTAL:  4    Vitals:  BP (!) 139/91   Pulse 91   Temp 98.3 °F (36.8 °C) (Oral)   Resp 22   Ht 5' 8\" (1.727 m)   Wt 190 lb (86.2 kg)   SpO2 96%   BMI 28.89 kg/m²   Temp (24hrs), Av.3 °F (36.8 °C), Min:98.3 °F (36.8 °C), Max:98.3 °F (36.8 °C)      I/O (24Hr): No intake or output data in the 24 hours ending 10/31/22 1326    Physical Exam  Vitals (hypertensive) reviewed. Constitutional:       General: He is not in acute distress. Appearance: Normal appearance. He is not ill-appearing. HENT:      Head: Normocephalic and atraumatic. Right Ear: External ear normal.      Left Ear: External ear normal.      Nose: Nose normal.      Mouth/Throat:      Mouth: Mucous membranes are moist.      Pharynx: No oropharyngeal exudate or posterior oropharyngeal erythema.    Eyes: Extraocular Movements: Extraocular movements intact and EOM normal.      Pupils: Pupils are equal, round, and reactive to light. Cardiovascular:      Rate and Rhythm: Normal rate and regular rhythm. Heart sounds: Normal heart sounds. No murmur heard. Pulmonary:      Effort: Pulmonary effort is normal. No respiratory distress. Breath sounds: Normal breath sounds. No wheezing. Abdominal:      General: Bowel sounds are normal.      Palpations: Abdomen is soft. Tenderness: There is no abdominal tenderness. Musculoskeletal:         General: Normal range of motion. Right lower leg: No edema. Left lower leg: No edema. Skin:     General: Skin is warm. Findings: No rash. Neurological:      Mental Status: He is alert and oriented to person, place, and time. Coordination: Finger-Nose-Finger Test abnormal (RUE ataxia, normal LUE). Heel to Nikki Him Test normal.   Psychiatric:         Mood and Affect: Mood normal.         Speech: Speech is slurred. Behavior: Behavior normal.     Neurologic Exam     Mental Status   Oriented to person, place, and time. Follows 1 step commands. Attention: normal. Concentration: normal.   Speech: slurred   Level of consciousness: alert  Able to repeat. Normal comprehension. Cranial Nerves     CN II   Visual fields full to confrontation. CN III, IV, VI   Pupils are equal, round, and reactive to light. Extraocular motions are normal.   Right pupil: Shape: regular. Left pupil: Shape: regular. CN V   Facial sensation intact.    Right facial sensation deficit: none  Left facial sensation deficit: none    CN VII   Right facial weakness: central  Left facial weakness: none    CN VIII   CN VIII normal.   Hearing: intact    CN IX, X   CN IX normal.   CN X normal.   Palate: symmetric    CN XI   CN XI normal.   Right sternocleidomastoid strength: normal  Left sternocleidomastoid strength: normal  Right trapezius strength: normal  Left trapezius strength: normal    CN XII   CN XII normal.   Tongue: not atrophic  Fasciculations: absent  Tongue deviation: none    Motor Exam   Muscle bulk: normal  Overall muscle tone: normal  Right arm pronator drift: present (very mild)  Left arm pronator drift: absent  No upper extremity drift noted on exam during stroke alert in CT. On repeat examination this afternoon in ED, very mild RUE drift noted. RLE drift improved in ED on repeat evaluation. Motor strength:  RUE: 3+/5  RLE: 4/5  LUE: 5/5  LLE: 4+/5     Sensory Exam   Light touch normal.     Gait, Coordination, and Reflexes     Coordination   Finger to nose coordination: abnormal (RUE ataxia, normal LUE)  Heel to shin coordination: normal    Tremor   Resting tremor: absent  Action tremor: absent  No adventitious motor movements noted during exam.       Labs/Imaging/Diagnostics   Labs:  CBC:  Recent Labs     10/31/22  1050   WBC 8.2   RBC 5.34   HGB 15.2   HCT 45.3   MCV 84.8        CHEMISTRIES:  Recent Labs     10/31/22  1050      K 4.6      CO2 22*   BUN 19   CREATININE 1.0   GLUCOSE 273*     COAGULATION STUDIES:  Recent Labs     10/31/22  1050   INR 0.89   APTT 37.2     LIVER PROFILE:  Recent Labs     10/31/22  1050   AST 24   ALT 20   BILITOT 0.2*   ALKPHOS 91     CHOLESTEROL AND A1C:No results for input(s): LDLCALC, HDL, CHOL, TRIG, LABA1C in the last 720 hours. Imaging Last 24 Hours:  CTA HEAD W WO CONTRAST (CODE STROKE)    Result Date: 10/31/2022  PROCEDURE: CTA HEAD W WO CONTRAST, CTA NECK W WO CONTRAST CLINICAL INFORMATION: Stroke Symptoms. COMPARISON: CT scan of the brain obtained on the same day. . TECHNIQUE: 1 mm axial images were obtained through the head and neck after the fast bolus administration of contrast. A noncontrast localizer was obtained. 3-D reconstructions were performed on a dedicated 3-D workstation. These include multiplanar MPR images and multiplanar MIP images.   Centerline reconstructions were obtained of the carotid systems. Isovue intravenous contrast was given. All carotid artery measurements are performed utilizing  Nascet criteria. This exam was analyzed using [employer].  contact CT LVO. All CT scans at this facility use dose modulation, iterative reconstruction, and/or weight-based dosing when appropriate to reduce radiation dose to as low as reasonably achievable. FINDINGS: CTA NECK: Aortic arch and branches: Atherosclerotic calcification in the aortic arch and at the origin of the left subclavian artery. Right common carotid artery/ICA: There is a small amount of plaque in the proximal right internal carotid artery. There is no significant hemodynamic stenosis in the right common and internal carotid arteries. Left common carotid artery/ICA: There is no significant hemodynamic stenosis in the left common and internal carotid arteries. Vertebral arteries: There is antegrade flow in the right and left vertebral arteries. CTA HEAD: Internal carotid arteries: Atherosclerotic calcification in the cavernous segments of the> left internal carotid arteries. No evidence of severe stenosis in the internal carotid arteries. . Middle cerebral arteries: Antegrade flow in both middle cerebral arteries. . Anterior cerebral arteries: Antegrade flow in the anterior cerebral arteries bilaterally. . Vertebral arteries: Atherosclerotic calcification in the distal right vertebral artery. Antegrade flow in the right and left vertebral arteries. Basilar artery: Antegrade flow in the basilar artery. . Superior cerebellar arteries: Antegrade flow in the right and left superior cerebellar arteries. . Posterior cerebral arteries: Antegrade flow in the posterior cerebral arteries bilaterally. No aneurysms, stenoses or occlusions are noted. The superior sagittal sinus, vein of Efren, internal cerebral veins, straight sinus, transverse sinuses and sigmoid sinuses are patent. Axial source data: Unremarkable.      1. Small amount of plaque in the proximal right internal carotid artery. No significant hemodynamic stenosis in the right common and internal carotid arteries. 2. No significant hemodynamic stenosis in the left common and internal carotid arteries. 3. Atherosclerotic calcification aortic arch and at the origin of the left subclavian artery. 4. Atherosclerotic calcification in the cavernous segments of both internal carotid arteries. No evidence of severe stenosis in the internal carotid arteries. 5. Atherosclerotic calcification in the distal right vertebral artery. There is antegrade flow in the right and left vertebral arteries. 6. Otherwise negative CTA of the [place]. **This report has been created using voice recognition software. It may contain minor errors which are inherent in voice recognition technology. ** Final report electronically signed by DR Mendy Kurtz on 10/31/2022 12:27 PM    CT HEAD WO CONTRAST    Result Date: 10/31/2022  PROCEDURE: CT HEAD WO CONTRAST CLINICAL INFORMATION: Stroke Symptoms. Facial droop. Weakness. COMPARISON: Brain MRI 3/2/2019. TECHNIQUE: Noncontrast 5 mm axial images were obtained through the brain. Sagittal and coronal reconstructions were obtained. All CT scans at this facility use dose modulation, iterative reconstruction, and/or weight-based dosing when appropriate to reduce radiation dose to as low as reasonably achievable. FINDINGS: There is mild global volume loss. This is stable. There is no hemorrhage. There are no intra-or extra-axial collections. There is no hydrocephalus, midline shift or mass effect. The gray-white matter differentiation is preserved. The paranasal sinuses and mastoid air cells are normally aerated. There is an apical abscess associated with the left maxillary 1st premolar. There are no suspicious osseous lesions. There are vascular calcifications. 1. No evidence of an acute process in the brain. 2. Apical abscess associated with the left maxillary 1st premolar.  These findings were telephoned to CEASAR Scott at 11:11 AM on 10/31/2022 . **This report has been created using voice recognition software. It may contain minor errors which are inherent in voice recognition technology. ** Final report electronically signed by Dr. Luis Woodson on 10/31/2022 11:14 AM    CTA NECK W WO CONTRAST (CODE STROKE)    Result Date: 10/31/2022  PROCEDURE: CTA HEAD W WO CONTRAST, CTA NECK W WO CONTRAST CLINICAL INFORMATION: Stroke Symptoms. COMPARISON: CT scan of the brain obtained on the same day. . TECHNIQUE: 1 mm axial images were obtained through the head and neck after the fast bolus administration of contrast. A noncontrast localizer was obtained. 3-D reconstructions were performed on a dedicated 3-D workstation. These include multiplanar MPR images and multiplanar MIP images. Centerline reconstructions were obtained of the carotid systems. Isovue intravenous contrast was given. All carotid artery measurements are performed utilizing  Nascet criteria. This exam was analyzed using viz.  contact CT LVO. All CT scans at this facility use dose modulation, iterative reconstruction, and/or weight-based dosing when appropriate to reduce radiation dose to as low as reasonably achievable. FINDINGS: CTA NECK: Aortic arch and branches: Atherosclerotic calcification in the aortic arch and at the origin of the left subclavian artery. Right common carotid artery/ICA: There is a small amount of plaque in the proximal right internal carotid artery. There is no significant hemodynamic stenosis in the right common and internal carotid arteries. Left common carotid artery/ICA: There is no significant hemodynamic stenosis in the left common and internal carotid arteries. Vertebral arteries: There is antegrade flow in the right and left vertebral arteries. CTA HEAD: Internal carotid arteries: Atherosclerotic calcification in the cavernous segments of the> left internal carotid arteries.  No evidence of severe stenosis in the internal carotid arteries. . Middle cerebral arteries: Antegrade flow in both middle cerebral arteries. . Anterior cerebral arteries: Antegrade flow in the anterior cerebral arteries bilaterally. . Vertebral arteries: Atherosclerotic calcification in the distal right vertebral artery. Antegrade flow in the right and left vertebral arteries. Basilar artery: Antegrade flow in the basilar artery. . Superior cerebellar arteries: Antegrade flow in the right and left superior cerebellar arteries. . Posterior cerebral arteries: Antegrade flow in the posterior cerebral arteries bilaterally. No aneurysms, stenoses or occlusions are noted. The superior sagittal sinus, vein of Efren, internal cerebral veins, straight sinus, transverse sinuses and sigmoid sinuses are patent. Axial source data: Unremarkable. 1. Small amount of plaque in the proximal right internal carotid artery. No significant hemodynamic stenosis in the right common and internal carotid arteries. 2. No significant hemodynamic stenosis in the left common and internal carotid arteries. 3. Atherosclerotic calcification aortic arch and at the origin of the left subclavian artery. 4. Atherosclerotic calcification in the cavernous segments of both internal carotid arteries. No evidence of severe stenosis in the internal carotid arteries. 5. Atherosclerotic calcification in the distal right vertebral artery. There is antegrade flow in the right and left vertebral arteries. 6. Otherwise negative CTA of the [place]. **This report has been created using voice recognition software. It may contain minor errors which are inherent in voice recognition technology. ** Final report electronically signed by DR Ángel Pelletier on 10/31/2022 12:27 PM    XR CHEST 1 VIEW    Result Date: 10/31/2022  PROCEDURE: XR CHEST 1 VIEW CLINICAL INFORMATION: stroke COMPARISON: Chest radiograph 3/29/2021 TECHNIQUE: Single frontal view of the chest performed.  FINDINGS: No focal pulmonary consolidation. Cardiac silhouette is not enlarged. Small bilateral pleural effusions, left greater than right. Bibasilar atelectasis. No pneumothorax. Old left rib fractures seen. Bones are demineralized. Degenerative changes of the thoracic spine. No acute bony abnormality. 1. Small bilateral pleural effusion with bibasilar atelectasis. **This report has been created using voice recognition software. It may contain minor errors which are inherent in voice recognition technology. ** Final report electronically signed by Dr Mio Tobias on 10/31/2022 12:48 PM    MRI BRAIN WO CONTRAST    Result Date: 10/31/2022  PROCEDURE: MRI BRAIN WO CONTRAST CLINICAL INFORMATION stroke alert. COMPARISON: CT scan of the brain and CTA obtained on the same day. MRI scan of the brain dated 3/2/2019. Garrett Antoine TECHNIQUE: Multiplanar and multiple spin echo MRI images were obtained of the brain without contrast. FINDINGS: The diffusion-weighted images demonstrate restricted diffusion in the left parietal periventricular white matter. There is corresponding diminished signal intensity on the ADC map. These findings are consistent with an acute infarct. .  The brain volume is slightly reduced. There is a mild amount of signal hyperintensity on the FLAIR and T2-weighted sequences in the white matter of the brain. This is consistent with mild severity chronic small vessel ischemic changes. There are no intra-or extra-axial collections. There is no hydrocephalus, midline shift or mass effect. There is mineralization in the medial aspects of the basal ganglia bilaterally. No other areas of susceptibility artifact are present. The major intracranial vascular flow voids are present.  The midline craniocervical junction structures are normal.  The pituitary gland and brainstem are normal.      1. Area of restricted diffusion in the left parietal periventricular white matter with corresponding diminished signal intensity on ADC map consistent with an acute infarct. 2. Mild atrophy and probable ischemic changes in the white matter. 3. Otherwise negative MRI scan of the brain. **This report has been created using voice recognition software. It may contain minor errors which are inherent in voice recognition technology. ** Final report electronically signed by DR Rylan Marrero on 10/31/2022 12:14 PM     Assessment and Plan:        Acute infarct of left corona radiata  Imaging  CT revealed no acute findings  CTA H&N: negative for LVO or hemodynamically significant stenosis. No need for carotid ultrasound. STAT MRI of brain without contrast: acute infarct in left corona radiata  2D echocardiogram ordered. Stat CT head is needed if the patient develops new-onset altered mental status, a severe headache, or new-onset neurologic deficit  Risk factors and medications  Blood pressure goal: permissive hypertension up to 220/120 for 24 hours after stroke onset. following this period, less than 130/80. Keep well hydrated. Initiate normal saline at 75 ml/hr as needed. Antithrombotics: Loaded with  mg and Plavix 225 mg in ED. Continue Plavix 75 mg and ASA 81 mg daily, restarting 11/1/22. P2Y12 125, demonstrating inhibitor effect of Plavix. HgbA1C in the morning. If the patient has diabetes, recommend tight control of A1c with goal of <7.0 if attainable. Lipid panel in the morning. LDL goal of 45-70. Increase Lipitor to 80 mg nightly, pending LDL results. Smoking and alcohol cessation when applicable. Provide stroke eduction for individualized risk factors. Recommend complete infectious work-up. Recommend tight risk factor control, including DM, HTN, HLD  EKG/telemetry to monitor for atrial fibrillation  Core stroke metrics  Dysphagia screen prior to oral intake  PT/OT/SLP consult. IPR consult if applicable. DVT prophylaxis: Lovenox  NIHSS every shift. Neuro checks per unit unless otherwise specified.   Pre-morbid Modified Randall Scale: 1 - No significant disability: despite symptoms, able to carry out all usual duties and activities. Neurology following. Please call with any questions or concerns. This patient was seen and evaluated with Dr. Em Echeverria and he is in agreement with the assessment and plan.     Electronically signed by Jean Rothman PA-C on 10/31/22 at 5:27 PM EDT

## 2022-10-31 NOTE — ED NOTES
Patient to ED for right sided facial droop and right sided numbness and weakness. Patient reports felling fine before bed last night. Patient states he went to bed around 2200. Patient denies hx of prior stroke.  Patient is alert and oriented, family at bedside      Chayito Schmidt RN  10/31/22 1117

## 2022-10-31 NOTE — ED PROVIDER NOTES
Attending Supervising Physicians Attestation Statement  I was present with the resident physician during the history and exam. I discussed the findings and plans with the resident physician and agree as documented in her note     Electronically signed by Giovanna Zhu MD on 10/31/22 at 11:10 AM EDT      Critical Care  Performed by: Ceci Cook MD  Authorized by: Ceci Cook MD     Critical care provider statement:     Critical care time (minutes):  35    Critical care time was exclusive of:  Separately billable procedures and treating other patients and teaching time    Critical care was necessary to treat or prevent imminent or life-threatening deterioration of the following conditions:  CNS failure or compromise    Critical care was time spent personally by me on the following activities:  Ordering and performing treatments and interventions, ordering and review of laboratory studies, ordering and review of radiographic studies, pulse oximetry, development of treatment plan with patient or surrogate, discussions with consultants, evaluation of patient's response to treatment, re-evaluation of patient's condition, examination of patient, obtaining history from patient or surrogate and review of old charts    I assumed direction of critical care for this patient from another provider in my specialty: no      Care discussed with: admitting provider    Comments:      Patient seen as stroke alert, NIH 5 but given LKW was last night before bed he is not a candidate for thrombolytic, patient seen by neuro interventional team emergently, agree with plan for imaging and admit, load with asa and plavix.       Ceci Cook MD  11/01/22 [id number]

## 2022-10-31 NOTE — CARE COORDINATION
Spoke with patient and live in girlfriend of 5 years [address]  Both advised has filled out documents in past but online. Wants to fill out and have attached to chart while here.

## 2022-10-31 NOTE — H&P
Hospitalist H&P    Patient:  Konrad Fisher    MRN: [id number]    Unit/Bed:4A-12/012-A    Acct: [de-identified]    YOB: 1950    PCP: Kaci Garcia. Rosaura Gutierrez MD    Date of Admission: 10/31/2022      Assessment and Plan:     1. Acute Left Parietal Stroke. Highly consistent symptoms of new onset right-sided facial droop, right upper extremity numbness, right upper extremity weakness, and right lower extremity weakness. My initial NIHSS score of 6. Code stroke was called and patient was determined not to be tPA candidate. Patient was given loading dose aspirin and Plavix. CT of the head demonstrated no relevant acute findings, CT of the head and neck demonstrated no signs of LVO, and MRI of the brain confirmed the diagnosis. P2Y12 assay consistent with Plavix working effectively. Provider to provider discussion with neurology who again confirms the patient is not a tPA candidate. They also state that ischemic stroke was probably secondary to poorly controlled diabetes and hypertension. Will appropriately control diabetes and hypertension. Maintain on full stroke protocol as an inpatient including NIHSS checks, lipid panel and hemoglobin A1c, outpatient event monitor, 24 hours of permissive hypertension and then aggressive control of hypertension, telemetry, echocardiogram, and PT/OT/SLP evaluation. Maintain on daily aspirin 81 mg daily and Plavix 75 mg daily. Neurology will follow as an inpatient and as an outpatient. 2.  Type 2 Diabetes with Nephropathy, Vasculopathy, Hyperglycemia, and Without Long-term insulin use. Microalbumin to creatinine ratio highly elevated on 3/23/2022 at 1677. Hemoglobin A1c of 8.0% 8/24/2022. Will hold glimepiride and semaglutide as an inpatient. Would highly recommend discontinuing glimepiride as an outpatient but will defer to PCP.   Maintain on Lantus 23 units nightly, hypoglycemia protocols, 4 times a day glucose checks, and low-dose sliding scale as an inpatient. Resume losartan for renal protection after end of permissive hypertension period as above. Inpatient blood glucose goal of 140-180. Follow-up with PCP as an outpatient. 3.  Benign Essential Hypertension. Noted on history. Twenty four hours of permissive hypertension as above. Therefore, will hold Norvasc 5 mg daily and losartan 25 mg daily for 24 hours. Afterwards, resume these medications and intensify regimen as necessary to achieve blood pressure of 130/80 or less. Follow-up with PCP as an outpatient. 4.  Hyperlipidemia, unspecified. Last known lipid panel on 3/23/2022 demonstrated total cholesterol 166, triglyceride level terms of 67, HDL of 38, and a calculated LDL of 75. Intensify atorvastatin from 20 mg nightly to 80 mg nightly given stroke. Follow-up with PCP as an outpatient. 5.  Chronic Obstructive Pulmonary Disease, Gold Stage II. Last known pulmonary function test on 7/15/2019. Given wheezes, rhonchi and chronic shortness of breath we will start DuoNebs, incentive spirometry, and Acapella. Outpatient pulmonology follow-up. 6.  Coronary Artery Disease. Cardiac catheterization with stent placed in the circumflex by Dr. Martín Anglin on 3/12/2019. Maintain on aspirin and Plavix as above. Follow-up with PCP and cardiology as an outpatient. 7.  Benign Prostatic Hyperplasia and Urinary Retention. Noted on history. On dutasteride as an outpatient. Maintained on finasteride as an inpatient. Bladder scan. Follow-up with PCP as an outpatient. 8.  Personal history of lung cancer. Surgery and last known chemotherapy in 2019. Follow-up with PCP and hematology/oncology as an outpatient. 9.  Apical Abscess of the Left Maxillary First Premolar. Noted incidentally on CT of the head. No signs or symptoms of systematic infection. Obtain blood cultures. Augmentin until the patient can follow-up with dentistry or oral surgery.       CC: Right Sided Facial Droop and Right Upper Extremity Numbness    Opening Statement:  Patient is a 77-year-old male with a past medical history of urinary retention, hypertension, hyperlipidemia, chronic obstructive pulmonary disease, arthritis, enlarged prostate, stage II lung cancer with last known surgery and chemotherapy in 2019, and coronary artery disease on Plavix and aspirin with stent to the circumflex on 3/12/2019 who presents with a chief complaint of right-sided facial droop and right upper extremity numbness and her managing primarily for acute left parietal stroke. HPI/Summary Hospital course:   Per patient report, last known well was at 5:30 AM on 10/31/2022. At around 7:30 AM the patient started experiencing symptoms of disequilibrium along with right upper extremity numbness/tingling. Therefore, patient was brought to the emergency room. Patient denies fevers, chills, headache, dizziness, chest pain, palpitations, abdominal pain, nausea, vomiting, diarrhea, dysuria, and hematuria. Affirms shortness of breath which is chronic. Family affirms right upper extremity weakness, right lower extremity weakness, dysarthria, and right-sided facial droop which are all new. Affirms medical history as above. Initially affirmed that he takes all of his medications consistently. However, when the patient's fiancé pressed him on this issue, he stated that he only took the aspirin every other day. Affirms a heavy smoking history. Smoked a pack a day for 40 years and quit in 2/20/2019. Denies any affirms very minimal alcohol use. Denies any illicit drug use. My initial NIHSS of 6. Code stroke was called and neurology saw the patient. Patient was determined not to be a tPA candidate due to last known well being greater than 4.5 hours. Patient was given loading dose aspirin, loading dose Plavix, and was maintained on oral aspirin and oral Plavix.     CT of the head without contrast on 10/31/2022 demonstrated stable mild global volume loss, and apical abscess associate with a left maxillary first premolar, and no other significant acute findings. CT of the head and neck on 10/31/2022 demonstrated small amount of plaque in the proximal right internal carotid artery; no significant hemodynamic stenosis of the right common and internal carotid arteries, no significant stenosis in the left common and internal carotid artery; atherosclerotic calcifications of the aortic arch and at the origin of the left subclavian artery; atherosclerotic calcifications of the cavernous segments of both internal carotid artery with no evidence of severe stenosis; atherosclerotic calcifications in the distal right vertebral artery with antegrade flow in the right and left vertebral arteries, and no other significant abnormalities. MRI of the brain without contrast on 10/31/2022 demonstrated an area of diffusion in the left parietal periventricular white matter with corresponding diminished signal intensity on ADC map consistent with acute infarct; mild atrophy and probable mild severity chronic small vessel ischemic changes, and no other significant abnormalities. P2Y12 assay consistent with Plavix working effectively. Provider to provider discussion with neurology who again confirms the patient is not a tPA candidate. They also state that ischemic stroke was probably secondary to poorly controlled diabetes and hypertension. Will appropriately controlled diabetes and hypertension. Maintain on full stroke protocol as an inpatient including NIHSS checks, outpatient event monitor, 24 hours of permissive hypertension and then aggressive control of hypertension, telemetry, echocardiogram, and PT/OT/SLP evaluation. Neurology will continue to follow as an inpatient and as an outpatient. ROS (Review of systems completed. Pertinent positives noted.  Otherwise ROS is negative)    PMH:  Per HPI and       Diagnosis Date    Anxiety Arthritis     CAD (coronary artery disease)     on Plavix and ASA    Cancer ([facility] [address] 01/2019    left lung stage 2-Dr Dan    COPD (chronic obstructive pulmonary disease) ([facility] [address] 02/2019    ANDREINA Leiva    Diabetes ([facility] [address]     Metformin    Enlarged prostate with urinary retention     Gait difficulty     Generalized weakness     Hyperlipidemia     Hypertension     Dr Italia Myers and Dr Paul Abelardo    Lesion of bladder     Osteoarthritis     Retention of urine     Right inguinal hernia 2021    S/P cardiac catheterization 03/12/2019    stent to circumflex per Dr. Durant Daily    S/P TURP     SOB (shortness of breath)     With activity    Voiding difficulty      SHX:        Procedure Laterality Date    APPENDECTOMY      BRONCHOSCOPY N/A 1/25/2019    BRONCHOSCOPY performed by Fran Sanon MD at [address] [facility]  1/25/2019    BRONCHOSCOPY/TRANSBRONCHIAL LUNG BIOPSY performed by Fran Sanon MD at [address] [facility] N/A 3/1/2019    BRONCHOSCOPY W/EBUS FNA performed by Fran Sanon MD at [address]  03/12/2019    COLONOSCOPY  [id number]    CT NEEDLE BIOPSY LUNG PERCUTANEOUS  3/29/2021    CT NEEDLE BIOPSY LUNG PERCUTANEOUS 3/29/2021 [facility] CT SCAN    HERNIA REPAIR Right 6/3/2021    RIGHT INGUINAL HERNIA REPAIR WITH MESH performed by [employer] DO Jeffry at [address]  2012    TURP    THORACOTOMY Left 3/14/2019    LEFT EXPLORATORY THORACOTOMY, MEDIASTINAL LYMPH NODE DISECTION, FLEXIBLE BRONCHOSCOPY performed by Harrison Ritter MD at [facility]:       Problem Relation Age of Onset    Diabetes Mother     High Blood Pressure Mother     Brain Cancer Mother     Heart Disease Father     Diabetes Father     High Blood Pressure Father     Heart Attack Father     Diabetes Sister     High Blood Pressure Sister     COPD Sister         Agent Orange    Diabetes Brother     High Blood Pressure Brother      Allergies: Patient has no known allergies.   Medications: dextrose      sodium chloride 75 mL/hr at 10/31/22 1717      atorvastatin  80 mg Oral Nightly    insulin lispro  0-4 Units SubCUTAneous TID WC    insulin lispro  0-4 Units SubCUTAneous Nightly    insulin glargine  23 Units SubCUTAneous Nightly    [START ON 11/1/2022] aspirin  81 mg Oral Daily    [START ON 11/1/2022] clopidogrel  75 mg Oral Daily    [Held by provider] amLODIPine  5 mg Oral Daily    [START ON 11/1/2022] finasteride  5 mg Oral Daily    [Held by provider] losartan  25 mg Oral Daily    [START ON 11/1/2022] pantoprazole  40 mg Oral QAM AC    ipratropium-albuterol  1 ampule Inhalation Q4H WA    amoxicillin-clavulanate  1 tablet Oral 2 times per day       Vital Signs:   BP (!) 149/86   Pulse 88   Temp 97.5 °F (36.4 °C) (Oral)   Resp 20   Ht 5' 8\" (1.727 m)   Wt 190 lb (86.2 kg)   SpO2 98%   BMI 28.89 kg/m²    No intake or output data in the 24 hours ending 10/31/22 1828     Physical Exam  Constitutional:       General: He is not in acute distress. Appearance: He is normal weight. He is not toxic-appearing. Comments: Patient is awake, alert, and in no acute distress. Capable of answering questions and following commands. Obvious right-sided facial droop and right upper extremity weakness. Less right lower extremity weakness. Slurred speech. Family states that this is all new. HENT:      Head: Normocephalic and atraumatic. Right Ear: External ear normal.      Left Ear: External ear normal.      Mouth/Throat:      Mouth: Mucous membranes are moist.      Pharynx: Oropharynx is clear. Eyes:      General: No scleral icterus. Pupils: Pupils are equal, round, and reactive to light. Cardiovascular:      Rate and Rhythm: Normal rate and regular rhythm. Pulses: Normal pulses. Heart sounds: Normal heart sounds. Pulmonary:      Effort: Pulmonary effort is normal.      Breath sounds: Normal air entry. Decreased breath sounds and wheezing present.       Comments: Decreased breath sounds along with scattered wheezes and rhonchi noted. Abdominal:      General: Abdomen is flat. There is no distension. Palpations: Abdomen is soft. Tenderness: There is no abdominal tenderness. There is no guarding. Skin:     General: Skin is warm and dry. Capillary Refill: Capillary refill takes less than 2 seconds. Neurological:      Mental Status: He is alert. GCS: GCS eye subscore is 4. GCS verbal subscore is 5. GCS motor subscore is 6. Comments: NIHSS score of 6. Partial paralysis of the right lower face with right-sided facial drooping. Right arm motor drift noted. Right leg motor drift noted. Finger-to-nose discoordination noted in the right upper extremity. Dysarthria noted. Family affirms that all of these changes are new. Psychiatric:         Mood and Affect: Mood normal.         Behavior: Behavior normal.         Thought Content: Thought content normal.         Judgment: Judgment normal.        Data: (All radiographs, tracings, PFTs, and imaging are personally viewed and interpreted unless otherwise noted). Chest x-ray notable for small bilateral left greater than right-sided pleural effusion. Electrolyte panel notable only for very slightly low bicarb 22, creatinine 1.0 which appears to be baseline, and an elevated glucose of 273. Liver panel unremarkable. Initial troponin T negative. CBC completely unremarkable. INR 0.89. EKG demonstrated sinus tachycardia, left axis deviation, right bundle branch block, and occasional supraventricular complexes along with occasional PVCs. Essentially unchanged as compared to previous EKG. Given poor quality of EKG, will repeat in the a.m.       Electronically signed by Kobe Conner MD, MPH, [facility] on 10/31/2022 at 6:28 PM  Supervised by Dr. Miguel A Farrar

## 2022-11-01 ENCOUNTER — APPOINTMENT (OUTPATIENT)
Dept: GENERAL RADIOLOGY | Age: 72
DRG: 065 | End: 2022-11-01
Payer: MEDICARE

## 2022-11-01 LAB
ANION GAP SERPL CALCULATED.3IONS-SCNC: 11 MEQ/L (ref 8–16)
AVERAGE GLUCOSE: 162 MG/DL (ref 70–126)
BUN BLDV-MCNC: 12 MG/DL (ref 7–22)
CALCIUM SERPL-MCNC: 8.4 MG/DL (ref 8.5–10.5)
CHLORIDE BLD-SCNC: 105 MEQ/L (ref 98–111)
CHOLESTEROL, TOTAL: 169 MG/DL (ref 100–199)
CO2: 24 MEQ/L (ref 23–33)
CREAT SERPL-MCNC: 0.9 MG/DL (ref 0.4–1.2)
EKG ATRIAL RATE: 89 BPM
EKG P AXIS: 66 DEGREES
EKG P-R INTERVAL: 144 MS
EKG Q-T INTERVAL: 378 MS
EKG QRS DURATION: 116 MS
EKG QTC CALCULATION (BAZETT): 459 MS
EKG R AXIS: -72 DEGREES
EKG T AXIS: 5 DEGREES
EKG VENTRICULAR RATE: 89 BPM
GFR SERPL CREATININE-BSD FRML MDRD: > 60 ML/MIN/1.73M2
GLUCOSE BLD-MCNC: 131 MG/DL (ref 70–108)
GLUCOSE BLD-MCNC: 137 MG/DL (ref 70–108)
GLUCOSE BLD-MCNC: 188 MG/DL (ref 70–108)
GLUCOSE BLD-MCNC: 212 MG/DL (ref 70–108)
GLUCOSE BLD-MCNC: 272 MG/DL (ref 70–108)
HBA1C MFR BLD: 7.4 % (ref 4.4–6.4)
HDLC SERPL-MCNC: 34 MG/DL
LDL CHOLESTEROL CALCULATED: 90 MG/DL
LV EF: 55 %
LVEF MODALITY: NORMAL
POTASSIUM SERPL-SCNC: 4.9 MEQ/L (ref 3.5–5.2)
SODIUM BLD-SCNC: 140 MEQ/L (ref 135–145)
TRIGL SERPL-MCNC: 224 MG/DL (ref 0–199)
TROPONIN T: < 0.01 NG/ML

## 2022-11-01 PROCEDURE — 92611 MOTION FLUOROSCOPY/SWALLOW: CPT

## 2022-11-01 PROCEDURE — 93010 ELECTROCARDIOGRAM REPORT: CPT | Performed by: INTERNAL MEDICINE

## 2022-11-01 PROCEDURE — 93005 ELECTROCARDIOGRAM TRACING: CPT | Performed by: STUDENT IN AN ORGANIZED HEALTH CARE EDUCATION/TRAINING PROGRAM

## 2022-11-01 PROCEDURE — 6370000000 HC RX 637 (ALT 250 FOR IP): Performed by: PHYSICIAN ASSISTANT

## 2022-11-01 PROCEDURE — 99232 SBSQ HOSP IP/OBS MODERATE 35: CPT | Performed by: HOSPITALIST

## 2022-11-01 PROCEDURE — 97163 PT EVAL HIGH COMPLEX 45 MIN: CPT

## 2022-11-01 PROCEDURE — 93307 TTE W/O DOPPLER COMPLETE: CPT

## 2022-11-01 PROCEDURE — 6360000002 HC RX W HCPCS: Performed by: STUDENT IN AN ORGANIZED HEALTH CARE EDUCATION/TRAINING PROGRAM

## 2022-11-01 PROCEDURE — 99222 1ST HOSP IP/OBS MODERATE 55: CPT | Performed by: NURSE PRACTITIONER

## 2022-11-01 PROCEDURE — 80048 BASIC METABOLIC PNL TOTAL CA: CPT

## 2022-11-01 PROCEDURE — 82948 REAGENT STRIP/BLOOD GLUCOSE: CPT

## 2022-11-01 PROCEDURE — 92610 EVALUATE SWALLOWING FUNCTION: CPT

## 2022-11-01 PROCEDURE — 97166 OT EVAL MOD COMPLEX 45 MIN: CPT

## 2022-11-01 PROCEDURE — 6370000000 HC RX 637 (ALT 250 FOR IP): Performed by: HOSPITALIST

## 2022-11-01 PROCEDURE — 94640 AIRWAY INHALATION TREATMENT: CPT

## 2022-11-01 PROCEDURE — 36415 COLL VENOUS BLD VENIPUNCTURE: CPT

## 2022-11-01 PROCEDURE — 2500000003 HC RX 250 WO HCPCS: Performed by: HOSPITALIST

## 2022-11-01 PROCEDURE — 2060000000 HC ICU INTERMEDIATE R&B

## 2022-11-01 PROCEDURE — 92523 SPEECH SOUND LANG COMPREHEN: CPT

## 2022-11-01 PROCEDURE — 6370000000 HC RX 637 (ALT 250 FOR IP)

## 2022-11-01 PROCEDURE — 80061 LIPID PANEL: CPT

## 2022-11-01 PROCEDURE — 97530 THERAPEUTIC ACTIVITIES: CPT

## 2022-11-01 PROCEDURE — 74230 X-RAY XM SWLNG FUNCJ C+: CPT

## 2022-11-01 PROCEDURE — 97116 GAIT TRAINING THERAPY: CPT

## 2022-11-01 PROCEDURE — 6370000000 HC RX 637 (ALT 250 FOR IP): Performed by: STUDENT IN AN ORGANIZED HEALTH CARE EDUCATION/TRAINING PROGRAM

## 2022-11-01 PROCEDURE — 84484 ASSAY OF TROPONIN QUANT: CPT

## 2022-11-01 PROCEDURE — 83036 HEMOGLOBIN GLYCOSYLATED A1C: CPT

## 2022-11-01 RX ORDER — GLIMEPIRIDE 4 MG/1
4 TABLET ORAL 2 TIMES DAILY WITH MEALS
Status: ON HOLD | COMMUNITY
End: 2022-11-09 | Stop reason: HOSPADM

## 2022-11-01 RX ORDER — ATORVASTATIN CALCIUM 40 MG/1
40 TABLET, FILM COATED ORAL NIGHTLY
Status: DISCONTINUED | OUTPATIENT
Start: 2022-11-01 | End: 2022-11-02 | Stop reason: HOSPADM

## 2022-11-01 RX ORDER — ASCORBIC ACID 500 MG
500 TABLET ORAL DAILY
COMMUNITY

## 2022-11-01 RX ORDER — AMLODIPINE BESYLATE 10 MG/1
10 TABLET ORAL DAILY
Status: ON HOLD | COMMUNITY
End: 2022-11-09 | Stop reason: SDUPTHER

## 2022-11-01 RX ORDER — LOSARTAN POTASSIUM 50 MG/1
50 TABLET ORAL DAILY
Status: ON HOLD | COMMUNITY
End: 2022-11-09 | Stop reason: HOSPADM

## 2022-11-01 RX ADMIN — AMOXICILLIN AND CLAVULANATE POTASSIUM 1 TABLET: 875; 125 TABLET, FILM COATED ORAL at 19:53

## 2022-11-01 RX ADMIN — IPRATROPIUM BROMIDE AND ALBUTEROL SULFATE 1 AMPULE: .5; 3 SOLUTION RESPIRATORY (INHALATION) at 07:48

## 2022-11-01 RX ADMIN — AMOXICILLIN AND CLAVULANATE POTASSIUM 1 TABLET: 875; 125 TABLET, FILM COATED ORAL at 10:12

## 2022-11-01 RX ADMIN — BARIUM SULFATE 20 ML: 0.81 POWDER, FOR SUSPENSION ORAL at 09:51

## 2022-11-01 RX ADMIN — ASPIRIN 81 MG 81 MG: 81 TABLET ORAL at 10:12

## 2022-11-01 RX ADMIN — ATORVASTATIN CALCIUM 40 MG: 40 TABLET, FILM COATED ORAL at 19:53

## 2022-11-01 RX ADMIN — BARIUM SULFATE 20 ML: 400 SUSPENSION ORAL at 09:52

## 2022-11-01 RX ADMIN — BARIUM SULFATE 10 ML: 400 PASTE ORAL at 09:51

## 2022-11-01 RX ADMIN — IPRATROPIUM BROMIDE AND ALBUTEROL SULFATE 1 AMPULE: .5; 3 SOLUTION RESPIRATORY (INHALATION) at 16:59

## 2022-11-01 RX ADMIN — FINASTERIDE 5 MG: 5 TABLET, FILM COATED ORAL at 10:13

## 2022-11-01 RX ADMIN — ONDANSETRON 4 MG: 2 INJECTION INTRAMUSCULAR; INTRAVENOUS at 00:08

## 2022-11-01 RX ADMIN — CLOPIDOGREL BISULFATE 75 MG: 75 TABLET ORAL at 10:12

## 2022-11-01 ASSESSMENT — 9 HOLE PEG TEST
TESTTIME_SECONDS: 102
TEST_RESULT: IMPAIRED
TESTTIME_SECONDS: 33.65
TEST_RESULT: FUNCTIONAL

## 2022-11-01 ASSESSMENT — PAIN SCALES - GENERAL: PAINLEVEL_OUTOF10: 0

## 2022-11-01 NOTE — CONSULTS
Physical Medicine & Rehabilitation   Consult Note      Admitting Physician: Maira Dougherty MD    Primary Care Provider: Amanda Plaza. Florina Smith MD     Reason for Consult:  CVA. Rehab needs    History of Present Illness:  Meenu Levine is a 67 y.o. male admitted to [address] [place] [address] on 10/31/2022. Patient  has a past medical history of Anxiety, Arthritis, CAD (coronary artery disease), Cancer ([facility] COPD (chronic obstructive pulmonary disease) ([facility] Diabetes ([facility] Enlarged prostate with urinary retention, Gait difficulty, Generalized weakness, Hyperlipidemia, Hypertension, Lesion of bladder, Osteoarthritis, Retention of urine, Right inguinal hernia, S/P cardiac catheterization, S/P TURP, SOB (shortness of breath), and Voiding difficulty. Patient presented to [facility] ER due to new onset of right arm numbness and balance issues. Patient with noted chronic SOB. Family reports right sides weakness, dysarthria, right sided facial droop. Patient was to be taking ASA and plavix daily, but only was taking ASA every other day due to bruising. Patient with initial NIHSS of 6. Code stroke called, not a tPA candidate. CT of the head without contrast on 10/31/2022 demonstrated stable mild global volume loss, and apical abscess associate with a left maxillary first premolar, and no other significant acute findings. CT of the head and neck on 10/31/2022 without major acute findings. MRI of the brain without contrast on 10/31/2022 demonstrated an area of diffusion in the left parietal periventricular white matter with corresponding diminished signal intensity on ADC map consistent with acute infarct; mild atrophy and probable mild severity chronic small vessel ischemic changes, and no other significant abnormalities. CVA likely due to poorly controlled HTN and DM. Patient is seen today, sitting up in chair. Right arm hanging to outside of chair. Patient able to lift arm slowly up once attention was made to the arm. Patient lives with his s/o that works full time, 6a-2pm daily. Discussed IPR and goal of helping patient to be as independent as possible at discharge. Patient understanding he will need therapy upon discharge, open to IPR, discussed 80/20 with medicare, patient understanding.      Current Rehabilitation Assessments:  PT:   Strength:  Right Lower Extremity: grossly >/=3/5, with functional mobility weaker than LLE  Left Lower Extremity: WFL  Balance:  Static Sitting Balance:  Stand By Assistance  Static Standing Balance: Contact Guard Assistance, without UE support, reaching shoulder to waist level, min unsteady WBOS, with NBOS pt had LOB with Gary to correct, pt leaned to right  Dynamic Standing Balance: Minimal Assistance, to CGA with 0-1 UE support, reaching in GALLO  Bed Mobility:  Rolling to Left: Stand By Assistance   Supine to Sit: Contact Guard Assistance  Scooting: Contact Guard Assistance  HOB up 30 degrees, inc time to complete  Transfers:  Sit to Stand: Minimal Assistance, first trial, CGA second trial  Stand to Sit:Contact Guard Assistance  Ambulation:  Minimal Assistance, to CGA  Distance: 10'x1, 30'x1  Surface: Level Tile  Device:No Device  Gait Deviations:  Slow Sofie, Decreased Step Length on Right, Decreased Arm Swing, Decreased Heel Strike on Right, Wide Base of Support, Mild Path Deviations, and Unsteady Gait    OT:    RANGE OF MOTION:  Right Upper Extremity: Impaired - due to stroke  Left Upper Extremity:  WFL  STRENGTH:  Right Upper Extremity: Impaired - see hand assessment   Left Upper Extremity:  WFL  HAND ASSESSMENT  Hand Dominance: Left  Left Hand Strength -  (lbs)  Handle Setting 2: 54, 58, 50, av: 54  Right Hand Strength -  (lbs)  Handle Setting 2: 7, 9, 10, av 8.7  Fine Motor Skills  Left 9-Hole Peg Test: Functional  Left 9 Hole Peg Test Time (secs): 33.65  Right 9-Hole Peg Test: Impaired  Right 9 Hole Peg Test Time (secs): 102 (unable to complete, pt frustrated)  Fine Motor Comment: Pt unable to maintain pincer grasp  SENSATION:   WFL  BALANCE:  Sitting Balance:  Contact Guard Assistance. Seated EOB  Standing Balance: Minimal Assistance. With RW, R sided ataxia with R foot drift  BED MOBILITY:  Supine to Sit: Minimal Assistance with RLE  TRANSFERS:  Sit to Stand:  Minimal Assistance. From EOB  Stand to Sit: Minimal Assistance. To EOB  FUNCTIONAL MOBILITY:  Assistive Device: Rolling Walker  Assist Level:  Minimal Assistance. Distance:  Bed to recliner, about 4 steps  Pt demonstrated R hand supination while holding onto walker, continue monitoring for need of built up handle vc walker splint. Pt demonstrated RLE ataxia and required vc to not drag his foot. ST:  copied from incomplete note. DIAGNOSTIC IMPRESSIONS:  Clinical Swallow Evaluation: Patient presents with moderate oral dysphagia with inability to fully discern potential presence of pharyngeal phase deficits without formal instrumentation. Patient with impaired mastication with a slow, munching pattern suspected to be s/t decaying dentition. Patient with notable loss of secretions from right corner of lips during completion of cognitive assessment. Suspected poor bolus formation and control d/t patient with moderate oral residue. Oral residue mostly cleared when followed with a liquid wash. No overt s/s of aspiration, however, unable to fully r/o possible airway invasion events and/or pharyngeal dysfunction at the bedside alone. Recommendations to complete formal instrumentation via MBS to further evaluate pharyngeal integrity d/t patient with hx of lung cancer, current med hx of CVA, and patient with excessive coughing verbally noted 10/31 by MOLLY Pavon. Speech, Language, Cognitive Evaluation: Patient presents with a mild cognitive impairment d/t deficits within the domains of delayed recall, problem solving, sequencing, thought organization, and executive functioning.  Receptive and expressive skills measure grossly intact for basic and complex daily communication. Speech intelligibility best approximates 60-70%, with noted dysarthria. Skilled ST services recommended to address the above mentioned deficits for potential return to the home and work settings. Wood Cognitive Assessment [facility]) version 7.2 completed. Patient scored *21/30. Normal is greater than or equal to 26/30. *Inclusion of +1 point given highest level of education achieved less than/equal to 12th grade or GED with limited-0 post-secondary schooling    Orientation: /6  Immediate Recall: 5/5 on both trials  Short-Term Recall: 0/5 independent, 4/5 FO2  Divergent Namin/1; 7 units named in 1 minute time frame  Problem Solvin3  Reasonin/2  Sequencin/2  Thought Organization: Impaired  Insight: fair  Attention:   Math Computation: 3/3  Executive Functionin/5    Past Medical History:        Diagnosis Date    Anxiety     Arthritis     CAD (coronary artery disease)     on Plavix and ASA    Cancer ([facility] 2019    left lung stage 2-Dr Dan    COPD (chronic obstructive pulmonary disease) ([facility] 2019    ANDREINA Leiva    Diabetes ([facility]     Metformin    Enlarged prostate with urinary retention     Gait difficulty     Generalized weakness     Hyperlipidemia     Hypertension       and Dr Gilbert Seat    Lesion of bladder     Osteoarthritis     Retention of urine     Right inguinal hernia     S/P cardiac catheterization 2019    stent to circumflex per Dr. Chan Lav    S/P TURP     SOB (shortness of breath)     With activity    Voiding difficulty        Past Surgical History:        Procedure Laterality Date    APPENDECTOMY      BRONCHOSCOPY N/A 2019    BRONCHOSCOPY performed by Kee Jacques MD at [address] [facility]  2019    BRONCHOSCOPY/TRANSBRONCHIAL LUNG BIOPSY performed by Kee Jacques MD at [address] [facility] N/A 3/1/2019    BRONCHOSCOPY W/EBUS FNA performed by Kee Jacques MD at [address]  03/12/2019    COLONOSCOPY  [id number]    CT NEEDLE BIOPSY LUNG PERCUTANEOUS  3/29/2021    CT NEEDLE BIOPSY LUNG PERCUTANEOUS 3/29/2021 [facility] CT SCAN    HERNIA REPAIR Right 6/3/2021    RIGHT INGUINAL HERNIA REPAIR WITH MESH performed by Kartik Palomino DO at [address]  2012    TURP    THORACOTOMY Left 3/14/2019    LEFT EXPLORATORY THORACOTOMY, MEDIASTINAL LYMPH NODE DISECTION, FLEXIBLE BRONCHOSCOPY performed by Desirae Arce MD at [address] [facility]:    No Known Allergies     Current Medications:   Current Facility-Administered Medications: atorvastatin (LIPITOR) tablet 40 mg, 40 mg, Oral, Nightly  glucose chewable tablet 16 g, 4 tablet, Oral, PRN  dextrose bolus 10% 125 mL, 125 mL, IntraVENous, PRN **OR** dextrose bolus 10% 250 mL, 250 mL, IntraVENous, PRN  glucagon (rDNA) injection 1 mg, 1 mg, SubCUTAneous, PRN  dextrose 10 % infusion, , IntraVENous, Continuous PRN  insulin lispro (HUMALOG) injection vial 0-4 Units, 0-4 Units, SubCUTAneous, TID WC  insulin lispro (HUMALOG) injection vial 0-4 Units, 0-4 Units, SubCUTAneous, Nightly  aspirin chewable tablet 81 mg, 81 mg, Oral, Daily  clopidogrel (PLAVIX) tablet 75 mg, 75 mg, Oral, Daily  [Held by provider] amLODIPine (NORVASC) tablet 5 mg, 5 mg, Oral, Daily  finasteride (PROSCAR) tablet 5 mg, 5 mg, Oral, Daily  [Held by provider] losartan (COZAAR) tablet 25 mg, 25 mg, Oral, Daily  pantoprazole (PROTONIX) tablet 40 mg, 40 mg, Oral, QAM AC  ondansetron (ZOFRAN-ODT) disintegrating tablet 4 mg, 4 mg, Oral, Q8H PRN **OR** ondansetron (ZOFRAN) injection 4 mg, 4 mg, IntraVENous, Q6H PRN  polyethylene glycol (GLYCOLAX) packet 17 g, 17 g, Oral, Daily PRN  0.9 % sodium chloride infusion, , IntraVENous, Continuous  labetalol (NORMODYNE;TRANDATE) injection syringe 10 mg, 10 mg, IntraVENous, Q10 Min PRN  amoxicillin-clavulanate (AUGMENTIN) 875-125 MG per tablet 1 tablet, 1 tablet, Oral, 2 times per day  ipratropium-albuterol (DUONEB) nebulizer solution 1 ampule, 1 ampule, Inhalation, Q4H PRN  ipratropium-albuterol (DUONEB) nebulizer solution 1 ampule, 1 ampule, Inhalation, BID    Social History:  Social History     Socioeconomic History    Marital status:      Spouse name: Not on file    Number of children: Not on file    Years of education: Not on file    Highest education level: Not on file   Occupational History    Not on file   Tobacco Use    Smoking status: Former     Packs/day: 1.00     Years: 40.00     Pack years: 40.00     Types: Cigarettes     Start date: 26     Quit date: 2/28/2019     Years since quitting: 3.6    Smokeless tobacco: Never    Tobacco comments:     about 5 cigs per day   Vaping Use    Vaping Use: Never used   Substance and Sexual Activity    Alcohol use: No    Drug use: No    Sexual activity: Not on file   Other Topics Concern    Not on file   Social History Narrative    Not on file     Social Determinants of Health     Financial Resource Strain: Not on file   Food Insecurity: Not on file   Transportation Needs: Not on file   Physical Activity: Not on file   Stress: Not on file   Social Connections: Not on file   Intimate Partner Violence: Not on file   Housing Stability: Not on file     SOCIAL HISTORY:   Living Arrangements: Lives at home with significant other. Work History:  Works part time  Education Level: High school graduate  Driving Status: Active   Finance Management: Independent  Medication Management: Independent  ADL's: Independent.    Hobbies: Fishing, hiking, watching sports  Vision Status: Glasses  Hearing: WFL  Type of Home: House  Home Layout: One level  Home Access: Stairs to enter without rails  Entrance Stairs - Number of Steps: 2  Home Equipment: Elby Syed, 4 wheeled      Family History:       Problem Relation Age of Onset    Diabetes Mother     High Blood Pressure Mother     Brain Cancer Mother     Heart Disease Father     Diabetes Father High Blood Pressure Father     Heart Attack Father     Diabetes Sister     High Blood Pressure Sister     COPD Sister         Agent Orange    Diabetes Brother     High Blood Pressure Brother        Review of Systems:  CONSTITUTIONAL:  positive for  fatigue  EYES:  positive for chronic visual disturbance, denies new visual issues  HEENT:  negative  RESPIRATORY:  positive for cough  CARDIOVASCULAR:  negative  GASTROINTESTINAL:  positive for dysphagia, negative for abdomin pain, nausea, vomiting, upset stomach  GENITOURINARY:  BPH with urinary retention  SKIN:  negative  HEMATOLOGIC/LYMPHATIC:  negative  MUSCULOSKELETAL:  positive for  muscle weakness  NEUROLOGICAL:  positive for speech problems, coordination problems, gait problems, dysphagia, and weakness  negative for headaches, dizziness, numbness, pain, and tingling  BEHAVIOR/PSYCH:  negative for anxiety  System review otherwise negative    Physical Exam:  /88   Pulse (!) 103   Temp 98.6 °F (37 °C) (Oral)   Resp 20   Ht 5' 8\" (1.727 m)   Wt 190 lb (86.2 kg)   SpO2 92%   BMI 28.89 kg/m²   awake  Orientation:   person, place, time  Mood: within normal limits  Affect: calm  General appearance: Patient is well nourished, well developed, well groomed and in no acute distress, appearing stated age    Memory:   normal with conversation  Attention/Concentration: normal  Language:  abnormal - slight dysarthria     Cranial Nerves:  II: Visual fields:  normal  III,IV,VI: Extra Ocular Movements: intact  VII: Facial strength: abnormal right facial droop  ROM:  normal  Motor Exam:  Motor exam is 4- out of 5 right upper and 4+ out of 5 right lower extremities  Motor exam is 5 out of 5 left upper and left lower extremities  Tone:  normal  Muscle bulk: within normal limits  Sensory:  Sensory intact  Coordination:   abnormal - RUE and RLE    Heart: normal rate, regular rhythm, normal S1, S2, no murmurs, rubs, clicks or gallops  Lungs: clear to auscultation without wheezes or rales  Abdomen: soft, non-tender, non-distended, normal bowel sounds, no masses or organomegaly    Skin: warm and dry, no rash or erythema  Peripheral vascular: Pulses: Normal upper and lower extremity pulses; Edema: no      Diagnostics:  Recent Results (from the past 24 hour(s))   Platelet L5N94 Inhibition    Collection Time: 10/31/22 12:45 PM   Result Value Ref Range    P2Y12 ASSAY 125 (L) 180 - 376 PRU   POCT glucose    Collection Time: 10/31/22  5:24 PM   Result Value Ref Range    POC Glucose 83 70 - 108 mg/dl   POCT glucose    Collection Time: 10/31/22  7:34 PM   Result Value Ref Range    POC Glucose 91 70 - 108 mg/dl   Lipid Panel    Collection Time: 11/01/22  5:31 AM   Result Value Ref Range    Cholesterol, Total 169 100 - 199 mg/dL    Triglycerides 224 (H) 0 - 199 mg/dL    HDL 34 mg/dL    LDL Calculated 90 mg/dL   Hemoglobin A1C    Collection Time: 11/01/22  5:31 AM   Result Value Ref Range    Hemoglobin A1C 7.4 (H) 4.4 - 6.4 %    AVERAGE GLUCOSE 162 (H) 70 - 126 mg/dL   Basic Metabolic Panel    Collection Time: 11/01/22  5:31 AM   Result Value Ref Range    Sodium 140 135 - 145 meq/L    Potassium 4.9 3.5 - 5.2 meq/L    Chloride 105 98 - 111 meq/L    CO2 24 23 - 33 meq/L    Glucose 137 (H) 70 - 108 mg/dL    BUN 12 7 - 22 mg/dL    Creatinine 0.9 0.4 - 1.2 mg/dL    Calcium 8.4 (L) 8.5 - 10.5 mg/dL   Troponin    Collection Time: 11/01/22  5:31 AM   Result Value Ref Range    Troponin T < 0.010 ng/ml   Glomerular Filtration Rate, Estimated    Collection Time: 11/01/22  5:31 AM   Result Value Ref Range    Est, Glom Filt Rate >60 >60 ml/min/1.73m2   Anion Gap    Collection Time: 11/01/22  5:31 AM   Result Value Ref Range    Anion Gap 11.0 8.0 - 16.0 meq/L   EKG 12 lead    Collection Time: 11/01/22  6:05 AM   Result Value Ref Range    Ventricular Rate 89 BPM    Atrial Rate 89 BPM    P-R Interval 144 ms    QRS Duration 116 ms    Q-T Interval 378 ms    QTc Calculation (Bazett) 459 ms    P Axis 66 degrees R Axis -72 degrees    T Axis 5 degrees   POCT glucose    Collection Time: 11/01/22  6:38 AM   Result Value Ref Range    POC Glucose 131 (H) 70 - 108 mg/dl   POCT glucose    Collection Time: 11/01/22 11:17 AM   Result Value Ref Range    POC Glucose 212 (H) 70 - 108 mg/dl     MRI BRAIN 10/31/22  Impression       1. Area of restricted diffusion in the left parietal periventricular white matter with corresponding diminished signal intensity on ADC map consistent with an acute infarct. 2. Mild atrophy and probable ischemic changes in the white matter. 3. Otherwise negative MRI scan of the brain. Impression:  Acute left corona radiata CVA  Right facial droop  Right hemiparesis   Dysphagia   Cognitive deficits  Type 2 diabetes with nephropathy, uncontrolled   Hypertension  Hyperlipidemia  COPD Gold stg II  CAD  BPH with urinary retention  Hx RUL malignant neoplasm with Right VATS wedge resection x 2  2021  Apical abscess of the left maxillary first premolar, Augmentin until dental follow up    Recommendations:  Continue current therapies  Dysphagia diet: soft and bite sized with small sips fluids  CVA: ASA 81 and Plavix 75mg daily, lipitor 40mg daily  Feel patient is appropriate and can tolerate IPR when patient is medically ready and CVA workup is complete. Patient will not require a precert and is aware of the 80/20 coverage with medicare as he does not have a secondary insurance.     Patient is appropriate for IPR for the diagnosis of CVA  Patient require active and ongoing intervention of multiple therapy disciplines of PT, OT, and SLP   Patient can participate in and does require an intensive rehabilitation therapy program, generally consisting of 3 hours of therapy per day at least 5 days per week  Patient is expected to actively participate in, and benefit significantly from, the intensive rehabilitation therapy program (the patients condition and functional status are such that the patient can reasonably be expected to make measurable improvement, expected to be made within a prescribed period of time and as a result of the intensive rehabilitation therapy program, that will be of practical value to improve the patients functional capacity or adaptation to impairments)  Patient requires physician supervision by a rehabilitation physician, with face-to-face visits at least 3 days per week to assess the patient both medically and functionally and to modify the course of treatment as needed. Medical conditions to include for management include : dysphagia, dysarthria, cognitive deficits, HTN, DM type II, hemiparesis, BPH with urinary retention. Require an intensive and coordinated interdisciplinary team approach to the delivery of rehabilitative care. It was my pleasure to evaluate [facility] today. Please call with questions.     RICH Dewey - CNP

## 2022-11-01 NOTE — PROGRESS NOTES
Dada Dan 60  INPATIENT OCCUPATIONAL THERAPY  [facility] NEUROSCIENCES 4A  EVALUATION    Time:   Time In: 5098  Time Out: 1116  Timed Code Treatment Minutes: 24 Minutes  Minutes: 32        Date: 2022  Patient Name: Anival Gray,   Gender: male      MRN: [id number]  : 1950  (67 y.o.)  Referring Practitioner: Shoshana Sepulveda MD  Diagnosis: Stroke determined by clinical assessment  Additional Pertinent Hx: Patient is a 77-year-old male with a past medical history of urinary retention, hypertension, hyperlipidemia, chronic obstructive pulmonary disease, arthritis, enlarged prostate, stage II lung cancer with last known surgery and chemotherapy in 2019, and coronary artery disease on Plavix and aspirin with stent to the circumflex on 3/12/2019 who presents with a chief complaint of right-sided facial droop and right upper extremity numbness and her managing primarily for acute left parietal stroke.     Restrictions/Precautions:  Restrictions/Precautions: General Precautions, Fall Risk  Position Activity Restriction  Other position/activity restrictions: R hemibody weakness    Subjective  Chart Reviewed: Yes  Patient assessed for rehabilitation services?: Yes    Subjective: Pt approached laying in bed, pt pleasant and agreeable to therapy    Pain: no pain reported    Vitals: Vitals not assessed per clinical judgement, see nursing flowsheet    Social/Functional History:  Lives With: Significant other  Type of Home: House  Home Layout: One level  Home Access: Stairs to enter without rails  Entrance Stairs - Number of Steps: 2  Home Equipment: Marilyne Casiano, 4 wheeled   Bathroom Shower/Tub: Tub/Shower unit  Bathroom Toilet: Handicap height  Bathroom Accessibility: Walker accessible    Receives Help From: Family  ADL Assistance: Independent  Homemaking Assistance: Independent  Ambulation Assistance: Independent  Transfer Assistance: Independent    Active : Yes  Mode of Transportation: Car  Occupation: Retired       VISION:WFL    HEARING:  WFL    COGNITION: Slow Processing, Inattention, Decreased Problem Solving, Decreased Safety Awareness, Impulsive, and Tangential    RANGE OF MOTION:  Right Upper Extremity: Impaired - due to stroke  Left Upper Extremity:  WFL    STRENGTH:  Right Upper Extremity: Impaired - see hand assessment   Left Upper Extremity:  WFL    HAND ASSESSMENT  Hand Dominance: Left  Left Hand Strength -  (lbs)  Handle Setting 2: 54, 58, 50, av: 54  Right Hand Strength -  (lbs)  Handle Setting 2: 7, 9, 10, av 8.7  Fine Motor Skills  Left 9-Hole Peg Test: Functional  Left 9 Hole Peg Test Time (secs): 33.65  Right 9-Hole Peg Test: Impaired  Right 9 Hole Peg Test Time (secs): 102 (unable to complete, pt frustrated)  Fine Motor Comment: Pt unable to maintain pincer grasp     SENSATION:   WFL    ADL:   No ADL's completed this session. Melo Jurado BALANCE:  Sitting Balance:  Contact Guard Assistance. Seated EOB  Standing Balance: Minimal Assistance. With RW, R sided ataxia with R foot drift    BED MOBILITY:  Supine to Sit: Minimal Assistance with RLE    TRANSFERS:  Sit to Stand:  Minimal Assistance. From EOB  Stand to Sit: Minimal Assistance. To EOB    FUNCTIONAL MOBILITY:  Assistive Device: Rolling Walker  Assist Level:  Minimal Assistance. Distance:  Bed to recliner, about 4 steps  Pt demonstrated R hand supination while holding onto walker, continue monitoring for need of built up handle vc walker splint. Pt demonstrated RLE ataxia and required vc to not drag his foot. Activity Tolerance:  Patient tolerance of  treatment: good. Assessment:  Assessment: Pt presented with listed deficits. Pt demonstrates ataxia with R sided foot drift. Pt able to ambulate with RW and min A x1 from bed to recliner. Pt required min A with bed mobility due to R sided weakness. Pt CGA to sit EOB with decreased endurance/tolerance to sit EOB. Pt demonstrates decreased strength and [email or web address]  Pt will benefit from continued OT services to increase endurance, strength, R sided awareness and indep in ADLs/IADLs. Performance deficits / Impairments: Decreased functional mobility , Decreased safe awareness, Decreased balance, Decreased ADL status, Decreased ROM, Decreased endurance, Decreased high-level IADLs, Decreased strength, Decreased fine motor control  Prognosis: Good  REQUIRES OT FOLLOW-UP: Yes  Decision Making: Medium Complexity    Treatment Initiated: Treatment and education initiated within context of evaluation. Evaluation time included review of current medical information, gathering information related to past medical, social and functional history, completion of standardized testing, formal and informal observation of tasks, assessment of data and development of plan of care and goals. Treatment time included skilled education and facilitation of tasks to increase safety and independence with ADL's for improved functional independence and quality of life. Discharge Recommendations:  IP Rehab    Patient Education:  Patient Education  Education Given To: Patient  Education Provided: Role of Therapy, Plan of Care, Transfer Training, Energy Conservation, Fall Prevention Strategies, ADL Adaptive Strategies  Education Method: Verbal  Barriers to Learning: None  Education Outcome: Verbalized understanding, Continued education needed    Equipment Recommendations: Other: Possible need for built up handle or walker splint for RUE    Plan:  Times Per Week: 5x  Times Per Day: Once a day  Current Treatment Recommendations: Strengthening, ROM, Balance training, Functional mobility training, Endurance training, Neuromuscular re-education, Pain management, Safety education & training, Patient/Caregiver education & training, Positioning, Self-Care / ADL, Home management training. See long-term goal time frame for expected duration of plan of care.   If no long-term goals established, a short length of stay is anticipated. Goals:  Patient goals : return to PLOF, get R sided function back  Short Term Goals  Time Frame for Short Term Goals: by discharge  Short Term Goal 1: Pt will complete oral hygiene routine while using R side for >/= 50% of task and CGA to increase indep with self-care  Short Term Goal 2: Pt will complete functional mobility, household distances, with SBA and 0-2 VC for R sided awareness to increase indep with grooming  Short Term Goal 3: Pt will complete dynamic standing with 2 UE release, SBA and 0-2 VC for R sided awareness to increase indep with hygiene  Short Term Goal 4: Pt will complete tolieting, including transfer, with CGA and 0-2 VC for R sided awareness to increase indep with tolieting    Following session, patient left in safe position with all fall risk precautions in place.

## 2022-11-01 NOTE — PROGRESS NOTES
[address] [address]  INPATIENT PHYSICAL THERAPY  EVALUATION  [facility] 4A - 4A-12/012-A    Time In: 9460  Time Out: 0825  Timed Code Treatment Minutes: 8 Minutes  Minutes: 17          Date: 2022  Patient Name: Liborio Calloway,  Gender:  male        MRN: [id number]  : 1950  (67 y.o.)      Referring Practitioner: Dr. Hugo Tolentino  Diagnosis: stroke determined by clinical assessment  Additional Pertinent Hx: Patient is a 68-year-old male with a past medical history of urinary retention, hypertension, hyperlipidemia, chronic obstructive pulmonary disease, arthritis, enlarged prostate, stage II lung cancer with last known surgery and chemotherapy in 2019, and coronary artery disease on Plavix and aspirin with stent to the circumflex on 3/12/2019 who presents with a chief complaint of right-sided facial droop and right upper extremity numbness and her managing primarily for acute left parietal stroke.      Restrictions/Precautions:  Restrictions/Precautions: General Precautions, Fall Risk  Position Activity Restriction  Other position/activity restrictions: R hemibody weakness    Subjective:  Chart Reviewed: Yes  Patient assessed for rehabilitation services?: Yes  Subjective: pleasant, cooperative, motivated    General:                Pain: no pain per pt    Vitals: Vitals not assessed per clinical judgement, see nursing flowsheet    Social/Functional History:    Lives With: Significant other  Type of Home: House  Home Layout: One level  Home Access: Stairs to enter without rails  Entrance Stairs - Number of Steps: 2  Home Equipment: Blanka , 4 wheeled     Bathroom Shower/Tub: Tub/Shower unit  Bathroom Toilet: Handicap height  Bathroom Accessibility: Walker accessible    Receives Help From: Family  ADL Assistance: Independent  Homemaking Assistance: Independent  Ambulation Assistance: Independent  Transfer Assistance: Independent    Active : Yes  Mode of Transportation: Car  Occupation: Retired       OBJECTIVE:  Range of Motion:  Bilateral Lower Extremity: WFL    Strength:  Right Lower Extremity: grossly >/=3/5, with functional mobility weaker than LLE  Left Lower Extremity: WFL    Balance:  Static Sitting Balance:  Stand By Assistance  Static Standing Balance: Contact Guard Assistance, without UE support, reaching shoulder to waist level, min unsteady WBOS, with NBOS pt had LOB with Gary to correct, pt leaned to right  Dynamic Standing Balance: Minimal Assistance, to CGA with 0-1 UE support, reaching in GALLO    Bed Mobility:  Rolling to Left: Stand By Assistance   Supine to Sit: Contact Guard Assistance  Scooting: Contact Guard Assistance  HOB up 30 degrees, inc time to complete  Transfers:  Sit to Stand: Minimal Assistance, first trial, CGA second trial  Stand to Sit:Contact Guard Assistance    Ambulation:  Minimal Assistance, to CGA  Distance: 10'x1, 30'x1  Surface: Level Tile  Device:No Device  Gait Deviations:  Slow Sofie, Decreased Step Length on Right, Decreased Arm Swing, Decreased Heel Strike on Right, Wide Base of Support, Mild Path Deviations, and Unsteady Gait        Functional Outcome Measures: Completed  Balance Score: 3  Gait Score: 3  Tinetti Total Score: 6  Risk Indicators:  Less than/equal to 18 = high risk  19-23 Moderate risk  Greater than/equal to 24 = low risk   -PAC Inpatient Mobility Raw Score : 18  -PAC Inpatient T-Scale Score : 43.63    ASSESSMENT:  Activity Tolerance:  Patient tolerance of  treatment: good. Treatment Initiated: Treatment and education initiated within context of evaluation. Evaluation time included review of current medical information, gathering information related to past medical, social and functional history, completion of standardized testing, formal and informal observation of tasks, assessment of data and development of plan of care and goals.   Treatment time included skilled education and facilitation of tasks to increase safety and independence with functional mobility for improved independence and quality of life. Assessment: Body Structures, Functions, Activity Limitations Requiring Skilled Therapeutic Intervention: Decreased functional mobility , Decreased endurance, Decreased balance, Decreased strength  Assessment: pt s/p stroke with right sided weakness, pt with Tinneti score of 6 demo high fall risk, Emilee Willis is a 67 y.o. male that presents with stroke. he is ind prior to admission now requiring assist for basic mobility. Pt demonstrates a decrease in baseline by way of bed mobility, transfers and ambulation secondary to decreased activity tolerance, strength, fatigue, and balance deficits. Pt will benefit from skilled PT services throughout admission and beyond hospital discharge for improvements in functional mobility and in order to decrease fall risk and return pt to OF. Therapy Prognosis: Excellent    Requires PT Follow-Up: Yes    Discharge Recommendations:  Discharge Recommendations: Continue to assess pending progress, IP Rehab, Patient would benefit from continued therapy after discharge    Patient Education:      . Patient Education  Education Given To: Patient  Education Provided: Role of Therapy, Plan of Care  Education Method: Verbal  Barriers to Learning: None  Education Outcome: Verbalized understanding       Equipment Recommendations:   Other: cont to assess needs    Plan:  Specific Instructions for Next Treatment: therex, mobility, balance activities  General Plan:  (6X N)  Specific Instructions for Next Treatment: therex, mobility, balance activities    Goals:  Patient Goals : be independent again  Short Term Goals  Time Frame for Short Term Goals: by discharge  Short Term Goal 1: bed mobility with MOD I to get in/out of bed  Short Term Goal 2: transfer with MOD I to get in/out of chairs  Short Term Goal 3: amb >75'x1 without AD and MOD I to walk safely in home  Short Term Goal 4: negotiate 2 step without HR and MOD I to enter home safely  Short Term Goal 5: improve Tinneti score to >/=24 to demonstrate dec fall risk for safe functional mobility  Long Term Goals  Time Frame for Long Term Goals : no LTGs set secondary to short ELOS    Following session, patient left in safe position with all fall risk precautions in place.

## 2022-11-01 NOTE — CARE COORDINATION
Case Management Assessment  Initial Evaluation    Date/Time of Evaluation: 11/1/2022 12:10 PM  Assessment Completed by: Prachi Restrepo RN    If patient is discharged prior to next notation, then this note serves as note for discharge by case management. Patient Name: Jenna Saldana                   YOB: 1950  Diagnosis: Stroke determined by clinical assessment [facility]) [I63.9]                   Date / Time: 10/31/2022 10:42 AM    Patient Admission Status: Inpatient     Current PCP: Angel Santiago. Alicia Jj MD  PCP verified by CM? Yes    Chart Reviewed: Yes      Patient Orientation: Alert and Oriented    Patient Cognition: Alert  History Provided by: Patient    Hospitalization in the last 30 days (Readmission):  No    If yes, Readmission Assessment in CM Navigator will be completed. Advance Directives:     Code Status: Full Code     Primary Decision Maker: Tameka Meléndez - Other - [phone]    Secondary Decision Maker: Kristy Blanchard Child - [phone]    Supplemental (Other) Decision Maker: Erick Miller - Child - [phone]    Discharge Planning  Patient lives with: Spouse/Significant Other Type of Home: House   Primary Caregiver: Self  Patient Support Systems include: Spouse/Significant Other   Current Financial resources: Medicare  Current community resources:    Current services prior to admission: None   Type of Home Care services:  None    ADLS  Prior functional level: Independent in ADLs/IADLs  Current functional level: Assistance with the following:, Bathing, Dressing, Toileting      Family can provide assistance at DC: Yes  Would you like Case Management to discuss the discharge plan with any other family members/significant others, and if so, who?     Plans to Return to Present Housing: Unknown at present  Other Identified Issues/Barriers to RETURNING to current housing: Medical complications  Potential Assistance needed at discharge: N/A  Patient expects to discharge to: Unknown  Plan for transportation at discharge: Self    Financial  Payor: MEDICARE / Plan: MEDICARE PART A AND B / Product Type: *No Product type* /     Does insurance require precert for SNF: No    Potential assistance Purchasing Medications: No  Meds-to-Beds request: Yes      [facility] [address] - [place], [address] [phone] [address] [facility] [address]  Phone: [phone] Fax: [phone]    [address], [place] - [address] [place]  [address] [place] [address] [phone] [place] [phone]  [address] [place]  [address] [place] [address]  Phone: [phone] Fax: [phone]      Factors facilitating achievement of predicted outcomes: Family support    Barriers to discharge: Medication managment  Procedures:  10/31 CXR 1. Small bilateral pleural effusion with bibasilar atelectasis. 10/31 MRI Brain WO Contrast 1. Area of restricted diffusion in the left parietal periventricular white matter with corresponding diminished signal intensity on ADC map consistent with an acute infarct. 2. Mild atrophy and probable ischemic changes in the white matter. 3. Otherwise negative MRI scan of the brain. Additional Case Management Notes: From ED, Palliative Care eval, telemetry, diabetes management, PT/OT/ST, Neurology consult. IV fluids, Augmentin, Asa, Plavix, Diabetes management, Duonebs, Protonix. The Plan for Transition of Care is related to the following treatment goals of Stroke determined by clinical assessment [facility]) [I63.9]    Patient Goals/Plan/Treatment Preferences: Met with \"Shekhar\". He currently lives at home with his significant other. He was independent. Discharge plan is unsure pending clinical course. Will follow Physiatry recommendations for potential needs. Will follow. Transportation/Food Security/Housekeeping Addressed:  No issues identified.      Adrianna Chris RN  Case Management Department

## 2022-11-01 NOTE — ACP (ADVANCE CARE PLANNING)
Advance Care Planning     Advance Care Planning Inpatient Note  [facility] [place] [facility] Department    Today's Date: 10/31/2022  Unit: [facility] 4A    Received request from IDT Member and patient. Upon review of chart and communication with care team, patient's decision making abilities are not in question. . Patient, Healthcare Decision Maker, and Child/Children was/were present in the room during visit. Goals of ACP Conversation:  Discuss advance care planning documents  Facilitate a discussion related to patient's goals of care as they align with the patient's values and beliefs. Health Care Decision Makers:       Primary Decision Maker: Casie Jacques - Other - [phone]    Secondary Decision Maker: Alka López - Child - [phone]    Supplemental (Other) Decision Maker: Hosea Kendall Child - [phone]  Summary:  Completed [address]      Advance Care Planning Documents (Patient Wishes):  Healthcare Power of /Advance Directive Appointment of Health Care Agent  Legal Guardianship Document     Assessment:   responded to Spiritual Care Consult to assist patient and family in completing Advance Directives. Encountered patient in room with one daughter. Significant other Ruthie entered as the conversation was being engaged. Patient was alert and oriented X 4. Good conversation was held around purpose of HCPOA and the role of agents and the importance of patient communication of his wishes to agents. Questions were addressed and clarified. Patient named his significant other Zachary Dewitt, who patient conveyed has been his domestic partner for 9 years as his primayr agent. Patient named two daughters as his additional agents. Nurse and  completed signatures with patient and witnessed the document/s. Copies given to family and one put in hard chart. Copy scanned to medical records.  offered prayer to family which was accepted. Appreciation for Advance directive work and for prayer and spiritual encouragement. Interventions:  Provided education on documents for clarity and greater understanding  Assisted in the completion of documents according to patient's wishes at this time  Encouraged ongoing ACP conversation with future decision makers and loved ones    Care Preferences Communicated:   Ventilation:   If the patient, in their present state of health, suddenly became very ill and unable to breathe on their own,     the patient would desire the use of a ventilator (breathing machine). If their health worsens and it becomes clear that the change of recovery is unlikely,     the patient would NOT desire the use of a ventilator (breathing machine). Resuscitation:  In the event the patient's heart stopped as a result of an underlying serious health condition, the patient communicates a preference for      resuscitative attempts (CPR). Outcomes/Plan:  New advance directive completed. Returned original document(s) to patient, as well as copies for distribution to appointed agents  Copy of advance directive given to staff to scan into medical record.     Electronically signed by Estela Subramanian on 10/31/2022 at 8:13 PM

## 2022-11-01 NOTE — FLOWSHEET NOTE
11/01/22 1116   Safe Environment   Safety Measures Other (comment)  (vn safety check complete)   Pt responds to audio permits video , pt setting up in the chair eating at this time , pt denied any needs or concerns at this time

## 2022-11-01 NOTE — PROGRESS NOTES
Hospitalist Progress Note      Patient:  Jose Sapp    Unit/Bed:4A-12/012-A  YOB: 1950  MRN: [id number]   Acct: [de-identified]   PCP: Alexander Starr. Jenna Chen MD  Date of Admission: 10/31/2022    Assessment/Plan:    #Acute L parietal stroke: Px R-sided facial droop, RUE numbness/weakness, and RLE weakness. CT head: negative. MRI obtained: L parietal stroke confirmed. Clopidogrel assay showed inhibition. Patient occasionally non-compliant with ASA at home. Neurology consulted. IPR consulted, accepted to inpatient rehab once stroke work-up finished. -BP <130/80  -DAPT with ASA/plavix  -A1c, lipid panel   -Telemetry  -IPR when cleared by neuro  #NIDDMII: A1c 8.0 8/24/22. Home meds held. -Continue lantus, low dose SS  -Hypoglycemia protocol, accu-check   #HTN: Noted. Received permissive HTN. -Home medications resumed  #HLD: Lipid panel on 3/23/2022 demonstrated total cholesterol 166, triglyceride level terms of 67, HDL of 38, and a calculated LDL of 75  -Lipitor 40mg daily  -F/u PCP   #COPD: GOLD stage II. Continued on DuoNebs, pulmonary toilet. #CAD: S/p PCI circ 3/2019.  -DAPT per above   #BPH: Noted. Continue finasteride inpatient, can resume dutasteride OP on d/c  #hx lung cancer: Noted. Last chemo 2019. #Apical abscess of L maxillary first premolar: Incidental on CT. Plan on augmentin until dentistry/oral surgery follow-up    Chief Complaint: R-sided facial droop, r-sided numbness/weakness    Initial H and P:-    \"Per patient report, last known well was at 5:30 AM on 10/31/2022. At around 7:30 AM the patient started experiencing symptoms of disequilibrium along with right upper extremity numbness/tingling. Therefore, patient was brought to the emergency room. Patient denies fevers, chills, headache, dizziness, chest pain, palpitations, abdominal pain, nausea, vomiting, diarrhea, dysuria, and hematuria.   Affirms shortness of breath which is chronic. Family affirms right upper extremity weakness, right lower extremity weakness, dysarthria, and right-sided facial droop which are all new. Affirms medical history as above. Initially affirmed that he takes all of his medications consistently. However, when the patient's fiancé pressed him on this issue, he stated that he only took the aspirin every other day. Affirms a heavy smoking history. Smoked a pack a day for 40 years and quit in 2/20/2019. Denies any affirms very minimal alcohol use. Denies any illicit drug use. My initial NIHSS of 6. Code stroke was called and neurology saw the patient. Patient was determined not to be a tPA candidate due to last known well being greater than 4.5 hours. Patient was given loading dose aspirin, loading dose Plavix, and was maintained on oral aspirin and oral Plavix. CT of the head without contrast on 10/31/2022 demonstrated stable mild global volume loss, and apical abscess associate with a left maxillary first premolar, and no other significant acute findings. CT of the head and neck on 10/31/2022 demonstrated small amount of plaque in the proximal right internal carotid artery; no significant hemodynamic stenosis of the right common and internal carotid arteries, no significant stenosis in the left common and internal carotid artery; atherosclerotic calcifications of the aortic arch and at the origin of the left subclavian artery; atherosclerotic calcifications of the cavernous segments of both internal carotid artery with no evidence of severe stenosis; atherosclerotic calcifications in the distal right vertebral artery with antegrade flow in the right and left vertebral arteries, and no other significant abnormalities.      MRI of the brain without contrast on 10/31/2022 demonstrated an area of diffusion in the left parietal periventricular white matter with corresponding diminished signal intensity on ADC map consistent with acute infarct; mild atrophy and probable mild severity chronic small vessel ischemic changes, and no other significant abnormalities. P2Y12 assay consistent with Plavix working effectively. Provider to provider discussion with neurology who again confirms the patient is not a tPA candidate. They also state that ischemic stroke was probably secondary to poorly controlled diabetes and hypertension. Will appropriately controlled diabetes and hypertension. Maintain on full stroke protocol as an inpatient including NIHSS checks, outpatient event monitor, 24 hours of permissive hypertension and then aggressive control of hypertension, telemetry, echocardiogram, and PT/OT/SLP evaluation. Neurology will continue to follow as an inpatient and as an outpatient. \" - Per HPI    Subjective (past 24 hours):   Patient seen and examined this AM. No acute complaints. States residual deficits improving. Denies CP, SOB, abdominal pain. IPR consulted, accepted patient. Past medical history, family history, social history and allergies reviewed again and is unchanged since admission. ROS (12 point review of systems completed. Pertinent positives noted.  Otherwise ROS is negative)     Medications:  Reviewed    Infusion Medications    dextrose      sodium chloride 75 mL/hr at 10/31/22 1717     Scheduled Medications    atorvastatin  80 mg Oral Nightly    insulin lispro  0-4 Units SubCUTAneous TID WC    insulin lispro  0-4 Units SubCUTAneous Nightly    aspirin  81 mg Oral Daily    clopidogrel  75 mg Oral Daily    [Held by provider] amLODIPine  5 mg Oral Daily    finasteride  5 mg Oral Daily    [Held by provider] losartan  25 mg Oral Daily    pantoprazole  40 mg Oral QAM AC    amoxicillin-clavulanate  1 tablet Oral 2 times per day    ipratropium-albuterol  1 ampule Inhalation BID     PRN Meds: glucose, dextrose bolus **OR** dextrose bolus, glucagon (rDNA), dextrose, ondansetron **OR** ondansetron, polyethylene glycol, labetalol, ipratropium-albuterol      Intake/Output Summary (Last 24 hours) at 11/1/2022 0717  Last data filed at 10/31/2022 2352  Gross per 24 hour   Intake --   Output 325 ml   Net -325 ml       Diet:  Diet NPO    Exam:    /80   Pulse 100   Temp 97.6 °F (36.4 °C) (Oral)   Resp 14   Ht 5' 8\" (1.727 m)   Wt 190 lb (86.2 kg)   SpO2 94%   BMI 28.89 kg/m²     General appearance: No apparent distress, appears stated age and cooperative. HEENT: Pupils equal, round, and reactive to light. Conjunctivae/corneas clear. Neck: Supple, with full range of motion. No jugular venous distention. Trachea midline. Respiratory:  Normal respiratory effort. Clear to auscultation, bilaterally without Rales/Wheezes/Rhonchi. Cardiovascular: Regular rate and rhythm with normal S1/S2 without murmurs, rubs or gallops. Abdomen: Soft, non-tender, non-distended with normal bowel sounds. Musculoskeletal: passive and active ROM x 4 extremities. Extremities: No peripheral edema noted  Skin: Skin color, texture, turgor normal.  No rashes or lesions. Neurologic:  Partial R lower face with R-sided facial drooping, R arm motor drift, R-sided numbness, RLE weakness  Psychiatric: Alert and oriented, thought content appropriate, normal insight  Capillary Refill: Brisk,< 3 seconds   Peripheral Pulses: +2 palpable, equal bilaterally     Labs:   Recent Labs     10/31/22  1050   WBC 8.2   HGB 15.2   HCT 45.3        Recent Labs     10/31/22  1050 11/01/22  0531    140   K 4.6 4.9    105   CO2 22* 24   BUN 19 12   CREATININE 1.0 0.9   CALCIUM 10.0 8.4*   PHOS 2.8  --      Recent Labs     10/31/22  1050   AST 24   ALT 20   BILITOT 0.2*   ALKPHOS 91     Recent Labs     10/31/22  1050   INR 0.89     No results for input(s): Gill Gonzalez in the last 72 hours.     Microbiology:    Blood culture #1: No results found for: BC    Blood culture #2:No results found for: BLOODCULT2    Organism:  Lab Results   Component Value Date/Time    ORG Enterobacter aerogenes 01/25/2019 12:00 PM         Lab Results   Component Value Date/Time    LABGRAM  04/19/2019 12:05 PM     performed on cytospun specimen  Rare segmented neutrophils observed. No bacteria seen. MRSA culture only:No results found for: [facility]    Urine culture: No results found for: LABURIN    Respiratory culture: No results found for: CULTRESP    Aerobic and Anaerobic :  No results found for: LABAERO  Lab Results   Component Value Date/Time    LABANAE No growth-preliminary  No growth   04/19/2019 12:05 PM       Urinalysis:      Lab Results   Component Value Date/Time    NITRU NEGATIVE 09/08/2021 02:50 AM    WBCUA 0-2 09/08/2021 02:50 AM    BACTERIA NONE SEEN 09/08/2021 02:50 AM    RBCUA 0-2 09/08/2021 02:50 AM    BLOODU TRACE 09/08/2021 02:50 AM    SPECGRAV 1.025 09/08/2021 02:50 AM    GLUCOSEU NEGATIVE 03/04/2021 08:58 AM       Radiology:  XR CHEST 1 VIEW   Final Result   1. Small bilateral pleural effusion with bibasilar atelectasis. **This report has been created using voice recognition software. It may contain minor errors which are inherent in voice recognition technology. **      Final report electronically signed by Dr Tressa Wiseman on 10/31/2022 12:48 PM      MRI BRAIN WO CONTRAST   Final Result       1. Area of restricted diffusion in the left parietal periventricular white matter with corresponding diminished signal intensity on ADC map consistent with an acute infarct. 2. Mild atrophy and probable ischemic changes in the white matter. 3. Otherwise negative MRI scan of the brain. **This report has been created using voice recognition software. It may contain minor errors which are inherent in voice recognition technology. **      Final report electronically signed by DR Bebeto Cavazos on 10/31/2022 12:14 PM      CTA HEAD W WO CONTRAST (CODE STROKE)   Final Result   1. Small amount of plaque in the proximal right internal carotid artery. No significant hemodynamic stenosis in the right common and internal carotid arteries. 2. No significant hemodynamic stenosis in the left common and internal carotid arteries. 3. Atherosclerotic calcification aortic arch and at the origin of the left subclavian artery. 4. Atherosclerotic calcification in the cavernous segments of both internal carotid arteries. No evidence of severe stenosis in the internal carotid arteries. 5. Atherosclerotic calcification in the distal right vertebral artery. There is antegrade flow in the right and left vertebral arteries. 6. Otherwise negative CTA of the [place]. **This report has been created using voice recognition software. It may contain minor errors which are inherent in voice recognition technology. **      Final report electronically signed by DR Baron Klein on 10/31/2022 12:27 PM      CTA NECK W WO CONTRAST (CODE STROKE)   Final Result   1. Small amount of plaque in the proximal right internal carotid artery. No significant hemodynamic stenosis in the right common and internal carotid arteries. 2. No significant hemodynamic stenosis in the left common and internal carotid arteries. 3. Atherosclerotic calcification aortic arch and at the origin of the left subclavian artery. 4. Atherosclerotic calcification in the cavernous segments of both internal carotid arteries. No evidence of severe stenosis in the internal carotid arteries. 5. Atherosclerotic calcification in the distal right vertebral artery. There is antegrade flow in the right and left vertebral arteries. 6. Otherwise negative CTA of the [place]. **This report has been created using voice recognition software. It may contain minor errors which are inherent in voice recognition technology. **      Final report electronically signed by DR Baron Klein on 10/31/2022 12:27 PM      CT HEAD WO CONTRAST   Final Result       1.  No evidence of an acute process in the brain. 2. Apical abscess associated with the left maxillary 1st premolar. These findings were telephoned to CEASAR Keys at 11:11 AM on 10/31/2022 . **This report has been created using voice recognition software. It may contain minor errors which are inherent in voice recognition technology. **      Final report electronically signed by Dr. Cathleen Hayes on 10/31/2022 11:14 AM        CTA HEAD W WO CONTRAST (CODE STROKE)    Result Date: 10/31/2022  PROCEDURE: CTA HEAD W WO CONTRAST, CTA NECK W WO CONTRAST CLINICAL INFORMATION: Stroke Symptoms. COMPARISON: CT scan of the brain obtained on the same day. . TECHNIQUE: 1 mm axial images were obtained through the head and neck after the fast bolus administration of contrast. A noncontrast localizer was obtained. 3-D reconstructions were performed on a dedicated 3-D workstation. These include multiplanar MPR images and multiplanar MIP images. Centerline reconstructions were obtained of the carotid systems. Isovue intravenous contrast was given. All carotid artery measurements are performed utilizing  Nascet criteria. This exam was analyzed using viz.  contact CT LVO. All CT scans at this facility use dose modulation, iterative reconstruction, and/or weight-based dosing when appropriate to reduce radiation dose to as low as reasonably achievable. FINDINGS: CTA NECK: Aortic arch and branches: Atherosclerotic calcification in the aortic arch and at the origin of the left subclavian artery. Right common carotid artery/ICA: There is a small amount of plaque in the proximal right internal carotid artery. There is no significant hemodynamic stenosis in the right common and internal carotid arteries. Left common carotid artery/ICA: There is no significant hemodynamic stenosis in the left common and internal carotid arteries. Vertebral arteries: There is antegrade flow in the right and left vertebral arteries.  CTA HEAD: Internal carotid arteries: Atherosclerotic calcification in the cavernous segments of the> left internal carotid arteries. No evidence of severe stenosis in the internal carotid arteries. . Middle cerebral arteries: Antegrade flow in both middle cerebral arteries. . Anterior cerebral arteries: Antegrade flow in the anterior cerebral arteries bilaterally. . Vertebral arteries: Atherosclerotic calcification in the distal right vertebral artery. Antegrade flow in the right and left vertebral arteries. Basilar artery: Antegrade flow in the basilar artery. . Superior cerebellar arteries: Antegrade flow in the right and left superior cerebellar arteries. . Posterior cerebral arteries: Antegrade flow in the posterior cerebral arteries bilaterally. No aneurysms, stenoses or occlusions are noted. The superior sagittal sinus, vein of Efren, internal cerebral veins, straight sinus, transverse sinuses and sigmoid sinuses are patent. Axial source data: Unremarkable. 1. Small amount of plaque in the proximal right internal carotid artery. No significant hemodynamic stenosis in the right common and internal carotid arteries. 2. No significant hemodynamic stenosis in the left common and internal carotid arteries. 3. Atherosclerotic calcification aortic arch and at the origin of the left subclavian artery. 4. Atherosclerotic calcification in the cavernous segments of both internal carotid arteries. No evidence of severe stenosis in the internal carotid arteries. 5. Atherosclerotic calcification in the distal right vertebral artery. There is antegrade flow in the right and left vertebral arteries. 6. Otherwise negative CTA of the [place]. **This report has been created using voice recognition software. It may contain minor errors which are inherent in voice recognition technology. ** Final report electronically signed by DR Gwen Marlow on 10/31/2022 12:27 PM    CT HEAD WO CONTRAST    Result Date: 10/31/2022  PROCEDURE: CT HEAD WO CONTRAST performed utilizing  Nascet criteria. This exam was analyzed using [facility].  contact CT LVO. All CT scans at this facility use dose modulation, iterative reconstruction, and/or weight-based dosing when appropriate to reduce radiation dose to as low as reasonably achievable. FINDINGS: CTA NECK: Aortic arch and branches: Atherosclerotic calcification in the aortic arch and at the origin of the left subclavian artery. Right common carotid artery/ICA: There is a small amount of plaque in the proximal right internal carotid artery. There is no significant hemodynamic stenosis in the right common and internal carotid arteries. Left common carotid artery/ICA: There is no significant hemodynamic stenosis in the left common and internal carotid arteries. Vertebral arteries: There is antegrade flow in the right and left vertebral arteries. CTA HEAD: Internal carotid arteries: Atherosclerotic calcification in the cavernous segments of the> left internal carotid arteries. No evidence of severe stenosis in the internal carotid arteries. . Middle cerebral arteries: Antegrade flow in both middle cerebral arteries. . Anterior cerebral arteries: Antegrade flow in the anterior cerebral arteries bilaterally. . Vertebral arteries: Atherosclerotic calcification in the distal right vertebral artery. Antegrade flow in the right and left vertebral arteries. Basilar artery: Antegrade flow in the basilar artery. . Superior cerebellar arteries: Antegrade flow in the right and left superior cerebellar arteries. . Posterior cerebral arteries: Antegrade flow in the posterior cerebral arteries bilaterally. No aneurysms, stenoses or occlusions are noted. The superior sagittal sinus, vein of Efren, internal cerebral veins, straight sinus, transverse sinuses and sigmoid sinuses are patent. Axial source data: Unremarkable. 1. Small amount of plaque in the proximal right internal carotid artery.  No significant hemodynamic stenosis in the right common and internal carotid arteries. 2. No significant hemodynamic stenosis in the left common and internal carotid arteries. 3. Atherosclerotic calcification aortic arch and at the origin of the left subclavian artery. 4. Atherosclerotic calcification in the cavernous segments of both internal carotid arteries. No evidence of severe stenosis in the internal carotid arteries. 5. Atherosclerotic calcification in the distal right vertebral artery. There is antegrade flow in the right and left vertebral arteries. 6. Otherwise negative CTA of the [place]. **This report has been created using voice recognition software. It may contain minor errors which are inherent in voice recognition technology. ** Final report electronically signed by  [facility] on 10/31/2022 12:27 PM    XR CHEST 1 VIEW    Result Date: 10/31/2022  PROCEDURE: XR CHEST 1 VIEW CLINICAL INFORMATION: stroke COMPARISON: Chest radiograph 3/29/2021 TECHNIQUE: Single frontal view of the chest performed. FINDINGS: No focal pulmonary consolidation. Cardiac silhouette is not enlarged. Small bilateral pleural effusions, left greater than right. Bibasilar atelectasis. No pneumothorax. Old left rib fractures seen. Bones are demineralized. Degenerative changes of the thoracic spine. No acute bony abnormality. 1. Small bilateral pleural effusion with bibasilar atelectasis. **This report has been created using voice recognition software. It may contain minor errors which are inherent in voice recognition technology. ** Final report electronically signed by Dr Aliya Carranza on 10/31/2022 12:48 PM    MRI BRAIN WO CONTRAST    Result Date: 10/31/2022  PROCEDURE: MRI BRAIN WO CONTRAST CLINICAL INFORMATION stroke alert. COMPARISON: CT scan of the brain and CTA obtained on the same day. MRI scan of the brain dated 3/2/2019. Talita Elmore  TECHNIQUE: Multiplanar and multiple spin echo MRI images were obtained of the brain without contrast. FINDINGS: The diffusion-weighted images demonstrate restricted diffusion in the left parietal periventricular white matter. There is corresponding diminished signal intensity on the ADC map. These findings are consistent with an acute infarct. .  The brain volume is slightly reduced. There is a mild amount of signal hyperintensity on the FLAIR and T2-weighted sequences in the white matter of the brain. This is consistent with mild severity chronic small vessel ischemic changes. There are no intra-or extra-axial collections. There is no hydrocephalus, midline shift or mass effect. There is mineralization in the medial aspects of the basal ganglia bilaterally. No other areas of susceptibility artifact are present. The major intracranial vascular flow voids are present. The midline craniocervical junction structures are normal.  The pituitary gland and brainstem are normal.      1. Area of restricted diffusion in the left parietal periventricular white matter with corresponding diminished signal intensity on ADC map consistent with an acute infarct. 2. Mild atrophy and probable ischemic changes in the white matter. 3. Otherwise negative MRI scan of the brain. **This report has been created using voice recognition software. It may contain minor errors which are inherent in voice recognition technology. ** Final report electronically signed by DR Melody Watson on 10/31/2022 12:14 PM      Electronically signed by David Hussein MD, PGY-2 on 11/1/2022 at 7:17 AM

## 2022-11-01 NOTE — PROGRESS NOTES
Dada Dan 60  OCCUPATIONAL THERAPY MISSED TREATMENT NOTE  [facility]      Date: 2022  Patient Name: Kailee Parisi        CSN: [id number]   : 1950  (67 y.o.)  Gender: male                REASON FOR MISSED TREATMENT:  Pt off floor for MBS. Will check back later as time allows.

## 2022-11-01 NOTE — PLAN OF CARE
Problem: Respiratory - Adult  Goal: Achieves optimal ventilation and oxygenation  Outcome: Progressing    Family mutually agreed on goals.

## 2022-11-01 NOTE — PROGRESS NOTES
Initial Evaluation          Patient:   Doris Acosta  YOB: 1950  Age:  67 y.o. Room:  [address] [place] [address]  MRN:  [id number]   Acct: [de-identified]    Date of Admission:  10/31/2022 10:42 AM  Date of Service:  11/1/2022  Completed By:  Efrain Coronel RN                 Reason for Palliative Care Evaluation:-             [x] Code Status Discussion              [] Goals of Care              [] Pain/Symptom Management               [x] Emotional Support              [] Other:                   Current Issues:-  []  Pain  []  Fatigue  []  Nausea  []  Anxiety  []  Depression  []  Shortness of Breath  []  Constipation  []  Appetite  [x]  Other:CVA with right sided weakness             Advance Directives:-  [] [place] DNR Form  [] Living Will  [x] Medical POA             Current Code Status:-  [x] Full Resuscitation  [] DNR-Comfort Care-Arrest  [] DNR-Comfort Care       [] Limited Resuscitation             [] No CPR            [] No shock            [] No ET intubation/reintubation            [] No resuscitative medications            [] Other limitation:              Palliative Performance Status:          [x] 60%  Ambulation reduced; Significant disease;Can't do hobbies/housework; intake normal or reduced; occasional assist; LOC full/confusion        [] 50%  Mainly sit/lie; Extensive disease; Can't do any work; Considerable assist; intake normal or reduced; LOC full/confusion        [] 40%  Mainly in bed; Extensive disease; Mainly assist; intake normal or reduced; LOC full/confusion         [] 30%  Bed Bound; Extensive disease; Total care; intake reduced; LOC full/confusion        [] 20%  Bed Bound; Extensive disease; Total care; intake minimal; Drowsy/coma        [] 10%  Bed Bound; Extensive disease;  Total care; Mouth care only; Drowsy/coma        [] 0  Death        Goals of care evaluation:-        The patient goals of care are to provide comfort care/supportive services/palliation & relieve suffering:  Goals of care discussed with:  [x] Patient independently  [] Patient and Family  [] Family or Healthcare DPOA independently  [] Unable to discuss with patient, family/DPOA not present         Family/Patient Discussion:  Spoke with Shekhar at bedside. He confirms his HCPOA as Ruthie. We discussed goals of care and plans for rehab. Lance Rodriguez stated the plan of for inpatient rehab, then return home. Discussed code status. Lance Rodriguez stated he wishes to be resuscitated. He will remain a FULL code. Plan/Follow-Up:  Lance Rodriguez wishes to continue as a full code. Please call palliative care if new needs or questions arise.         Electronically signed by Capri Brown RN on 11/1/2022 at 11:53 AM           Palliative Care Office: [phone]

## 2022-11-01 NOTE — PROGRESS NOTES
[facility]  SPEECH THERAPY  [facility] 4A  Modified Barium Swallow    SLP Individual Minutes  Time In: 0945  Time Out: 1000  Minutes: 15  Timed Code Treatment Minutes: 0 Minutes       Date: 2022  Patient Name: Rosemary Zavala      CSN: [id number]   : 1950  (67 y.o.)  Gender: male   Referring Physician:  Ursula Terry MD  Diagnosis: CVA  Precautions: Fall risk  History of Present Illness/Injury: Patient admitted to [address] [place] [address] with the above med dx; per chart review, Nicko Tan is a 67 y.o. male with a history of HTN, HLD, DM, CAD, adenocarcinoma of the lung, chronic renal failure who presents to [address] [place] [address] ED complaining of right sided facial droop, right sided weakness and difficulty ambulating at home prior to presentation. Patient states he kept bumping into the walls while trying to ambulate in his home this AM. LKW approximately 2200 10/30 prior to going to bed. POC glucose on arrival: 257. Initial NIH 4: 1 for right sided facial droop, 1 for RLE drift, 1 for RUE ataxia, and 1 for dysarthria. No RUE drift noted on my initial exam. Patient not a candidate for TNK due to being out of the window for administration. Patient on ASA 81 mg and Plavix 75 mg as outpatient, although patient admits to taking ASA every other day secondary to easy bruising he was experiencing. He reports compliance with Plavix and Lipitor 20 mg nightly. He denies prior history of stroke. Reports chronic right sided visual disturbance of a \"black speck\" in the center of his visual field on the right side, but is able to see in the periphery. BP in /94. Denies any sensory changes. He is not aphasic. He denies dizziness, headache, difficulty expressing speech, numbness or tingling, tremors. Denies chest pain, SOB, recent fevers/illness. Denies seizure history. Patient reports extensive smoking history in the past, but reports quitting in 2019 due to hx of lung cancer. \"     MRI Brain WO Contrast 10/31/22  Impression       1. Area of restricted diffusion in the left parietal periventricular white matter with corresponding diminished signal intensity on ADC map consistent with an acute infarct. 2. Mild atrophy and probable ischemic changes in the white matter. 3. Otherwise negative MRI scan of the brain. Patient has a past medical history of Anxiety, Arthritis, CAD (coronary artery disease), Cancer ([address] COPD (chronic obstructive pulmonary disease) ([facility] [address] Diabetes ([address] Enlarged prostate with urinary retention, Gait difficulty, Generalized weakness, Hyperlipidemia, Hypertension, Lesion of bladder, Osteoarthritis, Retention of urine, Right inguinal hernia, S/P cardiac catheterization, S/P TURP, SOB (shortness of breath), and Voiding difficulty. ST consulted to complete formal instrumentation via MBS to further evaluate pharyngeal integrity to update POC as appropriate. Current Diet: Soft and bite sized diet, thin liquids    Pain: No pain reported. SUBJECTIVE:  Patient with arrival to fluoro suite alert and oriented. Patient agreeable to participate in formal instrumentation. OBJECTIVE:    Respiratory Status:  Room Air    Behavioral Observation:  Alert and Oriented    PATIENT WAS EVALUATED USING:  Barium: Thin Liquids, Mildly Thick Liquids, Puree, Soft Solids, Coarse Solids, and Mixed Consistency    ORAL PHASE NIURKA SCORE: (Dysphagia outcome and severity scale)  5 = Mild Dysphagia - May have one diet consistency restricted - Mild oral residue but clears    PHARYNGEAL PHASE NIURKA SCORE: (Dysphagia outcome and severity scale)  5 = Mild Dysphagia - may need one consistency restricted - May have one or more of the following: Aspiration with thin - cough to clear, Airway penetration midway to the vocal cords with one or more consistency or to the vocal folds with one consistency, but clears spontaneously - Residue in the pharynx clears spontaneously    EVIDENCE FOR LARYNGEAL PENETRATION AND/OR ASPIRATION:  No evidence of aspiration  Laryngeal penetration evident with thin liquids    PENETRATION-ASPIRATION SCALE (PAS): Thin Liquids: 2 = Material enters the airway, remains above vocal folds, and is ejected from the airway  Mildly Thick Liquids:  1 = Material does not enter the airway  Puree:  1 = Material does not enter the airway  Soft Solid:  1 = Material does not enter the airway  Mixed Consistencies: 1 = Material does not enter the airway  Hard Solid: 1 = Material does not enter the airway    ESOPHAGEAL PHASE:   No significant findings    ATTEMPTED TECHNIQUES:  Small Bolus Size Effective    Straw Effective    Cup Effective    Large Drinks Ineffective    Consecutive Drinks Ineffective    Chin Tuck Not Attempted    Head Turn Not Attempted    Spoon Presentations Not Attempted    Volitional Cough Not Attempted    Spontaneous Cough Not Attempted           DIAGNOSTIC IMPRESSIONS:  Patient presents with mild oropharyngeal dysphagia as evidenced by skill clinical findings outlined above. Presence of slow, munching pattern mastication likely d/t dentition creating difficulty breaking down hard solid textures. Bolus formation and control impaired s/t laryngeal penetration indicated before the swallow. Mild premature spillage to the valleculae with swallow onset timeliness characterized to be variable/inconsistent with appropriate hyolaryngeal elevation and excursion. Presence of min-mod residue in the valleculae s/t decreased tongue base retractation; statis partially cleared with completion of subsequent swallow+liquid assist.     Presence of laryngeal penetration with thin liquids only following consecutive and large drinks. Airway invasion occurrence reached midway to the vocal folds and was independently ejected with the progression of the swallow. Throughout entirety of controlled study, imaging did not endorse tracheal aspiration events.      Recommendations for soft and bite sized diet, thin liquids given ongoing challenges to oral phases abilities. Ongoing dysphagia services to be rendered to assist with potential achievement of baseline swallow function status/diet level within this hospital course. Diet Recommendations:  Soft and bite sized diet, thin liquids  Strategies:  Full Upright Position, Small Bite/Sip, Oral Care after all Meals, Intermittent Supervision, Alternate Solids and Liquids, and Limit Distractions   Rehabilitation Potential: good  Discharge Recommendations: Home with Home Exercise Program vs. Outpatient Speech Therapy    EDUCATION:  Learner: Patient  Education:  Reviewed results and recommendations of this evaluation, Reviewed diet and strategies, and Reviewed ST goals and Plan of Care  Evaluation of Education: Verbalizes understanding, Needs further instruction, and Family not present    PLAN:  Skilled SLP intervention on acute care 3-5 x per week or until goals met and/or pt plateaus in function. Specific interventions for next session may include: dysphagia management. PATIENT GOAL:    Did not state. Will further assess during treatment. SHORT TERM GOALS:  Short Term Goals  Time Frame for Short Term Goals: 2 weeks  Goal 1: Patient will consume soft and bite sized diet, thin liquids safely with the use of compensatory strategies to aid in nutrition/hydration needs. Goal 2: Patient will complete advanced texture PO trials with ST only for potential advancement to baseline diet. Goal 3: Patient will complete immediate/delayed recall and working memory tasks with 60% accuracy, mod cues to improve retention of functional information. Goal 4: Patient will complete complex problem solving, verbal/visual reasoning, and executive functioning tasks (money/finance, meds, time) with 70% accuracy, mod cues to improve completion for daily living and work scenerios.   Goal 5: Patient will complete thought organization and sequencing tasks (divergent/convergent naming) with 70% accuracy, mod cues to improve mental flexibility for daily living scenarios. Goal 6: Patient will complete structured speech drills/tasks (DDK rates, sentence repetition, verbal fluency) with implementation of SOS speech strategies to improve intelligibility to 80% or higher to maximize efficacy of communicative efficacy as it r/t speech production. LONG TERM GOALS:  No LTG d/t estimated short ELOS. Marilyn Vega.  Student Intern

## 2022-11-01 NOTE — PROGRESS NOTES
Neurology Progress Note    Date:11/1/2022       SEPZ:[id number]  Patient Name:Shazia Sher     YOB: 1950     Age:72 y.o. Subjective     HPI: Dung Rodríguez who is a 67 y.o. male with a history of HTN, HLD, DM, CAD, adenocarcinoma of the lung, chronic renal failure who presents to [address] [place] [address] ED complaining of right sided facial droop, right sided weakness and difficulty ambulating at home prior to presentation. Patient states he kept bumping into the walls while trying to ambulate in his home this AM. LKW approximately 2200 10/30 prior to going to bed. POC glucose on arrival: 257. Initial NIH 4: 1 for right sided facial droop, 1 for RLE drift, 1 for RUE ataxia, and 1 for dysarthria. No RUE drift noted on my initial exam. Patient not a candidate for TNK due to being out of the window for administration. Patient on ASA 81 mg and Plavix 75 mg as outpatient, although patient admits to taking ASA every other day secondary to easy bruising he was experiencing. He reports compliance with Plavix and Lipitor 20 mg nightly. He denies prior history of stroke. Reports chronic right sided visual disturbance of a \"black speck\" in the center of his visual field on the right side, but is able to see in the periphery. BP in /94. Denies any sensory changes. He is not aphasic. He denies dizziness, headache, difficulty expressing speech, numbness or tingling, tremors. Denies chest pain, SOB, recent fevers/illness. Denies seizure history. Patient reports extensive smoking history in the past, but reports quitting in 2019 due to hx of lung cancer. Last known well:  2200 10/30/22  Time of stroke alert:  65  Time of neurology arrival:  1049, pt arrival at 1056  Vascular risk factors:  HTN, HLD, DM, CAD, former smoker  Initial glucose: 257  Old deficits from prior stroke:  denies hx of CVA  INR in ED if anticoagulated:  not applicable.   Initial blood pressure:  177/94  Initial NIHSS: 4  Pre-morbid Modified Russell Scale:  1  Time of initial imaging read:  1111  Thrombolytic administered:  not indicated due to LKW outside window of administration  Thrombectomy performed:  not indicated. Puncture time:  not applicable. Interval history 11/1/22: The patient continues to have right-sided weakness and dysarthria. No new neurologic symptoms. Review of Systems   Review of Systems   Eyes:  Negative for visual disturbance. Neurological:  Positive for facial asymmetry, speech difficulty and weakness. Negative for dizziness, light-headedness, numbness and headaches. Medications   Scheduled Meds:    atorvastatin  80 mg Oral Nightly    insulin lispro  0-4 Units SubCUTAneous TID WC    insulin lispro  0-4 Units SubCUTAneous Nightly    aspirin  81 mg Oral Daily    clopidogrel  75 mg Oral Daily    [Held by provider] amLODIPine  5 mg Oral Daily    finasteride  5 mg Oral Daily    [Held by provider] losartan  25 mg Oral Daily    pantoprazole  40 mg Oral QAM AC    amoxicillin-clavulanate  1 tablet Oral 2 times per day    ipratropium-albuterol  1 ampule Inhalation BID     Continuous Infusions:    dextrose      sodium chloride 75 mL/hr at 10/31/22 1717     PRN Meds:   Medications Prior to Admission:   No current facility-administered medications on file prior to encounter. Current Outpatient Medications on File Prior to Encounter   Medication Sig Dispense Refill    Semaglutide,0.25 or 0.5MG/DOS, (OZEMPIC, 0.25 OR 0.5 MG/DOSE,) 2 MG/1.5ML SOPN Inject 0.25 mg into the skin once a week      atorvastatin (LIPITOR) 20 MG tablet Take 20 mg by mouth daily      amLODIPine (NORVASC) 5 MG tablet Take 5 mg by mouth daily       nitroGLYCERIN (NITROSTAT) 0.4 MG SL tablet up to max of 3 total doses.  If no relief after 1 dose, call 911. 25 tablet 3    glimepiride (AMARYL) 1 MG tablet TAKE 1 TABLET BY MOUTH ONE TIME A DAY (Patient not taking: Reported on 10/31/2022)      losartan (COZAAR) 25 MG tablet TAKE 1 TABLET BY MOUTH EVERY DAY (Patient not taking: Reported on 10/31/2022)      clopidogrel (PLAVIX) 75 MG tablet TAKE 1 TABLET BY MOUTH ONE TIME A DAY 30 tablet 11    Blood Glucose Monitoring Suppl (FREESTYLE FREEDOM LITE) w/Device KIT 1 Device daily  0    FREESTYLE LITE strip 1 strip daily  11    FREESTYLE LANCETS MISC 1 Stick daily  11    metFORMIN (GLUCOPHAGE) 500 MG tablet Take 500 mg by mouth 2 times daily (with meals) Take 1 tab by mouth at supper x2 weeks, then 1 tab in the morning and 1 tab at supper x2 weeks, then 1 tab in the morning and 2 tab at supper x2weeks, then 2 tabs twice a day      aspirin 81 MG chewable tablet Take 1 tablet by mouth daily 30 tablet 3    omeprazole (PRILOSEC) 20 MG delayed release capsule Take 20 mg by mouth daily      dutasteride (AVODART) 0.5 MG capsule Take 0.5 mg by mouth daily. Past History    Past Medical History:   has a past medical history of Anxiety, Arthritis, CAD (coronary artery disease), Cancer ([facility] [address] COPD (chronic obstructive pulmonary disease) ([facility] [address] Diabetes ([facility] [address] Enlarged prostate with urinary retention, Gait difficulty, Generalized weakness, Hyperlipidemia, Hypertension, Lesion of bladder, Osteoarthritis, Retention of urine, Right inguinal hernia, S/P cardiac catheterization, S/P TURP, SOB (shortness of breath), and Voiding difficulty. Social History:   reports that he quit smoking about 3 years ago. His smoking use included cigarettes. He started smoking about 51 years ago. He has a 40.00 pack-year smoking history. He has never used smokeless tobacco. He reports that he does not drink alcohol and does not use drugs.      Family History:   Family History   Problem Relation Age of Onset    Diabetes Mother     High Blood Pressure Mother     Brain Cancer Mother     Heart Disease Father     Diabetes Father     High Blood Pressure Father     Heart Attack Father     Diabetes Sister     High Blood Pressure Sister     COPD Sister         Agent Orange    Diabetes Brother     High Blood Pressure Brother        Physical Examination        Vitals:  /80   Pulse 100   Temp 97.6 °F (36.4 °C) (Oral)   Resp 14   Ht 5' 8\" (1.727 m)   Wt 190 lb (86.2 kg)   SpO2 94%   BMI 28.89 kg/m²   Temp (24hrs), Av.9 °F (36.6 °C), Min:97.5 °F (36.4 °C), Max:98.5 °F (36.9 °C)      I/O (24Hr): Intake/Output Summary (Last 24 hours) at 2022 0732  Last data filed at 10/31/2022 2352  Gross per 24 hour   Intake --   Output 325 ml   Net -325 ml       Physical Exam  Vitals (hypertensive) reviewed. Constitutional:       General: He is not in acute distress. Appearance: Normal appearance. He is not ill-appearing. HENT:      Head: Normocephalic and atraumatic. Right Ear: External ear normal.      Left Ear: External ear normal.      Nose: Nose normal.      Mouth/Throat:      Mouth: Mucous membranes are moist.      Pharynx: No oropharyngeal exudate or posterior oropharyngeal erythema. Eyes:      Extraocular Movements: Extraocular movements intact and EOM normal.      Pupils: Pupils are equal, round, and reactive to light. Musculoskeletal:         General: Normal range of motion. Right lower leg: No edema. Left lower leg: No edema. Skin:     General: Skin is warm. Findings: No rash. Neurological:      Mental Status: He is alert and oriented to person, place, and time. Coordination: Finger-Nose-Finger Test normal.   Psychiatric:         Mood and Affect: Mood normal.         Speech: Speech is slurred. Behavior: Behavior normal.     Neurologic Exam     Mental Status   Oriented to person, place, and time. Attention: normal. Concentration: normal.   Speech: slurred   Level of consciousness: alert  Normal comprehension. Cranial Nerves     CN II   Visual fields full to confrontation. CN III, IV, VI   Pupils are equal, round, and reactive to light. Extraocular motions are normal.   Right pupil: Shape: regular. Left pupil: Shape: regular.      CN V   Facial sensation intact. Right facial sensation deficit: none  Left facial sensation deficit: none    CN VII   Right facial weakness: central  Left facial weakness: none    CN VIII   CN VIII normal.   Hearing: intact    CN IX, X   CN IX normal.   CN X normal.   Palate: symmetric    CN XI   Right trapezius strength: weak  Left trapezius strength: normal    CN XII   CN XII normal.   Tongue: not atrophic  Fasciculations: absent  Tongue deviation: none    Motor Exam   Muscle bulk: normal  Overall muscle tone: normal  Right arm pronator drift: absent  Left arm pronator drift: absent  Motor strength:  RUE: 4/5  RLE: 5/5  LUE: 5/5  LLE: 5/5     Sensory Exam   Light touch normal.     Gait, Coordination, and Reflexes     Coordination   Finger to nose coordination: normal     Labs/Imaging/Diagnostics   Labs:  CBC:  Recent Labs     10/31/22  1050   WBC 8.2   RBC 5.34   HGB 15.2   HCT 45.3   MCV 84.8          CHEMISTRIES:  Recent Labs     10/31/22  1050 11/01/22  0531    140   K 4.6 4.9    105   CO2 22* 24   BUN 19 12   CREATININE 1.0 0.9   GLUCOSE 273* 137*   PHOS 2.8  --    MG 2.0  --        COAGULATION STUDIES:  Recent Labs     10/31/22  1050   INR 0.89   APTT 37.2       LIVER PROFILE:  Recent Labs     10/31/22  1050   AST 24   ALT 20   BILITOT 0.2*   ALKPHOS 91       CHOLESTEROL AND A1C:  Recent Labs     11/01/22  0531   LDLCALC 90   HDL 34   CHOL 169   TRIG 224*   LABA1C 7.4*        Imaging Last 24 Hours:  CTA HEAD W WO CONTRAST (CODE STROKE)    Result Date: 10/31/2022  PROCEDURE: CTA HEAD W WO CONTRAST, CTA NECK W WO CONTRAST CLINICAL INFORMATION: Stroke Symptoms. COMPARISON: CT scan of the brain obtained on the same day. . TECHNIQUE: 1 mm axial images were obtained through the head and neck after the fast bolus administration of contrast. A noncontrast localizer was obtained. 3-D reconstructions were performed on a dedicated 3-D workstation. These include multiplanar MPR images and multiplanar MIP images. Centerline reconstructions were obtained of the carotid systems. Isovue intravenous contrast was given. All carotid artery measurements are performed utilizing  Nascet criteria. This exam was analyzed using viz.  contact CT LVO. All CT scans at this facility use dose modulation, iterative reconstruction, and/or weight-based dosing when appropriate to reduce radiation dose to as low as reasonably achievable. FINDINGS: CTA NECK: Aortic arch and branches: Atherosclerotic calcification in the aortic arch and at the origin of the left subclavian artery. Right common carotid artery/ICA: There is a small amount of plaque in the proximal right internal carotid artery. There is no significant hemodynamic stenosis in the right common and internal carotid arteries. Left common carotid artery/ICA: There is no significant hemodynamic stenosis in the left common and internal carotid arteries. Vertebral arteries: There is antegrade flow in the right and left vertebral arteries. CTA HEAD: Internal carotid arteries: Atherosclerotic calcification in the cavernous segments of the> left internal carotid arteries. No evidence of severe stenosis in the internal carotid arteries. . Middle cerebral arteries: Antegrade flow in both middle cerebral arteries. . Anterior cerebral arteries: Antegrade flow in the anterior cerebral arteries bilaterally. . Vertebral arteries: Atherosclerotic calcification in the distal right vertebral artery. Antegrade flow in the right and left vertebral arteries. Basilar artery: Antegrade flow in the basilar artery. . Superior cerebellar arteries: Antegrade flow in the right and left superior cerebellar arteries. . Posterior cerebral arteries: Antegrade flow in the posterior cerebral arteries bilaterally. No aneurysms, stenoses or occlusions are noted. The superior sagittal sinus, vein of Efren, internal cerebral veins, straight sinus, transverse sinuses and sigmoid sinuses are patent.  Axial source data: Unremarkable. 1. Small amount of plaque in the proximal right internal carotid artery. No significant hemodynamic stenosis in the right common and internal carotid arteries. 2. No significant hemodynamic stenosis in the left common and internal carotid arteries. 3. Atherosclerotic calcification aortic arch and at the origin of the left subclavian artery. 4. Atherosclerotic calcification in the cavernous segments of both internal carotid arteries. No evidence of severe stenosis in the internal carotid arteries. 5. Atherosclerotic calcification in the distal right vertebral artery. There is antegrade flow in the right and left vertebral arteries. 6. Otherwise negative CTA of the [place]. **This report has been created using voice recognition software. It may contain minor errors which are inherent in voice recognition technology. ** Final report electronically signed by DR Jez Schmidt on 10/31/2022 12:27 PM    CT HEAD WO CONTRAST    Result Date: 10/31/2022  PROCEDURE: CT HEAD WO CONTRAST CLINICAL INFORMATION: Stroke Symptoms. Facial droop. Weakness. COMPARISON: Brain MRI 3/2/2019. TECHNIQUE: Noncontrast 5 mm axial images were obtained through the brain. Sagittal and coronal reconstructions were obtained. All CT scans at this facility use dose modulation, iterative reconstruction, and/or weight-based dosing when appropriate to reduce radiation dose to as low as reasonably achievable. FINDINGS: There is mild global volume loss. This is stable. There is no hemorrhage. There are no intra-or extra-axial collections. There is no hydrocephalus, midline shift or mass effect. The gray-white matter differentiation is preserved. The paranasal sinuses and mastoid air cells are normally aerated. There is an apical abscess associated with the left maxillary 1st premolar. There are no suspicious osseous lesions. There are vascular calcifications. 1. No evidence of an acute process in the brain.  2. Apical abscess associated with the left maxillary 1st premolar. These findings were telephoned to CEASAR Scott at 11:11 AM on 10/31/2022 . **This report has been created using voice recognition software. It may contain minor errors which are inherent in voice recognition technology. ** Final report electronically signed by Dr. Luis Woodson on 10/31/2022 11:14 AM    CTA NECK W WO CONTRAST (CODE STROKE)    Result Date: 10/31/2022  PROCEDURE: CTA HEAD W WO CONTRAST, CTA NECK W WO CONTRAST CLINICAL INFORMATION: Stroke Symptoms. COMPARISON: CT scan of the brain obtained on the same day. . TECHNIQUE: 1 mm axial images were obtained through the head and neck after the fast bolus administration of contrast. A noncontrast localizer was obtained. 3-D reconstructions were performed on a dedicated 3-D workstation. These include multiplanar MPR images and multiplanar MIP images. Centerline reconstructions were obtained of the carotid systems. Isovue intravenous contrast was given. All carotid artery measurements are performed utilizing  Nascet criteria. This exam was analyzed using viz.  contact CT LVO. All CT scans at this facility use dose modulation, iterative reconstruction, and/or weight-based dosing when appropriate to reduce radiation dose to as low as reasonably achievable. FINDINGS: CTA NECK: Aortic arch and branches: Atherosclerotic calcification in the aortic arch and at the origin of the left subclavian artery. Right common carotid artery/ICA: There is a small amount of plaque in the proximal right internal carotid artery. There is no significant hemodynamic stenosis in the right common and internal carotid arteries. Left common carotid artery/ICA: There is no significant hemodynamic stenosis in the left common and internal carotid arteries. Vertebral arteries: There is antegrade flow in the right and left vertebral arteries.  CTA HEAD: Internal carotid arteries: Atherosclerotic calcification in the cavernous segments of the> left internal carotid arteries. No evidence of severe stenosis in the internal carotid arteries. . Middle cerebral arteries: Antegrade flow in both middle cerebral arteries. . Anterior cerebral arteries: Antegrade flow in the anterior cerebral arteries bilaterally. . Vertebral arteries: Atherosclerotic calcification in the distal right vertebral artery. Antegrade flow in the right and left vertebral arteries. Basilar artery: Antegrade flow in the basilar artery. . Superior cerebellar arteries: Antegrade flow in the right and left superior cerebellar arteries. . Posterior cerebral arteries: Antegrade flow in the posterior cerebral arteries bilaterally. No aneurysms, stenoses or occlusions are noted. The superior sagittal sinus, vein of Efren, internal cerebral veins, straight sinus, transverse sinuses and sigmoid sinuses are patent. Axial source data: Unremarkable. 1. Small amount of plaque in the proximal right internal carotid artery. No significant hemodynamic stenosis in the right common and internal carotid arteries. 2. No significant hemodynamic stenosis in the left common and internal carotid arteries. 3. Atherosclerotic calcification aortic arch and at the origin of the left subclavian artery. 4. Atherosclerotic calcification in the cavernous segments of both internal carotid arteries. No evidence of severe stenosis in the internal carotid arteries. 5. Atherosclerotic calcification in the distal right vertebral artery. There is antegrade flow in the right and left vertebral arteries. 6. Otherwise negative CTA of the [place]. **This report has been created using voice recognition software. It may contain minor errors which are inherent in voice recognition technology. ** Final report electronically signed by DR Azar Rosa on 10/31/2022 12:27 PM    XR CHEST 1 VIEW    Result Date: 10/31/2022  PROCEDURE: XR CHEST 1 VIEW CLINICAL INFORMATION: stroke COMPARISON: Chest radiograph 3/29/2021 TECHNIQUE: Single frontal view of the chest performed. FINDINGS: No focal pulmonary consolidation. Cardiac silhouette is not enlarged. Small bilateral pleural effusions, left greater than right. Bibasilar atelectasis. No pneumothorax. Old left rib fractures seen. Bones are demineralized. Degenerative changes of the thoracic spine. No acute bony abnormality. 1. Small bilateral pleural effusion with bibasilar atelectasis. **This report has been created using voice recognition software. It may contain minor errors which are inherent in voice recognition technology. ** Final report electronically signed by Dr Lauren Sue on 10/31/2022 12:48 PM    MRI BRAIN WO CONTRAST    Result Date: 10/31/2022  PROCEDURE: MRI BRAIN WO CONTRAST CLINICAL INFORMATION stroke alert. COMPARISON: CT scan of the brain and CTA obtained on the same day. MRI scan of the brain dated 3/2/2019. Harden Beech TECHNIQUE: Multiplanar and multiple spin echo MRI images were obtained of the brain without contrast. FINDINGS: The diffusion-weighted images demonstrate restricted diffusion in the left parietal periventricular white matter. There is corresponding diminished signal intensity on the ADC map. These findings are consistent with an acute infarct. .  The brain volume is slightly reduced. There is a mild amount of signal hyperintensity on the FLAIR and T2-weighted sequences in the white matter of the brain. This is consistent with mild severity chronic small vessel ischemic changes. There are no intra-or extra-axial collections. There is no hydrocephalus, midline shift or mass effect. There is mineralization in the medial aspects of the basal ganglia bilaterally. No other areas of susceptibility artifact are present. The major intracranial vascular flow voids are present.  The midline craniocervical junction structures are normal.  The pituitary gland and brainstem are normal.      1. Area of restricted diffusion in the left parietal periventricular white matter with corresponding diminished signal intensity on ADC map consistent with an acute infarct. 2. Mild atrophy and probable ischemic changes in the white matter. 3. Otherwise negative MRI scan of the brain. **This report has been created using voice recognition software. It may contain minor errors which are inherent in voice recognition technology. ** Final report electronically signed by DR Baltazar Laughlin on 10/31/2022 12:14 PM     Assessment and Plan:        Acute lacunar infarct of left corona radiata  Imaging  CT revealed no acute findings  CTA H&N: negative for LVO or hemodynamically significant stenosis. No need for carotid ultrasound. STAT MRI of brain without contrast: acute infarct in left corona radiata  2D echocardiogram ordered. Stat CT head is needed if the patient develops new-onset altered mental status, a severe headache, or new-onset neurologic deficit  Risk factors and medications  Blood pressure goal: <130/80  Keep well hydrated. Initiate normal saline at 75 ml/hr as needed. Antithrombotics: Continue Plavix 75 mg and ASA 81 mg daily, restarting 11/1/22. P2Y12 125, demonstrating inhibitor effect of Plavix. HgbA1C in the morning. If the patient has diabetes, recommend tight control of A1c with goal of <7.0 if attainable. LDL 90. LDL goal of 45-70. Increase Lipitor to 40 mg nightly. Smoking and alcohol cessation when applicable. Provide stroke eduction for individualized risk factors. EKG/telemetry to monitor for atrial fibrillation  Core stroke metrics  Dysphagia screen prior to oral intake  PT/OT/SLP consult. IPR consult if applicable. DVT prophylaxis: Lovenox  NIHSS every shift. Neuro checks per unit unless otherwise specified. Pre-morbid Modified Randall Scale: 1 - No significant disability: despite symptoms, able to carry out all usual duties and activities. Neurology following. Please call with any questions or concerns.     No further inpatient work-up from neurologic standpoint at this time.  Neurology will sign off. Continue aggressive risk factor modifications as detailed above. Follow-up with Dr. Silvia Braun on an outpatient basis in 1 to 2 months. This case was discussed with Dr. Colleen Madrid and he is in agreement with the assessment and plan.     Electronically signed by Marylene Snipe, PA-C on 11/1/22 at 2:07 PM EDT

## 2022-11-01 NOTE — PROGRESS NOTES
Pharmacy Medication History Note      List of current medications patient is taking is complete. Source of information: Patient's pill bottles, patient, external fill history    Changes made to medication list:  Medications removed (include reason, ex. therapy complete or physician discontinued):  None    Medications added/doses adjusted: Added Vitamin C 500 mg daily  Added Vitamin D 1,000 units daily  Adjusted Amlodipine 5 mg daily to Amlodipine 10 mg daily  Adjusted Glimepiride from 1 mg daily to Glimepiride 4 mg BID (see note below)  Adjusted Losartan from 25 mg daily to 50 mg daily (see note below)  Adjusted Metformin to 1000 mg BID with meals  Adjusted Ozempic from 0.25 mg weekly to Ozempic 0.5 mg weekly    Other notes (ex. Recent course of antibiotics, Coumadin dosing):  Patient states he still takes Lipitor 20 mg daily and Prilosec 20 mg daily at home, but they have not been filled since March 2022. He states his scripts ran out and he has not taken them in several weeks. Patient states he does not take Glimepiride. However, he has been consistently filling 4 mg since December 2021 (and had pill bottle). His most recent fill was 10/2/22 for Glimepiride 4 mg BID (90 day supply)  Patient was not sure how much losartan he takes, but he has been filling 50 mg daily since September 2021  Denies use of other OTC or herbal medications.     Electronically signed by Aashish Tineo, [address] [place] [address] on 11/1/2022 at 2:35 PM

## 2022-11-01 NOTE — PROCEDURES
12 lead EKG completed. Results handed to [facility].  Russell Rowe CET

## 2022-11-01 NOTE — PLAN OF CARE
Continue treatments as ordered per protocol to help facilitate normal breath sounds.  Pt agreeable to plan

## 2022-11-01 NOTE — PROGRESS NOTES
11/01/22 0751   RT Protocol   History Pulmonary Disease 2   Respiratory pattern 0   Breath sounds 4   Cough 0   Bronchodilator Assessment Score 6

## 2022-11-01 NOTE — FLOWSHEET NOTE
11/01/22 1739   Safe Environment   Safety Measures Other (comment)  (vn safety round complete)   Pt responds to audio , denied any needs or questions .

## 2022-11-01 NOTE — PROGRESS NOTES
John COORDINATOR CONSULT    Referral Type: internal    Patient Name: Kailee Parisi      MRN: [id number]    : 1950  (73 y.o.)  Gender: male   Race:Declined     Payor Source: Payor: MEDICARE / Plan: MEDICARE PART A AND B / Product Type: *No Product type* /   Secondary Payor Source:      Isolation Status: No active isolations    Lives With: Significant other  Type of Home: House  Home Layout: One level  Home Access: Stairs to enter without rails  Entrance Stairs - Number of Steps: 2  Receives Help From: Family  Occupation: Retired       What is treatment plan? Disciplines Required upon Admission to Inpatient Rehabilitation: Physical Therapy and Occupational Therapy  Post operative: No  Fall: No  Dialysis: No  Diet: ADULT DIET; Dysphagia - Soft and Bite Sized  Await PM&R consult.

## 2022-11-01 NOTE — PROGRESS NOTES
[address] [place] [address]  SPEECH THERAPY  [facility] NEUROSCIENCES 4A  Speech - Language - Cognitive Evaluation + Clinical Swallow Evaluation    SLP Individual Minutes  Time In: 0848  Time Out: 7471  Minutes: 28  Timed Code Treatment Minutes: 0 Minutes     Speech, Language, Cognitive Evaluation: 16 minutes  Clinical Swallow Evaluation: 12 minutes    Date: 2022  Patient Name: Rosita Toure      CSN: [id number]   : 1950  (67 y.o.)  Gender: male   Referring Physician:  Arleen Tafoya MD  Diagnosis: CVA  Precautions: Fall risk  History of Present Illness/Injury: Patient admitted to [facility]) with the above med dx; per chart review, Ebony Ivan is a 67 y.o. male with a history of HTN, HLD, DM, CAD, adenocarcinoma of the lung, chronic renal failure who presents to [facility] ED complaining of right sided facial droop, right sided weakness and difficulty ambulating at home prior to presentation. Patient states he kept bumping into the walls while trying to ambulate in his home this AM. LKW approximately 2200 10/30 prior to going to bed. POC glucose on arrival: 257. Initial NIH 4: 1 for right sided facial droop, 1 for RLE drift, 1 for RUE ataxia, and 1 for dysarthria. No RUE drift noted on my initial exam. Patient not a candidate for TNK due to being out of the window for administration. Patient on ASA 81 mg and Plavix 75 mg as outpatient, although patient admits to taking ASA every other day secondary to easy bruising he was experiencing. He reports compliance with Plavix and Lipitor 20 mg nightly. He denies prior history of stroke. Reports chronic right sided visual disturbance of a \"black speck\" in the center of his visual field on the right side, but is able to see in the periphery. BP in /94. Denies any sensory changes. He is not aphasic. He denies dizziness, headache, difficulty expressing speech, numbness or tingling, tremors. Denies chest pain, SOB, recent fevers/illness. Denies seizure history.  Patient reports extensive smoking history in the past, but reports quitting in 2019 due to hx of lung cancer. \"     MRI Brain WO Contrast 10/31/22  Impression       1. Area of restricted diffusion in the left parietal periventricular white matter with corresponding diminished signal intensity on ADC map consistent with an acute infarct. 2. Mild atrophy and probable ischemic changes in the white matter. 3. Otherwise negative MRI scan of the brain. ST consulted to complete clinical swallow evaluation and cognitive/language assessment to develop POC as appropriate. Past Medical History:   Diagnosis Date    Anxiety     Arthritis     CAD (coronary artery disease)     on Plavix and ASA    Cancer ([facility] [address] 01/2019    left lung stage 2-Dr Dan    COPD (chronic obstructive pulmonary disease) ([facility] 02/2019    ANDREINA Leiva    Diabetes ([facility]     Metformin    Enlarged prostate with urinary retention     Gait difficulty     Generalized weakness     Hyperlipidemia     Hypertension     Dr Tabitha Oseguera and Dr Thompson Prosmaryuri    Lesion of bladder     Osteoarthritis     Retention of urine     Right inguinal hernia 2021    S/P cardiac catheterization 03/12/2019    stent to circumflex per Dr. Camelia Clark    S/P TURP     SOB (shortness of breath)     With activity    Voiding difficulty        Pain: No pain reported. Subjective:  MOLLY Johns with approval to proceed with evaluations. Upon arrival, patient sitting in recliner chair, alert and oriented. Patient agreeable to participate in evaluations. SOCIAL HISTORY:   Living Arrangements: Lives at home with significant other. Work History:  Works part time  Education Level: High school graduate  Driving Status: Active   Finance Management: Independent  Medication Management: Independent  ADL's: Independent.    Hobbies: Fishing, hiking, watching sports  Vision Status: Glasses  Hearing: WFL  Type of Home: House  Home Layout: One level  Home Access: Stairs to enter without rails  Entrance Stairs - Decaying natural dentition    Vocal Quality WFL    Cough WFL     PATIENT WAS EVALUATED USING:  Thin Liquids, Puree, Soft Solids, Coarse Solids, and Mixed Consistency    ORAL PHASE:  Impaired:  Loss of Bolus from Lips, Impaired Mastication, and Reduced Bolus Formation    PHARYNGEAL PHASE:  WFL:  Pharyngeal phase appears WFL but cannot rule out pharyngeal phase deficits from a bedside swallowing evaluation alone. SIGNS AND SYMPTOMS OF LARYNGEAL PENETRATION / ASPIRATION:  No signs/symptoms of aspiration evident in this evaluation, but cannot rule out silent aspiration. INSTRUMENTAL EVALUATION: Modified Barium Swallow (MBS)    DIET RECOMMENDATIONS:  Diet recommendations pending MBS    STRATEGIES: Strategies pending MBS completion       RECOMMENDATIONS/ASSESSMENT:  DIAGNOSTIC IMPRESSIONS:  Clinical Swallow Evaluation: Patient presents with moderate oral dysphagia with inability to fully discern potential presence of pharyngeal phase deficits without formal instrumentation. Patient with impaired mastication with a slow, munching pattern suspected to be s/t decaying dentition. Patient with notable loss of secretions from right corner of lips within intake of PO offerings. Suspected poor bolus formation and control d/t patient with moderate oral residue. Oral residue mostly cleared when followed with a liquid wash. No overt s/s of aspiration, however, unable to fully r/o possible airway invasion events and/or pharyngeal dysfunction at the bedside alone. Recommendations to complete formal instrumentation via MBS to further evaluate pharyngeal integrity d/t patient with hx of lung cancer, current med hx of CVA, and patient with excessive coughing verbally noted 10/31 by MOLLY Batista.      Speech, Language, Cognitive Evaluation: Patient presents with a mild cognitive impairment d/t deficits within the domains of delayed recall, problem solving, sequencing, thought organization, and executive functioning; current level of impairments precluding potential return to vocational employment as well as successful contributions for IADL completion in home environment . Receptive and expressive skills measure grossly intact for basic and complex daily communication. Speech intelligibility best approximates 60-70%, with noted dysarthria; concerns for ability to convey desired message across multiple partners/settings given imprecise articulatory placement and low functional intensity (per perceptual measures). Skilled ST services recommended to address the above mentioned deficits for potential return to the home and work settings. Rehabilitation Potential: good  Discharge Recommendations: Continue to Assess Pending Progress    EDUCATION:  Learner: Patient  Education:  Reviewed results and recommendations of this evaluation, Reviewed diet and strategies, Reviewed signs, symptoms and risks of aspiration, Reviewed ST goals and Plan of Care, and Reviewed recommendations for follow-up  Evaluation of Education: Verbalizes understanding, Needs further instruction, and Family not present    PLAN:  Recommendations pending MBS    PATIENT GOAL:    Did not state. Will further assess during treatment. SHORT TERM GOALS:  Short Term Goals  Time Frame for Short Term Goals: 2 weeks  Goal 1: Patient will complete formal instrumentation via MBS to furter evaluate pharygneal integrity. Goal 2: Patient will complete immediate/delayed recall and working memory tasks with 60% accuracy, mod cues to improve retention of functional information. Goal 3: Patient will complete complex problem solving, verbal/visual reasoning, and executive functioning tasks (money/finance, meds, time) with 70% accuracy, mod cues to improve completion for daily living and work scenerios.   Goal 4: Patient will complete thought organization and sequencing tasks (divergent/convergent naming) with 70% accuracy, mod cues to improve mental flexibility for daily living scenarios. LONG TERM GOALS:  No LTG d/t estimated short [employer], [facility].  Student Intern

## 2022-11-01 NOTE — RT PROTOCOL NOTE
RT Inhaler-Nebulizer Bronchodilator Protocol Note    There is a bronchodilator order in the chart from a provider indicating to follow the RT Bronchodilator Protocol and there is an Initiate RT Inhaler-Nebulizer Bronchodilator Protocol order as well (see protocol at bottom of note). CXR Findings:  No results found. The findings from the last RT Protocol Assessment were as follows:   History Pulmonary Disease: Chronic pulmonary disease  Respiratory Pattern: Regular pattern and RR 12-20 bpm  Breath Sounds: Intermittent or unilateral wheezes  Cough: Strong, spontaneous, non-productive  Indication for Bronchodilator Therapy: Wheezing associated with pulm disorder  Bronchodilator Assessment Score: 6    Aerosolized bronchodilator medication orders have been revised according to the RT Inhaler-Nebulizer Bronchodilator Protocol below. Respiratory Therapist to perform RT Therapy Protocol Assessment initially then follow the protocol. Repeat RT Therapy Protocol Assessment PRN for score 0-3 or on second treatment, BID, and PRN for scores above 3. No Indications - adjust the frequency to every 6 hours PRN wheezing or bronchospasm, if no treatments needed after 48 hours then discontinue using Per Protocol order mode. If indication present, adjust the RT bronchodilator orders based on the Bronchodilator Assessment Score as indicated below. Use Inhaler orders unless patient has one or more of the following: on home nebulizer, not able to hold breath for 10 seconds, is not alert and oriented, cannot activate and use MDI correctly, or respiratory rate 25 breaths per minute or more, then use the equivalent nebulizer order(s) with same Frequency and PRN reasons based on the score. If a patient is on this medication at home then do not decrease Frequency below that used at home.     0-3 - enter or revise RT bronchodilator order(s) to equivalent RT Bronchodilator order with Frequency of every 4 hours PRN for wheezing or increased work of breathing using Per Protocol order mode. 4-6 - enter or revise RT Bronchodilator order(s) to two equivalent RT bronchodilator orders with one order with BID Frequency and one order with Frequency of every 4 hours PRN wheezing or increased work of breathing using Per Protocol order mode. 7-10 - enter or revise RT Bronchodilator order(s) to two equivalent RT bronchodilator orders with one order with TID Frequency and one order with Frequency of every 4 hours PRN wheezing or increased work of breathing using Per Protocol order mode. 11-13 - enter or revise RT Bronchodilator order(s) to one equivalent RT bronchodilator order with QID Frequency and an Albuterol order with Frequency of every 4 hours PRN wheezing or increased work of breathing using Per Protocol order mode. Greater than 13 - enter or revise RT Bronchodilator order(s) to one equivalent RT bronchodilator order with every 4 hours Frequency and an Albuterol order with Frequency of every 2 hours PRN wheezing or increased work of breathing using Per Protocol order mode. RT to enter RT Home Evaluation for COPD & MDI Assessment order using Per Protocol order mode.     Electronically signed by Samra Leonard RCP on 11/1/2022 at 7:51 AM

## 2022-11-01 NOTE — RT PROTOCOL NOTE
RT Inhaler-Nebulizer Bronchodilator Protocol Note    There is a bronchodilator order in the chart from a provider indicating to follow the RT Bronchodilator Protocol and there is an Initiate RT Inhaler-Nebulizer Bronchodilator Protocol order as well (see protocol at bottom of note). CXR Findings:  No results found. The findings from the last RT Protocol Assessment were as follows:   History Pulmonary Disease: Chronic pulmonary disease  Respiratory Pattern: Regular pattern and RR 12-20 bpm  Breath Sounds: Slightly diminished and/or crackles  Cough: Strong, spontaneous, non-productive  Indication for Bronchodilator Therapy: Decreased or absent breath sounds  Bronchodilator Assessment Score: 4    Aerosolized bronchodilator medication orders have been revised according to the RT Inhaler-Nebulizer Bronchodilator Protocol below. Respiratory Therapist to perform RT Therapy Protocol Assessment initially then follow the protocol. Repeat RT Therapy Protocol Assessment PRN for score 0-3 or on second treatment, BID, and PRN for scores above 3. No Indications - adjust the frequency to every 6 hours PRN wheezing or bronchospasm, if no treatments needed after 48 hours then discontinue using Per Protocol order mode. If indication present, adjust the RT bronchodilator orders based on the Bronchodilator Assessment Score as indicated below. Use Inhaler orders unless patient has one or more of the following: on home nebulizer, not able to hold breath for 10 seconds, is not alert and oriented, cannot activate and use MDI correctly, or respiratory rate 25 breaths per minute or more, then use the equivalent nebulizer order(s) with same Frequency and PRN reasons based on the score. If a patient is on this medication at home then do not decrease Frequency below that used at home.     0-3 - enter or revise RT bronchodilator order(s) to equivalent RT Bronchodilator order with Frequency of every 4 hours PRN for wheezing or increased work of breathing using Per Protocol order mode. 4-6 - enter or revise RT Bronchodilator order(s) to two equivalent RT bronchodilator orders with one order with BID Frequency and one order with Frequency of every 4 hours PRN wheezing or increased work of breathing using Per Protocol order mode. 7-10 - enter or revise RT Bronchodilator order(s) to two equivalent RT bronchodilator orders with one order with TID Frequency and one order with Frequency of every 4 hours PRN wheezing or increased work of breathing using Per Protocol order mode. 11-13 - enter or revise RT Bronchodilator order(s) to one equivalent RT bronchodilator order with QID Frequency and an Albuterol order with Frequency of every 4 hours PRN wheezing or increased work of breathing using Per Protocol order mode. Greater than 13 - enter or revise RT Bronchodilator order(s) to one equivalent RT bronchodilator order with every 4 hours Frequency and an Albuterol order with Frequency of every 2 hours PRN wheezing or increased work of breathing using Per Protocol order mode. RT to enter RT Home Evaluation for COPD & MDI Assessment order using Per Protocol order mode.     Electronically signed by Stefanie Brooks RCP on 10/31/2022 at 10:01 PM

## 2022-11-01 NOTE — PROGRESS NOTES
[facility] PROGRESS NOTE      Patient: Benny Isabel  Room #: 4A-12/012-A            YOB: 1950  Age: 67 y.o. Gender: male            Admit Date & Time: 10/31/2022 10:42 AM    Assessment:  Interventions: Outcomes:    responded to [facility] to assist patient and family in completing Advance Directives. Encountered patient in room with one daughter. Significant other Ruthie entered as the conversation was being engaged. Patient was alert and oriented X 4. Good conversation was held around purpose of HCPOA and the role of agents and the importance of patient communication of his wishes to agents. Questions were addressed and clarified. Patient named his significant other Modesto Martini, who patient conveyed has been his domestic partner for 9 years as his primayr agent. Patient named two daughters as his additional agents. Nurse and  completed signatures with patient and witnessed the document/s. Copies given to family and one put in hard chart. Copy scanned to medical records.  offered prayer to family which was accepted. Appreciation for Advance directive work and for prayer and spiritual encouragement. SEE ACP NOTE OF SAME DATE. Plan:     Continue to support this patient through this hospitalization. Electronically signed by Ronaldo Paulson, on 10/31/2022 at 8:04 PM.  [facility]  [phone]             10/31/22 2001   Encounter Summary   Encounter Overview/Reason  Advance Care Planning   Service Provided For: Patient and family together   Referral/Consult From: Nurse   Support System Significant other;Children;Family members   Last Encounter  10/31/22   Complexity of Encounter Low   Begin Time 1845   End Time  2002   Total Time Calculated 77 min   Advance Care Planning   Type ACP conversation; Completed AD/ACP document(s); Care Preferences Addressed   Assessment/Intervention/Outcome   Assessment Calm;Coping; Hopeful   Intervention Active listening; Facilitated/Participated in Patient/Family Care Conference;Empowerment; Discussed death, afterlife;Explored/Affirmed feelings, thoughts, concerns;Explored Coping Skills/Resources;Prayer (assurance of)/[place]   Outcome Acceptance; Connection/Belonging;Coping;Encouraged;Engaged in conversation;Expressed feelings, needs, and concerns;Expressed Gratitude

## 2022-11-02 ENCOUNTER — HOSPITAL ENCOUNTER (INPATIENT)
Age: 72
LOS: 8 days | Discharge: HOME | DRG: 057 | End: 2022-11-10
Attending: PHYSICAL MEDICINE & REHABILITATION | Admitting: PHYSICAL MEDICINE & REHABILITATION
Payer: MEDICARE

## 2022-11-02 VITALS
OXYGEN SATURATION: 94 % | DIASTOLIC BLOOD PRESSURE: 79 MMHG | HEART RATE: 94 BPM | WEIGHT: 190 LBS | TEMPERATURE: 98.1 F | SYSTOLIC BLOOD PRESSURE: 137 MMHG | RESPIRATION RATE: 18 BRPM | BODY MASS INDEX: 28.79 KG/M2 | HEIGHT: 68 IN

## 2022-11-02 DIAGNOSIS — I63.9 ACUTE CEREBROVASCULAR ACCIDENT (CVA) (HCC): Primary | ICD-10-CM

## 2022-11-02 PROBLEM — E11.9 TYPE 2 DIABETES MELLITUS TREATED WITHOUT INSULIN (HCC): Status: ACTIVE | Noted: 2022-11-02

## 2022-11-02 LAB
ANION GAP SERPL CALCULATED.3IONS-SCNC: 11 MEQ/L (ref 8–16)
BUN BLDV-MCNC: 14 MG/DL (ref 7–22)
CALCIUM SERPL-MCNC: 9 MG/DL (ref 8.5–10.5)
CHLORIDE BLD-SCNC: 105 MEQ/L (ref 98–111)
CO2: 25 MEQ/L (ref 23–33)
CREAT SERPL-MCNC: 0.9 MG/DL (ref 0.4–1.2)
ERYTHROCYTE [DISTWIDTH] IN BLOOD BY AUTOMATED COUNT: 13.9 % (ref 11.5–14.5)
ERYTHROCYTE [DISTWIDTH] IN BLOOD BY AUTOMATED COUNT: 44.1 FL (ref 35–45)
GFR SERPL CREATININE-BSD FRML MDRD: > 60 ML/MIN/1.73M2
GLUCOSE BLD-MCNC: 160 MG/DL (ref 70–108)
GLUCOSE BLD-MCNC: 200 MG/DL (ref 70–108)
GLUCOSE BLD-MCNC: 207 MG/DL (ref 70–108)
GLUCOSE BLD-MCNC: 227 MG/DL (ref 70–108)
GLUCOSE BLD-MCNC: 250 MG/DL (ref 70–108)
HCT VFR BLD CALC: 43 % (ref 42–52)
HEMOGLOBIN: 14.2 GM/DL (ref 14–18)
MCH RBC QN AUTO: 28.7 PG (ref 26–33)
MCHC RBC AUTO-ENTMCNC: 33 GM/DL (ref 32.2–35.5)
MCV RBC AUTO: 87 FL (ref 80–94)
PLATELET # BLD: 185 THOU/MM3 (ref 130–400)
PMV BLD AUTO: 8.8 FL (ref 9.4–12.4)
POTASSIUM SERPL-SCNC: 4.2 MEQ/L (ref 3.5–5.2)
RBC # BLD: 4.94 MILL/MM3 (ref 4.7–6.1)
SODIUM BLD-SCNC: 141 MEQ/L (ref 135–145)
WBC # BLD: 8.4 THOU/MM3 (ref 4.8–10.8)

## 2022-11-02 PROCEDURE — 99239 HOSP IP/OBS DSCHRG MGMT >30: CPT | Performed by: HOSPITALIST

## 2022-11-02 PROCEDURE — 1180000000 HC REHAB R&B

## 2022-11-02 PROCEDURE — 6370000000 HC RX 637 (ALT 250 FOR IP): Performed by: PHYSICAL MEDICINE & REHABILITATION

## 2022-11-02 PROCEDURE — 94640 AIRWAY INHALATION TREATMENT: CPT

## 2022-11-02 PROCEDURE — 97129 THER IVNTJ 1ST 15 MIN: CPT

## 2022-11-02 PROCEDURE — 85027 COMPLETE CBC AUTOMATED: CPT

## 2022-11-02 PROCEDURE — 82948 REAGENT STRIP/BLOOD GLUCOSE: CPT

## 2022-11-02 PROCEDURE — 97530 THERAPEUTIC ACTIVITIES: CPT

## 2022-11-02 PROCEDURE — 99223 1ST HOSP IP/OBS HIGH 75: CPT | Performed by: PHYSICAL MEDICINE & REHABILITATION

## 2022-11-02 PROCEDURE — 6370000000 HC RX 637 (ALT 250 FOR IP): Performed by: HOSPITALIST

## 2022-11-02 PROCEDURE — 92526 ORAL FUNCTION THERAPY: CPT

## 2022-11-02 PROCEDURE — 80048 BASIC METABOLIC PNL TOTAL CA: CPT

## 2022-11-02 PROCEDURE — 97110 THERAPEUTIC EXERCISES: CPT

## 2022-11-02 PROCEDURE — 97116 GAIT TRAINING THERAPY: CPT

## 2022-11-02 PROCEDURE — 36415 COLL VENOUS BLD VENIPUNCTURE: CPT

## 2022-11-02 PROCEDURE — 6370000000 HC RX 637 (ALT 250 FOR IP)

## 2022-11-02 PROCEDURE — 6370000000 HC RX 637 (ALT 250 FOR IP): Performed by: STUDENT IN AN ORGANIZED HEALTH CARE EDUCATION/TRAINING PROGRAM

## 2022-11-02 RX ORDER — ONDANSETRON 2 MG/ML
4 INJECTION INTRAMUSCULAR; INTRAVENOUS EVERY 6 HOURS PRN
Status: DISCONTINUED | OUTPATIENT
Start: 2022-11-02 | End: 2022-11-10 | Stop reason: HOSPADM

## 2022-11-02 RX ORDER — BISACODYL 10 MG
10 SUPPOSITORY, RECTAL RECTAL DAILY PRN
Status: DISCONTINUED | OUTPATIENT
Start: 2022-11-02 | End: 2022-11-10 | Stop reason: HOSPADM

## 2022-11-02 RX ORDER — ASPIRIN 81 MG/1
81 TABLET, CHEWABLE ORAL DAILY
Status: CANCELLED | OUTPATIENT
Start: 2022-11-03

## 2022-11-02 RX ORDER — BISACODYL 10 MG
10 SUPPOSITORY, RECTAL RECTAL DAILY PRN
Status: CANCELLED | OUTPATIENT
Start: 2022-11-02

## 2022-11-02 RX ORDER — METFORMIN HYDROCHLORIDE 500 MG/1
1000 TABLET, EXTENDED RELEASE ORAL 2 TIMES DAILY WITH MEALS
Status: DISCONTINUED | OUTPATIENT
Start: 2022-11-03 | End: 2022-11-10 | Stop reason: HOSPADM

## 2022-11-02 RX ORDER — PANTOPRAZOLE SODIUM 40 MG/1
40 TABLET, DELAYED RELEASE ORAL
Status: DISCONTINUED | OUTPATIENT
Start: 2022-11-03 | End: 2022-11-10 | Stop reason: HOSPADM

## 2022-11-02 RX ORDER — INSULIN LISPRO 100 [IU]/ML
0-4 INJECTION, SOLUTION INTRAVENOUS; SUBCUTANEOUS
Status: CANCELLED | OUTPATIENT
Start: 2022-11-02

## 2022-11-02 RX ORDER — CLOPIDOGREL BISULFATE 75 MG/1
75 TABLET ORAL DAILY
Status: DISCONTINUED | OUTPATIENT
Start: 2022-11-03 | End: 2022-11-10 | Stop reason: HOSPADM

## 2022-11-02 RX ORDER — IPRATROPIUM BROMIDE AND ALBUTEROL SULFATE 2.5; .5 MG/3ML; MG/3ML
1 SOLUTION RESPIRATORY (INHALATION) EVERY 4 HOURS PRN
Status: CANCELLED | OUTPATIENT
Start: 2022-11-02

## 2022-11-02 RX ORDER — AMOXICILLIN AND CLAVULANATE POTASSIUM 875; 125 MG/1; MG/1
1 TABLET, FILM COATED ORAL EVERY 12 HOURS SCHEDULED
Status: COMPLETED | OUTPATIENT
Start: 2022-11-02 | End: 2022-11-09

## 2022-11-02 RX ORDER — ACETAMINOPHEN 325 MG/1
650 TABLET ORAL EVERY 4 HOURS PRN
Status: CANCELLED | OUTPATIENT
Start: 2022-11-02

## 2022-11-02 RX ORDER — PANTOPRAZOLE SODIUM 40 MG/1
40 TABLET, DELAYED RELEASE ORAL
Status: CANCELLED | OUTPATIENT
Start: 2022-11-03

## 2022-11-02 RX ORDER — POLYETHYLENE GLYCOL 3350 17 G/17G
17 POWDER, FOR SOLUTION ORAL DAILY PRN
Status: CANCELLED | OUTPATIENT
Start: 2022-11-02

## 2022-11-02 RX ORDER — FINASTERIDE 5 MG/1
5 TABLET, FILM COATED ORAL DAILY
Status: CANCELLED | OUTPATIENT
Start: 2022-11-03

## 2022-11-02 RX ORDER — DOCUSATE SODIUM 100 MG/1
100 CAPSULE, LIQUID FILLED ORAL 2 TIMES DAILY
Status: DISCONTINUED | OUTPATIENT
Start: 2022-11-02 | End: 2022-11-10 | Stop reason: HOSPADM

## 2022-11-02 RX ORDER — ONDANSETRON 4 MG/1
4 TABLET, ORALLY DISINTEGRATING ORAL EVERY 8 HOURS PRN
Status: DISCONTINUED | OUTPATIENT
Start: 2022-11-02 | End: 2022-11-10 | Stop reason: HOSPADM

## 2022-11-02 RX ORDER — IPRATROPIUM BROMIDE AND ALBUTEROL SULFATE 2.5; .5 MG/3ML; MG/3ML
1 SOLUTION RESPIRATORY (INHALATION) 2 TIMES DAILY
Status: DISCONTINUED | OUTPATIENT
Start: 2022-11-02 | End: 2022-11-10 | Stop reason: HOSPADM

## 2022-11-02 RX ORDER — CLOPIDOGREL BISULFATE 75 MG/1
75 TABLET ORAL DAILY
Status: CANCELLED | OUTPATIENT
Start: 2022-11-03

## 2022-11-02 RX ORDER — ONDANSETRON 2 MG/ML
4 INJECTION INTRAMUSCULAR; INTRAVENOUS EVERY 6 HOURS PRN
Status: CANCELLED | OUTPATIENT
Start: 2022-11-02

## 2022-11-02 RX ORDER — INSULIN LISPRO 100 [IU]/ML
0-4 INJECTION, SOLUTION INTRAVENOUS; SUBCUTANEOUS
Status: DISCONTINUED | OUTPATIENT
Start: 2022-11-02 | End: 2022-11-04

## 2022-11-02 RX ORDER — AMLODIPINE BESYLATE 5 MG/1
5 TABLET ORAL DAILY
Status: CANCELLED | OUTPATIENT
Start: 2022-11-03

## 2022-11-02 RX ORDER — IPRATROPIUM BROMIDE AND ALBUTEROL SULFATE 2.5; .5 MG/3ML; MG/3ML
1 SOLUTION RESPIRATORY (INHALATION) 2 TIMES DAILY
Status: CANCELLED | OUTPATIENT
Start: 2022-11-02

## 2022-11-02 RX ORDER — IPRATROPIUM BROMIDE AND ALBUTEROL SULFATE 2.5; .5 MG/3ML; MG/3ML
1 SOLUTION RESPIRATORY (INHALATION) EVERY 4 HOURS PRN
Status: DISCONTINUED | OUTPATIENT
Start: 2022-11-02 | End: 2022-11-10 | Stop reason: HOSPADM

## 2022-11-02 RX ORDER — LOSARTAN POTASSIUM 25 MG/1
25 TABLET ORAL DAILY
Status: DISCONTINUED | OUTPATIENT
Start: 2022-11-03 | End: 2022-11-10 | Stop reason: HOSPADM

## 2022-11-02 RX ORDER — AMLODIPINE BESYLATE 5 MG/1
5 TABLET ORAL DAILY
Status: DISCONTINUED | OUTPATIENT
Start: 2022-11-03 | End: 2022-11-10 | Stop reason: HOSPADM

## 2022-11-02 RX ORDER — AMOXICILLIN AND CLAVULANATE POTASSIUM 875; 125 MG/1; MG/1
1 TABLET, FILM COATED ORAL EVERY 12 HOURS SCHEDULED
Status: CANCELLED | OUTPATIENT
Start: 2022-11-02

## 2022-11-02 RX ORDER — ONDANSETRON 4 MG/1
4 TABLET, ORALLY DISINTEGRATING ORAL EVERY 8 HOURS PRN
Status: CANCELLED | OUTPATIENT
Start: 2022-11-02

## 2022-11-02 RX ORDER — POLYETHYLENE GLYCOL 3350 17 G/17G
17 POWDER, FOR SOLUTION ORAL DAILY PRN
Status: DISCONTINUED | OUTPATIENT
Start: 2022-11-02 | End: 2022-11-10 | Stop reason: HOSPADM

## 2022-11-02 RX ORDER — ATORVASTATIN CALCIUM 40 MG/1
40 TABLET, FILM COATED ORAL NIGHTLY
Status: DISCONTINUED | OUTPATIENT
Start: 2022-11-02 | End: 2022-11-10 | Stop reason: HOSPADM

## 2022-11-02 RX ORDER — FINASTERIDE 5 MG/1
5 TABLET, FILM COATED ORAL DAILY
Status: DISCONTINUED | OUTPATIENT
Start: 2022-11-03 | End: 2022-11-10 | Stop reason: HOSPADM

## 2022-11-02 RX ORDER — DOCUSATE SODIUM 100 MG/1
100 CAPSULE, LIQUID FILLED ORAL 2 TIMES DAILY
Status: CANCELLED | OUTPATIENT
Start: 2022-11-02

## 2022-11-02 RX ORDER — ACETAMINOPHEN 325 MG/1
650 TABLET ORAL EVERY 4 HOURS PRN
Status: DISCONTINUED | OUTPATIENT
Start: 2022-11-02 | End: 2022-11-10 | Stop reason: HOSPADM

## 2022-11-02 RX ORDER — INSULIN LISPRO 100 [IU]/ML
0-4 INJECTION, SOLUTION INTRAVENOUS; SUBCUTANEOUS NIGHTLY
Status: CANCELLED | OUTPATIENT
Start: 2022-11-02

## 2022-11-02 RX ORDER — POLYETHYLENE GLYCOL 3350 17 G/17G
17 POWDER, FOR SOLUTION ORAL DAILY PRN
Status: DISCONTINUED | OUTPATIENT
Start: 2022-11-02 | End: 2022-11-02 | Stop reason: SDUPTHER

## 2022-11-02 RX ORDER — ASPIRIN 81 MG/1
81 TABLET, CHEWABLE ORAL DAILY
Status: DISCONTINUED | OUTPATIENT
Start: 2022-11-03 | End: 2022-11-10 | Stop reason: HOSPADM

## 2022-11-02 RX ORDER — INSULIN LISPRO 100 [IU]/ML
0-4 INJECTION, SOLUTION INTRAVENOUS; SUBCUTANEOUS NIGHTLY
Status: DISCONTINUED | OUTPATIENT
Start: 2022-11-02 | End: 2022-11-04

## 2022-11-02 RX ORDER — LOSARTAN POTASSIUM 25 MG/1
25 TABLET ORAL DAILY
Status: CANCELLED | OUTPATIENT
Start: 2022-11-03

## 2022-11-02 RX ORDER — ATORVASTATIN CALCIUM 40 MG/1
40 TABLET, FILM COATED ORAL NIGHTLY
Status: CANCELLED | OUTPATIENT
Start: 2022-11-02

## 2022-11-02 RX ADMIN — AMLODIPINE BESYLATE 5 MG: 5 TABLET ORAL at 08:07

## 2022-11-02 RX ADMIN — IPRATROPIUM BROMIDE AND ALBUTEROL SULFATE 1 AMPULE: .5; 3 SOLUTION RESPIRATORY (INHALATION) at 16:57

## 2022-11-02 RX ADMIN — POLYETHYLENE GLYCOL 3350 17 G: 17 POWDER, FOR SOLUTION ORAL at 09:56

## 2022-11-02 RX ADMIN — PANTOPRAZOLE SODIUM 40 MG: 40 TABLET, DELAYED RELEASE ORAL at 06:34

## 2022-11-02 RX ADMIN — AMOXICILLIN AND CLAVULANATE POTASSIUM 1 TABLET: 875; 125 TABLET, FILM COATED ORAL at 21:28

## 2022-11-02 RX ADMIN — AMOXICILLIN AND CLAVULANATE POTASSIUM 1 TABLET: 875; 125 TABLET, FILM COATED ORAL at 08:07

## 2022-11-02 RX ADMIN — CLOPIDOGREL BISULFATE 75 MG: 75 TABLET ORAL at 08:08

## 2022-11-02 RX ADMIN — LOSARTAN POTASSIUM 25 MG: 25 TABLET, FILM COATED ORAL at 08:08

## 2022-11-02 RX ADMIN — ATORVASTATIN CALCIUM 40 MG: 40 TABLET, FILM COATED ORAL at 21:28

## 2022-11-02 RX ADMIN — ACETAMINOPHEN 650 MG: 325 TABLET ORAL at 21:47

## 2022-11-02 RX ADMIN — FINASTERIDE 5 MG: 5 TABLET, FILM COATED ORAL at 08:08

## 2022-11-02 RX ADMIN — INSULIN LISPRO 2 UNITS: 100 INJECTION, SOLUTION INTRAVENOUS; SUBCUTANEOUS at 07:40

## 2022-11-02 RX ADMIN — INSULIN LISPRO 1 UNITS: 100 INJECTION, SOLUTION INTRAVENOUS; SUBCUTANEOUS at 11:31

## 2022-11-02 RX ADMIN — ASPIRIN 81 MG 81 MG: 81 TABLET ORAL at 08:08

## 2022-11-02 RX ADMIN — DOCUSATE SODIUM 100 MG: 100 CAPSULE, LIQUID FILLED ORAL at 15:33

## 2022-11-02 RX ADMIN — DOCUSATE SODIUM 100 MG: 100 CAPSULE, LIQUID FILLED ORAL at 21:28

## 2022-11-02 RX ADMIN — IPRATROPIUM BROMIDE AND ALBUTEROL SULFATE 1 AMPULE: .5; 3 SOLUTION RESPIRATORY (INHALATION) at 08:51

## 2022-11-02 RX ADMIN — INSULIN LISPRO 1 UNITS: 100 INJECTION, SOLUTION INTRAVENOUS; SUBCUTANEOUS at 17:49

## 2022-11-02 ASSESSMENT — PAIN DESCRIPTION - DESCRIPTORS
DESCRIPTORS: ACHING
DESCRIPTORS: ACHING

## 2022-11-02 ASSESSMENT — PAIN - FUNCTIONAL ASSESSMENT
PAIN_FUNCTIONAL_ASSESSMENT: ACTIVITIES ARE NOT PREVENTED
PAIN_FUNCTIONAL_ASSESSMENT: ACTIVITIES ARE NOT PREVENTED

## 2022-11-02 ASSESSMENT — PAIN SCALES - GENERAL
PAINLEVEL_OUTOF10: 0
PAINLEVEL_OUTOF10: 7

## 2022-11-02 ASSESSMENT — PAIN DESCRIPTION - LOCATION
LOCATION: ARM;WRIST
LOCATION: WRIST

## 2022-11-02 ASSESSMENT — PAIN DESCRIPTION - ONSET: ONSET: GRADUAL

## 2022-11-02 ASSESSMENT — PAIN DESCRIPTION - ORIENTATION
ORIENTATION: RIGHT
ORIENTATION: RIGHT;LOWER

## 2022-11-02 ASSESSMENT — PAIN DESCRIPTION - FREQUENCY: FREQUENCY: INTERMITTENT

## 2022-11-02 ASSESSMENT — PAIN DESCRIPTION - PAIN TYPE: TYPE: ACUTE PAIN

## 2022-11-02 NOTE — PROGRESS NOTES
[address]  Occupational Therapy  Daily Note  Time:   Time In: 9549  Time Out: 1303  Timed Code Treatment Minutes: 41 Minutes  Minutes: 41        Date: 2022  Patient Name: Rosemary Zavala,   Gender: male      Room: -12/012-A  MRN: [id number]  : 1950  (67 y.o.)  Referring Practitioner: Ursula Terry MD  Diagnosis: Stroke determined by clinical assessment  Additional Pertinent Hx: Patient is a 27-year-old male with a past medical history of urinary retention, hypertension, hyperlipidemia, chronic obstructive pulmonary disease, arthritis, enlarged prostate, stage II lung cancer with last known surgery and chemotherapy in 2019, and coronary artery disease on Plavix and aspirin with stent to the circumflex on 3/12/2019 who presents with a chief complaint of right-sided facial droop and right upper extremity numbness and her managing primarily for acute left parietal stroke. Restrictions/Precautions:  Restrictions/Precautions: General Precautions, Fall Risk  Position Activity Restriction  Other position/activity restrictions: R hemibody weakness     SUBJECTIVE: Pt approached sitting in recliner. Pt pleasant and stated they were excited to go to IP rehab later this date. PAIN: no pain reported    Vitals: Vitals not assessed per clinical judgement, see nursing flowsheet    COGNITION: Inattention, Decreased Safety Awareness, and Tangential    ADL:   No ADL's completed this session. Emanuel Hidden BALANCE:  Sitting Balance:  Stand By Assistance. Seated in recliner and wheelchair  Standing Balance: [address] Brief periods of SBA. R sided ataxia with R foot drift noted. Pt able to tolerate dynamic standing during [address] task for ~4 min prior to requesting a break    BED MOBILITY:  Not Tested    TRANSFERS:  Sit to Stand:  Contact Guard Assistance. From recliner and wheelchair, increase time needed  Stand to Sit: [address]  To recliner and wheelchair    FUNCTIONAL MOBILITY:  Assistive Device: None  Assist Level:  Contact Guard Assistance. Distance:  From recliner to wheelchair in doorway of room. Pt trailed without walker this date and functional mobility improved from min A yesterday to CGA with brief periods of SBA this date. Pt demonstrates RLE ataxia. ADDITIONAL ACTIVITIES:  Pt completed weightbearing neuromuscular facilitation task while standing at steps in gym. Pt required to complete closed chain task while Wbing through RUE and moving outside GALLO with LUE in a dusting motion on stair railing. Pt demonstrated good dynamic balance during task and able to complete for ~4 min x2 trials. Pt CGA throughout. Pt then completed a body on arm closed chained task while Wbing through LUE and moving outside GALLO with RUE in a dusting motion on stair railing. Pt demonstrated good dynamic balance during task and able to complete for ~4 min x2 trials. Pt CGA throughout. Pt completed task to increase RUE strength and awareness to facilitate independence with ADLs/IADLs. Pt completed fine motor coordination task while seated in wheelchair. Pt required to demonstrate pincer grasp to fastening a nut on a screw with RUE. Pt demonstrated difficulty with task and required increased time to complete. Pt SBA throughout and requiring multiple verbal cues to demo pincer grasp. Pt completed task to increase fine motor coordination and function of RUE to facilitate indep with ADLs/IADLs    ASSESSMENT:  Activity Tolerance:  Patient tolerance of  treatment: good. Discharge Recommendations: Inpatient Rehabilitation  Equipment Recommendations: Other: Possible need for built up handle or walker splint for RUE  Plan: Times Per Week: 5x  Times Per Day:  Once a day  Current Treatment Recommendations: Strengthening, ROM, Balance training, Functional mobility training, Endurance training, Neuromuscular re-education, Pain management, Safety education & training, Patient/Caregiver education & training, Positioning, Self-Care / ADL, Home management training    Patient Education  Patient Education: Role of OT, Plan of Care, ADL's, IADL's, Energy Conservation, Hemibody Awareness/Attention, and Reviewed Prior Education    Goals  Short Term Goals  Time Frame for Short Term Goals: by discharge  Short Term Goal 1: Pt will complete oral hygiene routine while using R side for >/= 50% of task and CGA to increase indep with self-care  Short Term Goal 2: Pt will complete functional mobility, household distances, with SBA and 0-2 VC for R sided awareness to increase indep with grooming  Short Term Goal 3: Pt will complete dynamic standing with 2 UE release, SBA and 0-2 VC for R sided awareness to increase indep with hygiene  Short Term Goal 4: Pt will complete tolieting, including transfer, with CGA and 0-2 VC for R sided awareness to increase indep with tolieting    Following session, patient left in safe position with all fall risk precautions in place.

## 2022-11-02 NOTE — PLAN OF CARE
Problem: Discharge Planning  Goal: Discharge to home or other facility with appropriate resources  Outcome: Progressing  Flowsheets (Taken 11/2/2022 1400)  Discharge to home or other facility with appropriate resources:   Identify barriers to discharge with patient and caregiver   Identify discharge learning needs (meds, wound care, etc)   Refer to discharge planning if patient needs post-hospital services based on physician order or complex needs related to functional status, cognitive ability or social support system     Problem: Safety - Adult  Goal: Free from fall injury  Outcome: Progressing     Problem: ABCDS Injury Assessment  Goal: Absence of physical injury  Outcome: Progressing

## 2022-11-02 NOTE — H&P
Physical Medicine & Rehabilitation   History and Physical    Chief Complaint and Reason for Rehabilitation Admission:   CVA; rehab needs    History of Present Illness:  Kassandra Lemos  is a 67 y.o. male admitted to the inpatient rehabilitation unit on 11/2/2022. He was originally admitted to [address] [place] [address] on 10/31/2022. Patient  has a past medical history of Anxiety, Arthritis, CAD (coronary artery disease), Cancer ([facility] [address] COPD (chronic obstructive pulmonary disease) ([facility] [address] [facility] Enlarged prostate with urinary retention, Gait difficulty, Generalized weakness, Hyperlipidemia, Hypertension, Lesion of bladder, Osteoarthritis, Retention of urine, Right inguinal hernia, S/P cardiac catheterization, S/P TURP, SOB (shortness of breath), and Voiding difficulty. Patient presented to [facility] ER due to new onset of right arm numbness and balance issues. Patient with noted chronic SOB. Family reports right sides weakness, dysarthria, right sided facial droop. Patient was to be taking ASA and plavix daily, but only was taking ASA every other day due to bruising. Patient with initial NIHSS of 6. Code stroke called, not a tPA candidate. CT of the head without contrast on 10/31/2022 demonstrated stable mild global volume loss, and apical abscess associated with a left maxillary first premolar, and no other significant acute findings. CT of the head and neck on 10/31/2022 without major acute findings. MRI of the brain without contrast on 10/31/2022 demonstrated an area of diffusion in the left parietal periventricular white matter with corresponding diminished signal intensity on ADC map consistent with acute infarct; mild atrophy and probable mild severity chronic small vessel ischemic changes, and no other significant abnormalities. CVA likely due to poorly controlled HTN and DM. Patient is seen today upon admission to the inpatient rehabilitation unit.   Patient lives with his s/o that works full time, 6a-2pm daily. Discussed IPR and goal of helping patient to be as independent as possible at discharge including mobility, ADLs, cognition, swallowing, and endurance. Current Rehabilitation Assessments:  PT:       OBJECTIVE:  Range of Motion:  Bilateral Lower Extremity: WFL     Strength:  Right Lower Extremity: grossly >/=3/5, with functional mobility weaker than LLE  Left Lower Extremity: WFL     Balance:  Static Sitting Balance:  Stand By Assistance  Static Standing Balance: Contact Guard Assistance, without UE support, reaching shoulder to waist level, min unsteady WBOS, with NBOS pt had LOB with Gary to correct, pt leaned to right  Dynamic Standing Balance: Minimal Assistance, to CGA with 0-1 UE support, reaching in GALLO     Bed Mobility:  Rolling to Left: Stand By Assistance   Supine to Sit: Contact Guard Assistance  Scooting: Contact Guard Assistance  HOB up 30 degrees, inc time to complete  Transfers:  Sit to Stand: Minimal Assistance, first trial, CGA second trial  Stand to Sit:Contact Guard Assistance     Ambulation:  Minimal Assistance, to CGA  Distance: 10'x1, 30'x1  Surface: Level Tile  Device:No Device  Gait Deviations:  Slow Sofie, Decreased Step Length on Right, Decreased Arm Swing, Decreased Heel Strike on Right, Wide Base of Support, Mild Path Deviations, and Unsteady Gait           Functional Outcome Measures: Completed  Balance Score: 3  Gait Score: 3  Tinetti Total Score: 6  Risk Indicators:  Less than/equal to 18 = high risk  19-23 Moderate risk  Greater than/equal to 24 = low risk   AM-PAC Inpatient Mobility Raw Score : 18  AM-PAC Inpatient T-Scale Score : 43.63     ASSESSMENT:  Activity Tolerance:  Patient tolerance of  treatment: good. Treatment Initiated: Treatment and education initiated within context of evaluation.   Evaluation time included review of current medical information, gathering information related to past medical, social and functional history, completion of standardized testing, formal and informal observation of tasks, assessment of data and development of plan of care and goals. Treatment time included skilled education and facilitation of tasks to increase safety and independence with functional mobility for improved independence and quality of life. Assessment: Body Structures, Functions, Activity Limitations Requiring Skilled Therapeutic Intervention: Decreased functional mobility , Decreased endurance, Decreased balance, Decreased strength  Assessment: pt s/p stroke with right sided weakness, pt with Tinneti score of 6 demo high fall risk, Vandana Tamez is a 67 y.o. male that presents with stroke. he is ind prior to admission now requiring assist for basic mobility. Pt demonstrates a decrease in baseline by way of bed mobility, transfers and ambulation secondary to decreased activity tolerance, strength, fatigue, and balance deficits. Pt will benefit from skilled PT services throughout admission and beyond hospital discharge for improvements in functional mobility and in order to decrease fall risk and return pt to PLOF. Therapy Prognosis: Excellent     Requires PT Follow-Up: Yes     Discharge Recommendations:  Discharge Recommendations: Continue to assess pending progress, IP Rehab      OT:       COGNITION: Inattention, Decreased Safety Awareness, and Tangential     ADL:   No ADL's completed this session. Garrett Antoine BALANCE:  Sitting Balance:  Stand By Assistance. Seated in recliner and wheelchair  Standing Balance: [employer]. Brief periods of SBA. R sided ataxia with R foot drift noted. Pt able to tolerate dynamic standing during [address] task for ~4 min prior to requesting a break     BED MOBILITY:  Not Tested     TRANSFERS:  Sit to Stand:  Contact Guard Assistance. From recliner and wheelchair, increase time needed  Stand to Sit: [employer].  To recliner and wheelchair     FUNCTIONAL MOBILITY:  Assistive Device: None  Assist Level:  Contact Guard Assistance. Distance:  From recliner to wheelchair in doorway of room. Pt trailed without walker this date and functional mobility improved from min A yesterday to CGA with brief periods of SBA this date. Pt demonstrates RLE ataxia. ADDITIONAL ACTIVITIES:  Pt completed weightbearing neuromuscular facilitation task while standing at steps in gym. Pt required to complete closed chain task while Wbing through RUE and moving outside GALLO with LUE in a dusting motion on stair railing. Pt demonstrated good dynamic balance during task and able to complete for ~4 min x2 trials. Pt CGA throughout. Pt then completed a body on arm closed chained task while Wbing through LUE and moving outside GALLO with RUE in a dusting motion on stair railing. Pt demonstrated good dynamic balance during task and able to complete for ~4 min x2 trials. Pt CGA throughout. Pt completed task to increase RUE strength and awareness to facilitate independence with ADLs/IADLs. Pt completed fine motor coordination task while seated in wheelchair. Pt required to demonstrate pincer grasp to fastening a nut on a screw with RUE. Pt demonstrated difficulty with task and required increased time to complete. Pt SBA throughout and requiring multiple verbal cues to demo pincer grasp. Pt completed task to increase fine motor coordination and function of RUE to facilitate indep with ADLs/IADLs     ASSESSMENT:  Activity Tolerance:  Patient tolerance of  treatment: good.        Discharge Recommendations: Inpatient Rehabilitation      ST:      Minutes: 16  Timed Code Treatment Minutes: 8 Minutes   Cognitive Tx: 8 minutes  Dysphagia Tx: 8 minutes     Date: 2022  Patient Name: Cleo Adkins      CSN: [id number]   : 1950  (67 y.o.)  Gender: male   Referring Physician:  Kristin Leone MD  Diagnosis: CVA  Precautions: Fall risk  Current Diet:  Soft and Bite Size, Thin liquids   Swallowing Strategies:  Full Upright Position, Small Bite/Sip, Oral Care after all Meals, Intermittent Supervision, Alternate Solids and Liquids, and Limit Distractions      Date of Last MBS/FEES:  11/1/22     Pain:  No pain reported. Subjective:  MOLLY Preciado with approval to proceed with treatment. Patient seated upright in recliner upon ST arrival; alert and cooperative throughout. Short-Term Goals:  SHORT TERM GOAL #1:  Goal 1: Patient will consume soft and bite sized diet, thin liquids safely with the use of compensatory strategies to aid in nutrition/hydration needs. INTERVENTIONS: DNT due to focus on additional STGs. SHORT TERM GOAL #2:  Goal 2: Patient will complete advanced texture PO trials with ST only for potential advancement to baseline diet. INTERVENTIONS: An advanced texture PO trial as clinically indicated was completed consisting of regular solids (chelita crackers) and thin liquids (water). Oral phase presented with some right spillage of solids and liquids, good mastication/coordination, and fair bolus control. Patient also presented with right pocketing of solids that was cleared by multiple swallows with thin liquids. Recommending patient continue a soft and bite size diet with thin liquids and advanced PO trials to determine possible diet upgrade. SHORT TERM GOAL #3:  Goal 3: Patient will complete immediate/delayed recall and working memory tasks with 60% accuracy, mod cues to improve retention of functional information. INTERVENTIONS: DNT due to focus on additional STGs. SHORT TERM GOAL #4:  Goal 4: Patient will complete complex problem solving, verbal/visual reasoning, and executive functioning tasks (money/finance, meds, time) with 70% accuracy, mod cues to improve completion for daily living and work scenerios.   INTERVENTIONS: Time Word Problems ([employer]): 3/6 independent, 3/6 min cues   *good processing speed  *most errors due to mental math computation      SHORT TERM GOAL #5:  Goal 5: Patient will complete thought organization and sequencing tasks (divergent/convergent naming) with 70% accuracy, mod cues to improve mental flexibility for daily living scenarios. INTERVENTIONS: DNT due to focus on additional STGs. SHORT TERM GOAL #6:  Goal 6: Patient will complete structured speech drills/tasks (DDK rates, sentence repetition, verbal fluency) with implementation of SOS speech strategies to improve intelligibility to 80% or higher to maximize efficacy of communicative efficacy as it r/t speech production. INTERVENTIONS: DNT due to focus on additional STGs. EDUCATION:  Learner: Patient  Education:  Reviewed diet and strategies, Reviewed ST goals and Plan of Care, and Reviewed recommendations for follow-up  Evaluation of Education: Carlitos Barakat understanding and Family not present     ASSESSMENT/PLAN:  Activity Tolerance:  Patient tolerance of  treatment: good. Assessment/Plan: Patient progressing toward established goals. Continues to require skilled care of licensed speech pathologist to progress toward achievement of established goals and plan of care. .     Plan for Next Session: Cognitive Tx  Discharge Recommendations: Inpatient Rehabilitation        Past Medical History:      Diagnosis Date    Anxiety     Arthritis     CAD (coronary artery disease)     on Plavix and ASA    Cancer ([facility] 01/2019    left lung stage 2-Dr Dan    COPD (chronic obstructive pulmonary disease) ([facility] 02/2019    EKatharine Leiva    Diabetes ([facility]     Metformin    Enlarged prostate with urinary retention     Gait difficulty     Generalized weakness     Hyperlipidemia     Hypertension     Dr Rao Wheatley and Dr Terri Whatley    Lesion of bladder     Osteoarthritis     Retention of urine     Right inguinal hernia 2021    S/P cardiac catheterization 03/12/2019    stent to circumflex per Dr. Francisco Blood    S/P TURP     SOB (shortness of breath)     With activity    Voiding difficulty        Primary care provider: Renetta Sullivan.  Jearldine Peabody, MD     Past Surgical History:      Procedure Laterality Date    APPENDECTOMY      BRONCHOSCOPY N/A 1/25/2019    BRONCHOSCOPY performed by Kenny Nicole MD at [address]  1/25/2019    BRONCHOSCOPY/TRANSBRONCHIAL LUNG BIOPSY performed by Kenny Nicole MD at [address] [facility] N/A 3/1/2019    BRONCHOSCOPY W/EBUS FNA performed by Kenny Nicole MD at [address]  03/12/2019    COLONOSCOPY  [id number]    CT NEEDLE BIOPSY LUNG PERCUTANEOUS  3/29/2021    CT NEEDLE BIOPSY LUNG PERCUTANEOUS 3/29/2021 [facility] CT SCAN    HERNIA REPAIR Right 6/3/2021    RIGHT INGUINAL HERNIA REPAIR WITH MESH performed by Sheila Urena DO at [address]  2012    TURP    THORACOTOMY Left 3/14/2019    LEFT EXPLORATORY THORACOTOMY, MEDIASTINAL LYMPH NODE DISECTION, FLEXIBLE BRONCHOSCOPY performed by Pedro Gallagher MD at [address] [facility]:    Patient has no known allergies.     Current Medications:    Current Facility-Administered Medications: [START ON 11/3/2022] amLODIPine (NORVASC) tablet 5 mg, 5 mg, Oral, Daily  amoxicillin-clavulanate (AUGMENTIN) 875-125 MG per tablet 1 tablet, 1 tablet, Oral, 2 times per day  [START ON 11/3/2022] aspirin chewable tablet 81 mg, 81 mg, Oral, Daily  atorvastatin (LIPITOR) tablet 40 mg, 40 mg, Oral, Nightly  [START ON 11/3/2022] clopidogrel (PLAVIX) tablet 75 mg, 75 mg, Oral, Daily  [START ON 11/3/2022] finasteride (PROSCAR) tablet 5 mg, 5 mg, Oral, Daily  glucagon (rDNA) injection 1 mg, 1 mg, SubCUTAneous, PRN  glucose chewable tablet 16 g, 4 tablet, Oral, PRN  insulin lispro (HUMALOG) injection vial 0-4 Units, 0-4 Units, SubCUTAneous, TID WC  insulin lispro (HUMALOG) injection vial 0-4 Units, 0-4 Units, SubCUTAneous, Nightly  ipratropium-albuterol (DUONEB) nebulizer solution 1 ampule, 1 ampule, Inhalation, Q4H PRN  ipratropium-albuterol (DUONEB) nebulizer solution 1 ampule, 1 ampule, Inhalation, BID  [START ON 11/3/2022] losartan (COZAAR) tablet 25 mg, 25 mg, Oral, Daily  ondansetron (ZOFRAN-ODT) disintegrating tablet 4 mg, 4 mg, Oral, Q8H PRN **OR** ondansetron (ZOFRAN) injection 4 mg, 4 mg, IntraVENous, Q6H PRN  [START ON 11/3/2022] pantoprazole (PROTONIX) tablet 40 mg, 40 mg, Oral, QAM AC  polyethylene glycol (GLYCOLAX) packet 17 g, 17 g, Oral, Daily PRN  acetaminophen (TYLENOL) tablet 650 mg, 650 mg, Oral, Q4H PRN  docusate sodium (COLACE) capsule 100 mg, 100 mg, Oral, BID  bisacodyl (DULCOLAX) suppository 10 mg, 10 mg, Rectal, Daily PRN  magnesium hydroxide (MILK OF MAGNESIA) 400 MG/5ML suspension 15 mL, 15 mL, Oral, Daily PRN     Social History:  Social History     Socioeconomic History    Marital status:      Spouse name: Not on file    Number of children: Not on file    Years of education: Not on file    Highest education level: Not on file   Occupational History    Not on file   Tobacco Use    Smoking status: Former     Packs/day: 1.00     Years: 40.00     Pack years: 40.00     Types: Cigarettes     Start date: 26     Quit date: 2/28/2019     Years since quitting: 3.6    Smokeless tobacco: Never    Tobacco comments:     about 5 cigs per day   Vaping Use    Vaping Use: Never used   Substance and Sexual Activity    Alcohol use: No    Drug use: No    Sexual activity: Not on file   Other Topics Concern    Not on file   Social History Narrative    Not on file     Social Determinants of Health     Financial Resource Strain: Not on file   Food Insecurity: Not on file   Transportation Needs: Not on file   Physical Activity: Not on file   Stress: Not on file   Social Connections: Not on file   Intimate Partner Violence: Not on file   Housing Stability: Not on file     Lives With: Significant other  [address] One level  Home Access: Stairs to enter without rails  Entrance Stairs - Number of Steps: 2  Home Equipment: Jeff Shasta, 4 wheeled      Bathroom Shower/Tub: Tub/Shower unit  Bathroom Toilet: Handicap height  Bathroom Accessibility: Walker accessible     Receives Help From: Family  ADL Assistance: Independent  Homemaking Assistance: Independent  Ambulation Assistance: Independent  Transfer Assistance: Independent     Active : Yes  Mode of Transportation: Car  Occupation: Retired    Family History:       Problem Relation Age of Onset    Diabetes Mother     High Blood Pressure Mother     Brain Cancer Mother     Heart Disease Father     Diabetes Father     High Blood Pressure Father     Heart Attack Father     Diabetes Sister     High Blood Pressure Sister     COPD Sister         Agent Orange    Diabetes Brother     High Blood Pressure Brother        Review of Systems:  CONSTITUTIONAL:  positive for  fatigue  EYES:  positive for chronic visual disturbance, denies new visual issues  HEENT:  negative  RESPIRATORY:  positive for cough  CARDIOVASCULAR:  negative  GASTROINTESTINAL:  positive for dysphagia, negative for abdomin pain, nausea, vomiting, upset stomach  GENITOURINARY:  BPH with urinary retention  SKIN:  negative  HEMATOLOGIC/LYMPHATIC:  negative  MUSCULOSKELETAL:  positive for  muscle weakness  NEUROLOGICAL:  positive for speech problems, coordination problems, gait problems, dysphagia, and weakness  negative for headaches, dizziness, numbness, pain, and tingling  BEHAVIOR/PSYCH:  negative for anxiety  System review otherwise negative    Physical Exam:  BP (!) 154/76   Pulse 94   Temp 97.8 °F (36.6 °C) (Oral)   Resp 16   Ht 5' 8\" (1.727 m)   Wt 188 lb 1.6 oz (85.3 kg)   SpO2 95%   BMI 28.60 kg/m²   awake  Orientation:   person, place, time  Mood: within normal limits  Affect: calm  General appearance: Patient is well nourished, well developed, well groomed and in no acute distress, appearing stated age     Memory:   normal with conversation  Attention/Concentration: normal  Language:  abnormal - slight dysarthria      Cranial Nerves:  II: Visual fields:  normal  III,IV,VI: Extra Ocular Movements: intact  VII: Facial strength: abnormal right facial droop  ROM:  normal  Motor Exam:  Motor exam is 4- out of 5 right upper and 4+ out of 5 right lower extremities  Motor exam is 5 out of 5 left upper and left lower extremities  Tone:  normal  Muscle bulk: within normal limits  Sensory:  Sensory intact  Coordination:   abnormal - RUE and RLE     Heart: normal rate, regular rhythm, normal S1, S2, no murmurs, rubs, clicks or gallops  Lungs: clear to auscultation without wheezes or rales  Abdomen: soft, non-tender, non-distended, normal bowel sounds, no masses or organomegaly     Skin: warm and dry, no rash or erythema  Peripheral vascular: Pulses: Normal upper and lower extremity pulses; Edema: no    Diagnostics:  Recent Results (from the past 24 hour(s))   POCT glucose    Collection Time: 11/01/22  4:06 PM   Result Value Ref Range    POC Glucose 188 (H) 70 - 108 mg/dl   Glomerular Filtration Rate, Estimated    Collection Time: 11/01/22  6:06 PM   Result Value Ref Range    Est, Glom Filt Rate >60 >60 ml/min/1.73m2   POCT glucose    Collection Time: 11/01/22  7:48 PM   Result Value Ref Range    POC Glucose 272 (H) 70 - 108 mg/dl   Basic Metabolic Panel    Collection Time: 11/02/22  3:59 AM   Result Value Ref Range    Sodium 141 135 - 145 meq/L    Potassium 4.2 3.5 - 5.2 meq/L    Chloride 105 98 - 111 meq/L    CO2 25 23 - 33 meq/L    Glucose 160 (H) 70 - 108 mg/dL    BUN 14 7 - 22 mg/dL    Creatinine 0.9 0.4 - 1.2 mg/dL    Calcium 9.0 8.5 - 10.5 mg/dL   CBC    Collection Time: 11/02/22  3:59 AM   Result Value Ref Range    WBC 8.4 4.8 - 10.8 thou/mm3    RBC 4.94 4.70 - 6.10 mill/mm3    Hemoglobin 14.2 14.0 - 18.0 gm/dl    Hematocrit 43.0 42.0 - 52.0 %    MCV 87.0 80.0 - 94.0 fL    MCH 28.7 26.0 - 33.0 pg    MCHC 33.0 32.2 - 35.5 gm/dl    RDW-CV 13.9 11.5 - 14.5 %    RDW-SD 44.1 35.0 - 45.0 fL    Platelets 279 573 - 894 thou/mm3    MPV 8.8 (L) 9.4 - 12.4 fL   Anion Gap    Collection Time: 11/02/22  3:59 AM Result Value Ref Range    Anion Gap 11.0 8.0 - 16.0 meq/L   POCT glucose    Collection Time: 11/02/22  6:34 AM   Result Value Ref Range    POC Glucose 250 (H) 70 - 108 mg/dl   POCT glucose    Collection Time: 11/02/22 10:56 AM   Result Value Ref Range    POC Glucose 227 (H) 70 - 108 mg/dl       Impression:  Acute left corona radiata CVA  Right facial droop  Right hemiparesis   Dysphagia   Cognitive deficits  Type 2 diabetes with nephropathy, uncontrolled   Hypertension  Hyperlipidemia  COPD Gold stg II  CAD  BPH with urinary retention  Hx RUL malignant neoplasm with Right VATS wedge resection x 2  2021  Apical abscess of the left maxillary first premolar, Augmentin until dental follow up   Anxiety  Osteoarthritis  Diabetes mellitus  Gait difficulty  Lesion of the urinary bladder  Right inguinal hernia  Status postcardiac catheterization with stent to circumflex per Dr. Guaman Sensing post TURP  Shortness of breath with activity      Plan:   Admit to the inpatient rehabilitation unit. The patient demonstrates good potential to participate in an inpatient rehabilitation program involving at least 3 hours per day, 5 days per week of intensive rehabilitation. Rehabilitation services will include PT, OT, and SLP/RT in order to improve functional status prior to discharge. Family education and training will be completed. Equipment evaluations and recommendations will be completed as appropriate. Rehabilitation nursing will be involved for bowel, bladder, skin, and pain management. Nursing will also provide education and training to patient and family. Prophylaxis:  DVT: EPC cuffs; continue aspirin, Plavix. GI: Protonix 40 mg p.o daily before breakfast.  Pain: Tylenol 650 mg p.o. every 4 hours as needed  Nutrition:  Consultation to dietician for nutritional counseling and recommendations. Prealbumin will be checked on admission.     Bladder: Per rehab nursing: Continue Proscar 5 mg p.o. daily  Bowel: Per rehab nursing; continue Colace 100 mg p.o. twice daily, Dulcolax suppository 10 mg rectal daily as needed, milk of magnesia 15 mL p.o. daily as needed, and GlycoLax 17 g p.o. daily as needed   and case management consultations for coordination of care and discharge planning  Continue Augmentin 875/125 1 tablet every 12 hours continue dental abscess  Continue aspirin 81 mg p.o. daily for antiplatelets  Continue Plavix 75 mg p.o. daily for antiplatelets  Continue Humalog insulin for hyperglycemia  Continue DuoNeb nebulizer for COPD  Continue glucagon injection 1 mg subcutaneous as needed and glucose chewable tablet 16 g p.o. as needed for hypoglycemia    The main medical problem(s) and comorbidities being actively managed by the physicians and requiring 24 hour rehabilitation nursing care during this stay include:  Acute left corona radiata CVA  Right facial droop  Right hemiparesis   Dysphagia   Cognitive deficits  Type 2 diabetes with nephropathy, uncontrolled   Hypertension  Hyperlipidemia  COPD Gold stg II  CAD  BPH with urinary retention  Hx RUL malignant neoplasm with Right VATS wedge resection x 2  2021  Apical abscess of the left maxillary first premolar, Augmentin until dental follow up   Anxiety  Osteoarthritis  Diabetes mellitus  Gait difficulty  Lesion of the urinary bladder  Right inguinal hernia  Status postcardiac catheterization with stent to circumflex per Dr. Charisse Angela post TURP  Shortness of breath with activity    The domains of functional impairment present in this patient which will require an intensive and interdisciplinary rehabilitation environment include self care, mobility, motor dysfunction, bowel/bladder management, pain management, safety, cognitive function, and communication. Estimated length of stay for this admission 14 days    Anticipated disposition: Home. The potential to achieve that is good.     The post admission physician evaluation (YOANNA) is consistent with the pre-admission assessment. See above findings to reflect the elements required in the YOANNA. Patient's admitting condition is consistent with the findings of the preadmission assessment by the rehabilitation admissions coordinator. Spent 73 minutes reviewing the case, interviewing and examining the patient, completing documentation (including H&P, Problem List, and the PM&R Order Set), and coordinating care.      Warren Romeo MD

## 2022-11-02 NOTE — PLAN OF CARE
Problem: Respiratory - Adult  Goal: Clear lung sounds  Outcome: Progressing  Note: Txs to help improve lung aeration. Patient mutually agreed on goals.

## 2022-11-02 NOTE — CARE COORDINATION
11/2/22, 12:04 PM EDT    Patient goals/plan/ treatment preferences discussed by  and . Patient goals/plan/ treatment preferences reviewed with patient/ family. Patient/ family verbalize understanding of discharge plan and are in agreement with goal/plan/treatment preferences. Understanding was demonstrated using the teach back method. AVS provided by RN at time of discharge, which includes all necessary medical information pertaining to the patients current course of illness, treatment, post-discharge goals of care, and treatment preferences.      Services At/After Discharge: Inpatient rehab       IMM Letter  IMM Letter given to Patient/Family/Significant other/Guardian/POA/by[de-identified] staff  IMM Letter date given[de-identified] 10/31/22  IMM Letter time given[de-identified] 0169

## 2022-11-02 NOTE — PROGRESS NOTES
Admitted to the Inpatient Rehabilitation Unit via wheelchair. Patient was then oriented to room and unit. Education provided on the rehabilitation routine: three hours of therapy five days per week. Explained patients right to have family, representative or physician notified of their admission. Patient has Requested for physician to be notified. Patient has Declined for family/representative to be notified. Admitting medication orders compared with acute stay medications; home medication list reviewed with patient/family. Medication issues identified No  Medication issue: n/a  If yes, physician notified  n/a, no issues noted . Transportation:   Has transportation kept you from medical appointments, meetings, work, or from getting things needed for daily living? (Check all that apply)  No.      Health Literacy:   How often do you need to have someone help you when you read instructions, pamphlets, or other written material from your doctor or pharmacy? 0. - Never    Social Isolation:  How often do you feel lonely or isolated from those around you?  0. Never    Patient Mood Interview (PHQ-2 to 9) (from [email or web address])   Say to Patient: \"Over the last 2 weeks, have you been bothered by any of the following problems? \"   If symptom is present, enter yes in column 1 (Symptom Presence)  If yes in column 1, then ask the patient: About how often have you been bothered by this?  Indicate response in column 2, Symptom Frequency. Symptom Presence  No    Yes   9. No response  Symptom Frequency  Never or 1 day  2-6 days (several days)  7-11 days (half or more of the days)  12-14 days (nearly every day)    Symptom Presence Symptom Frequency   Little interest or pleasure in doing things 0. No 0. Never or 1 day   Feeling down, depressed, or hopeless 0. No 0. Never or 1 day   If either A or B above has symptom frequency coded 2 or 3, CONTINUE asking questions below.       If not, END the interview  and right click on next table to delete. Pain:  Pain Effect on Sleep    Ask patient: \"Over the past 5 days, how much of the time has pain made it hard for you to sleep at night?\" 1.  Rarely or not at all     If no pain is reported, Stop here. If pain is reported, continue with the additional questions. Pain Interference with Therapy Activities   Ask patient: Starlene Notch the past 5 days, how often have you limited your participation in rehabilitation therapy sessions due to pain?\" 1.  Rarely or not at all   Pain Interference with Day to Day Activities   Ask patient: Starlene Notch the past 5 days, how often have you limited your day to day activities (excluding rehabilitation therapy sessions) because of pain?\" 1.  Rarely or not at all         Bladder and Bowel Function Assessment:  Prior history of bladder problems: medications for prostate  Number of pads used per day: 1  Frequency of night time voiding: twice  Fluid intake volume and pattern: adequate  Last BM: 11/02/22  Bowel problems (prior or current) No      Incontinence      Frequent diarrhea      No BM in 3 days this stay or history of constipation      Hemorrhoids      Diverticulitis      Bowel Surgery     Two nurse skin assessment performed by Maddie Welch  and [facility] LPN . Weight: 188.1 lb      Care plan was created with patient's input and goals were agreed upon. Admission folder provided with education regarding patients diagnoses, fall prevention, and skin care. \"Data Collection Information Summary for Patients in Inpatient Rehabilitation Facilities\" and \"Privacy Act Statement - Health Care Records\" provided. Please refer to the admission navigator for further information.

## 2022-11-02 NOTE — PLAN OF CARE
Problem: Respiratory - Adult  Goal: Clear lung sounds  Outcome: Progressing   Patient mutually agreed on goals.

## 2022-11-02 NOTE — DISCHARGE SUMMARY
Hospital Medicine Discharge Summary      Patient Identification:  Name: Amy Rodriges  : 1950  MRN: [id number]  Account: [de-identified]    Patient's PCP: Katie Vital. Brian Mckeon MD    Admit Date: 10/31/2022    Discharge Date:   22    Admitting Physician: Isrrael Cerrato MD    Discharge Physician: Le Beckford MD      HPI:  Amy Rodriges is a 67 y.o. male with PMHx of NIDDMII, HTN, HLD, COPD, CAD s/p PCI, BPH, Hx lung cancer who was admitted to [facility] on 10/31/2022 for R-sided facial droop, R-sided weakness/numbness. Please see chart for more details regarding HPI. Hospital Course:   Amy Rodriges is a 67 y.o. male who presented to [facility] R-sided facial droop, R-sided weakness/numbness. Patient reportedly experienced RUE numbness/tingling and RLE weakness with R-sided facial droop at 0730 on day of admission. He presented to [facility] due to failure of these symptoms to resolve and MRI demonstrated acute L parietal stroke. He was determined not to be a tPA candidate due to [facility] being over 4.5 hours. Patient was initiated on DAPT therapy and PT/OT/SLP were consulted. Patient was reportedly on Plavix and aspirin at home. Clopidogrel assay confirmed inhibition, but patient admitted to intermittently taking aspirin. He was also noted to have poorly controlled HTN and diabetes during admission with an A1c of 8.0. IPR was recommended, and patient was eventually discharged to rehab with plans to return home following rehab stay. The patient was stable for discharge - all consultants were contacted and in agreement with plan for discharge. Appropriate follow up appointment was arranged prior to discharge. Please see below or view chart for more details from hospital course. Discharge Diagnoses:    #Acute L parietal stroke: Px R-sided facial droop, RUE numbness/weakness, and RLE weakness. MRI obtained: L parietal stroke confirmed.  Clopidogrel assay showed inhibition. -DAPT with ASA/plavix  -Discharge to [facility]  #NIDDMII: A1c 8.0 8/24/22. Home meds re-started. Patient will need home regimen adjusted with more aggressive blood sugar control in setting of recent stroke and uncontrolled diabetes  #HTN: Continued home Norvasc 5mg daily, Losartan 25mg daily. #HLD: Lipid panel on 3/23/2022 demonstrated total cholesterol 166, triglyceride level terms of 67, HDL of 38, and a calculated LDL of 75  -Continue Lipitor 40mg daily  #COPD  #CAD: DAPT per above  #BPH: Resumed home medication. #hx lung cancer: Noted. Last chemo 2019. #Apical abscess of L maxillary first premolar: Incidental on CT. -Augmentin until dentistry/oral surgery follow-up      The patient was seen and examined on day of discharge and this discharge summary is in conjunction with any daily progress note from day of discharge. The patient understood, was in agreement, and verbalized the plan of care at time of discharge. Exam:    Vitals:   Vitals:    11/01/22 1947 11/01/22 2309 11/02/22 0304 11/02/22 0715   BP: 137/83 137/84 (!) 145/88 (!) 143/77   Pulse: 96 97 86 86   Resp: 18  16 18   Temp: 98.1 °F (36.7 °C)   97.4 °F (36.3 °C)   TempSrc: Oral   Oral   SpO2: 93% 94%  94%   Weight:       Height:         Weight: Weight: 190 lb (86.2 kg)    24 hour intake/output:  Intake/Output Summary (Last 24 hours) at 11/2/2022 0801  Last data filed at 11/1/2022 1738  Gross per 24 hour   Intake 480 ml   Output --   Net 480 ml         General appearance: No apparent distress, appears stated age and cooperative. HEENT: Pupils equal, round, and reactive to light. Conjunctivae/corneas clear. Neck: Supple, with full range of motion. No jugular venous distention. Trachea midline. Respiratory:  Normal respiratory effort. Clear to auscultation, bilaterally without Rales/Wheezes/Rhonchi. Cardiovascular: Regular rate and rhythm with normal S1/S2 without murmurs, rubs or gallops.   Abdomen: Soft, non-tender, non-distended with MRI BRAIN WO CONTRAST   Final Result       1. Area of restricted diffusion in the left parietal periventricular white matter with corresponding diminished signal intensity on ADC map consistent with an acute infarct. 2. Mild atrophy and probable ischemic changes in the white matter. 3. Otherwise negative MRI scan of the brain. **This report has been created using voice recognition software. It may contain minor errors which are inherent in voice recognition technology. **      Final report electronically signed by DR Gorge Campbell on 10/31/2022 12:14 PM      CTA HEAD W WO CONTRAST (CODE STROKE)   Final Result   1. Small amount of plaque in the proximal right internal carotid artery. No significant hemodynamic stenosis in the right common and internal carotid arteries. 2. No significant hemodynamic stenosis in the left common and internal carotid arteries. 3. Atherosclerotic calcification aortic arch and at the origin of the left subclavian artery. 4. Atherosclerotic calcification in the cavernous segments of both internal carotid arteries. No evidence of severe stenosis in the internal carotid arteries. 5. Atherosclerotic calcification in the distal right vertebral artery. There is antegrade flow in the right and left vertebral arteries. 6. Otherwise negative CTA of the [place]. **This report has been created using voice recognition software. It may contain minor errors which are inherent in voice recognition technology. **      Final report electronically signed by DR Gorge Campbell on 10/31/2022 12:27 PM      CTA NECK W WO CONTRAST (CODE STROKE)   Final Result   1. Small amount of plaque in the proximal right internal carotid artery. No significant hemodynamic stenosis in the right common and internal carotid arteries. 2. No significant hemodynamic stenosis in the left common and internal carotid arteries.    3. Atherosclerotic calcification aortic arch and at the origin of the left subclavian artery. 4. Atherosclerotic calcification in the cavernous segments of both internal carotid arteries. No evidence of severe stenosis in the internal carotid arteries. 5. Atherosclerotic calcification in the distal right vertebral artery. There is antegrade flow in the right and left vertebral arteries. 6. Otherwise negative CTA of the [place]. **This report has been created using voice recognition software. It may contain minor errors which are inherent in voice recognition technology. **      Final report electronically signed by DR Edi Vogt on 10/31/2022 12:27 PM      CT HEAD WO CONTRAST   Final Result       1. No evidence of an acute process in the brain. 2. Apical abscess associated with the left maxillary 1st premolar. These findings were telephoned to CEASAR Weathers at 11:11 AM on 10/31/2022 . **This report has been created using voice recognition software. It may contain minor errors which are inherent in voice recognition technology. **      Final report electronically signed by Dr. Katheryn Darnell on 10/31/2022 11:14 AM            Consults:     IP CONSULT TO PHARMACY  PHARMACY TO CHANGE BASE FLUIDS  IP CONSULT TO CASE MANAGEMENT  PALLIATIVE CARE EVAL  IP CONSULT TO SPIRITUAL SERVICES  IP CONSULT TO PHYSICAL MEDICINE REHAB  IP CONSULT TO REHAB/TCU ADMISSION COORDINATOR    Disposition:    [] Home        [] TCU       [x] Rehab       [] Psych       [] SNF       [] [place]       [] Other-    Condition at Discharge: stable    Code Status:  Full Code   Admitted for: Acute stroke    Patient Instructions:  Discharge lab work: None  Activity: activity as tolerated  Diet: ADULT DIET;  Dysphagia - Soft and Bite Sized    Follow-up visits:   Simona Pineda MD  [address] [place] [address]  [phone]    Follow up in 1 month(s)  stroke        Discharge Medications:        Medication List        CONTINUE taking these medications      FreeStyle Freedom Lite w/Device Kit     FreeStyle Lancets Misc            ASK your doctor about these medications      amLODIPine 10 MG tablet  Commonly known as: [place]  Ask about: Which instructions should I use?     aspirin 81 MG chewable tablet  Take 1 tablet by mouth daily     atorvastatin 20 MG tablet  Commonly known as: LIPITOR     clopidogrel 75 MG tablet  Commonly known as: PLAVIX  TAKE 1 TABLET BY MOUTH ONE TIME A DAY     dutasteride 0.5 MG capsule  Commonly known as: AVODART     FREESTYLE LITE strip  Generic drug: blood glucose test strips     glimepiride 4 MG tablet  Commonly known as: AMARYL  Ask about: Which instructions should I use?     losartan 50 MG tablet  Commonly known as: COZAAR  Ask about: Which instructions should I use?     metFORMIN 500 MG tablet  Commonly known as: GLUCOPHAGE  Ask about: Which instructions should I use?     nitroGLYCERIN 0.4 MG SL tablet  Commonly known as: NITROSTAT  up to max of 3 total doses. If no relief after 1 dose, call 911. omeprazole 20 MG delayed release capsule  Commonly known as: PRILOSEC     Ozempic (0.25 or 0.5 MG/DOSE) 2 MG/1.5ML Sopn  Generic drug: Semaglutide(0.25 or 0.5MG/DOS)     vitamin C 500 MG tablet  Commonly known as: ASCORBIC ACID     vitamin D 25 MCG (1000 UT) Tabs tablet  Commonly known as: CHOLECALCIFEROL              Discharge Time:  Time spent on discharge is >25 minutes in the examination, evaluation, counseling, and review of medications and discharge plan. The hospital course was discussed with the patient and all questions and concerns were addressed at that time. The patient was in agreement with and verbalized understanding of the plan of care and had no additional questions or complaints. The patient was instructed to follow-up with any scheduled outpatient appointments or to report to the nearest Emergency Department if new or worsening symptoms should arise. Thank you Katty Hong.  Travis Larkin MD for the opportunity to be involved in this patient's care.     Signed:    Electronically signed by Luis Koch MD, PGY-2 on 11/2/2022 at 8:01 AM

## 2022-11-02 NOTE — PROGRESS NOTES
[facility]  INPATIENT PHYSICAL THERAPY  DAILY NOTE  [facility]      Time In: 8522  Time Out: 7437  Timed Code Treatment Minutes: 44 Minutes  Minutes: 39          Date: 2022  Patient Name: Skyler Vazquez,  Gender:  male        MRN: [id number]  : 1950  (67 y.o.)     Referring Practitioner: Dr. Ruby Rand  Diagnosis: stroke determined by clinical assessment  Additional Pertinent Hx: Patient is a 77-year-old male with a past medical history of urinary retention, hypertension, hyperlipidemia, chronic obstructive pulmonary disease, arthritis, enlarged prostate, stage II lung cancer with last known surgery and chemotherapy in 2019, and coronary artery disease on Plavix and aspirin with stent to the circumflex on 3/12/2019 who presents with a chief complaint of right-sided facial droop and right upper extremity numbness and her managing primarily for acute left parietal stroke.      Prior Level of Function:  Lives With: Significant other  Type of Home: House  Home Layout: One level  Home Access: Stairs to enter without rails  Entrance Stairs - Number of Steps: 2  Home Equipment: [place], 4 wheeled   Bathroom Shower/Tub: Tub/Shower unit  Bathroom Toilet: Handicap height  Bathroom Accessibility: Walker accessible    [address] Help From: Family  ADL Assistance: Independent  Homemaking Assistance: Independent  Ambulation Assistance: Independent  Transfer Assistance: Independent  Active : Yes    Restrictions/Precautions:  Restrictions/Precautions: General Precautions, Fall Risk  Position Activity Restriction  Other position/activity restrictions: R hemibody weakness       SUBJECTIVE: nursing ok'd therapy, pt agreeable for therapy and eager to get moving - pt required many cues throughout session for right UE use     PAIN: 0/10:       Vitals: Vitals not assessed per clinical judgement, see nursing flowsheet    OBJECTIVE:  Bed Mobility:  Supine to Sit: Stand By Assistance, however pt going to his left side   Little use of right UE- cues for right UE placement prior to scooting to the edge of the bed   Transfers:  Sit to Stand: Minimal Assistance, to [address] he needed cues 100% of time for right UE placement and hand over hand 50% of time for hand placement on chair   Stand to [address] to min assist- again he needed cues for right UE use and for equal wbing or he would lean to his left w/ little right alton body use     Ambulation:  Minimal Assistance, to CGA   Distance: 50x1, and short distances in room   Surface: Level Tile  Device:No Device  Gait Deviations:  noted min path deviation and he had multiple loss of balance needing extra steps to regain balance, noted frequently decreased heel strike at right LE and would catch foot mid swing     Balance:  Standing w/o UE at support pt standing on foam completed upper trunk rotations, shoulder horz abd/add and alt UEs flex/ext needing min to CGA pt then progressed to reaching activity above head and to knee and right and left with min assist as he tended to drift to the right and struggled to correct to midline w/o reaching his left UE to a table   - pt completed step ups on 2 in step needing min assist for balance and he struggled clearing his right foot upto step and needed min assist for balance   Exercise:  Patient was guided in 1 set(s) 10 reps of exercise to both lower extremities. Standing ex with right UE at support pt completed mini squats, hip abd/add, hamstring curls, marches, hip extension noted decreased control at right LE vs left and cues for proper technique  . Exercises were completed for increased independence with functional mobility. Functional Outcome Measures: Not completed       ASSESSMENT:  Assessment: Patient progressing toward established goals. Pt demonstrated generalized weakness in right alton body along with decreased balance, endurance and safety awareness.  Pt would greatly

## 2022-11-02 NOTE — PROGRESS NOTES
[address] [address]  Acute Inpatient Rehab Preadmission Assessment    Patient Name: Josefa Huff        Ethnicity:Patient declines to respond  Race:Patient declines to respond  MRN: [id number]    : 1950  (73 y.o.)  Gender: male     Admitted from:[address]  Initial Assessment    Date of admission to the hospital: 10/31/2022 10:42 AM  Date patient eligible for admission:2022    Primary Diagnosis: CVA      Did patient have surgery?  no    Physicians: Anderson Smalls MD, Dr Steve Fall, Dr Michael Hwang, Dr Purdy How for clinical complications/co-morbidities:   Past Medical History:   Diagnosis Date    Anxiety     Arthritis     CAD (coronary artery disease)     on Plavix and ASA    Cancer ([facility] [address] 2019    left lung stage 2-Dr Dan    COPD (chronic obstructive pulmonary disease) ([facility] [address] 2019    E. Cali    Diabetes ([facility] [address]     Metformin    Enlarged prostate with urinary retention     Gait difficulty     Generalized weakness     Hyperlipidemia     Hypertension     Dr Errol Lewis and Dr Morgan Settlerene    Lesion of bladder     Osteoarthritis     Retention of urine     Right inguinal hernia     S/P cardiac catheterization 2019    stent to circumflex per Dr. Nisreen Cleary    S/P TURP     SOB (shortness of breath)     With activity    Voiding difficulty        Financial Information  Primary insurance: Medicare    Secondary Insurance:   none    Has the patient had two or more falls in the past year or any fall with injury in the past year? no    Did the patient have major surgery during the 100 days prior to admission?   no    Precautions:   falls and seizures  Restrictions/Precautions: General Precautions, Fall Risk  Other position/activity restrictions: R hemibody weakness    Isolation Precautions: None       Physiatrist: Dr. Steve Fall    Patients Occupation: Retired  Reviewed Lab and Diagnostic reports from Current Admission: Yes    Patients Prior Functional  Level: Prior Function  Receives Help From: Family  ADL Assistance: Independent  Homemaking Assistance: Independent  Ambulation Assistance: Independent  Transfer Assistance: Independent    Current functional status for upper extremity ADLs: moderate assist due to RUE weakness    Current functional status for lower extremity ADLs: maximum assist due to RUE weakness    Current functional status for bed, chair, wheelchair transfers: FUNCTIONAL MOBILITY:  Assistive Device: Rolling Walker  Assist Level:  Minimal Assistance. Distance:  Bed to recliner, about 4 steps  Pt demonstrated R hand supination while holding onto walker, continue monitoring for need of built up handle vc walker splint. Pt demonstrated RLE ataxia and required vc to not drag his foot. Current functional status for toilet transfers: maximum assist for clothing management    Current functional status for locomotion: Assistive Device: Rolling Walker  Assist Level:  Minimal Assistance. Distance:  Bed to recliner, about 4 steps  Pt demonstrated R hand supination while holding onto walker, continue monitoring for need of built up handle vc walker splint.  Pt demonstrated RLE ataxia and required vc to not drag his foot    Current functional status for bladder management: Moderate assistance    Current functional status for bowel management:Moderate assistance    Current functional status for comprehension: Supervision    Current functional status for expression: Supervision    Current functional status for social interaction: Supervision    Current functional status for problem solving: Supervision    Current functional status for memory: Supervision    Expected level of Improvement in Self-Care:  Modified independence    Expected level of Improvement in Sphincter Control:  Modified independence    Expected level of Improvement in Transfers: Modified independence    Expected level of Improvement in Locomotion:  Modified independence    Expected level of Improvement in Communication and Social Cognition: Modified independence    Expected length of time to achieve that level of improvement: 2 weeks    Current rehab issues: ADL dysfunction,bladder management,bowel management,carry over of therapy techniques, discharge planning, disease and co-morbidity management, gait/mobility dysfunction, medication management, nutrition and hydration management,Ongoing assessment of safety, Pain management, Patient and family education, Prevention of secondary complications, Skin Integrity, NWB,TTWB, PWB,cognitive impairment, communication impairment. Required therapy: Physical Therapy, Occupational Therapy and Speech Therapy 3 hours per day, 5-6 days per week. Recreational Therapy 1 hour per week. Expected Discharge Destination: Home    Expected Post Discharge Treatments: Home Care    Other information relevant to the care needs:   Lives With: Significant other  Type of Home: House  Home Layout: One level  Home Access: Stairs to enter without rails  Entrance Stairs - Number of Steps: 2  Bathroom Shower/Tub: Tub/Shower unit  Bathroom Toilet: Handicap height  Bathroom Accessibility: Walker accessible  Home Equipment: Stefani Forrest, 4 wheeled  Receives Help From: Family  ADL Assistance: Independent  Homemaking Assistance: Independent  Ambulation Assistance: Independent  Transfer Assistance: Independent  Active : Yes  Mode of Transportation: Car  Occupation: Retired    Acute Inpatient Rehabilitation Disclosure Statement provided to patient. Patient verbalized understanding. I have reviewed and concur with the findings and results of the pre-admission screening assessment completed by the Inpatient Rehabilitation Admissions Coordinator.       Electronically signed by Ronnie Jennings MD on 11/2/2022 at 1:20 PM

## 2022-11-02 NOTE — PROGRESS NOTES
Report received from primary RN Anil Adjutant this AM.    Head to Toe Assessment    Skin  General skin appearance / Description: warm, dry, appropriate color for ethnicity     Neuro/Head  LOC: A+O x 4  Eyes PERRLA 5-3 mm  Symmetry of face / tongue: Slight right sided facial drop  JVD of neck: negative    Chest  Heart sounds / Apical Pulse: Strong and regular rate and rhythm  Dyspnea: not present  Lung sounds: Clear throughout all lung fields  Cough: Only present when taking too large sips of drinks, pt. Encouraged to take small sips    Abdomen/ Pelvis  Bowel sounds: Active in all quadrants  Passing flatus: yes  Palpation / Inspection: Abdomen flat, non-tender, and no masses palpated  Last BM: No last BM recorded in chart  Stool assessment: N/A  Any GI issues: none  Urinary issues: Hx. Of enlarged prostate with urinary retention  Continence: Pt. Is continent  Urine color / turbidity: yellow, clear    Extremities  Edema: not present  Upper extremity push / pulls:Left side strong, right side is slightly weaker  Left Arm Radial Pulse: Strong and regular Capillary Refill: <3 seconds  Right Arm Radial Pulse: Strong and regular Capillary Refill: <3 seconds  Lower extremity pedal push / pulls: Left side strong, right side slightly weaker  Left pedal pulse: strong and regular Left posterior tibialis pulse: strong and regular Left lower extremity capillary refill: <3 seconds  Right pedal pulse: strong and regular Right posterior tibialis pulse: strong and regular Right lower extremity capillary refill: <3 seconds  Pain: Pt.  Denies any pain at this time    Other issues: none     Electronically signed by Fernando Duckworth on 11/2/2022 at 8:27 AM  RSNIDHI/

## 2022-11-02 NOTE — PLAN OF CARE
Individualized Plan of [facility] Inpatient Rehabilitation Unit    Rehabilitation physician: Dr. Cristina Chandler Date: 11/2/2022     Rehabilitation Diagnosis: CVA (cerebral vascular accident) [facility]) [I63.9]  Acute cerebrovascular accident (CVA) ([facility] [address] [I63.9]      Rehabilitation impairments: self care, mobility, motor dysfunction, bowel/bladder management, pain management, safety, cognitive function, and communication    Factors facilitating achievement of predicted outcomes: Family support, Motivated, Cooperative, and Pleasant  Barriers to the achievement of predicted outcomes: Cognitive deficit, Limited safety awareness, Depression, Anxiety, Limited insight into deficits, and Decreased endurance    Patient Goals: Improve independence with mobility, Improvement of mobility at a wheelchair level, Increase overall strength and endurance, Increase balance, Increase endurance, Increase independence with activities of daily living, Improve cognition, Increase self-awareness, Increase safety awareness, Increase community integration, Increase socialization, Functional communication with caregivers, Integrate appropriate pain management plan, Assure adequate nutritional option for discharge, Continence of bowel and bladder, and Provide appropriate patient and family education      NURSING:  Nursing goals for [facility] while on the rehabilitation unit will include:  Continence of bowel and bladder, Adequate number of bowel movements, Urinate with no urinary retention >300ml in bladder, Complete bladder protocol with neal removal, Maintain O2 SATs at an acceptable level during stay, Effective pain management while on the rehabilitation unit, Establish adequate pain control plan for discharge, Absence of skin breakdown while on the rehabilitation unit, Improved skin integrity via assessments including wound measurements, Avoidance of any hospital acquired infections, No signs/symptoms of infection at the wound site, Freedom from injury during hospitalization, and Complete education with patient/family with understanding demonstrated regarding disease process and resultant impairment     In order to achieve these goals, nursing interventions may include bowel/bladder training, education for medical assistive devices, medication education, O2 saturation management, energy conservation, stress management techniques, fall prevention, alarms protocol, seating and positioning, skin/wound care, pressure relief instruction, dressing changes, infection protection, DVT prophylaxis, assistance with safe transfers , and/or assistance with bathroom activities and hygiene. PHYSICAL THERAPY:  Goals:        Short Term Goals  Time Frame for Short Term Goals: 1 week  Short Term Goal 1: Patient will complete supine < > sit and rolling with head of bed flat and no rails with modified independence to transfer in and out of bed safely. Short Term Goal 2: Patient will complete sit < > stand with SBA and less than or equal to 25% verbal cues for utilization of right UE to stand to ambulate with decreased difficulty. Short Term Goal 3: Patient will ambulate 76' with no AD and CGA to progress towards navigating home safely. Short Term Goal 4: Patient will ascend/descend 1 step with no hand rail and CGA to progress towards safe home entry. Short Term Goal 5: Patient will improve single leg stance to 3 seconds each lower extremity with CGA to demonstrate good lateral weight shift and improve balance  Long Term Goals  Time Frame for Long Term Goals : 3 weeks  Long Term Goal 1: Patient will complete sit < > stand with modified independence with no verbal cues for hand placement to stand to ambulate safely. Long Term Goal 2: Patient will complete chair < > bed transfer with no AD and modified independence to transfer surface to surface safely.   Long Term Goal 3: Patient will ambulate 80' with no AD and modified independence to navigate home safely. Long Term Goal 4: Patient will ascend/descend 2 steps with no hand rail and SBA for home entry. Long Term Goal 5: Patient will improve KUMARI to greater than or equal to 45/56 to reduce his risk for falls. Additional Goals?: Yes  Long term goal 6: Patient will complete car transfer with modified independence to transfer in and out of vehicl safely. Plan of Care: Patient to be seen by physical therapy services  (5x/wk 90min, 1x/wk 30min)       Anticipated interventions may include therapeutic exercises, gait training, neuromuscular re-ed, transfer training, community reintegration, bed mobility, w/c mobility and training.       OCCUPATIONAL THERAPY:  Goals:             Short Term Goals  Time Frame for Short Term Goals: 1 week from eval  Short Term Goal 1: Pt will complete oral hygiene routine while using R side for >/= 75% of task and SBA to increase indep with self-care  Short Term Goal 2: Pt will complete item retieval with >/= 5 items with SBA and 0-1 VC for R sided involvement to increase indep with grooming  Short Term Goal 3: Pt will complete dynamic standing with 2 UE release, SBA and 0-2 VC for R sided awareness to increase indep with hygiene  Short Term Goal 4: Pt will complete tolieting, including transfer, with SBA and 0-2 VC for R sided awareness to increase indep with tolieting  Short Term Goal 5: Pt will complete LB dressing with supervision and 0-1 VC for technique to increase indep with dressing  Long Term Goals  Time Frame for Long Term Goals : 2 weeks from eval  Long Term Goal 1: Pt will complete homemaking task with mod I to increase indep with laundry  Long Term Goal 2: Pt will complete pathway finding task with supervision and 0-2 vc for visual scanning to use signs to increase indep with community reintigration  Long Term Goal 3: Pt will complete BADL routine with mod I to increase indep with oral hygiene    Plan of Care: Patient to be seen by occupational therapy services 5x a week for 90 min and 1x a week for 30 min     Anticipated interventions may include ADL and IADL retraining, strengthening, safety education and training, patient/caregiver education and training, equipment evaluation/ training/procurement, neuromuscular reeducation, wheelchair mobility training. SPEECH THERAPY:     PLAN:  Skilled SLP intervention on IP Rehab 60 minutes per day, 5 days per week. Specific interventions for next session may include: advanced dietary analysis, higher level cognition. PATIENT GOAL:    Return to prior level of function. SHORT TERM GOALS:  Short Term Goals  Time Frame for Short Term Goals: 1 week  Goal 1: Patient will safely consume advanced trials of regular solids in conjunction with thin liquids while utilizing compensatory strategies to maximize nutrition/hydration levels. Goal 2: Patient will complete immediate/delayed recall and working memory tasks with 70% accuracy, mod cues to improve retention of functional information. Goal 3: Patient will complete complex executive functioning tasks (meds, time, finances, hobbies) with 70% accuracy, mod cues to improve skills required for IADL completion and return to occupational duties. Goal 4: Patient will complete complex attention tasks with no more than 5 errors across a structured activity consistently to permit potential return to driving and other multi-tasking responsibilities. Goal 5: Patient will complete structured speech tasks (DDK's, sentence repetition, verbal fluency, phonemic oral motor isolation) with implementation of SOS strategies to improve intelligibility to self-perceived level of 75% or higher to maximize communication clarity. INTERVENTIONS: Introduced SOS speech strategies: Speak up, Open mouth more, Slow down. Reviewed rationale behind use of strategies and recommendations for carryover.  Wrote on care board in room and provided patient with ways to incorporate into functional conversation. Discussed structured and unstructured speech exercises to assist with improving generalization of overall speech clarity. Patient verbalized understanding; will require follow up education. LONG TERM GOALS:  Long Term Goals  Time Frame for Long Term Goals: 5 weeks from evaluation  Goal 1: Patient will safely consume a regular diet + thin liquids while utilizing compensatory strategies without overt s/s of aspiration to ensure safe return to previous diet. Goal 2: Patient will improve cognition to a level of Mod I in order to permit potential return to completion of IADL's in home setting. Goal 3: Patient will improve speech intelligibility to a self-perceived level of 85% clarity in order to improve overall communication of wants/needs. CASE MANAGEMENT:  Goals:   Assist patient/family with discharge planning, patient/family counseling,  and coordination with insurance during the inpatient rehabilitation stay. Other members of the multidisciplinary rehabilitation team that will be involved in the patient's plan of care include recreational therapy, dietary, respiratory therapy, and neuropsychology. Medical issues being managed closely and that require 24 hour availability of a physician:  Swallowing precautions, Bowel/Bladder function, Weight bearing precautions, Wound care, Pain management, Infection protection, DVT prophylaxis, Fall precautions, Fluid/Electrolyte balance, Nutritional status, Respiratory needs, Anemia, and History of heart disease                                           Physician anticipated functional outcomes: Improved independence with functional measures   Estimated length of stay for this admission 14 days  Medical Prognosis: Good  Anticipated disposition: Home. The potential to achieve the above medical and rehabilitative goals is good.     This plan of care has been developed with the assistance and input of the multidisciplinary rehabilitation team.  The plan was reviewed with the patient. The patient has had the opportunity to provide input to the therapy team.    I have reviewed this Individualized Plan of Care and agree with its contents. Above documentation has been expanded, modified, adjusted to reflect the findings of my evaluations and goals for the patient. Physician:   Dolly Quezada M.D.

## 2022-11-02 NOTE — PROGRESS NOTES
[facility]  INPATIENT SPEECH THERAPY  [facility] NEUROSCIENCES 4A  DAILY NOTE    TIME   SLP Individual Minutes  Time In: 1020  Time Out: [place]  Minutes: 16  Timed Code Treatment Minutes: 8 Minutes   Cognitive Tx: 8 minutes  Dysphagia Tx: 8 minutes    Date: 2022  Patient Name: Jenna Saldana      CSN: [id number]   : 1950  (67 y.o.)  Gender: male   Referring Physician:  Florence Jj MD  Diagnosis: CVA  Precautions: Fall risk  Current Diet: Soft and Bite Size, Thin liquids   Swallowing Strategies:  Full Upright Position, Small Bite/Sip, Oral Care after all Meals, Intermittent Supervision, Alternate Solids and Liquids, and Limit Distractions      Date of Last MBS/FEES:  22    Pain:  No pain reported. Subjective:  MOLLY Ramírez with approval to proceed with treatment. Patient seated upright in recliner upon ST arrival; alert and cooperative throughout. Short-Term Goals:  SHORT TERM GOAL #1:  Goal 1: Patient will consume soft and bite sized diet, thin liquids safely with the use of compensatory strategies to aid in nutrition/hydration needs. INTERVENTIONS: DNT due to focus on additional STGs. SHORT TERM GOAL #2:  Goal 2: Patient will complete advanced texture PO trials with ST only for potential advancement to baseline diet. INTERVENTIONS: An advanced texture PO trial as clinically indicated was completed consisting of regular solids (chelita crackers) and thin liquids (water). Oral phase presented with some right spillage of solids and liquids, good mastication/coordination, and fair bolus control. Patient also presented with right pocketing of solids that was cleared by multiple swallows with thin liquids. Recommending patient continue a soft and bite size diet with thin liquids and advanced PO trials to determine possible diet upgrade.      SHORT TERM GOAL #3:  Goal 3: Patient will complete immediate/delayed recall and working memory tasks with 60% accuracy, mod cues to improve retention of functional information. INTERVENTIONS: DNT due to focus on additional STGs. SHORT TERM GOAL #4:  Goal 4: Patient will complete complex problem solving, verbal/visual reasoning, and executive functioning tasks (money/finance, meds, time) with 70% accuracy, mod cues to improve completion for daily living and work scenerios. INTERVENTIONS: Time Word Problems (bluebird bio): 3/6 independent, 3/6 min cues   *good processing speed  *most errors due to mental math computation     SHORT TERM GOAL #5:  Goal 5: Patient will complete thought organization and sequencing tasks (divergent/convergent naming) with 70% accuracy, mod cues to improve mental flexibility for daily living scenarios. INTERVENTIONS: DNT due to focus on additional STGs. SHORT TERM GOAL #6:  Goal 6: Patient will complete structured speech drills/tasks (DDK rates, sentence repetition, verbal fluency) with implementation of SOS speech strategies to improve intelligibility to 80% or higher to maximize efficacy of communicative efficacy as it r/t speech production. INTERVENTIONS: DNT due to focus on additional STGs. EDUCATION:  Learner: Patient  Education:  Reviewed diet and strategies, Reviewed ST goals and Plan of Care, and Reviewed recommendations for follow-up  Evaluation of Education: Doni Hernandez understanding and Family not present    ASSESSMENT/PLAN:  Activity Tolerance:  Patient tolerance of  treatment: good. Assessment/Plan: Patient progressing toward established goals. Continues to require skilled care of licensed speech pathologist to progress toward achievement of established goals and plan of care. .     Plan for Next Session: Cognitive Tx  Discharge Recommendations: Inpatient Rehabilitation     [facility]  Speech Therapy Student Intern

## 2022-11-02 NOTE — RT PROTOCOL NOTE
RT Inhaler-Nebulizer Bronchodilator Protocol Note    There is a bronchodilator order in the chart from a provider indicating to follow the RT Bronchodilator Protocol and there is an Initiate RT Inhaler-Nebulizer Bronchodilator Protocol order as well (see protocol at bottom of note). CXR Findings:  No results found. The findings from the last RT Protocol Assessment were as follows:   History Pulmonary Disease: Chronic pulmonary disease  Respiratory Pattern: Regular pattern and RR 12-20 bpm  Breath Sounds: Slightly diminished and/or crackles  Cough: Strong, spontaneous, non-productive  Indication for Bronchodilator Therapy: Decreased or absent breath sounds  Bronchodilator Assessment Score: 4    Aerosolized bronchodilator medication orders have been revised according to the RT Inhaler-Nebulizer Bronchodilator Protocol below. Respiratory Therapist to perform RT Therapy Protocol Assessment initially then follow the protocol. Repeat RT Therapy Protocol Assessment PRN for score 0-3 or on second treatment, BID, and PRN for scores above 3. No Indications - adjust the frequency to every 6 hours PRN wheezing or bronchospasm, if no treatments needed after 48 hours then discontinue using Per Protocol order mode. If indication present, adjust the RT bronchodilator orders based on the Bronchodilator Assessment Score as indicated below. Use Inhaler orders unless patient has one or more of the following: on home nebulizer, not able to hold breath for 10 seconds, is not alert and oriented, cannot activate and use MDI correctly, or respiratory rate 25 breaths per minute or more, then use the equivalent nebulizer order(s) with same Frequency and PRN reasons based on the score. If a patient is on this medication at home then do not decrease Frequency below that used at home.     0-3 - enter or revise RT bronchodilator order(s) to equivalent RT Bronchodilator order with Frequency of every 4 hours PRN for wheezing or increased work of breathing using Per Protocol order mode. 4-6 - enter or revise RT Bronchodilator order(s) to two equivalent RT bronchodilator orders with one order with BID Frequency and one order with Frequency of every 4 hours PRN wheezing or increased work of breathing using Per Protocol order mode. 7-10 - enter or revise RT Bronchodilator order(s) to two equivalent RT bronchodilator orders with one order with TID Frequency and one order with Frequency of every 4 hours PRN wheezing or increased work of breathing using Per Protocol order mode. 11-13 - enter or revise RT Bronchodilator order(s) to one equivalent RT bronchodilator order with QID Frequency and an Albuterol order with Frequency of every 4 hours PRN wheezing or increased work of breathing using Per Protocol order mode. Greater than 13 - enter or revise RT Bronchodilator order(s) to one equivalent RT bronchodilator order with every 4 hours Frequency and an Albuterol order with Frequency of every 2 hours PRN wheezing or increased work of breathing using Per Protocol order mode. RT to enter RT Home Evaluation for COPD & MDI Assessment order using Per Protocol order mode.     Electronically signed by Patel Valle RCP on 11/2/2022 at 4:57 PM

## 2022-11-03 ENCOUNTER — APPOINTMENT (OUTPATIENT)
Dept: GENERAL RADIOLOGY | Age: 72
DRG: 057 | End: 2022-11-03
Attending: PHYSICAL MEDICINE & REHABILITATION
Payer: MEDICARE

## 2022-11-03 LAB
ANION GAP SERPL CALCULATED.3IONS-SCNC: 13 MEQ/L (ref 8–16)
BUN BLDV-MCNC: 19 MG/DL (ref 7–22)
CALCIUM SERPL-MCNC: 9 MG/DL (ref 8.5–10.5)
CHLORIDE BLD-SCNC: 103 MEQ/L (ref 98–111)
CO2: 23 MEQ/L (ref 23–33)
CREAT SERPL-MCNC: 0.9 MG/DL (ref 0.4–1.2)
ERYTHROCYTE [DISTWIDTH] IN BLOOD BY AUTOMATED COUNT: 13.8 % (ref 11.5–14.5)
ERYTHROCYTE [DISTWIDTH] IN BLOOD BY AUTOMATED COUNT: 43.2 FL (ref 35–45)
GFR SERPL CREATININE-BSD FRML MDRD: > 60 ML/MIN/1.73M2
GLUCOSE BLD-MCNC: 177 MG/DL (ref 70–108)
GLUCOSE BLD-MCNC: 206 MG/DL (ref 70–108)
GLUCOSE BLD-MCNC: 207 MG/DL (ref 70–108)
GLUCOSE BLD-MCNC: 257 MG/DL (ref 70–108)
GLUCOSE BLD-MCNC: 305 MG/DL (ref 70–108)
HCT VFR BLD CALC: 42.8 % (ref 42–52)
HEMOGLOBIN: 14.1 GM/DL (ref 14–18)
MCH RBC QN AUTO: 28.5 PG (ref 26–33)
MCHC RBC AUTO-ENTMCNC: 32.9 GM/DL (ref 32.2–35.5)
MCV RBC AUTO: 86.5 FL (ref 80–94)
PLATELET # BLD: 190 THOU/MM3 (ref 130–400)
PMV BLD AUTO: 9.4 FL (ref 9.4–12.4)
POTASSIUM REFLEX MAGNESIUM: 4 MEQ/L (ref 3.5–5.2)
PREALBUMIN: 24.5 MG/DL (ref 20–40)
RBC # BLD: 4.95 MILL/MM3 (ref 4.7–6.1)
SODIUM BLD-SCNC: 139 MEQ/L (ref 135–145)
WBC # BLD: 7.4 THOU/MM3 (ref 4.8–10.8)

## 2022-11-03 PROCEDURE — 94669 MECHANICAL CHEST WALL OSCILL: CPT

## 2022-11-03 PROCEDURE — 80048 BASIC METABOLIC PNL TOTAL CA: CPT

## 2022-11-03 PROCEDURE — 92610 EVALUATE SWALLOWING FUNCTION: CPT

## 2022-11-03 PROCEDURE — 6370000000 HC RX 637 (ALT 250 FOR IP): Performed by: NURSE PRACTITIONER

## 2022-11-03 PROCEDURE — 94640 AIRWAY INHALATION TREATMENT: CPT

## 2022-11-03 PROCEDURE — 73110 X-RAY EXAM OF WRIST: CPT

## 2022-11-03 PROCEDURE — 84134 ASSAY OF PREALBUMIN: CPT

## 2022-11-03 PROCEDURE — 97535 SELF CARE MNGMENT TRAINING: CPT

## 2022-11-03 PROCEDURE — 6370000000 HC RX 637 (ALT 250 FOR IP): Performed by: PHYSICAL MEDICINE & REHABILITATION

## 2022-11-03 PROCEDURE — 97112 NEUROMUSCULAR REEDUCATION: CPT

## 2022-11-03 PROCEDURE — 97530 THERAPEUTIC ACTIVITIES: CPT

## 2022-11-03 PROCEDURE — 6370000000 HC RX 637 (ALT 250 FOR IP): Performed by: FAMILY MEDICINE

## 2022-11-03 PROCEDURE — 92507 TX SP LANG VOICE COMM INDIV: CPT

## 2022-11-03 PROCEDURE — 97162 PT EVAL MOD COMPLEX 30 MIN: CPT

## 2022-11-03 PROCEDURE — 1180000000 HC REHAB R&B

## 2022-11-03 PROCEDURE — 97116 GAIT TRAINING THERAPY: CPT

## 2022-11-03 PROCEDURE — 97166 OT EVAL MOD COMPLEX 45 MIN: CPT

## 2022-11-03 PROCEDURE — 99232 SBSQ HOSP IP/OBS MODERATE 35: CPT | Performed by: NURSE PRACTITIONER

## 2022-11-03 PROCEDURE — 36415 COLL VENOUS BLD VENIPUNCTURE: CPT

## 2022-11-03 PROCEDURE — 92523 SPEECH SOUND LANG COMPREHEN: CPT

## 2022-11-03 PROCEDURE — 85027 COMPLETE CBC AUTOMATED: CPT

## 2022-11-03 PROCEDURE — 82948 REAGENT STRIP/BLOOD GLUCOSE: CPT

## 2022-11-03 RX ADMIN — INSULIN LISPRO 3 UNITS: 100 INJECTION, SOLUTION INTRAVENOUS; SUBCUTANEOUS at 12:09

## 2022-11-03 RX ADMIN — ASPIRIN 81 MG 81 MG: 81 TABLET ORAL at 08:51

## 2022-11-03 RX ADMIN — FINASTERIDE 5 MG: 5 TABLET, FILM COATED ORAL at 08:52

## 2022-11-03 RX ADMIN — CLOPIDOGREL BISULFATE 75 MG: 75 TABLET ORAL at 08:51

## 2022-11-03 RX ADMIN — DOCUSATE SODIUM 100 MG: 100 CAPSULE, LIQUID FILLED ORAL at 22:20

## 2022-11-03 RX ADMIN — LOSARTAN POTASSIUM 25 MG: 25 TABLET, FILM COATED ORAL at 08:52

## 2022-11-03 RX ADMIN — PANTOPRAZOLE SODIUM 40 MG: 40 TABLET, DELAYED RELEASE ORAL at 08:51

## 2022-11-03 RX ADMIN — INSULIN LISPRO 2 UNITS: 100 INJECTION, SOLUTION INTRAVENOUS; SUBCUTANEOUS at 08:51

## 2022-11-03 RX ADMIN — DICLOFENAC SODIUM 2 G: 10 GEL TOPICAL at 22:19

## 2022-11-03 RX ADMIN — IPRATROPIUM BROMIDE AND ALBUTEROL SULFATE 1 AMPULE: .5; 3 SOLUTION RESPIRATORY (INHALATION) at 06:15

## 2022-11-03 RX ADMIN — DICLOFENAC SODIUM 2 G: 10 GEL TOPICAL at 12:14

## 2022-11-03 RX ADMIN — ATORVASTATIN CALCIUM 40 MG: 40 TABLET, FILM COATED ORAL at 22:20

## 2022-11-03 RX ADMIN — AMOXICILLIN AND CLAVULANATE POTASSIUM 1 TABLET: 875; 125 TABLET, FILM COATED ORAL at 22:20

## 2022-11-03 RX ADMIN — AMOXICILLIN AND CLAVULANATE POTASSIUM 1 TABLET: 875; 125 TABLET, FILM COATED ORAL at 08:51

## 2022-11-03 RX ADMIN — IPRATROPIUM BROMIDE AND ALBUTEROL SULFATE 1 AMPULE: .5; 3 SOLUTION RESPIRATORY (INHALATION) at 15:23

## 2022-11-03 RX ADMIN — AMLODIPINE BESYLATE 5 MG: 5 TABLET ORAL at 08:51

## 2022-11-03 RX ADMIN — DOCUSATE SODIUM 100 MG: 100 CAPSULE, LIQUID FILLED ORAL at 09:23

## 2022-11-03 RX ADMIN — METFORMIN HYDROCHLORIDE 1000 MG: 500 TABLET, EXTENDED RELEASE ORAL at 08:51

## 2022-11-03 RX ADMIN — METFORMIN HYDROCHLORIDE 1000 MG: 500 TABLET, EXTENDED RELEASE ORAL at 17:20

## 2022-11-03 ASSESSMENT — PAIN SCALES - GENERAL
PAINLEVEL_OUTOF10: 4
PAINLEVEL_OUTOF10: 0
PAINLEVEL_OUTOF10: 3
PAINLEVEL_OUTOF10: 0
PAINLEVEL_OUTOF10: 0
PAINLEVEL_OUTOF10: 3

## 2022-11-03 ASSESSMENT — 9 HOLE PEG TEST
TEST_RESULT: FUNCTIONAL
TESTTIME_SECONDS: 31.01
TESTTIME_SECONDS: 223
TEST_RESULT: IMPAIRED

## 2022-11-03 ASSESSMENT — PAIN DESCRIPTION - ORIENTATION
ORIENTATION: RIGHT
ORIENTATION: RIGHT

## 2022-11-03 ASSESSMENT — PAIN DESCRIPTION - LOCATION
LOCATION: WRIST
LOCATION: WRIST

## 2022-11-03 ASSESSMENT — PAIN DESCRIPTION - DESCRIPTORS
DESCRIPTORS: DISCOMFORT;ACHING
DESCRIPTORS: DISCOMFORT;ACHING

## 2022-11-03 NOTE — PROGRESS NOTES
[facility]      Date:  11/3/2022            Patient Name: Myrtle Morris           MRN: [id number]  Evelinaide: [de-identified]          YOB: 1950 (73 y.o.)       Gender: male   Diagnosis: Acute CVA  Physician: Referring Practitioner:  Warren Romeo MD    REASON FOR MISSED TREATMENT:  Daughter took pt outside to get fresh air this afternoon-if time allows will evaluate for RT    Hartwell Mcardle, CTRS    11/3/2022

## 2022-11-03 NOTE — PROGRESS NOTES
Physical Medicine & Rehabilitation   Progress Note    Chief Complaint:  CVA    Subjective:  patient seen today, sitting up in therapy. Patient with more intentional movement of the RUE today. Complains of right wrist pain however. Wrist extension and flexion without pain, increased lateral pain with rotation. Discussed XRay, ice and voltaren. Discussed wrapping for support if needed.      Rehabilitation:  PT: await evals today  OT: await evals today  ST: await evals today    Review of Systems:  CONSTITUTIONAL:  positive for  fatigue  EYES:  positive for chronic visual disturbance, denies new visual issues  HEENT:  negative  RESPIRATORY:  positive for cough  CARDIOVASCULAR:  negative  GASTROINTESTINAL:  positive for dysphagia, negative for abdomin pain, nausea, vomiting, upset stomach  GENITOURINARY:  BPH with urinary retention  SKIN:  negative  HEMATOLOGIC/LYMPHATIC:  negative  MUSCULOSKELETAL:  positive for  muscle weakness  NEUROLOGICAL:  positive for speech problems, coordination problems, gait problems, dysphagia, and weakness  negative for headaches, dizziness, numbness, pain, and tingling  BEHAVIOR/PSYCH:  negative for anxiety  System review otherwise negative      Objective:  /78   Pulse 96   Temp 98.5 °F (36.9 °C)   Resp 12   Ht 5' 8\" (1.727 m)   Wt 188 lb 1.6 oz (85.3 kg)   SpO2 (!) 89%   BMI 28.60 kg/m²   awake  Orientation:   person, place, time  Mood: within normal limits  Affect: calm  General appearance: Patient is well nourished, well developed, well groomed and in no acute distress, appearing stated age     Memory:   normal with conversation  Attention/Concentration: normal  Language:  abnormal - slight dysarthria      Cranial Nerves:  II: Visual fields:  normal  III,IV,VI: Extra Ocular Movements: intact  VII: Facial strength: abnormal right facial droop  ROM:  normal  Motor Exam:  Motor exam is 4- out of 5 right upper and 4+ out of 5 right lower extremities  Motor exam is 5 out of 5 left upper and left lower extremities  Tone:  normal  Muscle bulk: within normal limits  Sensory:  Sensory intact  Coordination:   abnormal - RUE and RLE     Skin: warm and dry, no rash or erythema  Peripheral vascular: Pulses: Normal upper and lower extremity pulses; Edema: no         Diagnostics:   Recent Results (from the past 24 hour(s))   POCT glucose    Collection Time: 11/02/22 10:56 AM   Result Value Ref Range    POC Glucose 227 (H) 70 - 108 mg/dl   POCT glucose    Collection Time: 11/02/22  5:39 PM   Result Value Ref Range    POC Glucose 200 (H) 70 - 108 mg/dl   Glomerular Filtration Rate, Estimated    Collection Time: 11/02/22  6:08 PM   Result Value Ref Range    Est, Glom Filt Rate >60 >60 ml/min/1.73m2   POCT glucose    Collection Time: 11/02/22  9:15 PM   Result Value Ref Range    POC Glucose 207 (H) 70 - 108 mg/dl   CBC    Collection Time: 11/03/22  5:47 AM   Result Value Ref Range    WBC 7.4 4.8 - 10.8 thou/mm3    RBC 4.95 4.70 - 6.10 mill/mm3    Hemoglobin 14.1 14.0 - 18.0 gm/dl    Hematocrit 42.8 42.0 - 52.0 %    MCV 86.5 80.0 - 94.0 fL    MCH 28.5 26.0 - 33.0 pg    MCHC 32.9 32.2 - 35.5 gm/dl    RDW-CV 13.8 11.5 - 14.5 %    RDW-SD 43.2 35.0 - 45.0 fL    Platelets 988 454 - 142 thou/mm3    MPV 9.4 9.4 - 12.4 fL   Basic Metabolic Panel w/ Reflex to MG    Collection Time: 11/03/22  5:47 AM   Result Value Ref Range    Sodium 139 135 - 145 meq/L    Potassium reflex Magnesium 4.0 3.5 - 5.2 meq/L    Chloride 103 98 - 111 meq/L    CO2 23 23 - 33 meq/L    Glucose 206 (H) 70 - 108 mg/dL    BUN 19 7 - 22 mg/dL    Creatinine 0.9 0.4 - 1.2 mg/dL    Calcium 9.0 8.5 - 10.5 mg/dL   Prealbumin    Collection Time: 11/03/22  5:47 AM   Result Value Ref Range    Prealbumin 24.5 20.0 - 40.0 mg/dl   Anion Gap    Collection Time: 11/03/22  5:47 AM   Result Value Ref Range    Anion Gap 13.0 8.0 - 16.0 meq/L   POCT glucose    Collection Time: 11/03/22  8:12 AM   Result Value Ref Range    POC Glucose 257 (H) 70 - 108 mg/dl Impression:  Acute left corona radiata CVA  Right facial droop  Right hemiparesis   Dysphagia   Cognitive deficits  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 GI: Protonix 40 mg p.o daily before breakfast.  Pain: Tylenol 650 mg p.o. every 4 hours as needed  Bladder: Continue Proscar 5 mg p.o. daily  Bowel: continue Colace 100 mg p.o. twice daily, Dulcolax suppository 10 mg rectal daily as needed, milk of magnesia 15 mL p.o. daily as needed, and GlycoLax 17 g p.o. daily as needed  Continue Augmentin 875/125 1 tablet every 12 hours continue dental abscess  Continue aspirin 81 mg p.o. daily for antiplatelets  Continue Plavix 75 mg p.o. daily for antiplatelets  Continue Humalog insulin for hyperglycemia  Continue DuoNeb nebulizer for COPD  Continue glucagon injection 1 mg subcutaneous as needed and glucose chewable tablet 16 g p.o. as needed for hypoglycemia  Team conference Tuesday   Right wrist XR, voltaren gel to right wrist, ICE prn.  OT ok to wrap right wrist for support if needed    Missed Therapy Time:  None    Jenna Toledo, APRN - CNP

## 2022-11-03 NOTE — PLAN OF CARE
Problem: Respiratory - Adult  Goal: Clear lung sounds  11/3/2022 0618 by Adrian Schmitt RCP  Outcome: Progressing   Patient mutually agreed on goals.

## 2022-11-03 NOTE — CONSULTS
Department of Family Practice  Consult Note        Reason for Consult:  Medical management while on the Inpatient Rehab unit. Requesting Physician:  Dr Kathryn López:   The need to continue the intensive time with therapies following the acute hospital stay. History Obtained From:  patient, EMR    HISTORY OF PRESENT ILLNESS:              The patient is a 67 y.o. male with significant past medical history of       Diagnosis Date    Anxiety     Arthritis     CAD (coronary artery disease)     on Plavix and ASA    Cancer ([facility] 01/2019    left lung stage 2-Dr Dan    COPD (chronic obstructive pulmonary disease) ([facility] 02/2019    E. Cali    Diabetes ([facility]     Metformin    Enlarged prostate with urinary retention     Gait difficulty     Generalized weakness     Hyperlipidemia     Hypertension     Dr Elidia Segovia and Dr Lissy Laughlin    Lesion of bladder     Osteoarthritis     Retention of urine     Right inguinal hernia 2021    S/P cardiac catheterization 03/12/2019    stent to circumflex per Dr. Neno Morris    S/P TURP     SOB (shortness of breath)     With activity    Voiding difficulty       who presents with an episode of right sided weakness. When he presented to the ED he did not qualify for any acute neurological intervention. He states that the right side is getting stronger again over time. He has come to the Inpatient Rehab Unit to continue the time with therapies prior to a discharge disposition being made. Past Medical History:        Diagnosis Date    Anxiety     Arthritis     CAD (coronary artery disease)     on Plavix and ASA    Cancer ([facility] 01/2019    left lung stage 2-Dr Dan    COPD (chronic obstructive pulmonary disease) ([facility] 02/2019    E. Cali    Diabetes ([facility]     Metformin    Enlarged prostate with urinary retention     Gait difficulty     Generalized weakness     Hyperlipidemia     Hypertension     Dr Elidia Segovia and Dr Lissy Laughlin    Lesion of bladder     Osteoarthritis     Retention of urine     Right inguinal hernia 2021    S/P cardiac catheterization 03/12/2019    stent to circumflex per Dr. Maye Laguerre    S/P TURP     SOB (shortness of breath)     With activity    Voiding difficulty      Past Surgical History:        Procedure Laterality Date    APPENDECTOMY      BRONCHOSCOPY N/A 1/25/2019    BRONCHOSCOPY performed by Olive Young MD at [address]  1/25/2019    BRONCHOSCOPY/TRANSBRONCHIAL LUNG BIOPSY performed by Olive Young MD at [address] N/A 3/1/2019    BRONCHOSCOPY W/EBUS FNA performed by Olive Young MD at [address]  03/12/2019    COLONOSCOPY  [id number]    CT NEEDLE BIOPSY LUNG PERCUTANEOUS  3/29/2021    CT NEEDLE BIOPSY LUNG PERCUTANEOUS 3/29/2021 [facility] CT SCAN    HERNIA REPAIR Right 6/3/2021    RIGHT INGUINAL HERNIA REPAIR WITH MESH performed by Janet Dan DO at [address] [place] [address],[facility], [id number]    TURP    THORACOTOMY Left 3/14/2019    LEFT EXPLORATORY THORACOTOMY, MEDIASTINAL LYMPH NODE DISECTION, FLEXIBLE BRONCHOSCOPY [facility]     Current Medications:   Current Facility-Administered Medications: [START ON 11/3/2022] amLODIPine (NORVASC) tablet 5 mg, 5 mg, Oral, Daily  amoxicillin-clavulanate (AUGMENTIN) 875-125 MG per tablet 1 tablet, 1 tablet, Oral, 2 times per day  [START ON 11/3/2022] aspirin chewable tablet 81 mg, 81 mg, Oral, Daily  atorvastatin (LIPITOR) tablet 40 mg, 40 mg, Oral, Nightly  [START ON 11/3/2022] clopidogrel (PLAVIX) tablet 75 mg, 75 mg, Oral, Daily  [START ON 11/3/2022] finasteride (PROSCAR) tablet 5 mg, 5 mg, Oral, Daily  glucagon (rDNA) injection 1 mg, 1 mg, SubCUTAneous, PRN  glucose chewable tablet 16 g, 4 tablet, Oral, PRN  insulin lispro (HUMALOG) injection vial 0-4 Units, 0-4 Units, SubCUTAneous, TID WC  insulin lispro (HUMALOG) injection vial 0-4 Units, 0-4 Units, SubCUTAneous, Nightly  ipratropium-albuterol (DUONEB) nebulizer solution 1 ampule, 1 ampule, Inhalation, Q4H PRN  ipratropium-albuterol (DUONEB) nebulizer solution 1 ampule, 1 ampule, Inhalation, BID  [START ON 11/3/2022] losartan (COZAAR) tablet 25 mg, 25 mg, Oral, Daily  ondansetron (ZOFRAN-ODT) disintegrating tablet 4 mg, 4 mg, Oral, Q8H PRN **OR** ondansetron (ZOFRAN) injection 4 mg, 4 mg, IntraVENous, Q6H PRN  [START ON 11/3/2022] pantoprazole (PROTONIX) tablet 40 mg, 40 mg, Oral, QAM AC  polyethylene glycol (GLYCOLAX) packet 17 g, 17 g, Oral, Daily PRN  acetaminophen (TYLENOL) tablet 650 mg, 650 mg, Oral, Q4H PRN  docusate sodium (COLACE) capsule 100 mg, 100 mg, Oral, BID  bisacodyl (DULCOLAX) suppository 10 mg, 10 mg, Rectal, Daily PRN  magnesium hydroxide (MILK OF MAGNESIA) 400 MG/5ML suspension 15 mL, 15 mL, Oral, Daily PRN  [START ON 11/3/2022] metFORMIN (GLUCOPHAGE-XR) extended release tablet 1,000 mg, 1,000 mg, Oral, BID WC  Allergies:  Patient has no known allergies.     Social History:   MARITAL STATUS:      Family History:       Problem Relation Age of Onset    Diabetes Mother     High Blood Pressure Mother     Brain Cancer Mother     Heart Disease Father     Diabetes Father     High Blood Pressure Father     Heart Attack Father     Diabetes Sister     High Blood Pressure Sister     COPD Sister         Agent Orange    Diabetes Brother     High Blood Pressure Brother      REVIEW OF SYSTEMS:    CONSTITUTIONAL:  positive for  fatigue  EYES:  positive for  glasses  HEENT:  negative for  nasal congestion  RESPIRATORY:  negative for  dyspnea  CARDIOVASCULAR:  negative for  chest pain  GASTROINTESTINAL:  negative for diarrhea  GENITOURINARY:  negative for dysuria  INTEGUMENT/BREAST:  negative for rash  HEMATOLOGIC/LYMPHATIC:  negative for swelling/edema  ALLERGIC/IMMUNOLOGIC:  negative for anaphylaxis  ENDOCRINE:  negative for diabetic symptoms including polyuria  MUSCULOSKELETAL:  positive for  myalgias, arthralgias, and muscle weakness  NEUROLOGICAL:  positive for coordination problems, gait problems, and weakness  BEHAVIOR/PSYCH:  negative for increased agitation  PHYSICAL EXAM:      Vitals:    BP (!) 154/76   Pulse 94   Temp 97.8 °F (36.6 °C) (Oral)   Resp 16   Ht 5' 8\" (1.727 m)   Wt 188 lb 1.6 oz (85.3 kg)   SpO2 95%   BMI 28.60 kg/m²     Well developed well nourished  male who is awake alert and cooperative  Skin atrophic  Membranes moist  Head normocephalic  Neck without mass  Chest symmetrical expansion  Heart S1S2 without murmur  Lungs CTA  Abd soft, non tender, normoactive BS and no mass  Ext without edema  Neuro weak on the right side.   Psy pleasant    DATA:     Latest Reference Range & Units 11/2/22 06:34 11/2/22 10:56 11/2/22 17:39   POC Glucose 70 - 108 mg/dl 250 (H) 227 (H) 200 (H)   (H): Data is abnormally high    IMPRESSION/RECOMMENDATIONS:      Active Hospital Problems    Diagnosis Date Noted    Acute cerebrovascular accident (CVA) ([facility] [I63.9] 11/02/2022     Priority: Medium    CAD in native artery [I25.10] 03/12/2019    Adenocarcinoma of left lung ([facility] [C34.92]

## 2022-11-03 NOTE — PROGRESS NOTES
[address] [address]  EVALUATION    Time:   Time In: 0700  Time Out: 0830  Timed Code Treatment Minutes: 75 Minutes  Minutes: 90     Date: 11/3/2022  Patient Name: Liborio Calloway,   Gender: male      MRN: [id number]  : 1950  (73 y.o.)  Referring Practitioner: Michael Mace MD  Diagnosis: Acute CVA  Additional Pertinent Hx: Liborio Calloway  is a 67 y.o. male admitted to the inpatient rehabilitation unit on 2022. He was originally admitted to [address] [place] [address] on 10/31/2022. Patient  has a past medical history of Anxiety, Arthritis, CAD (coronary artery disease), Cancer ([facility] [address] COPD (chronic obstructive pulmonary disease) ([facility] [address] Diabetes ([facility] [address] Enlarged prostate with urinary retention, Gait difficulty, Generalized weakness, Hyperlipidemia, Hypertension, Lesion of bladder, Osteoarthritis, Retention of urine, Right inguinal hernia, S/P cardiac catheterization, S/P TURP, SOB (shortness of breath), and Voiding difficulty. Patient presented to [facility] ER due to new onset of right arm numbness and balance issues. Patient with noted chronic SOB. Family reports right sides weakness, dysarthria, right sided facial droop. Patient was to be taking ASA and plavix daily, but only was taking ASA every other day due to bruising. Patient with initial NIHSS of 6. Code stroke called, not a tPA candidate. CT of the head without contrast on 10/31/2022 demonstrated stable mild global volume loss, and apical abscess associated with a left maxillary first premolar, and no other significant acute findings. CT of the head and neck on 10/31/2022 without major acute findings.  MRI of the brain without contrast on 10/31/2022 demonstrated an area of diffusion in the left parietal periventricular white matter with corresponding diminished signal intensity on ADC map consistent with acute infarct; mild atrophy and probable mild severity chronic small vessel ischemic changes, and no other significant abnormalities. CVA likely due to poorly controlled HTN and DM. Patient is seen today upon admission to the inpatient rehabilitation unit. Patient lives with his s/o that works full time, 6a-2pm daily. Discussed IPR and goal of helping patient to be as independent as possible at discharge including mobility, ADLs, cognition, swallowing, and endurance.     Restrictions/Precautions:  Restrictions/Precautions: General Precautions, Fall Risk  Position Activity Restriction  Other position/activity restrictions: R hemibody weakness    Subjective  Chart Reviewed: Yes  Patient assessed for rehabilitation services?: Yes  Family / Caregiver Present: No    Subjective: Pt approached sleeping in bed, pt pleasant and agreeable to therapy once awake    Pain: slight RUE pain reported towards end of session    Vitals: Vitals not assessed per clinical judgement, see nursing flowsheet    Social/Functional History:  Lives With: Significant other  Type of Home: House  Home Layout: One level  Home Access: Stairs to enter without rails  Entrance Stairs - Number of Steps: 2  Home Equipment: Donney Proffer, 4 wheeled   Bathroom Shower/Tub: Tub/Shower unit  Bathroom Toilet: Handicap height  Bathroom Accessibility: Walker accessible    Receives Help From: Family  ADL Assistance: Independent  Homemaking Assistance: Independent  Ambulation Assistance: Independent  Transfer Assistance: Independent    Active : Yes  Mode of Transportation: Car  Occupation: Retired  Leisure & Hobbies: Cooking     VISION:Corrected    HEARING:  WFL    COGNITION: Decreased Recall and Inattention    RANGE OF MOTION:  Right Upper Extremity: Impaired - decreased ROM due to CVA  Left Upper Extremity:  WFL    STRENGTH:  Right Upper Extremity: Impaired - see hand assessment  Left Upper Extremity:  WFL    HAND ASSESSMEN  Hand Dominance: Left  Left Hand Strength -  (lbs)  Handle Setting 2: 57, 68, 85 av: 70  Right Hand Strength -  (lbs)  Handle Setting 2: 25, 28, 35 av: 29  Fine Motor Skills  Left 9-Hole Peg Test: Functional  Left 9 Hole Peg Test Time (secs): 31.01  Right 9-Hole Peg Test: Impaired  Right 9 Hole Peg Test Time (secs): 223  Fine Motor Comment: Pt unable to maintain pincer grasp, pt demonstrated decreased in hand manipulation skills in R hand. SENSATION:   WFL    ADL:   EATING:Setup or clean-up assistance. Joyce Ochoa CARE Score: 5. ORAL HYGIENE:Supervision or touching assistance. CGA throughout for standing balance. CARE Score: 4. TOILETING HYGIENE:Supervision or touching assistance. CGA for nnamdi care. CARE Score: 4. SHOWERING/BATHING:Partial/moderate assistance. Pt requiring A with LE, pt seated on shower bench for duration. CARE Score: 3.     UPPER BODY DRESSING:Supervision or touching assistance. Pt completed standing with CGA throughout. CARE Score: 4. LOWER BODY DRESSING:Partial/moderate assistance. Pt threading pants while seated, pt able to demo 2 UE release while standing to pull pants up. Requiring min A with pant orientation. Pt requiring increased time. CARE Score: 3. FOOTWEAR:Partial/moderate assistance  Pt requiring A donning and adjusting socks. Pt able to doff socks with increased time. CARE Score: 3.     TOILET TRANSFER: Supervision or touching assistance. CGA for transfer to/from. CARE Score: 4. BALANCE:  Sitting Balance:  Supervision. Seated EOB and in wheelchair  Standing Balance: [address] Brief periods of min A when bending down to  items. Pt able to stand for ~3 min to complete grooming    BED MOBILITY:  Supine to Sit: Stand By Assistance increased time    TRANSFERS:  Sit to Stand:  Contact Guard Assistance. From EOB, wheelchair and shower bench. Pt requiring increased time. Stand to Sit: Contact Guard Assistance. To wheelchair, shower bench and recliner. Pt requiring 1 vc for safety awareness.     FUNCTIONAL MOBILITY:  Assistive Device: None  Assist Level:  Contact Guard Assistance. Distance: To and from bathroom  Pt requiring increased time. Pt demonstrates ataxia with R sided foot drift during ambulation. Pt trailed without walker and ambulates with increased ease. Activity Tolerance:  Patient tolerance of  treatment: good. Assessment:  Assessment: Pt presents with decreased functional mobility, endurance, ROM, balance and decreased strength and fine motor control in RUE. Pt demonstrates ataxia with R sided foot drift. Pt able to ambulate to/from bathroom and around room with CGA, transfer with CGA, and complete grooming while standing at sink with CGA. Pt requires CGA for UB dressing while standing and min A for LB dressing. Pt requiring min A with showering, requiring A with LE. Pt requiring mod A for footwear, requiring A donning and adjusting socks. Pt fatigues quickly with activity but has good motivation and willingness to participate in therapy. Pt will benefit from continued OT services to increase strength, endurance and independence with ADLs/IADLs in prep for discharge. Performance deficits / Impairments: Decreased functional mobility , Decreased safe awareness, Decreased balance, Decreased ADL status, Decreased ROM, Decreased endurance, Decreased high-level IADLs, Decreased strength, Decreased fine motor control  Prognosis: Good  REQUIRES OT FOLLOW-UP: No  Decision Making: Medium Complexity    Treatment Initiated: Treatment and education initiated within context of evaluation. Evaluation time included review of current medical information, gathering information related to past medical, social and functional history, completion of standardized testing, formal and informal observation of tasks, assessment of data and development of plan of care and goals.   Treatment time included skilled education and facilitation of tasks to increase safety and independence with ADL's for improved functional independence and quality of life. Discharge Recommendations:  Continue to assess pending progress    Patient Education:  Patient Education  Education Given To: Patient  Education Provided: Role of Therapy, Plan of Care, Transfer Training, Energy Conservation, Fall Prevention Strategies, ADL Adaptive Strategies  Education Method: Verbal  Barriers to Learning: None  Education Outcome: Verbalized understanding    Equipment Recommendations:  Equipment Needed: Yes  Mobility Devices: ADL Assistive Devices  ADL Assistive Devices: Shower Chair with back, Grab Bars - shower  Other: Continue to monitor for AE    Plan:  Times Per Week: 5x a week for 90 min and 1x a week for 30 min  Times Per Day: Once a day  Current Treatment Recommendations: Strengthening, ROM, Balance training, Functional mobility training, Endurance training, Neuromuscular re-education, Pain management, Safety education & training, Patient/Caregiver education & training, Positioning, Self-Care / ADL, Home management training. See long-term goal time frame for expected duration of plan of care. If no long-term goals established, a short length of stay is anticipated.     Goals:  Patient goals : return to PLOF, get R sided function back  Short Term Goals  Time Frame for Short Term Goals: 1 week from eval  Short Term Goal 1: Pt will complete oral hygiene routine while using R side for >/= 75% of task and SBA to increase indep with self-care  Short Term Goal 2: Pt will complete item retieval with >/= 5 items with SBA and 0-1 VC for R sided involvement to increase indep with grooming  Short Term Goal 3: Pt will complete dynamic standing with 2 UE release, SBA and 0-2 VC for R sided awareness to increase indep with hygiene  Short Term Goal 4: Pt will complete tolieting, including transfer, with SBA and 0-2 VC for R sided awareness to increase indep with tolieting  Short Term Goal 5: Pt will complete LB dressing with supervision and 0-1 VC for technique to increase indep with dressing  Long Term Goals  Time Frame for Long Term Goals : 2 weeks from eval  Long Term Goal 1: Pt will complete homemaking task with mod I to increase indep with laundry  Long Term Goal 2: Pt will complete pathway finding task with supervision and 0-2 vc for visual scanning to use signs to increase indep with community reintigration  Long Term Goal 3: Pt will complete BADL routine with mod I to increase indep with oral hygiene    Following session, patient left in safe position with all fall risk precautions in place

## 2022-11-03 NOTE — PLAN OF CARE
Problem: Discharge Planning  Goal: Discharge to home or other facility with appropriate resources  11/3/2022 1511 by EFRAÍN Odom  Note:   [id number] [address]  Physical Medicine Case Management Assessment    [x] Inpatient Rehabilitation Unit    Patient Name: Jessa Frye        MRN: [id number]    : 1950  (67 y.o.)  Gender: male   Date of Admission: 2022  1:50 PM    Family/Social/Home Environment:   Prior to admission, patient was living his girlfriend, Blythe Sacks. Patient was independent at home. Patient was completing his ADLs, housekeeping, meal prep, errands, finances and driving. Patient was working part time. Patient reports that his main supports are Thiago Brooks (child) and Papito Blanco (grandchild). Patient's family physician is Meghana Dodge. Javier Mcgregor MD. Patient prefers [employer]. Patient was not using assistive devices at home. Patient reports that his goals are to regain his strength and return to home. Patient is motivated to participate in therapy.      Social/Functional History  Lives With: Significant other, Other (comment) (girlfriends son)  Type of Home: House  Home Layout: One level  Home Access: Stairs to enter without rails  Entrance Stairs - Number of Steps: 2  Bathroom Shower/Tub: Tub/Shower unit  Bathroom Toilet: Handicap height  Bathroom Accessibility: Walker accessible  Home Equipment: Dwana Galeazzi, 4 wheeled (6QH has no seat or breaks--wheels swivel)  Has the patient had two or more falls in the past year or any fall with injury in the past year?: No  Receives Help From: Family  ADL Assistance: Independent  Homemaking Assistance: Independent  Ambulation Assistance: Independent  Transfer Assistance: Independent  Active : Yes  Mode of Transportation: Car  Occupation: Retired  Leisure & Hobbies: Cooking  Additional Comments: Pt's girlfriend works full-time, daughters live ~30 miles away in [place] and work    Contact/Guardian Information: Moises Gomez (girlfriend) [phone], Idris Spencer (daughter) [phone]    Community Resources Utilized: Patient was not using community resources prior to admission. Sexuality/Intimacy: Patient did not disclose sexuality/intimacy concerns during SW assessment. Complementary Health Approaches: Patient did not disclose desires towards complementary health approaches during SW assessment. Anticipated Needs/Discharge Plans: SW will follow and maintain involvement in discharge planning. SW met with patient, his brother and granddaughter, Lydia Luis, on this date to introduce self, complete SW assessment and initiate discharge planning. Prior to admission, patient was living his girlfriend, Jonh Sarabia. Patient was independent at home. Patient was completing his ADLs, housekeeping, meal prep, errands, finances and driving. Patient was working part time. Patient reports that his main supports are Gucci Donato (child) and Lydia Luis (grandchild). Patient's family physician is Katie Vital. Brian Mckeon MD. Patient prefers [employer]. Patient was not using assistive devices at home. Patient reports that his goals are to regain his strength and return to home. Patient is motivated to participate in therapy. SW educated patient on Tuesday, 11/8 care conference. SW will follow and maintain involvement in discharge planning.        Read-Only, Retired: Discharge Planning  Living Arrangements: Spouse/Significant Other  Support Systems: Spouse/Significant Other  Potential Assistance Needed: N/A  Potential Assistance Purchasing Medications: No  Type of Home Care Services: None  Patient expects to be discharged to[de-identified] [facility]  Expected Discharge Date:  (Undetermined)      EFRAÍN Rg 11/3/2022 3:12 PM

## 2022-11-03 NOTE — PROGRESS NOTES
[facility]  Acute Inpatient Rehab Preadmission Assessment     Patient Name: Trey Crockett        Ethnicity:Patient declines to respond  Race:Patient declines to respond  MRN:   [id number]    : 1950  (73 y.o.)  Gender: male      Admitted from:[address]  Initial Assessment     Date of admission to the hospital: 10/31/2022 10:42 AM  Date patient eligible for admission:2022     Primary Diagnosis: CVA      Did patient have surgery?  no     Physicians: Gracie Masters MD, Dr Duran Salomon, Dr Aldo Mccauley, Dr Lucila Jacques for clinical complications/co-morbidities:   Past Medical History        Past Medical History:   Diagnosis Date    Anxiety      Arthritis      CAD (coronary artery disease)       on Plavix and ASA    Cancer ([facility] [address] 2019     left lung stage 2-Dr Dan    COPD (chronic obstructive pulmonary disease) ([facility] [address] 2019     EKatharine Cali    Diabetes ([facility] [address]       Metformin    Enlarged prostate with urinary retention      Gait difficulty      Generalized weakness      Hyperlipidemia      Hypertension       Dr Jaylan De Los Santos and Dr Marisol Gonzalez    Lesion of bladder      Osteoarthritis      Retention of urine      Right inguinal hernia     S/P cardiac catheterization 2019     stent to circumflex per Dr. Skip Whitfield    S/P TURP      SOB (shortness of breath)       With activity    Voiding difficulty              Financial Information  Primary insurance: Medicare     Secondary Insurance:   none     Has the patient had two or more falls in the past year or any fall with injury in the past year? no     Did the patient have major surgery during the 100 days prior to admission?   no     Precautions:   falls and seizures  Restrictions/Precautions: General Precautions, Fall Risk  Other position/activity restrictions: R hemibody weakness    Isolation Precautions: None                  Physiatrist: Dr. Duran Salomon     Patients Occupation: Retired  Reviewed Lab and Diagnostic reports from Current Admission: Yes Patients Prior Functional  Level: Prior Function  Receives Help From: Family  ADL Assistance: Independent  Homemaking Assistance: Independent  Ambulation Assistance: Independent  Transfer Assistance: Independent     Current functional status for upper extremity ADLs: moderate assist due to RUE weakness     Current functional status for lower extremity ADLs: maximum assist due to RUE weakness     Current functional status for bed, chair, wheelchair transfers: FUNCTIONAL MOBILITY:  Assistive Device: Rolling Walker  Assist Level:  Minimal Assistance. Distance:  Bed to recliner, about 4 steps  Pt demonstrated R hand supination while holding onto walker, continue monitoring for need of built up handle vc walker splint. Pt demonstrated RLE ataxia and required vc to not drag his foot. Current functional status for toilet transfers: maximum assist for clothing management     Current functional status for locomotion: Assistive Device: Rolling Walker  Assist Level:  Minimal Assistance. Distance:  Bed to recliner, about 4 steps  Pt demonstrated R hand supination while holding onto walker, continue monitoring for need of built up handle vc walker splint.  Pt demonstrated RLE ataxia and required vc to not drag his foot     Current functional status for bladder management: Moderate assistance     Current functional status for bowel management:Moderate assistance     Current functional status for comprehension: Supervision     Current functional status for expression: Supervision     Current functional status for social interaction: Supervision     Current functional status for problem solving: Supervision     Current functional status for memory: Supervision     Expected level of Improvement in Self-Care:  Modified independence     Expected level of Improvement in Sphincter Control:  Modified independence     Expected level of Improvement in Transfers: Modified independence     Expected level of Improvement in Locomotion:  Modified independence     Expected level of Improvement in Communication and Social Cognition: Modified independence     Expected length of time to achieve that level of improvement: 2 weeks     Current rehab issues: ADL dysfunction,bladder management,bowel management,carry over of therapy techniques, discharge planning, disease and co-morbidity management, gait/mobility dysfunction, medication management, nutrition and hydration management,Ongoing assessment of safety, Pain management, Patient and family education, Prevention of secondary complications, Skin Integrity, NWB,TTWB, PWB,cognitive impairment, communication impairment. Required therapy: Physical Therapy, Occupational Therapy and Speech Therapy 3 hours per day, 5-6 days per week. Recreational Therapy 1 hour per week. Expected Discharge Destination: Home     Expected Post Discharge Treatments: Home Care     Other information relevant to the care needs:   Lives With: Significant other  Type of Home: House  Home Layout: One level  Home Access: Stairs to enter without rails  Entrance Stairs - Number of Steps: 2  Bathroom Shower/Tub: Tub/Shower unit  Bathroom Toilet: Handicap height  Bathroom Accessibility: Walker accessible  Home Equipment: Lum Griffon, 4 wheeled  Receives Help From: Family  ADL Assistance: Independent  Homemaking Assistance: Independent  Ambulation Assistance: Independent  Transfer Assistance: Independent  Active : Yes  Mode of Transportation: Car  Occupation: Retired     Acute Inpatient Rehabilitation Disclosure Statement provided to patient. Patient verbalized understanding. I have reviewed and concur with the findings and results of the pre-admission screening assessment completed by the Inpatient Rehabilitation Admissions Coordinator.         Electronically signed by Parth Landin MD on 11/2/2022 at 1:20 PM                  Revision History  Date/Time User Provider Type Action   11/2/2022  1:20 PM Edinson Arias Pedro Pablo Cunha MD Physician Sign   11/2/2022 12:01 PM Xavier Castle, MOLLY Registered Nurse Sign    View Details Report

## 2022-11-03 NOTE — PROGRESS NOTES
[facility] [place]  Speech - Language - Cognitive Evaluation + Clinical Swallow Evaluation + Speech/language Treatment    SLP Individual Minutes  Time In: 1130  Time Out: 1200  Minutes: 30  Timed Code Treatment Minutes: 0 Minutes  Speech, Language, Cognitive Evaluation: 14 minutes   Clinical Swallow Evaluation: 8 minutes   Speech/language Treatment: 8 minutes     Date: 11/3/2022  Patient Name: Cleo Adkins      CSN: [id number]   : 1950  (67 y.o.)  Gender: male   Referring Physician: Parth Landin MD  Diagnosis: CVA  Precautions: Aspiration risk, fall risk   History of Present Illness/Injury: Patient admitted with above diagnosis. Initially presented to [facility] ER with new onset of right arm numbness and right-sided facial droop. MRI of brain completed 10/31/22 revealed \"an area of diffusion in the left parietal periventricular white matter with corresponding diminished signal intensity on ADC map consistent with acute infarct; mild atrophy and probable mild severity chronic small vessel ischemic changes, and no other significant abnormalities. \" Per chart review, CVA likely due to poorly controlled HTN and DM. ST consulted to assess cognitive linguistic function, and swallowing in order to determine most appropriate goals/POC during IPR stay. Past Medical History:   Diagnosis Date    Anxiety     Arthritis     CAD (coronary artery disease)     on Plavix and ASA    Cancer ([facility] [address] 2019    left lung stage 2-Dr Dan    COPD (chronic obstructive pulmonary disease) ([facility] [address] 2019    ANDREINA Leiva    Diabetes ([facility] [address]     Metformin    Enlarged prostate with urinary retention     Gait difficulty     Generalized weakness     Hyperlipidemia     Hypertension     Dr Sixto Wasserman and Dr Gilbert Seat    Lesion of bladder     Osteoarthritis     Retention of urine     Right inguinal hernia     S/P cardiac catheterization 2019    stent to circumflex per Dr. Chan Lav S/P TURP     SOB (shortness of breath)     With activity    Voiding difficulty        Pain: No pain reported. Subjective:  Patient seated upright in recliner for duration of session. Alert and pleasantly engaged throughout. Daughter also present. SOCIAL HISTORY:   Living Arrangements: Home with significant other; has 3 daughters, 2 of which live within ~20 miles  Work History:  Employed part-time unloading trucks and doing  work  Education Level: [place] [address] Status: Active   Finance Management: Independent - online banking, cash, card   Medication Management: Independent - pill box   ADL's: Independent  Hobbies: Fishing, hiking, watching sports   Vision Status: WFL   Hearing: WFL     Type of Home: [address] [place], [place] [address],[address]: One level  Home Access: Stairs to enter without rails  Entrance Stairs - Number of Steps: 2  [address]: [facility], 4 wheeled (5OG has no seat or breaks--wheels swivel)    SPEECH / VOICE:  Speech:   Articulation: Impaired; Decreased Articulatory Precision, Blended Word Boundaries, and Decreased Articulatory Coordination  DDK Rates (Norm Rates 4.5-7.5)  Rate: Rapid bursts alternated with slow rate  Rhythm: Incoordinated  Precision: Impaired  Word Level: WFL  Sentence Level: Impaired  Paragraph Level: Impaired    Voice: NO s/s of dysphonia/aphonia. LANGUAGE:  Receptive:  Receptive language skills appear to be grossly intact for basic and complex daily communication. Expressive:  Expressive language skills appear to be grossly intact for basic and complex daily communication. COGNITION:  [facility] Cognitive Assessment [facility]) version 7.1 completed. Patient scored 24/30. Normal is greater than or equal to 26/30. Inclusion of +1 point given highest level of education achieved less than/equal to 12th grade or GED with limited-0 post-secondary schooling.   Orientation: 6/6  Immediate Recall: 2/5  Short-Term Recall: 2/5  Divergent Namin wpm (target is 11 wpm)  Problem Solving: Higher level deficits  Reasoning: Higher level deficits  Sequencing: Higher level deficits  Insight: Good-fair  Attention: Sustained [facility]); Divided (Anticipate Higher level deficits)  Math Computation: 4/5  Executive Functioning: 3/5 (reduced contour/spacing with cube dictation)    SWALLOWING:    Respiratory Status: Room Air      Behavioral Observation: Alert and Oriented    CRANIAL NERVE ASSESSMENT   CN V (Trigeminal) Closes and Opens Mandible WFL    Rotary Jaw Movement WFL      CN VII (Facial) Cheeks Hold Food out of Sulci Impaired: Unilateral - Right    Opens, Closes/Seals, Protrudes, Retracts Lips Impaired: Unilateral - Right    General Appearance Impaired    Sensation WFL      CN X (Vagus - Pharyngeal) Raises Back of Tongue WFL      CN XI (Accessory) Lifts Soft Palate WFL      CN XII (Hypoglossal) Elevates Tongue Up and Back WFL    Protrusion   WFL    Lateralizes Tongue WFL    Sensation WFL      Other Observations Dentition Decaying natural dentition    Vocal Quality WFL    Cough WFL      PATIENT WAS EVALUATED USING:  Thin Liquids, Puree, and Coarse Solids    ORAL PHASE:  Impaired:  Pocketing Right and Reduced Bolus Formation    PHARYNGEAL PHASE:  WFL:  Pharyngeal phase appears WFL but cannot rule out pharyngeal phase deficits from a bedside swallowing evaluation alone. SIGNS AND SYMPTOMS OF LARYNGEAL PENETRATION / ASPIRATION:  No signs/symptoms of aspiration evident in this evaluation, but cannot rule out silent aspiration. INSTRUMENTAL EVALUATION: Instrumental evaluation not indicated at this time. MBS completed 11/1/22 revealed no aspiration with just trace laryngeal penetration with thin liquids.      DIET RECOMMENDATIONS:  Soft and bite sized, thin liquids     STRATEGIES: Set-up with Meals, Full Upright Position, Small Bite/Sip, Alternate Solids and Liquids, and Watch for Right-sided Pocketing      Comprehension: 6 - Complex ideas 90% or device (hearing aid or glasses- if patient is primarily a visual learner)  Expression: 6 - Device used to express complex ideas/needs  Social Interaction: 5 - Patient is appropriate with supervision/cues  Problem Solving: 3 - Patient solves simple/routine tasks 50%-74%  Memory: 2 - Patient remembers 25%-49% of the time    RECOMMENDATIONS/ASSESSMENT:  DIAGNOSTIC IMPRESSIONS:  Clinical Swallow Evaluation: Patient presents with mild oral dysphagia with inability to fully discern potential presence of pharyngeal phase deficits without formal instrumentation. MBS completed 11/1/22 ruled out aspiration events, though did reveal trace laryngeal penetration of thin liquids. On this date, patient oral phase characterized by slightly reduced textural breakdown on right-side with very min pocketing in right buccal sulci. Able to clear with cue for liquid wash/double swallow. No other overt s/s of aspiration or concerns for oral phase deficits observed at baseline. Recommend patient resume soft and bite sized diet with thin liquids at this time, with plans for completion of advanced trial of regular solids tomorrow, 11/3/22 to begin intensive dysphagia treatment. Speech, Language, Cognitive Evaluation: Patient presents with a mild cognitive impairment characterized by score of 24/30 on the [address] [place] [address]. Of note, patient improved score from initial admitting cognitive evaluation which revealed 21/30. Deficits ongoing at this time in immediate/delayed recall, executive functioning, mental flexibility, divided attention, and higher level problem solving/sequencing/reasoning. Concerns for patient making safe and successful return to previous responsibilities given high level of independence and part-time employment. Additionally, patient presents with at least mild dysarthria characterized by decreased articulatory precision and blended word boundaries; suspect reduced speech clarity directly related to right-sided oral motor weakness.  Patient ~80% intelligible in known contexts, but only ~50-70% intelligible in unknown contexts, especially at conversation level. Patient self-perceives his communicative intelligibility at ~60%. No dysphonia or aphonia noted. Skilled ST services are highly recommended at this time to improve the aforementioned deficits and permit potential return to PLOF. Rehabilitation Potential: excellent  Discharge Recommendations: Continue to Assess Pending Progress    EDUCATION:  Learner: Patient and Family  Education:  Reviewed results and recommendations of this evaluation, Reviewed diet and strategies, Reviewed signs, symptoms and risks of aspiration, Reviewed ST goals and Plan of Care, Reviewed recommendations for follow-up, and Education Related to Potential Risks and Complications Due to Impairment/Illness/Injury  Evaluation of Education: Verbalizes understanding and Demonstrates with assistance    PLAN:  Skilled SLP intervention on IP Rehab 60 minutes per day, 5 days per week. Specific interventions for next session may include: advanced dietary analysis, higher level cognition. PATIENT GOAL:    Return to prior level of function. SHORT TERM GOALS:  Short Term Goals  Time Frame for Short Term Goals: 1 week  Goal 1: Patient will safely consume advanced trials of regular solids in conjunction with thin liquids while utilizing compensatory strategies to maximize nutrition/hydration levels. Goal 2: Patient will complete immediate/delayed recall and working memory tasks with 70% accuracy, mod cues to improve retention of functional information. Goal 3: Patient will complete complex executive functioning tasks (meds, time, finances, hobbies) with 70% accuracy, mod cues to improve skills required for IADL completion and return to occupational duties.   Goal 4: Patient will complete complex attention tasks with no more than 5 errors across a structured activity consistently to permit potential return to driving and other multi-tasking responsibilities. Goal 5: Patient will complete structured speech tasks (DDK's, sentence repetition, verbal fluency, phonemic oral motor isolation) with implementation of SOS strategies to improve intelligibility to self-perceived level of 75% or higher to maximize communication clarity. INTERVENTIONS: Introduced SOS speech strategies: Speak up, Open mouth more, Slow down. Reviewed rationale behind use of strategies and recommendations for carryover. Wrote on care board in room and provided patient with ways to incorporate into functional conversation. Discussed structured and unstructured speech exercises to assist with improving generalization of overall speech clarity. Patient verbalized understanding; will require follow up education. LONG TERM GOALS:  Long Term Goals  Time Frame for Long Term Goals: 5 weeks from evaluation  Goal 1: Patient will safely consume a regular diet + thin liquids while utilizing compensatory strategies without overt s/s of aspiration to ensure safe return to previous diet. Goal 2: Patient will improve cognition to a level of Mod I in order to permit potential return to completion of IADL's in home setting. Goal 3: Patient will improve speech intelligibility to a self-perceived level of 85% clarity in order to improve overall communication of wants/needs.           Stephanie Pruitt, M.S. [address] [facility] [address]

## 2022-11-03 NOTE — PROGRESS NOTES
[facility]  INPATIENT PHYSICAL THERAPY  EVALUATION  [address] [facility] - [id number]    Time In: 3803  Time Out: 1035  Timed Code Treatment Minutes: 40 Minutes  Minutes: 60        Date: 11/3/2022  Patient Name: Emilee Willis,  Gender:  male        MRN: [id number]  : 1950  (67 y.o.)  Referral Date : 22   Referring Practitioner: Denia Abdalla MD  Diagnosis: Acute cerebrovascular accident (CVA)  Treatment Diagnosis: difficulty in walking  Additional Pertinent Hx: Per H&P:Shazia Avalos  is a 67 y.o. male admitted to the inpatient rehabilitation unit on 2022. He was originally admitted to [facility] on 10/31/2022. Patient  has a past medical history of Anxiety, Arthritis, CAD (coronary artery disease), Cancer ([facility] [address] COPD (chronic obstructive pulmonary disease) ([facility] [address] Diabetes ([facility] [address] Enlarged prostate with urinary retention, Gait difficulty, Generalized weakness, Hyperlipidemia, Hypertension, Lesion of bladder, Osteoarthritis, Retention of urine, Right inguinal hernia, S/P cardiac catheterization, S/P TURP, SOB (shortness of breath), and Voiding difficulty. Patient presented to [facility] ER due to new onset of right arm numbness and balance issues. Patient with noted chronic SOB. Family reports right sides weakness, dysarthria, right sided facial droop. Patient was to be taking ASA and plavix daily, but only was taking ASA every other day due to bruising. Patient with initial NIHSS of 6. Code stroke called, not a tPA candidate. CT of the head without contrast on 10/31/2022 demonstrated stable mild global volume loss, and apical abscess associated with a left maxillary first premolar, and no other significant acute findings. CT of the head and neck on 10/31/2022 without major acute findings.  MRI of the brain without contrast on 10/31/2022 demonstrated an area of diffusion in the left parietal periventricular white matter with corresponding diminished signal intensity on ADC map consistent with acute infarct; mild atrophy and probable mild severity chronic small vessel ischemic changes, and no other significant abnormalities. CVA likely due to poorly controlled HTN and DM. Restrictions/Precautions:  Restrictions/Precautions: General Precautions, Fall Risk  Position Activity Restriction  Other position/activity restrictions: R hemibody weakness    Subjective:  Chart Reviewed: Yes  Patient assessed for rehabilitation services?: Yes  Family / Caregiver Present: No  Subjective: Patient in room in recliner, agreeable to PT. Pt cooperative and pleasant. General:  Orientation Level: Oriented X4  Vision: Impaired  Vision Exceptions: Wears glasses for reading--at end of session patient noting   Hearing: Within functional limits       Pain: right wrist--not rated, notes new onset yesterday with no reported injuries. NAT DejesusCNP in during session and pt discussed new right wrist pain with her at that time and plans for x-ray to rule of fracture, utilization of ice and a cream to reduce pain/inflammation was discussed.   *Pt applied ice to right wrist at end of session    Vitals: Vitals not assessed per clinical judgement, see nursing flowsheet    Social/Functional History:    Lives With: Significant other, Other (comment) (girlfriends son)  Type of Home: House  Home Layout: One level  Home Access: Stairs to enter without rails  Entrance Stairs - Number of Steps: 2  Home Equipment: Chelsi Ibanez, 4 wheeled (8JP has no seat or breaks--wheels swivel)     Bathroom Shower/Tub: Tub/Shower unit  Bathroom Toilet: Handicap height  Bathroom Accessibility: Walker accessible    Receives Help From: Family  ADL Assistance: Independent  Homemaking Assistance: Independent  Ambulation Assistance: Independent  Transfer Assistance: Independent    Active : Yes  Mode of Transportation: Car  Occupation: Retired  Leisure & Hobbies: Cooking  Additional Comments: Pt's girlfriend works full-time, daughters live ~30 miles away in [place] and work    OBJECTIVE:  Range of Motion:  Right Lower Extremity: WFL  Left Lower Extremity: WFL    Strength:  Right Lower Extremity: Impaired - hip flexion 4/5, knee flexion 4/5, knee extension 4+/5  Left Lower Extremity: WFL    Balance:  Static Sitting Balance:  Independent  Static Standing Balance: Contact Guard Assistance  Dynamic Standing Balance: Contact Guard Assistance, Minimal Assistance, Moderate Assistance  *Pt picked up object from floor with min assist    Bed Mobility:  Rolling to Left: Stand By Assistance   Rolling to Right: Stand By Assistance   Supine to Sit: Stand By Assistance  Sit to Supine: Stand By Assistance   *Head of bed flat, no rail    Transfers:  Sit to Stand: Contact Guard Assistance--verbal cues ~50% of time for right hand placement  Stand to Sit:Contact Guard Assistance--verbal cues ~75% of time for right hand placement  To/From Bed and Chair: Contact Guard Assistance, Minimal Assistance--right lateral lean with turn to sit  Car:Contact Guard Assistance with verbal cues for technique. **Attempted to assess without PT intervention, however pt attempting to complete unsafely and with instability through right LE and therefore that technique discontinued and PT educated pt on safer technique and provided verbal cues. Ambulation:  Contact Guard Assistance, Minimal Assistance  Distance: 5' x2, 11', 4'. 5', plus other short distances in therapy gym chair to chair  Surface: Level Tile  Device:No Device  Gait Deviations: Forward Flexed Posture, Slow Sofie, Decreased Step Length Bilaterally, Decreased Arm Swing, Decreased Gait Speed, Decreased Heel Strike Bilaterally, Mild Ataxia Right, and Mild Path Deviations    Neuromuscular education:  Patient completed sit < > stand from mat in therapy gym with no UE support, focusing on equal weight bearing through LE's.   Patient tends to place majority of weight to left LE. Verbal cues for increased weight shift to right LE  Patient stood in front of fly swatter, instructed to step with one foot over fly swatter with heel and then step back. Verbal cues for increased weight shift to stance leg and muscle contraction to avoid loss of balance. Patient stood and completed anterior step taps to 3 different colored/numbered balance pods in random sequence. Verbal cues for increased muscle contraction on stance leg and weight shift to stance leg to advance opposite LE's. *Activities completed to improve LE coordination, single leg stance, LE proprioception and trunk stability to reduce his risk for falls and increase independence and safety with mobility. Functional Outcome Measures: Not completed  Kumari Balance Score: 22/56  KUMARI BALANCE TEST SCORING   Score of < 45 indicates a greater risk of falling  41-56= low fall risk  21-40= medium fall risk (recommendation of walking with assist at all times)  0-20= high fall risk     ASSESSMENT:  Activity Tolerance:  Patient tolerance of  treatment: good. Treatment Initiated: Treatment and education initiated within context of evaluation. Evaluation time included review of current medical information, gathering information related to past medical, social and functional history, completion of standardized testing, formal and informal observation of tasks, assessment of data and development of plan of care and goals. Treatment time included skilled education and facilitation of tasks to increase safety and independence with functional mobility for improved independence and quality of life. Neuromuscular education completed to improve patient's LE proprioception and reduce his risk for falls. Therapeutic Activity Completed to improve patient's ability to complete functional mobility    Assessment:   Body Structures, Functions, Activity Limitations Requiring Skilled Therapeutic Intervention: Decreased functional mobility , Decreased strength, Decreased balance, Decreased coordination  Assessment: Patient is a 67year old s/p CVA who presents with right sided weakness. Patient was independent at [facility] with no AD, requiring CGA to min assistance to complete transfers, CGA/min assist to ambulate short distances with no AD, and right sided lean/loss of balance at times. Patient scored 22/56 on the KUMARI indicating high risk for falls. Mr Juanita Holloway presents with decreased attention to the right side, requiring education and cues for increased utilization of right side. Patient would benefit from skilled PT services to improve his ability to complete functional mobility, reduce his risk for falls and allow patient to return to OF. Therapy Prognosis: Excellent  Co-morbidities: anxiety, arthritis, COPD,HTN     Discharge Recommendations:  Discharge Recommendations: Continue to assess pending progress, Patient would benefit from continued therapy after discharge    Patient Education:  Education  Education Given To: Patient  Education Provided: Role of Therapy, Plan of Care, Transfer Training, Mobility Training  Education Method: Verbal, Demonstration  Education Outcome: Verbalized understanding, Demonstrated understanding, Continued education needed   .           Equipment Recommendations:  Equipment Needed:  (continue to assess needs)    Plan:  Current Treatment Recommendations: Strengthening, ROM, Balance training, Functional mobility training, Transfer training, Stair training, Gait training, Endurance training, Neuromuscular re-education, Home exercise program, Safety education & training, Patient/Caregiver education & training, Therapeutic activities, Equipment evaluation, education, & procurement  General Plan:  (5x/wk 90min, 1x/wk 30min)    Goals:  Patient Goals : be independent again  Short Term Goals  Time Frame for Short Term Goals: 1 week  Short Term Goal 1: Patient will complete supine < > sit and rolling with head of bed flat and no rails with modified independence to transfer in and out of bed safely. Short Term Goal 2: Patient will complete sit < > stand with SBA and less than or equal to 25% verbal cues for utilization of right UE to stand to ambulate with decreased difficulty. Short Term Goal 3: Patient will ambulate 76' with no AD and CGA to progress towards navigating home safely. Short Term Goal 4: Patient will ascend/descend 1 step with no hand rail and CGA to progress towards safe home entry. Short Term Goal 5: Patient will improve single leg stance to 3 seconds each lower extremity with CGA to demonstrate good lateral weight shift and improve balance  Long Term Goals  Time Frame for Long Term Goals : 3 weeks  Long Term Goal 1: Patient will complete sit < > stand with modified independence with no verbal cues for hand placement to stand to ambulate safely. Long Term Goal 2: Patient will complete chair < > bed transfer with no AD and modified independence to transfer surface to surface safely. Long Term Goal 3: Patient will ambulate 80' with no AD and modified independence to navigate home safely. Long Term Goal 4: Patient will ascend/descend 2 steps with no hand rail and SBA for home entry. Long Term Goal 5: Patient will improve KUMARI to greater than or equal to 45/56 to reduce his risk for falls. Additional Goals?: Yes  Long term goal 6: Patient will complete car transfer with modified independence to transfer in and out of vehicl safely. Following session, patient left in safe position with all fall risk precautions in place.

## 2022-11-03 NOTE — PROGRESS NOTES
[address] [place] [address]  INPATIENT PHYSICAL THERAPY  DAILY NOTE  [facility] - [id number]    Time In: 1330  Time Out: 1400  Timed Code Treatment Minutes: 30 Minutes  Minutes: 30          Date: 11/3/2022  Patient Name: Konrad Fisher,  Gender:  male        MRN: [id number]  : 1950  (67 y.o.)  Referral Date : 22  Referring Practitioner: Columba Gold MD  Diagnosis: Acute cerebrovascular accident (CVA)  Treatment Diagnosis: difficulty in walking  Additional Pertinent Hx: Per H&P:Shazia Shepard  is a 67 y.o. male admitted to the inpatient rehabilitation unit on 2022. He was originally admitted to [address] [place] [address] on 10/31/2022. Patient  has a past medical history of Anxiety, Arthritis, CAD (coronary artery disease), Cancer ([facility] [address] COPD (chronic obstructive pulmonary disease) ([facility] [address] Diabetes ([facility] [address] Enlarged prostate with urinary retention, Gait difficulty, Generalized weakness, Hyperlipidemia, Hypertension, Lesion of bladder, Osteoarthritis, Retention of urine, Right inguinal hernia, S/P cardiac catheterization, S/P TURP, SOB (shortness of breath), and Voiding difficulty. Patient presented to [facility] ER due to new onset of right arm numbness and balance issues. Patient with noted chronic SOB. Family reports right sides weakness, dysarthria, right sided facial droop. Patient was to be taking ASA and plavix daily, but only was taking ASA every other day due to bruising. Patient with initial NIHSS of 6. Code stroke called, not a tPA candidate. CT of the head without contrast on 10/31/2022 demonstrated stable mild global volume loss, and apical abscess associated with a left maxillary first premolar, and no other significant acute findings. CT of the head and neck on 10/31/2022 without major acute findings.  MRI of the brain without contrast on 10/31/2022 demonstrated an area of diffusion in the left parietal periventricular white matter with corresponding diminished signal intensity on ADC map consistent with acute infarct; mild atrophy and probable mild severity chronic small vessel ischemic changes, and no other significant abnormalities. CVA likely due to poorly controlled HTN and DM. Prior Level of Function:  Lives With: Significant other, Other (comment) (girlfriends son)  Type of Home: House  Home Layout: One level  Home Access: Stairs to enter without rails  Entrance Stairs - Number of Steps: 2  Home Equipment: Bella Barrett, 4 wheeled (3EH has no seat or breaks--wheels swivel)   Bathroom Shower/Tub: Tub/Shower unit  Bathroom Toilet: Handicap height  Bathroom Accessibility: Walker accessible    Receives Help From: Family  ADL Assistance: Independent  Homemaking Assistance: Independent  Ambulation Assistance: Independent  Transfer Assistance: Independent  Active : Yes  Additional Comments: Pt's girlfriend works full-time, daughters live ~30 miles away in [place] and work    Restrictions/Precautions:  Restrictions/Precautions: General Precautions, Fall Risk  Position Activity Restriction  Other position/activity restrictions: R hemibody weakness     SUBJECTIVE: Patient in room in bathroom, standing up upon arrival.  Pt's daughter in room and observed therapy session. Pt educated on importance of having assistance from staff for mobility, including to and from bathroom due to impaired balance and strength at this time and risk for falling. PAIN --no complaints of pain. Right wrist x-ray negative    Vitals: Vitals not assessed per clinical judgement, see nursing flowsheet    OBJECTIVE:  Bed Mobility:  Not Tested    Transfers:  Sit to Stand: Contact Guard Assistance  Stand to [employer] Assistance    Ambulation:  Contact Guard Assistance, Minimal Assistance  Distance: 30'x3 with cone activity as shown below, 50'x2, [de-identified]' with gait task as shown below  Surface: Level Tile  Device:No Device  Gait Deviations:   Forward Flexed Posture, Slow Sofie, Decreased Step Length on Right, Decreased Gait Speed, Decreased Heel Strike Bilaterally, and Ataxia  *Verbal cues for weight shift     Patient weaved in and out of cones 30'x2 with no AD. Verbal cues for increased observance of objects to right side to avoid knocking over cones. Pt knocked over 2 cones on right side and brushed against 2 cones on right side. Patient instructed to ambulate in hallway and locate numbered post-it notes on both right and left side. Some difficulty with depth perception noted on the right side, pt requiring verbal cues for step closer to object. Pt only missed one number on the right side. Neuromuscular education:  Patient completed alternating toe taps on cones then stepped over cones. Verbal cues for lateral weight shift and increased muscle contraction stance leg  Patient instructed to reach for object in various directions then step forward and shift weight forward and toss object to target. Completed with both right and left LE advancing forward. Balance:  Static Standing Balance: Contact Guard Assistance  Dynamic Standing Balance: Contact Guard Assistance, Minimal Assistance, Moderate Assistance    Functional Outcome Measures: Not completed  ASSESSMENT:  Assessment: Patient progressing toward established goals. Activity Tolerance:  Patient tolerance of  treatment: good.       Equipment Recommendations:Equipment Needed:  (continue to assess needs)  Discharge Recommendations: Continue to assess pending progress and Patient would benefit from continued PT at discharge  Plan: Current Treatment Recommendations: Strengthening, ROM, Balance training, Functional mobility training, Transfer training, Stair training, Gait training, Endurance training, Neuromuscular re-education, Home exercise program, Safety education & training, Patient/Caregiver education & training, Therapeutic activities, Equipment evaluation, education, & procurement  General Plan:  (5x/wk 90min, 1x/wk 30min)    Patient Education  Patient Education: Transfers, Gait, Verbal Exercise Instruction,  - Patient Verbalized Understanding, - Patient Requires Continued Education    Goals:  Patient Goals : be independent again  Short Term Goals  Time Frame for Short Term Goals: 1 week  Short Term Goal 1: Patient will complete supine < > sit and rolling with head of bed flat and no rails with modified independence to transfer in and out of bed safely. Short Term Goal 2: Patient will complete sit < > stand with SBA and less than or equal to 25% verbal cues for utilization of right UE to stand to ambulate with decreased difficulty. Short Term Goal 3: Patient will ambulate 76' with no AD and CGA to progress towards navigating home safely. Short Term Goal 4: Patient will ascend/descend 1 step with no hand rail and CGA to progress towards safe home entry. Short Term Goal 5: Patient will improve single leg stance to 3 seconds each lower extremity with CGA to demonstrate good lateral weight shift and improve balance  Long Term Goals  Time Frame for Long Term Goals : 3 weeks  Long Term Goal 1: Patient will complete sit < > stand with modified independence with no verbal cues for hand placement to stand to ambulate safely. Long Term Goal 2: Patient will complete chair < > bed transfer with no AD and modified independence to transfer surface to surface safely. Long Term Goal 3: Patient will ambulate 80' with no AD and modified independence to navigate home safely. Long Term Goal 4: Patient will ascend/descend 2 steps with no hand rail and SBA for home entry. Long Term Goal 5: Patient will improve KUMARI to greater than or equal to 45/56 to reduce his risk for falls. Additional Goals?: Yes  Long term goal 6: Patient will complete car transfer with modified independence to transfer in and out of vehicl safely. Following session, patient left in safe position with all fall risk precautions in place.

## 2022-11-03 NOTE — PLAN OF CARE
Problem: Safety - Adult  Goal: Free from fall injury  Outcome: Progressing  Flowsheets (Taken 11/3/2022 1045)  Free From Fall Injury: Instruct family/caregiver on patient safety  Note: Focus Note  Education Focus: Remember to use call light before getting up. Verbalized understanding    Wound: no, no new areas of redness or abrasions since admit. Pt is utilizing call light and wearing non-skid footwear upon rising. Last date of picture/measure: -   Family member to be included in wound care education: -               Problem: ABCDS Injury Assessment  Goal: Absence of physical injury  Outcome: Progressing  Note: Focus Note  Education Focus: Other (Comments) Pt utilizes call light before getting up, non-skid foot wear and gait-belt with transfers and ambulating. Changes position when seated to prevent redness or breakdown of skin, ambulates as tolerated.     Verbalized understanding    Wound: no   Last date of picture/measure: -   Family member to be included in wound care education: -

## 2022-11-04 LAB
GLUCOSE BLD-MCNC: 158 MG/DL (ref 70–108)
GLUCOSE BLD-MCNC: 159 MG/DL (ref 70–108)
GLUCOSE BLD-MCNC: 182 MG/DL (ref 70–108)

## 2022-11-04 PROCEDURE — 97110 THERAPEUTIC EXERCISES: CPT

## 2022-11-04 PROCEDURE — 97112 NEUROMUSCULAR REEDUCATION: CPT

## 2022-11-04 PROCEDURE — 99232 SBSQ HOSP IP/OBS MODERATE 35: CPT | Performed by: PHYSICAL MEDICINE & REHABILITATION

## 2022-11-04 PROCEDURE — 94669 MECHANICAL CHEST WALL OSCILL: CPT

## 2022-11-04 PROCEDURE — 82948 REAGENT STRIP/BLOOD GLUCOSE: CPT

## 2022-11-04 PROCEDURE — 6370000000 HC RX 637 (ALT 250 FOR IP): Performed by: PHYSICAL MEDICINE & REHABILITATION

## 2022-11-04 PROCEDURE — 97530 THERAPEUTIC ACTIVITIES: CPT

## 2022-11-04 PROCEDURE — 97129 THER IVNTJ 1ST 15 MIN: CPT

## 2022-11-04 PROCEDURE — 97130 THER IVNTJ EA ADDL 15 MIN: CPT

## 2022-11-04 PROCEDURE — 6370000000 HC RX 637 (ALT 250 FOR IP): Performed by: FAMILY MEDICINE

## 2022-11-04 PROCEDURE — 97116 GAIT TRAINING THERAPY: CPT

## 2022-11-04 PROCEDURE — 92526 ORAL FUNCTION THERAPY: CPT

## 2022-11-04 PROCEDURE — 90791 PSYCH DIAGNOSTIC EVALUATION: CPT | Performed by: PSYCHOLOGIST

## 2022-11-04 PROCEDURE — 94640 AIRWAY INHALATION TREATMENT: CPT

## 2022-11-04 PROCEDURE — 1180000000 HC REHAB R&B

## 2022-11-04 RX ADMIN — AMLODIPINE BESYLATE 5 MG: 5 TABLET ORAL at 09:33

## 2022-11-04 RX ADMIN — CLOPIDOGREL BISULFATE 75 MG: 75 TABLET ORAL at 09:32

## 2022-11-04 RX ADMIN — AMOXICILLIN AND CLAVULANATE POTASSIUM 1 TABLET: 875; 125 TABLET, FILM COATED ORAL at 21:35

## 2022-11-04 RX ADMIN — ASPIRIN 81 MG 81 MG: 81 TABLET ORAL at 09:32

## 2022-11-04 RX ADMIN — ATORVASTATIN CALCIUM 40 MG: 40 TABLET, FILM COATED ORAL at 21:35

## 2022-11-04 RX ADMIN — METFORMIN HYDROCHLORIDE 1000 MG: 500 TABLET, EXTENDED RELEASE ORAL at 09:32

## 2022-11-04 RX ADMIN — LOSARTAN POTASSIUM 25 MG: 25 TABLET, FILM COATED ORAL at 09:33

## 2022-11-04 RX ADMIN — DICLOFENAC SODIUM 2 G: 10 GEL TOPICAL at 09:34

## 2022-11-04 RX ADMIN — FINASTERIDE 5 MG: 5 TABLET, FILM COATED ORAL at 09:33

## 2022-11-04 RX ADMIN — DICLOFENAC SODIUM 2 G: 10 GEL TOPICAL at 21:35

## 2022-11-04 RX ADMIN — METFORMIN HYDROCHLORIDE 1000 MG: 500 TABLET, EXTENDED RELEASE ORAL at 21:38

## 2022-11-04 RX ADMIN — IPRATROPIUM BROMIDE AND ALBUTEROL SULFATE 1 AMPULE: .5; 3 SOLUTION RESPIRATORY (INHALATION) at 17:49

## 2022-11-04 RX ADMIN — AMOXICILLIN AND CLAVULANATE POTASSIUM 1 TABLET: 875; 125 TABLET, FILM COATED ORAL at 09:32

## 2022-11-04 RX ADMIN — IPRATROPIUM BROMIDE AND ALBUTEROL SULFATE 1 AMPULE: .5; 3 SOLUTION RESPIRATORY (INHALATION) at 05:50

## 2022-11-04 RX ADMIN — DOCUSATE SODIUM 100 MG: 100 CAPSULE, LIQUID FILLED ORAL at 21:35

## 2022-11-04 RX ADMIN — DOCUSATE SODIUM 100 MG: 100 CAPSULE, LIQUID FILLED ORAL at 09:32

## 2022-11-04 RX ADMIN — PANTOPRAZOLE SODIUM 40 MG: 40 TABLET, DELAYED RELEASE ORAL at 04:54

## 2022-11-04 ASSESSMENT — ENCOUNTER SYMPTOMS
NAUSEA: 0
SHORTNESS OF BREATH: 0
RHINORRHEA: 0
CONSTIPATION: 0
WHEEZING: 0
TROUBLE SWALLOWING: 0
VOMITING: 0
ABDOMINAL PAIN: 0
BACK PAIN: 0
DIARRHEA: 0
SORE THROAT: 0
COUGH: 0

## 2022-11-04 ASSESSMENT — PAIN DESCRIPTION - LOCATION: LOCATION: WRIST

## 2022-11-04 ASSESSMENT — PAIN SCALES - GENERAL: PAINLEVEL_OUTOF10: 3

## 2022-11-04 ASSESSMENT — PAIN DESCRIPTION - ORIENTATION: ORIENTATION: RIGHT

## 2022-11-04 NOTE — PLAN OF CARE
Problem: Respiratory - Adult  Goal: Clear lung sounds  11/4/2022 0554 by Ildefonso Green RCP  Outcome: Progressing     DuoNeb treatments to improve aeration of lungs. Patient mutually agreed on goals.

## 2022-11-04 NOTE — PLAN OF CARE
Problem: Musculoskeletal - Adult  Goal: Return ADL status to a safe level of function  Outcome: Progressing  Flowsheets (Taken 11/4/2022 0940)  Return ADL Status to a Safe Level of Function:   Assess activities of daily living deficits and provide assistive devices as needed   Assist and instruct patient to increase activity and self care as tolerated     Problem: Metabolic/Fluid and Electrolytes - Adult  Goal: Glucose maintained within prescribed range  Outcome: Progressing  Flowsheets (Taken 11/4/2022 0940)  Glucose maintained within prescribed range:   Instruct patient on self management of diabetes and initiate consult as needed   Assess for signs and symptoms of hyperglycemia and hypoglycemia   Monitor blood glucose as ordered  Note: Patient was educated on cutting sugary drinks and drink alternative diet carbonated drinks instead of regular pepsi. Problem: Hematologic - Adult  Goal: Maintains hematologic stability  Outcome: Progressing  Flowsheets (Taken 11/4/2022 0940)  Maintains hematologic stability: Monitor labs for bleeding or clotting disorders   Care plan reviewed with patient and  verbalize understanding of the plan of care and contribute to goal setting.

## 2022-11-04 NOTE — PROGRESS NOTES
[facility] THERAPY  [facility]  DAILY NOTE    TIME   SLP Individual Minutes  Time In: 0800  Time Out: 0900  Minutes: 60  Timed Code Treatment Minutes: 45 Minutes   Dysphagia Treatment: 15 minutes  Cognitive Treatment: 45  minutes    Date: 2022  Patient Name: Konrad Fisher      CSN: [id number]   : 1950  (67 y.o.)  Gender: male   Referring Physician: Columba Gold MD  Diagnosis: CVA  Precautions: Aspiration risk, fall risk  Current Diet: Soft  and  Bite Size with Thin Liquids UPGRADED to Regular Diet with Thin Liquids 2022  Swallowing Strategies:  Set-up with Meals, Full Upright Position, Small Bite/Sip, Alternate Solids and Liquids, and Watch for Right-sided Pocketing  Date of Last MBS/FEES:  2022    Pain:  3/10 - Pain location: RIGHT wrist; patient informed RN of pain per patient report    Subjective:  Patient seen  sitting EOB with advanced  breakfast tray upon ST  arrival; alert, pleasant, and agreeable to participate  in ST this date. No family present. Short-Term Goals:  SHORT TERM GOAL #1:  Goal 1: Patient will safely consume advanced trials of regular solids in conjunction with thin liquids while utilizing compensatory strategies to maximize nutrition/hydration levels. GOAL MET  REVISED  GOAL 1: Patient will safely consume regular solids in conjunction with thin liquids while utilizing compensatory strategies to maximize nutrition/hydration levels. INTERVENTIONS: Completed skilled dietary analysis of regular solids with breakfast tray consisting  of:  scrambled  eggs, toast with butter, and smiley as well as thin  liquids via cup and straw.   Patient oral phase with adequate mastication of complex solids with minimal oral residuals spread diffusely across lingual dorsum; ST with encouragement to utilize liquid wash with fair receptiveness and  implementation, however, residue remaining should liquid wash NOT be utilized  - patient reported sensation of remaining stasis with report,  \"I  am still swallowing. \" Patient with throat clear endorsed x2 and inconsistent, dry cough throughout meal, though suspect r/t  patient history of COPD rather  than s/s of  airway invasion, though certainly cannot r/o without formal instrumentation. According to recent [address]  completed 11/01,  patient with penetration of thin  liquids given LARGE bolus size  only  with clearance  of  material and NO tracheal aspiration documented. Recommendations to  upgrade patient diet to regular diet with  thin  liquids with implementation of compensatory swallowing strategies  in  order to improve success and safety with PO consumption. SHORT TERM GOAL #2:  Goal 2: Patient will complete immediate/delayed recall and working memory tasks with 70% accuracy, mod cues to improve retention of functional information. INTERVENTIONS:  Delayed Recall of SOS Strategies: 3/3 with written  visual aid     ST completed review of compensatory memory strategies using the acronym W.R.A.P. (i.e.,Write it down, Repeat it, Associate it, and Picture it). ST provided functional scenarios to utilize each memory strategy within ADLs/IADLs. Delayed Recall  of WRAP  Immediate Recall: 4/4 with written visual aid; additionally, patient with 4/4  examples independently  10 Minute Delayed Recall: 3/4 independent, 1/4 with written visual aid utilized independently  20 Minute Delayed Recall:  3/4  independent, 1/4 with written visual aid  utilized independently    SHORT TERM GOAL #3:  Goal 3: Patient will complete complex executive functioning tasks (meds, time, finances, hobbies) with 70% accuracy, mod cues to improve skills required for IADL completion and return to occupational duties.   INTERVENTIONS:  Evaluating Pillbox for  Errors (Complex): 3/5 independent,  2/5 with moderate cuing  *Patient with difficulty with dosage\"two tablets twice daily\" needing  cuing to correct error  *Decreased organization upon initiation  of task randomly selecting prescription with at least moderate cuing to assist with task analysis to initiate successfully  *Suspect patient may benefit from supervision  with medication management at  discharge,  though will continue to monitor and adjust recommendations  as progressed achieved    Time Word Problems ([place] Schedule): 4/5 independent, 1/5  with minimal cuing for math computation r/t  time  *Overall , good success and insight into task difficulty   *Mildly impaired processing speed evidenced  by need for additional  time for basic math computation    Therapy Schedule Navigation:  2/6 independent, 3/6 with minimal cuing, 1/6  with moderate cuing to orient to [place]" for abbreviations  *Reduced reasoning within task  *Suspect breakdowns in similar task - continue to address    [address] #4:  Goal 4: Patient will complete complex attention tasks with no more than 5 errors across a structured activity consistently to permit potential return to driving and other multi-tasking responsibilities. INTERVENTIONS: Adequate  sustained  attention  with completion of all tasks this date. SHORT TERM GOAL #5:  Goal 5: Patient will complete structured speech tasks (DDK's, sentence repetition, verbal fluency, phonemic oral motor isolation) with implementation of SOS strategies to improve intelligibility to self-perceived level of 75% or higher to maximize communication clarity. INTERVENTIONS:DNT due to focus on additional STGs. Long-Term Goals:  Time Frame for Long Term Goals: 5 weeks from evaluation    LONG TERM GOAL #1:  Goal 1: Patient will safely consume a regular diet + thin liquids while utilizing compensatory strategies without overt s/s of aspiration to ensure safe return to previous diet.     LONG TERM GOAL #2:  Goal 2: Patient will improve cognition to a level of Mod I in order to permit potential return to completion of IADL's in home setting. LONG TERM GOAL #3:  Goal 3: Patient will improve speech intelligibility to a self-perceived level of 85% clarity in order to improve overall communication of wants/needs. Comprehension: 6 - Complex ideas 90% or device (hearing aid or glasses- if patient is primarily a visual learner)  Expression: 6 - Device used to express complex ideas/needs  Social Interaction: 5 - Patient is appropriate with supervision/cues  Problem Solvin - Patient solves simple/routine tasks 75-90%+   Memory: 3 - Patient remembers 50%-74% of the time    EDUCATION:  Learner: Patient  Education:  Reviewed diet and strategies, Reviewed signs, symptoms and risks of aspiration, Reviewed ST goals and Plan of Care, Reviewed recommendations for follow-up, Education Related to Potential Risks and Complications Due to Impairment/Illness/Injury, Education Related to Prevention of Recurrence of Impairment/Illness/Injury, Education Related to [employer] and Wellness, and Home Safety Education  Evaluation of Education: [employer] understanding, Needs further instruction, and Family not present    ASSESSMENT/PLAN:  Activity Tolerance:  Patient tolerance of  treatment: good. Good participation. Assessment/Plan: Patient progressing toward established goals. Continues to require skilled care of licensed speech pathologist to progress toward achievement of established goals and plan of care. .     Plan for Next Session: Medication management, calendar task on cell phone, recall  Discharge Recommendations: Outpatient Speech [address] [place] [place]

## 2022-11-04 NOTE — PROGRESS NOTES
[address] [place] [address]  INPATIENT PHYSICAL THERAPY  DAILY NOTE  [facility] - [id number]    Time In: 1330  Time Out: 1400  Timed Code Treatment Minutes: 30 Minutes  Minutes: 30          Date: 2022  Patient Name: Dung Rodríguez,  Gender:  male        MRN: [id number]  : 1950  (67 y.o.)  Referral Date : 22  Referring Practitioner: Medina Dhaliwal MD  Diagnosis: Acute cerebrovascular accident (CVA)  Treatment Diagnosis: difficulty in walking  Additional Pertinent Hx: Per H&P:Shazia Sandhu  is a 67 y.o. male admitted to the inpatient rehabilitation unit on 2022. He was originally admitted to [address] [place] [address] on 10/31/2022. Patient  has a past medical history of Anxiety, Arthritis, CAD (coronary artery disease), Cancer ([facility] [address] COPD (chronic obstructive pulmonary disease) ([facility] [address] Diabetes ([facility] [address] Enlarged prostate with urinary retention, Gait difficulty, Generalized weakness, Hyperlipidemia, Hypertension, Lesion of bladder, Osteoarthritis, Retention of urine, Right inguinal hernia, S/P cardiac catheterization, S/P TURP, SOB (shortness of breath), and Voiding difficulty. Patient presented to [facility] ER due to new onset of right arm numbness and balance issues. Patient with noted chronic SOB. Family reports right sides weakness, dysarthria, right sided facial droop. Patient was to be taking ASA and plavix daily, but only was taking ASA every other day due to bruising. Patient with initial NIHSS of 6. Code stroke called, not a tPA candidate. CT of the head without contrast on 10/31/2022 demonstrated stable mild global volume loss, and apical abscess associated with a left maxillary first premolar, and no other significant acute findings. CT of the head and neck on 10/31/2022 without major acute findings.  MRI of the brain without contrast on 10/31/2022 demonstrated an area of diffusion in the left parietal periventricular white matter with corresponding diminished signal intensity on ADC map consistent with acute infarct; mild atrophy and probable mild severity chronic small vessel ischemic changes, and no other significant abnormalities. CVA likely due to poorly controlled HTN and DM. Prior Level of Function:  Lives With: Significant other, Other (comment) (girlfriends son)  Type of Home: House  Home Layout: One level  Home Access: Stairs to enter without rails  Entrance Stairs - Number of Steps: 2  Home Equipment: Atlee Langeloth, 4 wheeled (1PM has no seat or breaks--wheels swivel)   Bathroom Shower/Tub: Tub/Shower unit  Bathroom Toilet: Handicap height  Bathroom Accessibility: Walker accessible    Receives Help From: Family  ADL Assistance: Independent  Homemaking Assistance: Independent  Ambulation Assistance: Independent  Transfer Assistance: Independent  Active : Yes  Additional Comments: Pt's girlfriend works full-time, daughters live ~30 miles away in [place] and work    Restrictions/Precautions:  Restrictions/Precautions: General Precautions, Fall Risk  Position Activity Restriction  Other position/activity restrictions: R hemibody weakness     SUBJECTIVE: Patient in chair upon arrival, agreed and cooperative for therapy. PAIN: No pain noted. Vitals: Vitals not assessed per clinical judgement, see nursing flowsheet    OBJECTIVE:  Bed Mobility:  Not Tested    Transfers:  Sit to Stand: Contact Guard Assistance  Stand to Sit:Contact Guard Assistance    Ambulation:  Contact Guard Assistance  Distance: 170 ft. X1; 120 ft. X1   Surface: Level Tile  Device:No Device  Gait Deviations: Forward Flexed Posture, Slow Sofie, Decreased Step Length on Right, Decreased Weight Shift Left, Decreased Gait Speed, Decreased Heel Strike Bilaterally, Mild Path Deviations, and Unsteady Gait  **Verbal cues for elongated steps on RLE during swing phase.      Balance:  Static Standing Balance: Stand By Assistance, Contact Guard Assistance  Dynamic Standing Balance: Contact Guard Assistance, Minimal Assistance    Neuromuscular Education:   -Completed multiple activities to work on increasing lateral weight shift onto left side and foot clearance on RLE. Held onto booyah pole with RUE for several activities to facilitate muscle activation and awareness to RUE, also completed multiple activities without UE support to further challenge balance. Functional Outcome Measures: Not completed       ASSESSMENT:  Assessment: Patient progressing toward established goals. Activity Tolerance:  Patient tolerance of  treatment: good. Equipment Recommendations:Equipment Needed:  (continue to assess needs)  Discharge Recommendations: Continue to assess pending progress and Patient would benefit from continued PT at discharge  Plan: Current Treatment Recommendations: Strengthening, ROM, Balance training, Functional mobility training, Transfer training, Stair training, Gait training, Endurance training, Neuromuscular re-education, Home exercise program, Safety education & training, Patient/Caregiver education & training, Therapeutic activities, Equipment evaluation, education, & procurement  General Plan:  (5x/wk 90min, 1x/wk 30min)    Patient Education  Patient Education: Plan of Care, Precautions/Restrictions, Transfers, Reviewed Prior Education, Gait, Health Promotion and Wellness Education, Home Safety Education, - Patient Requires Continued Education    Goals:  Patient Goals : be independent again  Short Term Goals  Time Frame for Short Term Goals: 1 week  Short Term Goal 1: Patient will complete supine < > sit and rolling with head of bed flat and no rails with modified independence to transfer in and out of bed safely. Short Term Goal 2: Patient will complete sit < > stand with SBA and less than or equal to 25% verbal cues for utilization of right UE to stand to ambulate with decreased difficulty.   Short Term Goal 3: Patient will ambulate 76' with no AD and CGA to progress towards navigating home safely. Short Term Goal 4: Patient will ascend/descend 1 step with no hand rail and CGA to progress towards safe home entry. Short Term Goal 5: Patient will improve single leg stance to 3 seconds each lower extremity with CGA to demonstrate good lateral weight shift and improve balance  Long Term Goals  Time Frame for Long Term Goals : 3 weeks  Long Term Goal 1: Patient will complete sit < > stand with modified independence with no verbal cues for hand placement to stand to ambulate safely. Long Term Goal 2: Patient will complete chair < > bed transfer with no AD and modified independence to transfer surface to surface safely. Long Term Goal 3: Patient will ambulate 80' with no AD and modified independence to navigate home safely. Long Term Goal 4: Patient will ascend/descend 2 steps with no hand rail and SBA for home entry. Long Term Goal 5: Patient will improve KUMARI to greater than or equal to 45/56 to reduce his risk for falls. Additional Goals?: Yes  Long term goal 6: Patient will complete car transfer with modified independence to transfer in and out of vehicl safely. Following session, patient left in safe position with all fall risk precautions in place.

## 2022-11-04 NOTE — PLAN OF CARE
Problem: Discharge Planning  Goal: Discharge to home or other facility with appropriate resources  11/4/2022 0254 by Paul Jones RN  Outcome: Progressing  Flowsheets (Taken 11/3/2022 2215)  Discharge to home or other facility with appropriate resources:   Identify barriers to discharge with patient and caregiver   Identify discharge learning needs (meds, wound care, etc)   Refer to discharge planning if patient needs post-hospital services based on physician order or complex needs related to functional status, cognitive ability or social support system  Note: Focus Note  Education Focus: Disease process and treatment  Prevention, detection, and treatment of acute complications  Develop strategies to promote health/ change behaviors  Using medications safely  Verbalized understanding    Wound: no    Pain Management:    Non-pharmacological strategies for pain: rest, reposition, ice, elevation      Problem: Safety - Adult  Goal: Free from fall injury  Outcome: Progressing     Problem: ABCDS Injury Assessment  Goal: Absence of physical injury  Outcome: Progressing     Problem: Respiratory - Adult  Goal: Clear lung sounds  11/4/2022 0254 by Paul Jones RN  Outcome: Progressing     Problem: Pain  Goal: Verbalizes/displays adequate comfort level or baseline comfort level  Outcome: Progressing

## 2022-11-04 NOTE — PROGRESS NOTES
[address] [place] [address]  Occupational Therapy  Daily Note  Time:   Time In: 1400  Time Out: 1430  Timed Code Treatment Minutes: 30 Minutes  Minutes: 30    Date: 2022  Patient Name: Dai Macias,   Gender: male      Room: [facility]65/065-A  MRN: [id number]  : 1950  (73 y.o.)  Referring Practitioner: Mukund Dawkins MD  Diagnosis: Acute CVA  Additional Pertinent Hx: Dai Macias  is a 67 y.o. male admitted to the inpatient rehabilitation unit on 2022. He was originally admitted to [address] [place] [address] on 10/31/2022. Patient  has a past medical history of Anxiety, Arthritis, CAD (coronary artery disease), Cancer ([facility] [address] COPD (chronic obstructive pulmonary disease) ([facility] [address] Diabetes ([facility] [address] Enlarged prostate with urinary retention, Gait difficulty, Generalized weakness, Hyperlipidemia, Hypertension, Lesion of bladder, Osteoarthritis, Retention of urine, Right inguinal hernia, S/P cardiac catheterization, S/P TURP, SOB (shortness of breath), and Voiding difficulty. Patient presented to [facility] ER due to new onset of right arm numbness and balance issues. Patient with noted chronic SOB. Family reports right sides weakness, dysarthria, right sided facial droop. Patient was to be taking ASA and plavix daily, but only was taking ASA every other day due to bruising. Patient with initial NIHSS of 6. Code stroke called, not a tPA candidate. CT of the head without contrast on 10/31/2022 demonstrated stable mild global volume loss, and apical abscess associated with a left maxillary first premolar, and no other significant acute findings. CT of the head and neck on 10/31/2022 without major acute findings.  MRI of the brain without contrast on 10/31/2022 demonstrated an area of diffusion in the left parietal periventricular white matter with corresponding diminished signal intensity on ADC map consistent with acute infarct; mild atrophy and probable mild severity transportation vs  if no transportation. Equipment Recommendations: Equipment Needed: Yes  Mobility Devices: ADL Assistive Devices  ADL Assistive Devices: Shower Chair with back, Grab Bars - shower  Other: Continue to monitor for AE  Plan: Times Per Week: 5x a week for 90 min and 1x a week for 30 min  Times Per Day:  Once a day  Current Treatment Recommendations: Strengthening, ROM, Balance training, Functional mobility training, Endurance training, Neuromuscular re-education, Pain management, Safety education & training, Patient/Caregiver education & training, Positioning, Self-Care / ADL, Home management training    Patient Education  Patient Education: HEP, transfer safety, RUE engagement    Goals  Short Term Goals  Time Frame for Short Term Goals: 1 week from eval  Short Term Goal 1: Pt will complete oral hygiene routine while using R side for >/= 75% of task and SBA to increase indep with self-care  Short Term Goal 2: Pt will complete item retieval with >/= 5 items with SBA and 0-1 VC for R sided involvement to increase indep with grooming  Short Term Goal 3: Pt will complete dynamic standing with 2 UE release, SBA and 0-2 VC for R sided awareness to increase indep with hygiene  Short Term Goal 4: Pt will complete tolieting, including transfer, with SBA and 0-2 VC for R sided awareness to increase indep with tolieting  Short Term Goal 5: Pt will complete LB dressing with supervision and 0-1 VC for technique to increase indep with dressing  Long Term Goals  Time Frame for Long Term Goals : 2 weeks from eval  Long Term Goal 1: Pt will complete homemaking task with mod I to increase indep with laundry  Long Term Goal 2: Pt will complete pathway finding task with supervision and 0-2 vc for visual scanning to use signs to increase indep with community reintigration  Long Term Goal 3: Pt will complete BADL routine with mod I to increase indep with oral hygiene    Following session, patient left in safe position with all fall risk precautions in place.

## 2022-11-04 NOTE — PROGRESS NOTES
Type and Reason for Visit:    Initial, Consult, Patient Education (nutrition evaluation, new admit)     Nutrition Recommendations/Plan:    Added carb controlled, cardiac diet to current diet to assist in blood sugar management and stroke prevention. Carb controlled, cardiac diet reinforced. Has had previous diet education -3/13/2019, unfortunately continues to drink regular Pepsi, etc.  Compliance with diet encouraged. Nutrition Assessment:      Pt. At low nutrition risk per RD evaluation. Admit with CVA. 11/1/22: HgbA1c 7.4%, Cholesterol 169, HLD 34, LDL 90, . Chemsticks 257, 305, 177, 207. Patient seen, his nurse was present as well. Patient reports good appetite prior to admit and now, consuming % of meals. He has a room full of bottles of regular Pepsi his family brought in [facility] family knows I like this\", he was advised in 2019 by dietitian to try to find a low carb/no carb alternative to this. Hasn't been checking his blood sugars at home, ran out of test strips for meter. Note diet just upgraded to regular texture by SLP. Hx: DB, HTN, HLD, anxiety, OA, lung cancer, enlarge prostate, COPD, CAD, former smoker. Malnutrition Assessment:  No Malnutrition      Wounds:    None     Current Nutrition Therapies:    ADULT DIET; Regular; 5 carb choices (75 gm/meal); Low Fat/Low Chol/High Fiber/[place]     Anthropometric Measures:  Height:    5' 8\" (172.7 cm)  Current Body Weight:   188 lb 1.6 oz (85.3 kg) (11/2/22 no edema, bedscale)  Admission Body Weight:    188 lb 1.6 oz (85.3 kg) (11/2/22 no edema, bedscale)  Usual Body Weight:      (~ 190# per pt. Per EMR: 7/28/21: 190#3oz, no weights in EMR in the last year)  Ideal Body Weight:     154 lbs   BMI:   28.6  BMI Categories:    Overweight (BMI 25.0-29. 9)     Nutrition Diagnosis:   Limited adherence to nutrition-related recommendations  related to   (unwilling to change behavior/lifestyle)  as evidenced by    (patient interview and dietary habits prior to admit)      Nutrition Interventions:   Food and/or Nutrient Delivery:    Continue Current Diet  Nutrition Education/Counseling:    Education completed (Carb controlled, cardiac diet reinforced with patient, highly advised finding another low carb alternative to his regular Pepsi intake. Handout provided 11/4/22.)  Coordination of Nutrition Care:   Continue to monitor while inpatient     Nutrition Monitoring and Evaluation:   Will monitor nutritional needs during LOS.        Discharge Planning:    RD following    Lisa Ram RD, LD:    Contact Number: [phone]

## 2022-11-04 NOTE — PROGRESS NOTES
[facility]  INPATIENT PHYSICAL THERAPY  DAILY NOTE  [address] [facility] - [id number]    Time In: 1130  Time Out: 1230  Timed Code Treatment Minutes: 60 Minutes  Minutes: 60          Date: 2022  Patient Name: Skyler Vazquez,  Gender:  male        MRN: [id number]  : 1950  (67 y.o.)  Referral Date : 22  Referring Practitioner: Izabel Hernandes MD  Diagnosis: Acute cerebrovascular accident (CVA)  Treatment Diagnosis: difficulty in walking  Additional Pertinent Hx: Per H&P:Shazia Diaz  is a 67 y.o. male admitted to the inpatient rehabilitation unit on 2022. He was originally admitted to [facility] on 10/31/2022. Patient  has a past medical history of Anxiety, Arthritis, CAD (coronary artery disease), Cancer ([facility] [address] COPD (chronic obstructive pulmonary disease) ([facility] [address] Diabetes ([facility] [address] Enlarged prostate with urinary retention, Gait difficulty, Generalized weakness, Hyperlipidemia, Hypertension, Lesion of bladder, Osteoarthritis, Retention of urine, Right inguinal hernia, S/P cardiac catheterization, S/P TURP, SOB (shortness of breath), and Voiding difficulty. Patient presented to [facility] ER due to new onset of right arm numbness and balance issues. Patient with noted chronic SOB. Family reports right sides weakness, dysarthria, right sided facial droop. Patient was to be taking ASA and plavix daily, but only was taking ASA every other day due to bruising. Patient with initial NIHSS of 6. Code stroke called, not a tPA candidate. CT of the head without contrast on 10/31/2022 demonstrated stable mild global volume loss, and apical abscess associated with a left maxillary first premolar, and no other significant acute findings. CT of the head and neck on 10/31/2022 without major acute findings.  MRI of the brain without contrast on 10/31/2022 demonstrated an area of diffusion in the left parietal periventricular white matter with corresponding diminished signal intensity on ADC map consistent with acute infarct; mild atrophy and probable mild severity chronic small vessel ischemic changes, and no other significant abnormalities. CVA likely due to poorly controlled HTN and DM. Prior Level of Function:  Lives With: Significant other, Other (comment) (girlfriends son)  Type of Home: House  Home Layout: One level  Home Access: Stairs to enter without rails  Entrance Stairs - Number of Steps: 2  Home Equipment: Volofy, 4 wheeled (4NS has no seat or breaks--wheels swivel)   Bathroom Shower/Tub: Tub/Shower unit  Bathroom Toilet: Handicap height  Bathroom Accessibility: Walker accessible    Receives Help From: Family  ADL Assistance: Independent  Homemaking Assistance: Independent  Ambulation Assistance: Independent  Transfer Assistance: Independent  Active : Yes  Additional Comments: Pt's girlfriend works full-time, daughters live ~30 miles away in [place] and work    Restrictions/Precautions:  Restrictions/Precautions: General Precautions, Fall Risk  Position Activity Restriction  Other position/activity restrictions: R hemibody weakness     SUBJECTIVE: Patient in chair upon arrival, agreed and cooperative for therapy. PAIN: No pain noted. Vitals: Vitals not assessed per clinical judgement, see nursing flowsheet    OBJECTIVE:  Bed Mobility:  Not Tested    Transfers:  Sit to Stand: Contact Guard Assistance  Stand to Sit:Contact [employer]  **Verbal cues for lining up with chair before trying to sit and also reaching back with RUE first to improve awareness to R hemibody. Ambulation:  Contact Guard Assistance  Distance: 175 ft. X2; multiple short distances in therapy gym. Surface: Level Tile  Device:No Device  Gait Deviations:   Forward Flexed Posture, Slow Sofie, Decreased Step Length on Right, Decreased Weight Shift Left, Decreased Arm Swing, Decreased Gait Speed, Decreased Heel Strike Bilaterally, Narrow Base of Support, and Mild Path Deviations    -Weaved around cones without AD to challenge balance with directional changes. Patient required increased verbal cues for increasing step length on RLE during swing phase and visual scanning to R to avoid bumping into cones. Did know over 2 cones with task.   -Ambulated to the right of of cones to work on facilitating elongated step length on RLE during swing phase and to improve lateral weight shift to left. Ambulated to the right of cones to improve awareness to R side. Balance:  Static Standing Balance: Stand By Assistance, Contact Guard Assistance  Dynamic Standing Balance: Contact Guard Assistance, Minimal Assistance    Neuromuscular Education:   -Instructed in pod taps with BLE's in alternating fashion to work on foot clearance on RLE, lateral weight shift onto left and stability while in SLS. Difficulty with lateral weight shift onto left side which resulted in quick tap using RLE. Also unsteady when in SLS on LLE and having several LOB's to R side.   -Stepped forward/back over fly-swatter using RLE to work on increasing lateral weight shift onto left side. Difficulty clearing fly-swatter when stepping with RLE. Also completed lateral step over and back with fly-swatter using RLE to facilitate increased lateral weight shift onto left. -Instructed in cone taps with BLE's in alternating fashion to challenge balance while in SLS, increase lateral weight shift onto left and improve foot clearance on RLE. Trunk shortening on R when trying to lift RLE up high enough to tap cone, tactile cues to elongate trunk with improvement noted. -Standing with R LE on blue balance pad and LLE in SLS on floor, patient completed dynamic reaching task to challenge dynamic standing balance and to increase weight into left LE. Completed 2 trials of activity without seated rest break.      Exercises:   -Completed forward/lateral step ups onto 4\" step leading with BLE's to work on strengthening bilaterally and to improve lateral weight shift onto left side when leading with LLE. Completed 10 reps each. All for increased strength to improve functional mobility. Functional Outcome Measures: Not completed       ASSESSMENT:  Assessment: Patient progressing toward established goals. Activity Tolerance:  Patient tolerance of  treatment: good. Equipment Recommendations:Equipment Needed:  (continue to assess needs)  Discharge Recommendations: Continue to assess pending progress and Patient would benefit from continued PT at discharge  Plan: Current Treatment Recommendations: Strengthening, ROM, Balance training, Functional mobility training, Transfer training, Stair training, Gait training, Endurance training, Neuromuscular re-education, Home exercise program, Safety education & training, Patient/Caregiver education & training, Therapeutic activities, Equipment evaluation, education, & procurement  General Plan:  (5x/wk 90min, 1x/wk 30min)    Patient Education  Patient Education: Plan of Care, Precautions/Restrictions, Transfers, Reviewed Prior Education, Gait, Health Promotion and Wellness Education, Home Safety Education, - Patient Requires Continued Education    Goals:  Patient Goals : be independent again  Short Term Goals  Time Frame for Short Term Goals: 1 week  Short Term Goal 1: Patient will complete supine < > sit and rolling with head of bed flat and no rails with modified independence to transfer in and out of bed safely. Short Term Goal 2: Patient will complete sit < > stand with SBA and less than or equal to 25% verbal cues for utilization of right UE to stand to ambulate with decreased difficulty. Short Term Goal 3: Patient will ambulate 76' with no AD and CGA to progress towards navigating home safely. Short Term Goal 4: Patient will ascend/descend 1 step with no hand rail and CGA to progress towards safe home entry.   Short Term Goal 5: Patient will improve single leg stance to 3 seconds each lower extremity with CGA to demonstrate good lateral weight shift and improve balance  Long Term Goals  Time Frame for Long Term Goals : 3 weeks  Long Term Goal 1: Patient will complete sit < > stand with modified independence with no verbal cues for hand placement to stand to ambulate safely. Long Term Goal 2: Patient will complete chair < > bed transfer with no AD and modified independence to transfer surface to surface safely. Long Term Goal 3: Patient will ambulate 80' with no AD and modified independence to navigate home safely. Long Term Goal 4: Patient will ascend/descend 2 steps with no hand rail and SBA for home entry. Long Term Goal 5: Patient will improve KUMARI to greater than or equal to 45/56 to reduce his risk for falls. Additional Goals?: Yes  Long term goal 6: Patient will complete car transfer with modified independence to transfer in and out of vehicl safely. Following session, patient left in safe position with all fall risk precautions in place.

## 2022-11-04 NOTE — PROGRESS NOTES
Physical Medicine & Rehabilitation Progress Note    Chief Complaint: Right side weakness and numbness    Subjective:    Trey Crockett is a 67 y.o.   male with history of COPD, diabetes mellitus, hypertension, arthritis, anxiety, coronary artery disease, left lung cancer requiring thoracotomy, benign prostate hypertrophy with urinary retention requiring TURP, hyperlipidemia, status post right inguinal hernia repair, status post carotid stenting for stenosis, was admitted on 11/2/2022 for intensive inpatient management of impairment & disability secondary to right hemiparesis secondary to left parietal periventricular white matter acute infarct stroke. The patient says he still has mild weakness and numbness at his right side body and mild right face drooping but the symptom is improving. He says have some pain at his right wrist with intensity rated at 3/10 level. Currently he is using Voltaren gel for pain control with a good result. He says he is tolerating the intensive inpatient rehabilitation treatment well. He denies having difficulty swallowing or speaking, nausea or vomiting, headache or dizziness, bladder or bowel incontinence. Rehabilitation:  PT: Reviewed. Transfers:  Sit to Stand: Contact Guard Assistance  Stand to [place] [id number]  To/From [facility] and Chair: Contact Guard Assistance, Minimal Assistance--right lateral lean with turn to sit  [address] with verbal cues for technique. **Attempted to assess without PT intervention, however pt attempting to complete unsafely and with instability through right LE and therefore that technique discontinued and PT educated pt on safer technique and provided verbal cues. Ambulation:  Contact Guard Assistance, Minimal Assistance  Distance: 5' x2, 11', 4'. 5', plus other short distances in therapy gym chair to chair  Surface: Level Tile  Device:No Device  Gait Deviations:   Forward Flexed Posture, Slow Sofie, Decreased Step Length Bilaterally, Decreased Arm Swing, Decreased Gait Speed, Decreased Heel Strike Bilaterally, Mild Ataxia Right, and Mild Path Deviations    Contact Guard Assistance, Minimal Assistance  Distance: 30'x3 with cone activity as shown below, 50'x2, 80' with gait task as shown below  Surface: Level Tile  Device:No Device  Gait Deviations: Forward Flexed Posture, Slow Sofie, Decreased Step Length on Right, Decreased Gait Speed, Decreased Heel Strike Bilaterally, and Ataxia  *Verbal cues for weight shift      Patient weaved in and out of cones 30'x2 with no AD. Verbal cues for increased observance of objects to right side to avoid knocking over cones. Pt knocked over 2 cones on right side and brushed against 2 cones on right side. Patient instructed to ambulate in hallway and locate numbered post-it notes on both right and left side. Some difficulty with depth perception noted on the right side, pt requiring verbal cues for step closer to object. Pt only missed one number on the right side. Neuromuscular education:  Patient completed sit < > stand from mat in therapy gym with no UE support, focusing on equal weight bearing through LE's. Patient tends to place majority of weight to left LE. Verbal cues for increased weight shift to right LE  Patient stood in front of fly swatter, instructed to step with one foot over fly swatter with heel and then step back. Verbal cues for increased weight shift to stance leg and muscle contraction to avoid loss of balance. Patient stood and completed anterior step taps to 3 different colored/numbered balance pods in random sequence. Verbal cues for increased muscle contraction on stance leg and weight shift to stance leg to advance opposite LE's. *Activities completed to improve LE coordination, single leg stance, LE proprioception and trunk stability to reduce his risk for falls and increase independence and safety with mobility.      Patient completed alternating toe taps on cones then stepped over cones. Verbal cues for lateral weight shift and increased muscle contraction stance leg  Patient instructed to reach for object in various directions then step forward and shift weight forward and toss object to target. Completed with both right and left LE advancing forward. Balance:  Static Standing Balance: Contact Guard Assistance  Dynamic Standing Balance: Contact Guard Assistance, Minimal Assistance, Moderate Assistance       OT: Reviewed. ADL:   EATING:Setup or clean-up assistance. Ksenia Push CARE Score: 5. ORAL HYGIENE:Supervision or touching assistance. CGA throughout for standing balance. CARE Score: 4. TOILETING HYGIENE:Supervision or touching assistance. CGA for nnamdi care. CARE Score: 4. SHOWERING/BATHING:Partial/moderate assistance. Pt requiring A with LE, pt seated on shower bench for duration. CARE Score: 3.     UPPER BODY DRESSING:Supervision or touching assistance. Pt completed standing with CGA throughout. CARE Score: 4. LOWER BODY DRESSING:Partial/moderate assistance. Pt threading pants while seated, pt able to demo 2 UE release while standing to pull pants up. Requiring min A with pant orientation. Pt requiring increased time. CARE Score: 3. FOOTWEAR:Partial/moderate assistance  Pt requiring A donning and adjusting socks. Pt able to doff socks with increased time. CARE Score: 3.     TOILET TRANSFER: Supervision or touching assistance. CGA for transfer to/from. CARE Score: 4. BALANCE:  Sitting Balance:  Supervision. Seated EOB and in wheelchair  Standing Balance: [employer]. Brief periods of min A when bending down to  items. Pt able to stand for ~3 min to complete grooming     BED MOBILITY:  Supine to Sit: Stand By Assistance increased time     TRANSFERS:  Sit to Stand:  Contact Guard Assistance. From EOB, wheelchair and shower bench. Pt requiring increased time.   Stand to Sit: Contact Guard Assistance. To wheelchair, shower bench and recliner. Pt requiring 1 vc for safety awareness. FUNCTIONAL MOBILITY:  Assistive Device: None  Assist Level:  Contact Guard Assistance. Distance: To and from bathroom  Pt requiring increased time. Pt demonstrates ataxia with R sided foot drift during ambulation. Pt trailed without walker and ambulates with increased ease. ST: Reviewed. [facility] Cognitive Assessment [facility] version 7.1 completed. Patient scored 24/30. Normal is greater than or equal to 26/30. Inclusion of +1 point given highest level of education achieved less than/equal to 12th grade or GED with limited-0 post-secondary schooling. DIAGNOSTIC IMPRESSIONS:    Clinical Swallow Evaluation: Patient presents with mild oral dysphagia with inability to fully discern potential presence of pharyngeal phase deficits without formal instrumentation. MBS completed 11/1/22 ruled out aspiration events, though did reveal trace laryngeal penetration of thin liquids. On this date, patient oral phase characterized by slightly reduced textural breakdown on right-side with very min pocketing in right buccal sulci. Able to clear with cue for liquid wash/double swallow. No other overt s/s of aspiration or concerns for oral phase deficits observed at baseline. Recommend patient resume soft and bite sized diet with thin liquids at this time, with plans for completion of advanced trial of regular solids tomorrow, 11/3/22 to begin intensive dysphagia treatment. Speech, Language, Cognitive Evaluation: Patient presents with a mild cognitive impairment characterized by score of 24/30 on the [address] [place] [address]. Of note, patient improved score from initial admitting cognitive evaluation which revealed 21/30. Deficits ongoing at this time in immediate/delayed recall, executive functioning, mental flexibility, divided attention, and higher level problem solving/sequencing/reasoning.  Concerns for patient making safe and successful return to previous responsibilities given high level of independence and part-time employment. Additionally, patient presents with at least mild dysarthria characterized by decreased articulatory precision and blended word boundaries; suspect reduced speech clarity directly related to right-sided oral motor weakness. Patient ~80% intelligible in known contexts, but only ~50-70% intelligible in unknown contexts, especially at conversation level. Patient self-perceives his communicative intelligibility at ~60%. No dysphonia or aphonia noted. Skilled ST services are highly recommended at this time to improve the aforementioned deficits and permit potential return to OF. Completed skilled dietary analysis of regular solids with breakfast tray consisting  of:  scrambled  eggs, toast with butter, and smiley as well as thin  liquids via cup and straw. Patient oral phase with adequate mastication of complex solids with minimal oral residuals spread diffusely across lingual dorsum; ST with encouragement to utilize liquid wash with fair receptiveness and  implementation, however, residue remaining should liquid wash NOT be utilized  - patient reported sensation of remaining stasis with report,  \"I  am still swallowing. \" Patient with throat clear endorsed x2 and inconsistent, dry cough throughout meal, though suspect r/t  patient history of COPD rather  than s/s of  airway invasion, though certainly cannot r/o without formal instrumentation. According to recent [address]  completed 11/01,  patient with penetration of thin  liquids given LARGE bolus size  only  with clearance  of  material and NO tracheal aspiration documented. Recommendations to  upgrade patient diet to regular diet with  thin  liquids with implementation of compensatory swallowing strategies  in  order to improve success and safety with PO consumption.        Delayed Recall of SOS Strategies: 3/3 with written  visual aid      ST completed review of compensatory memory strategies using the acronym W.R.A.P. (i.e.,Write it down, Repeat it, Associate it, and Picture it). ST provided functional scenarios to utilize each memory strategy within ADLs/IADLs. Delayed Recall  of WRAP  Immediate Recall: 4/4 with written visual aid; additionally, patient with 4/4  examples independently  10 Minute Delayed Recall: 3/4 independent, 1/4 with written visual aid utilized independently  20 Minute Delayed Recall:  3/4  independent, 1/4 with written visual aid  utilized independently    Evaluating Pillbox for  Errors (Complex): 3/5 independent,  2/5 with moderate cuing  *Patient with difficulty with dosage\"two tablets twice daily\" needing  cuing to correct error  *Decreased organization upon initiation  of task randomly selecting prescription with at least moderate cuing to assist with task analysis to initiate successfully  *Suspect patient may benefit from supervision  with medication management at  discharge,  though will continue to monitor and adjust recommendations  as progressed achieved     Time Word Problems ([place] Schedule): 4/5 independent, 1/5  with minimal cuing for math computation r/t  time  *Overall , good success and insight into task difficulty   *Mildly impaired processing speed evidenced  by need for additional  time for basic math computation     Therapy Schedule Navigation:  2/6 independent, 3/6 with minimal cuing, 1/6  with moderate cuing to orient to Johnny" for abbreviations  *Reduced reasoning within task  *Suspect breakdowns in similar task - continue to address    Adequate  sustained  attention  with completion of all tasks this date. Review of Systems:  Review of Systems   Constitutional:  Negative for chills, diaphoresis, fatigue and fever. HENT:  Negative for hearing loss, rhinorrhea, sneezing, sore throat and trouble swallowing. Eyes:  Negative for visual disturbance.    Respiratory:  Negative for cough, shortness of breath and wheezing. Cardiovascular:  Negative for chest pain and palpitations. Gastrointestinal:  Negative for abdominal pain, constipation, diarrhea, nausea and vomiting. Genitourinary:  Negative for dysuria. Musculoskeletal:  Positive for arthralgias (Right wrist) and gait problem. Negative for back pain, myalgias and neck pain. Skin:  Negative for rash. Neurological:  Positive for facial asymmetry, weakness (Right side) and numbness (Right side). Negative for dizziness, tremors, speech difficulty, light-headedness and headaches. Psychiatric/Behavioral:  Negative for dysphoric mood, hallucinations and sleep disturbance. The patient is not nervous/anxious.        Objective:  /78   Pulse 96   Temp 97.9 °F (36.6 °C) (Oral)   Resp 18   Ht 5' 8\" (1.727 m)   Wt 188 lb 1.6 oz (85.3 kg)   SpO2 100%   BMI 28.60 kg/m²   Physical Exam   General:  well-developed, well nourished  male; in no acute distress ; appropriate affect & mood; sitting on wheelchair comfortably  Eyes: pupil equally round ; extra-ocular motion intact bilaterally  Head, Ear, Nose, Mouth & Throat : normocephalic ; no discharge from ears or nose ; very mild right lower face weakness; no other deformity ; no facial swelling ; oral mucosa pink   Neck :  supple ; no tenderness ; no muscle spasm  Cardiovascular : regular rate & rhythm ; normal S1 & S2 heart sound ; no murmur ; normal peripheral pulse   Pulmonary : Breath sounds present at bilateral lung fields; no wheezing ; no rale; no crackle  Gastrointestinal : soft, slightly protuberant abdomen without tenderness ; normal bowel sound present   Skin: no skin lesion or rash ; no pitting edema at all 4 extremities  Musculoskeletal : no limb asymmetry; no limb deformity; mild tenderness at right wrist; no tenderness at the rest of bilateral upper & lower extremities; no palpable mass at limbs ; no joints laxity or crepitation ; shoulder flexion and abduction passive ROM reaching 160 degrees; hip flexion passive ROM reaching 110 degrees bilaterally; otherwise normal functional joints ROM at the rest of bilateral upper & lower extremities  Cerebral :  alert ; awake ; oriented to place, person and time; follow two-step verbal command  Cerebellum : Very mild dysmetria with right finger-to-nose test ; no dysmetria with left finger-to-nose test  Cranial Nerves : Very mild right lower face weakness  (CN VII) ; tongue protrudes slightly to the right  (CN XII) ; otherwise grossly intact other CN II to XII function  Sensory : intact light touch and pin prick sensation at bilateral upper & lower extremities  Motor : Slightly increased muscle tone at the right upper extremity ; normal tone at left upper & bilateral lower extremities ; 4+/5 to 5/5 muscle strength at the right shoulder flexion and abduction, right elbow extension; 4+/5 at the right wrist extension; 4-/5 muscle strength at the right finger extension; 3-/5 muscle strength at the right finger abduction; 4-/5 muscle strength at the right hand ; 4+/5 muscle strength at the right hip flexion; normal 5/5 muscle strength at the rest of bilateral upper & lower extremities  Reflex : 3+ right biceps, right brachioradialis and right knee reflexes; 2+ left bicep, left brachioradialis and left knee reflexes  Pathological Reflex :  No Ketty's sign ; no ankle clonus  Gait : Not assessed    Diagnostics:   Recent Results (from the past 24 hour(s))   POCT glucose    Collection Time: 11/03/22 12:01 PM   Result Value Ref Range    POC Glucose 305 (H) 70 - 108 mg/dl   POCT glucose    Collection Time: 11/03/22  5:00 PM   Result Value Ref Range    POC Glucose 177 (H) 70 - 108 mg/dl   POCT glucose    Collection Time: 11/03/22  7:47 PM   Result Value Ref Range    POC Glucose 207 (H) 70 - 108 mg/dl   POCT glucose    Collection Time: 11/04/22  7:57 AM   Result Value Ref Range    POC Glucose 182 (H) 70 - 108 mg/dl       X-ray of right wrist (11/3/2022) :  Impression   1. No acute bony abnormality       Impression:  Right facial droop, right hemiparesis and dysphagia secondary to left parietal periventricular white matter acute infarct stroke  Cognitive deficits  Type 2 diabetes with nephropathy and hyperglycemia, uncontrolled   Hypertension  Hyperlipidemia  COPD Gold stg II  CAD with PCI  BPH with urinary retention, hx TURP  Hx RUL malignant neoplasm with Right VATS wedge resection x 2  2021  Apical abscess of the left maxillary first premolar, Augmentin until dental follow up   Anxiety  Gait difficulty  Lesion of the urinary bladder  Right inguinal hernia    The patient's condition remains stable. He still has weakness at right side which is improving. He still has pain at the right wrist but x-ray did not show any significant bony or joint abnormality. He is tolerating the intensive inpatient rehabilitation treatment well. His function is improving slowly. Plan:  Continue PT, OT, and SLP/RT     Prophylaxis:  DVT: EPC cuffs; continue aspirin, Plavix. GI: Protonix 40 mg p.o daily before breakfast.  Pain: Tylenol 650 mg p.o. every 4 hours as needed  Bladder: Continue Proscar 5 mg p.o. daily  Bowel: continue Colace 100 mg p.o. twice daily, Dulcolax suppository 10 mg rectal daily as needed, milk of magnesia 15 mL p.o. daily as needed, and GlycoLax 17 g p.o. daily as needed  Continue Augmentin 875/125 1 tablet every 12 hours continue dental abscess  Continue aspirin 81 mg p.o. daily for antiplatelets  Continue Plavix 75 mg p.o. daily for antiplatelets  Continue Humalog insulin for hyperglycemia  Continue DuoNeb nebulizer for COPD  Continue glucagon injection 1 mg subcutaneous as needed and glucose chewable tablet 16 g p.o. as needed for hypoglycemia  Team conference Tuesday   Continue voltaren gel to right wrist, ICE prn.  OT ok to wrap right wrist for support if needed      Missed Therapy Time:  None      Aspen Gaspar MD

## 2022-11-04 NOTE — PROGRESS NOTES
[facility]  Recreational Therapy  Inpatient Rehabilitation Evaluation        Time Spent with Patient: 15 minutes    Date:  2022       Patient Name: Rob Coleman      MRN: [id number]       YOB: 1950 (73 y.o.)       Gender: male  Diagnosis: Acute CVA  Referring Practitioner:  Sukhjinder Ferris MD    RESTRICTIONS/PRECAUTIONS:  Restrictions/Precautions: General Precautions, Fall Risk     Hearing: Within functional limits    PAIN: 2-R wrist    SUBJECTIVE:  pt lives with girl friend and her son (22) yrs old-he has 4 children (1 )    VISION:  Visual Impairment-R eye decreased    HEARING: Within Normal Limit    LEISURE INTERESTS:   Pt delivered milk for 23 yrs-states he only missed 1 day of work due to having pneumonia-he mows his yard, watches tv, goes out to eat and on occasion will fish-gave pt deck of cards to use in his room-very pleasant and social-states he has a fish and a bird at [employer] he is worried about the direction our country is going -chart indicates pt works part time but pt did not make mention of it    BARRIERS TO LEISURE INTERESTS:    Decreased endurance and Upper extremity weakness        Patient Education  New Education Provided: Importance of Leisure, RT Plan of Care    Plan:  Continue to follow patient through this admission  See patient individually    Electronically signed by: Brii Perry CTRS  Date: 2022

## 2022-11-04 NOTE — PROGRESS NOTES
Stroke education was provided to the patient and visual and written materials were provided. Patient verbalized understanding. Diabetes education was provided as well along with importance to decrease drinks that contain high sugars and avoid juices.

## 2022-11-04 NOTE — CONSULTS
Dada Dan [address] [place] [address]     PSYCHOLOGY CONSULTATION REPORT    TO:Maria C Mims MD  PATIENT: Octavia Butler  : 1950   MR NUMBER: [id number]  FROM: Simon Graham, Ph. D.   DATE: 2022      REPORT OF CONSULTATION: FINDINGS, OPINIONS and RECOMMENDATIONS     REASON FOR REFERRAL: The patient was referred for evaluation due to concerns about emotional status and coping in the wake of recent admission. This occurred after onset of right sided numbness and balance issues and later developing a right sided weakness, dysarthria , and right sided facial drop. Patient is admitted to inpatient rehab for cognition impairment and mobility issues secondary to CVA. Patient presents with the following presenting problems:   Patient Active Problem List   Diagnosis    Chronic retention of urine    Benign prostatic hyperplasia    Mass of left lung    Adenocarcinoma of left lung (HCC)    Mediastinal lymphadenopathy    Angina effort    Abnormal stress test    S/P coronary angioplasty    CAD in native artery    S/P cardiac cath    Adenocarcinoma, lung, left (HCC)    Chest pain    Pleural effusion exudative    Hypertension    Cerebrovascular accident (CVA) ([address]    Dental abscess    Acute cerebrovascular accident (CVA) ([address]    Type 2 diabetes mellitus treated without insulin ([address]       BASIS OF EVALUATION:   Clinical assessment of the patient, review of medical records, Mini-Cog, Category Fluency Test (CFT), consultation with Nursing, Physical Therapy, Occupational Therapy, Speech Language Pathology, and Medical Social Work and with attending physician. BEHAVIORAL OBSERVATIONS: The patient presents as a 67y.o.-year-old male of  descent. Grooming was WNL. Interpersonally, the patient was pleasant and engaged in conversation. Cooperation with assessment was WNL. Level of consciousness was alert. Affect was mood congruent.  Mood was non depressed but anxious due to a number of adjustment factors. Memory impaired, however is improving. Receptive language was intact, and expressive language was intact. Attention/concentration was WNL. Judgment and problem solving were WNL. Frustration tolerance was fair to normal.    TEST RESULTS: On the Mini-Cog, he earned a score of 4 out of possible 5 points, which falls in the range of normal/near normal.  On the CFT (F-A-S), which is sensitive to frontal damage, he earned a score of 22 within 3 full minutes. For the patient's age range and level of education, this would be at about the 10th percentile. PRESENTING PROBLEM: The patient acknowledged having difficulty with adjusting to his deficits post stroke. Patient states that he has always been an active man and does not know how life will look for him now. Patient states that he is hopeful and that he will overcome this obstacle. Primary coping strategies involve staying active and social support from his family. Support systems are his girlfriend, her son, 3 daughters, and grandchildren. MEDICAL HISTORY:   Past Medical History:   Diagnosis Date    Anxiety     Arthritis     CAD (coronary artery disease)     on Plavix and ASA    Cancer ([facility] 01/2019    left lung stage 2-Dr Dan    COPD (chronic obstructive pulmonary disease) ([facility] 02/2019    ANDREINA Leiva    Diabetes ([facility]     Metformin    Enlarged prostate with urinary retention     Gait difficulty     Generalized weakness     Hyperlipidemia     Hypertension     Dr Italia Myers and Dr Paul Abelardo    Lesion of bladder     Osteoarthritis     Retention of urine     Right inguinal hernia 2021    S/P cardiac catheterization 03/12/2019    stent to circumflex per Dr. Durant Daily    S/P TURP     SOB (shortness of breath)     With activity    Voiding difficulty         SOCIAL HISTORY:   Social History     Socioeconomic History    Marital status:      Spouse name: Hayden Tolentino (girlfriend)     Number of children: 3 daughters    Years of education: Not on file    Highest education level: Not on file   Occupational History    Retired from [employer] after 23 years. Works part time at [employer] now (20 hours/week)   Tobacco Use    Smoking status: Former     Packs/day: 1.00     Years: 40.00     Pack years: 40.00     Types: Cigarettes     Start date: 26     Quit date: 2/28/2019     Years since quitting: 3.6    Smokeless tobacco: Never    Tobacco comments:     about 5 cigs per day   Vaping Use    Vaping Use: Never used   Substance and Sexual Activity    Alcohol use: No    Drug use: No    Sexual activity: Not on file   Other Topics Concern    Not on file   Social History Narrative    Not on file     Social Determinants of Health     Financial Resource Strain: Not on file   Food Insecurity: Not on file   Transportation Needs: Not on file   Physical Activity: Not on file   Stress: Not on file   Social Connections: Not on file   Intimate Partner Violence: Not on file   Housing Stability: Not on file        IMPRESSIONS:   Cognitively, the patient presents with mild cognitive impairments in the domain of delayed recall and executive functioning. Patient also has a slight dysarthria characterized by slowed and slurred speech. Patient's cognition seems to be improving. With all the progress he is making we expect a good recovery. The patient's emotional state is non depressed but anxious about a number of adjustment factors to his life. Patent states that he has always been an active person and was working up until his stroke. Patient is worried about how his life will be affected. Diagnosis: Adjustment disorder with anxiety. Patient strengths and resources include motivation, chris, and good social support from family. RECOMMENDATIONS:   I will follow patient via Rehab Team, and individually as needed during the hospital stay. Medication recommendations: none at present. Bright light phototherapy is recommended to stimulate brain recovery and improve mood and energy.  I educated patient about getting sunshine upon his discharge  I provided patient with psycho-education on stroke recovery. I educated the patient on the importance of hydration. Because of patient's motivation and desire to get back to work as soon as possible, he will need guidance on pacing and limitations or work hardening program when he returns to work. Likewise for chores at home, as he wants to start mowing and so on ASAP    Time spent in evaluating patient, including face to face time with patient, review of records, consultation with staff, and documentation:  65 minutes      Thank you for the opportunity to participate in the care of this patient.      Shayne Salinas, Ph. D.

## 2022-11-04 NOTE — PROGRESS NOTES
[facility]  [address] [facility]  Occupational Therapy  Daily Note  Time:   Time In: 1000  Time Out: 1100  Timed Code Treatment Minutes: 60 Minutes  Minutes: 60    Date: 2022  Patient Name: Jose Sapp,   Gender: male      Room: -65/065-A  MRN: [id number]  : 1950  (73 y.o.)  Referring Practitioner: Ellie Hicks MD  Diagnosis: Acute CVA  Additional Pertinent Hx: Jose Sapp  is a 67 y.o. male admitted to the inpatient rehabilitation unit on 2022. He was originally admitted to [facility] on 10/31/2022. Patient  has a past medical history of Anxiety, Arthritis, CAD (coronary artery disease), Cancer ([facility] COPD (chronic obstructive pulmonary disease) ([facility] Diabetes ([facility] Enlarged prostate with urinary retention, Gait difficulty, Generalized weakness, Hyperlipidemia, Hypertension, Lesion of bladder, Osteoarthritis, Retention of urine, Right inguinal hernia, S/P cardiac catheterization, S/P TURP, SOB (shortness of breath), and Voiding difficulty. Patient presented to [facility] ER due to new onset of right arm numbness and balance issues. Patient with noted chronic SOB. Family reports right sides weakness, dysarthria, right sided facial droop. Patient was to be taking ASA and plavix daily, but only was taking ASA every other day due to bruising. Patient with initial NIHSS of 6. Code stroke called, not a tPA candidate. CT of the head without contrast on 10/31/2022 demonstrated stable mild global volume loss, and apical abscess associated with a left maxillary first premolar, and no other significant acute findings. CT of the head and neck on 10/31/2022 without major acute findings.  MRI of the brain without contrast on 10/31/2022 demonstrated an area of diffusion in the left parietal periventricular white matter with corresponding diminished signal intensity on ADC map consistent with acute infarct; mild atrophy and probable mild severity chronic small vessel ischemic changes, and no other significant abnormalities. CVA likely due to poorly controlled HTN and DM. Patient is seen today upon admission to the inpatient rehabilitation unit. Patient lives with his s/o that works full time, 6a-2pm daily. Discussed IPR and goal of helping patient to be as independent as possible at discharge including mobility, ADLs, cognition, swallowing, and endurance. Restrictions/Precautions:  Restrictions/Precautions: General Precautions, Fall Risk  Position Activity Restriction  Other position/activity restrictions: R hemibody weakness     SUBJECTIVE: Patient pleasant and cooperative. Motivated. PAIN: denies     Vitals: Vitals not assessed per clinical judgement, see nursing flowsheet    COGNITION: Slow Processing    ADL:   No ADL's completed this session. Viridiana Hawley BALANCE:  Sitting Balance:  Independent. Standing Balance: Contact Guard Assistance. BED MOBILITY:  Not Tested    TRANSFERS:  Sit to Stand:  Contact Guard Assistance. EOB, standard chairs. Min A from couch low surface, when coming to stand pt demo slight LOB  Stand to Sit: Contact Guard Assistance. FUNCTIONAL MOBILITY:  Assistive Device: None  Assist Level:  Contact Guard Assistance. To SBA at times. Distance: To and from therapy gym, To and from therapy apartment, and therapy market       ADDITIONAL ACTIVITIES:  Patient guided into quadriped with min A. Once in quadriped, pt completed BUE push-ups and unilateral UE release to hit target. Task completed to challenge proprioceptive input through R alton body, completing WBing through RUE first and then used RUE in open chain with min impaired targeting     Patient completed IADL grocery store task of recalling 5 items from a list and going to grocery store to locate. Pt was able to recall 4/5 independently and required 1 cue to recall 1/5.  Pt was able to ambulate within store while carrying a basket with RUE to challenge heavy work / Education  Patient Education: ADL's, IADL's, Home Exercise Program, Hemibody Awareness/Attention, Reviewed Prior Education, and Assistive Device Safety    Goals  Short Term Goals  Time Frame for Short Term Goals: 1 week from eval  Short Term Goal 1: Pt will complete oral hygiene routine while using R side for >/= 75% of task and SBA to increase indep with self-care  Short Term Goal 2: Pt will complete item retieval with >/= 5 items with SBA and 0-1 VC for R sided involvement to increase indep with grooming  Short Term Goal 3: Pt will complete dynamic standing with 2 UE release, SBA and 0-2 VC for R sided awareness to increase indep with hygiene  Short Term Goal 4: Pt will complete tolieting, including transfer, with SBA and 0-2 VC for R sided awareness to increase indep with tolieting  Short Term Goal 5: Pt will complete LB dressing with supervision and 0-1 VC for technique to increase indep with dressing  Long Term Goals  Time Frame for Long Term Goals : 2 weeks from eval  Long Term Goal 1: Pt will complete homemaking task with mod I to increase indep with laundry  Long Term Goal 2: Pt will complete pathway finding task with supervision and 0-2 vc for visual scanning to use signs to increase indep with community reintigration  Long Term Goal 3: Pt will complete BADL routine with mod I to increase indep with oral hygiene    Following session, patient left in safe position with all fall risk precautions in place.

## 2022-11-04 NOTE — RT PROTOCOL NOTE
RT Inhaler-Nebulizer Bronchodilator Protocol Note    There is a bronchodilator order in the chart from a provider indicating to follow the RT Bronchodilator Protocol and there is an Initiate RT Inhaler-Nebulizer Bronchodilator Protocol order as well (see protocol at bottom of note). CXR Findings:  No results found. The findings from the last RT Protocol Assessment were as follows:   History Pulmonary Disease: Chronic pulmonary disease  Respiratory Pattern: Regular pattern and RR 12-20 bpm  Breath Sounds: Slightly diminished and/or crackles  Cough: Strong, spontaneous, non-productive  Indication for Bronchodilator Therapy: Decreased or absent breath sounds  Bronchodilator Assessment Score: 4    Aerosolized bronchodilator medication orders have been revised according to the RT Inhaler-Nebulizer Bronchodilator Protocol below. Respiratory Therapist to perform RT Therapy Protocol Assessment initially then follow the protocol. Repeat RT Therapy Protocol Assessment PRN for score 0-3 or on second treatment, BID, and PRN for scores above 3. No Indications - adjust the frequency to every 6 hours PRN wheezing or bronchospasm, if no treatments needed after 48 hours then discontinue using Per Protocol order mode. If indication present, adjust the RT bronchodilator orders based on the Bronchodilator Assessment Score as indicated below. Use Inhaler orders unless patient has one or more of the following: on home nebulizer, not able to hold breath for 10 seconds, is not alert and oriented, cannot activate and use MDI correctly, or respiratory rate 25 breaths per minute or more, then use the equivalent nebulizer order(s) with same Frequency and PRN reasons based on the score. If a patient is on this medication at home then do not decrease Frequency below that used at home.     0-3 - enter or revise RT bronchodilator order(s) to equivalent RT Bronchodilator order with Frequency of every 4 hours PRN for wheezing or increased work of breathing using Per Protocol order mode. 4-6 - enter or revise RT Bronchodilator order(s) to two equivalent RT bronchodilator orders with one order with BID Frequency and one order with Frequency of every 4 hours PRN wheezing or increased work of breathing using Per Protocol order mode. 7-10 - enter or revise RT Bronchodilator order(s) to two equivalent RT bronchodilator orders with one order with TID Frequency and one order with Frequency of every 4 hours PRN wheezing or increased work of breathing using Per Protocol order mode. 11-13 - enter or revise RT Bronchodilator order(s) to one equivalent RT bronchodilator order with QID Frequency and an Albuterol order with Frequency of every 4 hours PRN wheezing or increased work of breathing using Per Protocol order mode. Greater than 13 - enter or revise RT Bronchodilator order(s) to one equivalent RT bronchodilator order with every 4 hours Frequency and an Albuterol order with Frequency of every 2 hours PRN wheezing or increased work of breathing using Per Protocol order mode. RT to enter RT Home Evaluation for COPD & MDI Assessment order using Per Protocol order mode.     Electronically signed by Sidney Guadalupe RCP on 11/4/2022 at 5:54 PM

## 2022-11-05 LAB
BLOOD CULTURE, ROUTINE: NORMAL
BLOOD CULTURE, ROUTINE: NORMAL
GLUCOSE BLD-MCNC: 188 MG/DL (ref 70–108)

## 2022-11-05 PROCEDURE — 6370000000 HC RX 637 (ALT 250 FOR IP): Performed by: FAMILY MEDICINE

## 2022-11-05 PROCEDURE — 6370000000 HC RX 637 (ALT 250 FOR IP): Performed by: PHYSICAL MEDICINE & REHABILITATION

## 2022-11-05 PROCEDURE — 97130 THER IVNTJ EA ADDL 15 MIN: CPT | Performed by: SPEECH-LANGUAGE PATHOLOGIST

## 2022-11-05 PROCEDURE — 94640 AIRWAY INHALATION TREATMENT: CPT

## 2022-11-05 PROCEDURE — 1180000000 HC REHAB R&B

## 2022-11-05 PROCEDURE — 97110 THERAPEUTIC EXERCISES: CPT

## 2022-11-05 PROCEDURE — 97129 THER IVNTJ 1ST 15 MIN: CPT | Performed by: SPEECH-LANGUAGE PATHOLOGIST

## 2022-11-05 PROCEDURE — 97535 SELF CARE MNGMENT TRAINING: CPT

## 2022-11-05 PROCEDURE — 97116 GAIT TRAINING THERAPY: CPT

## 2022-11-05 PROCEDURE — 97530 THERAPEUTIC ACTIVITIES: CPT

## 2022-11-05 PROCEDURE — 97112 NEUROMUSCULAR REEDUCATION: CPT

## 2022-11-05 PROCEDURE — 82948 REAGENT STRIP/BLOOD GLUCOSE: CPT

## 2022-11-05 RX ADMIN — POLYETHYLENE GLYCOL 3350 17 G: 17 POWDER, FOR SOLUTION ORAL at 08:09

## 2022-11-05 RX ADMIN — DOCUSATE SODIUM 100 MG: 100 CAPSULE, LIQUID FILLED ORAL at 08:04

## 2022-11-05 RX ADMIN — IPRATROPIUM BROMIDE AND ALBUTEROL SULFATE 1 AMPULE: .5; 3 SOLUTION RESPIRATORY (INHALATION) at 16:14

## 2022-11-05 RX ADMIN — FINASTERIDE 5 MG: 5 TABLET, FILM COATED ORAL at 08:04

## 2022-11-05 RX ADMIN — LOSARTAN POTASSIUM 25 MG: 25 TABLET, FILM COATED ORAL at 08:04

## 2022-11-05 RX ADMIN — AMLODIPINE BESYLATE 5 MG: 5 TABLET ORAL at 08:04

## 2022-11-05 RX ADMIN — ATORVASTATIN CALCIUM 40 MG: 40 TABLET, FILM COATED ORAL at 21:57

## 2022-11-05 RX ADMIN — PANTOPRAZOLE SODIUM 40 MG: 40 TABLET, DELAYED RELEASE ORAL at 05:26

## 2022-11-05 RX ADMIN — DOCUSATE SODIUM 100 MG: 100 CAPSULE, LIQUID FILLED ORAL at 21:57

## 2022-11-05 RX ADMIN — AMOXICILLIN AND CLAVULANATE POTASSIUM 1 TABLET: 875; 125 TABLET, FILM COATED ORAL at 21:57

## 2022-11-05 RX ADMIN — IPRATROPIUM BROMIDE AND ALBUTEROL SULFATE 1 AMPULE: .5; 3 SOLUTION RESPIRATORY (INHALATION) at 05:24

## 2022-11-05 RX ADMIN — AMOXICILLIN AND CLAVULANATE POTASSIUM 1 TABLET: 875; 125 TABLET, FILM COATED ORAL at 08:04

## 2022-11-05 RX ADMIN — ASPIRIN 81 MG 81 MG: 81 TABLET ORAL at 08:04

## 2022-11-05 RX ADMIN — METFORMIN HYDROCHLORIDE 1000 MG: 500 TABLET, EXTENDED RELEASE ORAL at 08:04

## 2022-11-05 RX ADMIN — METFORMIN HYDROCHLORIDE 1000 MG: 500 TABLET, EXTENDED RELEASE ORAL at 17:34

## 2022-11-05 RX ADMIN — CLOPIDOGREL BISULFATE 75 MG: 75 TABLET ORAL at 08:04

## 2022-11-05 ASSESSMENT — PAIN SCALES - GENERAL: PAINLEVEL_OUTOF10: 0

## 2022-11-05 NOTE — PROGRESS NOTES
Patient: Betito Gallagher  Unit/Bed: 7E-65/065-A  YOB: 1950  MRN: [id number] Acct: [de-identified]   Admitting Diagnosis: CVA (cerebral vascular accident) [facility]) [I63.9]  Acute cerebrovascular accident (CVA) [facility]) [I63.9]  Admit Date:  11/2/2022  Hospital Day: 2    Assessment:     Principal Problem:    Acute cerebrovascular accident ([facility]A) ([facility]  Active Problems:    Type 2 diabetes mellitus treated without insulin ([facility]    Adenocarcinoma of left lung ([facility]    CAD in native artery  Resolved Problems:    * No resolved hospital problems. *      Plan:     Follow the CS as the metformi was just begun. Make the CS fasting only        Subjective:     Patient has no complaint of CP or SOB. .   Medication side effects: none    Scheduled Meds:   diclofenac sodium  2 g Topical BID    amLODIPine  5 mg Oral Daily    amoxicillin-clavulanate  1 tablet Oral 2 times per day    aspirin  81 mg Oral Daily    atorvastatin  40 mg Oral Nightly    clopidogrel  75 mg Oral Daily    finasteride  5 mg Oral Daily    ipratropium-albuterol  1 ampule Inhalation BID    losartan  25 mg Oral Daily    pantoprazole  40 mg Oral QAM AC    docusate sodium  100 mg Oral BID    metFORMIN  1,000 mg Oral BID WC     Continuous Infusions:  PRN Meds:glucagon (rDNA), glucose, ipratropium-albuterol, ondansetron **OR** ondansetron, polyethylene glycol, acetaminophen, bisacodyl, magnesium hydroxide    Review of Systems  Pertinent items are noted in HPI. Objective:     No data found. I/O last 3 completed shifts: In: 1260 [P.O.:1260]  Out: -   No intake/output data recorded.     /78   Pulse 96   Temp 97.9 °F (36.6 °C) (Oral)   Resp 18   Ht 5' 8\" (1.727 m)   Wt 188 lb 1.6 oz (85.3 kg)   SpO2 100%   BMI 28.60 kg/m²     General appearance: alert, appears stated age, and cooperative  Head: Normocephalic, without obvious abnormality, atraumatic  Lungs: clear to auscultation bilaterally  Chest wall: no tenderness  Heart: regular rate and rhythm, S1, S2 normal, no murmur, click, rub or gallop  Abdomen: soft, non-tender; bowel sounds normal; no masses,  no organomegaly  Extremities: extremities normal, atraumatic, no cyanosis or edema  Skin: Skin color, texture, turgor normal. No rashes or lesions  Neurologic:  weak on the right    Data Review:    Latest Reference Range & Units 11/4/22 07:57 11/4/22 12:29 11/4/22 17:14   POC Glucose 70 - 108 mg/dl 182 (H) 158 (H) 159 (H)   (H): Data is abnormally high    Electronically signed by Chris David MD on 11/4/2022 at 9:15 PM

## 2022-11-05 NOTE — PLAN OF CARE
Care plan reviewed with patient. Patient  verbalize understanding of the plan of care and contribute to goal setting. Focus Note  Education Focus: Other (Comments)  Verbalized understanding  Educated patient on importance of taking aspirin in regards to his stroke    Pain Management:    Non-pharmacological strategies for pain: denies pain    Discharge Needs:   Caregiver/Support identified: Dina Adams, partner of 9 years and POA   Equipment needs: walker   Follow up appointments needed: PCP, Neuro      Problem: Discharge Planning  Goal: Discharge to home or other facility with appropriate resources  Outcome: Progressing  Flowsheets (Taken 11/5/2022 1201)  Discharge to home or other facility with appropriate resources:   Identify barriers to discharge with patient and caregiver   Arrange for needed discharge resources and transportation as appropriate   Identify discharge learning needs (meds, wound care, etc)     Problem: Safety - Adult  Goal: Free from fall injury  Outcome: Progressing  Flowsheets (Taken 11/5/2022 1201)  Free From Fall Injury: Instruct family/caregiver on patient safety     Problem: ABCDS Injury Assessment  Goal: Absence of physical injury  Outcome: Progressing  Flowsheets (Taken 11/5/2022 1201)  Absence of Physical Injury: Implement safety measures based on patient assessment     Problem: Musculoskeletal - Adult  Goal: Return ADL status to a safe level of function  Outcome: Progressing  Flowsheets (Taken 11/5/2022 1201)  Return ADL Status to a Safe Level of Function:   Administer medication as ordered   Assess activities of daily living deficits and provide assistive devices as needed     Problem: Respiratory - Adult  Goal: Clear lung sounds  11/5/2022 1201 by Rafael Luz LPN  Outcome: Progressing  Note: Lung sounds clear with diminished bases noted.       Problem: Metabolic/Fluid and Electrolytes - Adult  Goal: Glucose maintained within prescribed range  Outcome: Progressing  Flowsheets (Taken 11/5/2022 1201)  Glucose maintained within prescribed range:   Monitor blood glucose as ordered   Assess for signs and symptoms of hyperglycemia and hypoglycemia     Problem: Hematologic - Adult  Goal: Maintains hematologic stability  Outcome: Progressing  Flowsheets (Taken 11/5/2022 1201)  Maintains hematologic stability:   Assess for signs and symptoms of bleeding or hemorrhage   Monitor labs for bleeding or clotting disorders     Problem: Pain  Goal: Verbalizes/displays adequate comfort level or baseline comfort level  Outcome: Progressing  Flowsheets (Taken 11/5/2022 1201)  Verbalizes/displays adequate comfort level or baseline comfort level:   Encourage patient to monitor pain and request assistance   Assess pain using appropriate pain scale   Administer analgesics based on type and severity of pain and evaluate response   Implement non-pharmacological measures as appropriate and evaluate response

## 2022-11-05 NOTE — PROGRESS NOTES
[address] [facility] THERAPY  [facility]  DAILY NOTE    TIME   SLP Individual Minutes  Time In: 1100  Time Out: 1130  Minutes: 30  Timed Code Treatment Minutes: 30 Minutes     Cognitive Treatment: 30  minutes    Date: 2022  Patient Name: Roya Cheatham      CSN: [id number]   : 1950  (67 y.o.)  Gender: male   Referring Physician: Anitha Mojica MD  Diagnosis: CVA  Precautions: Aspiration risk, fall risk  Current Diet: Soft  and  Bite Size with Thin Liquids UPGRADED to Regular Diet with Thin Liquids 2022  Swallowing Strategies:  Set-up with Meals, Full Upright Position, Small Bite/Sip, Alternate Solids and Liquids, and Watch for Right-sided Pocketing  Date of Last MBS/FEES:  2022    Pain:  No pain reported. Subjective:  Patient sitting upright in recliner, pleasant and cooperative. Completed entire session with TV and audio on. No family present. Short-Term Goals:  SHORT TERM GOAL #1:  Goal 1: Patient will safely consume regular solids in conjunction with thin liquids while utilizing compensatory strategies to maximize nutrition/hydration levels. INTERVENTIONS: Did not address due to focus on other goals. SHORT TERM GOAL #2:  Goal 2: Patient will complete immediate/delayed recall and working memory tasks with 70% accuracy, mod cues to improve retention of functional information. INTERVENTIONS:  Working memory:  (3 unit re-ordering)- 5/5 indep  (4 units re-ordering)- 2/5 indep, 1/5 with repetition, 2/5 unable to complete  *Increased difficulty with mental manipulation with increased number of units. SHORT TERM GOAL #3:  Goal 3: Patient will complete complex executive functioning tasks (meds, time, finances, hobbies) with 70% accuracy, mod cues to improve skills required for IADL completion and return to occupational duties.   INTERVENTIONS:  Money management tasks: 10/10 indep  *Excellent success with math computation and math reasoning  *Timely completion     Topographical Navigation: 3/5 indep, 2 with review and self correct  *Reduced attention to orientation of map and selective attention    Prescription label:  indep,  with min cues to review and correct  *Errors related to reduced attention to detail    Mey [address] #4:  Goal 4: Patient will complete complex attention tasks with no more than 5 errors across a structured activity consistently to permit potential return to driving and other multi-tasking responsibilities. INTERVENTIONS: Appropriate sustained attention completed throughout tasks even in the presence of background stimuli (TV). SHORT TERM GOAL #5:  Goal 5: Patient will complete structured speech tasks (DDK's, sentence repetition, verbal fluency, phonemic oral motor isolation) with implementation of SOS strategies to improve intelligibility to self-perceived level of 75% or higher to maximize communication clarity. INTERVENTIONS:DNT due to focus on additional STGs. Long-Term Goals:  Time Frame for Long Term Goals: 5 weeks from evaluation    LONG TERM GOAL #1:  Goal 1: Patient will safely consume a regular diet + thin liquids while utilizing compensatory strategies without overt s/s of aspiration to ensure safe return to previous diet. LONG TERM GOAL #2:  Goal 2: Patient will improve cognition to a level of Mod I in order to permit potential return to completion of IADL's in home setting. LONG TERM GOAL #3:  Goal 3: Patient will improve speech intelligibility to a self-perceived level of 85% clarity in order to improve overall communication of wants/needs.     Comprehension: 6 - Complex ideas 90% or device (hearing aid or glasses- if patient is primarily a visual learner)  Expression: 6 - Device used to express complex ideas/needs  Social Interaction: 5 - Patient is appropriate with supervision/cues  Problem Solvin - Patient solves simple/routine tasks 75-90%+   Memory: 3 - Patient remembers 50%-74% of the time    EDUCATION:  Learner: Patient  Education:  Reviewed diet and strategies, Reviewed signs, symptoms and risks of aspiration, Reviewed ST goals and Plan of Care, Reviewed recommendations for follow-up, Education Related to Potential Risks and Complications Due to Impairment/Illness/Injury, Education Related to Prevention of Recurrence of Impairment/Illness/Injury, Education Related to [employer] and Wellness, and Home Safety Education  Evaluation of Education: [employer] understanding, Needs further instruction, and Family not present    ASSESSMENT/PLAN:  Activity Tolerance:  Patient tolerance of  treatment: good. Good participation. Assessment/Plan: Patient progressing toward established goals. Continues to require skilled care of licensed speech pathologist to progress toward achievement of established goals and plan of care. .     Plan for Next Session: Medication management, calendar task on cell phone, recall  Discharge Recommendations: Outpatient Speech Therapy     Maryjane Corley.  [address] [facility], [facility], [address]

## 2022-11-05 NOTE — PROGRESS NOTES
[address] [place] [address]  Occupational Therapy  Daily Note  Time:   Time In: 1130  Time Out: 1230  Timed Code Treatment Minutes: 60 Minutes  Minutes: 60          Date: 2022  Patient Name: Skyler Vazquez,   Gender: male      Room: [facility]/065-A  MRN: [id number]  : 1950  (73 y.o.)  Referring Practitioner: Izabel Hernandes MD  Diagnosis: Acute CVA  Additional Pertinent Hx: Skyler Vazquez  is a 67 y.o. male admitted to the inpatient rehabilitation unit on 2022. He was originally admitted to [address] [place] [address] on 10/31/2022. Patient  has a past medical history of Anxiety, Arthritis, CAD (coronary artery disease), Cancer ([facility] [address] COPD (chronic obstructive pulmonary disease) ([facility] [address] Diabetes ([facility] [address] Enlarged prostate with urinary retention, Gait difficulty, Generalized weakness, Hyperlipidemia, Hypertension, Lesion of bladder, Osteoarthritis, Retention of urine, Right inguinal hernia, S/P cardiac catheterization, S/P TURP, SOB (shortness of breath), and Voiding difficulty. Patient presented to [facility] ER due to new onset of right arm numbness and balance issues. Patient with noted chronic SOB. Family reports right sides weakness, dysarthria, right sided facial droop. Patient was to be taking ASA and plavix daily, but only was taking ASA every other day due to bruising. Patient with initial NIHSS of 6. Code stroke called, not a tPA candidate. CT of the head without contrast on 10/31/2022 demonstrated stable mild global volume loss, and apical abscess associated with a left maxillary first premolar, and no other significant acute findings. CT of the head and neck on 10/31/2022 without major acute findings.  MRI of the brain without contrast on 10/31/2022 demonstrated an area of diffusion in the left parietal periventricular white matter with corresponding diminished signal intensity on ADC map consistent with acute infarct; mild atrophy and probable mild severity chronic small vessel ischemic changes, and no other significant abnormalities. CVA likely due to poorly controlled HTN and DM. Patient is seen today upon admission to the inpatient rehabilitation unit. Patient lives with his s/o that works full time, 6a-2pm daily. Discussed IPR and goal of helping patient to be as independent as possible at discharge including mobility, ADLs, cognition, swallowing, and endurance. Restrictions/Precautions:  Restrictions/Precautions: General Precautions, Fall Risk  Position Activity Restriction  Other position/activity restrictions: R hemibody weakness     SUBJECTIVE: Up in chair upon arrival, agreeable to OT session, cooperative and pleasant    PAIN: 0/10:     Vitals: Vitals not assessed per clinical judgement, see nursing flowsheet    COGNITION: WFL    ADL:   Grooming: Stand By Assistance. Completed hair care standing sinkside  Bathing: Supervision. Completed in shower, used shower chair  Upper Extremity Dressing: Stand By Assistance. Gathered clothing, donned sitting in bedside chair  Lower Extremity Dressing: Stand By Assistance. Donned sitting in bedside chair  Tub Transfer: Stand By Assistance. Stepping over tub. BALANCE:  Standing Balance: Stand By Assistance. Approx 4 minutes while hanging up shirts, increased time for allowing R hand to be incorporated    BED MOBILITY:  Not Tested    TRANSFERS:  Sit to Stand:  Stand By Assistance. Bedside chair  Stand to Sit: Stand By Assistance. FUNCTIONAL MOBILITY:  Assistive Device: None  Assist Level:  Stand By Assistance. Distance: To and from shower room       ADDITIONAL ACTIVITIES:  Patient completed green theraputty exercises this date with R hand,  full hand grasp, picking out 4 beads, and rolling in order to increase [facility] and strength for the completion of ADL tasks such as opening grooming supplies and buttoning shirts/pants. moderate difficulty noted.       ASSESSMENT:     Activity Tolerance:  Patient tolerance of  treatment: good. Discharge Recommendations: Continue to assess pending progress  Equipment Recommendations: Equipment Needed: Yes  Mobility Devices: ADL Assistive Devices  ADL Assistive Devices: Shower Chair with back, Grab Bars - shower  Other: Continue to monitor for AE  Plan: Times Per Week: 5x a week for 90 min and 1x a week for 30 min  Times Per Day:  Once a day  Current Treatment Recommendations: Strengthening, ROM, Balance training, Functional mobility training, Endurance training, Neuromuscular re-education, Pain management, Safety education & training, Patient/Caregiver education & training, Positioning, Self-Care / ADL, Home management training    Patient Education  Patient Education: ADL's and Home Exercise Program    Goals  Short Term Goals  Time Frame for Short Term Goals: 1 week from eval  Short Term Goal 1: Pt will complete oral hygiene routine while using R side for >/= 75% of task and SBA to increase indep with self-care  Short Term Goal 2: Pt will complete item retieval with >/= 5 items with SBA and 0-1 VC for R sided involvement to increase indep with grooming  Short Term Goal 3: Pt will complete dynamic standing with 2 UE release, SBA and 0-2 VC for R sided awareness to increase indep with hygiene  Short Term Goal 4: Pt will complete tolieting, including transfer, with SBA and 0-2 VC for R sided awareness to increase indep with tolieting  Short Term Goal 5: Pt will complete LB dressing with supervision and 0-1 VC for technique to increase indep with dressing  Long Term Goals  Time Frame for Long Term Goals : 2 weeks from eval  Long Term Goal 1: Pt will complete homemaking task with mod I to increase indep with laundry  Long Term Goal 2: Pt will complete pathway finding task with supervision and 0-2 vc for visual scanning to use signs to increase indep with community reintigration  Long Term Goal 3: Pt will complete BADL routine with mod I to increase indep with oral hygiene    Following session, patient left in safe position with all fall risk precautions in place.

## 2022-11-05 NOTE — PLAN OF CARE
Problem: Respiratory - Adult  Goal: Clear lung sounds  Outcome: Progressing   Patient mutually agrees to goal.

## 2022-11-05 NOTE — PROGRESS NOTES
[address]  Occupational Therapy  Daily Note  Time:   Time In: 1030  Time Out: 1100  Timed Code Treatment Minutes: 30 Minutes  Minutes: 30          Date: 2022  Patient Name: Dung Rodríguez,   Gender: male      Room: [facility]/065-A  MRN: [id number]  : 1950  (73 y.o.)  Referring Practitioner: Medina Dhaliwal MD  Diagnosis: Acute CVA  Additional Pertinent Hx: Dung Rodríguez  is a 67 y.o. male admitted to the inpatient rehabilitation unit on 2022. He was originally admitted to [facility] on 10/31/2022. Patient  has a past medical history of Anxiety, Arthritis, CAD (coronary artery disease), Cancer ([facility] [address] COPD (chronic obstructive pulmonary disease) ([facility] [address] Diabetes ([facility] [address] Enlarged prostate with urinary retention, Gait difficulty, Generalized weakness, Hyperlipidemia, Hypertension, Lesion of bladder, Osteoarthritis, Retention of urine, Right inguinal hernia, S/P cardiac catheterization, S/P TURP, SOB (shortness of breath), and Voiding difficulty. Patient presented to [facility] ER due to new onset of right arm numbness and balance issues. Patient with noted chronic SOB. Family reports right sides weakness, dysarthria, right sided facial droop. Patient was to be taking ASA and plavix daily, but only was taking ASA every other day due to bruising. Patient with initial NIHSS of 6. Code stroke called, not a tPA candidate. CT of the head without contrast on 10/31/2022 demonstrated stable mild global volume loss, and apical abscess associated with a left maxillary first premolar, and no other significant acute findings. CT of the head and neck on 10/31/2022 without major acute findings.  MRI of the brain without contrast on 10/31/2022 demonstrated an area of diffusion in the left parietal periventricular white matter with corresponding diminished signal intensity on ADC map consistent with acute infarct; mild atrophy and probable mild severity chronic small vessel ischemic changes, and no other significant abnormalities. CVA likely due to poorly controlled HTN and DM. Patient is seen today upon admission to the inpatient rehabilitation unit. Patient lives with his s/o that works full time, 6a-2pm daily. Discussed IPR and goal of helping patient to be as independent as possible at discharge including mobility, ADLs, cognition, swallowing, and endurance. Restrictions/Precautions:  Restrictions/Precautions: General Precautions, Fall Risk  Position Activity Restriction  Other position/activity restrictions: R hemibody weakness     SUBJECTIVE: Pt. In chair upon arrival pleasant and agreeable to participate in OT session. PAIN: no pain voiced    Vitals: Vitals not assessed per clinical judgement, see nursing flowsheet    COGNITION: WFL    ADL:   No ADL's completed this session. Brigido Charles BALANCE:  Standing Balance: Contact Guard Assistance. BED MOBILITY:  Not Tested    TRANSFERS:  Sit to Stand:  Contact Guard Assistance. Stand to Sit: Contact Guard Assistance. FUNCTIONAL MOBILITY:  Assistive Device: None  Assist Level:  Contact Guard Assistance. Distance: To and from therapy apartment  Occassionally unsteadiness and bumping slightly twice into items on the right side     ADDITIONAL ACTIVITIES:  Pt. Engaged in red theraweb ther ex. All with RUE incorp. 15 reps each with flex./ext./radial/ulnar dev. And digit ext./opp. to improve intrinsic muscle strength to improve ADL performance. Pt. Participated in access/retrieval of items at various levels obtaining and transporting items with RUE while challenging RUE coordination and functional use to promote [place] with ADLs. ASSESSMENT:     Activity Tolerance:  Patient tolerance of  treatment: good.        Discharge Recommendations: Continue to assess pending progress and Patient would benefit from continued OT at discharge  Equipment Recommendations: Equipment Needed: Yes  Mobility

## 2022-11-05 NOTE — PROGRESS NOTES
[facility]  INPATIENT PHYSICAL THERAPY  DAILY NOTE  [facility] - 7E-65/065-A    Time In: 0830  Time Out: 0930  Timed Code Treatment Minutes: 60 Minutes  Minutes: 60          Date: 2022  Patient Name: Dai Macias,  Gender:  male        MRN: [id number]  : 1950  (67 y.o.)  Referral Date : 22  Referring Practitioner: Mukund Dawkins MD  Diagnosis: Acute cerebrovascular accident (CVA)  Treatment Diagnosis: difficulty in walking  Additional Pertinent Hx: Per H&P:Shaiza Pizarro  is a 67 y.o. male admitted to the inpatient rehabilitation unit on 2022. He was originally admitted to [facility] on 10/31/2022. Patient  has a past medical history of Anxiety, Arthritis, CAD (coronary artery disease), Cancer ([facility] [address] COPD (chronic obstructive pulmonary disease) ([facility] [address] Diabetes ([facility] [address] Enlarged prostate with urinary retention, Gait difficulty, Generalized weakness, Hyperlipidemia, Hypertension, Lesion of bladder, Osteoarthritis, Retention of urine, Right inguinal hernia, S/P cardiac catheterization, S/P TURP, SOB (shortness of breath), and Voiding difficulty. Patient presented to [facility] ER due to new onset of right arm numbness and balance issues. Patient with noted chronic SOB. Family reports right sides weakness, dysarthria, right sided facial droop. Patient was to be taking ASA and plavix daily, but only was taking ASA every other day due to bruising. Patient with initial NIHSS of 6. Code stroke called, not a tPA candidate. CT of the head without contrast on 10/31/2022 demonstrated stable mild global volume loss, and apical abscess associated with a left maxillary first premolar, and no other significant acute findings. CT of the head and neck on 10/31/2022 without major acute findings.  MRI of the brain without contrast on 10/31/2022 demonstrated an area of diffusion in the left parietal periventricular white matter with corresponding diminished signal intensity on ADC map consistent with acute infarct; mild atrophy and probable mild severity chronic small vessel ischemic changes, and no other significant abnormalities. CVA likely due to poorly controlled HTN and DM. Prior Level of Function:  Lives With: Significant other, Other (comment) (girlfriends son)  Type of Home: House  Home Layout: One level  Home Access: Stairs to enter without rails  Entrance Stairs - Number of Steps: 2  Home Equipment: Thirza Sinning, 4 wheeled (1OM has no seat or breaks--wheels swivel)   Bathroom Shower/Tub: Tub/Shower unit  Bathroom Toilet: Handicap height  Bathroom Accessibility: Walker accessible    Receives Help From: Family  ADL Assistance: Independent  Homemaking Assistance: Independent  Ambulation Assistance: Independent  Transfer Assistance: Independent  Active : Yes  Additional Comments: Pt's girlfriend works full-time, daughters live ~30 miles away in [place] and work    Restrictions/Precautions:  Restrictions/Precautions: General Precautions, Fall Risk  Position Activity Restriction  Other position/activity restrictions: R hemibody weakness     SUBJECTIVE: Patient standing at EOB upon arrival, education to only get up with staff. Agreed and cooperative for therapy. PAIN: No pain noted. Vitals: Vitals not assessed per clinical judgement, see nursing flowsheet    OBJECTIVE:  Bed Mobility:  Not Tested    Transfers:  Sit to Stand: Stand By Assistance, Contact Guard Assistance  Stand to [place] [address], cues for hand placement, with verbal cues    Ambulation:  Contact Guard Assistance  Distance: 175 ft. X2   Surface: Level Tile  Device:No Device  Gait Deviations:   Forward Flexed Posture, Slow Sofie, Decreased Step Length on Right, Decreased Weight Shift Left, Decreased Gait Speed, Decreased Arm Swing, Decreased Heel Strike Bilaterally, Mild Path Deviations, and Unsteady Gait    **Dynamic Gait:   -Patient ambulated multiple times down hallway while holding onto cone with tennis ball on top with quickly changing speeds and completing cervical flexion to further challenge balance, divide attention with multiple tasks and improve awareness to R hemibody. Balance:  Static Standing Balance: Stand By Assistance, Contact Guard Assistance  Dynamic Standing Balance: Contact Guard Assistance, Minimal Assistance    Neuromuscular Education:   -Completed multiple activities to work on lateral weight shift to left side, stability while in SLS and also foot clearance on RLE. Having multiple LOB's towards right side when in SLS on LLE. -Weaved around cones to work on challenging balance with directional changes and improving awareness to R side. Patient knocking over several cones on R side initially, with verbal cues for technique, able to prevent bumping into cones. **Progressed activity to having patient hold onto cone with tennis ball on top with RUE to further challenge balance, divide attention and improve awareness to RUE. Exercise:  Patient was guided in 1 set(s) 15-20 reps of exercise to both lower extremities. Seated marches, Seated hamstring curls, Seated heel/toe raises, Long arc quads, Seated isometric hip adduction, and Seated abduction/adduction. Exercises were completed for increased independence with functional mobility. Functional Outcome Measures: Not completed       ASSESSMENT:  Assessment: Patient progressing toward established goals. Activity Tolerance:  Patient tolerance of  treatment: good.       Equipment Recommendations:Equipment Needed:  (continue to assess needs)  Discharge Recommendations: Continue to assess pending progress and Patient would benefit from continued PT at discharge  Plan: Current Treatment Recommendations: Strengthening, ROM, Balance training, Functional mobility training, Transfer training, Stair training, Gait training, Endurance training, Neuromuscular re-education, Home exercise program, Safety education & training, Patient/Caregiver education & training, Therapeutic activities, Equipment evaluation, education, & procurement  General Plan:  (5x/wk 90min, 1x/wk 30min)    Patient Education  Patient Education: Plan of Care, Precautions/Restrictions, Bed Mobility, Transfers, Reviewed Prior Education, Gait, Health Promotion and Wellness Education, Home Safety Education, - Patient Requires Continued Education    Goals:  Patient Goals : be independent again  Short Term Goals  Time Frame for Short Term Goals: 1 week  Short Term Goal 1: Patient will complete supine < > sit and rolling with head of bed flat and no rails with modified independence to transfer in and out of bed safely. Short Term Goal 2: Patient will complete sit < > stand with SBA and less than or equal to 25% verbal cues for utilization of right UE to stand to ambulate with decreased difficulty. Short Term Goal 3: Patient will ambulate 76' with no AD and CGA to progress towards navigating home safely. Short Term Goal 4: Patient will ascend/descend 1 step with no hand rail and CGA to progress towards safe home entry. Short Term Goal 5: Patient will improve single leg stance to 3 seconds each lower extremity with CGA to demonstrate good lateral weight shift and improve balance  Long Term Goals  Time Frame for Long Term Goals : 3 weeks  Long Term Goal 1: Patient will complete sit < > stand with modified independence with no verbal cues for hand placement to stand to ambulate safely. Long Term Goal 2: Patient will complete chair < > bed transfer with no AD and modified independence to transfer surface to surface safely. Long Term Goal 3: Patient will ambulate 80' with no AD and modified independence to navigate home safely. Long Term Goal 4: Patient will ascend/descend 2 steps with no hand rail and SBA for home entry. Long Term Goal 5: Patient will improve KUMARI to greater than or equal to 45/56 to reduce his risk for falls.   Additional Goals?: Yes  Long term goal 6: Patient will complete car transfer with modified independence to transfer in and out of vehicl safely. Following session, patient left in safe position with all fall risk precautions in place.

## 2022-11-05 NOTE — PROGRESS NOTES
Pt is a 72y. o. male, sitting in easychair watching television and resting, in [address]. Yumiko Robles shared that he had a stroke and 'they tell me I'm doing great.' He also said that 'I was bunny' regarding the severity of the stroke, with a wide grin. He is quiet and calm, not overly talkative.  provided encouragement, nurtured hope and prayer-met with gratitude by Yumiko Robles. 11/04/22 2230   Encounter Summary   Encounter Overview/Reason  Initial Encounter   Service Provided For: Patient   Referral/Consult From: Nurse   Support System Significant other;Children;Family members   Last Encounter  11/04/22   Complexity of Encounter Low   Begin Time 1820   End Time  1832   Total Time Calculated 12 min   Assessment/Intervention/Outcome   Assessment Calm;Coping;Peaceful   Intervention Active listening;Prayer (assurance of)/[place];Nurtured Hope;Discussed illness injury and its impact; Explored/Affirmed feelings, thoughts, concerns   Outcome Engaged in conversation;Receptive; Expressed feelings, needs, and concerns;Expressed Gratitude

## 2022-11-06 LAB — GLUCOSE BLD-MCNC: 175 MG/DL (ref 70–108)

## 2022-11-06 PROCEDURE — 94640 AIRWAY INHALATION TREATMENT: CPT

## 2022-11-06 PROCEDURE — 6370000000 HC RX 637 (ALT 250 FOR IP): Performed by: PHYSICAL MEDICINE & REHABILITATION

## 2022-11-06 PROCEDURE — 82948 REAGENT STRIP/BLOOD GLUCOSE: CPT

## 2022-11-06 PROCEDURE — 94760 N-INVAS EAR/PLS OXIMETRY 1: CPT

## 2022-11-06 PROCEDURE — 94669 MECHANICAL CHEST WALL OSCILL: CPT

## 2022-11-06 PROCEDURE — 6370000000 HC RX 637 (ALT 250 FOR IP): Performed by: FAMILY MEDICINE

## 2022-11-06 PROCEDURE — 1180000000 HC REHAB R&B

## 2022-11-06 RX ADMIN — LOSARTAN POTASSIUM 25 MG: 25 TABLET, FILM COATED ORAL at 08:45

## 2022-11-06 RX ADMIN — METFORMIN HYDROCHLORIDE 1000 MG: 500 TABLET, EXTENDED RELEASE ORAL at 08:45

## 2022-11-06 RX ADMIN — AMOXICILLIN AND CLAVULANATE POTASSIUM 1 TABLET: 875; 125 TABLET, FILM COATED ORAL at 08:45

## 2022-11-06 RX ADMIN — FINASTERIDE 5 MG: 5 TABLET, FILM COATED ORAL at 08:45

## 2022-11-06 RX ADMIN — IPRATROPIUM BROMIDE AND ALBUTEROL SULFATE 1 AMPULE: .5; 3 SOLUTION RESPIRATORY (INHALATION) at 05:38

## 2022-11-06 RX ADMIN — AMOXICILLIN AND CLAVULANATE POTASSIUM 1 TABLET: 875; 125 TABLET, FILM COATED ORAL at 22:59

## 2022-11-06 RX ADMIN — DICLOFENAC SODIUM 2 G: 10 GEL TOPICAL at 22:59

## 2022-11-06 RX ADMIN — CLOPIDOGREL BISULFATE 75 MG: 75 TABLET ORAL at 08:45

## 2022-11-06 RX ADMIN — AMLODIPINE BESYLATE 5 MG: 5 TABLET ORAL at 08:45

## 2022-11-06 RX ADMIN — METFORMIN HYDROCHLORIDE 1000 MG: 500 TABLET, EXTENDED RELEASE ORAL at 17:25

## 2022-11-06 RX ADMIN — ACETAMINOPHEN 650 MG: 325 TABLET ORAL at 15:11

## 2022-11-06 RX ADMIN — ATORVASTATIN CALCIUM 40 MG: 40 TABLET, FILM COATED ORAL at 22:59

## 2022-11-06 RX ADMIN — IPRATROPIUM BROMIDE AND ALBUTEROL SULFATE 1 AMPULE: .5; 3 SOLUTION RESPIRATORY (INHALATION) at 17:38

## 2022-11-06 RX ADMIN — DOCUSATE SODIUM 100 MG: 100 CAPSULE, LIQUID FILLED ORAL at 22:59

## 2022-11-06 RX ADMIN — DOCUSATE SODIUM 100 MG: 100 CAPSULE, LIQUID FILLED ORAL at 08:45

## 2022-11-06 RX ADMIN — ASPIRIN 81 MG 81 MG: 81 TABLET ORAL at 08:45

## 2022-11-06 RX ADMIN — DICLOFENAC SODIUM 2 G: 10 GEL TOPICAL at 08:47

## 2022-11-06 RX ADMIN — PANTOPRAZOLE SODIUM 40 MG: 40 TABLET, DELAYED RELEASE ORAL at 08:45

## 2022-11-06 ASSESSMENT — ENCOUNTER SYMPTOMS
CONSTIPATION: 0
WHEEZING: 0
SORE THROAT: 0
TROUBLE SWALLOWING: 0
DIARRHEA: 0
BACK PAIN: 0
COUGH: 0
VOMITING: 0
SHORTNESS OF BREATH: 0
ABDOMINAL PAIN: 0
NAUSEA: 0
RHINORRHEA: 0

## 2022-11-06 ASSESSMENT — PAIN SCALES - GENERAL
PAINLEVEL_OUTOF10: 0
PAINLEVEL_OUTOF10: 2
PAINLEVEL_OUTOF10: 0
PAINLEVEL_OUTOF10: 0

## 2022-11-06 ASSESSMENT — PAIN DESCRIPTION - ORIENTATION: ORIENTATION: RIGHT

## 2022-11-06 ASSESSMENT — PAIN DESCRIPTION - LOCATION: LOCATION: HEAD

## 2022-11-06 ASSESSMENT — PAIN - FUNCTIONAL ASSESSMENT: PAIN_FUNCTIONAL_ASSESSMENT: ACTIVITIES ARE NOT PREVENTED

## 2022-11-06 ASSESSMENT — PAIN DESCRIPTION - DESCRIPTORS: DESCRIPTORS: THROBBING;SHARP

## 2022-11-06 NOTE — PLAN OF CARE
Care plan reviewed with patient. Patient verbalizes understanding of the plan of care and contribute to goal setting. Focus Note  Education Focus: Monitoring blood glucose/ Interpreting and using results  Verbalized understanding    Wound: No     Medication Education:   Diabetes management education: Metformin    Pain Management: denies pain   Non-pharmacological strategies for pain: repositions self as needed. Discharge Needs:   Caregiver/Support identified: significant other   Equipment needs: walker   Follow up appointments needed: PCP, Neuro, Physiatry?     Problem: Discharge Planning  Goal: Discharge to home or other facility with appropriate resources  Recent Flowsheet Documentation  Taken 11/6/2022 0815 by Leilani Manriquez LPN  Discharge to home or other facility with appropriate resources: Identify barriers to discharge with patient and caregiver  Problem: Musculoskeletal - Adult  Goal: Return ADL status to a safe level of function  Recent Flowsheet Documentation  Taken 11/6/2022 0815 by Leilani Manriquez LPN  Return ADL Status to a Safe Level of Function: Administer medication as ordered       Problem: Metabolic/Fluid and Electrolytes - Adult  Goal: Glucose maintained within prescribed range  Recent Flowsheet Documentation  Taken 11/6/2022 0815 by Leilani Manriquez LPN  Glucose maintained within prescribed range: Monitor blood glucose as ordered     Problem: Hematologic - Adult  Goal: Maintains hematologic stability  Recent Flowsheet Documentation  Taken 11/6/2022 0815 by Leilani Manriquez LPN  Maintains hematologic stability: Assess for signs and symptoms of bleeding or hemorrhage     Problem: Pain  Goal: Verbalizes/displays adequate comfort level or baseline comfort level  Recent Flowsheet Documentation  Taken 11/6/2022 0815 by Leilani Manriquez LPN  Verbalizes/displays adequate comfort level or baseline comfort level: Encourage patient to monitor pain and request assistance

## 2022-11-06 NOTE — PROGRESS NOTES
Physical Medicine & Rehabilitation Progress Note    Chief Complaint: Right side weakness and numbness    Subjective:    Edinson Stevens is a 67 y.o.   male with history of COPD, diabetes mellitus, hypertension, arthritis, anxiety, coronary artery disease, left lung cancer requiring thoracotomy, benign prostate hypertrophy with urinary retention requiring TURP, hyperlipidemia, status post right inguinal hernia repair, status post carotid stenting for stenosis, was admitted on 11/2/2022 for intensive inpatient management of impairment & disability secondary to right hemiparesis secondary to left parietal periventricular white matter acute infarct stroke. Yesterday the patient complained of an episode of throbbing pain at his right head with intensity only rated at 2/10 level. He was given Tylenol at that time. He says the headache later resolved. He continues having some weakness at his right upper and right lower extremities. He says the right wrist pain has improved. He still has some numbness at the his right side body. He denies having difficulty speaking or swallowing, dizziness or lightheadedness, shortness of breath, abdominal pain, nausea or vomiting. He says he is tolerating the intensive inpatient rehabilitation treatment well. Rehabilitation:  PT: Reviewed. Transfers:  Sit to Stand: Stand By Assistance, Contact Guard Assistance  Stand to [place] [id number], cues for hand placement, with verbal cues     Ambulation:  Contact Guard Assistance  Distance: 175 ft. X2   Surface: Level Tile  Device:No Device  Gait Deviations:   Forward Flexed Posture, Slow Sofie, Decreased Step Length on Right, Decreased Weight Shift Left, Decreased Gait Speed, Decreased Arm Swing, Decreased Heel Strike Bilaterally, Mild Path Deviations, and Unsteady Gait     **Dynamic Gait:   -Patient ambulated multiple times down hallway while holding onto cone with tennis ball on top with quickly changing speeds and completing cervical flexion to further challenge balance, divide attention with multiple tasks and improve awareness to R hemibody. Balance:  Static Standing Balance: Stand By Assistance, Contact Guard Assistance  Dynamic Standing Balance: Contact Guard Assistance, Minimal Assistance     Neuromuscular Education:   -Completed multiple activities to work on lateral weight shift to left side, stability while in SLS and also foot clearance on RLE. Having multiple LOB's towards right side when in SLS on LLE. -Weaved around cones to work on challenging balance with directional changes and improving awareness to R side. Patient knocking over several cones on R side initially, with verbal cues for technique, able to prevent bumping into cones. **Progressed activity to having patient hold onto cone with tennis ball on top with RUE to further challenge balance, divide attention and improve awareness to RUE. OT: Reviewed. COGNITION: WFL     ADL:   Grooming: Stand By Assistance. Completed hair care standing sinkside  Bathing: Supervision. Completed in shower, used shower chair  Upper Extremity Dressing: Stand By Assistance. Gathered clothing, donned sitting in bedside chair  Lower Extremity Dressing: Stand By Assistance. Donned sitting in bedside chair  Tub Transfer: Stand By Assistance. Stepping over tub. BALANCE:  Standing Balance: Stand By Assistance. Approx 4 minutes while hanging up shirts, increased time for allowing R hand to be incorporated     TRANSFERS:  Sit to Stand:  Stand By Assistance. Bedside chair  Stand to Sit: Stand By Assistance. FUNCTIONAL MOBILITY:  Assistive Device: None  Assist Level:  Stand By Assistance. Distance: To and from shower room     Assistive Device: None  Assist Level:  Contact Guard Assistance. Distance:  To and from therapy apartment  Occasionally unsteadiness and bumping slightly twice into items on the right side      ADDITIONAL ACTIVITIES:  Pt. Engaged in red theraweb ther ex. All with RUE incorp. 15 reps each with flex./ext./radial/ulnar dev. And digit ext./opp. to improve intrinsic muscle strength to improve ADL performance. Pt. Participated in access/retrieval of items at various levels obtaining and transporting items with RUE while challenging RUE coordination and functional use to promote [place] with ADLs. Patient completed green theraputty exercises this date with R hand,  full hand grasp, picking out 4 beads, and rolling in order to increase [address] and strength for the completion of ADL tasks such as opening grooming supplies and buttoning shirts/pants. moderate difficulty noted. ST: Reviewed. Working memory:  (3 unit re-ordering)- 5/5 indep  (4 units re-ordering)- 2/5 indep, 1/5 with repetition, 2/5 unable to complete  *Increased difficulty with mental manipulation with increased number of units. Money management tasks: 10/10 indep  *Excellent success with math computation and math reasoning  *Timely completion      Topographical Navigation: 3/5 indep, 2/5 with review and self correct  *Reduced attention to orientation of map and selective attention     Prescription label: 5/7 indep, 2/7 with min cues to review and correct  *Errors related to reduced attention to detail    Appropriate sustained attention completed throughout tasks even in the presence of background stimuli (TV). Review of Systems:  Review of Systems   Constitutional:  Negative for chills, diaphoresis, fatigue and fever. HENT:  Negative for hearing loss, rhinorrhea, sneezing, sore throat and trouble swallowing. Eyes:  Negative for visual disturbance. Respiratory:  Negative for cough, shortness of breath and wheezing. Cardiovascular:  Negative for chest pain and palpitations. Gastrointestinal:  Negative for abdominal pain, constipation, diarrhea, nausea and vomiting. Genitourinary:  Negative for dysuria.    Musculoskeletal:  Positive for arthralgias (Right wrist) and gait problem. Negative for back pain, myalgias and neck pain. Skin:  Negative for rash. Neurological:  Positive for weakness (Right side) and numbness (Right side). Negative for dizziness, tremors, facial asymmetry, speech difficulty, light-headedness and headaches. Psychiatric/Behavioral:  Negative for dysphoric mood, hallucinations and sleep disturbance. The patient is not nervous/anxious.          Objective:  /77   Pulse 95   Temp 97.5 °F (36.4 °C) (Oral)   Resp 16   Ht 5' 8\" (1.727 m)   Wt 191 lb 0.9 oz (86.7 kg)   SpO2 94%   BMI 29.05 kg/m²   Physical Exam   General:  well-developed, well nourished  male; in no acute distress ; appropriate affect & mood; sitting on reclining chair comfortably  Eyes: pupil equally round ; extra-ocular motion intact bilaterally  Head, Ear, Nose, Mouth & Throat : normocephalic ; no discharge from ears or nose ; very mild right lower face weakness; no other deformity ; no facial swelling ; oral mucosa pink   Neck :  supple ; no tenderness ; no muscle spasm  Cardiovascular : regular rate & rhythm ; normal S1 & S2 heart sound ; no murmur ; normal peripheral pulse   Pulmonary : Breath sounds present at bilateral lung fields; no wheezing ; no rale; no crackle  Gastrointestinal : soft, slightly protuberant abdomen without tenderness ; normal bowel sound present   Skin: no skin lesion or rash ; no pitting edema at all 4 extremities  Musculoskeletal : no limb asymmetry; no limb deformity; very mild tenderness at right wrist; no tenderness at the rest of bilateral upper & lower extremities; no palpable mass at limbs ; no joints laxity or crepitation ; shoulder flexion and abduction passive ROM reaching 160 degrees; hip flexion passive ROM reaching 110 degrees bilaterally; otherwise normal functional joints ROM at the rest of bilateral upper & lower extremities  Cerebral :  alert ; awake ; oriented to place, person and time; follow two-step verbal command  Cerebellum : Very mild dysmetria with right finger-to-nose test ; no dysmetria with left finger-to-nose test  Cranial Nerves : Very mild right lower face weakness  (CN VII) ; tongue protrudes slightly to the right  (CN XII) ; otherwise grossly intact other CN II to XII function  Sensory : intact light touch and pin prick sensation at bilateral upper & lower extremities  Motor : Slightly increased muscle tone at the right upper extremity ; normal tone at left upper & bilateral lower extremities ; 4+/5 to 5/5 muscle strength at the right shoulder flexion and abduction, right elbow extension; 4+/5 at the right wrist extension; 4-/5 to 4+/5 muscle strength at the right finger extension; 3-/5 to 3+/5 muscle strength at the right finger abduction; 4-/5 muscle strength at the right hand ; 4+/5 to 5/5 muscle strength at the right hip flexion; normal 5/5 muscle strength at the rest of bilateral upper & lower extremities  Reflex : 3+ right biceps, right brachioradialis and right knee reflexes; 2+ left bicep, left brachioradialis and left knee reflexes  Pathological Reflex :  No Ketty's sign ; no ankle clonus  Gait : Not assessed      Diagnostics:   No results found for this or any previous visit (from the past 24 hour(s)). Impression:  Right facial droop, right hemiparesis and dysphagia secondary to left parietal periventricular white matter acute infarct stroke  Cognitive deficits  Type 2 diabetes with nephropathy and hyperglycemia, uncontrolled   Hypertension  Hyperlipidemia  COPD Gold stg II  CAD with PCI  BPH with urinary retention, hx TURP  Hx RUL malignant neoplasm with Right VATS wedge resection x 2  2021  Apical abscess of the left maxillary first premolar, Augmentin until dental follow up   Anxiety  Gait difficulty  Lesion of the urinary bladder  Right inguinal hernia    The patient's condition remains stable. He continues having mild right hemiparesis from the stroke.   The pain at his right wrist is improving. He continues tolerating the intensive inpatient rehabilitation treatment well. His function is improving slowly. Plan:  Continue PT, OT, and SLP/RT     Prophylaxis:  DVT: EPC cuffs; continue aspirin, Plavix. GI: Protonix 40 mg p.o daily before breakfast.  Pain: Tylenol 650 mg p.o. every 4 hours as needed  Bladder: Continue Proscar 5 mg p.o. daily  Bowel: continue Colace 100 mg p.o. twice daily, Dulcolax suppository 10 mg rectal daily as needed, milk of magnesia 15 mL p.o. daily as needed, and GlycoLax 17 g p.o. daily as needed  Continue Augmentin 875/125 1 tablet every 12 hours continue dental abscess  Continue aspirin 81 mg p.o. daily for antiplatelets  Continue Plavix 75 mg p.o. daily for antiplatelets  Continue Humalog insulin for hyperglycemia  Continue DuoNeb nebulizer for COPD  Continue glucagon injection 1 mg subcutaneous as needed and glucose chewable tablet 16 g p.o. as needed for hypoglycemia  Team conference Tuesday   Continue Voltaren gel to right wrist, ICE prn.  OT ok to wrap right wrist for support if needed      Missed Therapy Time:  None      Gracie Kendall MD

## 2022-11-06 NOTE — PROGRESS NOTES
Patient c/o sharp thumping type pain on right side of head around 3pm. No new blurred vision, slurred speech or facial droop noted. Noted left side hand grasp to be very minimally less then right at this time. Vitals similar to morning vitals. Notified Dr. Jania Devlin at [id number] via perfect serve. Dr. Jania Devlin went and spoke with patient suggested some tylenol at this time as patient states pain only around a 2 right now. Suggested Ice as well but patient refused ice. This nurse gave patient tylenol per prn orders. Sitting up in w/c talking with family member at this time. Gave fresh ice water. Denies any further needs at this time.

## 2022-11-07 PROCEDURE — 94640 AIRWAY INHALATION TREATMENT: CPT

## 2022-11-07 PROCEDURE — 92526 ORAL FUNCTION THERAPY: CPT

## 2022-11-07 PROCEDURE — 97110 THERAPEUTIC EXERCISES: CPT

## 2022-11-07 PROCEDURE — 97116 GAIT TRAINING THERAPY: CPT

## 2022-11-07 PROCEDURE — 97530 THERAPEUTIC ACTIVITIES: CPT

## 2022-11-07 PROCEDURE — 6370000000 HC RX 637 (ALT 250 FOR IP): Performed by: FAMILY MEDICINE

## 2022-11-07 PROCEDURE — 94669 MECHANICAL CHEST WALL OSCILL: CPT

## 2022-11-07 PROCEDURE — 97112 NEUROMUSCULAR REEDUCATION: CPT

## 2022-11-07 PROCEDURE — 97130 THER IVNTJ EA ADDL 15 MIN: CPT

## 2022-11-07 PROCEDURE — 6370000000 HC RX 637 (ALT 250 FOR IP): Performed by: PHYSICAL MEDICINE & REHABILITATION

## 2022-11-07 PROCEDURE — 99232 SBSQ HOSP IP/OBS MODERATE 35: CPT | Performed by: PHYSICAL MEDICINE & REHABILITATION

## 2022-11-07 PROCEDURE — 1180000000 HC REHAB R&B

## 2022-11-07 PROCEDURE — 97129 THER IVNTJ 1ST 15 MIN: CPT

## 2022-11-07 PROCEDURE — 97535 SELF CARE MNGMENT TRAINING: CPT

## 2022-11-07 RX ADMIN — AMOXICILLIN AND CLAVULANATE POTASSIUM 1 TABLET: 875; 125 TABLET, FILM COATED ORAL at 22:01

## 2022-11-07 RX ADMIN — AMOXICILLIN AND CLAVULANATE POTASSIUM 1 TABLET: 875; 125 TABLET, FILM COATED ORAL at 09:35

## 2022-11-07 RX ADMIN — LOSARTAN POTASSIUM 25 MG: 25 TABLET, FILM COATED ORAL at 09:35

## 2022-11-07 RX ADMIN — DOCUSATE SODIUM 100 MG: 100 CAPSULE, LIQUID FILLED ORAL at 22:01

## 2022-11-07 RX ADMIN — PANTOPRAZOLE SODIUM 40 MG: 40 TABLET, DELAYED RELEASE ORAL at 05:42

## 2022-11-07 RX ADMIN — ATORVASTATIN CALCIUM 40 MG: 40 TABLET, FILM COATED ORAL at 22:01

## 2022-11-07 RX ADMIN — AMLODIPINE BESYLATE 5 MG: 5 TABLET ORAL at 09:35

## 2022-11-07 RX ADMIN — IPRATROPIUM BROMIDE AND ALBUTEROL SULFATE 1 AMPULE: .5; 3 SOLUTION RESPIRATORY (INHALATION) at 06:38

## 2022-11-07 RX ADMIN — METFORMIN HYDROCHLORIDE 1000 MG: 500 TABLET, EXTENDED RELEASE ORAL at 09:35

## 2022-11-07 RX ADMIN — FINASTERIDE 5 MG: 5 TABLET, FILM COATED ORAL at 09:35

## 2022-11-07 RX ADMIN — IPRATROPIUM BROMIDE AND ALBUTEROL SULFATE 1 AMPULE: .5; 3 SOLUTION RESPIRATORY (INHALATION) at 16:46

## 2022-11-07 RX ADMIN — METFORMIN HYDROCHLORIDE 1000 MG: 500 TABLET, EXTENDED RELEASE ORAL at 17:41

## 2022-11-07 RX ADMIN — DOCUSATE SODIUM 100 MG: 100 CAPSULE, LIQUID FILLED ORAL at 09:35

## 2022-11-07 RX ADMIN — CLOPIDOGREL BISULFATE 75 MG: 75 TABLET ORAL at 09:35

## 2022-11-07 RX ADMIN — ASPIRIN 81 MG 81 MG: 81 TABLET ORAL at 09:35

## 2022-11-07 RX ADMIN — DICLOFENAC SODIUM 2 G: 10 GEL TOPICAL at 09:35

## 2022-11-07 ASSESSMENT — PAIN SCALES - GENERAL: PAINLEVEL_OUTOF10: 0

## 2022-11-07 NOTE — PROGRESS NOTES
[address] [place] [place] [facility]  [address] [facility]  PROGRESS NOTE    TIME   SLP Individual Minutes  Time In: 1230  Time Out: 1330  Minutes: 60  Timed Code Treatment Minutes: 46 Minutes   Cognitive Tx: 46 minutes  Dysphagia Tx: 14 minutes     Date: 2022  Patient Name: Octavia Butler      CSN: [id number]   : 1950  (67 y.o.)  Gender: male   Referring Physician: Tamika Vega MD  Diagnosis: CVA  Precautions: Aspiration risk, fall risk  Current Diet: Soft  and  Bite Size with Thin Liquids UPGRADED to Regular Diet with Thin Liquids 2022  Swallowing Strategies:  Set-up with Meals, Full Upright Position, Small Bite/Sip, Alternate Solids and Liquids, and Watch for Right-sided Pocketing  Date of Last MBS/FEES:  2022    Pain:  No pain reported. Subjective: Patient sitting upright in recliner upon ST arrival, pleasant and cooperative. Short-Term Goals:  SHORT TERM GOAL #1:  Goal 1: Patient will safely consume regular solids in conjunction with thin liquids while utilizing compensatory strategies to maximize nutrition/hydration levels. -GOAL MET, NO NEW GOAL  INTERVENTIONS: A skilled dietary analysis was completed of regular diet with thin liquids during lunch meal consisting of meat loaf, mash potatoes & gravy, side salad, and iced tea. Oral phase presented with good labial seal and adequate mastication of solids with minimal residue remaining in the oral cavity, easily cleared with liquid wash. Patient independently implemented all swallowing strategies and independently utilized lingual sweeps to clear residue. No overt s/s of aspiration/penetration; however, cannot r/o without formal instrumentation, which is not warranted at this time. Recommend patient continue a regular diet with thin liquids with no further dysphagia management.       SHORT TERM GOAL #2:  Goal 2: Patient will complete immediate/delayed recall and working memory tasks with 70% accuracy, mod cues to improve retention of functional information.- GOAL MET  Revised Goal: Patient will complete immediate/delayed recall and working memory tasks with 80% accuracy, mod cues to improve retention of functional information. INTERVENTIONS: Generation & Recall of Errands List (cut grass, get gas, clean garage, do laundry, feed fish):    Immediate- 5/5 independent   Delayed- ~10 mins while writing down list for future use; 4/5 independent, 1/5 min cues   Delayed- ~50 mins; 5/5 independent     *Patient asked ST questions related to the effects of strokes and memory deficits. ST provided education of immediate/delayed/working memory deficits that may be present after a stroke, along with education on his other deficits present. SHORT TERM GOAL #3:  Goal 3: Patient will complete complex executive functioning tasks (meds, time, finances, hobbies) with 70% accuracy, mod cues to improve skills required for IADL completion and return to occupational duties. -GOAL MET  Revised Goal: Patient will complete complex executive functioning tasks (meds, time, finances, hobbies) with 80% accuracy, mod cues to improve skills required for IADL completion and return to occupational duties. INTERVENTIONS:   Medication Management (AM/PM Pillbox): Reviewed use of towel or cookie sheet during medication management for safety.    Trial 1-1 tablet, once daily   Comprehension: independent   Completion: independent     Trial 2- 1 tablet, once in AM  Comprehension: independent   Completion: Independent, x1 min cue  *suspect error due to R sided weakness in arms/hands (pill right in front of day within the box), leading to min cue versus lack of comprehension     Trial 3- 1 tablet, once daily   Comprehension: independent   Completion: independent     Trial 4- 2 tablets, 2 times daily  Comprehension: independent   Completion: independent   *Medication bottle had an insufficient amount of pills to complete task; patient with excellent problem solving/reasoning to call the pharmacy or doctor for a refill    Trial 5- 1 tablet, once daily   Comprehension: independent   Completion: independent     Calendar Task with Questions: 7/8 independent, 1/8 min cues  *good mental math computation  *increased processing speed       SHORT TERM GOAL #4:  Goal 4: Patient will complete complex attention tasks with no more than 5 errors across a structured activity consistently to permit potential return to driving and other multi-tasking responsibilities. -GOAL NOT MET, CONTINUE   INTERVENTIONS: DNT due to focus on additional STGs. Previous Session: Appropriate sustained attention completed throughout tasks even in the presence of background stimuli (TV). SHORT TERM GOAL #5:  Goal 5: Patient will complete structured speech tasks (DDK's, sentence repetition, verbal fluency, phonemic oral motor isolation) with implementation of SOS strategies to improve intelligibility to self-perceived level of 75% or higher to maximize communication clarity. -GOAL NOT MET, CONTINUE   INTERVENTIONS: DNT due to focus on additional STGs. Previous Session: Introduced SOS speech strategies: Speak up, Open mouth more, Slow down. Reviewed rationale behind use of strategies and recommendations for carryover. Wrote on care board in room and provided patient with ways to incorporate into functional conversation. Discussed structured and unstructured speech exercises to assist with improving generalization of overall speech clarity. Patient verbalized understanding; will require follow up education. Long-Term Goals:  Time Frame for Long Term Goals: 5 weeks from evaluation    LONG TERM GOAL #1:  Goal 1: Patient will safely consume a regular diet + thin liquids while utilizing compensatory strategies without overt s/s of aspiration to ensure safe return to previous diet. -GOAL MET, NO NEW GOAL    LONG TERM GOAL #2:  Goal 2: Patient will improve cognition to a level of Mod I in order to permit potential return to completion of IADL's in home setting. -GOAL NOT MET, CONTINUE     LONG TERM GOAL #3:  Goal 3: Patient will improve speech intelligibility to a self-perceived level of 85% clarity in order to improve overall communication of wants/needs. -GOAL NOT MET, CONTINUE     Comprehension: 6 - Complex ideas 90% or device (hearing aid or glasses- if patient is primarily a visual learner)  Expression: 6 - Device used to express complex ideas/needs  Social Interaction: 6 - Patient requires medication for mood and/or effect  Problem Solvin - Patient solves simple/routine tasks 75-90%+   Memory: 4 - Patient remembers 75-90%+ of the time    ST FIM ASSESSMENT:     Admission score Current score Goal Status   COMMUNICATION 6 - Complex ideas 90% or device (hearing aid or glasses- if patient is primarily a visual learner)   6 - Complex ideas 90% or device (hearing aid or glasses- if patient is primarily a visual learner)   Stable   EXPRESSION 6 - Device used to express complex ideas/needs     6 - Device used to express complex ideas/needs   Stable   SOCIAL INTERACTION 5 - Patient is appropriate with supervision/cues   6 - Patient requires medication for mood and/or effect   Progressing   PROBLEM SOLVING 3 - Patient solves simple/routine tasks 50%-74%   4 - Patient solves simple/routine tasks 75-90%+    Progressing   MEMORY 2 - Patient remembers 25%-49% of the time   4 - Patient remembers 75-90%+ of the time   Progressing       EDUCATION:  Learner: Patient  Education:  Reviewed diet and strategies, Reviewed signs, symptoms and risks of aspiration, Reviewed ST goals and Plan of Care, Reviewed recommendations for follow-up, Education Related to Potential Risks and Complications Due to Impairment/Illness/Injury, Education Related to Prevention of Recurrence of Impairment/Illness/Injury, Education Related to [employer] and [facility], and Home Safety Education  Evaluation of Education: Verbalizes understanding and Family not present    ASSESSMENT/PLAN:  Summary: Patient has met 3/5 STG's and 1/3 LTG's this [facility] stay thus far. Patient presents with a mild cognitive impairment characterized by a recent score of 24/30 on the [address] [place] [address] with deficits in immediate/delayed recall, executive functioning, mental flexibility, divided attention, and higher level problem solving/sequencing/reasoning. Patient is making improvements in executive functioning and memory; specifically within medication management tasks, immediate/delayed recall, and calendar tasks. Patient is currently on a regular diet with thin liquids and independently implements swallowing strategies and has NO right pocketing present; patient no longer requires dysphagia management. Additionally, patient presents with mild dysarthria characterized by decreased articulatory precision and blended word boundaries; suspect reduced speech clarity directly related to right-sided oral motor weakness. Patient ~80% intelligible in all contexts and speech has greatly improved since previously seen on 4K. No dysphonia or aphonia noted. No barriers to success at this time. Patient is pleasant and motivated to work within sessions. Continued skilled ST services are recommended at this time given patients's high level of independence in the home and work setting to improve the aforementioned deficits and permit potential return to [facility]. Recommending skilled ST services via OP ST upon discharge. Driving evaluation to be determined pending consult with OT/PT. Activity Tolerance:  Patient tolerance of  treatment: good. Good participation. Assessment/Plan: Patient progressing toward established goals. Continues to require skilled care of licensed speech pathologist to progress toward achievement of established goals and plan of care.     Plan for Next Session: Recall, attention, time, finances  Discharge Recommendations: Outpatient Speech Therapy    Ale Dawkins Celestino RADER  Speech Therapy Student Intern

## 2022-11-07 NOTE — PLAN OF CARE
Care plan reviewed with patient. Patient verbalizes understanding of the plan of care and contribute to goal setting. Focus Note  Education Focus: Disease process and treatment  Incorporating nutritional management into lifestyle  Prevention, detection, and treatment of acute complications  Monitoring blood glucose/ Interpreting and using results  Verbalized understanding and Needs reinforcement    Bowel Management: Continent: yes, Today 11/7 during therapy   Management: colace BID, uses toilet    Bladder Management: Continent: yes, uses call light appropriately   Management: takes proscar    Medication Education:   Diabetes management education: spoke with patient about drinking less pepsi, watching the foods he eats, closely monitoring BS    Pain Management: Denies pain    Discharge Needs:   Caregiver/Support identified: significant other   Equipment needs: NA   Barriers expressed by patient or family: none at this time, will keep educating on proper diet.     Follow up appointments needed:  Neuro, PCP, Physiatry      Problem: Discharge Planning  Goal: Discharge to home or other facility with appropriate resources  Outcome: Progressing  Flowsheets (Taken 11/7/2022 0934)  Discharge to home or other facility with appropriate resources: Identify barriers to discharge with patient and caregiver     Problem: Safety - Adult  Goal: Free from fall injury  Outcome: Progressing  Flowsheets (Taken 11/7/2022 1123)  Free From Fall Injury: Instruct family/caregiver on patient safety     Problem: ABCDS Injury Assessment  Goal: Absence of physical injury  Outcome: Progressing  Flowsheets (Taken 11/5/2022 1201)  Absence of Physical Injury: Implement safety measures based on patient assessment     Problem: Musculoskeletal - Adult  Goal: Return ADL status to a safe level of function  Outcome: Progressing  Flowsheets  Taken 11/7/2022 1123 by Douglas Thrasher LPN  Return ADL Status to a Safe Level of Function:   Assess activities of daily living deficits and provide assistive devices as needed   Administer medication as ordered  Taken 11/7/2022 0934 by Maya Dumont LPN  Return ADL Status to a Safe Level of Function: Administer medication as ordered  Taken 11/6/2022 2256 by Alcon Murphy LPN  Return ADL Status to a Safe Level of Function: Administer medication as ordered     Problem: Respiratory - Adult  Goal: Clear lung sounds  11/7/2022 1123 by Maya Dumont LPN  Outcome: Progressing  Note: Lung sounds clear     Problem: Metabolic/Fluid and Electrolytes - Adult  Goal: Glucose maintained within prescribed range  Outcome: Progressing  Flowsheets  Taken 11/7/2022 0934 by Maya Dumont LPN  Glucose maintained within prescribed range: Monitor blood glucose as ordered  Taken 11/6/2022 2256 by Alcon Murphy LPN  Glucose maintained within prescribed range: Monitor blood glucose as ordered     Problem: Hematologic - Adult  Goal: Maintains hematologic stability  Outcome: Progressing  Flowsheets  Taken 11/7/2022 0934 by Maya Dumont LPN  Maintains hematologic stability: Assess for signs and symptoms of bleeding or hemorrhage  Taken 11/6/2022 2256 by Alcon Murphy LPN  Maintains hematologic stability: Assess for signs and symptoms of bleeding or hemorrhage     Problem: Pain  Goal: Verbalizes/displays adequate comfort level or baseline comfort level  Outcome: Progressing  Flowsheets  Taken 11/7/2022 1123  Verbalizes/displays adequate comfort level or baseline comfort level:   Encourage patient to monitor pain and request assistance   Assess pain using appropriate pain scale  Taken 11/7/2022 0934  Verbalizes/displays adequate comfort level or baseline comfort level: Encourage patient to monitor pain and request assistance

## 2022-11-07 NOTE — PROGRESS NOTES
[facility] NOTE    Conference Date: 2022  Admit Date:  2022  1:50 PM  Patient Name: Roya Cheatham    MRN: [id number]    : 1950  (73 y.o.)  Rehabilitation Admitting Diagnosis:  CVA (cerebral vascular accident) ([facility] [I63.9]  Acute cerebrovascular accident (CVA) ([facility] [I63.9]  Referring Practitioner: Anitha Mojica MD      CASE MANAGEMENT  Current issues/needs regarding patient and family discharge status: Prior to admission, patient was living his girlfriend, July Mello. Patient was independent at home. Patient was completing his ADLs, housekeeping, meal prep, errands, finances and driving. Patient was working part time. Patient reports that his main supports are Reema Solano (child) and Anatoly Marin (grandchild). Patient's family physician is [place] Jo. Parviz Cook MD. Patient prefers [facility]. Patient was not using assistive devices at home. Patient reports that his goals are to regain his strength and return to home. Patient is motivated to participate in therapy. PHYSICAL THERAPY  Patient overall is making progress with skilled PT treatment and met 3/5 STG's and 0/6 LTG's. Patient is able to perform bed mobility supine<>sit and transfers with SBA without AD. Patient is able to ambulate SBA  to CGA without AD with mild path deviations with unsteadiness at times however able to self correct. Patient able to ascend/descend 1 step with B handrails with CGA. Patient improved his Jason Purpura Balance score to a 32/56 from a 22/56 indicating he is a moderate risk for falls. Patient overall can continue to benefit from skilled PT treatment in order to assist with BLE strengthening, gait training, transfer training, bed mobility, core strengthening and dynamic balance for increased functional mobility. Equipment Needed:  (continue to assess needs)    SPEECH THERAPY  Patient has met 3/5 STG's and 1/3 LTG's this IPR stay thus far.  Patient presents with a mild cognitive impairment characterized by a recent score of 24/30 on the [facility] with deficits in immediate/delayed recall, executive functioning, mental flexibility, divided attention, and higher level problem solving/sequencing/reasoning. Patient is making improvements in executive functioning and memory; specifically within medication management tasks, immediate/delayed recall, and calendar tasks. Patient is currently on a regular diet with thin liquids and independently implements swallowing strategies and has NO right pocketing present; patient no longer requires dysphagia management. Additionally, patient presents with mild dysarthria characterized by decreased articulatory precision and blended word boundaries; suspect reduced speech clarity directly related to right-sided oral motor weakness. Patient ~80% intelligible in all contexts and speech has greatly improved since previously seen on 4K. No dysphonia or aphonia noted. No barriers to success at this time. Patient is pleasant and motivated to work within sessions. Continued skilled ST services are recommended at this time given patients's high level of independence in the home and work setting to improve the aforementioned deficits and permit potential return to [facility]. Recommending skilled ST services via OP ST upon discharge. Driving evaluation to be determined pending consult with OT/PT. OCCUPATIONAL THERAPY  Pt presents with decreased functional mobility, endurance, ROM, balance and decreased strength and fine motor control in RUE. Pt demonstrates ataxia with R sided foot drift. Pt able to ambulate to/from bathroom and around room with CGA, transfer with CGA, and complete grooming while standing at sink with CGA. Pt requires CGA for UB dressing while standing and min A for LB dressing. Pt requiring min A with showering, requiring A with LE. Pt requiring mod A for footwear, requiring A donning and adjusting socks.  Pt fatigues quickly with activity but has good motivation and willingness to participate in therapy. Pt will benefit from continued OT services to increase strength, endurance and independence with ADLs/IADLs in prep for discharge. Equipment Needed: Yes  Mobility Devices: ADL Assistive Devices  ADL Assistive Devices: Shower Chair with back, Grab Bars - shower  Other: Continue to monitor for AE    RECREATIONAL THERAPY  Patient has been offered participation in recreational therapy activities and participates as able. Pt delivered milk for 23 yrs-states he only missed 1 day of work due to having pneumonia-he mows his yard, watches tv, goes out to eat and on occasion will fish-gave pt deck of cards to use in his room-very pleasant and social-states he has a fish and a bird at [employer] he is worried about the direction our country is going -chart indicates pt works part time but pt did not make mention of it     NUTRITION  Weight: 191 lb 0.9 oz (86.7 kg) / Body mass index is 29.05 kg/m². Current diet: ADULT DIET; Regular; 4 carb choices (60 gm/meal); Low Fat/Low Chol/High Fiber/[place]  Please see nutrition note for details. NURSING  Continent of Bowel: Yes. Frequency: Daily, last BM 11/7. Management: uses BR, takes colace BID. Continent of Bladder: Yes. Frequency: adequate. Management: Takes Proscar. Pain is Managed:  Yes. Management: Tylenol. Frequency of Intervention: infrequent, taken once while on rehab. Adequately Controlled: Yes  Sleep: Adequate  Signs and Symptoms of Infection:  Yes. Site of Infection: Tooth Abcess. Antibiotic: Augmentin Q12H, end date 11-9-22. Signs and Symptoms of Skin Breakdown:  No.   Injury and/or Falls during Inpatient Rehabilitation Admission: No  Anticoagulants: ASA, Plavix  Diabetic: Yes: Home Meds: Glimepiride, Metformin, semaglutide SQ 1x/wk. Hospital Meds: Metformin. Educational Needs: Needs diet education to better manage DM.     Consultations/Labs/X-rays: NA  Oxygen while on IP Rehab:  No Currently using  0 liters per 0  . Home oxygen: No  Patient/Family Education Focus: Diabetes and Stroke Education   Barriers to Education: none    Recent Labs     22  1241 22  0731 22  0736   POCGLU 188* 175* 241*       Lab Results   Component Value Date    LDLCALC 90 2022         Vitals:    22 0746 22 0934 22 2145 22 0804   BP:  131/77 (!) 142/77 (!) 152/78   Pulse:  95 88 75   Resp:  16 17 18   Temp:  97.5 °F (36.4 °C) 97.7 °F (36.5 °C) 98.2 °F (36.8 °C)   TempSrc:  Oral Oral Oral   SpO2:  94% 98% 94%   Weight: 191 lb 0.9 oz (86.7 kg)      Height:              Family Education: {Blank single:73568::\"Family available and participating in education \",\"No family available for education\",\"Need to make contact with family to initiate education\"}  Fall Risk:  {Blank single:::\"No\",\"Falling star program initiated\",\"Ultra high fall risk program initiated\"}  Is the patient appropriate for a stay in the functional apartment? {YES/NO:}    Discharge Plan   Estimated Discharge Date: {ambiguous abbreviation:8845820::\"Continue to assess\",\"***\"}   Destination: {Settin}  Services at Discharge: {FOLLOW-UP SERVICES:}  Is patient appropriate for an outpatient driving evaluation? {YES/NO:}  Equipment at Discharge: Other: Continue to monitor for AE  Factors facilitating achievement of predicted outcomes: {Patient Strengths:[id number]}  Barriers to the achievement of predicted outcomes: {BARRIERS:732250076}  Follow up with physiatrist? {YES/NO:}  If yes, what timeframe?  ***    Team Members Present at Conference:  :{IRF CONFERENCE ATTENDANCE - GB:[id number]}  Occupational Therapist:{IRF CONFERENCE ATTENDANCE - [id number]}  Physical Therapist:{IRF CONFERENCE ATTENDANCE - BC:[id number]}  Speech Therapist:{IRF CONFERENCE ATTENDANCE - [id number]}  Nurse:{IRF CONFERENCE ATTENDANCE - JYOSF:[id number]}  Psychologist: Antonina Arana, PhD.    I approve the established interdisciplinary plan of care as documented within the medical record of [facility].     Claudean Garre, PT

## 2022-11-07 NOTE — PLAN OF CARE
Problem: Respiratory - Adult  Goal: Clear lung sounds  11/7/2022 1648 by Linda Cox RCP  Outcome: Progressing  Note: Cont aerosol to improve aeration  11/7/2022 1123 by Cole Mercado LPN  Outcome: Progressing  Note: Lung sounds clear  11/7/2022 0640 by Landy Michelle RCP  Outcome: Progressing

## 2022-11-07 NOTE — PROGRESS NOTES
[facility]  INPATIENT PHYSICAL THERAPY  PROGRESS NOTE  [address] - 7E-65/065-A      Time In: 0730  Time Out: 0830  Timed Code Treatment Minutes: 60 Minutes  Minutes: 60          Date: 2022  Patient Name: Rosemary Zavala,  Gender:  male        MRN: [id number]  : 1950  (67 y.o.)  Referral Date : 22  Referring Practitioner: Konstantin Morris MD  Diagnosis: Acute cerebrovascular accident (CVA)  Treatment Diagnosis: difficulty in walking  Additional Pertinent Hx: Per H&P:Shazia León  is a 67 y.o. male admitted to the inpatient rehabilitation unit on 2022. He was originally admitted to [facility] on 10/31/2022. Patient  has a past medical history of Anxiety, Arthritis, CAD (coronary artery disease), Cancer ([facility] [address] COPD (chronic obstructive pulmonary disease) ([facility] [address] Diabetes ([facility] [address] Enlarged prostate with urinary retention, Gait difficulty, Generalized weakness, Hyperlipidemia, Hypertension, Lesion of bladder, Osteoarthritis, Retention of urine, Right inguinal hernia, S/P cardiac catheterization, S/P TURP, SOB (shortness of breath), and Voiding difficulty. Patient presented to [facility] ER due to new onset of right arm numbness and balance issues. Patient with noted chronic SOB. Family reports right sides weakness, dysarthria, right sided facial droop. Patient was to be taking ASA and plavix daily, but only was taking ASA every other day due to bruising. Patient with initial NIHSS of 6. Code stroke called, not a tPA candidate. CT of the head without contrast on 10/31/2022 demonstrated stable mild global volume loss, and apical abscess associated with a left maxillary first premolar, and no other significant acute findings. CT of the head and neck on 10/31/2022 without major acute findings.  MRI of the brain without contrast on 10/31/2022 demonstrated an area of diffusion in the left parietal periventricular white matter with corresponding diminished signal intensity on ADC map consistent with acute infarct; mild atrophy and probable mild severity chronic small vessel ischemic changes, and no other significant abnormalities. CVA likely due to poorly controlled HTN and DM. Prior Level of Function:  Lives With: Significant other, Other (comment) (girlfriends son)  Type of Home: House  Home Layout: One level  Home Access: Stairs to enter without rails  Entrance Stairs - Number of Steps: 2  Home Equipment: [employer], 4 wheeled (6LD has no seat or breaks--wheels swivel)   Bathroom Shower/Tub: Tub/Shower unit  Bathroom Toilet: Handicap height  Bathroom Accessibility: Walker accessible    Receives Help From: Family  ADL Assistance: Independent  Homemaking Assistance: Independent  Ambulation Assistance: Independent  Transfer Assistance: Independent  Active : Yes  Additional Comments: Pt's girlfriend works full-time, daughters live ~30 miles away in [place] and work    Restrictions/Precautions:  Restrictions/Precautions: General Precautions, Fall Risk  Position Activity Restriction  Other position/activity restrictions: R hemibody weakness     SUBJECTIVE: Patient in bedside chair at arrival and pleasantly agrees to therapy. PAIN: 0/10: Denied     Vitals: Vitals not assessed per clinical judgement, see nursing flowsheet    OBJECTIVE:  Bed Mobility:  Rolling to Left: Stand By Assistance   Rolling to Right: Stand By Assistance   Supine to Sit: Stand By Assistance  Sit to Supine: Stand By Assistance   *Performed with HOB flat and without rail     Transfers:  Sit to Stand: Stand By Assistance  Stand to Sit:Stand By Assistance  Car:Stand By Assistance  *Cues for hand placement at times for safety. Ambulation:  Stand By Assistance, [employer] Assistance  Distance: 175' x2, multiple short distances in therapy gym   Surface: Level Tile  Device:No Device  Gait Deviations:   Forward Flexed Posture, Slow Sofie, Decreased Step Length on Right, Decreased Weight Shift Left, Decreased Arm Swing, Decreased Gait Speed, Decreased Heel Strike Bilaterally, Mild Path Deviations, and Unsteady Gait at times however is able to correct without assistance. Dynamic Gait :   *Patient completed throughout easy street to collect letters from A to J at various heights. Cues needed to scan environment at times due to patient missing letters or attempting to collect out of order. Patient performed task with coordination task in R UE to increase awareness to R hemibody and further challenge stability. Stairs:  Contact Guard Assistance  Number of Steps: 1  Height: 6\" step with Bilateral Handrails  *Unable to complete with no handrail at this time due to fatigue. Neuromuscular Education:   *Patient performed cone weaving with coordination task in R UE. Completed to challenge balance with directional changes and increase awareness to R hemibody. Multiple LOB's to the right side occurred. Patient knocked over several cones while completing. *Patient performed con tapping then walking around the cone. Performed to improve lateral weight shift and stability in SLS. Multiple LOBs to the right side when completing requiring assistance to correct. Balance:  Static Standing Balance: Stand By Assistance  Dynamic Standing Balance: Stand By Assistance, Contact Guard Assistance  *While completing KUMARI     Exercise:  Patient was guided in 1 set(s) 10 reps of exercise to both lower extremities. Seated marches, Seated heel/toe raises, and Long arc quads. Exercises were completed for increased independence with functional mobility.     Functional Outcome Measures: Completed  KUMRAI BALANCE TEST SCORING   Score of < 45 indicates a greater risk of falling  41-56= low fall risk  21-40= medium fall risk (recommendation of walking with assist at all times)  0-20= high fall risk   Kumari Balance Score: 32  Kumari Balance Score: 32    ASSESSMENT:  Assessment: Patient progressing toward established goals however will continue to benefit from skilled physical therapy due to multiple LOB's occurring with dynamic activity, decreased endurance, and being a medium fall risk according to KUMARI balance test.     PT Assessment: Patient overall is making progress with skilled PT treatment and met 3/5 STG's and 0/6 LTG's. Patient is able to perform bed mobility supine<>sit and transfers with SBA without AD. Patient is able to ambulate SBA  to CGA without AD with mild path deviations with unsteadiness at times however able to self correct. Patient able to ascend/descend 1 step with B handrails with CGA. Patient improved his Teresita Guise Balance score to a 32/56 from a 22/56 indicating he is a moderate risk for falls. Patient overall can continue to benefit from skilled PT treatment in order to assist with BLE strengthening, gait training, transfer training, bed mobility, core strengthening and dynamic balance for increased functional mobility. Activity Tolerance:  Patient tolerance of  treatment: good.       Equipment Recommendations:Equipment Needed:  (continue to assess needs)  Discharge Recommendations: Continue to assess pending progress and Patient would benefit from continued PT at discharge  Plan: Current Treatment Recommendations: Strengthening, ROM, Balance training, Functional mobility training, Transfer training, Stair training, Gait training, Endurance training, Neuromuscular re-education, Home exercise program, Safety education & training, Patient/Caregiver education & training, Therapeutic activities, Equipment evaluation, education, & procurement  General Plan:  (5x/wk 90min, 1x/wk 30min)    Patient Education  Patient Education: Plan of Care, Bed Mobility, Transfers, Gait, Stairs, Car Transfers, Verbal Exercise Instruction, - Patient Requires Continued Education    Goals:  Patient Goals : be independent again  Short Term Goals  Time Frame for Short Term Goals: 1 week  Short Term Goal 1: Patient will complete supine < > sit and rolling with head of bed flat and no rails with modified independence to transfer in and out of bed safely. - NOT MET   Short Term Goal 2: Patient will complete sit < > stand with SBA and less than or equal to 25% verbal cues for utilization of right UE to stand to ambulate with decreased difficulty. - MET   Short Term Goal 3: Patient will ambulate 76' with no AD and CGA to progress towards navigating home safely. - MET   Short Term Goal 4: Patient will ascend/descend 1 step with no hand rail and CGA to progress towards safe home entry. - NOT MET   Short Term Goal 5: Patient will improve single leg stance to 3 seconds each lower extremity with CGA to demonstrate good lateral weight shift and improve balance - NOT MET   Long Term Goals  Time Frame for Long Term Goals : 3 weeks  Long Term Goal 1: Patient will complete sit < > stand with modified independence with no verbal cues for hand placement to stand to ambulate safely. - NOT MET   Long Term Goal 2: Patient will complete chair < > bed transfer with no AD and modified independence to transfer surface to surface safely. - NOT MET   Long Term Goal 3: Patient will ambulate 150' with no AD and modified independence to navigate home safely. - NOT MET   Long Term Goal 4: Patient will ascend/descend 2 steps with no hand rail and SBA for home entry.- NOT MET   Long Term Goal 5: Patient will improve KUMARI to greater than or equal to 45/56 to reduce his risk for falls. - NOT MET   Additional Goals?: Yes  Long term goal 6: Patient will complete car transfer with modified independence to transfer in and out of vehicl safely. - NOT MET     Following session, patient left in safe position with all fall risk precautions in place.     Revised Short-Term Goals:    Short Term Goals  Time Frame for Short Term Goals: 1 week  Short Term Goal 1: Patient will complete supine < > sit and rolling with head of bed flat and no rails with modified independence to transfer in and out of bed safely. Short Term Goal 2: Patient will complete sit < > stand with Supervision and less than or equal to 25% verbal cues for utilization of right UE to stand to ambulate with decreased difficulty. Short Term Goal 3: Patient to ambulate >/=100 feet without AD with Supervision in busy environments in order to assist with safety with home mobility. Short Term Goal 4: Patient will ascend/descend 1 step with no hand rail and CGA to progress towards safe home entry.   Short Term Goal 5: Patient will improve single leg stance to 3 seconds each lower extremity with CGA to demonstrate good lateral weight shift and improve balance

## 2022-11-07 NOTE — PROGRESS NOTES
Occupational [address]  [facility]  Occupational Therapy  Daily Note  Time:   Time In: 1400  Time Out: 1430  Timed Code Treatment Minutes: 30 Minutes  Minutes: 30          Date: 2022  Patient Name: Ellie Andrea,   Gender: male      Room: [facility]/065-A  MRN: [id number]  : 1950  (73 y.o.)  Referring Practitioner: Amanda Diamond MD  Diagnosis: Acute CVA  Additional Pertinent Hx: Ellie Andrea  is a 67 y.o. male admitted to the inpatient rehabilitation unit on 2022. He was originally admitted to [facility] on 10/31/2022. Patient  has a past medical history of Anxiety, Arthritis, CAD (coronary artery disease), Cancer ([facility] [address] COPD (chronic obstructive pulmonary disease) ([facility] [address] Diabetes ([facility] [address] Enlarged prostate with urinary retention, Gait difficulty, Generalized weakness, Hyperlipidemia, Hypertension, Lesion of bladder, Osteoarthritis, Retention of urine, Right inguinal hernia, S/P cardiac catheterization, S/P TURP, SOB (shortness of breath), and Voiding difficulty. Patient presented to [facility] ER due to new onset of right arm numbness and balance issues. Patient with noted chronic SOB. Family reports right sides weakness, dysarthria, right sided facial droop. Patient was to be taking ASA and plavix daily, but only was taking ASA every other day due to bruising. Patient with initial NIHSS of 6. Code stroke called, not a tPA candidate. CT of the head without contrast on 10/31/2022 demonstrated stable mild global volume loss, and apical abscess associated with a left maxillary first premolar, and no other significant acute findings. CT of the head and neck on 10/31/2022 without major acute findings.  MRI of the brain without contrast on 10/31/2022 demonstrated an area of diffusion in the left parietal periventricular white matter with corresponding diminished signal intensity on ADC map consistent with acute infarct; mild atrophy and probable mild severity chronic small vessel ischemic changes, and no other significant abnormalities. CVA likely due to poorly controlled HTN and DM. Patient is seen today upon admission to the inpatient rehabilitation unit. Patient lives with his s/o that works full time, 6a-2pm daily. Discussed IPR and goal of helping patient to be as independent as possible at discharge including mobility, ADLs, cognition, swallowing, and endurance. Restrictions/Precautions:  Restrictions/Precautions: General Precautions, Fall Risk  Position Activity Restriction  Other position/activity restrictions: R hemibody weakness     SUBJECTIVE: Pt in recliner upon arrival, pleasant and agreeable to OT treatment. PAIN: none    Vitals: Vitals not assessed per clinical judgement, see nursing flowsheet    COGNITION: Inattention    ADL:   No ADL's completed this session. Kiesha Newby BALANCE:  Standing Balance: Stand By Assistance. Without AD    BED MOBILITY:  Not Tested      FUNCTIONAL MOBILITY:  Assistive Device: None  Assist Level:  Stand By Assistance. Distance: To and from therapy gym       ADDITIONAL ACTIVITIES:  Patient completed Bilateral 3# theraball exercises this date x10 reps x1 set in B UE hand in all planes in order to increase strength for the completion of ADL tasks such as opening grooming supplies and buttoning shirts/pants. minimal difficulty noted. Item retrieval activity performed with encouragement of R UE use for retrieval and scanning environment to locate items. Pt performs activity to retrieve 12 items with 2 reminder cues to utilize R UE and no additional cues to locate items. ASSESSMENT:     Activity Tolerance:  Patient tolerance of  treatment: good. Discharge Recommendations: Home with assist as needed  Equipment Recommendations: Equipment Needed: Yes  Mobility Devices: ADL Assistive Devices  ADL Assistive Devices:  Shower Chair with back, [facility] - shower  Other: Continue to monitor for AE  Plan: Times Per Week: 5x a week for 90 min and 1x a week for 30 min  Times Per Day: Once a day  Current Treatment Recommendations: Strengthening, ROM, Balance training, Functional mobility training, Endurance training, Neuromuscular re-education, Pain management, Safety education & training, Patient/Caregiver education & training, Positioning, Self-Care / ADL, Home management training    Patient Education  Patient Education: Hemibody Awareness/Attention    Goals  Short Term Goals  Time Frame for Short Term Goals: 1 week from eval  Short Term Goal 1: Pt will complete oral hygiene routine while using R side for >/= 75% of task and MI to increase indep with self-care  Short Term Goal 2: Pt will complete item retieval with >/= 5 items with S and 0-1 VC for R sided involvement to increase indep with I/ADL preparation. Short Term Goal 3: Pt will complete dynamic standing with 2 UE release, MI and 0-2 VC for R sided awareness to increase indep with hygiene  Short Term Goal 4: Pt will complete tolieting, including transfer, with MI and 0-2 VC for R sided awareness to increase indep with tolieting  Short Term Goal 5: Pt will complete LB dressing with supervision and 0-1 VC for technique to increase indep with dressing  Long Term Goals  Time Frame for Long Term Goals : 2 weeks from eval  Long Term Goal 1: Pt will complete homemaking task with mod I to increase indep with laundry  Long Term Goal 2: Pt will complete pathway finding task with supervision and 0-2 vc for visual scanning to use signs to increase indep with community reintigration  Long Term Goal 3: Pt will complete BADL routine with mod I to increase indep with oral hygiene    Following session, patient left in safe position with all fall risk precautions in place.

## 2022-11-07 NOTE — PROGRESS NOTES
Occupational Therapy  [address] [place] [address]  [facility]  Occupational Therapy  Progress Note  Time:   Time In: 1030  Time Out: 1130  Timed Code Treatment Minutes: 60 Minutes  Minutes: 60          Date: 2022  Patient Name: Anival Gray,   Gender: male      Room: [facility]065-A  MRN: [id number]  : 1950  (73 y.o.)  Referring Practitioner: Keagan Morrow MD  Diagnosis: Acute CVA  Additional Pertinent Hx: Anival Gray  is a 67 y.o. male admitted to the inpatient rehabilitation unit on 2022. He was originally admitted to [address] [place] [address] on 10/31/2022. Patient  has a past medical history of Anxiety, Arthritis, CAD (coronary artery disease), Cancer ([facility] [address] COPD (chronic obstructive pulmonary disease) ([facility] [address] Diabetes ([facility] [address] Enlarged prostate with urinary retention, Gait difficulty, Generalized weakness, Hyperlipidemia, Hypertension, Lesion of bladder, Osteoarthritis, Retention of urine, Right inguinal hernia, S/P cardiac catheterization, S/P TURP, SOB (shortness of breath), and Voiding difficulty. Patient presented to [address] [place] [address] ER due to new onset of right arm numbness and balance issues. Patient with noted chronic SOB. Family reports right sides weakness, dysarthria, right sided facial droop. Patient was to be taking ASA and plavix daily, but only was taking ASA every other day due to bruising. Patient with initial NIHSS of 6. Code stroke called, not a tPA candidate. CT of the head without contrast on 10/31/2022 demonstrated stable mild global volume loss, and apical abscess associated with a left maxillary first premolar, and no other significant acute findings. CT of the head and neck on 10/31/2022 without major acute findings.  MRI of the brain without contrast on 10/31/2022 demonstrated an area of diffusion in the left parietal periventricular white matter with corresponding diminished signal intensity on ADC map consistent with acute infarct; mild atrophy and probable mild severity chronic small vessel ischemic changes, and no other significant abnormalities. CVA likely due to poorly controlled HTN and DM. Patient is seen today upon admission to the inpatient rehabilitation unit. Patient lives with his s/o that works full time, 6a-2pm daily. Discussed IPR and goal of helping patient to be as independent as possible at discharge including mobility, ADLs, cognition, swallowing, and endurance. Restrictions/Precautions:  Restrictions/Precautions: General Precautions, Fall Risk  Position Activity Restriction  Other position/activity restrictions: R hemibody weakness     SUBJECTIVE: Pt in recliner upon OT arrival, pleasant and cooperative to treatment. PAIN: denies    Vitals: Vitals not assessed per clinical judgement, see nursing flowsheet    COGNITION: Decreased Problem Solving and Decreased Safety Awareness    ADL:   Bathing: Supervision. Utilizing shower chair and grab bars. Pt has SC at home. Education provided on fall preventative techniques for tub shower level bathing routine. Pt reports he can get grab bars for shower and anti-skid strips  Upper Extremity Dressing: Supervision. For t-shirt  Lower Extremity Dressing: Stand By Assistance. With verbal cues to avoid doffing long pants by standing on single leg. Toileting: Supervision. Toilet Transfer: Supervision. Tub Transfer: Stand By Assistance. Using grab bars . BALANCE:  Standing Balance: Supervision. Without AD      TRANSFERS:  To/From Bed and Chair: Supervision. Without AD    FUNCTIONAL MOBILITY:  Assistive Device: None  Assist Level:  Supervision. Distance:  To and from shower room    Hand Dominance: Left  Left Hand Strength -  (lbs)  Handle Setting 2: 11/3- 57, 68, 85 av: 70   11/7: 72#, 69#, 82# av: 73#  Right Hand Strength -  (lbs)  Handle Setting 2: 11/3- 25, 28, 35 av: 29   11/7- 42#, 42#, 46# av: 44#  Fine Motor Skills  Left 9-Hole Peg Test: Functional  Left [address] Peg Test Time (secs): 31.01  Right 9-Hole Peg Test: Impaired  Right 9 Hole Peg Test Time (secs): 223       ASSESSMENT:  Salvador Woodson is progressing well with his Occupational therapy goals since his evaluation on 11/2. Pt has met 4/5 STG and is progressing well with LTG. Pt has progressed from [address] to SBA without AD for transfers from bed<>recliner. Pt has also progressed from CGA to SBA for functional mobility to and from the bathroom without AD and demo's reduced ataxia and R sided drift. Pt's right  strength has improved from 29# average to 44# average and 9-hole peg test improved from 223 seconds with right hand to 61 seconds indicating improved strength and coordination to right affected extremity. Pt will benefit from continued OT services to increase endurance, continue R UE strength, R sided awareness, and coordination, improve safety awareness, improve safety with higher level IADLs and maximize independence with ADLs, reduce risk for falls to return home with wife. Activity Tolerance:  Patient tolerance of  treatment: good. Discharge Recommendations: Home with assist as needed  Equipment Recommendations: Equipment Needed: Yes  Mobility Devices: ADL Assistive Devices  ADL Assistive Devices: Shower Chair with back, Grab Bars - shower  Other: Continue to monitor for AE  Plan: Times Per Week: 5x a week for 90 min and 1x a week for 30 min  Times Per Day:  Once a day  Current Treatment Recommendations: Strengthening, ROM, Balance training, Functional mobility training, Endurance training, Neuromuscular re-education, Pain management, Safety education & training, Patient/Caregiver education & training, Positioning, Self-Care / ADL, Home management training    Patient Education  Patient Education: ADL's, Equipment Education, and Home Safety    Goals  Short Term Goals  Time Frame for Short Term Goals: 1 week from eval  Short Term Goal 1: Pt will complete oral hygiene routine while using R side for >/= 75% supervision and 0-1 VC for technique to increase indep with dressing  Long Term Goals  Time Frame for Long Term Goals : 2 weeks from eval  Long Term Goal 1: Pt will complete homemaking task with mod I to increase indep with laundry  Long Term Goal 2: Pt will complete pathway finding task with supervision and 0-2 vc for visual scanning to use signs to increase indep with community reintigration  Long Term Goal 3: Pt will complete BADL routine with mod I to increase indep with oral hygiene    Following session, patient left in safe position with all fall risk precautions in place.

## 2022-11-07 NOTE — PROGRESS NOTES
[facility]  INPATIENT PHYSICAL THERAPY  DAILY NOTE  [address] [facility] - [id number]    Time In: 1130  Time Out: 1200  Timed Code Treatment Minutes: 30 Minutes  Minutes: 30          Date: 2022  Patient Name: Trey Crockett,  Gender:  male        MRN: [id number]  : 1950  (67 y.o.)  Referral Date : 22  Referring Practitioner: Tal Lamar MD  Diagnosis: Acute cerebrovascular accident (CVA)  Treatment Diagnosis: difficulty in walking  Additional Pertinent Hx: Per H&P:Shazia Aldridge  is a 67 y.o. male admitted to the inpatient rehabilitation unit on 2022. He was originally admitted to [facility] on 10/31/2022. Patient  has a past medical history of Anxiety, Arthritis, CAD (coronary artery disease), Cancer ([facility] [address] COPD (chronic obstructive pulmonary disease) ([facility] [address] Diabetes ([facility] [address] Enlarged prostate with urinary retention, Gait difficulty, Generalized weakness, Hyperlipidemia, Hypertension, Lesion of bladder, Osteoarthritis, Retention of urine, Right inguinal hernia, S/P cardiac catheterization, S/P TURP, SOB (shortness of breath), and Voiding difficulty. Patient presented to [facility] ER due to new onset of right arm numbness and balance issues. Patient with noted chronic SOB. Family reports right sides weakness, dysarthria, right sided facial droop. Patient was to be taking ASA and plavix daily, but only was taking ASA every other day due to bruising. Patient with initial NIHSS of 6. Code stroke called, not a tPA candidate. CT of the head without contrast on 10/31/2022 demonstrated stable mild global volume loss, and apical abscess associated with a left maxillary first premolar, and no other significant acute findings. CT of the head and neck on 10/31/2022 without major acute findings.  MRI of the brain without contrast on 10/31/2022 demonstrated an area of diffusion in the left parietal periventricular white matter with corresponding diminished signal intensity on ADC map consistent with acute infarct; mild atrophy and probable mild severity chronic small vessel ischemic changes, and no other significant abnormalities. CVA likely due to poorly controlled HTN and DM. Prior Level of Function:  Lives With: Significant other, Other (comment) (girlfriends son)  Type of Home: House  Home Layout: One level  Home Access: Stairs to enter without rails  Entrance Stairs - Number of Steps: 2  Home Equipment: [place] Mall, 4 wheeled (4HP has no seat or breaks--wheels swivel)   Bathroom Shower/Tub: Tub/Shower unit  Bathroom Toilet: Handicap height  Bathroom Accessibility: Walker accessible    Receives Help From: Family  ADL Assistance: Independent  Homemaking Assistance: Independent  Ambulation Assistance: Independent  Transfer Assistance: Independent  Active : Yes  Additional Comments: Pt's girlfriend works full-time, daughters live ~30 miles away in [place] and work    Restrictions/Precautions:  Restrictions/Precautions: General Precautions, Fall Risk  Position Activity Restriction  Other position/activity restrictions: R hemibody weakness     SUBJECTIVE: pleasant and cooperative, pt motivated, just finishing OT session    PAIN: no pain per pt    Vitals: Vitals not assessed per clinical judgement, see nursing flowsheet    OBJECTIVE:  Bed Mobility:  Not Tested    Transfers:  Sit to Stand: Stand By Assistance  Stand to Sit:Stand By Assistance  Bedside chair, visitor armchair, cues for midline orientation and control RLE, 5 trials sit to std at bedside chair  Car transfer with SBA for safe transportation home, cues for sequencing and hand placement.     Ambulation:  Stand By Assistance  Distance: 30'x1, 75'x1, 200'x1  Surface: Level Tile  Device:No Device  Gait Deviations:  with fatigue dec heelstrike RLE and tendency to hyperextend at right knee with fatigue in stance phase, cues if needed to take rest breaks for safety, with fatigue inc trunk sway and mild path deviations-cues to hold trunk tight and for home environment to  loose rugs    Negotiated platform step x2 trials with CGA to sBA, cues for sequencing and to slow down for safety    Negotiated 4 steps without HR, reciprocal with LOB and pt grabbing for HR to correct, CGA to SBA, cues to slow down and make sure he clears right foot before advancing for safety(annette with fatigue)    Balance:  Dynamic Standing Balance: Stand By Assistance, pt placing bilateral hands on PT hands to perform BLE heel raises, toe raises, hip ABD/ADD, marches x10-12 reps with fatigue more unsteady with slight lean to right with single leg stance on LLE and at times required CGA        ASSESSMENT:  Assessment: Patient progressing toward established goals. and pt primarily SBA but with fatigue inc weakness RLE and dec balance requiring cues for safety  Activity Tolerance:  Patient tolerance of  treatment: good. Equipment Recommendations:Equipment Needed:  (continue to assess needs)  Discharge Recommendations: Continue to assess pending progress  Plan: Current Treatment Recommendations: Strengthening, ROM, Balance training, Functional mobility training, Transfer training, Stair training, Gait training, Endurance training, Neuromuscular re-education, Home exercise program, Safety education & training, Patient/Caregiver education & training, Therapeutic activities, Equipment evaluation, education, & procurement  General Plan:  (5x/wk 90min, 1x/wk 30min)    Patient Education  Patient Education: Transfers, Gait, midline orientation, rest breaks with fatigue for safety    Goals:  Patient Goals : be independent again  Short Term Goals  Time Frame for Short Term Goals: 1 week  Short Term Goal 1: Patient will complete supine < > sit and rolling with head of bed flat and no rails with modified independence to transfer in and out of bed safely.   Short Term Goal 2: Patient will complete sit < > stand with SBA and less than or equal to 25% verbal cues for utilization of right UE to stand to ambulate with decreased difficulty. Short Term Goal 3: Patient will ambulate 76' with no AD and CGA to progress towards navigating home safely. Short Term Goal 4: Patient will ascend/descend 1 step with no hand rail and CGA to progress towards safe home entry. Short Term Goal 5: Patient will improve single leg stance to 3 seconds each lower extremity with CGA to demonstrate good lateral weight shift and improve balance  Long Term Goals  Time Frame for Long Term Goals : 3 weeks  Long Term Goal 1: Patient will complete sit < > stand with modified independence with no verbal cues for hand placement to stand to ambulate safely. Long Term Goal 2: Patient will complete chair < > bed transfer with no AD and modified independence to transfer surface to surface safely. Long Term Goal 3: Patient will ambulate 80' with no AD and modified independence to navigate home safely. Long Term Goal 4: Patient will ascend/descend 2 steps with no hand rail and SBA for home entry. Long Term Goal 5: Patient will improve KUMARI to greater than or equal to 45/56 to reduce his risk for falls. Additional Goals?: Yes  Long term goal 6: Patient will complete car transfer with modified independence to transfer in and out of vehicl safely. Following session, patient left in safe position with all fall risk precautions in place.

## 2022-11-08 LAB — GLUCOSE BLD-MCNC: 241 MG/DL (ref 70–108)

## 2022-11-08 PROCEDURE — 97112 NEUROMUSCULAR REEDUCATION: CPT

## 2022-11-08 PROCEDURE — 97110 THERAPEUTIC EXERCISES: CPT

## 2022-11-08 PROCEDURE — 6370000000 HC RX 637 (ALT 250 FOR IP): Performed by: PHYSICAL MEDICINE & REHABILITATION

## 2022-11-08 PROCEDURE — 99233 SBSQ HOSP IP/OBS HIGH 50: CPT | Performed by: PHYSICAL MEDICINE & REHABILITATION

## 2022-11-08 PROCEDURE — 97129 THER IVNTJ 1ST 15 MIN: CPT

## 2022-11-08 PROCEDURE — 82948 REAGENT STRIP/BLOOD GLUCOSE: CPT

## 2022-11-08 PROCEDURE — 94640 AIRWAY INHALATION TREATMENT: CPT

## 2022-11-08 PROCEDURE — 97535 SELF CARE MNGMENT TRAINING: CPT

## 2022-11-08 PROCEDURE — 97530 THERAPEUTIC ACTIVITIES: CPT

## 2022-11-08 PROCEDURE — 97116 GAIT TRAINING THERAPY: CPT

## 2022-11-08 PROCEDURE — 97130 THER IVNTJ EA ADDL 15 MIN: CPT

## 2022-11-08 PROCEDURE — 1180000000 HC REHAB R&B

## 2022-11-08 PROCEDURE — 6370000000 HC RX 637 (ALT 250 FOR IP): Performed by: FAMILY MEDICINE

## 2022-11-08 RX ADMIN — DOCUSATE SODIUM 100 MG: 100 CAPSULE, LIQUID FILLED ORAL at 21:51

## 2022-11-08 RX ADMIN — ASPIRIN 81 MG 81 MG: 81 TABLET ORAL at 08:06

## 2022-11-08 RX ADMIN — ATORVASTATIN CALCIUM 40 MG: 40 TABLET, FILM COATED ORAL at 21:51

## 2022-11-08 RX ADMIN — AMLODIPINE BESYLATE 5 MG: 5 TABLET ORAL at 08:06

## 2022-11-08 RX ADMIN — FINASTERIDE 5 MG: 5 TABLET, FILM COATED ORAL at 08:06

## 2022-11-08 RX ADMIN — METFORMIN HYDROCHLORIDE 1000 MG: 500 TABLET, EXTENDED RELEASE ORAL at 16:48

## 2022-11-08 RX ADMIN — AMOXICILLIN AND CLAVULANATE POTASSIUM 1 TABLET: 875; 125 TABLET, FILM COATED ORAL at 08:06

## 2022-11-08 RX ADMIN — LOSARTAN POTASSIUM 25 MG: 25 TABLET, FILM COATED ORAL at 08:06

## 2022-11-08 RX ADMIN — AMOXICILLIN AND CLAVULANATE POTASSIUM 1 TABLET: 875; 125 TABLET, FILM COATED ORAL at 21:51

## 2022-11-08 RX ADMIN — IPRATROPIUM BROMIDE AND ALBUTEROL SULFATE 1 AMPULE: .5; 3 SOLUTION RESPIRATORY (INHALATION) at 16:38

## 2022-11-08 RX ADMIN — CLOPIDOGREL BISULFATE 75 MG: 75 TABLET ORAL at 08:06

## 2022-11-08 RX ADMIN — METFORMIN HYDROCHLORIDE 1000 MG: 500 TABLET, EXTENDED RELEASE ORAL at 08:06

## 2022-11-08 RX ADMIN — PANTOPRAZOLE SODIUM 40 MG: 40 TABLET, DELAYED RELEASE ORAL at 06:18

## 2022-11-08 RX ADMIN — DOCUSATE SODIUM 100 MG: 100 CAPSULE, LIQUID FILLED ORAL at 08:06

## 2022-11-08 RX ADMIN — IPRATROPIUM BROMIDE AND ALBUTEROL SULFATE 1 AMPULE: .5; 3 SOLUTION RESPIRATORY (INHALATION) at 07:48

## 2022-11-08 ASSESSMENT — PAIN SCALES - GENERAL: PAINLEVEL_OUTOF10: 0

## 2022-11-08 NOTE — PROGRESS NOTES
[facility]  INPATIENT PHYSICAL THERAPY  Daily Note  [address]    Time In: 0700  Time Out: 0730  Timed Code Treatment Minutes: 30 Minutes  Minutes: 30          Date: 2022  Patient Name: Giovany Obrien,  Gender:  male        MRN: [id number]  : 1950  (67 y.o.)  Referral Date : 22  Referring Practitioner: Isiah Luu MD  Diagnosis: Acute cerebrovascular accident (CVA)  Treatment Diagnosis: difficulty in walking  Additional Pertinent Hx: Per H&P:Shazia Garvin  is a 67 y.o. male admitted to the inpatient rehabilitation unit on 2022. He was originally admitted to [facility] on 10/31/2022. Patient  has a past medical history of Anxiety, Arthritis, CAD (coronary artery disease), Cancer ([facility] [address] COPD (chronic obstructive pulmonary disease) ([facility] [address] Diabetes ([facility] [address] Enlarged prostate with urinary retention, Gait difficulty, Generalized weakness, Hyperlipidemia, Hypertension, Lesion of bladder, Osteoarthritis, Retention of urine, Right inguinal hernia, S/P cardiac catheterization, S/P TURP, SOB (shortness of breath), and Voiding difficulty. Patient presented to [facility] ER due to new onset of right arm numbness and balance issues. Patient with noted chronic SOB. Family reports right sides weakness, dysarthria, right sided facial droop. Patient was to be taking ASA and plavix daily, but only was taking ASA every other day due to bruising. Patient with initial NIHSS of 6. Code stroke called, not a tPA candidate. CT of the head without contrast on 10/31/2022 demonstrated stable mild global volume loss, and apical abscess associated with a left maxillary first premolar, and no other significant acute findings. CT of the head and neck on 10/31/2022 without major acute findings.  MRI of the brain without contrast on 10/31/2022 demonstrated an area of diffusion in the left parietal periventricular white matter with corresponding diminished signal intensity on ADC map consistent with acute infarct; mild atrophy and probable mild severity chronic small vessel ischemic changes, and no other significant abnormalities. CVA likely due to poorly controlled HTN and DM. Prior Level of Function:  Lives With: Significant other, Other (comment) (girlfriends son)  Type of Home: House  Home Layout: One level  Home Access: Stairs to enter without rails  Entrance Stairs - Number of Steps: 2  Home Equipment: Marcheta Bookbinder, 4 wheeled (3OH has no seat or breaks--wheels swivel)   Bathroom Shower/Tub: Tub/Shower unit  Bathroom Toilet: Handicap height  Bathroom Accessibility: Walker accessible    Receives Help From: Family  ADL Assistance: Independent  Homemaking Assistance: Independent  Ambulation Assistance: Independent  Transfer Assistance: Independent  Active : Yes  Additional Comments: Pt's girlfriend works full-time, daughters live ~30 miles away in [place] and work    Restrictions/Precautions:  Restrictions/Precautions: General Precautions, Fall Risk  Position Activity Restriction  Other position/activity restrictions: R hemibody weakness     SUBJECTIVE: Pt. Seated on BS chair and pleasantly agrees to therapy session. PAIN: No pain noted. Vitals: Vitals not assessed per clinical judgement, see nursing flowsheet    OBJECTIVE:  Bed Mobility:  Not Tested    Transfers:  Sit to Stand: Stand By Assistance  Stand to Sit:Stand By Assistance, cues for hand placement, with verbal cues    Ambulation:  Stand By Assistance, [employer] Assistance  Distance: 170 ft. X2; multiple shorter distances around therapy gym and on easy street   Surface: Level Tile  Device:No Device  Gait Deviations: Forward Flexed Posture, Slow Sofie, Decreased Step Length on Right, Decreased Weight Shift Left, Decreased Gait Speed, Decreased Heel Strike Bilaterally, and Mild Path Deviations    Neuromuscular Re-education  Pt.  Completed dynamic gait activity using No Device to improve proprioception, coordination, gait mechanics, hip/quad stability, lateral weight shifting, Visual tracking, and Environmental awareness for improved functional mobility. Exercise:  Patient was guided in 1 set(s) 10 reps of exercises: Standing heel/toe raises, Standing marches, Standing hip abduction/adduction, Standing hip extension, Standing hamstring curls, Mini squats. Exercises were completed for increased independence with functional mobility. Functional Outcome Measures: Not completed       ASSESSMENT:  Assessment: Patient progressing toward established goals. Activity Tolerance:  Patient tolerance of  treatment: good. Equipment Recommendations:Equipment Needed:  (continue to assess needs)  Discharge Recommendations: Continue to assess pending progress, Patient would benefit from continued therapy after discharge Continue to assess pending progress and Patient would benefit from continued PT at discharge    Plan: Current Treatment Recommendations: Strengthening, ROM, Balance training, Functional mobility training, Transfer training, Stair training, Gait training, Endurance training, Neuromuscular re-education, Home exercise program, Safety education & training, Patient/Caregiver education & training, Therapeutic activities, Equipment evaluation, education, & procurement  General Plan:  (5x/wk 90min, 1x/wk 30min)    Patient Education  Patient Education: Plan of Care, Functional Mobility, Reviewed Prior Education, Health Promotion and Wellness Education, Safety, Verbal Exercise Instruction    Goals:  Patient Goals : be independent again  Short Term Goals  Time Frame for Short Term Goals: 1 week  Short Term Goal 1: Patient will complete supine < > sit and rolling with head of bed flat and no rails with modified independence to transfer in and out of bed safely.   Short Term Goal 2: Patient will complete sit < > stand with Supervision and less than or equal to 25% verbal cues for utilization of right UE to stand to ambulate with decreased difficulty. Short Term Goal 3: Patient to ambulate >/=100 feet without AD with Supervision in busy environments in order to assist with safety with home mobility. Short Term Goal 4: Patient will ascend/descend 1 step with no hand rail and CGA to progress towards safe home entry. Short Term Goal 5: Patient will improve single leg stance to 3 seconds each lower extremity with CGA to demonstrate good lateral weight shift and improve balance  Long Term Goals  Time Frame for Long Term Goals : 3 weeks  Long Term Goal 1: Patient will complete sit < > stand with modified independence with no verbal cues for hand placement to stand to ambulate safely. Long Term Goal 2: Patient will complete chair < > bed transfer with no AD and modified independence to transfer surface to surface safely. Long Term Goal 3: Patient will ambulate 80' with no AD and modified independence to navigate home safely. Long Term Goal 4: Patient will ascend/descend 2 steps with no hand rail and SBA for home entry. Long Term Goal 5: Patient will improve KUMARI to greater than or equal to 45/56 to reduce his risk for falls. Additional Goals?: Yes  Long term goal 6: Patient will complete car transfer with modified independence to transfer in and out of vehicl safely. Following session, patient left in safe position with all fall risk precautions in place.

## 2022-11-08 NOTE — PLAN OF CARE
Problem: Safety - Adult  Goal: Free from fall injury  11/8/2022 1557 by Deneen Crum LPN  Outcome: Progressing  Free From Fall Injury: Instruct family/caregiver on patient safety  Note: Call light within reach. Walkway free of clutter. Hourly rounding performed. No fall. Problem: ABCDS Injury Assessment  Goal: Absence of physical injury  11/8/2022 1557 by Deneen Crum LPN  Outcome: Progressing  Patient free from injury at this time.   Patient turning Q2 hours, Floating heels  Problem: Musculoskeletal - Adult  Goal: Return ADL status to a safe level of function  Recent Flowsheet Documentation  Taken 11/8/2022 0811 by Deneen Crum LPN  Return ADL Status to a Safe Level of Function: Administer medication as ordered  Problem: Hematologic - Adult  Goal: Maintains hematologic stability  Recent Flowsheet Documentation  Taken 11/8/2022 0811 by Deneen Crum LPN  Maintains hematologic stability: Assess for signs and symptoms of bleeding or hemorrhage

## 2022-11-08 NOTE — PROGRESS NOTES
[address] [place] [address]  Occupational Therapy  Daily Note  Time:   Time In: 0830  Time Out: 0930  Timed Code Treatment Minutes: 60 Minutes  Minutes: 60      Date: 2022  Patient Name: Rosemary Zavala,   Gender: male      Room: [facility]/065-A  MRN: [id number]  : 1950  (73 y.o.)  Referring Practitioner: Konstantin Morris MD  Diagnosis: Acute CVA  Additional Pertinent Hx: Rosemary Zavala  is a 67 y.o. male admitted to the inpatient rehabilitation unit on 2022. He was originally admitted to [address] [place] [address] on 10/31/2022. Patient  has a past medical history of Anxiety, Arthritis, CAD (coronary artery disease), Cancer ([facility] [address] COPD (chronic obstructive pulmonary disease) ([facility] [address] Diabetes ([facility] [address] Enlarged prostate with urinary retention, Gait difficulty, Generalized weakness, Hyperlipidemia, Hypertension, Lesion of bladder, Osteoarthritis, Retention of urine, Right inguinal hernia, S/P cardiac catheterization, S/P TURP, SOB (shortness of breath), and Voiding difficulty. Patient presented to [facility] ER due to new onset of right arm numbness and balance issues. Patient with noted chronic SOB. Family reports right sides weakness, dysarthria, right sided facial droop. Patient was to be taking ASA and plavix daily, but only was taking ASA every other day due to bruising. Patient with initial NIHSS of 6. Code stroke called, not a tPA candidate. CT of the head without contrast on 10/31/2022 demonstrated stable mild global volume loss, and apical abscess associated with a left maxillary first premolar, and no other significant acute findings. CT of the head and neck on 10/31/2022 without major acute findings.  MRI of the brain without contrast on 10/31/2022 demonstrated an area of diffusion in the left parietal periventricular white matter with corresponding diminished signal intensity on ADC map consistent with acute infarct; mild atrophy and probable mild severity chronic small vessel ischemic changes, and no other significant abnormalities. CVA likely due to poorly controlled HTN and DM. Patient is seen today upon admission to the inpatient rehabilitation unit. Patient lives with his s/o that works full time, 6a-2pm daily. Discussed IPR and goal of helping patient to be as independent as possible at discharge including mobility, ADLs, cognition, swallowing, and endurance. Restrictions/Precautions:  Restrictions/Precautions: General Precautions, Fall Risk  Position Activity Restriction  Other position/activity restrictions: R hemibody weakness     SUBJECTIVE: Patient pleasant and cooperative. Motivated. Reports he is eager to go home and currently states he has no concerns for home discharge. PAIN: denies     Vitals: Vitals not assessed per clinical judgement, see nursing flowsheet    COGNITION: WFL for rote BADL and simple meal prep IADLs. ADL:     EATING:Independent. Bryson Patella CARE Score: 6. ORAL HYGIENE:Independent. standing sinkside, no LOB. CARE Score: 6. TOILETING HYGIENE:Independent. independent for nnamdi care. CARE Score: 6. SHOWERING/BATHING:Independent. in tub, standing > 10 min, no LOB or cues required. CARE Score: 6. UPPER BODY DRESSING:Independent. independent with retrieving the shirt and donning it. CARE Score: 6. LOWER BODY DRESSING:Independent. independent with retrieving and donning. CARE Score: 6. FOOTWEAR:Independent  independent with donning and doffing shoes/socks. CARE Score: 6. TOILET TRANSFER: Independent. independent. CARE Score: 6. BALANCE:  Sitting Balance:  Independent. Standing Balance: Independent. BED MOBILITY:  Not Tested    TRANSFERS:  Sit to Stand:  Independent. Recliner, standard kitchen chair. Stand to Sit: Independent. Improved engagement of RUE noted. FUNCTIONAL MOBILITY:  Assistive Device: None  Assist Level:  Supervision   Distance:  To and from bathroom and To and from shower room, to/from therapy apartment   No LOB     ADDITIONAL ACTIVITIES:  Patient completed meal prep IADLs of cooking 2 simple meals simultaneously to challenge divided attention. Pt was able to safely initiate and sequence IADLs and manage stovetop without external cues. Pt did require  x1 cue to engage RUE and completed education on RUE integration into IADLs safely. Pt completed mobility portions with supervision and demo no LOB. Pt also stood to wash dishes with good B integration and appropriate grasp with RUE to functionally complete. Good endurance, 20 min throughout without rest breaks. Reviewed putty HEP for home: utilized R hand in pincer grasp to retreive small beads from within putty, completing with min difficulty for return of RUE [address] for button/zipper mgmt       ASSESSMENT:     Activity Tolerance:  Patient tolerance of  treatment: good. Discharge Recommendations: Home with Outpatient OT  Equipment Recommendations: Equipment Needed: Yes  Mobility Devices: ADL Assistive Devices  ADL Assistive Devices: Shower Chair with back, Grab Bars - shower  Other: Continue to monitor for AE  Plan: Times Per Week: 5x a week for 90 min and 1x a week for 30 min  Times Per Day:  Once a day  Current Treatment Recommendations: Strengthening, ROM, Balance training, Functional mobility training, Endurance training, Neuromuscular re-education, Pain management, Safety education & training, Patient/Caregiver education & training, Positioning, Self-Care / ADL, Home management training    Patient Education  Patient Education: ADL's, IADL's, Home Exercise Program, Hemibody Awareness/Attention, Reviewed Prior Education, and Home Safety    Goals  Short Term Goals  Time Frame for Short Term Goals: 1 week from eval  Short Term Goal 1: Pt will complete oral hygiene routine while using R side for >/= 75% of task and MI to increase indep with self-care  Short Term Goal 2: Pt will complete item retieval with >/= 5 items

## 2022-11-08 NOTE — PROGRESS NOTES
[facility]   INPATIENT PHYSICAL THERAPY  Daily Note  [address]    Time In: 1130  Time Out: 1230  Timed Code Treatment Minutes: 60 Minutes  Minutes: 60          Date: 2022  Patient Name: Amy Rodriges,  Gender:  male        MRN: [id number]  : 1950  (67 y.o.)  Referral Date : 22  Referring Practitioner: Madhu Syed MD  Diagnosis: Acute cerebrovascular accident (CVA)  Treatment Diagnosis: difficulty in walking  Additional Pertinent Hx: Per H&P:Shazia Vale  is a 67 y.o. male admitted to the inpatient rehabilitation unit on 2022. He was originally admitted to [facility]  on 10/31/2022. Patient  has a past medical history of Anxiety, Arthritis, CAD (coronary artery disease), Cancer ([facility] [address] COPD (chronic obstructive pulmonary disease) ([facility] [address] Diabetes ([facility] [address] Enlarged prostate with urinary retention, Gait difficulty, Generalized weakness, Hyperlipidemia, Hypertension, Lesion of bladder, Osteoarthritis, Retention of urine, Right inguinal hernia, S/P cardiac catheterization, S/P TURP, SOB (shortness of breath), and Voiding difficulty. Patient presented to [address] [place] [address] ER due to new onset of right arm numbness and balance issues. Patient with noted chronic SOB. Family reports right sides weakness, dysarthria, right sided facial droop. Patient was to be taking ASA and plavix daily, but only was taking ASA every other day due to bruising. Patient with initial NIHSS of 6. Code stroke called, not a tPA candidate. CT of the head without contrast on 10/31/2022 demonstrated stable mild global volume loss, and apical abscess associated with a left maxillary first premolar, and no other significant acute findings. CT of the head and neck on 10/31/2022 without major acute findings.  MRI of the brain without contrast on 10/31/2022 demonstrated an area of diffusion in the left parietal periventricular white matter with corresponding diminished signal intensity on ADC map consistent with acute infarct; mild atrophy and probable mild severity chronic small vessel ischemic changes, and no other significant abnormalities. CVA likely due to poorly controlled HTN and DM. Prior Level of Function:  Lives With: Significant other, Other (comment) (girlfriends son)  Type of Home: House  Home Layout: One level  Home Access: Stairs to enter without rails  Entrance Stairs - Number of Steps: 2  Home Equipment: [place] [employer], 4 wheeled (5HY has no seat or breaks--wheels swivel)   Bathroom Shower/Tub: Tub/Shower unit  Bathroom Toilet: Handicap height  Bathroom Accessibility: Walker accessible    Receives Help From: Family  ADL Assistance: Independent  Homemaking Assistance: Independent  Ambulation Assistance: Independent  Transfer Assistance: Independent  Active : Yes  Additional Comments: Pt's girlfriend works full-time, daughters live ~30 miles away in [place] and work    Restrictions/Precautions:  Restrictions/Precautions: General Precautions, Fall Risk  Position Activity Restriction  Other position/activity restrictions: R hemibody weakness     SUBJECTIVE: Pt. Seated on BS chair and pleasantly agrees to therapy session. Patient asking about using cane for stability once going home, education on trialing cane before discharge to ensure safety and if needed. PAIN: No pain noted.      Vitals: Vitals not assessed per clinical judgement, see nursing flowsheet    OBJECTIVE:  Bed Mobility:  Rolling to Left: Modified Independent   Rolling to Right: Modified Independent   Supine to Sit: Modified Independent  Sit to Supine: Modified Independent     Transfers:  Sit to Stand: Stand By Assistance  Stand to Sit:Stand By Assistance    Ambulation:  Stand By Assistance, [employer], with verbal cues   Distance: 170 ft. X2; multiple shorter distances around therapy gym and on easy street   Surface: Level Tile  Device:No Device  Gait Deviations: Forward Flexed Posture, Slow Sofie, Decreased Step Length on Right, Decreased Weight Shift Left, Decreased Gait Speed, Decreased Heel Strike Bilaterally, Mild Path Deviations, and Unsteady Gait    Stairs:  Stairs:  6\" steps. X 12 using intermittent UE support and Contact Guard Assistance, Minimal Assistance. **Patient able to ascend steps without UE support, required CGA-Min. A with descent d/t catching R foot on step when descending, required use of railings to descend steps safely. Recommended grab bar/railing being installed at home prior to discharge home . Balance:  Pt. Completed standing dynamic balance activity: Standing on black balance pad while completing pod taps with Normal GALLO on foam Surface and No UE support with Contact Guard Assistance, Minimal Assistance. Unsteady when in SLS on LLE due to decreased lateral weight shift. Activity completed to improve balance, enhance functional mobility, and reduce risk of falls. Neuromuscular Re-education  Pt. Completed dynamic gait and Stepping activities using Single UE support and No UE support to improve proprioception, coordination, gait mechanics, hip/quad stability, and lateral weight shifting for improved functional mobility. **Did use [employer] pole for a couple activities to improve muscle facilitation and activation into RUE. Exercise:  None    Functional Outcome Measures: Not completed       ASSESSMENT:  Assessment: Patient progressing toward established goals. Activity Tolerance:  Patient tolerance of  treatment: good.      Equipment Recommendations:Equipment Needed:  (continue to assess needs)  Discharge Recommendations: Continue to assess pending progress, Patient would benefit from continued therapy after discharge Home with Assist as Needed and Home with Outpatient PT    Plan: Current Treatment Recommendations: Strengthening, ROM, Balance training, Functional mobility training, Transfer training, Stair training, Gait training, Endurance training, Neuromuscular re-education, Home exercise program, Safety education & training, Patient/Caregiver education & training, Therapeutic activities, Equipment evaluation, education, & procurement  General Plan:  (5x/wk 90min, 1x/wk 30min) *Starting 11/9/22 PT to decrease POC to 5x/wk 60 min*    Patient Education  Patient Education: Plan of Care, Functional Mobility, Reviewed Prior Education, Health Promotion and Wellness Education, Safety, Verbal Exercise Instruction, Precautions/Restrictions, [employer] Mobility, Transfers, Gait, Stairs, Home Safety Education, - Patient Requires Continued Education    Goals:  Patient Goals : be independent again  Short Term Goals  Time Frame for Short Term Goals: 1 week  Short Term Goal 1: Patient will complete supine < > sit and rolling with head of bed flat and no rails with modified independence to transfer in and out of bed safely. Short Term Goal 2: Patient will complete sit < > stand with Supervision and less than or equal to 25% verbal cues for utilization of right UE to stand to ambulate with decreased difficulty. Short Term Goal 3: Patient to ambulate >/=100 feet without AD with Supervision in busy environments in order to assist with safety with home mobility. Short Term Goal 4: Patient will ascend/descend 1 step with no hand rail and CGA to progress towards safe home entry. Short Term Goal 5: Patient will improve single leg stance to 3 seconds each lower extremity with CGA to demonstrate good lateral weight shift and improve balance  Long Term Goals  Time Frame for Long Term Goals : 3 weeks  Long Term Goal 1: Patient will complete sit < > stand with modified independence with no verbal cues for hand placement to stand to ambulate safely. Long Term Goal 2: Patient will complete chair < > bed transfer with no AD and modified independence to transfer surface to surface safely.   Long Term Goal 3: Patient will ambulate 80' with no AD and modified independence to navigate home safely. Long Term Goal 4: Patient will ascend/descend 2 steps with no hand rail and SBA for home entry. Long Term Goal 5: Patient will improve KUMARI to greater than or equal to 45/56 to reduce his risk for falls. Additional Goals?: Yes  Long term goal 6: Patient will complete car transfer with modified independence to transfer in and out of vehicl safely. Following session, patient left in safe position with all fall risk precautions in place.

## 2022-11-08 NOTE — PROGRESS NOTES
Focus Note  Education Focus:  Incorporating nutritional management into lifestyle  Verbalized understanding and Demonstrated Understanding        Bowel Management: Continent: yes, 11-8-2022   Management: colace daily     Bladder Management: Continent: yes, q2-3 hours   Management: n/a

## 2022-11-08 NOTE — PROGRESS NOTES
[facility] NOTE    Conference Date: 2022  Admit Date:  2022  1:50 PM  Patient Name: Lisbeth Camacho    MRN: [id number]    : 1950  (73 y.o.)  Rehabilitation Admitting Diagnosis:  CVA (cerebral vascular accident) ([facility] [address] [I63.9]  Acute cerebrovascular accident (CVA) ([facility] [address] [I63.9]  Referring Practitioner: Lina Kimbrough MD      CASE MANAGEMENT  Current issues/needs regarding patient and family discharge status: Prior to admission, patient was living his girlfriend, Liang Funez. Patient was independent at home. Patient was completing his ADLs, housekeeping, meal prep, errands, finances and driving. Patient was working part time. Patient reports that his main supports are Mitch Roberts (child) and Rosalia Ochoa (grandchild). Patient's family physician is Rubens Cruz. Matt Thrasher MD. Patient prefers [address] Patient was not using assistive devices at home. Patient reports that his goals are to regain his strength and return to home. Patient is motivated to participate in therapy. PHYSICAL THERAPY  Patient overall is making progress with skilled PT treatment and met 3/5 STG's and 0/6 LTG's. Patient is able to perform bed mobility supine<>sit and transfers with SBA without AD. Patient is able to ambulate SBA  to CGA without AD with mild path deviations with unsteadiness at times however able to self correct. Patient able to ascend/descend 1 step with B handrails with CGA. Patient improved his Eulas Hides Balance score to a 32/56 from a 22/56 indicating he is a moderate risk for falls. Patient overall can continue to benefit from skilled PT treatment in order to assist with BLE strengthening, gait training, transfer training, bed mobility, core strengthening and dynamic balance for increased functional mobility. Equipment Needed:  (continue to assess needs)    SPEECH THERAPY  Patient has met 3/5 STG's and 1/3 LTG's this IPR stay thus far.  Patient presents with a mild cognitive impairment characterized by a recent score of 24/30 on the [facility] REHABILITATION with deficits in immediate/delayed recall, executive functioning, mental flexibility, divided attention, and higher level problem solving/sequencing/reasoning. Patient is making improvements in executive functioning and memory; specifically within medication management tasks, immediate/delayed recall, and calendar tasks. Patient is currently on a regular diet with thin liquids and independently implements swallowing strategies and has NO right pocketing present; patient no longer requires dysphagia management. Additionally, patient presents with mild dysarthria characterized by decreased articulatory precision and blended word boundaries; suspect reduced speech clarity directly related to right-sided oral motor weakness. Patient ~80% intelligible in all contexts and speech has greatly improved since previously seen on 4K. No dysphonia or aphonia noted. No barriers to success at this time. Patient is pleasant and motivated to work within sessions. Continued skilled ST services are recommended at this time given patients's high level of independence in the home and work setting to improve the aforementioned deficits and permit potential return to [facility]. Recommending skilled ST services via OP ST upon discharge. Driving evaluation to be determined pending consult with OT/PT. [address] is progressing well with his Occupational therapy goals since his evaluation on 11/2. Pt has met 4/5 STG and is progressing well with LTG. Pt has progressed from [address] A to SBA without AD for transfers from bed<>recliner. Pt has also progressed from CGA to SBA for functional mobility to and from the bathroom without AD and demo's reduced ataxia and R sided drift.  Pt's right  strength has improved from 29# average to 44# average and 9-hole peg test improved from 223 seconds with right hand to 61 seconds indicating improved strength and coordination to right affected extremity. Pt will benefit from continued OT services to increase endurance, continue R UE strength, R sided awareness, and coordination, improve safety awareness, improve safety with higher level IADLs and maximize independence with ADLs, reduce risk for falls to return home with wife. Activity Tolerance:  Patient tolerance of  treatment: good. Equipment Needed: No  Mobility Devices: ADL Assistive Devices  ADL Assistive Devices: Shower Chair with back, [facility]  Other: No AD needed. Pt does have access to a shower chair if needed, however currently doesn't require. RECREATIONAL THERAPY  Patient has been offered participation in recreational therapy activities and participates as able. 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 Current diet: ADULT DIET; Regular; 4 carb choices (60 gm/meal); Low Fat/Low Chol/High Fiber/[place]  Please see nutrition note for details. NURSING  Continent of Bowel: Yes. Frequency: Daily, last BM 11/7. Management: uses BR, takes colace BID. Continent of Bladder: Yes. Frequency: adequate. Management: Takes Proscar. Pain is Managed:  Yes. Management: Tylenol. Frequency of Intervention: infrequent, taken once while on rehab. Adequately Controlled: Yes  Sleep: Adequate  Signs and Symptoms of Infection:  Yes. Site of Infection: Tooth Abcess. Antibiotic: Augmentin Q12H, end date 11-9-22.   Signs and Symptoms of Skin Breakdown:  No.   Injury and/or Falls during Inpatient Rehabilitation Admission: No  Anticoagulants: ASA, Plavix  Diabetic: Yes: Home Meds: Glimepiride, Metformin, semaglutide SQ 1x/wk. Hospital Meds: Metformin. Educational Needs: Needs diet education to better manage DM. Consultations/Labs/X-rays: NA  Oxygen while on IP Rehab:  No Currently using  0 liters per 0  . Home oxygen: No  Patient/Family Education Focus: Diabetes and Stroke Education   Barriers to Education: none    Recent Labs     11/05/22  1241 11/06/22  0731 11/08/22  0736   POCGLU 188* 175* 241*       Lab Results   Component Value Date    LDLCALC 90 11/01/2022         Vitals:    11/07/22 0746 11/07/22 0934 11/07/22 2145 11/08/22 0804   BP:  131/77 (!) 142/77 (!) 152/78   Pulse:  95 88 75   Resp:  16 17 18   Temp:  97.5 °F (36.4 °C) 97.7 °F (36.5 °C) 98.2 °F (36.8 °C)   TempSrc:  Oral Oral Oral   SpO2:  94% 98% 94%   Weight: 191 lb 0.9 oz (86.7 kg)      Height:              Family Education: Family available and participating in education   Fall Risk:  Falling star program initiated  Is the patient appropriate for a stay in the functional apartment? no    Discharge Plan   Estimated Discharge Date: Thursday, 11/10/22  Destination: discharge home with supervision  Services at Discharge: Outpatient Physical Therapy, Occupational Therapy, and Speech Therapy 2x week  Is patient appropriate for an outpatient driving evaluation? Possibly; TBD  Equipment at Discharge: Other: No AD needed. Pt does have access to a shower chair if needed, however currently doesn't require.   Factors facilitating achievement of predicted outcomes: Family support, Friend support, Motivated, Cooperative, and Pleasant  Barriers to the achievement of predicted outcomes: Limited safety awareness, Decreased endurance, Upper extremity weakness, and Lower extremity weakness  Follow up with physiatrist? yes, 6-8 weeks        Team Members Present at Conference:  TONEY Guerin, MSW   Occupational Therapist:Manav Conley, MERNAR/L 2008  [employer] [place], [address]  Speech Monisha Brittany MKatharineSKatharine [address]   Kim Bailon RN  Psychologist: John Ma, PhD.    I approve the established interdisciplinary plan of care as documented within the medical record of [facility].     Starting 11/9/22, PT to decrease to 60 minutes per day    Michael Mace MD

## 2022-11-08 NOTE — PROGRESS NOTES
[facility]  [address] [facility]  Occupational Therapy  Daily Note  Time:    Time In: 1430  Time Out: 1500  Timed Code Treatment Minutes: 30 Minutes  Minutes: 30        Date: 2022  Patient Name: Meenu Levine,   Gender: male      Room: [facility]65/065-A  MRN: [id number]  : 1950  (73 y.o.)  Referring Practitioner: Eliazar Conn MD  Diagnosis: Acute CVA  Additional Pertinent Hx: Meenu Levine  is a 67 y.o. male admitted to the inpatient rehabilitation unit on 2022. He was originally admitted to [facility] on 10/31/2022. Patient  has a past medical history of Anxiety, Arthritis, CAD (coronary artery disease), Cancer ([facility] [address] COPD (chronic obstructive pulmonary disease) ([facility] [address] Diabetes ([facility] [address] Enlarged prostate with urinary retention, Gait difficulty, Generalized weakness, Hyperlipidemia, Hypertension, Lesion of bladder, Osteoarthritis, Retention of urine, Right inguinal hernia, S/P cardiac catheterization, S/P TURP, SOB (shortness of breath), and Voiding difficulty. Patient presented to [facility] ER due to new onset of right arm numbness and balance issues. Patient with noted chronic SOB. Family reports right sides weakness, dysarthria, right sided facial droop. Patient was to be taking ASA and plavix daily, but only was taking ASA every other day due to bruising. Patient with initial NIHSS of 6. Code stroke called, not a tPA candidate. CT of the head without contrast on 10/31/2022 demonstrated stable mild global volume loss, and apical abscess associated with a left maxillary first premolar, and no other significant acute findings. CT of the head and neck on 10/31/2022 without major acute findings.  MRI of the brain without contrast on 10/31/2022 demonstrated an area of diffusion in the left parietal periventricular white matter with corresponding diminished signal intensity on ADC map consistent with acute infarct; mild atrophy and probable mild severity chronic small vessel ischemic changes, and no other significant abnormalities. CVA likely due to poorly controlled HTN and DM. Patient is seen today upon admission to the inpatient rehabilitation unit. Patient lives with his s/o that works full time, 6a-2pm daily. Discussed IPR and goal of helping patient to be as independent as possible at discharge including mobility, ADLs, cognition, swallowing, and endurance. Restrictions/Precautions:  Restrictions/Precautions: General Precautions, Fall Risk  Position Activity Restriction  Other position/activity restrictions: R hemibody weakness      SUBJECTIVE: Pt was sitting in recliner in room prior to session. Pt was pleasant and receptive to session. PAIN: None reported     Vitals: Vitals not assessed per clinical judgement, see nursing flowsheet    COGNITION: WFL and Decreased Safety Awareness    ADL:   Toileting: Independent. Toilet Transfer: Independent. Loreatha Press BALANCE:  Sitting Balance:  Independent. Standing Balance: Independent. BED MOBILITY:  Not Tested    TRANSFERS:  Sit to Stand:  Independent. From recliner and mat table in therapy gym. Stand to Sit: Independent. At recliner and mat table in therapy gym. FUNCTIONAL MOBILITY:  Assistive Device: None  Assist Level:  Supervision. Distance: To and from therapy apartment  No LOB noted. ADDITIONAL ACTIVITIES:  Pt sitting on mat table in therapy gym was facilitated to complete quadriped exercises for increasing proprioception in R alton body through weight bearing body on arm. Therapist provided demo, pt return demo. Pt required no A to get into quadriped on mat table. Once in position pt was guided through thoracic flexion \"cat\" and thoracic extension \"cow\" x10 for 10 second holds each without rest break needed and 1 vc to correct posture. Pt was facilitated to complete bilateral scapular exercises to increase scapular mobility and ROM in RUE.  Therapist provided demo, return demo of each exercise. Pt completed scapular elevation, protraction, retraction and circular retraction 2 sets of 10 reps each. ASSESSMENT:  Assessment: Mariaelena Newby is progressing well with his Occupational therapy goals since his evaluation on 11/2. Pt has met 4/5 STG and is progressing well with LTG. Pt has progressed from [address] A to SBA without AD for transfers from bed<>recliner. Pt has also progressed from CGA to SBA for functional mobility to and from the bathroom without AD and demo's reduced ataxia and R sided drift. Pt's right  strength has improved from 29# average to 44# average and 9-hole peg test improved from 223 seconds with right hand to 61 seconds indicating improved strength and coordination to right affected extremity. Pt will benefit from continued OT services to increase endurance, continue R UE strength, R sided awareness, and coordination, improve safety awareness, improve safety with higher level IADLs and maximize independence with ADLs, reduce risk for falls to return home with wife. Activity Tolerance:  Patient tolerance of  treatment: good. Activity Tolerance:  Patient tolerance of  treatment: good. Discharge Recommendations: Home with Outpatient OT  Equipment Recommendations: Equipment Needed: No  Mobility Devices: ADL Assistive Devices  ADL Assistive Devices: Shower Chair with [facility] - shower  Other: No AD needed. Pt does have access to a shower chair if needed, however currently doesn't require. Plan: Times Per Week: 5x a week for 90 min and 1x a week for 30 min  Times Per Day:  Once a day  Current Treatment Recommendations: Strengthening, ROM, Balance training, Functional mobility training, Endurance training, Neuromuscular re-education, Pain management, Safety education & training, Patient/Caregiver education & training, Positioning, Self-Care / ADL, Home management training    Patient Education  Patient Education: Role of OT, Plan of Care, Home Exercise Program, Hemibody Awareness/Attention, and Importance of Increasing Activity    Goals  Short Term Goals  Time Frame for Short Term Goals: 1 week from eval  Short Term Goal 1: Pt will complete oral hygiene routine while using R side for >/= 75% of task and MI to increase indep with self-care  Short Term Goal 2: Pt will complete item retieval with >/= 5 items with S and 0-1 VC for R sided involvement to increase indep with I/ADL preparation. Short Term Goal 3: Pt will complete dynamic standing with 2 UE release, MI and 0-2 VC for R sided awareness to increase indep with hygiene  Short Term Goal 4: Pt will complete tolieting, including transfer, with MI and 0-2 VC for R sided awareness to increase indep with tolieting  Short Term Goal 5: Pt will complete LB dressing with supervision and 0-1 VC for technique to increase indep with dressing  Long Term Goals  Time Frame for Long Term Goals : 2 weeks from eval  Long Term Goal 1: Pt will complete homemaking task with mod I to increase indep with laundry  Long Term Goal 2: Pt will complete pathway finding task with supervision and 0-2 vc for visual scanning to use signs to increase indep with community reintigration  Long Term Goal 3: Pt will complete BADL routine with mod I to increase indep with oral hygiene    Following session, patient left in safe position with all fall risk precautions in place.

## 2022-11-08 NOTE — PLAN OF CARE
Problem: Discharge Planning  Goal: Discharge to home or other facility with appropriate resources  Outcome: Progressing  Flowsheets  Taken 11/8/2022 0432  Discharge to home or other facility with appropriate resources:   Identify barriers to discharge with patient and caregiver   Identify discharge learning needs (meds, wound care, etc)  Taken 11/7/2022 2000  Discharge to home or other facility with appropriate resources: Identify barriers to discharge with patient and caregiver  Note: Focus Note  Education Focus: Prevention, detection, and treatment of acute complications  Verbalized understanding      Bowel Management: Continent: yes   Management: colace     Bladder Management: Continent: yes         Pain Management:    Non-pharmacological strategies for pain: rest, reposition, decrease stimulation             Problem: Safety - Adult  Goal: Free from fall injury  11/8/2022 0432 by Swati Kessler LPN  Outcome: Progressing  Flowsheets (Taken 11/8/2022 0432)  Free From Fall Injury: Instruct family/caregiver on patient safety  Note: Call light within reach. Walkway free of clutter. Hourly rounding performed. No fall.       Problem: ABCDS Injury Assessment  Goal: Absence of physical injury  Outcome: Progressing  Flowsheets (Taken 11/7/2022 1195)  Absence of Physical Injury: Implement safety measures based on patient assessment

## 2022-11-08 NOTE — PROGRESS NOTES
[facility]  Diagnosis List for Inpatient Rehab facility (IRF) - Patient Assessment Instrument (BRANDON)    Patient Name: Ramy Pérez        MRN: [id number]    : 1950  (73 y.o.)  Gender: male     Primary impairment requiring rehabilitation: 1.2 CVA, Right body involvement     Etiologic Diagnosis that led to the condition: Acute left corona radiata CVA    Comorbid conditions affecting rehabilitation:  Acute left corona radiata CVA  Right facial droop  Right hemiparesis   Dysphagia   Cognitive deficits  *Type 2 diabetes with nephropathy and hyperglycemia, uncontrolled   Hypertension  Hyperlipidemia  COPD Gold stg II  CAD with PCI  BPH with urinary retention, hx TURP  Hx RUL malignant 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

## 2022-11-08 NOTE — PLAN OF CARE
Problem: Respiratory - Adult  Goal: Clear lung sounds  Outcome: Progressing   Continue therapy as ordered to achieve and maintain clear breath sounds and improve aeration.

## 2022-11-08 NOTE — PROGRESS NOTES
Physical Medicine & Rehabilitation   Progress Note    Chief Complaint:  CVA    Subjective:  patient seen on rounds with TONEY Melo, BERRY, following patient's inpatient Rehab Team Conference. We updated him regarding his planned date of discharge to home from the inpatient rehab unit on Thursday, 11/3/2022 with outpatient PT and OT and speech therapy as a home exercise program.  He stated he has been sleeping fairly well at night. His bowels have been moving. He denied headache, chest pain, nausea, vomiting, and shortness of breath. He is very pleased with his progress in therapy so far and is eager to return home in a few days.       Rehabilitation:  PT: Discussed during team conference  OT: Discussed during team conference      Review of Systems:  CONSTITUTIONAL:  positive for  fatigue  EYES:  positive for chronic visual disturbance, denies new visual issues  HEENT:  negative  RESPIRATORY:  positive for cough  CARDIOVASCULAR:  negative  GASTROINTESTINAL:  positive for dysphagia, negative for abdomin pain, nausea, vomiting, upset stomach  GENITOURINARY:  BPH with urinary retention  SKIN:  negative  HEMATOLOGIC/LYMPHATIC:  negative  MUSCULOSKELETAL:  positive for  muscle weakness  NEUROLOGICAL:  positive for speech problems, coordination problems, gait problems, dysphagia, and weakness  negative for headaches, dizziness, numbness, pain, and tingling  BEHAVIOR/PSYCH:  negative for anxiety  System review otherwise negative      Objective:  Vitals:    11/08/22 0804   BP: (!) 152/78   Pulse: 75   Resp: 18   Temp: 98.2 °F (36.8 °C)   SpO2: 94%      awake  Orientation:   person, place, time  Mood: within normal limits  Affect: calm  General appearance: Patient is well nourished, well developed, well groomed and in no acute distress, appearing stated age     Memory:   normal with conversation  Attention/Concentration: normal  Language:  abnormal - slight dysarthria      Cranial Nerves:  II: Visual fields: normal  III,IV,VI: Extra Ocular Movements: intact  VII: Facial strength: abnormal right facial droop  ROM:  normal  Motor Exam:  Motor exam is 4- out of 5 right upper and 4+ out of 5 right lower extremities  Motor exam is 5 out of 5 left upper and left lower extremities  Tone:  normal  Muscle bulk: within normal limits  Sensory:  Sensory intact  Coordination:   abnormal - RUE and RLE     Skin: warm and dry, no rash or erythema  Peripheral vascular: Pulses: Normal upper and lower extremity pulses; Edema: no         Diagnostics:   Recent Results (from the past 24 hour(s))   POCT glucose    Collection Time: 11/08/22  7:36 AM   Result Value Ref Range    POC Glucose 241 (H) 70 - 108 mg/dl       Impression:  Acute left corona radiata CVA  Right facial droop  Right hemiparesis   Dysphagia   Cognitive deficits  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   GI: Protonix 40 mg p.o daily before breakfast.  Pain: Tylenol 650 mg p.o. every 4 hours as needed  Bladder: Continue Proscar 5 mg p.o. daily  Bowel: continue Colace 100 mg p.o. twice daily, Dulcolax suppository 10 mg rectal daily as needed, milk of magnesia 15 mL p.o. daily as needed, and GlycoLax 17 g p.o. daily as needed  Continue Augmentin 875/125 1 tablet every 12 hours continue dental abscess  Continue aspirin 81 mg p.o. daily for antiplatelets  Continue Plavix 75 mg p.o. daily for antiplatelets  Continue Humalog insulin for hyperglycemia  Continue DuoNeb nebulizer for COPD  Continue glucagon injection 1 mg subcutaneous as needed and glucose chewable tablet 16 g p.o. as needed for hypoglycemia  Team conference Tuesday   Right wrist XR, voltaren gel to right wrist, ICE prn. OT ok to wrap right wrist for support if needed  Planning discharge to home 11/3/2022 with follow-up outpatient PT and OT and home exercise program for speech therapy.     Missed Therapy Time:  None    Miranda Marie MD

## 2022-11-08 NOTE — NURSE NAVIGATOR
Neuro Nurse Navigator Follow Up    This RN attended care conference for pt. Medicare date is 11/15. Therapies report patient is doing well. Plan to discharge 11/10 with outpatient therapies.

## 2022-11-08 NOTE — PROGRESS NOTES
[address] [facility] THERAPY  [address] [facility]  DAILY NOTE    TIME   SLP Individual Minutes  Time In: 1000  Time Out: 1100  Minutes: 60  Timed Code Treatment Minutes: 60 Minutes   Cognitive Tx: 60 minutes      Date: 2022  Patient Name: Lisbeth Camacho      CSN: [id number]   : 1950  (67 y.o.)  Gender: male   Referring Physician: Lina Kimbrough MD  Diagnosis: CVA  Precautions: Aspiration risk, fall risk  Current Diet: Regular Diet with Thin Liquids   Swallowing Strategies: Standard Universal Swallow Precautions  Date of Last MBS/FEES:  2022    Pain:  No pain reported. Subjective: Patient sitting upright in recliner upon ST arrival, pleasant and cooperative. Patient agreeable to participate in hospital-wide navigation task and timeline task. Short-Term Goals:  SHORT TERM GOAL #1:  Goal 1: GOAL MET, NO NEW GOAL     SHORT TERM GOAL #2:  Goal 2: Patient will complete immediate/delayed recall and working memory tasks with 80% accuracy, mod cues to improve retention of functional information. INTERVENTIONS: Delayed Recall (~24hrs) of Errands List (cut grass, get gas, clean garage, do laundry, feed fish): 3/5 independent, 2/5 min cues     SHORT TERM GOAL #3:  Goal 3:  Patient will complete complex executive functioning tasks (meds, time, finances, hobbies) with 80% accuracy, mod cues to improve skills required for IADL completion and return to occupational duties. INTERVENTIONS: Patient completed a hospital-wide navigation task. Patient completed detailed, multi-step directions in order to navigate to 4 different locations across the hospital with good success. Excellent interpretation and use of signs throughout. Patient provided clear instructions and showed good problem solving skills, such as moving the sign at the [address] [place] [address] in order to see the reverends name.  Patient required min cues at times, however, suspect errors were made due to the client not be able to see very well far away. Patient easily adapted to new series of questions when the cafe was closed. Patient completed 15/17 independently and 2/17 with min cues. Patient completed a [facility] timeline task by navigating a comprehensive timeline in order to answer 16 questions related to [facility] history. Patient provided clear instructions to move forward or backward on the timeline in order to find the appropriate event and date. Patient presented with good sustained and alternating attention throughout. Patient appeared to struggle with finding isolated events due to capacity of comprehensive events on the timeline. Excellent mental math computation was present throughout. Overall, patient with good success in completion of task with 100% accuracy. Patient completed 12/16 independent and 4/16 with min cues. SHORT TERM GOAL #4:  Goal 4: Patient will complete complex attention tasks with no more than 5 errors across a structured activity consistently to permit potential return to driving and other multi-tasking responsibilities. INTERVENTIONS: Complex attention task initiated via use of newspaper article; patient instructed to identify one target word throughout while comprehending the article well enough to provide a summary following the task. Time- 4 minutes, 2 seconds   Errors- 9 (10/19)  Summary- Overall patient provided a well rounded summary. Patient was able to provide specific details of the article including 3 different topics discussed and give a comprehensive view of what the article included. *Patient identified target word after reading the summary rather then throughout; suspect this aided in patient's increased time to complete task.      SHORT TERM GOAL #5:  Goal 5: Patient will complete structured speech tasks (DDK's, sentence repetition, verbal fluency, phonemic oral motor isolation) with implementation of SOS strategies to improve intelligibility to self-perceived level of 75% or higher to maximize communication clarity. INTERVENTIONS: DNT due to focus on additional STGs. Long-Term Goals:  Time Frame for Long Term Goals: 5 weeks from evaluation    LONG TERM GOAL #1:  Goal 1: Patient will safely consume a regular diet + thin liquids while utilizing compensatory strategies without overt s/s of aspiration to ensure safe return to previous diet. -GOAL MET, NO NEW GOAL    LONG TERM GOAL #2:  Goal 2: Patient will improve cognition to a level of Mod I in order to permit potential return to completion of IADL's in home setting. LONG TERM GOAL #3:  Goal 3: Patient will improve speech intelligibility to a self-perceived level of 85% clarity in order to improve overall communication of wants/needs. Comprehension: 6 - Complex ideas 90% or device (hearing aid or glasses- if patient is primarily a visual learner)  Expression: 6 - Device used to express complex ideas/needs  Social Interaction: 6 - Patient requires medication for mood and/or effect  Problem Solvin - Patient solves simple/routine tasks 75-90%+   Memory: 4 - Patient remembers 75-90%+ of the time      EDUCATION:  Learner: Patient  Education:  Reviewed ST goals and Plan of Care and Reviewed recommendations for follow-up  Evaluation of Education: Verbalizes understanding and Family not present    ASSESSMENT/PLAN:  Activity Tolerance:  Patient tolerance of  treatment: good. Good participation. Assessment/Plan: Patient progressing toward established goals. Continues to require skilled care of licensed speech pathologist to progress toward achievement of established goals and plan of care. Plan for Next Session: Recall, time, finances  Discharge Recommendations: Outpatient Speech Therapy    Meghan RADER  Speech Therapy Student Intern

## 2022-11-08 NOTE — PLAN OF CARE
Problem: Discharge Planning  Goal: Discharge to home or other facility with appropriate resources  11/8/2022 1320 by Ray Nelson, [facility] (Taken 11/8/2022 0811 by David San LPN)  Discharge to home or other facility with appropriate resources: Identify barriers to discharge with patient and caregiver  Note: Team conference held Tuesday, 11/8. Recommendations of the team were explained to the patient by Dr Ghada Laboy and GABRIELLE. Team is recommending that patient continue on acute inpatient rehab for PT, OT and ST, with expected discharge date of Thursday, 11/10. Following discharge, team is recommending Outpatient PT, OT and ST. Care plan reviewed with patient. Patient verbalized understanding of the plan of care and contributed to goal setting. GABRIELLE left a message for Ruthie to schedule family education. SW to follow and maintain involvement in discharge planning.

## 2022-11-08 NOTE — PROGRESS NOTES
Patient: Konrad Fisher  Unit/Bed: 7E-65/065-A  YOB: 1950  MRN: [id number] Acct: [de-identified]   Admitting Diagnosis: CVA (cerebral vascular accident) [facility]) [I63.9]  Acute cerebrovascular accident (CVA) [facility]) [I63.9]  Admit Date:  11/2/2022  Hospital Day: 5    Assessment:     Principal Problem:    Acute cerebrovascular accident (CVA) ([facility] [address]  Active Problems:    Type 2 diabetes mellitus treated without insulin ([facility] [address]    Adenocarcinoma of left lung ([facility] [address]    CAD in native artery  Resolved Problems:    * No resolved hospital problems. *      Plan: Will cut the number of available carbs to see how that helps the elevated CS. Subjective:     Patient has no complaint of CP or SOB. .   Medication side effects: none    Scheduled Meds:   diclofenac sodium  2 g Topical BID    amLODIPine  5 mg Oral Daily    amoxicillin-clavulanate  1 tablet Oral 2 times per day    aspirin  81 mg Oral Daily    atorvastatin  40 mg Oral Nightly    clopidogrel  75 mg Oral Daily    finasteride  5 mg Oral Daily    ipratropium-albuterol  1 ampule Inhalation BID    losartan  25 mg Oral Daily    pantoprazole  40 mg Oral QAM AC    docusate sodium  100 mg Oral BID    metFORMIN  1,000 mg Oral BID WC     Continuous Infusions:  PRN Meds:glucagon (rDNA), glucose, ipratropium-albuterol, ondansetron **OR** ondansetron, polyethylene glycol, acetaminophen, bisacodyl, magnesium hydroxide    Review of Systems  Pertinent items are noted in HPI. Objective:     No data found. I/O last 3 completed shifts: In: 1320 [P.O.:1320]  Out: -   No intake/output data recorded.     /77   Pulse 95   Temp 97.5 °F (36.4 °C) (Oral)   Resp 16   Ht 5' 8\" (1.727 m)   Wt 191 lb 0.9 oz (86.7 kg)   SpO2 94%   BMI 29.05 kg/m²     General appearance: alert, appears stated age, and cooperative  Head: Normocephalic, without obvious abnormality, atraumatic  Lungs: clear to auscultation bilaterally  Chest wall: no tenderness  Heart: regular rate and rhythm, S1, S2 normal, no murmur, click, rub or gallop  Abdomen: soft, non-tender; bowel sounds normal; no masses,  no organomegaly  Extremities: extremities normal, atraumatic, no cyanosis or edema  Skin: Skin color, texture, turgor normal. No rashes or lesions  Neurologic:  right sided weakness improving    Data Review:    Latest Reference Range & Units 11/4/22 12:29 11/4/22 17:14 11/5/22 12:41 11/6/22 07:31   POC Glucose 70 - 108 mg/dl 158 (H) 159 (H) 188 (H) 175 (H)   (H): Data is abnormally high    Electronically signed by Dina Shay MD on 11/7/2022 at 8:14 PM

## 2022-11-09 LAB
BASOPHILS # BLD: 0.7 %
BASOPHILS ABSOLUTE: 0.1 THOU/MM3 (ref 0–0.1)
EOSINOPHIL # BLD: 2.5 %
EOSINOPHILS ABSOLUTE: 0.2 THOU/MM3 (ref 0–0.4)
ERYTHROCYTE [DISTWIDTH] IN BLOOD BY AUTOMATED COUNT: 13.9 % (ref 11.5–14.5)
ERYTHROCYTE [DISTWIDTH] IN BLOOD BY AUTOMATED COUNT: 44 FL (ref 35–45)
GLUCOSE BLD-MCNC: 172 MG/DL (ref 70–108)
HCT VFR BLD CALC: 43.9 % (ref 42–52)
HEMOGLOBIN: 14.6 GM/DL (ref 14–18)
IMMATURE GRANS (ABS): 0.04 THOU/MM3 (ref 0–0.07)
IMMATURE GRANULOCYTES: 0.5 %
LYMPHOCYTES # BLD: 20.3 %
LYMPHOCYTES ABSOLUTE: 1.7 THOU/MM3 (ref 1–4.8)
MCH RBC QN AUTO: 28.9 PG (ref 26–33)
MCHC RBC AUTO-ENTMCNC: 33.3 GM/DL (ref 32.2–35.5)
MCV RBC AUTO: 86.8 FL (ref 80–94)
MONOCYTES # BLD: 8.5 %
MONOCYTES ABSOLUTE: 0.7 THOU/MM3 (ref 0.4–1.3)
NUCLEATED RED BLOOD CELLS: 0 /100 WBC
PLATELET # BLD: 229 THOU/MM3 (ref 130–400)
PMV BLD AUTO: 9.4 FL (ref 9.4–12.4)
RBC # BLD: 5.06 MILL/MM3 (ref 4.7–6.1)
SEG NEUTROPHILS: 67.5 %
SEGMENTED NEUTROPHILS ABSOLUTE COUNT: 5.6 THOU/MM3 (ref 1.8–7.7)
WBC # BLD: 8.3 THOU/MM3 (ref 4.8–10.8)

## 2022-11-09 PROCEDURE — 36415 COLL VENOUS BLD VENIPUNCTURE: CPT

## 2022-11-09 PROCEDURE — 97110 THERAPEUTIC EXERCISES: CPT

## 2022-11-09 PROCEDURE — 97130 THER IVNTJ EA ADDL 15 MIN: CPT

## 2022-11-09 PROCEDURE — 97116 GAIT TRAINING THERAPY: CPT

## 2022-11-09 PROCEDURE — 85025 COMPLETE CBC W/AUTO DIFF WBC: CPT

## 2022-11-09 PROCEDURE — 94640 AIRWAY INHALATION TREATMENT: CPT

## 2022-11-09 PROCEDURE — 97530 THERAPEUTIC ACTIVITIES: CPT

## 2022-11-09 PROCEDURE — 82948 REAGENT STRIP/BLOOD GLUCOSE: CPT

## 2022-11-09 PROCEDURE — 6370000000 HC RX 637 (ALT 250 FOR IP): Performed by: PHYSICAL MEDICINE & REHABILITATION

## 2022-11-09 PROCEDURE — 1180000000 HC REHAB R&B

## 2022-11-09 PROCEDURE — 6370000000 HC RX 637 (ALT 250 FOR IP): Performed by: FAMILY MEDICINE

## 2022-11-09 PROCEDURE — 97129 THER IVNTJ 1ST 15 MIN: CPT

## 2022-11-09 PROCEDURE — 99232 SBSQ HOSP IP/OBS MODERATE 35: CPT | Performed by: PHYSICAL MEDICINE & REHABILITATION

## 2022-11-09 RX ORDER — FINASTERIDE 5 MG/1
5 TABLET, FILM COATED ORAL DAILY
Qty: 30 TABLET | Refills: 3 | Status: SHIPPED | OUTPATIENT
Start: 2022-11-10

## 2022-11-09 RX ORDER — PSEUDOEPHEDRINE HCL 30 MG
100 TABLET ORAL 2 TIMES DAILY
COMMUNITY
Start: 2022-11-09

## 2022-11-09 RX ORDER — AMLODIPINE BESYLATE 10 MG/1
10 TABLET ORAL DAILY
Qty: 30 TABLET | Refills: 3 | Status: SHIPPED | OUTPATIENT
Start: 2022-11-09

## 2022-11-09 RX ORDER — METFORMIN HYDROCHLORIDE 500 MG/1
1000 TABLET, EXTENDED RELEASE ORAL 2 TIMES DAILY WITH MEALS
Qty: 30 TABLET | Refills: 3 | Status: SHIPPED | OUTPATIENT
Start: 2022-11-09 | End: 2022-11-10 | Stop reason: HOSPADM

## 2022-11-09 RX ORDER — PANTOPRAZOLE SODIUM 40 MG/1
40 TABLET, DELAYED RELEASE ORAL
Qty: 30 TABLET | Refills: 3 | Status: SHIPPED | OUTPATIENT
Start: 2022-11-10

## 2022-11-09 RX ORDER — ATORVASTATIN CALCIUM 40 MG/1
40 TABLET, FILM COATED ORAL NIGHTLY
Qty: 30 TABLET | Refills: 3 | Status: SHIPPED | OUTPATIENT
Start: 2022-11-09

## 2022-11-09 RX ORDER — LOSARTAN POTASSIUM 25 MG/1
25 TABLET ORAL DAILY
Qty: 30 TABLET | Refills: 3 | Status: SHIPPED | OUTPATIENT
Start: 2022-11-10

## 2022-11-09 RX ADMIN — PANTOPRAZOLE SODIUM 40 MG: 40 TABLET, DELAYED RELEASE ORAL at 05:49

## 2022-11-09 RX ADMIN — FINASTERIDE 5 MG: 5 TABLET, FILM COATED ORAL at 08:51

## 2022-11-09 RX ADMIN — METFORMIN HYDROCHLORIDE 1000 MG: 500 TABLET, EXTENDED RELEASE ORAL at 17:50

## 2022-11-09 RX ADMIN — DOCUSATE SODIUM 100 MG: 100 CAPSULE, LIQUID FILLED ORAL at 21:08

## 2022-11-09 RX ADMIN — LOSARTAN POTASSIUM 25 MG: 25 TABLET, FILM COATED ORAL at 08:51

## 2022-11-09 RX ADMIN — AMLODIPINE BESYLATE 5 MG: 5 TABLET ORAL at 08:51

## 2022-11-09 RX ADMIN — ASPIRIN 81 MG 81 MG: 81 TABLET ORAL at 08:51

## 2022-11-09 RX ADMIN — IPRATROPIUM BROMIDE AND ALBUTEROL SULFATE 1 AMPULE: .5; 3 SOLUTION RESPIRATORY (INHALATION) at 16:47

## 2022-11-09 RX ADMIN — AMOXICILLIN AND CLAVULANATE POTASSIUM 1 TABLET: 875; 125 TABLET, FILM COATED ORAL at 08:51

## 2022-11-09 RX ADMIN — IPRATROPIUM BROMIDE AND ALBUTEROL SULFATE 1 AMPULE: .5; 3 SOLUTION RESPIRATORY (INHALATION) at 06:29

## 2022-11-09 RX ADMIN — CLOPIDOGREL BISULFATE 75 MG: 75 TABLET ORAL at 08:51

## 2022-11-09 RX ADMIN — ATORVASTATIN CALCIUM 40 MG: 40 TABLET, FILM COATED ORAL at 21:08

## 2022-11-09 RX ADMIN — METFORMIN HYDROCHLORIDE 1000 MG: 500 TABLET, EXTENDED RELEASE ORAL at 08:51

## 2022-11-09 RX ADMIN — DOCUSATE SODIUM 100 MG: 100 CAPSULE, LIQUID FILLED ORAL at 08:51

## 2022-11-09 ASSESSMENT — PAIN SCALES - GENERAL
PAINLEVEL_OUTOF10: 0
PAINLEVEL_OUTOF10: 0

## 2022-11-09 NOTE — DISCHARGE SUMMARY
Physical Medicine & Rehabilitation   Discharge Summary     Patient Identification:  Anival Gray  : 1950  Admit date: 2022  Discharge date: 11/10/2022  Attending provider: Keagan Morrow MD        Primary care provider: Jack Rios.  Dennis Hawkins MD       Discharge Diagnoses:   Acute left corona radiata CVA  Right facial droop  Right hemiparesis   Dysphagia   Cognitive deficits  Type 2 diabetes with nephropathy and hyperglycemia, uncontrolled   Hypertension  Hyperlipidemia  COPD Gold stg II  CAD with PCI  BPH with urinary retention, hx TURP  Hx RUL malignant neoplasm with Right VATS wedge resection x 2    Apical abscess of the left maxillary first premolar, Augmentin until dental follow up   Anxiety  Gait difficulty  Lesion of the urinary bladder  Right inguinal hernia      Discharge Functional Status:    Occupational Therapy:  EATING:Independent      ORAL HYGIENE:Independent standing sinkside, no LOB    TOILETING HYGIENE:Independent independent for nnamdi care    SHOWERING/BATHING:Independent in tub, standing > 10 min, no LOB or cues required    UPPER BODY DRESSING:Independent  independent with retrieving the shirt and donning it    LOWER BODY DRESSING:Independent  independent with retrieving and donning    FOOTWEAR:Independent  independent with donning and doffing shoes/socks    TOILET TRANSFER: Independent  independent      Physical Therapy:  ROLLING LEFT AND RIGHT: Independent       SIT TO SIDELYING:  Independent      SIDELYING TO SIT: Independent      SIT TO STAND:Independent       CHAIR TO BED TRANSFER:Independent       CAR TRANSFER:Independent      WALK 10 FEET:Independent        WALK 50 FEET WITH 2 TURNS:Independent         WALK 150 FEET:Independent        WALK 10 FEET ON EVEN SURFACES:Independent         NAVIGATE 1 STEP:Independent          NAVIGATE 4 STEPS:Independent        NAVIGATE 12 STEPS:Supervision or touching assistance         PROPEL WHEELCHAIR 50 FEET WITH 2 TURNS:         PROPEL WHEELCHAIR 150 FEET:     .  Not applicable      Speech therapy:    Comprehension: 6 - Complex ideas 90% or device (hearing aid or glasses- if patient is primarily a visual learner)  Expression: 6 - Device used to express complex ideas/needs  Social Interaction: 6 - Patient requires medication for mood and/or effect  Problem Solvin - Patient able to solve simple/routine tasks  Memory: 5 - Patient requires prompting with stress/unfamiliar situations    Inpatient Acute Hospital Course:   Chief Complaint and Reason for Rehabilitation Admission:   CVA; rehab needs     History of Present Illness:  Doris Acosta  is a 67 y.o. male admitted to the inpatient rehabilitation unit on 2022. He was originally admitted to [address] [place] [address] on 10/31/2022. Patient  has a past medical history of Anxiety, Arthritis, CAD (coronary artery disease), Cancer ([facility] [address] COPD (chronic obstructive pulmonary disease) ([facility] [address] Diabetes ([facility] [address] Enlarged prostate with urinary retention, Gait difficulty, Generalized weakness, Hyperlipidemia, Hypertension, Lesion of bladder, Osteoarthritis, Retention of urine, Right inguinal hernia, S/P cardiac catheterization, S/P TURP, SOB (shortness of breath), and Voiding difficulty. Patient presented to [facility] ER due to new onset of right arm numbness and balance issues. Patient with noted chronic SOB. Family reports right sides weakness, dysarthria, right sided facial droop. Patient was to be taking ASA and plavix daily, but only was taking ASA every other day due to bruising. Patient with initial NIHSS of 6. Code stroke called, not a tPA candidate. CT of the head without contrast on 10/31/2022 demonstrated stable mild global volume loss, and apical abscess associated with a left maxillary first premolar, and no other significant acute findings. CT of the head and neck on 10/31/2022 without major acute findings.  MRI of the brain without contrast on 10/31/2022 demonstrated an area of diffusion in the left parietal periventricular white matter with corresponding diminished signal intensity on ADC map consistent with acute infarct; mild atrophy and probable mild severity chronic small vessel ischemic changes, and no other significant abnormalities. CVA likely due to poorly controlled HTN and DM. Patient is seen today upon admission to the inpatient rehabilitation unit. Patient lives with his s/o that works full time, 6a-2pm daily. Discussed IPR and goal of helping patient to be as independent as possible at discharge including mobility, ADLs, cognition, swallowing, and endurance. Inpatient Rehabilitation Course:   Roya Cheatham is a 67 y.o. male admitted to inpatient rehabilitation on 11/2/2022 for improved mobility, ADLs, cognition, swallowing, and endurance. The patient participated in an aggressive multidisciplinary inpatient rehabilitation program involving 3 hours per day, 5 days per week of rehabilitation. Diabetic management was maximized with diet and medication options to attempt to achieve blood sugar control between . Hypertension management was undertaken with medication management on any patient with systolic blood pressure greater than 407 or diastolic blood pressure greater than 90. Hyperlipidemia was considered and the patient was started or continued on a statin medication for any LDL>100. Appropriate stroke prophylaxis was maintained throughout rehabilitation stay with aspirin 81 mg p.o. daily and Plavix 75 mg p.o. daily. Appropriate DVT prophylaxis options were continued throughout rehabilitation stay with aspirin 81 mg daily, Plavix 75 mg daily, and EPC cuffs. Dr Divine Phillips followed during the IPR stay for medical management    Patient was discharged Home in Stable condition.     Consults:   Family Medicine, Dr. Randee Alexandra:   CBC:   Lab Results   Component Value Date/Time    WBC 8.3 11/09/2022 05:04 PM    RBC 5.06 11/09/2022 05:04 PM    HGB 14.6 11/09/2022 05:04 PM    HCT 43.9 11/09/2022 05:04 PM    MCV 86.8 11/09/2022 05:04 PM    MCH 28.9 11/09/2022 05:04 PM    MCHC 33.3 11/09/2022 05:04 PM    RDW 13.8 06/01/2020 11:47 AM     11/09/2022 05:04 PM    MPV 9.4 11/09/2022 05:04 PM     BMP:    Lab Results   Component Value Date/Time     11/03/2022 05:47 AM    K 4.0 11/03/2022 05:47 AM     11/03/2022 05:47 AM    CO2 23 11/03/2022 05:47 AM    BUN 19 11/03/2022 05:47 AM    LABALBU 4.3 10/31/2022 10:50 AM    CREATININE 0.9 11/03/2022 05:47 AM    CALCIUM 9.0 11/03/2022 05:47 AM    LABGLOM >60 11/02/2022 06:08 PM    GLUCOSE 206 11/03/2022 05:47 AM        Recent Labs     11/08/22  0736 11/09/22  0837 11/10/22  0749   POCGLU 241* 172* 200*       Cholesterol Panel:   Results in Past 30 Days  Result Component Current Result Ref Range Previous Result Ref Range   Cholesterol, Total 169 (11/1/2022) 100 - 199 mg/dL Not in Time Range    HDL 34 (11/1/2022) mg/dL Not in Time Range    LDL Calculated 90 (11/1/2022) mg/dL Not in Time Range    Triglycerides 224 (H) (11/1/2022) 0 - 199 mg/dL Not in Time Range        Patient Instructions:   Home with OP Therapy   Therapy orders: PT, OT, and home exercise program for speech therapy    Discharge lab work: n/a  Code status: Full Code   Activity: activity as tolerated and no driving until outpatient OT 's evaluation completed successfully  Diet: ADULT DIET; Regular; 4 carb choices (60 gm/meal); Low Fat/Low Chol/High Fiber/[place]    Wound Care: n/a  Equipment: No AD needed. Pt does have access to a shower chair if needed, however currently doesn't require.     Follow-up visits: See after visit summary from hospitalization     Scheduled Meds:   diclofenac sodium  2 g Topical BID    amLODIPine  5 mg Oral Daily    aspirin  81 mg Oral Daily    atorvastatin  40 mg Oral Nightly    clopidogrel  75 mg Oral Daily    finasteride  5 mg Oral Daily    ipratropium-albuterol  1 ampule Inhalation BID losartan  25 mg Oral Daily    pantoprazole  40 mg Oral QAM AC    docusate sodium  100 mg Oral BID    metFORMIN  1,000 mg Oral BID WC     Continuous Infusions:  PRN Meds:.glucagon (rDNA), glucose, ipratropium-albuterol, ondansetron **OR** ondansetron, polyethylene glycol, acetaminophen, bisacodyl, magnesium hydroxide          Controlled substances monitoring: not applicable.      40 minutes spent preparing the patient for discharge    Madhu Syed MD

## 2022-11-09 NOTE — PROGRESS NOTES
Discharge pain assessment:    Complete within 3 days of discharge.       Pain Effect on Sleep ()   Ask patient: Laura Morris the past 5 days, how much of the time has pain made it hard for you to sleep at night?\" 1.  Rarely or not at all

## 2022-11-09 NOTE — PLAN OF CARE
Problem: Safety - Adult  Goal: Free from fall injury  11/8/2022 2317 by Arely Barbosa LPN  Outcome: Progressing   Pt uses call light appropriately, bed alarm on and functioning. Non skid footwear, gait belt and walker in use for transfers. Problem: Pain  Goal: Verbalizes/displays adequate comfort level or baseline comfort level  11/8/2022 2317 by Arely Barbosa LPN  Outcome: Progressing   Patient denies having pain this shift. Care plan reviewed with patient. Patient verbalized understanding of the plan of care and contribute to goal setting.

## 2022-11-09 NOTE — PLAN OF CARE
Problem: Discharge Planning  Goal: Discharge to home or other facility with appropriate resources  11/9/2022 1513 by EFRAÍN Evans  Note: SW spoke with Latanya Marley on this date. She is not available for discharge until 4 PM on 11/10. SW explained the process and nursing education. SW met with patient and provided information for discharge including contact for Cynthia Qiu for transportation assistance. SW will follow and maintain involvement in discharge planning.

## 2022-11-09 NOTE — PLAN OF CARE
Problem: Discharge Planning  Goal: Discharge to home or other facility with appropriate resources  11/9/2022 1514 by EFRAÍN Parks  Note: Transportation:   Has transportation kept you from medical appointments, meetings, work, or from getting things needed for daily living? (Check all that apply)  No.      Health Literacy:   How often do you need to have someone help you when you read instructions, pamphlets, or other written material from your doctor or pharmacy? 0. - Never    Social Isolation:  How often do you feel lonely or isolated from those around you?  0. Never      Patient Mood Interview (PHQ-2 to 9) (from [employer]©)   Say to Patient: \"Over the last 2 weeks, have you been bothered by any of the following problems? \"   If symptom is present, enter 1 (yes) in column 1 (Symptom Presence)  If yes in column 1, then ask the patient: About how often have you been bothered by this?   Read and show the patient a card with the symptom frequency choices. Indicate response in column 2, Symptom Frequency. Symptom Presence  No (enter 0 in column 2)   Yes (enter 0-3 in column 2)  9. No response (leave column 2 blank) Symptom Frequency  Never or 1 day  2-6 days (several days)  7-11 days (half or more of the days)  12-14 days (nearly every day    Symptom Presence Symptom Frequency   Little interest or pleasure in doing things 0. No 0. Never or 1 day   Feeling down, depressed, or hopeless 0. No 0.  Never or 1 day

## 2022-11-09 NOTE — PROGRESS NOTES
Physical Medicine & Rehabilitation   Progress Note    Chief Complaint:  CVA    Subjective:  patient seen on rounds in his room. He had questions regarding return to work and when he can return to driving. I explained that he will be scheduled for an OT 's evaluation following the completion of his therapies will also be seen in the office per Leonie the nurse practitioner and she will assist him in the transition back to work. His only other concern was that his discharge orders be written so that he is ready for discharge to home tomorrow! He denied headache, chest pain, shortness of breath, nausea, vomiting, and calf pain. Rehabilitation:  PT:     OBJECTIVE:  Bed Mobility:  Rolling to Left: Modified Independent   Rolling to Right: Modified Independent   Supine to Sit: Modified Independent  Sit to Supine: Modified Independent   *Patient performed with HOB flat and no railings     Transfers:  Sit to Stand: Modified Independent  Stand to Sit:Modified Independent  To/From Bed and Chair: Modified Independent  Car:Modified Independent     Ambulation:  Modified Independent  Distance: 150 feet, 20 feet x 2 and 160 feet   Surface: Level Tile and Uneven Surface  Device:No Device  Gait Deviations:  Slow Sofie, Decreased Step Length Bilaterally, Decreased Gait Speed, and Decreased Heel Strike Bilaterally     Stairs:  Platform:  6\" platform X 1 using No Device and Modified Independent.      Modified Independent  Number of Steps: 4  Height: 6\" step with Bilateral Handrails     Supervision  Number of Steps: 12  Height: 6\" step with Bilateral Handrails     Balance:  Dynamic Standing Balance: Modified Independent  *Patient instructed in picking up object from floor     Neuromuscular Re-education  None     Exercise:  Patient was guided in 1 set(s) 20 reps of exercises: Standing heel/toe raises, Standing marches, Standing hip abduction/adduction, Standing hip extension, Standing hamstring curls, Mini squats , Exercises were completed for increased independence with functional mobility. *Patient given and instructed in HEP with ability to perform with 80% accuracy      Functional Outcome Measures: Not completed     ASSESSMENT:  Assessment: Patient progressing toward established goals. Activity Tolerance:  Patient tolerance of  treatment: good. Equipment Recommendations:Equipment Needed:  (continue to assess needs)  Discharge Recommendations: Continue to assess pending progress, Patient would benefit from continued therapy after discharge Home with Outpatient PT       OT:     COGNITION: WFL and Decreased Recall     ADL:   No ADL's completed this session. Leroy Echevarrai BALANCE:  Sitting Balance:  Independent. Standing Balance: Independent. BED MOBILITY:  Not Tested     TRANSFERS:  Sit to Stand:  Independent. From recliner in room and chairs in therapy apartment. Stand to Sit: Independent. At recliner in room and chairs in therapy apartment. FUNCTIONAL MOBILITY:  Assistive Device: None  Assist Level:  Supervision. Distance:  From room to [address] [facility], [address] [place] [address] and [employer] shop and back for community reintegration task. No noted LOB or fatigue. ADDITIONAL ACTIVITIES:  Pt was facilitated to complete pathway finding, community reintegration task. Therapist listed 3 places ([address], [address], gift shop) for the pt to find and gave directions that he has to open all doors and work the elevator. Pt required 2 vc for recall of room number 7K28. Pt required 1 vc for direction. Pt was able to use signs and directions throughout hospital to locate room. Pt required no vc to remember other two areas. Pt was able to find all 3 locations and return back to [facility] with a total of 3 vc. Not noted LOB or fatigue. Pt was given directions to return back to therapy apartment. Pt was then faciliated to complete task of making coffee using mainly R hand d/t decreased use of RUE with being L hand dominant and R side affected.  Pt was able to complete task with 1 vc for safety of cleaning off the water from the pot that spilled and closing the lid. Pt was then faciliated to complete sink of dishes while coffee was being made using R hand to scrub and bilateral integration to hold dish in L hand. Pt required min vc to use R hand for tasks. Pt was faciliated to complete 6 challenging UE exercises x10 sets with 2 lb dowel laureano to increase BUE strength and endurance and to promote normal movement from RUE with LUE assisting through movements. Pt required 1 vc for correct posture with exercises. ASSESSMENT:  Activity Tolerance:  Patient tolerance of  treatment: good.         Discharge Recommendations: Home with Outpatient OT  Equipment Recommendations: Equipment Needed: No      Review of Systems:  CONSTITUTIONAL:  positive for  fatigue  EYES:  positive for chronic visual disturbance, denies new visual issues  HEENT:  negative  RESPIRATORY:  positive for cough  CARDIOVASCULAR:  negative  GASTROINTESTINAL:  positive for dysphagia, negative for abdomin pain, nausea, vomiting, upset stomach  GENITOURINARY:  BPH with urinary retention  SKIN:  negative  HEMATOLOGIC/LYMPHATIC:  negative  MUSCULOSKELETAL:  positive for  muscle weakness  NEUROLOGICAL:  positive for speech problems, coordination problems, gait problems, dysphagia, and weakness  negative for headaches, dizziness, numbness, pain, and tingling  BEHAVIOR/PSYCH:  negative for anxiety  System review otherwise negative      Objective:  Vitals:    11/09/22 0915   BP: 114/72   Pulse: 97   Resp: 16   Temp: 97.8 °F (36.6 °C)   SpO2:       awake  Orientation:   person, place, time  Mood: within normal limits  Affect: calm  General appearance: Patient is well nourished, well developed, well groomed and in no acute distress, appearing stated age     Memory:   normal with conversation  Attention/Concentration: normal  Language:  abnormal - slight dysarthria      Cranial Nerves:  II: Visual fields: normal  III,IV,VI: Extra Ocular Movements: intact  VII: Facial strength: abnormal right facial droop  ROM:  normal  Motor Exam:  Motor exam is 4 out of 5 right upper and 5- out of 5 right lower extremity  Motor exam is 5 out of 5 left upper and left lower extremities  Tone:  normal  Muscle bulk: within normal limits  Sensory:  Sensory intact  Coordination:   abnormal - RUE and RLE     Skin: warm and dry, no rash or erythema  Peripheral vascular: Pulses: Normal upper and lower extremity pulses; Edema: no         Diagnostics:   Recent Results (from the past 24 hour(s))   POCT glucose    Collection Time: 11/09/22  8:37 AM   Result Value Ref Range    POC Glucose 172 (H) 70 - 108 mg/dl       Impression:  Acute left corona radiata CVA  Right facial droop  Right hemiparesis   Dysphagia   Cognitive deficits  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   GI: Protonix 40 mg p.o daily before breakfast.  Pain: Tylenol 650 mg p.o. every 4 hours as needed  Bladder: Continue Proscar 5 mg p.o. daily  Bowel: continue Colace 100 mg p.o. twice daily, Dulcolax suppository 10 mg rectal daily as needed, milk of magnesia 15 mL p.o. daily as needed, and GlycoLax 17 g p.o. daily as needed  Continue Augmentin 875/125 1 tablet every 12 hours continue dental abscess  Continue aspirin 81 mg p.o. daily for antiplatelets  Continue Plavix 75 mg p.o. daily for antiplatelets  Continue Humalog insulin for hyperglycemia  Continue DuoNeb nebulizer for COPD  Continue glucagon injection 1 mg subcutaneous as needed and glucose chewable tablet 16 g p.o. as needed for hypoglycemia  Team conference Tuesday   Right wrist XR, voltaren gel to right wrist, ICE prn. OT ok to wrap right wrist for support if needed  Planning discharge to home 11/3/2022 with follow-up outpatient PT and OT and home exercise program for speech therapy.     Missed Therapy Time:  None    Lina Kimbrough MD

## 2022-11-09 NOTE — DISCHARGE INSTRUCTIONS
NO DRIVING UNTIL CLEARED BY RICH BRADFORD. Learning About How to Prevent a Stroke  What is a stroke? A stroke is damage to the brain that occurs when a blood vessel in the brain bursts or is blocked by a blood clot. Without blood and the oxygen it carries, part of the brain starts to die. The part of the body controlled by the damaged area of the brain can't work properly. Brain damage can start within minutes of a stroke. But quick treatment can help limit the damage and increase the chance of a full recovery. What puts you at risk for stroke? A risk factor is anything that makes you more likely to have a particular health problem. Risk factors for stroke that you can manage or change include:  Health problems like atrial fibrillation, diabetes, high blood pressure, high cholesterol, hardening of the arteries (atherosclerosis), and sickle cell disease. Smoking. Drinking more than 2 alcoholic drinks a day for men and 1 drink a day for women. Being overweight. Not eating healthy foods. Not getting enough physical activity. Risk factors you can't change include:  Having a previous stroke. Family history of stroke. Being older. Being [place] American, [place] Native, Native [place], or [facility] 178. Being female. Having certain problems during pregnancy, such as preeclampsia. Being past menopause. Your doctor can help you know your risk. Then you and your doctor can talk about whether to take steps to lower it. How can you help prevent a stroke? Here are some things you can do to help prevent a stroke. Manage health problems that raise your risk. These include atrial fibrillation, diabetes, high blood pressure, and high cholesterol. Have a heart-healthy lifestyle. Don't smoke. If you need help quitting, talk to your doctor about stop-smoking programs and medicines. These can increase your chances of quitting for good.   Limit alcohol to 2 drinks a day for men and 1 drink a day for women. Stay at a healthy weight. Lose weight if you need to. Be active. Get at least 30 minutes of exercise on most days of the week. Walking is a good choice. You also may want to do other activities, such as running, swimming, cycling, or playing tennis or team sports. Eat heart-healthy foods. These include vegetables, fruits, nuts, beans, lean meat, fish, and whole grains. Limit sodium and sugar. If you think you may have a problem with alcohol or drug use, talk to your doctor. If you use hormone therapy or hormonal birth control, talk with your doctor. Ask if these are right for you. They may raise the risk of stroke in some people. Decide with your doctor whether you will also take medicines to help lower your risk. For example, you and your doctor may decide you will take a medicine that prevents blood clots. What are the BE FAST stroke warning signs? BE FAST is a simple way to remember the main symptoms of stroke. These symptoms happen suddenly. So knowing what to look for helps you know when to call for medical help. BE FAST stands for:  B alance. Loss of balance or trouble walking. E yes. Trouble seeing out of one or both eyes. F ace. Weakness or drooping on one side of the face. A rm. Weakness or numbness in an arm or leg. S peech. Trouble speaking. T buster to call 911. Other stroke symptoms include sudden confusion, sudden trouble understanding simple statements, fainting, a seizure, and a sudden, severe headache. What are the symptoms of a stroke? Symptoms of a stroke happen quickly. A stroke may cause:  Sudden numbness, tingling, weakness, or loss of movement in your face, arm, or leg, especially on only one side of your body. Sudden vision changes. Sudden trouble speaking. Sudden confusion or trouble understanding simple statements. Sudden problems with walking or balance. A sudden, severe headache that is different from past headaches. Fainting. A seizure.   It's important to call for medical help if you have stroke symptoms. Quick treatment may save your life. And it may reduce the damage in your brain so that you have fewer problems after the stroke. Follow-up care is a key part of your treatment and safety. Be sure to make and go to all appointments, and call your doctor if you are having problems. It's also a good idea to know your test results and keep a list of the medicines you take. Where can you learn more? Go to https://KnomopeCeregene.amSTATZ. org and sign in to your GroundLink account. Enter G757 in the HauteDay box to learn more about \"Learning About How to Prevent a Stroke. \"     If you do not have an account, please click on the \"Sign Up Now\" link. Current as of: March 28, 2022               Content Version: 13.4  © 2006-2022 What's Trending. Care instructions adapted under license by [facility] ([facility]). If you have questions about a medical condition or this instruction, always ask your healthcare professional. [place] [address] any warranty or liability for your use of this information. Stroke: Care Instructions  Overview     A stroke is damage to the brain that occurs when a blood vessel in the brain bursts or is blocked by a blood clot. Without blood and the oxygen it carries, part of the brain is damaged. The part of your body controlled by that part of your brain may not function properly now. The brain is an amazing organ that can heal itself to some degree. The stroke you had damaged part of your brain. But other parts of your brain may take over in some way for the damaged areas. Your doctor will talk with you about what you can do to prevent another stroke. You can help by managing other health problems that raise your risk, such as atrial fibrillation or high blood pressure. Have a heart-healthy lifestyle which includes being active, eating healthy foods, staying at a healthy weight, and not smoking.  You may also take medicine that prevents blood clots. Enter a stroke rehabilitation (rehab) program if your doctor recommends it. Stroke rehab is training and therapy to help you recover, prevent problems, and relearn how to do everyday things you have not been able to do since your stroke. The focus will depend on how the stroke has affected your ability to do the things you want and need to do. Follow-up care is a key part of your treatment and safety. Be sure to make and go to all appointments, and call your doctor if you are having problems. It's also a good idea to know your test results and keep a list of the medicines you take. How can you care for yourself at home? Attend stroke rehabilitation (rehab) if your doctor recommends it. Your rehab plan will be based on your goals and how the stroke affected you. You will get instructions on how to manage specific problems that you might have because of the stroke. Manage other health problems that raise your risk of another stroke. These include atrial fibrillation, diabetes, high blood pressure, and high cholesterol. Have a heart-healthy lifestyle. Don't smoke and avoid secondhand smoke. Limit alcohol to 2 drinks a day for men and 1 drink a day for women. Stay at a healthy weight. Lose weight if you need to. Be active. Ask your doctor what type and level of activity is safe for you. Eat heart-healthy foods. These include vegetables, fruits, nuts, beans, lean meat, fish, and whole grains. Limit sodium and sugar. If you think you may have a problem with alcohol or drug use, talk to your doctor. Medicines    Be safe with medicines. Take your medicines exactly as prescribed. Call your doctor if you think you are having a problem with your medicine. You will get more details on the specific medicines your doctor prescribes. You may take a few medicines to help lower your risk of another stroke.  These include:  Blood pressure medicine such as an ACE (angiotensin-converting enzyme) inhibitor, angiotensin II receptor blocker (ARBs), or diuretic. Cholesterol medicine such as a statin. Aspirin or another blood thinner to prevent blood clots. If your doctor prescribed a blood thinner, be sure you get instructions about how to take your medicine safely. Blood thinners can cause serious bleeding problems. Do not take any over-the-counter medicines or herbal products without talking to your doctor first.     If you take hormonal birth control or hormone therapy, talk to your doctor about whether they are right for you. They may raise the risk of stroke in some people. For caregivers    Make the home safe. You may get advice from the stroke rehab team about what changes might be needed. Here are some examples. Set up a bedroom that does not require climbing stairs. Be sure the bathroom is on the same floor. Move throw rugs and furniture that could cause falls. Make sure that the lighting is good. Put grab bars and seats in tubs and showers. Provide transportation until they can drive again. Find out what they can do and what they need help with. Try not to do things that they can do on their own. Help them learn and practice new skills. Visit and talk with them often. Try doing activities together that you both enjoy, such as playing cards or board games. Encourage other people to visit too. Take care of yourself. Here are some tips that might help. Do not try to do everything yourself. Ask the stroke rehab team for help. Ask friends and family members to help. Eat well, get enough rest, and take time to do things that you enjoy. Keep up with your own doctor visits, and make sure to take your medicines regularly. Join a local support group. Find out if you qualify for home health care visits to help with rehab or for adult day care. When should you call for help? Call 911 anytime you think you may need emergency care.  For example, call if:    You have signs of another stroke. These may include:  Sudden numbness, tingling, weakness, or loss of movement in your face, arm, or leg, especially on only one side of your body. Sudden vision changes. Sudden trouble speaking. Sudden confusion or trouble understanding simple statements. Sudden problems with walking or balance. A sudden, severe headache that is different from past headaches. Fainting. A seizure. Call 911 even if these symptoms go away in a few minutes. Call your doctor now or seek immediate medical care if:    You have new symptoms that may be related to your stroke, such as falls or trouble swallowing. Watch and call if:    You have been feeling sad, depressed, or hopeless, or you have lost interest in things that you usually enjoy. You have anxiety or fear that affects your life. Watch closely for changes in your health, and be sure to contact your doctor if you have any problems. Where can you learn more? Go to https://Fusion Telecommunications.Locate Special Diet. org and sign in to your iPierian account. Enter O945 in the [employer]L [employer] box to learn more about \"Stroke: Care Instructions. \"     If you do not have an account, please click on the \"Sign Up Now\" link. Current as of: March 28, 2022               Content Version: 13.4  © 2006-2022 Healthwise, Incorporated. Care instructions adapted under license by [facility] ([facility]). If you have questions about a medical condition or this instruction, always ask your healthcare professional. [place] [address] any warranty or liability for your use of this information.

## 2022-11-09 NOTE — PLAN OF CARE
Problem: Musculoskeletal - Adult  Goal: Return ADL status to a safe level of function  Outcome: Progressing  Flowsheets (Taken 11/8/2022 0811 by Anuel Harrison LPN)  Return ADL Status to a Safe Level of Function: Administer medication as ordered     Problem: Respiratory - Adult  Goal: Clear lung sounds  11/9/2022 1030 by Adeline Moreno LPN  Outcome: Progressing    Problem: Metabolic/Fluid and Electrolytes - Adult  Goal: Glucose maintained within prescribed range  Outcome: Progressing     Problem: Hematologic - Adult  Goal: Maintains hematologic stability  Outcome: Progressing

## 2022-11-09 NOTE — DISCHARGE INSTR - COC
Continuity of Care Form    Patient Name: Lisbeht Camacho   :  1950  MRN:  [id number]    Admit date:  2022  Discharge date:  ***    Code Status Order: Full Code   Advance Directives:     Admitting Physician: Lina Kimbrough MD  PCP: Rubens Cruz.  Matt Thrasher MD    Discharging Nurse: [facility] Unit/Room#: 7E-65/065-A  Discharging Unit Phone Number: ***    Emergency Contact:   Extended Emergency Contact Information  Primary Emergency Contact: Ruthie Herzog  Address: [address] [facility], [address] [place] [address] Phone: [phone]  Mobile Phone: [phone]  Relation: Other  Secondary Emergency Contact: Maximo Suero [address] Phone: [phone]  Mobile Phone: [phone]  Relation: Child    Past Surgical History:  Past Surgical History:   Procedure Laterality Date    APPENDECTOMY      BRONCHOSCOPY N/A 2019    BRONCHOSCOPY performed by Manolo Azul MD at [address] [facility]  2019    BRONCHOSCOPY/TRANSBRONCHIAL LUNG BIOPSY performed by Manolo Azul MD at [address] [facility] N/A 3/1/2019    BRONCHOSCOPY W/EBUS FNA performed by Manolo Azul MD at [address] [place] [address]  2019    COLONOSCOPY  [id number]    CT NEEDLE BIOPSY LUNG PERCUTANEOUS  3/29/2021    CT NEEDLE BIOPSY LUNG PERCUTANEOUS 3/29/2021 [facility] CT SCAN    HERNIA REPAIR Right 6/3/2021    RIGHT INGUINAL HERNIA REPAIR WITH MESH performed by Lashonda Jurado DO at [address]      TURP    THORACOTOMY Left 3/14/2019    LEFT EXPLORATORY THORACOTOMY, MEDIASTINAL LYMPH NODE DISECTION, FLEXIBLE BRONCHOSCOPY performed by Benjamin Iniguez MD at [facility]       Immunization History:   Immunization History   Administered Date(s) Administered    COVID-19, J&J, (age 18y+), IM, 0.5 mL 2021    Tdap (Boostrix, Adacel) 2013       Active Problems:  Patient Active Problem List   Diagnosis Code    Chronic retention of urine R33.9    Benign prostatic hyperplasia N40.0    Mass of left lung R91.8    Adenocarcinoma of left lung (HCC) C34.92    Mediastinal lymphadenopathy R59.0    Angina effort I20.8    Abnormal stress test R94.39    S/P coronary angioplasty Z98.61    CAD in native artery I25.10    S/P cardiac cath Z98.890    Adenocarcinoma, lung, left (HCC) C34.92    Chest pain R07.9    Pleural effusion exudative J90    Hypertension I10    Cerebrovascular accident (CVA) ([facility] [address] I63.9    Dental abscess K04.7    Acute cerebrovascular accident (CVA) ([facility] [address] I63.9    Type 2 diabetes mellitus treated without insulin ([facility]) E11.9       Isolation/Infection:   Isolation            No Isolation          Patient Infection Status       None to display            Nurse Assessment:  Last Vital Signs: /72   Pulse 97   Temp 97.8 °F (36.6 °C) (Oral)   Resp 16   Ht 5' 8\" (1.727 m)   Wt 191 lb 0.9 oz (86.7 kg)   SpO2 92%   BMI 29.05 kg/m²     Last documented pain score (0-10 scale): Pain Level: 0  Last Weight:   Wt Readings from Last 1 Encounters:   22 191 lb 0.9 oz (86.7 kg)     Mental Status:  {IP PT MENTAL STATUS:78133}    IV Access:  { EMILIANO IV ACCESS:369655293}    Nursing Mobility/ADLs:  Walking   {P DME TOLV:855355800}  Transfer  {P DME ATY}  Bathing  {P DME BJJY:918937200}  Dressing  {CHP DME AKQK:676561890}  Toileting  {CHP DME BDUD:664330594}  Feeding  {P DME CAVC:420413924}  Med Admin  {P DME XTOT:838290647}  Med Delivery   { EMILIANO MED Delivery:268985269}    Wound Care Documentation and Therapy:  Incision 21 Anterior;Right (Active)   Number of days: 524        Elimination:  Continence:    Bowel: {YES / US:96100}  Bladder: {YES / KF:39862}  Urinary Catheter: {Urinary Catheter:318540078}   Colostomy/Ileostomy/Ileal Conduit: {YES / CV:57887}       Date of Last BM: ***    Intake/Output Summary (Last 24 hours) at 11/10/2022 1444  Last data filed at 11/10/2022 1402  Gross per 24 hour   Intake 780 ml   Output --   Net 780 ml     No intake/output data recorded. Safety Concerns:     50Shivani CUMMINGS Safety Concerns:613520118}    Impairments/Disabilities:      50Shivani CUMMINGS Impairments/Disabilities:122694374}    Nutrition Therapy:  Current Nutrition Therapy:   50Shivani CUMMINGS Diet List:917366469}    Routes of Feeding: {CHP DME Other Feedings:311954409}  Liquids: {Slp liquid thickness:15258}  Daily Fluid Restriction: {CHP DME Yes amt example:583134635}  Last Modified Barium Swallow with Video (Video Swallowing Test): {Done Not Done HBCI:640086471}    Treatments at the Time of Hospital Discharge:   Respiratory Treatments: ***  Oxygen Therapy:  {Therapy; copd oxygen:47218}  Ventilator:    { CC Vent OHIS:637743689}    Rehab Therapies: {THERAPEUTIC INTERVENTION:4654162229}  Weight Bearing Status/Restrictions: Felipe Schroeder  Weight Bearin}  Other Medical Equipment (for information only, NOT a DME order):  {EQUIPMENT:261905372}  Other Treatments: ***    Patient's personal belongings (please select all that are sent with patient):  {CHP DME Belongings:403284466}    RN SIGNATURE:  {Esignature:143749139}    PHYSICIAN SECTION    Prognosis: {Prognosis:6068823540}    Condition at Discharge: Felipe Schroeder Patient Condition:077978746}    Rehab Potential (if transferring to Rehab): {Prognosis:9087880078}    Recommended Labs or Other Treatments After Discharge: ***    Physician Certification: I certify the above information and transfer of Ann-Marie Lou  is necessary for the continuing treatment of the diagnosis listed and that he requires {Admit to Appropriate Level of Care:65176} for {GREATER/LESS:842574483} 30 days.      Update Admission H&P: {CHP DME Changes in KMIDY:040842515}    PHYSICIAN SIGNATURE:  {Esignature:797772462}

## 2022-11-09 NOTE — PROGRESS NOTES
[address] [place] [address]  Occupational Therapy  Daily Note  Time:    Time In: 0730  Time Out: 0830  Timed Code Treatment Minutes: 60 Minutes  Minutes: 60        Date: 2022  Patient Name: Myrtle Morris,   Gender: male      Room: [facility]65/065-A  MRN: [id number]  : 1950  (73 y.o.)  Referring Practitioner: Warren Romeo MD  Diagnosis: Acute CVA  Additional Pertinent Hx: Myrtle Morris  is a 67 y.o. male admitted to the inpatient rehabilitation unit on 2022. He was originally admitted to [address] [place] [address] on 10/31/2022. Patient  has a past medical history of Anxiety, Arthritis, CAD (coronary artery disease), Cancer ([facility] [address] COPD (chronic obstructive pulmonary disease) ([facility] [address] Diabetes ([facility] [address] Enlarged prostate with urinary retention, Gait difficulty, Generalized weakness, Hyperlipidemia, Hypertension, Lesion of bladder, Osteoarthritis, Retention of urine, Right inguinal hernia, S/P cardiac catheterization, S/P TURP, SOB (shortness of breath), and Voiding difficulty. Patient presented to [facility] ER due to new onset of right arm numbness and balance issues. Patient with noted chronic SOB. Family reports right sides weakness, dysarthria, right sided facial droop. Patient was to be taking ASA and plavix daily, but only was taking ASA every other day due to bruising. Patient with initial NIHSS of 6. Code stroke called, not a tPA candidate. CT of the head without contrast on 10/31/2022 demonstrated stable mild global volume loss, and apical abscess associated with a left maxillary first premolar, and no other significant acute findings. CT of the head and neck on 10/31/2022 without major acute findings.  MRI of the brain without contrast on 10/31/2022 demonstrated an area of diffusion in the left parietal periventricular white matter with corresponding diminished signal intensity on ADC map consistent with acute infarct; mild atrophy and probable mild severity chronic small vessel ischemic changes, and no other significant abnormalities. CVA likely due to poorly controlled HTN and DM. Patient is seen today upon admission to the inpatient rehabilitation unit. Patient lives with his s/o that works full time, 6a-2pm daily. Discussed IPR and goal of helping patient to be as independent as possible at discharge including mobility, ADLs, cognition, swallowing, and endurance. Restrictions/Precautions:  Restrictions/Precautions: General Precautions, Fall Risk  Position Activity Restriction  Other position/activity restrictions: R hemibody weakness      SUBJECTIVE: Pt sitting in recliner prior to session. Pt was pleasant and receptive to session. PAIN: None reported     Vitals: Vitals not assessed per clinical judgement, see nursing flowsheet    COGNITION: WFL and Decreased Recall    ADL:   No ADL's completed this session. Talita Elmore BALANCE:  Sitting Balance:  Independent. Standing Balance: Independent. BED MOBILITY:  Not Tested    TRANSFERS:  Sit to Stand:  Independent. From recliner in room and chairs in therapy apartment. Stand to Sit: Independent. At recliner in room and chairs in therapy apartment. FUNCTIONAL MOBILITY:  Assistive Device: None  Assist Level:  Supervision. Distance:  From room to [facility], [address] [place] [address] and [place] and back for community reintegration task. No noted LOB or fatigue. ADDITIONAL ACTIVITIES:  Pt was facilitated to complete pathway finding, community reintegration task. Therapist listed 3 places ([address], [address], gift shop) for the pt to find and gave directions that he has to open all doors and work the elevator. Pt required 2 vc for recall of room number 7K28. Pt required 1 vc for direction. Pt was able to use signs and directions throughout hospital to locate room. Pt required no vc to remember other two areas. Pt was able to find all 3 locations and return back to IPR with a total of 3 vc. Not noted LOB or fatigue.      Pt was given directions to return back to therapy apartment. Pt was then faciliated to complete task of making coffee using mainly R hand d/t decreased use of RUE with being L hand dominant and R side affected. Pt was able to complete task with 1 vc for safety of cleaning off the water from the pot that spilled and closing the lid. Pt was then faciliated to complete sink of dishes while coffee was being made using R hand to scrub and bilateral integration to hold dish in L hand. Pt required min vc to use R hand for tasks. Pt was faciliated to complete 6 challenging UE exercises x10 sets with 2 lb dowel laureano to increase BUE strength and endurance and to promote normal movement from RUE with LUE assisting through movements. Pt required 1 vc for correct posture with exercises. ASSESSMENT:     Activity Tolerance:  Patient tolerance of  treatment: good. Discharge Recommendations: Home with Outpatient OT  Equipment Recommendations: Equipment Needed: No  Mobility Devices: ADL Assistive Devices  ADL Assistive Devices: Shower Chair with back, [facility] - shower  Other: No AD needed. Pt does have access to a shower chair if needed, however currently doesn't require. Plan: Times Per Week: 5x a week for 90 min and 1x a week for 30 min  Times Per Day:  Once a day  Current Treatment Recommendations: Strengthening, ROM, Balance training, Functional mobility training, Endurance training, Neuromuscular re-education, Pain management, Safety education & training, Patient/Caregiver education & training, Positioning, Self-Care / ADL, Home management training    Patient Education  Patient Education: Role of OT, Plan of Care, Energy Conservation, Orientation, Hemibody Awareness/Attention, Reviewed Prior Education, and Importance of Increasing Activity    Goals  Short Term Goals  Time Frame for Short Term Goals: 1 week from eval  Short Term Goal 1: Pt will complete oral hygiene routine while using R side for >/= 75% of task and MI to increase indep with self-care  Short Term Goal 2: Pt will complete item retieval with >/= 5 items with S and 0-1 VC for R sided involvement to increase indep with I/ADL preparation. Short Term Goal 3: Pt will complete dynamic standing with 2 UE release, MI and 0-2 VC for R sided awareness to increase indep with hygiene  Short Term Goal 4: Pt will complete tolieting, including transfer, with MI and 0-2 VC for R sided awareness to increase indep with tolieting  Short Term Goal 5: Pt will complete LB dressing with supervision and 0-1 VC for technique to increase indep with dressing  Long Term Goals  Time Frame for Long Term Goals : 2 weeks from eval  Long Term Goal 1: Pt will complete homemaking task with mod I to increase indep with laundry  Long Term Goal 2: Pt will complete pathway finding task with supervision and 0-2 vc for visual scanning to use signs to increase indep with community reintigration  Long Term Goal 3: Pt will complete BADL routine with mod I to increase indep with oral hygiene    Following session, patient left in safe position with all fall risk precautions in place.

## 2022-11-09 NOTE — PROGRESS NOTES
[facility]      Date:  11/9/2022            Patient Name: Amy Rodriges           MRN: [id number]  [place]: [de-identified]          YOB: 1950 (73 y.o.)       Gender: male   Diagnosis: Acute CVA  Physician: Referring Practitioner:  Madhu Syed MD    REASON FOR MISSED TREATMENT:  Pt not interested in playing bingo with peers this am     Karyle Manas, CTRS    11/9/2022

## 2022-11-09 NOTE — PROGRESS NOTES
[address] [address]  INPATIENT PHYSICAL THERAPY  Daily Note  [address]    Time In: 0700  Time Out: 0730  Timed Code Treatment Minutes: 30 Minutes  Minutes: 30          Date: 2022  Patient Name: Octavia Butler,  Gender:  male        MRN: [id number]  : 1950  (67 y.o.)  Referral Date : 22  Referring Practitioner: Tamika Vega MD  Diagnosis: Acute cerebrovascular accident (CVA)  Treatment Diagnosis: difficulty in walking  Additional Pertinent Hx: Per H&P:Shazia Aquino  is a 67 y.o. male admitted to the inpatient rehabilitation unit on 2022. He was originally admitted to [address] [place] [address] on 10/31/2022. Patient  has a past medical history of Anxiety, Arthritis, CAD (coronary artery disease), Cancer ([facility] [address] COPD (chronic obstructive pulmonary disease) ([facility] [address] Diabetes ([facility] [address] Enlarged prostate with urinary retention, Gait difficulty, Generalized weakness, Hyperlipidemia, Hypertension, Lesion of bladder, Osteoarthritis, Retention of urine, Right inguinal hernia, S/P cardiac catheterization, S/P TURP, SOB (shortness of breath), and Voiding difficulty. Patient presented to [facility] ER due to new onset of right arm numbness and balance issues. Patient with noted chronic SOB. Family reports right sides weakness, dysarthria, right sided facial droop. Patient was to be taking ASA and plavix daily, but only was taking ASA every other day due to bruising. Patient with initial NIHSS of 6. Code stroke called, not a tPA candidate. CT of the head without contrast on 10/31/2022 demonstrated stable mild global volume loss, and apical abscess associated with a left maxillary first premolar, and no other significant acute findings. CT of the head and neck on 10/31/2022 without major acute findings.  MRI of the brain without contrast on 10/31/2022 demonstrated an area of diffusion in the left parietal periventricular white matter with corresponding diminished signal intensity on ADC map consistent with acute infarct; mild atrophy and probable mild severity chronic small vessel ischemic changes, and no other significant abnormalities. CVA likely due to poorly controlled HTN and DM. Prior Level of Function:  Lives With: Significant other, Other (comment) (girlfriends son)  Type of Home: House  Home Layout: One level  Home Access: Stairs to enter without rails  Entrance Stairs - Number of Steps: 2  Home Equipment: Delilah Tomas, 4 wheeled (8LW has no seat or breaks--wheels swivel)   Bathroom Shower/Tub: Tub/Shower unit  Bathroom Toilet: Handicap height  Bathroom Accessibility: Walker accessible    Receives Help From: Family  ADL Assistance: Independent  Homemaking Assistance: Independent  Ambulation Assistance: Independent  Transfer Assistance: Independent  Active : Yes  Additional Comments: Pt's girlfriend works full-time, daughters live ~30 miles away in [place] and work    Restrictions/Precautions:  Restrictions/Precautions: General Precautions, Fall Risk  Position Activity Restriction  Other position/activity restrictions: R hemibody weakness     SUBJECTIVE: Pt. Seated on EOB and pleasantly agrees to therapy session.      PAIN: 0/10    Vitals: Vitals not assessed per clinical judgement, see nursing flowsheet    OBJECTIVE:  Bed Mobility:  Rolling to Left: Modified Independent   Rolling to Right: Modified Independent   Supine to Sit: Modified Independent  Sit to Supine: Modified Independent   *Patient performed with HOB flat and no railings    Transfers:  Sit to Stand: Modified Independent  Stand to Sit:Modified Independent  To/From Bed and Chair: Modified Independent  Car:Modified Independent    Ambulation:  Modified Independent  Distance: 150 feet, 20 feet x 2 and 160 feet   Surface: Level Tile and Uneven Surface  Device:No Device  Gait Deviations:  Slow Sofie, Decreased Step Length Bilaterally, Decreased Gait Speed, and Decreased Heel Strike Bilaterally    Stairs:  Platform:  6\" platform X 1 using No Device and Modified Independent. Modified Independent  Number of Steps: 4  Height: 6\" step with Bilateral Handrails    Supervision  Number of Steps: 12  Height: 6\" step with Bilateral Handrails    Balance:  Dynamic Standing Balance: Modified Independent  *Patient instructed in picking up object from floor    Neuromuscular Re-education  None    Exercise:  Patient was guided in 1 set(s) 20 reps of exercises: Standing heel/toe raises, Standing marches, Standing hip abduction/adduction, Standing hip extension, Standing hamstring curls, Mini squats , Exercises were completed for increased independence with functional mobility. *Patient given and instructed in HEP with ability to perform with 80% accuracy     Functional Outcome Measures: Not completed       ASSESSMENT:  Assessment: Patient progressing toward established goals. Activity Tolerance:  Patient tolerance of  treatment: good.      Equipment Recommendations:Equipment Needed:  (continue to assess needs)  Discharge Recommendations: Continue to assess pending progress, Patient would benefit from continued therapy after discharge Home with Outpatient PT    Plan: Current Treatment Recommendations: Strengthening, ROM, Balance training, Functional mobility training, Transfer training, Stair training, Gait training, Endurance training, Neuromuscular re-education, Home exercise program, Safety education & training, Patient/Caregiver education & training, Therapeutic activities, Equipment evaluation, education, & procurement  General Plan:  (5x/wk 60 min; 1x/wk 30 min)    Patient Education  Patient Education: Plan of Care, Functional Mobility, Reviewed Prior Education, Health Promotion and Wellness Education, Safety, Verbal Exercise Instruction, Home Exercise Program, [employer] Mobility, Transfers, Gait, Car Transfers,  - Patient Verbalized Understanding    Goals:  Patient Goals : be independent again  Short Term Goals  Time Frame for Short Term Goals: 1 week  Short Term Goal 1: Patient will complete supine < > sit and rolling with head of bed flat and no rails with modified independence to transfer in and out of bed safely. Short Term Goal 2: Patient will complete sit < > stand with Supervision and less than or equal to 25% verbal cues for utilization of right UE to stand to ambulate with decreased difficulty. Short Term Goal 3: Patient to ambulate >/=100 feet without AD with Supervision in busy environments in order to assist with safety with home mobility. Short Term Goal 4: Patient will ascend/descend 1 step with no hand rail and CGA to progress towards safe home entry. Short Term Goal 5: Patient will improve single leg stance to 3 seconds each lower extremity with CGA to demonstrate good lateral weight shift and improve balance  Long Term Goals  Time Frame for Long Term Goals : 3 weeks  Long Term Goal 1: Patient will complete sit < > stand with modified independence with no verbal cues for hand placement to stand to ambulate safely. Long Term Goal 2: Patient will complete chair < > bed transfer with no AD and modified independence to transfer surface to surface safely. Long Term Goal 3: Patient will ambulate 80' with no AD and modified independence to navigate home safely. Long Term Goal 4: Patient will ascend/descend 2 steps with no hand rail and SBA for home entry. Long Term Goal 5: Patient will improve KUMARI to greater than or equal to 45/56 to reduce his risk for falls. Additional Goals?: Yes  Long term goal 6: Patient will complete car transfer with modified independence to transfer in and out of vehicl safely. Following session, patient left in safe position with all fall risk precautions in place.

## 2022-11-09 NOTE — PLAN OF CARE
Problem: Discharge Planning  Goal: Discharge to home or other facility with appropriate resources  Outcome: Progressing  Flowsheets (Taken 11/9/2022 1023)  Discharge to home or other facility with appropriate resources: Arrange for needed discharge resources and transportation as appropriate  Note: Speech, occupational and physical therapy appointments all made. Problem: Safety - Adult  Goal: Free from fall injury  11/9/2022 1023 by Poppy Parikh LPN  Outcome: Progressing  Flowsheets (Taken 11/9/2022 1023)  Free From Fall Injury: Instruct family/caregiver on patient safety  Note: Pt has remained free from falls since admission, utilizes gait-belt, and stand by assist when getting up to ambulate with non-skid socks/shoes and call light when in need of anything. Problem: ABCDS Injury Assessment  Goal: Absence of physical injury  Outcome: Progressing  Absence of Physical Injury: Implement safety measures based on patient assessment  Note: Pt free from new injuries since admission. Utilizes call light when in need of anything and gait-belt with nonskid shoes/socks when ambulating, turning frequently throughout the day to prevent pressure ulcers. Upon admission had pain in wrist with swelling, pt states it is no longer painful and no signs of swelling or discomfort.

## 2022-11-09 NOTE — PROGRESS NOTES
[facility]  INPATIENT SPEECH THERAPY  [facility]  DAILY NOTE    TIME   SLP Individual Minutes  Time In: 930  Time Out: 1030  Minutes: 60  Timed Code Treatment Minutes: 60 Minutes   Cognitive Tx: 60 minutes      Date: 2022  Patient Name: Anival Gray      CSN: [id number]   : 1950  (67 y.o.)  Gender: male   Referring Physician: Keagan Morrow MD  Diagnosis: CVA  Precautions: Aspiration risk, fall risk  Current Diet: Regular Diet with Thin Liquids   Swallowing Strategies: Standard Universal Swallow Precautions  Date of Last MBS/FEES:  2022    Pain:  No pain reported. Subjective: Patient sitting upright in recliner upon ST arrival, pleasantly engaged and cooperative. Patient's friend briefly stopped by at the start of the session. Keagan Morrow MD present briefly toward end of session to check patient vitals and discuss discharge. Short-Term Goals:  SHORT TERM GOAL #1:  Goal 1: GOAL MET, NO NEW GOAL     SHORT TERM GOAL #2:  Goal 2: Patient will complete immediate/delayed recall and working memory tasks with 80% accuracy, mod cues to improve retention of functional information. INTERVENTIONS:   Delayed Recall (~48hrs) of Errands List (cut grass, get gas, clean garage, do laundry, feed fish): 5/5 independent     Generation and Immediate/Delayed Recall Grocery List (bread, milk, eggs, chicken, fruit, fritos):   Immediate- 4/6 independent, 2/6 min cues  Delayed (~50 mins)- 5/6 independent, 1/6 min cues   *Independently initiated STM strategy to write down list after immediate recall for future use    Working Memory:  Category Inclusion- Given FO4 across 5 trials patient recalled 5/5 independent. Category Exclusion- Given FO5 across 5 trials patient recalled 5/5 min cues. Recall by Attribute Inclusion- Given FO4 across 5 trials patient recalled 5/5 independent.     SHORT TERM GOAL #3:  Goal 3:  Patient will complete complex executive functioning tasks (meds, time, finances, hobbies) with 80% accuracy, mod cues to improve skills required for IADL completion and return to occupational duties. INTERVENTIONS:   Payroll Organizer (6 employee weekly time cards with 4 components each): 23/24 independent, 1/24 min cues (to recalculate/organize TOTAL weekly hrs); Overall great organization and set up of time cards into rows and columns with good mental math computation and calculator use. Payroll Organizer Math Computation (calculating max, min, average of hourly rate, weekly hrs, weekly pay): 7/9 independent, 2/9 mod cues (calculating average)   *Patient initially unaware of how to calculate the average, ST provided general information on how to calculate average. *Fair-good use of calculator for math computations; presented difficulty with inputting large numbers/series of numbers     SHORT TERM GOAL #4:  Goal 4: Patient will complete complex attention tasks with no more than 5 errors across a structured activity consistently to permit potential return to driving and other multi-tasking responsibilities. INTERVENTIONS: DNT due to focus on additional STGs. SHORT TERM GOAL #5:  Goal 5: Patient will complete structured speech tasks (DDK's, sentence repetition, verbal fluency, phonemic oral motor isolation) with implementation of SOS strategies to improve intelligibility to self-perceived level of 75% or higher to maximize communication clarity. INTERVENTIONS: DNT due to focus on additional STGs. Long-Term Goals:  Time Frame for Long Term Goals: 5 weeks from evaluation    LONG TERM GOAL #1:  Goal 1: Patient will safely consume a regular diet + thin liquids while utilizing compensatory strategies without overt s/s of aspiration to ensure safe return to previous diet. -GOAL MET, NO NEW GOAL    LONG TERM GOAL #2:  Goal 2: Patient will improve cognition to a level of Mod I in order to permit potential return to completion of IADL's in home setting. LONG TERM GOAL #3:  Goal 3: Patient will improve speech intelligibility to a self-perceived level of 85% clarity in order to improve overall communication of wants/needs. Comprehension: 6 - Complex ideas 90% or device (hearing aid or glasses- if patient is primarily a visual learner)  Expression: 6 - Device used to express complex ideas/needs  Social Interaction: 6 - Patient requires medication for mood and/or effect  Problem Solvin - Patient able to solve simple/routine tasks  Memory: 5 - Patient requires prompting with stress/unfamiliar situations      EDUCATION:  Learner: Patient  Education:  Reviewed ST goals and Plan of Care and Reviewed recommendations for follow-up  Evaluation of Education: Verbalizes understanding and Family not present    ASSESSMENT/PLAN:  Activity Tolerance:  Patient tolerance of  treatment: good. Good participation. Assessment/Plan: Patient progressing toward established goals. Continues to require skilled care of licensed speech pathologist to progress toward achievement of established goals and plan of care. Plan for Next Session: Recall, SOS strategies/structured speech tasks, money-work related (change due)  Discharge Recommendations: Outpatient Speech Therapy    Meghan RADER  Speech Therapy Student Intern

## 2022-11-09 NOTE — PROGRESS NOTES
[facility]  INPATIENT PHYSICAL THERAPY  DAILY NOTE  [facility] - [id number]    Time In: 1400  Time Out: 1430  Timed Code Treatment Minutes: 30 Minutes  Minutes: 30          Date: 2022  Patient Name: Myrtle Morris,  Gender:  male        MRN: [id number]  : 1950  (67 y.o.)  Referral Date : 22  Referring Practitioner: Warren Romeo MD  Diagnosis: Acute cerebrovascular accident (CVA)  Treatment Diagnosis: difficulty in walking  Additional Pertinent Hx: Per H&P:Shazia Bills  is a 67 y.o. male admitted to the inpatient rehabilitation unit on 2022. He was originally admitted to [facility] on 10/31/2022. Patient  has a past medical history of Anxiety, Arthritis, CAD (coronary artery disease), Cancer ([facility] [address] COPD (chronic obstructive pulmonary disease) ([facility] [address] Diabetes ([facility] [address] Enlarged prostate with urinary retention, Gait difficulty, Generalized weakness, Hyperlipidemia, Hypertension, Lesion of bladder, Osteoarthritis, Retention of urine, Right inguinal hernia, S/P cardiac catheterization, S/P TURP, SOB (shortness of breath), and Voiding difficulty. Patient presented to [facility] ER due to new onset of right arm numbness and balance issues. Patient with noted chronic SOB. Family reports right sides weakness, dysarthria, right sided facial droop. Patient was to be taking ASA and plavix daily, but only was taking ASA every other day due to bruising. Patient with initial NIHSS of 6. Code stroke called, not a tPA candidate. CT of the head without contrast on 10/31/2022 demonstrated stable mild global volume loss, and apical abscess associated with a left maxillary first premolar, and no other significant acute findings. CT of the head and neck on 10/31/2022 without major acute findings.  MRI of the brain without contrast on 10/31/2022 demonstrated an area of diffusion in the left parietal periventricular white matter with corresponding diminished signal intensity on ADC map consistent with acute infarct; mild atrophy and probable mild severity chronic small vessel ischemic changes, and no other significant abnormalities. CVA likely due to poorly controlled HTN and DM. Prior Level of Function:  Lives With: Significant other, Other (comment) (girlfriends son)  Type of Home: House  Home Layout: One level  Home Access: Stairs to enter without rails  Entrance Stairs - Number of Steps: 2  Home Equipment: Gala [place]al, 4 wheeled (5VN has no seat or breaks--wheels swivel)   Bathroom Shower/Tub: Tub/Shower unit  Bathroom Toilet: Handicap height  Bathroom Accessibility: Walker accessible    Receives Help From: Family  ADL Assistance: Independent  Homemaking Assistance: Independent  Ambulation Assistance: Independent  Transfer Assistance: Independent  Active : Yes  Additional Comments: Pt's girlfriend works full-time, daughters live ~30 miles away in [place] and work    Restrictions/Precautions:  Restrictions/Precautions: General Precautions, Fall Risk  Position Activity Restriction  Other position/activity restrictions: R hemibody weakness     SUBJECTIVE: Patient in bedside chair upon arrival and agreeable to therapy. PAIN: denies    Vitals: Vitals not assessed per clinical judgement, see nursing flowsheet    OBJECTIVE:  Bed Mobility:  Not Tested    Transfers:  Sit to Stand: Modified Independent  Stand to Sit:Modified Independent    Ambulation:  Modified Independent  Distance: 150ft x2  Surface: Level Tile  Device:No Device  Gait Deviations:  Slow Sofie, Decreased Step Length Bilaterally, Decreased Gait Speed, and Decreased Heel Strike Bilaterally    Balance:  Dynamic gait: CGA, minor LOB 2x            -stepping forward over hurdles leading R/L and side-stepping over hurdles, using no UE support  *cues for slow pace and proper foot placement    Exercise:  Patient was guided in 1 set(s) 15 reps of exercise to both lower extremities.   Standing heel/toe raises, Standing marches, Standing hip abduction/adduction, Standing hamstring curls, Standing hip flexion, and Mini squats with 2# ankle weights applied. Pt. Completed 6 minutes on Nu-Step machine on L4 utilizing Bilateral Upper Extremities and Bilateral Lower Extremities to improve strength and endurance for improved functional mobility. Exercises were completed for increased independence with functional mobility. Functional Outcome Measures: Not completed       ASSESSMENT:  Assessment: Patient progressing toward established goals. Activity Tolerance:  Patient tolerance of  treatment: good. Equipment Recommendations:Equipment Needed:  (continue to assess needs)  Discharge Recommendations: Home with Outpatient PT  Plan: Current Treatment Recommendations: Strengthening, ROM, Balance training, Functional mobility training, Transfer training, Stair training, Gait training, Endurance training, Neuromuscular re-education, Home exercise program, Safety education & training, Patient/Caregiver education & training, Therapeutic activities, Equipment evaluation, education, & procurement  General Plan:  (5x/wk 60 min; 1x/wk 30 min)    Patient Education  Patient Education: Plan of Care, Transfers, Gait, Up in Chair for All Meals, Verbal Exercise Instruction    Goals:  Patient Goals : be independent again  Short Term Goals  Time Frame for Short Term Goals: 1 week  Short Term Goal 1: Patient will complete supine < > sit and rolling with head of bed flat and no rails with modified independence to transfer in and out of bed safely. Short Term Goal 2: Patient will complete sit < > stand with Supervision and less than or equal to 25% verbal cues for utilization of right UE to stand to ambulate with decreased difficulty. Short Term Goal 3: Patient to ambulate >/=100 feet without AD with Supervision in busy environments in order to assist with safety with home mobility.   Short Term Goal 4: Patient will ascend/descend 1 step with no hand rail and CGA to progress towards safe home entry. Short Term Goal 5: Patient will improve single leg stance to 3 seconds each lower extremity with CGA to demonstrate good lateral weight shift and improve balance  Long Term Goals  Time Frame for Long Term Goals : 3 weeks  Long Term Goal 1: Patient will complete sit < > stand with modified independence with no verbal cues for hand placement to stand to ambulate safely. Long Term Goal 2: Patient will complete chair < > bed transfer with no AD and modified independence to transfer surface to surface safely. Long Term Goal 3: Patient will ambulate 80' with no AD and modified independence to navigate home safely. Long Term Goal 4: Patient will ascend/descend 2 steps with no hand rail and SBA for home entry. Long Term Goal 5: Patient will improve KUMARI to greater than or equal to 45/56 to reduce his risk for falls. Additional Goals?: Yes  Long term goal 6: Patient will complete car transfer with modified independence to transfer in and out of vehicl safely. Following session, patient left in safe position with all fall risk precautions in place.

## 2022-11-09 NOTE — PLAN OF CARE
Problem: Discharge Planning  Goal: Discharge to home or other facility with appropriate resources  11/9/2022 1517 by Jaswinder Stallings LPN  Outcome: Progressing  11/9/2022 1514 by EFRAÍN Rg  Note: Transportation:   Has transportation kept you from medical appointments, meetings, work, or from getting things needed for daily living? (Check all that apply)  No.      Health Literacy:   How often do you need to have someone help you when you read instructions, pamphlets, or other written material from your doctor or pharmacy? 0. - Never    Social Isolation:  How often do you feel lonely or isolated from those around you?  0. Never      Patient Mood Interview (PHQ-2 to 9) (from [email or web address])   Say to Patient: \"Over the last 2 weeks, have you been bothered by any of the following problems? \"   If symptom is present, enter 1 (yes) in column 1 (Symptom Presence)  If yes in column 1, then ask the patient: About how often have you been bothered by this?   Read and show the patient a card with the symptom frequency choices. Indicate response in column 2, Symptom Frequency. Symptom Presence  No (enter 0 in column 2)   Yes (enter 0-3 in column 2)  9. No response (leave column 2 blank) Symptom Frequency  Never or 1 day  2-6 days (several days)  7-11 days (half or more of the days)  12-14 days (nearly every day    Symptom Presence Symptom Frequency   Little interest or pleasure in doing things 0. No 0. Never or 1 day   Feeling down, depressed, or hopeless 0. No 0. Never or 1 day        11/9/2022 1513 by EFRAÍN Rg  Note: GABRIELLE spoke with Jonh Sarabia on this date. She is not available for discharge until 4 PM on 11/10. GABRIELLE explained the process and nursing education. SW met with patient and provided information for discharge including contact for Sally Mendieta for transportation assistance. GABRIELLE will follow and maintain involvement in discharge planning.   11/9/2022 1023 by Jaswinder Stallings LPN  Outcome: Progressing  Flowsheets (Taken 11/9/2022 1023)  Discharge to home or other facility with appropriate resources: Arrange for needed discharge resources and transportation as appropriate  Note: Speech, occupational and physical therapy appointments all made. Focus Note    Discharge Needs:   Caregiver/Support identified: Yes; Ruthie   Equipment needs: N/A   Barriers expressed by patient or family: None   Follow up appointments needed: Occupational, Physical and Speech Therapy appointments all made. PCP will call tomorrow to schedule an appointment for a week.

## 2022-11-09 NOTE — PROGRESS NOTES
[address] [place] [address]  Occupational Therapy  Daily Note  Time:    Time In: 1330  Time Out: 1400  Timed Code Treatment Minutes: 30 Minutes  Minutes: 30        Date: 2022  Patient Name: Amy Rodriges,   Gender: male      Room: [facility]65/065-A  MRN: [id number]  : 1950  (73 y.o.)  Referring Practitioner: Madhu Syed MD  Diagnosis: Acute CVA  Additional Pertinent Hx: Amy Rodriges  is a 67 y.o. male admitted to the inpatient rehabilitation unit on 2022. He was originally admitted to [address] [place] [address] on 10/31/2022. Patient  has a past medical history of Anxiety, Arthritis, CAD (coronary artery disease), Cancer ([facility] [address] COPD (chronic obstructive pulmonary disease) ([facility] [address] Diabetes ([facility] [address] Enlarged prostate with urinary retention, Gait difficulty, Generalized weakness, Hyperlipidemia, Hypertension, Lesion of bladder, Osteoarthritis, Retention of urine, Right inguinal hernia, S/P cardiac catheterization, S/P TURP, SOB (shortness of breath), and Voiding difficulty. Patient presented to [facility] ER due to new onset of right arm numbness and balance issues. Patient with noted chronic SOB. Family reports right sides weakness, dysarthria, right sided facial droop. Patient was to be taking ASA and plavix daily, but only was taking ASA every other day due to bruising. Patient with initial NIHSS of 6. Code stroke called, not a tPA candidate. CT of the head without contrast on 10/31/2022 demonstrated stable mild global volume loss, and apical abscess associated with a left maxillary first premolar, and no other significant acute findings. CT of the head and neck on 10/31/2022 without major acute findings.  MRI of the brain without contrast on 10/31/2022 demonstrated an area of diffusion in the left parietal periventricular white matter with corresponding diminished signal intensity on ADC map consistent with acute infarct; mild atrophy and probable mild severity chronic small vessel ischemic changes, and no other significant abnormalities. CVA likely due to poorly controlled HTN and DM. Patient is seen today upon admission to the inpatient rehabilitation unit. Patient lives with his s/o that works full time, 6a-2pm daily. Discussed IPR and goal of helping patient to be as independent as possible at discharge including mobility, ADLs, cognition, swallowing, and endurance. Restrictions/Precautions:  Restrictions/Precautions: General Precautions, Fall Risk  Position Activity Restriction  Other position/activity restrictions: R hemibody weakness      SUBJECTIVE: Pt sitting in recliner prior to session watching tv. Pt was pleasant and receptive to session. PAIN: None reported     Vitals: Vitals not assessed per clinical judgement, see nursing flowsheet    COGNITION: WFL    ADL:   No ADL's completed this session. Michelle Lockhart BALANCE:  Sitting Balance:  Independent. Standing Balance: Independent. BED MOBILITY:  Not Tested    TRANSFERS:  Sit to Stand:  Independent. From recliner in room and chairs and couch in therapy apartment. Stand to Sit: Independent. FUNCTIONAL MOBILITY:  Assistive Device: None  Assist Level:  Supervision. Distance: To and from therapy apartment  Not noted LOB. ADDITIONAL ACTIVITIES:  Therapist educated pt over HEP for BUE scapula and quadruped exercises for RUE proprioceptive input and increasing strength and endurance in BUE. Therapist educated pt on floor transfer to complete quadruped exercises on flat surface at home. Therapist guided pt into quadruped position on floor for return demo for HEP. Pt completed thoracic flexion \"cat\" and thoracic extension \"cow\" x10 sets for 10 second holds. Pt was then facilitated to sit back on couch to complete scapular exercises for HEP 1x10 sets with slow controlled movements. Pt requried 1 vc for slow movements. ASSESSMENT:     Activity Tolerance:  Patient tolerance of  treatment: good. Discharge Recommendations: Continue to assess pending progress  Equipment Recommendations: Equipment Needed: No  Mobility Devices: ADL Assistive Devices  ADL Assistive Devices: Shower Chair with [facility], [facility] - shower  Other: No AD needed. Pt does have access to a shower chair if needed, however currently doesn't require. Plan: Times Per Week: 5x a week for 90 min and 1x a week for 30 min  Times Per Day: Once a day  Current Treatment Recommendations: Strengthening, ROM, Balance training, Functional mobility training, Endurance training, Neuromuscular re-education, Pain management, Safety education & training, Patient/Caregiver education & training, Positioning, Self-Care / ADL, Home management training    Patient Education  Patient Education: Role of OT, Plan of Care, Home Exercise Program, Home Safety, and Importance of Increasing Activity    Goals  Short Term Goals  Time Frame for Short Term Goals: 1 week from eval  Short Term Goal 1: Pt will complete oral hygiene routine while using R side for >/= 75% of task and MI to increase indep with self-care  Short Term Goal 2: Pt will complete item retieval with >/= 5 items with S and 0-1 VC for R sided involvement to increase indep with I/ADL preparation.   Short Term Goal 3: Pt will complete dynamic standing with 2 UE release, MI and 0-2 VC for R sided awareness to increase indep with hygiene  Short Term Goal 4: Pt will complete tolieting, including transfer, with MI and 0-2 VC for R sided awareness to increase indep with tolieting  Short Term Goal 5: Pt will complete LB dressing with supervision and 0-1 VC for technique to increase indep with dressing  Long Term Goals  Time Frame for Long Term Goals : 2 weeks from eval  Long Term Goal 1: Pt will complete homemaking task with mod I to increase indep with laundry  Long Term Goal 2: Pt will complete pathway finding task with supervision and 0-2 vc for visual scanning to use signs to increase indep with community reintigration  Long Term Goal 3: Pt will complete BADL routine with mod I to increase indep with oral hygiene    Following session, patient left in safe position with all fall risk precautions in place.

## 2022-11-10 VITALS
DIASTOLIC BLOOD PRESSURE: 91 MMHG | RESPIRATION RATE: 18 BRPM | SYSTOLIC BLOOD PRESSURE: 141 MMHG | BODY MASS INDEX: 28.96 KG/M2 | WEIGHT: 191.06 LBS | OXYGEN SATURATION: 93 % | HEART RATE: 91 BPM | HEIGHT: 68 IN | TEMPERATURE: 98.2 F

## 2022-11-10 LAB — GLUCOSE BLD-MCNC: 200 MG/DL (ref 70–108)

## 2022-11-10 PROCEDURE — 97530 THERAPEUTIC ACTIVITIES: CPT

## 2022-11-10 PROCEDURE — 97110 THERAPEUTIC EXERCISES: CPT

## 2022-11-10 PROCEDURE — 94640 AIRWAY INHALATION TREATMENT: CPT

## 2022-11-10 PROCEDURE — 6370000000 HC RX 637 (ALT 250 FOR IP): Performed by: PHYSICAL MEDICINE & REHABILITATION

## 2022-11-10 PROCEDURE — 97129 THER IVNTJ 1ST 15 MIN: CPT | Performed by: SPEECH-LANGUAGE PATHOLOGIST

## 2022-11-10 PROCEDURE — 99239 HOSP IP/OBS DSCHRG MGMT >30: CPT | Performed by: PHYSICAL MEDICINE & REHABILITATION

## 2022-11-10 PROCEDURE — 6370000000 HC RX 637 (ALT 250 FOR IP): Performed by: FAMILY MEDICINE

## 2022-11-10 PROCEDURE — 97130 THER IVNTJ EA ADDL 15 MIN: CPT | Performed by: SPEECH-LANGUAGE PATHOLOGIST

## 2022-11-10 PROCEDURE — 82948 REAGENT STRIP/BLOOD GLUCOSE: CPT

## 2022-11-10 RX ORDER — METFORMIN HYDROCHLORIDE 500 MG/1
1000 TABLET, EXTENDED RELEASE ORAL 2 TIMES DAILY
Qty: 360 TABLET | Refills: 3 | Status: SHIPPED | OUTPATIENT
Start: 2022-11-10 | End: 2023-02-08

## 2022-11-10 RX ADMIN — LOSARTAN POTASSIUM 25 MG: 25 TABLET, FILM COATED ORAL at 10:14

## 2022-11-10 RX ADMIN — PANTOPRAZOLE SODIUM 40 MG: 40 TABLET, DELAYED RELEASE ORAL at 06:08

## 2022-11-10 RX ADMIN — IPRATROPIUM BROMIDE AND ALBUTEROL SULFATE 1 AMPULE: .5; 3 SOLUTION RESPIRATORY (INHALATION) at 06:46

## 2022-11-10 RX ADMIN — FINASTERIDE 5 MG: 5 TABLET, FILM COATED ORAL at 10:14

## 2022-11-10 RX ADMIN — METFORMIN HYDROCHLORIDE 1000 MG: 500 TABLET, EXTENDED RELEASE ORAL at 10:14

## 2022-11-10 RX ADMIN — ASPIRIN 81 MG 81 MG: 81 TABLET ORAL at 10:14

## 2022-11-10 RX ADMIN — CLOPIDOGREL BISULFATE 75 MG: 75 TABLET ORAL at 10:14

## 2022-11-10 RX ADMIN — AMLODIPINE BESYLATE 5 MG: 5 TABLET ORAL at 11:11

## 2022-11-10 NOTE — PROGRESS NOTES
[facility] THERAPY  [address]  DISCHARGE NOTE    TIME   SLP Individual Minutes  Time In: 0930  Time Out: 1000  Minutes: 30  Timed Code Treatment Minutes: 23 Minutes   Cognitive Tx: 23 minutes  Speech tx: 7 minutes (non-billable)      Date: 11/10/2022  Patient Name: Konrad Fisher      CSN: [id number]   : 1950  (67 y.o.)  Gender: male   Referring Physician: Columba Gold MD  Diagnosis: CVA  Precautions: Aspiration risk, fall risk  Current Diet: Regular Diet with Thin Liquids   Swallowing Strategies: Standard Universal Swallow Precautions  Date of Last MBS/FEES:  2022    Pain:  No pain reported. Subjective: Patient sitting upright in recliner upon ST arrival. Alert and agreeable to therapeutic intervention. No family present    Short-Term Goals:  SHORT TERM GOAL #1:  Goal 1: GOAL MET, NO NEW GOAL     SHORT TERM GOAL #2:  Goal 2: Patient will complete immediate/delayed recall and working memory tasks with 80% accuracy, mod cues to improve retention of functional information. GOAL MET  INTERVENTIONS:   Visual memory:    -Immediate recall of details in picture scene- 9/10 indep, 1/10 unable to recall, but likely due to small print and reduced visual acuity.  -Delayed recall- 10/10 indep    SHORT TERM GOAL #3:  Goal 3:  Patient will complete complex executive functioning tasks (meds, time, finances, hobbies) with 80% accuracy, mod cues to improve skills required for IADL completion and return to occupational duties. GOAL MET  INTERVENTIONS: Money related word problems:  9/10 indep, 1/10 with min cue to review and correct (math computation was completed correctly, patient just missing completion of final step). PREVIOUS SESSION:   Payroll Organizer (6 employee weekly time cards with 4 components each):  independent, / min cues (to recalculate/organize TOTAL weekly hrs);  Overall great organization and set up of time cards into rows and columns with good mental math computation and calculator use. Payroll Organizer Math Computation (calculating max, min, average of hourly rate, weekly hrs, weekly pay): 7/9 independent, 2/9 mod cues (calculating average)   *Patient initially unaware of how to calculate the average, ST provided general information on how to calculate average. *Fair-good use of calculator for math computations; presented difficulty with inputting large numbers/series of numbers     SHORT TERM GOAL #4:  Goal 4: Patient will complete complex attention tasks with no more than 5 errors across a structured activity consistently to permit potential return to driving and other multi-tasking responsibilities. GOAL MET  INTERVENTIONS: DNT due to focus on additional STGs. SHORT TERM GOAL #5:  Goal 5: Patient will complete structured speech tasks (DDK's, sentence repetition, verbal fluency, phonemic oral motor isolation) with implementation of SOS strategies to improve intelligibility to self-perceived level of 75% or higher to maximize communication clarity. GOAL MET  INTERVENTIONS: Reviewed SOS strategies (speak up, over articulate, slow down) to optimize speech clarity.  -Structured speech tasks:  Reading at sentence level: 28/40 with clear speech production, 11/40 with min cues to repeat with use of speech strategies. *Patient rating his speech at a 7/10 (1 being unintelligible, 10 being baseline speech)  *Cues needed to increase vocal intensity, as well as over-articulation of consonants to optimize speech production and improve oral strength and ROM. Long-Term Goals:  Time Frame for Long Term Goals: 5 weeks from evaluation    LONG TERM GOAL #1:  Goal 1: Patient will safely consume a regular diet + thin liquids while utilizing compensatory strategies without overt s/s of aspiration to ensure safe return to previous diet. -GOAL MET    LONG TERM GOAL #2:  Goal 2: Patient will improve cognition to a level of Mod I in order to permit potential return to completion of IADL's in home setting. GOAL NOT    LONG TERM GOAL #3:  Goal 3: Patient will improve speech intelligibility to a self-perceived level of 85% clarity in order to improve overall communication of wants/needs. GOAL NOT MET    Comprehension: 6 - Complex ideas 90% or device (hearing aid or glasses- if patient is primarily a visual learner)  Expression: 6 - Device used to express complex ideas/needs  Social Interaction: 6 - Patient requires medication for mood and/or effect  Problem Solvin - Patient able to solve simple/routine tasks  Memory: 5 - Patient requires prompting with stress/unfamiliar situations      EDUCATION:  Learner: Patient  Education:  Reviewed ST goals and Plan of Care and Reviewed recommendations for follow-up  Evaluation of Education: Verbalizes understanding and Family not present    ASSESSMENT/PLAN:  SUMMARY: Patient has met 4/5 STG's and 1/3 LTG's this IPR stay thus far. Overall, patient making gains across all cognitive domains, but specifically within medication management tasks, immediate/delayed recall, and calendar tasks. Patient is currently on a regular diet with thin liquids, with independent use of swallow strategies. Additionally, patient presents with mild dysarthria characterized by decreased articulatory precision and blended word boundaries; reduced speech clarity directly related to right-sided oral motor weakness. Patient ~90% intelligible in all contexts. Continued skilled ST services are recommended at this time given patients's high level of independence in the home and work setting. Recommending skilled ST services via OP ST. Driving evaluation to be determined pending consult with OT/PT. Activity Tolerance:  Patient tolerance of  treatment: good. Good participation. Assessment/Plan: Patient discharged from Speech Therapy at this time due to completion of IPR stay. Discharge Disposition: home.   Continued Speech Therapy Services recommended: Yes      Discharge Recommendations: Outpatient Speech Therapy    Malena Serna.  [address], [address]

## 2022-11-10 NOTE — PLAN OF CARE
Problem: Respiratory - Adult  Goal: Clear lung sounds  11/10/2022 0650 by Db Neil RCP  Outcome: Progressing   Patient mutually agreed on goals.

## 2022-11-10 NOTE — PROGRESS NOTES
[address] [place] [address]  Inpatient Rehabilitation  Occupational Therapy  Discharge Note  Time:  Time In: 0830  Time Out: 0900  Timed Code Treatment Minutes: 30 Minutes  Minutes: 30          Date: 11/10/2022  Patient Name: Marcus Laguerre,   Gender: male      Room: [facility]/065-A  MRN: [id number]  : 1950  (73 y.o.)  Referring Practitioner: Rambo Lauren MD  Diagnosis: Acute CVA  Additional Pertinent Hx: Marcus Laguerre  is a 67 y.o. male admitted to the inpatient rehabilitation unit on 2022. He was originally admitted to [address] [place] [address] on 10/31/2022. Patient  has a past medical history of Anxiety, Arthritis, CAD (coronary artery disease), Cancer ([facility] [address] COPD (chronic obstructive pulmonary disease) ([facility] [address] Diabetes ([facility] [address] Enlarged prostate with urinary retention, Gait difficulty, Generalized weakness, Hyperlipidemia, Hypertension, Lesion of bladder, Osteoarthritis, Retention of urine, Right inguinal hernia, S/P cardiac catheterization, S/P TURP, SOB (shortness of breath), and Voiding difficulty. Patient presented to [facility] ER due to new onset of right arm numbness and balance issues. Patient with noted chronic SOB. Family reports right sides weakness, dysarthria, right sided facial droop. Patient was to be taking ASA and plavix daily, but only was taking ASA every other day due to bruising. Patient with initial NIHSS of 6. Code stroke called, not a tPA candidate. CT of the head without contrast on 10/31/2022 demonstrated stable mild global volume loss, and apical abscess associated with a left maxillary first premolar, and no other significant acute findings. CT of the head and neck on 10/31/2022 without major acute findings.  MRI of the brain without contrast on 10/31/2022 demonstrated an area of diffusion in the left parietal periventricular white matter with corresponding diminished signal intensity on ADC map consistent with acute infarct; mild atrophy and probable mild severity chronic small vessel ischemic changes, and no other significant abnormalities. CVA likely due to poorly controlled HTN and DM. Patient is seen today upon admission to the inpatient rehabilitation unit. Patient lives with his s/o that works full time, 6a-2pm daily. Discussed IPR and goal of helping patient to be as independent as possible at discharge including mobility, ADLs, cognition, swallowing, and endurance. Restrictions/Precautions:  Restrictions/Precautions: General Precautions, Fall Risk  Position Activity Restriction  Other position/activity restrictions: R hemibody weakness    SUBJECTIVE: Pt sitting in recliner prior to session. Pt was pleasant and receptive to session. PAIN: None reported     Vitals: Vitals not assessed per clinical judgement, see nursing flowsheet    COGNITION: WFL    ADL:   No ADL's completed this session. Emanuel Hidden BALANCE:  Sitting Balance:  Independent. Standing Balance: Independent. BED MOBILITY:  Not Tested    TRANSFERS:  Sit to Stand:  Independent. Stand to Sit: Independent. FUNCTIONAL MOBILITY:  Assistive Device: None  Assist Level: Modified Independent  Distance: To and from therapy apartment  Not noted LOB, increased time with ambulation. ADDITIONAL ACTIVITIES:  Pt was facilitated to complete HEP provided at an earlier date over BUE scapular and quadruped exercises for increasing proprioception in RUE. Pt was facilitated to demo to the therapist the exercises using recall for carry over at home. Pt required 2 vc with positioning with quadruped exercises, but was able to complete scapular exercises without vc. Pt was then facilitated to complete putty exercises using RUE following HEP. Pt was asked if he had any questions or concerns regarding going home, pt reported having no questions at this time. ASSESSMENT:  Activity Tolerance:  Patient tolerance of  treatment: good. Assessment: Assessment: Pt has progressed well in IPR.  He has met 5/5 STGs and 1/3 LTGs during his LOS. Pt demonstrates limitations in RUE d/t stroke impacting strength and increased need for repetition of education. Pt has progressed to modified independent with mobility, independent with sitting and standing balance, and independent with sit to stand and stand to sit transfers. Pt demonstrates safety with IADLs and ADLs and completes toileting and toileting transfers independently. Pt was provided 2 HEP for occupational therapy focusing on increasing strength and endurance in BUE for increasing proprioception in RUE. Pt demonstrated HEP with 2 vc for correct positioning. Pt being discharged to outpatient occupational therapy services to continue with UE exercises for increasing UE strength, facilitating proprioception through RUE, and increasing independence with ADLs/IADLs to return to [facility] for hopes to return back to work and driving. Discharge Recommendations: Outpatient OT  Equipment Recommendations: Equipment Needed: No  Mobility Devices: ADL Assistive Devices  ADL Assistive Devices: Shower Chair with back, [facility] - shower  Other: No AD needed. Pt does have access to a shower chair if needed, however currently doesn't require. Plan: Times Per Week: Pt d/c from IPR to home with support from significant other and to outpatient OT.   Current Treatment Recommendations: Strengthening, ROM, Balance training, Functional mobility training, Endurance training, Neuromuscular re-education, Pain management, Safety education & training, Patient/Caregiver education & training, Positioning, Self-Care / ADL, Home management training    Patient Education  Patient Education: Role of OT, Plan of Care, Energy Conservation, Home Exercise Program, Hemibody Awareness/Attention, and Reviewed Prior Education    Goals  Short Term Goals  Time Frame for Short Term Goals: 1 week from eval  Short Term Goal 1: Pt will complete oral hygiene routine while using R side for >/= 75% of task and MI to increase indep with self-care MET  Short Term Goal 2: Pt will complete item retieval with >/= 5 items with S and 0-1 VC for R sided involvement to increase indep with I/ADL preparation. MET  Short Term Goal 3: Pt will complete dynamic standing with 2 UE release, MI and 0-2 VC for R sided awareness to increase indep with hygiene MET  Short Term Goal 4: Pt will complete tolieting, including transfer, with MI and 0-2 VC for R sided awareness to increase indep with tolieting MET  Short Term Goal 5: Pt will complete LB dressing with supervision and 0-1 VC for technique to increase indep with dressing MET  Long Term Goals  Time Frame for Long Term Goals : 2 weeks from eval  Long Term Goal 1: Pt will complete homemaking task with mod I to increase indep with laundry NOT MET  Long Term Goal 2: Pt will complete pathway finding task with supervision and 0-2 vc for visual scanning to use signs to increase indep with community reintigration NOT MET   Long Term Goal 3: Pt will complete BADL routine with mod I to increase indep with oral hygiene MET    Following session, patient left in safe position with all fall risk precautions in place.

## 2022-11-10 NOTE — PROGRESS NOTES
[facility] PHYSICAL THERAPY  Discharge Note  [facility] - 7E-65/065-A    Time In: 0700  Time Out: 6400  Timed Code Treatment Minutes: 23 Minutes  Minutes: 23          Date: 11/10/2022  Patient Name: Brandi Harris,  Gender:  male        MRN: [id number]  : 1950  (67 y.o.)  Referral Date : 22  Referring Practitioner: Christina Alexander MD  Diagnosis: Acute cerebrovascular accident (CVA)  Treatment Diagnosis: difficulty in walking  Additional Pertinent Hx: Per H&P:Shazia Chavira  is a 67 y.o. male admitted to the inpatient rehabilitation unit on 2022. He was originally admitted to [facility] on 10/31/2022. Patient  has a past medical history of Anxiety, Arthritis, CAD (coronary artery disease), Cancer ([facility] [address] COPD (chronic obstructive pulmonary disease) ([facility] [address] Diabetes ([facility] [address] Enlarged prostate with urinary retention, Gait difficulty, Generalized weakness, Hyperlipidemia, Hypertension, Lesion of bladder, Osteoarthritis, Retention of urine, Right inguinal hernia, S/P cardiac catheterization, S/P TURP, SOB (shortness of breath), and Voiding difficulty. Patient presented to [facility] ER due to new onset of right arm numbness and balance issues. Patient with noted chronic SOB. Family reports right sides weakness, dysarthria, right sided facial droop. Patient was to be taking ASA and plavix daily, but only was taking ASA every other day due to bruising. Patient with initial NIHSS of 6. Code stroke called, not a tPA candidate. CT of the head without contrast on 10/31/2022 demonstrated stable mild global volume loss, and apical abscess associated with a left maxillary first premolar, and no other significant acute findings. CT of the head and neck on 10/31/2022 without major acute findings.  MRI of the brain without contrast on 10/31/2022 demonstrated an area of diffusion in the left parietal periventricular white matter with corresponding diminished signal intensity on ADC map consistent with acute infarct; mild atrophy and probable mild severity chronic small vessel ischemic changes, and no other significant abnormalities. CVA likely due to poorly controlled HTN and DM. Prior Level of Function:  Lives With: Significant other, Other (comment) (girlfriends son)  Type of Home: House  Home Layout: One level  Home Access: Stairs to enter without rails  Entrance Stairs - Number of Steps: 2  Home Equipment: Marilyne Casiano, 4 wheeled (4BY has no seat or breaks--wheels swivel)   Bathroom Shower/Tub: Tub/Shower unit  Bathroom Toilet: Handicap height  Bathroom Accessibility: Walker accessible    Receives Help From: Family  ADL Assistance: Independent  Homemaking Assistance: Independent  Ambulation Assistance: Independent  Transfer Assistance: Independent  Active : Yes  Additional Comments: Pt's girlfriend works full-time, daughters live ~30 miles away in [place] and work    Restrictions/Precautions:  Restrictions/Precautions: General Precautions, Fall Risk  Position Activity Restriction  Other position/activity restrictions: R hemibody weakness     SUBJECTIVE: Pt. Seated on EOB and pleasantly agrees to therapy session. PAIN: 0/10    Vitals: Vitals not assessed per clinical judgement, see nursing flowsheet    OBJECTIVE:  Bed Mobility:  Not Tested    Transfers:  Sit to Stand: Modified Independent  Stand to Sit:Modified Independent    Ambulation:  Modified Independent  Distance: 170 feet x 2  Surface: Level Tile  Device:No Device  Gait Deviations:   Forward Flexed Posture, Slow Sofie, Decreased Arm Swing, and Decreased Heel Strike Bilaterally    Balance:  Pt. Able to  an object from floor using No Device with Modified Independent    Dynamic Standing Balance: Supervision  *Patient instructed in Angulo Balance Test (see score below)  *Patient instructed in SLS with ability to hold for ~2 seconds with CGA and minimal assistance required at  times in order to correct LOB    Stairs:  Contact Guard Assistance  Number of Steps: 2  Height: 6\" step with No Device  *Patient required cueing for performing one step at a time for safety    Neuromuscular Re-education  None    Exercise:  Patient was guided in 1 set(s) 10 reps of exercises: Standing heel/toe raises, Standing marches, Standing hip abduction/adduction, Standing hip extension, Standing hamstring curls, Mini squats   Exercises were completed for increased independence with functional mobility. Functional Outcome Measures: Completed  Kumari Balance Score: 45  KUMARI BALANCE TEST SCORING   Score of < 45 indicates a greater risk of falling  41-56= low fall risk  21-40= medium fall risk (recommendation of walking with assist at all times)  0-20= high fall risk      ASSESSMENT:  Assessment: Patient overall has made progress with skilled PT treatment and has met 4/5 STG's and 5/6 LTG's. Patient is Mod I for bed mobility, transfers and ambulation with occasional path deviations however able to self correct. Patient has improved his Kumari Balance Test score to a 45/56 from a 22/56 indicating he is a low risk for falls. Patient continues to have difficulty with ascending/descending stairs without handrails requiring CGA due to unsteadiness. Patient to be discharged home with outpatient PT at this time. Activity Tolerance:  Patient tolerance of  treatment: good.      Equipment Recommendations:Equipment Needed: No  Discharge Recommendations: Home with Outpatient PT    Plan: Pt. to be discharged home with outpatient PT    Patient Education  Patient Education: Plan of Care, Functional Mobility, Reviewed Prior Education, Health Promotion and Wellness Education, Safety, Verbal Exercise Instruction, Stairs,  - Patient Verbalized Understanding, - Patient Requires Continued Education    Goals:  Patient Goals : be independent again    Short Term Goals  Time Frame for Short Term Goals: 1 week  Short Term Goal 1: Patient will complete supine < > sit and rolling with head of bed flat and no rails with modified independence to transfer in and out of bed safely. GOAL MET, SEE LTG  Short Term Goal 2: Patient will complete sit < > stand with Supervision and less than or equal to 25% verbal cues for utilization of right UE to stand to ambulate with decreased difficulty. GOAL MET, SEE LTG  Short Term Goal 3: Patient to ambulate >/=100 feet without AD with Supervision in busy environments in order to assist with safety with home mobility. GOAL MET, SEE LTG  Short Term Goal 4: Patient will ascend/descend 1 step with no hand rail and CGA to progress towards safe home entry. GOAL MET, SEE LTG  Short Term Goal 5: Patient will improve single leg stance to 3 seconds each lower extremity with CGA to demonstrate good lateral weight shift and improve balance GOAL NOT MET          Long Term Goals  Time Frame for Long Term Goals : 3 weeks  Long Term Goal 1: Patient will complete sit < > stand with modified independence with no verbal cues for hand placement to stand to ambulate safely. GOAL MET  Long Term Goal 2: Patient will complete chair < > bed transfer with no AD and modified independence to transfer surface to surface safely. GOAL MET  Long Term Goal 3: Patient will ambulate 80' with no AD and modified independence to navigate home safely. GOAL MET  Long Term Goal 4: Patient will ascend/descend 2 steps with no hand rail and SBA for home entry. GOAL NOT MET  Long Term Goal 5: Patient will improve KUMARI to greater than or equal to 45/56 to reduce his risk for falls. GOAL MET  Long term goal 6: Patient will complete car transfer with modified independence to transfer in and out of vehicl safely. GOAL MET        Following session, patient left in safe position with all fall risk precautions in place.

## 2022-11-10 NOTE — PROGRESS NOTES
[address] [address]  Recreational Therapy  Discharge Note  Inpatient Rehabilitation Unit         Date:  11/10/2022       Patient Name: Kaiden Benítez      MRN: [id number]       YOB: 1950 (73 y.o.)       Gender: male  Diagnosis: Acute CVA  Referring Practitioner: Vaughn Scheuermann, MD    Patient discharged from Recreational Therapy at this time. See recreational therapy notes for details.     Electronically signed by: KAIA Marquis  Date: 11/10/2022

## 2022-11-10 NOTE — PLAN OF CARE
Problem: Discharge Planning  Goal: Discharge to home or other facility with appropriate resources  Flowsheets (Taken 11/9/2022 2100 by Ilda Penaloza LPN)  Discharge to home or other facility with appropriate resources: Arrange for needed discharge resources and transportation as appropriate  Note: Patient to be discharged on Thursday, 11/10 to home. Patient will be under the supervision of his spouse, Richy Devlin. Family education was not provided. Patient will be receiving services through [place] [phone]. SW provided information to [facility] on 11/9 via [phone].

## 2022-11-14 NOTE — PROGRESS NOTES
[address] [address]  Therapy Contact Note      Date: 2022  Patient Name: Myra Perez        MRN: [id number]    Account Number:   : 1950  (67 y.o.)  Gender: male       Set up [address] [place] [address] for the following therapy appointments:    22 1300  22 800    Notified patient that  time is 30 minutes before listed appointment time.         Tarijose antonio Lees, Rehab Tech

## 2022-11-16 ENCOUNTER — HOSPITAL ENCOUNTER (OUTPATIENT)
Dept: SPEECH THERAPY | Age: 72
Setting detail: THERAPIES SERIES
Discharge: HOME | End: 2022-11-16
Payer: MEDICARE

## 2022-11-16 PROCEDURE — 92523 SPEECH SOUND LANG COMPREHEN: CPT | Performed by: SPEECH-LANGUAGE PATHOLOGIST

## 2022-11-16 NOTE — PROGRESS NOTES
** PLEASE SIGN, DATE AND TIME CERTIFICATION BELOW AND RETURN TO [facility] OUTPATIENT REHABILITATION (FAX #: [phone]). ATTEST/CO-SIGN IF ACCESSING VIA INSylantro. THANK YOU.**    I certify that I have examined the patient below and determined that Physical Medicine and Rehabilitation service is necessary and that I approve the established plan of care for up to 90 days or as specifically noted. Attestation, signature or co-signature of physician indicates approval of certification requirements.    ________________________ ____________ __________  Physician Signature   Date   Time     10[facility] THERAPY  [x] SPEECH LANGUAGE COGNITIVE EVALUATION  [] DAILY NOTE   [] PROGRESS NOTE [] DISCHARGE NOTE    [x] [facility]   [] [address]    [] 645 [facility]   [] Louisa Shipley    Date: 2022  Patient Name:  Josefa Huff  : 1950  MRN: [id number]  CSN: [id number]    Referring Practitioner Brandon Steward APR*   Diagnosis Acute cerebrovascular accident (CVA) [facility]) [I63.9]    Treatment Diagnosis Cognitive deficits   Date of Evaluation 22      Functional Out[facility] memory   Functional Outcome Score Level 5 (22)       Insurance: Primary: Payor: [address] [place] [address],[address] /  /  / ,   Secondary:    Authorization Information: No precert required   Visit # 1, 1/10 for progress note   Visits Allowed: Unlimited visits based on medical necessity   Recertification Date:    Physician Follow-Up: Robert Lou - 23   Physician Orders: Chuck Sherwood and treat   Pertinent History: Patient presented to [facility] ER on 10/31/22 due to new onset of right arm numbness and balance issues. Patient with noted chronic SOB. Family reports right sides weakness, dysarthria, right sided facial droop.   CT of the head without contrast on 10/31/2022 demonstrated stable mild global volume loss, and apical abscess associated with a left maxillary first premolar, and no other significant acute findings. CT of the head and neck on 10/31/2022 without major acute findings. MRI of the brain without contrast on 10/31/2022 demonstrated an area of diffusion in the left parietal periventricular white matter with corresponding diminished signal intensity on ADC map consistent with acute infarct; mild atrophy and probable mild severity chronic small vessel ischemic changes, and no other significant abnormalities. Patient was admitted to the inpatient rehabilitation unit for PT/OT/ST on 11/2/2022 and was discharged home on 11/10/22. SUBJECTIVE:  Patient cooperative and pleasant. No family present. Social/Functional History:  Electronic Medical Record reviewed and up to date    Luke lives with significant other in a single story home with stairs and no handrail to enter. Task Prior Level of Function Current Level of Function   ADLs  Independent Independent   Finance Management Independent Independent   Medication Management Independent (pill box) Independent (pill box)   Educational Level 12th grade 12th grade   Driving Active  Does not drive   Work Part-Time. 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 Overall speech intelligibility was ~90% in conversation.   Patient with a low vocal intensity and decreased oral opening resulting in decreased intelligibility at times. Patient reports he has always been a soft spoken person. Also reports his girlfriend told him he \"mumbles too much\" prior to his stroke. LANGUAGE:  Receptive:  Simple Yes/No Questions: 5/5  Complex Yes/No Questions: 4/5  Expressive:  Automatic Speech: independent  Sentence Completion (automatic): 3/3  Confrontational Naming: 3/3  Responsive Namin/5  Divergent Naming (concrete): 7 items named in 1 minute  Conversational Speech: No instances of anomia, paraphasias or perseveration noted in conversation. COGNITION:  Hadley Cognitive Assessment [facility]) version 7.3 completed. Pt scored /30*. Normal is greater than or equal to /30. *Inclusion of +1 point given highest level of education achieved less than/equal to 12th grade or GED with limited-0 post-secondary schooling       Visuospatial/Executive 4/5   Naming 3/3   Memory   Immediate 5/5 (not scored)   Short-Term 1/5   Attention   Digit Repetition 2/2   Sustained/Selective 1/1   Mental Math 3/3   Language   Sentence Repetition 2/2   Fluency Naming 8 items in 60 seconds   Abstraction 2/2   Orientation 6/6   Total: 30     **Assessed complex attention via identification of 2 numbers simultaneously throughout a whole page of numbers while listening and counting the number of taps heard. Patient completed the task with 2 errors on the auditory portion of the task and 34 errors with the written portion of the task. After review of the task, patient reported he went through the paper too fast.      SWALLOWING:  Patient is on a regular diet with thin liquids and reports overall good tolerance. IMPRESSIONS: Patient presents with very mild cognitive deficits characterized by impaired short term memory, executive functioning and complex attention. Patient's expressive and receptive language skills appear within functional limits.  Patient with mild dysarthria characterized by low vocal intensity and decreased oral opening. Areas for Improvement: Impaired cognition and Impaired speech  Prognosis: good  Specific Interventions Next Treatment: review plan of care and goals, education on memory strategies and speech strategies, complex attention task, executive function task    Activity/Treatment Tolerance:  [x]  Patient tolerated treatment well  []  Patient limited by fatigue  []  Patient limited by pain   []  Patient limited by other medical complications  []  Other:     GOALS:  Patient Goal: Patient would like to get back to driving and work. Short Term Goals:  Short Term Goals  Time Frame for Short Term Goals: 4 weeks  Goal 1: Patient will complete functional delayed recall tasks with up to 5 items with 80% accuracy and min cues for improved success with recall of daily tasks, appointments, etc.  Goal 2: Patient will complete executive function tasks (medication, time, finances, etc) with 90% accuracy and min cues for improved success with completion of IADL's. Goal 3: Patient will complete complex attention tasks with no more than 3 errors within a 4-5 minute task for success return to driving and completing other multi-tasking responsibilities. Goal 4: Patient will utilize SOS speech strategies independently in conversation at least 95% of the time for improved success communicating with family and friends. Long Term Goals:  Long Term Goals  Time Frame for Long Term Goals: 6 weeks  Goal 1: Patient's FOM score will improve by at least 1 point (DOUG NOMS: memory=level 5) for improved success managing all daily information and tasks. Goal 2: Patient will independently utilize speech strategies in conversation for improved success communicating with family and friends.       Patient Education:   [x]  Education Completed: Plan of Care, Goals, Results of testing completed  []  No new Education completed  []  Reviewed Prior HEP      [x]  Patient verbalized and/or demonstrated understanding of education provided. []  Patient unable to verbalize and/or demonstrate understanding of education provided. Will continue education. []  Barriers to learning:     PLAN:  Treatment Recommendations:     [x]  Plan of care initiated. Plan to see patient 2 times per week for 6 weeks to address the treatment planned outlined above.   []  Continue with current plan of care  []  Modify plan of care as follows:    []  Hold pending physician visit  []  Discharge    Time In 1257   Time Out 1354   Timed Code Minutes: 0 min   Total Treatment Time: 62 min       Natalia Aguillon M.S. Jerilyn [place] [address]342

## 2022-11-17 ENCOUNTER — HOSPITAL ENCOUNTER (OUTPATIENT)
Dept: PHYSICAL THERAPY | Age: 72
Setting detail: THERAPIES SERIES
Discharge: HOME | End: 2022-11-17
Payer: MEDICARE

## 2022-11-17 ENCOUNTER — HOSPITAL ENCOUNTER (OUTPATIENT)
Dept: OCCUPATIONAL THERAPY | Age: 72
Setting detail: THERAPIES SERIES
Discharge: HOME | End: 2022-11-17
Payer: MEDICARE

## 2022-11-17 PROCEDURE — 97161 PT EVAL LOW COMPLEX 20 MIN: CPT

## 2022-11-17 PROCEDURE — 97112 NEUROMUSCULAR REEDUCATION: CPT

## 2022-11-17 PROCEDURE — 97165 OT EVAL LOW COMPLEX 30 MIN: CPT

## 2022-11-17 NOTE — PROGRESS NOTES
** PLEASE SIGN, DATE AND TIME CERTIFICATION BELOW AND RETURN TO [facility] OUTPATIENT REHABILITATION (FAX #: [phone]). ATTEST/CO-SIGN IF ACCESSING VIA INVanDyne SuperTur[employer]. THANK YOU.**    I certify that I have examined the patient below and determined that Physical Medicine and Rehabilitation service is necessary and that I approve the established plan of care for up to 90 days or as specifically noted. Attestation, signature or co-signature of physician indicates approval of certification requirements.    ________________________ ____________ __________  Physician Signature   Date   Time    7[address] [facility]  PHYSICAL THERAPY  [x] EVALUATION  [] DAILY NOTE (LAND) [] DAILY NOTE (AQUATIC ) [] PROGRESS NOTE [] DISCHARGE NOTE    [x] 615 [facility]   [] [facility] 90    [] 645 [facility]   [] [facility]    Date: 2022  Patient Name:  Brandi Harris  : 1950  MRN: [id number]  CSN: [id number]    Referring Practitioner Inés Leon, APR*   Diagnosis Acute cerebrovascular accident (CVA) ([facility] [address] [I63.9]    Treatment Diagnosis Right-sided weakness, Unsteady    Date of Evaluation 22    Additional Pertinent History Lung cancer - in remission  HTN, COPD, diabetes - doesn't have his testing materials at home       Functional Outcome Measure Used Tinetti Balance and Gait Assessment   Functional Outcome Score 27/28 - low fall risk (22)       Insurance: Primary: Payor: Emilia Le /  /  / ,   Secondary:    Authorization Information: 20% copay, no visit limit, aquatic and modalities covered    Visit # 1, 1/10 for progress note   Visits Allowed: Unlimited    Recertification Date:    Physician Follow-Up: 22 - PCP  2023 - Dunia Carvajal - neuro  2023 - Jame Elena - PMR   Physician Orders:    History of Present Illness: Patient presented to [facility] ER on 10/31/22 due to new onset of right arm numbness and balance issues. Patient with noted chronic SOB. Family reports right sides weakness, dysarthria, right sided facial droop. CT of the head without contrast on 10/31/2022 demonstrated stable mild global volume loss, and apical abscess associated with a left maxillary first premolar, and no other significant acute findings. CT of the head and neck on 10/31/2022 without major acute findings. MRI of the brain without contrast on 10/31/2022 demonstrated an area of diffusion in the left parietal periventricular white matter with corresponding diminished signal intensity on ADC map consistent with acute infarct; mild atrophy and probable mild severity chronic small vessel ischemic changes, and no other significant abnormalities. Patient was admitted to the inpatient rehabilitation unit for PT/OT/ST on 11/2/2022 and was discharged home on 11/10/22. SUBJECTIVE: Since being home a week ago, patient notices his balance is still off, and notices weakness in right arm and leg. Presents without device. Social/Functional History and Current Status:  Medications and Allergies have been reviewed and are listed on Medical History Questionnaire. Rosemary Zavala lives with significant other - Mehnaz Stark in a single story home with stairs and no handrail to enter. 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        OBJECTIVE:    Pain: denies   Palpation    Observation    Posture    Oculomotor  Right eye central vision loss  Smooth pursuit - abnormal horizontal, abnormal vertical   Sacchades - abnormal right and left  Head thrust normal bilaterally    Range of Motion B hip flexion full, B KAREN seated - full ROM  Good ankle DF 0-15 deg  Mild hamstring restriction bilaterally  Good trunk flexion and rotation - denies pain   Strength Right single heel raise 6 reps  Left single heel raise 10 reps  B ankle DF 5/5  B knee ext/ flexion 5/5   Coordination    Sensation WFL    Bed Mobility IND    Transfers IND    Ambulation IND - no device 2x 250 ft about clinic - good gait speed - mildly reduced R arm swing, good B heel strike    Stairs SBA - reciprocal pattern up/down - at first with railing and then without  RLE externally rotates while descending steps   Balance SLS 2 seconds Left, 3 seconds Right   Special Tests 30 second sit to stand test - 10 reps, no UE on chair   Tinetti Balance and Gait - 27/28 - low fall risk   Dynamic Gait Index - 22/24 - low fall risk         TREATMENT   Precautions: CVA 10/31/22 - right sided weakness, mild deficit   Pain:     \"X in shaded column indicates activity completed today    *\" next to exercise/intervention indicates progression   Modalities Parameters/  Location  Notes                     Manual Therapy Time/Technique  Notes                     Exercise/  Intervention   Notes                                                                                  Specific Interventions Next Treatment: Stationary bike or NuStep warm up, Higher level balance and gait tasks (lateral, retro stepping, monster walks, carioca patterns), Descending steps - eccentric tap downs, leg strengthening on resistance equipment, and hamstring/gastroc, hip stretching.      Activity/Treatment Tolerance:  [x]  Patient tolerated treatment well  []  Patient limited by fatigue  []  Patient limited by pain   []  Patient limited by medical complications  []  Other:     Assessment: Patient presents with mild balance deficit and Right sided weakness consistent with recent CVA on 10/31/22. He demonstrates compensations with navigating stairs, and mild unsteadiness with balance perturbations. 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 Body Structures/Functions/Activity Limitations: impaired activity tolerance, impaired balance, impaired coordination, impaired endurance, impaired motor control, and impaired strength  Prognosis: good    GOALS:  Patient Goal: return to driving, return to work, better balance    Short Term Goals:  Time Frame: 6 weeks    Patient will report ability to stand and walk for at least 60 minutes in order to run errands and return to part time work. Patient will improve BLE PROM to 0-90 deg hamstring stretch, 0-110 deg hip flexion during KAREN in order to bend and lift at work without straining. Patient will improve standing balance to 10 sec either SLS in order to stabilize ankle with walking in grocery store amongst pallets and inventory. Patient will be IND with HEP in order to meet long term goals. Long Term Goals:  Time Frame: 12 weeks    Patient will improve BLE strength to 5/5 hip, knee, and ankle strength including 20 reps single heel raise in order to climb stairs, lift and walk longer durations. Patient will improve score of  Dynamic Gait Index from baseline 22/24 to 24/24 in order to reduce difficulty with stepping over obstacles. Patient will ascend/descend 6 steps with no railing and reciprocal pattern in order to access front door of home with ease. Patient will squat to lift 50 lb weight from floor to tabletop height with good body mechanics in order to return to heavy lifting at work.         Patient Education:   [x]  HEP/Education Completed: Plan of Care, Goals, no handouts given yet  Compufirst Access Code:  [] No new Education completed  []  Reviewed Prior HEP      [x]  Patient verbalized and/or demonstrated understanding of education provided. []  Patient unable to verbalize and/or demonstrate understanding of education provided. Will continue education. []  Barriers to learning: none    PLAN:  Treatment Recommendations: Strengthening, Range of Motion, Balance Training, Gait Training, Stair Training, Neuromuscular Re-education, Manual Therapy - Soft Tissue Mobilization, Manual Therapy - Joint Manipulation, Home Exercise Program, Patient Education, Vestibular Rehabilitation, Aquatics, and Modalities    [x]  Plan of care initiated. Plan to see patient 2 times per week for 12 weeks to address the treatment planned outlined above.   []  Continue with current plan of care  []  Modify plan of care as follows:    []  Hold pending physician visit  []  Discharge    Time In 800   Time Out 845   Timed Code Minutes: 0 min   Total Treatment Time: 45 min       Electronically Signed by: Emilee Ying PT

## 2022-11-17 NOTE — PROGRESS NOTES
** PLEASE SIGN, DATE AND TIME CERTIFICATION BELOW AND RETURN TO [facility] OUTPATIENT REHABILITATION (FAX #: [phone]). ATTEST/CO-SIGN IF ACCESSING VIA [employer]. THANK YOU.**    I certify that I have examined the patient below and determined that Physical Medicine and Rehabilitation service is necessary and that I approve the established plan of care for up to 90 days or as specifically noted. Attestation, signature or co-signature of physician indicates approval of certification requirements.    ________________________ ____________ __________  Physician Signature   Date   Time   [address] THERAPY  [x] EVALUATION  [] DAILY NOTE (LAND) [] DAILY NOTE (AQUATIC ) [] PROGRESS NOTE [] DISCHARGE NOTE    [x] [facility]   [] [address]    [] [address] [facility]   [] Windy Apo    Date: 2022  Patient Name:  Doris Acosta  : 1950  MRN: [id number]  CSN: [id number]    Referring Practitioner Sajan , APR*   Diagnosis Acute cerebrovascular accident (CVA) [facility]) [I63.9]    Treatment Diagnosis R UE weakness   Date of Evaluation 22      Functional Outcome Measure Used NT   Functional Outcome Score NT (22)       Insurance: Primary: Payor: MEDICARE /  /  / ,   Secondary:    Authorization Information: PRECERTIFICATION REQUIRED:  No  INSURANCE THERAPY BENEFIT:  Patient has unlimited visits based on medical necessity  Benefit will not cover maintenance or preventative treatment. AQUATIC THERAPY COVERED:   Yes   Visit # 1,  for progress note   Visits Allowed: No limit   Recertification Date: 64   Physician Follow-Up: NT   Physician Orders: OT eval and tx    Pertinent History: Pt had a stroke 10/31/22, affecting R side. Had an inpatient rehab stay where he did very well and was discharged home as independent with ADL and transfers.   Referral to OP therapy to improve R UE strength/coordination for return to driving and work. SUBJECTIVE: Pt reports no pain. Well dressed and groomed, pleasant and cooperative. Social/Functional History:  Medications and Allergies have been reviewed and are listed on the [address] lives with spouse in a single story home with stairs and no handrail to enter. .    Task Prior Level of Function  (current level of function addressed below)   ADLs  Independent   Ambulation Independent   Transfers Independent   Hobbies Working in the yard   Driving Active    Work Part-Time. 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, cash register      OBJECTIVE:  Hand Dominance left handed   Palpation    Observation    Posture Rounded shoulders   Edema    Special Tests        Vision R eye, years ago, pt reports he has a broken blood vessel and has impaired vision centrally, per pt report and PT eval    Hearing WNL   Cognition   Able to follow directions and recall HEP, answered all questions appropriately       ADL's Independent with ADL   Tub/Shower Set-Up Tub/Shower Combo   Toilet Set-Up Standard Height Toilet   [address] [place] [address] chair with back   Adaptive Equipment Used none   Bed Mobility  Not Tested   Transfers Sit to Stand:  Independent. Balance good   Functional Mobility Assistive Device: None  Assist Level: Independent. Distance: 30 feet         Sensation Right Upper Extremity: Intact.    Coordination WNL 9 hole peg test L 25 sec R 45 sec    Movement Descriptors Fine Motor Impairments, Gross Motor Impairments, and Decreased Speed   Tone Right Upper Extremity Tone:  Normal       RIGHT UPPER EXTREMITY  RANGE OF MOTION    AROM PROM COMMENTS         Shoulder Flexion WFL for all     Shoulder Extension      Shoulder Abduction      Shoulder External Rotation      Shoulder Internal Rotation      Elbow Flexion      Elbow Extension      Wrist Flexion      Wrist Extension      General Hand ROM          RIGHT UPPER EXTREMITY  STRENGTH COMMENTS   Shoulder Flexion 5/5    Shoulder Abduction 5/5    Elbow Extension 5/5    Elbow Flexion 5/5    Wrist Extension     Wrist Flexion          RIGHT LEFT    Strength Settin 54. 7#avg  87#avg   Pinch Strength Tip Pinch:      Lateral Pinch 11. 3#avg 19. 7#avg    3 Point Pinch 9.5#average 10. 8#avg      General Strength Comment        TREATMENT   Precautions: R eye central vision impaired (prior to stroke)    Pain: No pain      X in shaded column indicates Activity Completed Today   Modalities Parameters/  Location  Notes/Comments                     Manual Therapy Time/  Technique  Notes/Comments                     Exercises   Sets/  Sec Reps  Notes/Comments   Theraputty -, lateral, 3 point, tip pinches 1 10 each x Pt has blue putty at home-has been gripping and pinching with all fingers; added in specific pinches today                                              Activities Time    Notes/Comments   Dynavision   x Mode A All quadrants   R hand score 36, avg rxn time 1.67  L hand score  48, avg rxn time 1.25   HEP review  x Pt has blue putty at home, working on gripping, pulling things out            Specific Interventions Next Treatment: UEFI for baseline score, R hand strengthening, fine motor/gross motor coordination, dynavision, eventual driving eval, work simulation    Activity/Treatment Tolerance:  [x]  Patient tolerated treatment well  []  Patient limited by fatigue  []  Patient limited by pain   []  Patient limited by other medical complications  []  Other:     Assessment: Pt will benefit from skilled OT to improve R UE strength and coordination for return to work and driving.    Areas for Improvement: impaired coordination, impaired motor control, and impaired strength  Prognosis: good    GOALS:  Patient Goal: to return to work and driving    Short Term Goals:  Time Frame: 8 visits   *  Pt will increase R hand coordination to 40 seconds or less with 9 hole peg test.  Pt will increase R  strength to 60# avg. For IADL. Pt will increase R lateral pinch strength to 15#avg for IADL. Pt will increase R 3 point pinch strength to 10# avg for IADL. Pt will complete baseline UEFI for assessment of ADL/IADL level of difficulty. Long Term Goals:  Time Frame: 8 weeks   *  Patient will improve UEFS to at least 70/80. *  Pt will increase R  strength to 70# for RTW. Pt will increase R lateral pinch strength to 17# for RTW. Pt will increase R hand coordination to 30 sec or less for RTW. Pt will complete driving evaluation when appropriate for ability to RTW. Patient Education:   [x]  HEP/Education Completed: Plan of Care, Goals  []  No new Education completed  [x]  Reviewed Prior HEP      [x]  Patient verbalized and/or demonstrated understanding of education provided. []  Patient unable to verbalize and/or demonstrate understanding of education provided. Will continue education. [x]  Barriers to learning: none    PLAN:  Treatment Recommendations: Strengthening, Neuromuscular Re-education, Home Exercise Program, and coordination    [x]  Plan of care initiated. Plan to see patient 2 times per week for 8 weeks to address the treatment planned outlined above.   []  Continue with current plan of care  []  Modify plan of care as follows:    []  Hold pending physician visit  []  Discharge    Time In 0845   Time Out 0930   Timed Code Minutes: 15 min   Total Treatment Time: 45 min       Electronically Signed by: ADRIENNE Lemus, [address] [place] [address]

## 2022-11-30 NOTE — DISCHARGE SUMMARY
[address] NOTE  OUTPATIENT  [facility]    Name: Ramy Pérez  YOB: 1950  Gender: male  Patient Name: Ramy Pérez        CSN: [id number]   Referring Physician:  ALOK Davey  Diagnosis:  Acute Cerebrovascular accident I63.9  Secondary Diagnosis: cognitive deficits    Patient is discharged from Speech Therapy services at this time. See last note for details related to results of therapy and goal achievement. Reason for discharge: Patient requested to be discharged. Patient was seen for the evaluation and treatment was recommended and scheduled, however, the patient requested that all appointments be canceled due to financial reasons. Financial assistance was offered to the patient.       Phoebe Wilkinson M.S. [address] [address]

## 2022-12-01 NOTE — PROGRESS NOTES
[address] DISCHARGE NOTE  [address]    Date: 2022  Patient Name: Edinson Stevens        CSN: [id number]   : 1950  (67 y.o.)  Gender: male   Jesica Lewis, APR*,    Acute cerebrovascular accident (CVA) [facility]) [I63.9],      Patient is discharged from Occupational Therapy services at this time. See last note for details related to results of therapy and goal achievement. Reason for discharge: Patient requested to be discharged. Ksenia [address], [facility]/L, [place], [address]

## 2022-12-07 NOTE — DISCHARGE SUMMARY
[address] [facility]  PHYSICAL THERAPY  [] EVALUATION  [] DAILY NOTE (LAND) [] DAILY NOTE (AQUATIC ) [] PROGRESS NOTE [x] DISCHARGE NOTE    [x] [facility] [place]   [] [place] [address]    [] [facility]   [] Justo Show    Date: 2022  Patient Name:  Dai Macias  : 1950  MRN: [id number]  CSN: [id number]    Referring Practitioner Marshal Alvarado, APR*   Diagnosis Acute cerebrovascular accident (CVA) ([facility] [address] [I63.9]    Treatment Diagnosis Right-sided weakness, Unsteady    Date of Evaluation 22    Additional Pertinent History Lung cancer - in remission  HTN, COPD, diabetes - doesn't have his testing materials at home       Functional Outcome Measure Used Tinetti Balance and Gait Assessment   Functional Outcome Score 27/28 - low fall risk (22)       Insurance: Primary: Payor: [employer] /  /  / ,   Secondary:    Authorization Information: 20% copay, no visit limit, aquatic and modalities covered    Visit # 1, 1/10 for progress note   Visits Allowed: Unlimited    Recertification Date: [id number]   Physician Follow-Up: 22 - PCP  2023 - Shannon Haddad - neuro  2023 - Ania Meza - PMR   Physician Orders:    History of Present Illness: Patient presented to [facility] ER on 10/31/22 due to new onset of right arm numbness and balance issues. Patient with noted chronic SOB. Family reports right sides weakness, dysarthria, right sided facial droop. CT of the head without contrast on 10/31/2022 demonstrated stable mild global volume loss, and apical abscess associated with a left maxillary first premolar, and no other significant acute findings. CT of the head and neck on 10/31/2022 without major acute findings.  MRI of the brain without contrast on 10/31/2022 demonstrated an area of diffusion in the left parietal periventricular white matter with corresponding diminished signal intensity on ADC map consistent with acute infarct; mild atrophy and probable mild severity chronic small vessel ischemic changes, and no other significant abnormalities. Patient was admitted to the inpatient rehabilitation unit for PT/OT/ST on 11/2/2022 and was discharged home on 11/10/22. Patient discharged due to: Patient requested to be discharged. .  See evaluation 11/17/22. Patient felt therapy would be too expensive.  He was offered the application to the financial assistance program but still declined therapy after the eval.       Electronically Signed by: Catherine Garza PT

## 2023-01-11 ENCOUNTER — OFFICE VISIT (OUTPATIENT)
Dept: NEUROLOGY | Age: 73
End: 2023-01-11
Payer: MEDICARE

## 2023-01-11 VITALS
DIASTOLIC BLOOD PRESSURE: 78 MMHG | OXYGEN SATURATION: 95 % | HEART RATE: 98 BPM | HEIGHT: 68 IN | WEIGHT: 186 LBS | SYSTOLIC BLOOD PRESSURE: 132 MMHG | BODY MASS INDEX: 28.19 KG/M2

## 2023-01-11 DIAGNOSIS — I69.30 CHRONIC ISCHEMIC LEFT MCA STROKE: Primary | ICD-10-CM

## 2023-01-11 DIAGNOSIS — I63.89 OTHER CEREBRAL INFARCTION (HCC): ICD-10-CM

## 2023-01-11 PROCEDURE — 3074F SYST BP LT 130 MM HG: CPT | Performed by: PSYCHIATRY & NEUROLOGY

## 2023-01-11 PROCEDURE — G8484 FLU IMMUNIZE NO ADMIN: HCPCS | Performed by: PSYCHIATRY & NEUROLOGY

## 2023-01-11 PROCEDURE — 3078F DIAST BP <80 MM HG: CPT | Performed by: PSYCHIATRY & NEUROLOGY

## 2023-01-11 PROCEDURE — 1036F TOBACCO NON-USER: CPT | Performed by: PSYCHIATRY & NEUROLOGY

## 2023-01-11 PROCEDURE — 99205 OFFICE O/P NEW HI 60 MIN: CPT | Performed by: PSYCHIATRY & NEUROLOGY

## 2023-01-11 PROCEDURE — G8417 CALC BMI ABV UP PARAM F/U: HCPCS | Performed by: PSYCHIATRY & NEUROLOGY

## 2023-01-11 PROCEDURE — G8427 DOCREV CUR MEDS BY ELIG CLIN: HCPCS | Performed by: PSYCHIATRY & NEUROLOGY

## 2023-01-11 PROCEDURE — 3017F COLORECTAL CA SCREEN DOC REV: CPT | Performed by: PSYCHIATRY & NEUROLOGY

## 2023-01-11 PROCEDURE — 1123F ACP DISCUSS/DSCN MKR DOCD: CPT | Performed by: PSYCHIATRY & NEUROLOGY

## 2023-01-11 RX ORDER — INSULIN ASPART 100 [IU]/ML
INJECTION, SOLUTION INTRAVENOUS; SUBCUTANEOUS
COMMUNITY
Start: 2022-12-15

## 2023-01-11 RX ORDER — INSULIN GLARGINE 100 [IU]/ML
INJECTION, SOLUTION SUBCUTANEOUS
COMMUNITY
Start: 2022-12-15

## 2023-01-11 RX ORDER — SEMAGLUTIDE 1.34 MG/ML
INJECTION, SOLUTION SUBCUTANEOUS
COMMUNITY

## 2023-01-11 NOTE — PATIENT INSTRUCTIONS
Continue with dual antiplatelets  Continue with lipid lowering medication, target LDL below 70  Homocystine level  Lipid panel  Modify risk factors for stroke (blood pressure, cholesterol, diabetes, smoking cessation etc.. Control)  Work closely with your PCP regarding better control of your diabetes. Call with any new symptoms or concerns.    Follow up as needed

## 2023-01-12 ENCOUNTER — NURSE ONLY (OUTPATIENT)
Dept: LAB | Age: 73
End: 2023-01-12

## 2023-01-12 DIAGNOSIS — I69.30 CHRONIC ISCHEMIC LEFT MCA STROKE: ICD-10-CM

## 2023-01-12 DIAGNOSIS — I63.89 OTHER CEREBRAL INFARCTION (HCC): ICD-10-CM

## 2023-01-12 LAB
CHOLESTEROL, TOTAL: 122 MG/DL (ref 100–199)
HDLC SERPL-MCNC: 33 MG/DL
HOMOCYSTEINE: 7.9 UMOL/L
LDL CHOLESTEROL CALCULATED: 64 MG/DL
TRIGL SERPL-MCNC: 125 MG/DL (ref 0–199)

## 2023-01-24 NOTE — PROGRESS NOTES
Chief Complaint   Patient presents with    Consultation     NP Stroke       Ten Kaur is a 67 y.o. male who presents today for evaluation of acute ischeic infarct in left corona radiata in October 2022. Initial presenting symptoms was of right sided facial droop, right sided weakness and balance trouble. MRI brain done 10/31/22 showed Area of restricted diffusion in the left parietal periventricular white matter with corresponding diminished signal intensity on ADC map consistent with an acute infarct. Mild atrophy and probable ischemic changes in the white matter. CTA head and neck done 1/31/22 showed no concerning findings. He was on aspirin and plavix prior to stroke. He is diabetic for the past 4 years, poorly controlled. His sleep is poor and interrupted, he does not wake up feeling tired. He does not take daytime naps. He does not smoke. He denies chest pain. No shortness of breath, no neck pain. No vision changes. No dysphagia. No fever. No rash. No weight loss. History provided by patient. Past Medical History:   Diagnosis Date    Anxiety     Arthritis     CAD (coronary artery disease)     on Plavix and ASA    Cancer ([facility] 01/2019    left lung stage 2-Dr Dan    COPD (chronic obstructive pulmonary disease) ([facility] 02/2019    ANDREINA Leiva    Diabetes ([facility]     Metformin    Enlarged prostate with urinary retention     Gait difficulty     Generalized weakness     Hyperlipidemia     Hypertension     Dr Jason Mccall and Dr Little Fuel    Lesion of bladder     Osteoarthritis     Retention of urine     Right inguinal hernia 2021    S/P cardiac catheterization 03/12/2019    stent to circumflex per Dr. Haven Verduzco    S/P TURP     SOB (shortness of breath)     With activity    Voiding difficulty        Patient Active Problem List   Diagnosis    Chronic retention of urine    Benign prostatic hyperplasia    Mass of left lung    Adenocarcinoma of left lung (HCC)    Mediastinal lymphadenopathy    Angina effort    Abnormal stress test S/P coronary angioplasty    CAD in native artery    S/P cardiac cath    Adenocarcinoma, lung, left (HCC)    Chest pain    Pleural effusion exudative    Hypertension    [facility]    Dental abscess    Acute cerebrovascular accident (CVA) ([facility]    Type 2 diabetes mellitus treated without insulin ([facility]       No Known Allergies    Current Outpatient Medications   Medication Sig Dispense Refill    Semaglutide, 1 MG/DOSE, (OZEMPIC, 1 MG/DOSE,) 2 MG/1.5ML SOPN Inject into the skin      metFORMIN (GLUCOPHAGE-XR) 500 MG extended release tablet Take 2 tablets by mouth 2 times daily 360 tablet 3    atorvastatin (LIPITOR) 40 MG tablet Take 1 tablet by mouth nightly 30 tablet 3    losartan (COZAAR) 25 MG tablet Take 1 tablet by mouth daily 30 tablet 3    finasteride (PROSCAR) 5 MG tablet Take 1 tablet by mouth daily 30 tablet 3    amLODIPine (NORVASC) 10 MG tablet Take 1 tablet by mouth daily 30 tablet 3    pantoprazole (PROTONIX) 40 MG tablet Take 1 tablet by mouth every morning (before breakfast) 30 tablet 3    vitamin C (ASCORBIC ACID) 500 MG tablet Take 500 mg by mouth daily      vitamin D (CHOLECALCIFEROL) 25 MCG (1000 UT) TABS tablet Take 1,000 Units by mouth daily      nitroGLYCERIN (NITROSTAT) 0.4 MG SL tablet up to max of 3 total doses. If no relief after 1 dose, call 911. 25 tablet 3    clopidogrel (PLAVIX) 75 MG tablet TAKE 1 TABLET BY MOUTH ONE TIME A DAY 30 tablet 11    Blood Glucose Monitoring Suppl (FREESTYLE FREEDOM LITE) w/Device KIT 1 Device daily  0    FREESTYLE LANCETS MISC 1 Stick daily  11    aspirin 81 MG chewable tablet Take 1 tablet by mouth daily 30 tablet 3    NOVOLOG FLEXPEN 100 UNIT/ML injection pen Inject 4 units with meals plus sliding scale.  If chem strip is 150-200 take 2 units, 201-250 take 4 units, 251-300 take 6 units, 301-350 rhys      LANTUS SOLOSTAR 100 UNIT/ML injection pen INJECT 10 UNITS SUBCUTANEOUSLY IN THE MORNING      docusate sodium (COLACE, DULCOLAX) 100 MG CAPS Take 100 mg by mouth 2 times daily       No current facility-administered medications for this visit. Social History     Socioeconomic History    Marital status:      Spouse name: None    Number of children: None    Years of education: None    Highest education level: None   Tobacco Use    Smoking status: Former     Packs/day: 1.00     Years: 40.00     Pack years: 40.00     Types: Cigarettes     Start date: 26     Quit date: 2/28/2019     Years since quitting: 3.8    Smokeless tobacco: Never    Tobacco comments:     about 5 cigs per day   Vaping Use    Vaping Use: Never used   Substance and Sexual Activity    Alcohol use: No    Drug use: No    Sexual activity: Not Currently       Family History   Problem Relation Age of Onset    Diabetes Mother     High Blood Pressure Mother     Brain Cancer Mother     Heart Disease Father     Diabetes Father     High Blood Pressure Father     Heart Attack Father     Diabetes Sister     High Blood Pressure Sister     COPD Sister         Agent Orange    Diabetes Brother     High Blood Pressure Brother          I reviewed the past medical history, allergies, medications, social history and family history. Review of Systems   All systems reviewed, and are all negative, except what is mentioned in HPI      Vitals:    01/11/23 1203   BP: 132/78   Site: Left Upper Arm   Position: Sitting   Cuff Size: Medium Adult   Pulse: 98   SpO2: 95%   Weight: 186 lb (84.4 kg)   Height: 5' 8\" (1.727 m)       Physical Examination:  General appearance - alert, well appearing, and in no distress, oriented to person, place, and time and over weight  Mental status- Level of Alertness: awake  Orientation: person, place, time  Memory: normal  Fund of Knowledge: normal  Attention/Concentration: normal  Language: normal. Mood is normal.   Neck - supple, no significant adenopathy, carotids upstroke normal bilaterally. There is no axillary lymphadenopathy. There is no carotid bruit. No neck lymphadenopathy. No thyroid enlargement   Neurological -   Cranial Bfseto-VF-ODO:.   Cranial nerve II: Normal   Cranial nerve III: Pupils: equal, round, reactive to light  Cranial nerves III, IV, VI: Extraocular Movements: intact   Cranial nerve V: Facial sensation: intact   Cranial nerve VII:Facial strength: intact   Cranial nerve VIII: Hearing: intact   Cranial nerve IX: Palate Elevation intact bilaterally  Cranial nerve XI: Shoulder shrug intact bilaterally  Cranial nerve XII: Tongue midline   neck supple without rigidity, there is no limitation of range of motion of the neck. DTR's are Intact distal and symmetric  Babinski sign negative  Motor exam is 5/5 in the upper and lower extremities. Normal muscle tone. There is no muscle atrophy. Sensory is intact for light touch. Coordination: finger to nose,   intact  Gait and station intact   Abnormal movement none, vibration reduced distally   Skin - warm, dry to touch, normal coloration, no rashes, no suspicious skin lesions  Superficial temporal artery pulses are normal.   There is no limitation of range of motion of the neck. There is no resting tremor, no pin rolling, no bradykinesia, no Hypohonia, normal blink rate. Musculoskeletal: Has no hand arthritis, no limitation of ROM in any of the four extremities. There is no leg edema. The Heart was regular in rate and rhythm. No heart murmur  Chest Clear, with  good effort. Abdomen soft, intact bowel sounds. Results for orders placed during the hospital encounter of 10/31/22    CTA HEAD W WO CONTRAST (CODE STROKE)    Narrative  PROCEDURE: CTA HEAD W WO CONTRAST, CTA NECK W WO CONTRAST    CLINICAL INFORMATION: Stroke Symptoms. COMPARISON: CT scan of the brain obtained on the same day. .    TECHNIQUE: 1 mm axial images were obtained through the head and neck after the fast bolus administration of contrast. A noncontrast localizer was obtained.  3-D reconstructions were performed on a dedicated 3-D workstation. These include multiplanar MPR  images and multiplanar MIP images. Centerline reconstructions were obtained of the carotid systems. Isovue intravenous contrast was given. All carotid artery measurements are performed utilizing  Nascet criteria. This exam was analyzed using [facility].   contact CT LVO. All CT scans at this facility use dose modulation, iterative reconstruction, and/or weight-based dosing when appropriate to reduce radiation dose to as low as reasonably achievable. FINDINGS:    CTA NECK:  Aortic arch and branches: Atherosclerotic calcification in the aortic arch and at the origin of the left subclavian artery. Right common carotid artery/ICA: There is a small amount of plaque in the proximal right internal carotid artery. There is no significant hemodynamic stenosis in the right common and internal carotid arteries. Left common carotid artery/ICA: There is no significant hemodynamic stenosis in the left common and internal carotid arteries. Vertebral arteries: There is antegrade flow in the right and left vertebral arteries. CTA HEAD:    Internal carotid arteries: Atherosclerotic calcification in the cavernous segments of the> left internal carotid arteries. No evidence of severe stenosis in the internal carotid arteries. .    Middle cerebral arteries: Antegrade flow in both middle cerebral arteries. .    Anterior cerebral arteries: Antegrade flow in the anterior cerebral arteries bilaterally. .    Vertebral arteries: Atherosclerotic calcification in the distal right vertebral artery. Antegrade flow in the right and left vertebral arteries. Basilar artery: Antegrade flow in the basilar artery. .    Superior cerebellar arteries: Antegrade flow in the right and left superior cerebellar arteries. .    Posterior cerebral arteries: Antegrade flow in the posterior cerebral arteries bilaterally. No aneurysms, stenoses or occlusions are noted.     The superior sagittal sinus, vein of Efren, internal cerebral veins, straight sinus, transverse sinuses and sigmoid sinuses are patent. Axial source data: Unremarkable. Impression  1. Small amount of plaque in the proximal right internal carotid artery. No significant hemodynamic stenosis in the right common and internal carotid arteries. 2. No significant hemodynamic stenosis in the left common and internal carotid arteries. 3. Atherosclerotic calcification aortic arch and at the origin of the left subclavian artery. 4. Atherosclerotic calcification in the cavernous segments of both internal carotid arteries. No evidence of severe stenosis in the internal carotid arteries. 5. Atherosclerotic calcification in the distal right vertebral artery. There is antegrade flow in the right and left vertebral arteries. 6. Otherwise negative CTA of the [place]. **This report has been created using voice recognition software. It may contain minor errors which are inherent in voice recognition technology. **    Final report electronically signed by DR Gareth Meléndez on 10/31/2022 12:27 PM    Results for orders placed during the hospital encounter of 10/31/22    CTA NECK W [address] (CODE STROKE)    Narrative  PROCEDURE: CTA HEAD W WO CONTRAST, CTA NECK W WO CONTRAST    CLINICAL INFORMATION: Stroke Symptoms. COMPARISON: CT scan of the brain obtained on the same day. .    TECHNIQUE: 1 mm axial images were obtained through the head and neck after the fast bolus administration of contrast. A noncontrast localizer was obtained. 3-D reconstructions were performed on a dedicated 3-D workstation. These include multiplanar MPR  images and multiplanar MIP images. Centerline reconstructions were obtained of the carotid systems. Isovue intravenous contrast was given. All carotid artery measurements are performed utilizing  Nascet criteria. This exam was analyzed using viz. AI  contact CT LVO.     All CT scans at this facility use dose modulation, iterative reconstruction, and/or weight-based dosing when appropriate to reduce radiation dose to as low as reasonably achievable. FINDINGS:    CTA NECK:  Aortic arch and branches: Atherosclerotic calcification in the aortic arch and at the origin of the left subclavian artery. Right common carotid artery/ICA: There is a small amount of plaque in the proximal right internal carotid artery. There is no significant hemodynamic stenosis in the right common and internal carotid arteries. Left common carotid artery/ICA: There is no significant hemodynamic stenosis in the left common and internal carotid arteries. Vertebral arteries: There is antegrade flow in the right and left vertebral arteries. CTA HEAD:    Internal carotid arteries: Atherosclerotic calcification in the cavernous segments of the> left internal carotid arteries. No evidence of severe stenosis in the internal carotid arteries. .    Middle cerebral arteries: Antegrade flow in both middle cerebral arteries. .    Anterior cerebral arteries: Antegrade flow in the anterior cerebral arteries bilaterally. .    Vertebral arteries: Atherosclerotic calcification in the distal right vertebral artery. Antegrade flow in the right and left vertebral arteries. Basilar artery: Antegrade flow in the basilar artery. .    Superior cerebellar arteries: Antegrade flow in the right and left superior cerebellar arteries. .    Posterior cerebral arteries: Antegrade flow in the posterior cerebral arteries bilaterally. No aneurysms, stenoses or occlusions are noted. The superior sagittal sinus, vein of Efren, internal cerebral veins, straight sinus, transverse sinuses and sigmoid sinuses are patent. Axial source data: Unremarkable. Impression  1. Small amount of plaque in the proximal right internal carotid artery. No significant hemodynamic stenosis in the right common and internal carotid arteries.   2. No significant hemodynamic stenosis in the left common and internal carotid arteries. 3. Atherosclerotic calcification aortic arch and at the origin of the left subclavian artery. 4. Atherosclerotic calcification in the cavernous segments of both internal carotid arteries. No evidence of severe stenosis in the internal carotid arteries. 5. Atherosclerotic calcification in the distal right vertebral artery. There is antegrade flow in the right and left vertebral arteries. 6. Otherwise negative CTA of the [place]. **This report has been created using voice recognition software. It may contain minor errors which are inherent in voice recognition technology. **    Final report electronically signed by DR Elijah Garrido on 10/31/2022 12:27 PM    Results for orders placed during the hospital encounter of 10/31/22    MRI BRAIN WO CONTRAST    Narrative  PROCEDURE: MRI BRAIN WO CONTRAST    CLINICAL INFORMATION stroke alert. COMPARISON: CT scan of the brain and CTA obtained on the same day. MRI scan of the brain dated 3/2/2019. Esme Martinez TECHNIQUE: Multiplanar and multiple spin echo MRI images were obtained of the brain without contrast.  FINDINGS:    The diffusion-weighted images demonstrate restricted diffusion in the left parietal periventricular white matter. There is corresponding diminished signal intensity on the ADC map. These findings are consistent with an acute infarct. .  The brain volume  is slightly reduced. There is a mild amount of signal hyperintensity on the FLAIR and T2-weighted sequences in the white matter of the brain. This is consistent with mild severity chronic small vessel ischemic changes. There are no intra-or extra-axial collections. There is no hydrocephalus, midline shift or mass effect. There is mineralization in the medial aspects of the basal ganglia bilaterally. No other areas of susceptibility artifact are present. The major intracranial vascular flow voids are present.     The midline craniocervical junction structures are normal.  The pituitary gland and brainstem are normal.    Impression  1. Area of restricted diffusion in the left parietal periventricular white matter with corresponding diminished signal intensity on ADC map consistent with an acute infarct. 2. Mild atrophy and probable ischemic changes in the white matter. 3. Otherwise negative MRI scan of the brain. **This report has been created using voice recognition software. It may contain minor errors which are inherent in voice recognition technology. **    Final report electronically signed by DR Shellye Ahumada on 10/31/2022 12:14 PM    Results for orders placed during the hospital encounter of 03/02/19    MRI BRAIN W WO CONTRAST    Narrative  PROCEDURE: MRI BRAIN W [address] of left lung ([facility] COMPARISON: CT chest 1/23/2019. Head CT 5/25/2013. TECHNIQUE: Multiplanar and multiple spin echo T1 and T2-weighted images were obtained through the brain before and after the administration of intravenous contrast.    FINDINGS:        The diffusion-weighted images are normal. The brain volume is normal.  There are no intra-or extra-axial collections. There is no hydrocephalus, midline shift or mass effect. On the FLAIR and T2-weighted sequences, there is a minimal amount of signal hyperintensity in the white matter of the brain. This is most likely due to minimal severity chronic small vessel ischemic changes. On the gradient echo T2-weighted images, there is mineralization in the medial aspects of the basal ganglia. No other areas of susceptibility artifact are present. There is no abnormal enhancement in the brain. The major intracranial vascular flow voids are present. The midline craniocervical junction structures are normal.  The brainstem and pituitary gland are normal.    The superior ophthalmic veins are dilated bilaterally.  The cavernous sinuses appear grossly normal.    Impression  1. No evidence of metastatic disease in the brain. 2. Minimal severity chronic small vessel ischemic changes. 3. Dilated superior ophthalmic veins bilaterally. **This report has been created using voice recognition software. It may contain minor errors which are inherent in voice recognition technology. **      Final report electronically signed by Dr. Marian Rod on 3/2/2019 2:41 PM    No results found for this or any previous visit. Results for orders placed during the hospital encounter of 10/31/22    CT HEAD WO CONTRAST    Narrative  PROCEDURE: CT HEAD WO CONTRAST    CLINICAL INFORMATION: Stroke Symptoms. Facial droop. Weakness. COMPARISON: Brain MRI 3/2/2019. TECHNIQUE: Noncontrast 5 mm axial images were obtained through the brain. Sagittal and coronal reconstructions were obtained. All CT scans at this facility use dose modulation, iterative reconstruction, and/or weight-based dosing when appropriate to reduce radiation dose to as low as reasonably achievable. FINDINGS:    There is mild global volume loss. This is stable. There is no hemorrhage. There are no intra-or extra-axial collections. There is no hydrocephalus, midline shift or mass effect. The gray-white matter differentiation is preserved. The paranasal sinuses and mastoid air cells are normally aerated. There is an apical abscess associated with the left maxillary 1st premolar. There are no suspicious osseous lesions. There are vascular calcifications. Impression  1. No evidence of an acute process in the brain. 2. Apical abscess associated with the left maxillary 1st premolar. These findings were telephoned to CEASAR Antony at 11:11 AM on 10/31/2022 . **This report has been created using voice recognition software. It may contain minor errors which are inherent in voice recognition technology. **    Final report electronically signed by Dr. Marian Rod on 10/31/2022 11:14 AM    Cardiac echo:      Conclusions      Summary   Ejection fraction is visually estimated at 55%. Overall left ventricular function is normal.   Aortic valve appears tricuspid. Thickened aortic valve leaflets noted. Aortic valve leaflets are Mildly calcified. Signature      ----------------------------------------------------------------   Electronically signed by Yvonne Pimentel MD (Interpreting   physician) on 11/01/2022 at 02:28 PM   ----------------------------------------------------------------      We reviewed the patient records from referring provider and available information in the EHR       ASSESSMENT:      Diagnosis Orders   1. Chronic ischemic left MCA stroke           This is a 79-year-old male who presents with acute ischemic infarct in the left corona radiata in October 2022. . Initial presenting symptoms was of right sided facial droop, right sided weakness and balance trouble. I reviewed the MRI Brain and agree with interpretation, I also reviewed the patient pertinent labs and records in the EHR and from other providers. MRI brain done 10/31/22 showed Area of restricted diffusion in the left parietal periventricular white matter with corresponding diminished signal intensity on ADC map consistent with an acute infarct. Mild atrophy and probable ischemic changes in the white matter. CTA head and neck done 1/31/22 showed no concerning findings. He was on aspirin and plavix prior to stroke. He is diabetic for the past 4 years, poorly controlled. The patient was counseled about his symptoms and work up recommendedhe was also counseled about medication options and side effects. We will arrange for him undergo homocystine level and lipid panel. We offered him referral to endocrinology regarding his poorly controlled diabetes, however he is not interested in this at this time. We encouraged him to work closely with his PCP regarding better control of his diabetes.   After detailed discussion with the patient we agreed on the following plan. Plan    Continue with dual antiplatelets  Continue with lipid lowering medication, target LDL below 70  Homocystine level  Lipid panel  Modify risk factors for stroke (blood pressure, cholesterol, diabetes, smoking cessation etc.. Control)  Offered Referral to endocrinology re: poorly controlled diabetes, however he is not interested at this time  Work closely with your PCP regarding better control of your diabetes. Call with any new symptoms or concerns.    Follow up as needed    Total time 65 min    Octaviano Mustafa MD

## 2023-02-27 NOTE — PROGRESS NOTES
[facility] [address] [address], [address]  [address] [address]  Dept: [phone]  Dept Fax: [phone]: [phone]      Visit Date:6/20/2019     Harper Perea is a 71 y.o. male who presents today for:   Chief Complaint   Patient presents with    Follow-up     ADENCARCINOMA OF LEFT LUNG        HPI:   Harper Perea is a 71 y.o. male with lung cancer. The patient developed hemoptysis beginning of January 2019. Chest x-ray on January 14, 2019 showed a developing retrocardiac infiltrate. Patient was referred to the pulmonologist Kady Lorenz who ordered CT of the chest.  It was performed on January 23, 2019 and showed:  a lobulated mass in the left lower lobe possibly two separate masses is left lower lobe this is a lobulated elongated and measures up to six 5.9 cm in length 2.5 cm in width spiculated margins are present and adjacent airspace disease. Lobulated    8mm nodule lateral aspect right lower lobe image 42   No effusions are seen. Upper abdomen   No adrenal masses. Simple cyst the left kidney. No suspicious bone lesions   Patient underwent bronchoscopy with transbronchial biopsy and BAL on January 25, 2019. They showed no endobronchial lesions and benign bronchial epithelium, alveolar macrophages, and scattered inflammatory cells, respectively. No malignant cells seen. Subsequently on February 12, 2019 the patient had CT-guided lung biopsy which showed poorly differentiated pulmonary adenocarcinoma. The patient had PET scan on February 25, 2019 that showed:  1. Known left lower lobe malignancy showing FDG avidity. 2. Mediastinal adenopathy with minimal activity above background suggesting possible reactive lymphadenopathy. Metastatic disease is not excludable. 3. Pleural fluid in the major fissure of the left upper lung. Minimal activity above background may indicate a malignant effusion.    4. No other evidence of metastatic disease, small nodule in the right lower lobe shows no significant activity above background.       Patient had EBUS with transbronchial ultrasound guided mediastinal lymph node biopsy. The mediastinal lymph node biopsy was  Negative. Therefore the patient met with the thoracic surgeon Dr. John Delaney and after discussing his surgical treatment options, on March 14, 2019 he underwent left lower lobe, lung lobectomy: .  Final pathology report showed:  A. 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   Lymph nodes (0/3): Negative for malignancy. B. Tissue designated lymph node, station 9, excision:   Benign fibroadipose tissue. C. Lymph node, station 10 L, excision:   Negative for malignancy (0/1). D. Lymph nodes, station 6, excision:   Negative for malignancy (0/3). E. Lymph node, station 7, excision:   Negative for malignancy (0/1). F. Tissue designated lymph node, station 8, excision:   Benign fibroadipose tissue. The patient was hospitalized from April 18, 2019 till April 20, 2019 for atypical chest pain, left pleural exudative effusion. He underwent thoracentesis. Cytology was negative for malignant cells. Troponins were negative. No ecg changes. He received cycle #1 of Cisplatin and Alimta on 5/9/19     Interval History 6/22/2019:   The patient is here for follow-up evaluation and to receive cycle #3 of Cisplatin and Alimta. His cycle #1 was complicated by persistent hiccups that began several days after chemo. He was given thorazine and was instructed to stop compazine with improvement. Patient affirms ongoing pain to the LUQ of his abdomen that is mildly controlled with Percocet 7.5-325 every 8 hours. He denies fever, chills, or signs/symptoms of infection. Denies chest pain, vomiting, bowel or urinary changes. Denies hearing changes, rashes, peripheral neuropathy or edema.       HPI   Past Medical History:   Diagnosis Date    Anxiety     Arthritis     Cancer ([facility] 2019    left lung stage 2    [facility]    Enlarged prostate with urinary retention     Gait difficulty     Generalized weakness     Hyperlipidemia     Hypertension     Lesion of bladder     Osteoarthritis     Retention of urine     S/P cardiac catheterization 2019    stent to circumflex per Dr. Gayla Hendricks S/P TURP     SOB (shortness of breath)     With activity    Voiding difficulty       Past Surgical History:   Procedure Laterality Date    APPENDECTOMY      BRONCHOSCOPY N/A 2019    BRONCHOSCOPY performed by Annamaria Frankel, MD at [address] [place] [address]  2019    BRONCHOSCOPY/TRANSBRONCHIAL LUNG BIOPSY performed by Annamaria Frankel, MD at [address] [place] [address] N/A 3/1/2019    BRONCHOSCOPY W/EBUS FNA performed [facility]nam[facility]el, [facility] at [facility]  2019    COLONOSCOPY  [id number]    PROSTATE SURGERY      TURP    THORACOTOMY Left 3/14/2019    LEFT EXPLORATORY THORACOTOMY, MEDIASTINAL LYMPH NODE DISECTION, FLEXIBLE BRONCHOSCOPY performed by Maykel Finn MD at [address] [place] [address] History   Problem Relation Age of Onset    Diabetes Mother     High Blood Pressure Mother     Brain Cancer Mother     Heart Disease Father     Diabetes Father     High Blood Pressure Father     Heart Attack Father     Diabetes Sister     High Blood Pressure Sister     COPD Sister         Agent Orange    Diabetes Brother     High Blood Pressure Brother       Social History     Tobacco Use    Smoking status: Former Smoker     Packs/day: 1.00     Years: 40.00     Pack years: 40.00     Types: Cigarettes     Start date:      Last attempt to quit: 2019     Years since quittin.3    Smokeless tobacco: Never Used    Tobacco comment: about 5 cigs per day   Substance Use Topics    Alcohol use: No      Current Outpatient Medications   Medication Sig Dispense Refill    Blood Glucose Monitoring Suppl (FREESTYLE FREEDOM LITE) w/Device KIT 1 Device daily  0    FREESTYLE LITE strip 1 strip daily  11    FREESTYLE LANCETS MISC 1 Stick daily  11    oxyCODONE-acetaminophen (ENDOCET) 7.5-325 MG per tablet Take 1 tablet by mouth every 6 hours as needed for Pain for up to 30 days. Intended supply: 30 days 90 tablet 0    ondansetron (ZOFRAN-ODT) 4 MG disintegrating tablet Take 1 tablet by mouth every 8 hours as needed for Nausea or Vomiting 30 tablet 2    chlorproMAZINE (THORAZINE) 10 MG tablet Take 1 tablet by mouth 3 times daily 30 tablet 1    amLODIPine (NORVASC) 10 MG tablet Take 10 mg by mouth daily      atorvastatin (LIPITOR) 40 MG tablet Take 40 mg by mouth daily      senna (SENOKOT) 8.6 MG TABS tablet Take 1 tablet by mouth daily      metFORMIN (GLUCOPHAGE) 500 MG tablet Take 500 mg by mouth 2 times daily (with meals) Take 1 tab by mouth at supper x2 weeks, then 1 tab in the morning and 1 tab at supper x2 weeks, then 1 tab in the morning and 2 tab at supper x2weeks, then 2 tabs twice a day      aspirin 81 MG chewable tablet Take 1 tablet by mouth daily 30 tablet 3    nitroGLYCERIN (NITROSTAT) 0.4 MG SL tablet up to max of 3 total doses. If no relief after 1 dose, call 911. 25 tablet 3    clopidogrel (PLAVIX) 75 MG tablet Take 1 tablet by mouth daily 30 tablet 3    omeprazole (PRILOSEC) 20 MG delayed release capsule Take 20 mg by mouth daily      lisinopril (PRINIVIL;ZESTRIL) 40 MG tablet Take 40 mg by mouth daily.  dutasteride (AVODART) 0.5 MG capsule Take 0.5 mg by mouth daily.  lactulose (CHRONULAC) 10 GM/15ML solution Take 15-30 mLs by mouth 2 times daily as needed (constipation) 672 mL 1    folic acid (FOLVITE) 1 MG tablet Take 1 tablet by mouth daily 30 tablet 3     No current facility-administered medications for this visit.        No Known Allergies   Health Maintenance   Topic Date Due    AAA screen  1950    Hepatitis C screen  1950    Diabetic foot exam  03/25/1960    Diabetic retinal exam  03/25/1960    Diabetic microalbuminuria test  03/25/1968    Shingles Vaccine (1 of 2) 03/25/2000    Annual Wellness Visit (AWV)  03/25/2013    Pneumococcal 65+ years Vaccine (1 of 2 - PCV13) 03/25/2015    Flu vaccine (Season Ended) 09/01/2019    Lipid screen  04/08/2020    A1C test (Diabetic or Prediabetic)  04/18/2020    Potassium monitoring  06/20/2020    Creatinine monitoring  06/20/2020    DTaP/Tdap/Td vaccine (2 - Td) 05/25/2023    Colon cancer screen colonoscopy  01/05/2027       Review of Systems:   Review of Systems   Constitutional: Positive for fatigue. Negative for activity change, appetite change and fever. HENT: Negative for congestion, dental problem, facial swelling, hearing loss, mouth sores, nosebleeds, sore throat, tinnitus and trouble swallowing. Eyes: Negative for discharge and visual disturbance. Respiratory: Negative for cough, chest tightness, shortness of breath and wheezing. Cardiovascular: Positive for chest pain. Negative for palpitations and leg swelling. Gastrointestinal: Negative for abdominal distention, abdominal pain, blood in stool, constipation, diarrhea, nausea, rectal pain and vomiting. Endocrine: Negative for cold intolerance, polydipsia and polyuria. Genitourinary: Negative for decreased urine volume, difficulty urinating, dysuria, flank pain, hematuria and urgency. Musculoskeletal: Negative for arthralgias, back pain, gait problem, joint swelling, myalgias and neck stiffness. Skin: Negative for color change, rash and wound. Neurological: Negative for dizziness, tremors, seizures, speech difficulty, weakness, light-headedness, numbness and headaches. Hematological: Negative for adenopathy. Does not bruise/bleed easily. Psychiatric/Behavioral: Negative for confusion and sleep disturbance. The patient is not nervous/anxious.        Pertinent review of systems noted in HPI, all other ROS negative. Objective:   Physical Exam   /83 (Site: Left Upper Arm, Position: Sitting, Cuff Size: Medium Adult)   Pulse 85   Temp 99 °F (37.2 °C) (Oral)   Resp 18   Ht 5' 7.01\" (1.702 m)   Wt 179 lb (81.2 kg)   SpO2 95%   BMI 28.03 kg/m²    General appearance: No apparent distress, well developed and cooperative. HEENT: Pupils equal, round, and reactive to light. Conjunctivae/corneas clear. Oral mucosa moist, no sores noted. Neck: Supple, with full range of motion. Trachea midline. Respiratory:  Normal respiratory effort. Clear to auscultation, bilaterally without Rales/Wheezes/Rhonchi. Well healed scar to left anterior chest wall consistent with prior lobectomy. Cardiovascular: Regular rate and rhythm with normal S1/S2 without murmurs, rubs or gallops. Abdomen: Soft, non-distended with active bowel sounds. Tenderness to palpation of LUQ and near surgical scar. Musculoskeletal: No clubbing, cyanosis or edema bilaterally. Full range of motion without deformity. Skin: Skin color, texture, turgor normal.  No rashes or lesions. Neurologic:  Neurovascularly intact without any focal sensory/motor deficits. Psychiatric: Alert and oriented      Imaging Studies and Labs:   CBC:   Lab Results   Component Value Date    WBC 5.0 06/20/2019    HGB 12.1 (L) 06/20/2019    HCT 37.7 (L) 06/20/2019    MCV 83.2 06/20/2019     06/20/2019     BMP:    LFT:   Lab Results   Component Value Date    ALT 10 (L) 06/20/2019    AST 16 06/20/2019    ALKPHOS 88 06/20/2019    BILITOT 0.3 06/20/2019      Na: 01 38, potassium: 6.1, BUN: 25, creatinine: 1.0. Assessment and Plan:   1. Non-small cell lung cancer. Poorly differentiated adenocarcinoma with sarcomatoid features  Stage IIA (pT2b, pN0, cM0)  S/P LLL lobectomy and lymph node sampling on 03/14/2019. He developed hemoptysis that triggered further workup with CT scans showing  lobulated mass in the left lower lobe.  Subsequent Negative

## 2024-08-28 ENCOUNTER — LAB (OUTPATIENT)
Dept: LAB | Age: 74
End: 2024-08-28

## 2024-08-28 LAB
ALT SERPL W/O P-5'-P-CCNC: 16 U/L (ref 11–66)
ANION GAP SERPL CALC-SCNC: 14 MEQ/L (ref 8–16)
BUN SERPL-MCNC: 28 MG/DL (ref 7–22)
CALCIUM SERPL-MCNC: 9.4 MG/DL (ref 8.5–10.5)
CHLORIDE SERPL-SCNC: 102 MEQ/L (ref 98–111)
CHOLEST SERPL-MCNC: 248 MG/DL (ref 100–199)
CO2 SERPL-SCNC: 22 MEQ/L (ref 23–33)
CREAT SERPL-MCNC: 1.2 MG/DL (ref 0.4–1.2)
DEPRECATED MEAN GLUCOSE BLD GHB EST-ACNC: 213 MG/DL (ref 70–126)
DEPRECATED RDW RBC AUTO: 42.3 FL (ref 35–45)
ERYTHROCYTE [DISTWIDTH] IN BLOOD BY AUTOMATED COUNT: 13.8 % (ref 11.5–14.5)
GFR SERPL CREATININE-BSD FRML MDRD: 63 ML/MIN/1.73M2
GLUCOSE FASTING: 289 MG/DL (ref 70–108)
HBA1C MFR BLD HPLC: 9.1 % (ref 4.4–6.4)
HCT VFR BLD AUTO: 46.3 % (ref 42–52)
HDLC SERPL-MCNC: 29 MG/DL
HGB BLD-MCNC: 15.6 GM/DL (ref 14–18)
LDLC SERPL CALC-MCNC: ABNORMAL MG/DL
MCH RBC QN AUTO: 28.9 PG (ref 26–33)
MCHC RBC AUTO-ENTMCNC: 33.7 GM/DL (ref 32.2–35.5)
MCV RBC AUTO: 85.9 FL (ref 80–94)
PLATELET # BLD AUTO: 207 THOU/MM3 (ref 130–400)
PMV BLD AUTO: 10.2 FL (ref 9.4–12.4)
POTASSIUM SERPL-SCNC: 5.7 MEQ/L (ref 3.5–5.2)
RBC # BLD AUTO: 5.39 MILL/MM3 (ref 4.7–6.1)
SODIUM SERPL-SCNC: 138 MEQ/L (ref 135–145)
TRIGL SERPL-MCNC: 1094 MG/DL (ref 0–199)
WBC # BLD AUTO: 8.4 THOU/MM3 (ref 4.8–10.8)

## 2024-09-06 ENCOUNTER — LAB (OUTPATIENT)
Dept: LAB | Age: 74
End: 2024-09-06

## 2024-09-06 LAB
ANION GAP SERPL CALC-SCNC: 11 MEQ/L (ref 8–16)
BUN SERPL-MCNC: 32 MG/DL (ref 7–22)
CALCIUM SERPL-MCNC: 9.7 MG/DL (ref 8.5–10.5)
CHLORIDE SERPL-SCNC: 101 MEQ/L (ref 98–111)
CO2 SERPL-SCNC: 26 MEQ/L (ref 23–33)
CREAT SERPL-MCNC: 1.1 MG/DL (ref 0.4–1.2)
GFR SERPL CREATININE-BSD FRML MDRD: 70 ML/MIN/1.73M2
GLUCOSE SERPL-MCNC: 301 MG/DL (ref 70–108)
POTASSIUM SERPL-SCNC: 5.3 MEQ/L (ref 3.5–5.2)
SODIUM SERPL-SCNC: 138 MEQ/L (ref 135–145)

## 2024-09-28 ENCOUNTER — LAB (OUTPATIENT)
Dept: LAB | Age: 74
End: 2024-09-28

## 2024-09-28 LAB
ALT SERPL W/O P-5'-P-CCNC: 18 U/L (ref 11–66)
ANION GAP SERPL CALC-SCNC: 11 MEQ/L (ref 8–16)
BACTERIA URNS QL MICRO: ABNORMAL /HPF
BILIRUB UR QL STRIP.AUTO: NEGATIVE
BUN SERPL-MCNC: 24 MG/DL (ref 7–22)
CALCIUM SERPL-MCNC: 9.6 MG/DL (ref 8.5–10.5)
CASTS #/AREA URNS LPF: ABNORMAL /LPF
CASTS 2: ABNORMAL /LPF
CHARACTER UR: CLEAR
CHLORIDE SERPL-SCNC: 102 MEQ/L (ref 98–111)
CO2 SERPL-SCNC: 24 MEQ/L (ref 23–33)
COLOR, UA: YELLOW
CREAT SERPL-MCNC: 1.1 MG/DL (ref 0.4–1.2)
CREAT UR-MCNC: 113.9 MG/DL
CRYSTALS URNS MICRO: ABNORMAL
DEPRECATED MEAN GLUCOSE BLD GHB EST-ACNC: 222 MG/DL (ref 70–126)
EPITHELIAL CELLS, UA: ABNORMAL /HPF
GFR SERPL CREATININE-BSD FRML MDRD: 70 ML/MIN/1.73M2
GLUCOSE FASTING: 242 MG/DL (ref 70–108)
GLUCOSE UR QL STRIP.AUTO: 250 MG/DL
HBA1C MFR BLD HPLC: 9.4 % (ref 4.4–6.4)
HGB UR QL STRIP.AUTO: ABNORMAL
KETONES UR QL STRIP.AUTO: NEGATIVE
MISCELLANEOUS 2: ABNORMAL
NITRITE UR QL STRIP: NEGATIVE
PH UR STRIP.AUTO: 5.5 [PH] (ref 5–9)
POTASSIUM SERPL-SCNC: 4.5 MEQ/L (ref 3.5–5.2)
PROT UR STRIP.AUTO-MCNC: 300 MG/DL
PROT UR-MCNC: 455.2 MG/DL
PROT/CREAT 24H UR: 4 MG/G{CREAT}
RBC URINE: ABNORMAL /HPF
RENAL EPI CELLS #/AREA URNS HPF: ABNORMAL /[HPF]
SODIUM SERPL-SCNC: 137 MEQ/L (ref 135–145)
SP GR UR REFRACT.AUTO: 1.02 (ref 1–1.03)
TRIGL SERPL-MCNC: 254 MG/DL (ref 0–199)
UROBILINOGEN, URINE: 0.2 EU/DL (ref 0–1)
WBC #/AREA URNS HPF: ABNORMAL /HPF
WBC #/AREA URNS HPF: NEGATIVE /[HPF]
YEAST LIKE FUNGI URNS QL MICRO: ABNORMAL

## 2024-11-11 ENCOUNTER — HOSPITAL ENCOUNTER (OUTPATIENT)
Dept: GENERAL RADIOLOGY | Age: 74
Discharge: HOME | End: 2024-11-11
Payer: MEDICARE

## 2024-11-11 ENCOUNTER — HOSPITAL ENCOUNTER (OUTPATIENT)
Age: 74
Discharge: HOME | End: 2024-11-11
Payer: MEDICARE

## 2024-11-11 DIAGNOSIS — S53.402A SPRAIN OF LEFT ELBOW, INITIAL ENCOUNTER: ICD-10-CM

## 2024-11-11 PROCEDURE — 73070 X-RAY EXAM OF ELBOW: CPT

## 2024-11-12 ENCOUNTER — LAB (OUTPATIENT)
Dept: LAB | Age: 74
End: 2024-11-12

## 2024-11-12 LAB — POTASSIUM SERPL-SCNC: 5.1 MEQ/L (ref 3.5–5.2)

## 2024-11-13 LAB
ANION GAP SERPL CALC-SCNC: 18 MEQ/L (ref 8–16)
BUN SERPL-MCNC: 25 MG/DL (ref 7–22)
CALCIUM SERPL-MCNC: 10 MG/DL (ref 8.5–10.5)
CHLORIDE SERPL-SCNC: 102 MEQ/L (ref 98–111)
CO2 SERPL-SCNC: 18 MEQ/L (ref 23–33)
CREAT SERPL-MCNC: 1.2 MG/DL (ref 0.4–1.2)
GFR SERPL CREATININE-BSD FRML MDRD: 63 ML/MIN/1.73M2
GLUCOSE SERPL-MCNC: 194 MG/DL (ref 70–108)
POTASSIUM SERPL-SCNC: 5.1 MEQ/L (ref 3.5–5.2)
SODIUM SERPL-SCNC: 138 MEQ/L (ref 135–145)

## 2025-01-14 ENCOUNTER — LAB (OUTPATIENT)
Dept: LAB | Age: 75
End: 2025-01-14

## 2025-01-14 LAB
ALBUMIN SERPL BCG-MCNC: 4.3 G/DL (ref 3.5–5.1)
ALT SERPL W/O P-5'-P-CCNC: 15 U/L (ref 11–66)
ANION GAP SERPL CALC-SCNC: 13 MEQ/L (ref 8–16)
ANION GAP SERPL CALC-SCNC: 13 MEQ/L (ref 8–16)
BASOPHILS ABSOLUTE: 0 THOU/MM3 (ref 0–0.1)
BASOPHILS NFR BLD AUTO: 0.5 %
BUN SERPL-MCNC: 51 MG/DL (ref 7–22)
BUN SERPL-MCNC: 51 MG/DL (ref 7–22)
CALCIUM SERPL-MCNC: 10.2 MG/DL (ref 8.5–10.5)
CALCIUM SERPL-MCNC: 10.3 MG/DL (ref 8.5–10.5)
CHLORIDE SERPL-SCNC: 102 MEQ/L (ref 98–111)
CHLORIDE SERPL-SCNC: 99 MEQ/L (ref 98–111)
CO2 SERPL-SCNC: 24 MEQ/L (ref 23–33)
CO2 SERPL-SCNC: 25 MEQ/L (ref 23–33)
CREAT SERPL-MCNC: 1.5 MG/DL (ref 0.4–1.2)
CREAT SERPL-MCNC: 1.5 MG/DL (ref 0.4–1.2)
DEPRECATED MEAN GLUCOSE BLD GHB EST-ACNC: 279 MG/DL (ref 70–126)
DEPRECATED RDW RBC AUTO: 41.4 FL (ref 35–45)
EOSINOPHIL NFR BLD AUTO: 3.7 %
EOSINOPHILS ABSOLUTE: 0.3 THOU/MM3 (ref 0–0.4)
ERYTHROCYTE [DISTWIDTH] IN BLOOD BY AUTOMATED COUNT: 13.3 % (ref 11.5–14.5)
GFR SERPL CREATININE-BSD FRML MDRD: 48 ML/MIN/1.73M2
GFR SERPL CREATININE-BSD FRML MDRD: 48 ML/MIN/1.73M2
GLUCOSE FASTING: 310 MG/DL (ref 70–108)
GLUCOSE SERPL-MCNC: 308 MG/DL (ref 70–108)
HBA1C MFR BLD HPLC: 11.3 % (ref 4.4–6.4)
HCT VFR BLD AUTO: 52.4 % (ref 42–52)
HGB BLD-MCNC: 17.1 GM/DL (ref 14–18)
IMM GRANULOCYTES # BLD AUTO: 0.04 THOU/MM3 (ref 0–0.07)
IMM GRANULOCYTES NFR BLD AUTO: 0.5 %
LYMPHOCYTES ABSOLUTE: 1.1 THOU/MM3 (ref 1–4.8)
LYMPHOCYTES NFR BLD AUTO: 13.9 %
MCH RBC QN AUTO: 28 PG (ref 26–33)
MCHC RBC AUTO-ENTMCNC: 32.6 GM/DL (ref 32.2–35.5)
MCV RBC AUTO: 85.9 FL (ref 80–94)
MONOCYTES ABSOLUTE: 0.7 THOU/MM3 (ref 0.4–1.3)
MONOCYTES NFR BLD AUTO: 8.4 %
NEUTROPHILS ABSOLUTE: 6 THOU/MM3 (ref 1.8–7.7)
NEUTROPHILS NFR BLD AUTO: 73 %
NRBC BLD AUTO-RTO: 0 /100 WBC
PHOSPHATE SERPL-MCNC: 4.9 MG/DL (ref 2.4–4.7)
PLATELET # BLD AUTO: 184 THOU/MM3 (ref 130–400)
PMV BLD AUTO: 10.7 FL (ref 9.4–12.4)
POTASSIUM SERPL-SCNC: 5.5 MEQ/L (ref 3.5–5.2)
POTASSIUM SERPL-SCNC: 5.9 MEQ/L (ref 3.5–5.2)
RBC # BLD AUTO: 6.1 MILL/MM3 (ref 4.7–6.1)
SODIUM SERPL-SCNC: 137 MEQ/L (ref 135–145)
SODIUM SERPL-SCNC: 139 MEQ/L (ref 135–145)
WBC # BLD AUTO: 8.2 THOU/MM3 (ref 4.8–10.8)

## 2025-01-16 ENCOUNTER — LAB (OUTPATIENT)
Dept: LAB | Age: 75
End: 2025-01-16

## 2025-01-16 LAB — POTASSIUM SERPL-SCNC: 5.5 MEQ/L (ref 3.5–5.2)

## 2025-03-03 ENCOUNTER — LAB (OUTPATIENT)
Dept: LAB | Age: 75
End: 2025-03-03

## 2025-03-03 LAB
ANION GAP SERPL CALC-SCNC: 11 MEQ/L (ref 8–16)
BACTERIA: ABNORMAL
BILIRUB UR QL STRIP: NEGATIVE
BUN SERPL-MCNC: 42 MG/DL (ref 8–23)
CALCIUM SERPL-MCNC: 10.5 MG/DL (ref 8.8–10.2)
CASTS #/AREA URNS LPF: ABNORMAL /LPF
CASTS #/AREA URNS LPF: ABNORMAL /LPF
CHARACTER UR: CLEAR
CHARCOAL URNS QL MICRO: ABNORMAL
CHLORIDE SERPL-SCNC: 100 MEQ/L (ref 98–111)
CO2 SERPL-SCNC: 26 MEQ/L (ref 22–29)
COLOR UR: YELLOW
CREAT SERPL-MCNC: 1.5 MG/DL (ref 0.7–1.2)
CRYSTALS URNS QL MICRO: ABNORMAL
DEPRECATED RDW RBC AUTO: 42 FL (ref 35–45)
EPITHELIAL CELLS, UA: ABNORMAL /HPF
ERYTHROCYTE [DISTWIDTH] IN BLOOD BY AUTOMATED COUNT: 13.8 % (ref 11.5–14.5)
GFR SERPL CREATININE-BSD FRML MDRD: 48 ML/MIN/1.73M2
GLUCOSE SERPL-MCNC: 350 MG/DL (ref 74–109)
GLUCOSE UR QL STRIP.AUTO: >= 1000 MG/DL
HCT VFR BLD AUTO: 51.6 % (ref 42–52)
HGB BLD-MCNC: 17.1 GM/DL (ref 14–18)
HGB UR QL STRIP.AUTO: NEGATIVE
KETONES UR QL STRIP.AUTO: NEGATIVE
LEUKOCYTE ESTERASE UR QL STRIP.AUTO: NEGATIVE
MCH RBC QN AUTO: 27.8 PG (ref 26–33)
MCHC RBC AUTO-ENTMCNC: 33.1 GM/DL (ref 32.2–35.5)
MCV RBC AUTO: 83.9 FL (ref 80–94)
NITRITE UR QL STRIP.AUTO: NEGATIVE
PH UR STRIP.AUTO: 5 [PH] (ref 5–9)
PLATELET # BLD AUTO: 175 THOU/MM3 (ref 130–400)
PMV BLD AUTO: 10.3 FL (ref 9.4–12.4)
POTASSIUM SERPL-SCNC: 5.2 MEQ/L (ref 3.5–5.2)
PROT UR STRIP.AUTO-MCNC: 100 MG/DL
RBC # BLD AUTO: 6.15 MILL/MM3 (ref 4.7–6.1)
RBC #/AREA URNS HPF: ABNORMAL /HPF
RENAL EPI CELLS #/AREA URNS HPF: ABNORMAL /[HPF]
SODIUM SERPL-SCNC: 137 MEQ/L (ref 135–145)
SPECIFIC GRAVITY UA: 1.02 (ref 1–1.03)
UROBILINOGEN, URINE: 0.2 EU/DL (ref 0–1)
WBC # BLD AUTO: 8.8 THOU/MM3 (ref 4.8–10.8)
WBC #/AREA URNS HPF: ABNORMAL /HPF
YEAST LIKE FUNGI URNS QL MICRO: ABNORMAL

## 2025-03-04 LAB
CREAT UR-MCNC: 35.7 MG/DL
PROT UR-MCNC: 60.7 MG/DL
PROT/CREAT 24H UR: 1.7 MG/G{CREAT}

## 2025-05-05 ENCOUNTER — LAB (OUTPATIENT)
Dept: LAB | Age: 75
End: 2025-05-05

## 2025-05-05 LAB
ALT SERPL W/O P-5'-P-CCNC: 12 U/L (ref 10–50)
ANION GAP SERPL CALC-SCNC: 12 MEQ/L (ref 8–16)
BUN SERPL-MCNC: 32 MG/DL (ref 8–23)
CALCIUM SERPL-MCNC: 9.8 MG/DL (ref 8.8–10.2)
CHLORIDE SERPL-SCNC: 103 MEQ/L (ref 98–111)
CO2 SERPL-SCNC: 26 MEQ/L (ref 22–29)
CREAT SERPL-MCNC: 1.6 MG/DL (ref 0.7–1.2)
GFR SERPL CREATININE-BSD FRML MDRD: 45 ML/MIN/1.73M2
GLUCOSE FASTING: 285 MG/DL (ref 74–109)
POTASSIUM SERPL-SCNC: 4.9 MEQ/L (ref 3.5–5.2)
SODIUM SERPL-SCNC: 141 MEQ/L (ref 135–145)

## 2025-06-06 ENCOUNTER — LAB (OUTPATIENT)
Dept: LAB | Age: 75
End: 2025-06-06

## 2025-06-06 LAB
ANION GAP SERPL CALC-SCNC: 12 MEQ/L (ref 8–16)
BUN SERPL-MCNC: 42 MG/DL (ref 8–23)
CALCIUM SERPL-MCNC: 9.4 MG/DL (ref 8.8–10.2)
CHLORIDE SERPL-SCNC: 101 MEQ/L (ref 98–111)
CO2 SERPL-SCNC: 24 MEQ/L (ref 22–29)
CREAT SERPL-MCNC: 1.6 MG/DL (ref 0.7–1.2)
CREAT UR-MCNC: 22.1 MG/DL
GFR SERPL CREATININE-BSD FRML MDRD: 45 ML/MIN/1.73M2
GLUCOSE SERPL-MCNC: 287 MG/DL (ref 74–109)
MAGNESIUM SERPL-MCNC: 2.6 MG/DL (ref 1.6–2.6)
POTASSIUM SERPL-SCNC: 5.2 MEQ/L (ref 3.5–5.2)
PROT UR-MCNC: 30.9 MG/DL
PROT/CREAT 24H UR: 1.4 MG/G{CREAT}
SODIUM SERPL-SCNC: 137 MEQ/L (ref 135–145)

## (undated) DEVICE — SINGLE USE SUCTION VALVE MAJ-209: Brand: SINGLE USE SUCTION VALVE (STERILE)

## (undated) DEVICE — COVER ARMBRD W13XL28.5IN IMPERV BLU FOR OP RM

## (undated) DEVICE — TUBING IV STOPCOCK 48 CM 3 W

## (undated) DEVICE — SMARTSLEEVE SURGICAL GOWN, 2XL: Brand: CONVERTORS

## (undated) DEVICE — 1/2 IN. X 18 IN. LENGTH: Brand: SILICONE TUBING, PENROSE DRAIN

## (undated) DEVICE — CONTAINER,SPECIMEN,PNEU TUBE,4OZ,OR STRL: Brand: MEDLINE

## (undated) DEVICE — ENDO KIT: Brand: MEDLINE INDUSTRIES, INC.

## (undated) DEVICE — GOWN,SIRUS,NON REINFRCD,LARGE,SET IN SL: Brand: MEDLINE

## (undated) DEVICE — IV START KIT: Brand: MEDLINE INDUSTRIES, INC.

## (undated) DEVICE — TUBING, SUCTION, 1/4" X 6', STRAIGHT: Brand: MEDLINE

## (undated) DEVICE — TUBE LUKENS 20CC 6 1/4": Brand: ALLEGIANCE

## (undated) DEVICE — DRAPE,INSTRUMENT,MAGNETIC,10X16: Brand: MEDLINE

## (undated) DEVICE — SKIN AFFIX SURG ADHESIVE 72/CS 0.55ML: Brand: MEDLINE

## (undated) DEVICE — APPLIER CLP M L L11.4IN DIA10MM ENDOSCP ROT MULT FOR LIG

## (undated) DEVICE — GLOVE SURG SZ 75 L12IN FNGR THK94MIL TRNSLUC YEL LTX

## (undated) DEVICE — GLOVE ORANGE PI 8 1/2   MSG9085

## (undated) DEVICE — BLADE CLIPPER GEN PURP NS

## (undated) DEVICE — DRAIN SURG W10MMXL20CM SIL FLAT HUBLESS W/ RADPQ STRP 3/4

## (undated) DEVICE — SUTURE VCRL + SZ 3-0 L27IN ABSRB WHT CT-1 1/2 CIR VCP258H

## (undated) DEVICE — SUTURE VCRL + SZ 2-0 L27IN ABSRB CLR CT-1 1/2 CIR TAPERCUT VCP259H

## (undated) DEVICE — PACK PROCEDURE SURG SET UP SRMC

## (undated) DEVICE — DISSECTOR LAP DIA5MM BLNT TIP ENDOPATH

## (undated) DEVICE — SOLUTION IV 1000ML 0.45% SOD CHL PH 5 INJ USP VIAFLX PLAS

## (undated) DEVICE — GLOVE ORANGE PI 7   MSG9070

## (undated) DEVICE — Device: Brand: BALLOON

## (undated) DEVICE — PATIENT RETURN ELECTRODE, SINGLE-USE, CONTACT QUALITY MONITORING, ADULT, WITH 9FT CORD, FOR PATIENTS WEIGING OVER 33LBS. (15KG): Brand: MEGADYNE

## (undated) DEVICE — APPLIER LIG CLP M L11IN TI STR RNG HNDL FOR 20 CLP DISP

## (undated) DEVICE — Device

## (undated) DEVICE — SPONGE LAP W18XL18IN WHT COT 4 PLY FLD STRUNG RADPQ DISP ST

## (undated) DEVICE — GLOVE ORANGE PI 8   MSG9080

## (undated) DEVICE — 4-PORT MANIFOLD: Brand: NEPTUNE 2

## (undated) DEVICE — CONNECTOR STR 3/8IN ST 050506000 375STRCONN

## (undated) DEVICE — GLOVE SURG SZ 6 THK91MIL LTX FREE SYN POLYISOPRENE ANTI

## (undated) DEVICE — SOLUTION IV IRRIG POUR BRL 0.9% SODIUM CHL 2F7124

## (undated) DEVICE — THORACIC CATHETER,RIGHT ANGLE: Brand: ARGYLE

## (undated) DEVICE — CONNECTOR PERF W3/8XL1/2IN STR REDUC

## (undated) DEVICE — E-Z CLEAN, NON-STICK, PTFE COATED, ELECTROSURGICAL BLADE ELECTRODE, 6.5 INCH (16.5 CM): Brand: MEGADYNE

## (undated) DEVICE — Z CONVERTED USE 2715898 SWABSTICK MEDICATED W1.75XL6.5IN 1.6ML 3.15% CHG 70% ISO

## (undated) DEVICE — PENCIL SMK EVAC ALL IN 1 DSGN ENH VISIBILITY IMPROVED AIR

## (undated) DEVICE — 3M™ IOBAN™ 2 ANTIMICROBIAL INCISE DRAPE 6650EZ: Brand: IOBAN™ 2

## (undated) DEVICE — PACK,UNIVERSAL,NO GOWNS: Brand: MEDLINE

## (undated) DEVICE — RELOAD STPL L60MM THCK TISS GRN W/ GRIPPING SURF TECHNOLOGY

## (undated) DEVICE — CATH URETH 8FR RUBBER ULTMR 12IN STER

## (undated) DEVICE — LOOP VES W25MM THK1MM MAXI RED SIL FLD REPELLENT 100 PER

## (undated) DEVICE — SUTURE SOFSILK SZ 1 L30IN NABSORBABLE BLK C-17 L39MM 3/8 SS646

## (undated) DEVICE — SOLUTION IV 1000ML 0.9% SOD CHL PH 5 INJ USP VIAFLX PLAS

## (undated) DEVICE — CONNECTOR PERF 3/8X3/8X3/8IN EQL WYE

## (undated) DEVICE — GAUZE,SPONGE,8"X4",12PLY,XRAY,STRL,LF: Brand: MEDLINE

## (undated) DEVICE — SET ADMIN 25ML L117IN PMP MOD CK VLV RLER CLMP 2 SMRTSITE

## (undated) DEVICE — SET CATH 20GA L1.75IN RAD ART POLYUR RADPQ W/ INTEGR

## (undated) DEVICE — SET LNR RED GRN W/ BASE CLEANASCOPE

## (undated) DEVICE — DRAIN WND SIL FLAT RADIOPAQUE 10MM FULL FLUTED

## (undated) DEVICE — 500ML,PRESSURE INFUSER W/STOPCOCK: Brand: MEDLINE

## (undated) DEVICE — DRAIN SURG SGL COLL PT TB FOR ATS BG OASIS

## (undated) DEVICE — SUTURE PROL SZ 2-0 L30IN NONABSORBABLE BLU L26MM CT-1 1/2 8423H

## (undated) DEVICE — BASIC SINGLE BASIN BTC-LF: Brand: MEDLINE INDUSTRIES, INC.

## (undated) DEVICE — RELOAD STPL L45MM 4 45 5MM INNR TO OUTER ROW BLK ARTC EXTRA

## (undated) DEVICE — E-Z CLEAN, NON-STICK, PTFE COATED, ELECTROSURGICAL BLADE ELECTRODE, MODIFIED EXTENDED INSULATION, 4 INCH (10.2 CM): Brand: MEGADYNE

## (undated) DEVICE — PENROSE DRAIN 18 X .5" SILICONE: Brand: MEDLINE

## (undated) DEVICE — SWIVEL BRONCHSCP ANES 15 MM RUBBER CAP FIBEROPTIC SIL REUSE

## (undated) DEVICE — TUBING PRSS 36 M F

## (undated) DEVICE — CHLORAPREP 26ML ORANGE

## (undated) DEVICE — SPONGE DRN W4XL4IN RAYON/POLYESTER 6 PLY NONWOVEN PRECUT

## (undated) DEVICE — PRESSURE MONITORING SET: Brand: TRUWAVE

## (undated) DEVICE — SOLUTION IV 500ML 0.9% SOD CHL PH 5 INJ USP VIAFLX PLAS

## (undated) DEVICE — RELOAD STPL L45MM VASCULAR/MEDIUM TISS TAN CRV TIP ARTC

## (undated) DEVICE — 3M™ STERI-DRAPE™ INSTRUMENT POUCH 1018: Brand: STERI-DRAPE™

## (undated) DEVICE — GOWN,SIRUS,NONRNF,SETINSLV,XL,20/CS: Brand: MEDLINE

## (undated) DEVICE — STRIP,CLOSURE,WOUND,MEDI-STRIP,1/2X4: Brand: MEDLINE

## (undated) DEVICE — LUER-LOK 360°: Brand: CONNECTA, LUER-LOK

## (undated) DEVICE — SPONGE GZ W4XL4IN COT 12 PLY TYP VII WVN C FLD DSGN

## (undated) DEVICE — PAD GEN USE BORDERED ADH 14IN 2IN AND 12IN 4IN GZ UNIV ST

## (undated) DEVICE — Z DISCONTINUED NO SUB IDED TAPE UMB W1/8XL36IN WHT COT STRND NONRADIOPAQUE

## (undated) DEVICE — MARKER,SKIN,WI/RULER AND LABELS: Brand: MEDLINE

## (undated) DEVICE — BREAST HERNIA PACK: Brand: MEDLINE INDUSTRIES, INC.

## (undated) DEVICE — CATHETER ETER IV 22GA L1IN POLYUR STR RADPQ INTROCAN SFTY

## (undated) DEVICE — EVACUATOR SURG 100CC SIL BLB SUCT RESVR FOR CLS WND DRNGE

## (undated) DEVICE — SUTURE VCRL + SZ 4-0 L27IN ABSRB WHT FS-2 3/8 CIR REV CUT VCP422H

## (undated) DEVICE — SPLINT ARMBRD W3XL10.5IN POLYFOAM DLX A LN

## (undated) DEVICE — SOLUTION ANTIFOG VIS SYS CLEARIFY LAPSCP

## (undated) DEVICE — GLOVE ORANGE PI 7 1/2   MSG9075

## (undated) DEVICE — SUTURE PROL SZ 3-0 L36IN NONABSORBABLE BLU L26MM SH 1/2 CIR 8522H

## (undated) DEVICE — NEEDLE ASPIR 21GA L700MM US GUID TREAT DST END FOR EFFICIENT